# Patient Record
Sex: FEMALE | Race: WHITE | NOT HISPANIC OR LATINO | Employment: FULL TIME | URBAN - METROPOLITAN AREA
[De-identification: names, ages, dates, MRNs, and addresses within clinical notes are randomized per-mention and may not be internally consistent; named-entity substitution may affect disease eponyms.]

---

## 2018-10-30 ENCOUNTER — TRANSFERRED RECORDS (OUTPATIENT)
Dept: HEALTH INFORMATION MANAGEMENT | Facility: CLINIC | Age: 43
End: 2018-10-30

## 2019-02-27 ENCOUNTER — TRANSFERRED RECORDS (OUTPATIENT)
Dept: HEALTH INFORMATION MANAGEMENT | Facility: CLINIC | Age: 44
End: 2019-02-27

## 2019-03-07 ENCOUNTER — TELEPHONE (OUTPATIENT)
Dept: DERMATOLOGY | Facility: CLINIC | Age: 44
End: 2019-03-07

## 2019-06-13 NOTE — TELEPHONE ENCOUNTER
FUTURE VISIT INFORMATION      FUTURE VISIT INFORMATION:    Date: 6.27    Time: 1p    Location: Derm  REFERRAL INFORMATION:    Referring provider:       Referring providers clinic:  [facility]    Reason for visit/diagnosis  HS    RECORDS REQUESTED FROM:       Clinic name Comments Records Status Imaging Status   [facility] 2.27.19 Office note  10.30.18 Labs Scanned to [facility]

## 2019-06-27 ENCOUNTER — OFFICE VISIT (OUTPATIENT)
Dept: DERMATOLOGY | Facility: CLINIC | Age: 44
End: 2019-06-27
Payer: COMMERCIAL

## 2019-06-27 ENCOUNTER — PRE VISIT (OUTPATIENT)
Dept: DERMATOLOGY | Facility: CLINIC | Age: 44
End: 2019-06-27

## 2019-06-27 VITALS — SYSTOLIC BLOOD PRESSURE: 123 MMHG | HEART RATE: 75 BPM | DIASTOLIC BLOOD PRESSURE: 84 MMHG

## 2019-06-27 DIAGNOSIS — L73.2 HIDRADENITIS SUPPURATIVA: Primary | ICD-10-CM

## 2019-06-27 RX ORDER — CLOBETASOL PROPIONATE 0.5 MG/G
OINTMENT TOPICAL
Qty: 60 G | Refills: 1 | Status: SHIPPED | OUTPATIENT
Start: 2019-06-27 | End: 2020-03-20

## 2019-06-27 RX ORDER — OXYCODONE AND ACETAMINOPHEN 5; 325 MG/1; MG/1
1 TABLET ORAL EVERY 6 HOURS PRN
Qty: 10 TABLET | Refills: 0 | Status: SHIPPED | OUTPATIENT
Start: 2019-06-27 | End: 2019-09-05

## 2019-06-27 RX ORDER — SPIRONOLACTONE 100 MG/1
100 TABLET, FILM COATED ORAL DAILY
Qty: 90 TABLET | Refills: 3 | Status: SHIPPED | OUTPATIENT
Start: 2019-06-27 | End: 2020-03-20

## 2019-06-27 ASSESSMENT — PAIN SCALES - GENERAL: PAINLEVEL: MILD PAIN (2)

## 2019-06-27 NOTE — LETTER
6/27/2019       RE: Klarissa Curtis  [address] [facility] [address]     Dear Colleague,    Thank you for referring your patient, Klarissa Curtis, to the [facility] DERMATOLOGY at [facility]. Please see a copy of my visit note below.    [facility] Dermatology Note    Dermatology Clinic  [facility]  Clinics and Surgery Center  [address] [place] [address]    Dermatology Problem List:  1. Hidradenitis suppurativa: diagnosed age 11, initially on waistline (used to weigh 300 lbs) which cleared up -> intermittent outbreaks in groin -> for the past 10 years has had in bilateral axilla, groin, thighs, and buttocks   - Previous tx: PO antibiotics, Spironolactone, Finasteride, Rifampin, steroid injections  - Current tx: Humira (started 11/18/2018) 40mg weekly; Started 06/27/2019: clobetasol PRN, topical morphine PRN, spironolactone 100mg.    Encounter Date: Jun 27, 2019    CC:  Chief Complaint   Patient presents with     Derm Problem     Klarissa is here today for consult for HS, patient states it is located on both axilla and groin area. Previously on her waist but not recently.      History of Present Illness:  Ms. Klarissa Curtis is a 44 year old female who presents as a referral from [facility] in regards to her Hidradenitis suppurativa. This is her first visit with us at our dermatology clinic.     Since starting Humira, she doesn't think she is getting any new lesions but the current ones have not changed. She notes fatigue and a bit of weight gain since starting Humira. She states that her alcohol use has decreased since starting this medication as she used to self-medicate with alcohol. She feels steroid injections have helped in the past and allowed her to move. Pt notes previous use of many antibiotics without success. Her flares seem to be associated with her menstrual cycle, which have been irregular  recently. Reduction in smoking habits did not seem to help her symptoms. Pt notes recent swelling of her left middle finger, which hurts when she bends it.    Family Hx of breast cancer. No Hx of diabetes, pre-diabetes, or high blood pressure.    She is working [place] a  [place]    Past Medical History:   There is no problem list on file for this patient.    Past Medical History:   Diagnosis Date     NO ACTIVE PROBLEMS      Past Surgical History:   Procedure Laterality Date     BUNIONECTOMY       Social History:  Patient reports that she has been smoking.  She has never used smokeless tobacco.    Family History:  Family History   Problem Relation Age of Onset     Diabetes Sister      Hypertension Sister      Medications:  Current Outpatient Medications   Medication Sig Dispense Refill     adalimumab (HUMIRA *CF*) 40 MG/0.4ML pen kit        Allergies   Allergen Reactions     Penicillins Hives, Itching and Rash     Review of Systems:  -As per HPI  -Otherwise nml state of health. No other skin concerns.     Physical exam:  Vitals: /84 (BP Location: Left arm, Patient Position: Sitting, Cuff Size: Adult Large)   Pulse 75   GEN: This is a well developed, well-nourished female in no acute distress, in a pleasant mood.    SKIN: Full skin, which includes the head/face, both arms, chest, back, abdomen,both legs, genitalia and/or groin buttocks, digits and/or nails, was examined.  - 6 inflammatory nodules in R axilla, with 5mm vertical cord.  - 3 inflammatory nodules in the L axilla.  - Scarred plaque, non inflamed, on L abdomen  - 1 inflammatory nodule, R inguinal fold  - 2 inflammatory nodule in L upper thigh  - 1 inflammatory nodule on L lower buttock  - Cystic nodules and open comedones   - Cystic nodule on suprapubic region  - Acneiform papules on the jawline and posterior neck  - No other lesions of concern on areas examined.     Impression/Plan:  1. Hidradenitis suppurativa    Prescribed  topical morphine. Pt to apply 4 times day, PRN    Prescribed clobetasol ointment. Pt to apply PRN for itching.    Prescribed Spironolactone. Pt to take 100mg daily.    Prescribed oxycodone. Pt to take 1 tablet every 6 hours as needed. (10 tablets)    Prescribed Mepilex bandages.    Kenalog injection procedure note: After positioning and cleansing with isopropyl alcohol, 4 total cc of Kenalog 10 mg/cc was injected into multiple lesions Lesion(s) on the axilla and groin.  The patient tolerated the procedure well and left the Dermatology clinic in good condition.    Adalimumab Activity and Neutralizing Antibody to be taken, prior to next Humira injection.    Referral to plastic surgery provided for HS excision of R axilla.    Follow-up in 8 weeks, earlier for new or changing lesions.     Staff Involved: Flavio Raza MD (resident)    Scribe Disclosure  I, Ruben Crowe, am serving as a scribe to document services personally performed by Dr. Jean Kenny, based on data collection and the provider's statements to me.     Provider Disclosure:   The documentation recorded by the scribe accurately reflects the services I personally performed and the decisions made by me.    Jean Kenny MD, FAAD    Departments of Internal Medicine and Dermatology  [facility]  [phone]    More than half of the patient visit was spent counseling the patient on diagnosis/treatment.  Face-to-face time: 60 minutes.             Again, thank you for allowing me to participate in the care of your patient.      Sincerely,    Jean Kenny MD

## 2019-06-27 NOTE — NURSING NOTE
Drug Administration Record    Prior to injection, verified patient identity using patient's name and date of birth.  Due to injection administration, patient instructed to remain in clinic for 15 minutes  afterwards, and to report any adverse reaction to me immediately.    Drug Name: triamcinolone acetonide(kenalog)  Dose: 4mL of triamcinolone 10mg/mL, 40mg dose  Route administered: ID  NDC #: [id number]: Kenalog-10 ([id number])  Amount of waste(mL):1  Reason for waste: Multi dose vial    LOT #: [id number]  SITE: Body  : [employer]  EXPIRATION DATE: OCT2020

## 2019-06-27 NOTE — NURSING NOTE
Chief Complaint   Patient presents with     Derm Problem     Klarissa is here today for consult for HS, patient states it is located on both axilla and groin area. Previously on her waist but not recently.      Kasandra Billy LPN

## 2019-06-27 NOTE — PROGRESS NOTES
[facility] Dermatology Note    Dermatology Clinic  [facility]  Clinics and Surgery Center  [address] [place] [id number]    Dermatology Problem List:  1. Hidradenitis suppurativa: diagnosed age 11, initially on waistline (used to weigh 300 lbs) which cleared up -> intermittent outbreaks in groin -> for the past 10 years has had in bilateral axilla, groin, thighs, and buttocks   - Previous tx: PO antibiotics, Spironolactone, Finasteride, Rifampin, steroid injections  - Current tx: Humira (started 11/18/2018) 40mg weekly; Started 06/27/2019: clobetasol PRN, topical morphine PRN, spironolactone 100mg.    Encounter Date: Jun 27, 2019    CC:  Chief Complaint   Patient presents with     Derm Problem     Klarissa is here today for consult for HS, patient states it is located on both axilla and groin area. Previously on her waist but not recently.      History of Present Illness:  Ms. Klarissa Curtis is a 44 year old female who presents as a referral from [facility] in regards to her Hidradenitis suppurativa. This is her first visit with us at our dermatology clinic.     Since starting Humira, she doesn't think she is getting any new lesions but the current ones have not changed. She notes fatigue and a bit of weight gain since starting Humira. She states that her alcohol use has decreased since starting this medication as she used to self-medicate with alcohol. She feels steroid injections have helped in the past and allowed her to move. Pt notes previous use of many antibiotics without success. Her flares seem to be associated with her menstrual cycle, which have been irregular recently. Reduction in smoking habits did not seem to help her symptoms. Pt notes recent swelling of her left middle finger, which hurts when she bends it.    Family Hx of breast cancer. No Hx of diabetes, pre-diabetes, or high blood pressure.    She is working as a   [place]    Past Medical History:   There is no problem list on file for this patient.    Past Medical History:   Diagnosis Date     NO ACTIVE PROBLEMS      Past Surgical History:   Procedure Laterality Date     BUNIONECTOMY       Social History:  Patient reports that she has been smoking.  She has never used smokeless tobacco.    Family History:  Family History   Problem Relation Age of Onset     Diabetes Sister      Hypertension Sister      Medications:  Current Outpatient Medications   Medication Sig Dispense Refill     adalimumab (HUMIRA *CF*) 40 MG/0.4ML pen kit        Allergies   Allergen Reactions     Penicillins Hives, Itching and Rash     Review of Systems:  -As per HPI  -Otherwise nml state of health. No other skin concerns.     Physical exam:  Vitals: /84 (BP Location: Left arm, Patient Position: Sitting, Cuff Size: Adult Large)   Pulse 75   GEN: This is a well developed, well-nourished female in no acute distress, in a pleasant mood.    SKIN: Full skin, which includes the head/face, both arms, chest, back, abdomen,both legs, genitalia and/or groin buttocks, digits and/or nails, was examined.  - 6 inflammatory nodules in R axilla, with 5mm vertical cord.  - 3 inflammatory nodules in the L axilla.  - Scarred plaque, non inflamed, on L abdomen  - 1 inflammatory nodule, R inguinal fold  - 2 inflammatory nodule in L upper thigh  - 1 inflammatory nodule on L lower buttock  - Cystic nodules and open comedones   - Cystic nodule on suprapubic region  - Acneiform papules on the jawline and posterior neck  - No other lesions of concern on areas examined.     Impression/Plan:  1. Hidradenitis suppurativa    Prescribed topical morphine. Pt to apply 4 times day, PRN    Prescribed clobetasol ointment. Pt to apply PRN for itching.    Prescribed Spironolactone. Pt to take 100mg daily.    Prescribed oxycodone. Pt to take 1 tablet every 6 hours as needed. (10 tablets)    Prescribed Mepilex  bandages.    Kenalog injection procedure note: After positioning and cleansing with isopropyl alcohol, 4 total cc of Kenalog 10 mg/cc was injected into multiple lesions Lesion(s) on the axilla and groin.  The patient tolerated the procedure well and left the Dermatology clinic in good condition.    Adalimumab Activity and Neutralizing Antibody to be taken, prior to next Humira injection.    Referral to plastic surgery provided for HS excision of R axilla.    Follow-up in 8 weeks, earlier for new or changing lesions.     Staff Involved: Flavio Raza MD (resident)    Scribe Disclosure  I, Ruben Crowe, am serving as a scribe to document services personally performed by Dr. Jean Kenny, based on data collection and the provider's statements to me.     Provider Disclosure:   The documentation recorded by the scribe accurately reflects the services I personally performed and the decisions made by me.    Jean Kenny MD, FAAD    Departments of Internal Medicine and Dermatology  [facility]  [phone]    More than half of the patient visit was spent counseling the patient on diagnosis/treatment.  Face-to-face time: 60 minutes.       Provider Disclosure:   The documentation recorded by the scribe accurately reflects the services I personally performed and the decisions made by me.    Jean Kenny MD, FAAD    Departments of Internal Medicine and Dermatology  [facility]  [phone]    Staff Physician Comments:   I saw and evaluated the patient with the resident and I agree with the assessment and plan.  I was present for the entire procedure and examination.    Jean Kenny MD, FAABERTRAND    Departments of Internal Medicine and Dermatology  [facility]  [phone]

## 2019-06-27 NOTE — LETTER
Date:June 28, 2019      Patient was self referred, no letter generated. Do not send.        [facility] Physicians Health Information

## 2019-09-05 ENCOUNTER — OFFICE VISIT (OUTPATIENT)
Dept: DERMATOLOGY | Facility: CLINIC | Age: 44
End: 2019-09-05
Payer: COMMERCIAL

## 2019-09-05 ENCOUNTER — TELEPHONE (OUTPATIENT)
Dept: DERMATOLOGY | Facility: CLINIC | Age: 44
End: 2019-09-05

## 2019-09-05 DIAGNOSIS — L73.2 HIDRADENITIS SUPPURATIVA: Primary | ICD-10-CM

## 2019-09-05 DIAGNOSIS — L73.2 HIDRADENITIS SUPPURATIVA: ICD-10-CM

## 2019-09-05 LAB
ALBUMIN SERPL-MCNC: 3.7 G/DL (ref 3.4–5)
ALP SERPL-CCNC: 60 U/L (ref 40–150)
ALT SERPL W P-5'-P-CCNC: 20 U/L (ref 0–50)
ANION GAP SERPL CALCULATED.3IONS-SCNC: 4 MMOL/L (ref 3–14)
AST SERPL W P-5'-P-CCNC: 14 U/L (ref 0–45)
BASOPHILS # BLD AUTO: 0 10E9/L (ref 0–0.2)
BASOPHILS NFR BLD AUTO: 0.4 %
BILIRUB SERPL-MCNC: 0.3 MG/DL (ref 0.2–1.3)
BUN SERPL-MCNC: 14 MG/DL (ref 7–30)
CALCIUM SERPL-MCNC: 8.6 MG/DL (ref 8.5–10.1)
CHLORIDE SERPL-SCNC: 102 MMOL/L (ref 94–109)
CO2 SERPL-SCNC: 29 MMOL/L (ref 20–32)
CREAT SERPL-MCNC: 0.67 MG/DL (ref 0.52–1.04)
DIFFERENTIAL METHOD BLD: NORMAL
EOSINOPHIL # BLD AUTO: 0.2 10E9/L (ref 0–0.7)
EOSINOPHIL NFR BLD AUTO: 2.2 %
ERYTHROCYTE [DISTWIDTH] IN BLOOD BY AUTOMATED COUNT: 13.1 % (ref 10–15)
GFR SERPL CREATININE-BSD FRML MDRD: >90 ML/MIN/{1.73_M2}
GLUCOSE SERPL-MCNC: 79 MG/DL (ref 70–99)
HCT VFR BLD AUTO: 43.3 % (ref 35–47)
HGB BLD-MCNC: 14.2 G/DL (ref 11.7–15.7)
IMM GRANULOCYTES # BLD: 0 10E9/L (ref 0–0.4)
IMM GRANULOCYTES NFR BLD: 0.3 %
LYMPHOCYTES # BLD AUTO: 2.9 10E9/L (ref 0.8–5.3)
LYMPHOCYTES NFR BLD AUTO: 39.6 %
MCH RBC QN AUTO: 31.1 PG (ref 26.5–33)
MCHC RBC AUTO-ENTMCNC: 32.8 G/DL (ref 31.5–36.5)
MCV RBC AUTO: 95 FL (ref 78–100)
MONOCYTES # BLD AUTO: 0.5 10E9/L (ref 0–1.3)
MONOCYTES NFR BLD AUTO: 6.6 %
NEUTROPHILS # BLD AUTO: 3.7 10E9/L (ref 1.6–8.3)
NEUTROPHILS NFR BLD AUTO: 50.9 %
NRBC # BLD AUTO: 0 10*3/UL
NRBC BLD AUTO-RTO: 0 /100
PLATELET # BLD AUTO: 403 10E9/L (ref 150–450)
POTASSIUM SERPL-SCNC: 4 MMOL/L (ref 3.4–5.3)
PROT SERPL-MCNC: 7.9 G/DL (ref 6.8–8.8)
RBC # BLD AUTO: 4.56 10E12/L (ref 3.8–5.2)
SODIUM SERPL-SCNC: 134 MMOL/L (ref 133–144)
WBC # BLD AUTO: 7.2 10E9/L (ref 4–11)

## 2019-09-05 RX ORDER — METHYLPREDNISOLONE SODIUM SUCCINATE 125 MG/2ML
40 INJECTION, POWDER, LYOPHILIZED, FOR SOLUTION INTRAMUSCULAR; INTRAVENOUS ONCE
Status: CANCELLED | OUTPATIENT
Start: 2019-09-05

## 2019-09-05 RX ORDER — OXYCODONE AND ACETAMINOPHEN 5; 325 MG/1; MG/1
1 TABLET ORAL EVERY 6 HOURS PRN
Qty: 10 TABLET | Refills: 0 | Status: SHIPPED | OUTPATIENT
Start: 2019-09-05 | End: 2019-11-07

## 2019-09-05 ASSESSMENT — PAIN SCALES - GENERAL: PAINLEVEL: MILD PAIN (2)

## 2019-09-05 NOTE — TELEPHONE ENCOUNTER
Patient to start infusions. Sending to finance specialists to check insurance coverage.      Bev Cavazos LPN

## 2019-09-05 NOTE — LETTER
9/5/2019       RE: Klarissa Curtis  [address] [address]     Dear Colleague,    Thank you for referring your patient, Klarissa Curtis, to the [facility] DERMATOLOGY at [facility]. Please see a copy of my visit note below.    [facility] Dermatology Note    Dermatology Clinic  [facility]  Clinics and Surgery Center  [address] [place] [address]    Dermatology Problem List:  1. Hidradenitis suppurativa: diagnosed age 11, initially on waistline (used to weigh 300 lbs) which cleared up -> intermittent outbreaks-> for the past 10 years has had in bilateral axilla, groin, thighs, and buttocks  - Current tx:    - infliximab started 9/5/19   - clobetasol PRN, topical morphine PRN    - Kenalog injections intermittently, last 9/5/19  - Previous tx: PO antibiotics, Finasteride, Rifampin,  Spironolactone (6/2019- 9/5/19, stopped due to abnormal uterine bleeding); Humira 40 mg weekly (11/18/2018-9/2019, stopped due to ineffective)    Encounter Date: Sep 5, 2019    CC:  Chief Complaint   Patient presents with     Derm Problem     10 week follow up, no changes     History of Present Illness:  Ms. Klarissa Curtis is a 44 year old female without other PMH apart from HS, who presents for followup for Hidradenitis suppurativa. The patient last saw us in clinic on 06/27/19. At the time, she reported no new changes while on spironolactone and humira. She noted that flares seemed to increase with her menstrual cycle which she had noted was irregular. She was prescribed: topical morphine to apply qid PRN, clobetasol ointment PRN for pruritis, spirnolactone 100 mg qd, and percocet prn for pain. She received Kenalog injection and continues on Humira. She also had a referral to plastic surgery for excision of R axilla, which was not yet seen.    Today the patient reports that her HS has remained about the same. She was on Humira since  October of last year. In the interim, she has not had time to do labs as she doesn't have time on Sundays (prior taking her medications).  She had a flare-up on the Lt groin and Rt axilla with lesions active 90% of the time with constant draining and tenderness. Used percocet when she has pain and to get her to sleep. She last took 2 tablets yesterday. She notes that about 3-4 times per month she will have a bad flare.     She has some pain with sitting. It became irregular after the start of Spironolactone, but she is unsure if it may actually be due to menopause. Her menstruation normally lasts 4 days, but now it is 1.5 weeks long.    After starting Humira 10/2019, there are no new lesions but current lesions remain the same. She has not been using the clobetasol cream regularly due to not feeling itching. With regards to the morphine topical, she states that she was unable to locate a pharmacy that distributed it. She continues to take the Spironolactone and has been for the past 6 weeks; she is not sure if it is working as her HS hasn't changed.    Past Medical History:   Patient Active Problem List   Diagnosis     Hidradenitis suppurativa     Past Surgical History:   Procedure Laterality Date     BUNIONECTOMY       Social History:  Patient reports that she has been smoking.  She has never used smokeless tobacco. 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 Her mother is a nurse.    Family History:  Family History   Problem Relation Age of Onset     Diabetes Sister      Hypertension Sister      Medications:  Current Outpatient Medications   Medication Sig Dispense Refill     adalimumab (HUMIRA *CF*) 40 MG/0.4ML pen kit        clobetasol (TEMOVATE) 0.05 % external ointment Pain, itching irritation 60 g 1     morphine 0.1% in intrasite topical gel Place 1 Applicatorful onto the skin 4 times daily as needed for pain 25 g 0     oxyCODONE-acetaminophen (PERCOCET) 5-325 MG tablet Take 1 tablet  by mouth every 6 hours as needed for severe pain 10 tablet 0     spironolactone (ALDACTONE) 100 MG tablet Take 1 tablet (100 mg) by mouth daily 90 tablet 3     Allergies   Allergen Reactions     Penicillins Hives, Itching and Rash     Review of Systems:  -As per HPI  -Gastrointestinal: Normal BM.  -Otherwise nml state of health. No other skin concerns.     Physical exam:  Vitals: /80 (BP Location: Right arm)   Pulse 86   GEN: This is a well developed, well-nourished female in no acute distress, in a pleasant mood.    SKIN: Focused examination of the axillas bilaterally, proximal upper extremities and buttocks was performed.  -Julian stage II  - Rt and left axilla: multiple non-inflammatory nodule with scarring and multiple sinus tracts,  Two inflammatory nodules on right axilla  - Lt groin tunnel in L inguinal fold, with one open nodule with white sinus draining  -Rt groin 1 draining nodule, with one open nodule with white sinus draining   - no perianal HS involvement  -nevi homogenous and fairly symmetric on buttocks upon examination with dermoscopy on right arm  - No other lesions of concern on areas examined.     Impression/Plan:  1. Hidradenitis suppurativa, Julian stage II  Stable since last visit. Uncertain if Humira is on target dose as no level was done at this time. She failed multiple previous treatment. Would switch from Humira to infliximab.    Start Infliximab (switch from Humira given not effective, will continue current Humira until approval) .     LABS: CBC with platelets differential, Quantiferon TB Gold Plus, CMP today, and annually    Continue clobetasol PRN for itching, topical morphine PRN, percocet prn for severe pain    Unable to use Mepilex bandages due to insurance restrictions    Stop spironolactone given abnormal uterine bleeding    Kenalog injection procedure note: After positioning and cleansing with isopropyl alcohol,  4 total cc of Kenalog 10 mg/cc was injected into groin nodule  bilaterally and R axilla; 4 Lesion(s) total.  The patient tolerated the procedure well and left the Dermatology clinic in good condition.    Placed another referral to plastic surgery provided for HS excision of R axilla. (not went through on last visit)    2.  Abnormal uterine bleeding  Onset ~6/ 2019 reported to be irregular 2 weeks prior to start of spironolactone. And becomes more irregular with 1-2 weeks of continuous period, 2 tampon/day. Possible ddx include spironolactone-related bleeding from abnormal hormone, gyne bleeding (has h/o polyps), or perimenopausal.     - Placed a referral to OB/GYN due to irregular menstruation.    Follow-up in 12 weeks, or prn.    Tanja Monzon MD  Internal Medicine PGY-2  Pager: [phone]    Staff Involved:    Scribe Disclosure  I, June Augustus, am serving as a scribe to document services personally performed by Dr. Jean Kenny, based on data collection and the provider's statements to me.     Provider Disclosure:   The documentation recorded by the scribe accurately reflects the services I personally performed and the decisions made by me.    Jean Kenny MD, FAAD    Departments of Internal Medicine and Dermatology  [facility]  [phone]        Staff Physician Comments:   I saw and evaluated the patient with the resident and I agree with the assessment and plan.  I was present for the key portions of the above procedure and examination.    Jean Kenny MD, FAAD    Departments of Internal Medicine and Dermatology  [facility]  [phone]              Again, thank you for allowing me to participate in the care of your patient.      Sincerely,    Jean Kenny MD

## 2019-09-05 NOTE — LETTER
Date:September 6, 2019      Patient was self referred, no letter generated. Do not send.        [facility] Physicians Health Information

## 2019-09-05 NOTE — PROGRESS NOTES
[facility] Dermatology Note    Dermatology Clinic  [facility]  [facility]  [address] [place] [id number]    Dermatology Problem List:  1. Hidradenitis suppurativa: diagnosed age 11, initially on waistline (used to weigh 300 lbs) which cleared up -> intermittent outbreaks-> for the past 10 years has had in bilateral axilla, groin, thighs, and buttocks  - Current tx:    - infliximab started 9/5/19   - clobetasol PRN, topical morphine PRN    - Kenalog injections intermittently, last 9/5/19  - Previous tx: PO antibiotics, Finasteride, Rifampin,  Spironolactone (6/2019- 9/5/19, stopped due to abnormal uterine bleeding); Humira 40 mg weekly (11/18/2018-9/2019, stopped due to ineffective)    Encounter Date: Sep 5, 2019    CC:  Chief Complaint   Patient presents with     Derm Problem     10 week follow up, no changes     History of Present Illness:  Ms. Klarissa Curtis is a 44 year old female without other PMH apart from HS, who presents for followup for Hidradenitis suppurativa. The patient last saw us in clinic on 06/27/19. At the time, she reported no new changes while on spironolactone and humira. She noted that flares seemed to increase with her menstrual cycle which she had noted was irregular. She was prescribed: topical morphine to apply qid PRN, clobetasol ointment PRN for pruritis, spirnolactone 100 mg qd, and percocet prn for pain. She received Kenalog injection and continues on Humira. She also had a referral to plastic surgery for excision of R axilla, which was not yet seen.    Today the patient reports that her HS has remained about the same. She was on Humira since October of last year. In the interim, she has not had time to do labs as she doesn't have time on Sundays (prior taking her medications).  She had a flare-up on the Lt groin and Rt axilla with lesions active 90% of the time with constant draining and tenderness. Used percocet when  she has pain and to get her to sleep. She last took 2 tablets yesterday. She notes that about 3-4 times per month she will have a bad flare.     She has some pain with sitting. It became irregular after the start of Spironolactone, but she is unsure if it may actually be due to menopause. Her menstruation normally lasts 4 days, but now it is 1.5 weeks long.    After starting Humira 10/2019, there are no new lesions but current lesions remain the same. She has not been using the clobetasol cream regularly due to not feeling itching. With regards to the morphine topical, she states that she was unable to locate a pharmacy that distributed it. She continues to take the Spironolactone and has been for the past 6 weeks; she is not sure if it is working as her HS hasn't changed.    Past Medical History:   Patient Active Problem List   Diagnosis     Hidradenitis suppurativa     Past Surgical History:   Procedure Laterality Date     BUNIONECTOMY       Social History:  Patient reports that she has been smoking.  She has never used smokeless tobacco. The patient works for a company that runs [employer] in the [place]. Her mother is a nurse.    Family History:  Family History   Problem Relation Age of Onset     Diabetes Sister      Hypertension Sister      Medications:  Current Outpatient Medications   Medication Sig Dispense Refill     adalimumab (HUMIRA *CF*) 40 MG/0.4ML pen kit        clobetasol (TEMOVATE) 0.05 % external ointment Pain, itching irritation 60 g 1     morphine 0.1% in intrasite topical gel Place 1 Applicatorful onto the skin 4 times daily as needed for pain 25 g 0     oxyCODONE-acetaminophen (PERCOCET) 5-325 MG tablet Take 1 tablet by mouth every 6 hours as needed for severe pain 10 tablet 0     spironolactone (ALDACTONE) 100 MG tablet Take 1 tablet (100 mg) by mouth daily 90 tablet 3     Allergies   Allergen Reactions     Penicillins Hives, Itching and Rash     Review of Systems:  -As per  HPI  -Gastrointestinal: Normal BM.  -Otherwise nml state of health. No other skin concerns.     Physical exam:  Vitals: /80 (BP Location: Right arm)   Pulse 86   GEN: This is a well developed, well-nourished female in no acute distress, in a pleasant mood.    SKIN: Focused examination of the axillas bilaterally, proximal upper extremities and buttocks was performed.  -Julian stage II  - Rt and left axilla: multiple non-inflammatory nodule with scarring and multiple sinus tracts,  Two inflammatory nodules on right axilla  - Lt groin tunnel in L inguinal fold, with one open nodule with white sinus draining  -Rt groin 1 draining nodule, with one open nodule with white sinus draining   - no perianal HS involvement  -nevi homogenous and fairly symmetric on buttocks upon examination with dermoscopy on right arm  - No other lesions of concern on areas examined.     Impression/Plan:  1. Hidradenitis suppurativa, Julian stage II  Stable since last visit. Uncertain if Humira is on target dose as no level was done at this time. She failed multiple previous treatment. Would switch from Humira to infliximab.    Start Infliximab (switch from Humira given not effective, will continue current Humira until approval) .     LABS: CBC with platelets differential, Quantiferon TB Gold Plus, CMP today, and annually    Continue clobetasol PRN for itching, topical morphine PRN, percocet prn for severe pain    Unable to use Mepilex bandages due to insurance restrictions    Stop spironolactone given abnormal uterine bleeding    Kenalog injection procedure note: After positioning and cleansing with isopropyl alcohol,  4 total cc of Kenalog 10 mg/cc was injected into groin nodule bilaterally and R axilla; 4 Lesion(s) total.  The patient tolerated the procedure well and left the Dermatology clinic in good condition.    Placed another referral to plastic surgery provided for HS excision of R axilla. (not went through on last visit)    2.   Abnormal uterine bleeding  Onset ~6/ 2019 reported to be irregular 2 weeks prior to start of spironolactone. And becomes more irregular with 1-2 weeks of continuous period, 2 tampon/day. Possible ddx include spironolactone-related bleeding from abnormal hormone, gyne bleeding (has h/o polyps), or perimenopausal.     - Placed a referral to OB/GYN due to irregular menstruation.    Follow-up in 12 weeks, or prn.    Tanja Monzon MD  Internal Medicine PGY-2  Pager: [phone]    Staff Involved:    Scribe Disclosure  I, June Augustus, am serving as a scribe to document services personally performed by Dr. Jean Kenny, based on data collection and the provider's statements to me.     Provider Disclosure:   The documentation recorded by the scribe accurately reflects the services I personally performed and the decisions made by me.    Jean Kenny MD, FAAD    Departments of Internal Medicine and Dermatology  [facility]  [phone]        Staff Physician Comments:   I saw and evaluated the patient with the resident and I agree with the assessment and plan.  I was present for the key portions of the above procedure and examination.    Jean Kenny MD, FAAD    Departments of Internal Medicine and Dermatology  [facility]  [phone]

## 2019-09-05 NOTE — NURSING NOTE
Chief Complaint   Patient presents with     Derm Problem     10 week follow up, no changes     Magali Snider, EMT

## 2019-09-09 LAB
GAMMA INTERFERON BACKGROUND BLD IA-ACNC: 0.04 IU/ML
M TB IFN-G BLD-IMP: NEGATIVE
M TB IFN-G CD4+ BCKGRND COR BLD-ACNC: >10 IU/ML
MITOGEN IGNF BCKGRD COR BLD-ACNC: 0 IU/ML
MITOGEN IGNF BCKGRD COR BLD-ACNC: 0 IU/ML

## 2019-09-16 ENCOUNTER — TELEPHONE (OUTPATIENT)
Dept: PLASTIC SURGERY | Facility: CLINIC | Age: 44
End: 2019-09-16

## 2019-09-16 VITALS
SYSTOLIC BLOOD PRESSURE: 112 MMHG | HEART RATE: 86 BPM | BODY MASS INDEX: 37.67 KG/M2 | WEIGHT: 240 LBS | DIASTOLIC BLOOD PRESSURE: 80 MMHG | HEIGHT: 67 IN

## 2019-09-16 ASSESSMENT — MIFFLIN-ST. JEOR: SCORE: 1766.26

## 2019-09-16 NOTE — TELEPHONE ENCOUNTER
Prior Authorization required. This is off-label or against insurance policy. This may take 10-14 business days.    If patient were to dose as scheduled, it would likely not be covered by insurance.  recommendation is to wait for insurance approval, unless deemed medically necessary by provider.

## 2019-09-16 NOTE — TELEPHONE ENCOUNTER
Health Call Center    Phone Message    May a detailed message be left on voicemail: yes    Reason for Call: Question regarding specialist protocol  Please follow protocols- only utilize this documentation for questions or concerns that are not clear in the protocol.  Contact clinic directly to clarify question(s) via phone or [employer] message.   Was Clinic Available: yes  Question regarding protocol:  Pt has a referral to be seen for Hidradenitis suppurativa. Guideline says to transfer to Dermatology but Derm is referring pt to plastic.  Is there a referral for the requested specialist/specialty? yes  Name of referring provider: Jean Kenny MD  Location of referring provider: Dermatology       Action Taken: Message routed to:  [facility] ([facility]): Plastic Surgery

## 2019-09-24 NOTE — TELEPHONE ENCOUNTER
Prior authorization denied. Appeal in process, which can be a lengthy process.  will report back when response received.

## 2019-09-27 ENCOUNTER — OFFICE VISIT (OUTPATIENT)
Dept: OBGYN | Facility: CLINIC | Age: 44
End: 2019-09-27
Attending: DERMATOLOGY
Payer: COMMERCIAL

## 2019-09-27 VITALS
SYSTOLIC BLOOD PRESSURE: 129 MMHG | HEART RATE: 91 BPM | DIASTOLIC BLOOD PRESSURE: 84 MMHG | BODY MASS INDEX: 37.67 KG/M2 | WEIGHT: 240 LBS | HEIGHT: 67 IN

## 2019-09-27 DIAGNOSIS — N92.6 IRREGULAR MENSES: Primary | ICD-10-CM

## 2019-09-27 DIAGNOSIS — Z01.419 ENCOUNTER FOR GYNECOLOGICAL EXAMINATION WITHOUT ABNORMAL FINDING: ICD-10-CM

## 2019-09-27 DIAGNOSIS — Z12.4 SCREENING FOR MALIGNANT NEOPLASM OF CERVIX: ICD-10-CM

## 2019-09-27 DIAGNOSIS — L73.2 HIDRADENITIS SUPPURATIVA: ICD-10-CM

## 2019-09-27 PROCEDURE — G0145 SCR C/V CYTO,THINLAYER,RESCR: HCPCS | Performed by: OBSTETRICS & GYNECOLOGY

## 2019-09-27 PROCEDURE — 87624 HPV HI-RISK TYP POOLED RSLT: CPT | Performed by: OBSTETRICS & GYNECOLOGY

## 2019-09-27 PROCEDURE — G0463 HOSPITAL OUTPT CLINIC VISIT: HCPCS | Mod: ZF

## 2019-09-27 ASSESSMENT — PATIENT HEALTH QUESTIONNAIRE - PHQ9
5. POOR APPETITE OR OVEREATING: SEVERAL DAYS
SUM OF ALL RESPONSES TO PHQ QUESTIONS 1-9: 3

## 2019-09-27 ASSESSMENT — ANXIETY QUESTIONNAIRES
3. WORRYING TOO MUCH ABOUT DIFFERENT THINGS: NOT AT ALL
6. BECOMING EASILY ANNOYED OR IRRITABLE: NOT AT ALL
7. FEELING AFRAID AS IF SOMETHING AWFUL MIGHT HAPPEN: NOT AT ALL
2. NOT BEING ABLE TO STOP OR CONTROL WORRYING: NOT AT ALL
5. BEING SO RESTLESS THAT IT IS HARD TO SIT STILL: NOT AT ALL
GAD7 TOTAL SCORE: 1
1. FEELING NERVOUS, ANXIOUS, OR ON EDGE: NOT AT ALL

## 2019-09-27 ASSESSMENT — PAIN SCALES - GENERAL: PAINLEVEL: NO PAIN (0)

## 2019-09-27 ASSESSMENT — MIFFLIN-ST. JEOR: SCORE: 1766.26

## 2019-09-27 NOTE — PROGRESS NOTES
Progress Note    SUBJECTIVE:      S: Klarissa Curtis is a 44 year old  here today for annual exam and to discuss recent irregular menses. She has severe hidradenitis suppurativa effecting her axilla and groin.  She is under the care of Dr. Kenny.  She is currently on Humira but this is not been effective and they are hoping to start infliximab. She is considering surgery if she can figure out the financial aspect of this and the loss of work.    She has been gone from [place] for 20 years but is back to be with family and for a better medical team.  She does not think she has had a GYN exam in about 4 years.  She reports normal Pap smears.  She does state that she had a history of a cervical polyp but otherwise no abnormal GYN history.  Her last intercourse was in .  Her H.S. has been consuming her and management.  It is quite painful and uncomfortable.  It is made her very hesitant to start a new relationship.  She is currently .    She states that she was started on spironolactone in 2019.  Prior to this her menses were very regular but then began having her menses every 2 weeks then she went one whole month of continued spotting and bleeding.  When she forgot to of her spironolactone her bleeding stopped.  She has had significant weight gain on her Humira.  She does not think her thyroid has ever been checked.    Her mother had breast cancer in her late 40's.  Maura has not had a mammogram since prior to age 39.      Menstrual History:  Menstrual History 2019   LAST MENSTRUAL PERIOD 8/15/2019       Mammogram current: no (patient will schedule)  Last Mammogram:   No results found.     Last Colonoscopy:  No results found for this or any previous visit.      HISTORY:  adalimumab (HUMIRA *CF*) 40 MG/0.4ML pen kit,   clobetasol (TEMOVATE) 0.05 % external ointment, Pain, itching irritation  morphine 0.1% in intrasite topical gel, Place 1 Applicatorful onto the skin 4  times daily as needed for pain  oxyCODONE-acetaminophen (PERCOCET) 5-325 MG tablet, Take 1 tablet by mouth every 6 hours as needed for severe pain  spironolactone (ALDACTONE) 100 MG tablet, Take 1 tablet (100 mg) by mouth daily    No current facility-administered medications on file prior to visit.     Allergies   Allergen Reactions     Penicillins Hives, Itching and Rash       There is no immunization history on file for this patient.    OB History   No obstetric history on file.     Past Medical History:   Diagnosis Date     NO ACTIVE PROBLEMS      Past Surgical History:   Procedure Laterality Date     BUNIONECTOMY       Family History   Problem Relation Age of Onset     Diabetes Sister      Hypertension Sister      Social History     Socioeconomic History     Marital status: Single     Spouse name: None     Number of children: None     Years of education: None     Highest education level: None   Occupational History     None   Social Needs     Financial resource strain: None     Food insecurity:     Worry: None     Inability: None     Transportation needs:     Medical: None     Non-medical: None   Tobacco Use     Smoking status: Current Every Day Smoker     Smokeless tobacco: Never Used   Substance and Sexual Activity     Alcohol use: None     Drug use: None     Sexual activity: None   Lifestyle     Physical activity:     Days per week: None     Minutes per session: None     Stress: None   Relationships     Social connections:     Talks on phone: None     Gets together: None     Attends Mu-ism service: None     Active member of club or organization: None     Attends meetings of clubs or organizations: None     Relationship status: None     Intimate partner violence:     Fear of current or ex partner: None     Emotionally abused: None     Physically abused: None     Forced sexual activity: None   Other Topics Concern     None   Social History Narrative     None      ROS: 10 point ROS neg other than the symptoms  "noted above in the HPI.   PHQ-9 SCORE 9/27/2019   PHQ-9 Total Score 3     ELLIE-7 SCORE 9/27/2019   Total Score 1         EXAM:  Blood pressure 129/84, pulse 91, height 1.702 m (5' 7\"), weight 108.9 kg (240 lb), last menstrual period 08/15/2019, not currently breastfeeding. Body mass index is 37.59 kg/m .  General - pleasant female in no acute distress.  Skin - no suspicious lesions or rashes  EENT-  euthyroid with out palpable nodules  Neck - supple without lymphadenopathy.  Lungs - clear to auscultation bilaterally.  Heart - regular rate and rhythm without murmur.  Abdomen - soft, nontender, nondistended, no masses or organomegaly noted.  Musculoskeletal - no gross deformities.  Neurological - normal strength, sensation, and mental status.    Breast Exam:  Breast: Without visible skin changes. No dimpling or lesions seen.   Breasts supple, non-tender with palpation, no dominant mass, nodularity, or nipple discharge noted bilaterally. Axillary nodes negative.      Pelvic Exam:  EG/BUS: Normal genital architecture without lesions, erythema or abnormal secretions Bartholin's, Urethra, [place]'s normal; H.S in groin area not affecting vulva   Urethral meatus: normal   Urethra: no masses, tenderness, or scarring   Bladder: no masses or tenderness   Vagina:  with creamy, white and odorless  secretions  Cervix: no lesions  Uterus: anteverted,  and small, smooth, firm, mobile w/o pain  Adnexa: Within normal limits and No masses, nodularity, tenderness  Rectum:anus normal       ASSESSMENT:  Encounter Diagnoses   Name Primary?     Hidradenitis suppurativa      Screening for malignant neoplasm of cervix      Encounter for gynecological examination without abnormal finding      Irregular menses Yes    Irregular menses are most likely from her spironolactone as the time frame is the same.  However her recent weight gain could also be an explanation.  I will write a future order for a TSH test.  In addition since she is 44 she " could possibly be experiencing some perimenopausal menstrual irregularity.  If this happens again we can evaluate more thoroughly with ultrasound and endometrial biopsy.    PLAN:   Orders Placed This Encounter   Procedures     Pap Smear Exam [] Do Not Remove     Pap imaged thin layer screen with HPV - recommended age 30 - 65 years (select HPV order below)     HPV High Risk Types DNA Cervical     TSH with free T4 reflex   Pt to schedule mammogram.

## 2019-09-27 NOTE — LETTER
2019       RE: Klarissa Curtis   [facility] [id number]     Dear Colleague,    Thank you for referring your patient, Klarissa Curtis, to the [facility] at [facility]. Please see a copy of my visit note below.              Progress Note    SUBJECTIVE:    S: Klarissa Curtis is a 44 year old  here today for annual exam and to discuss recent irregular menses. She has severe hidradenitis suppurativa effecting her axilla and groin.  She is under the care of Dr. Kenny.  She is currently on Humira but this is not been effective and they are hoping to start infliximab. She is considering surgery if she can figure out the financial aspect of this and the loss of work.    She has been gone from [place] for 20 years but is back to be with family and for a better medical team.  She does not think she has had a GYN exam in about 4 years.  She reports normal Pap smears.  She does state that she had a history of a cervical polyp but otherwise no abnormal GYN history.  Her last intercourse was in .  Her H.S. has been consuming her and management.  It is quite painful and uncomfortable.  It is made her very hesitant to start a new relationship.  She is currently .    She states that she was started on spironolactone in 2019.  Prior to this her menses were very regular but then began having her menses every 2 weeks then she went one whole month of continued spotting and bleeding.  When she forgot to of her spironolactone her bleeding stopped.  She has had significant weight gain on her Humira.  She does not think her thyroid has ever been checked.    Her mother had breast cancer in her late 40's.  Maura has not had a mammogram since prior to age 39.      Menstrual History:  Menstrual History 2019   LAST MENSTRUAL PERIOD 8/15/2019     Mammogram current: no (patient will schedule)  Last Mammogram:   No results found.      Last Colonoscopy:  No results found for this or any previous visit.      HISTORY:  adalimumab (HUMIRA *CF*) 40 MG/0.4ML pen kit,   clobetasol (TEMOVATE) 0.05 % external ointment, Pain, itching irritation  morphine 0.1% in intrasite topical gel, Place 1 Applicatorful onto the skin 4 times daily as needed for pain  oxyCODONE-acetaminophen (PERCOCET) 5-325 MG tablet, Take 1 tablet by mouth every 6 hours as needed for severe pain  spironolactone (ALDACTONE) 100 MG tablet, Take 1 tablet (100 mg) by mouth daily    No current facility-administered medications on file prior to visit.     Allergies   Allergen Reactions     Penicillins Hives, Itching and Rash       There is no immunization history on file for this patient.    OB History   No obstetric history on file.     Past Medical History:   Diagnosis Date     NO ACTIVE PROBLEMS      Past Surgical History:   Procedure Laterality Date     BUNIONECTOMY       Family History   Problem Relation Age of Onset     Diabetes Sister      Hypertension Sister      Social History     Socioeconomic History     Marital status: Single     Spouse name: None     Number of children: None     Years of education: None     Highest education level: None   Occupational History     None   Social Needs     Financial resource strain: None     Food insecurity:     Worry: None     Inability: None     Transportation needs:     Medical: None     Non-medical: None   Tobacco Use     Smoking status: Current Every Day Smoker     Smokeless tobacco: Never Used   Substance and Sexual Activity     Alcohol use: None     Drug use: None     Sexual activity: None   Lifestyle     Physical activity:     Days per week: None     Minutes per session: None     Stress: None   Relationships     Social connections:     Talks on phone: None     Gets together: None     Attends Gnosticism service: None     Active member of club or organization: None     Attends meetings of clubs or organizations: None     Relationship  "status: None     Intimate partner violence:     Fear of current or ex partner: None     Emotionally abused: None     Physically abused: None     Forced sexual activity: None   Other Topics Concern     None   Social History Narrative     None      ROS: 10 point ROS neg other than the symptoms noted above in the HPI.   PHQ-9 SCORE 9/27/2019   PHQ-9 Total Score 3     ELLIE-7 SCORE 9/27/2019   Total Score 1         EXAM:  Blood pressure 129/84, pulse 91, height 1.702 m (5' 7\"), weight 108.9 kg (240 lb), last menstrual period 08/15/2019, not currently breastfeeding. Body mass index is 37.59 kg/m .  General - pleasant female in no acute distress.  Skin - no suspicious lesions or rashes  EENT-  euthyroid with out palpable nodules  Neck - supple without lymphadenopathy.  Lungs - clear to auscultation bilaterally.  Heart - regular rate and rhythm without murmur.  Abdomen - soft, nontender, nondistended, no masses or organomegaly noted.  Musculoskeletal - no gross deformities.  Neurological - normal strength, sensation, and mental status.    Breast Exam:  Breast: Without visible skin changes. No dimpling or lesions seen.   Breasts supple, non-tender with palpation, no dominant mass, nodularity, or nipple discharge noted bilaterally. Axillary nodes negative.      Pelvic Exam:  EG/BUS: Normal genital architecture without lesions, erythema or abnormal secretions Bartholin's, Urethra, [place]'s normal; H.S in groin area not affecting vulva   Urethral meatus: normal   Urethra: no masses, tenderness, or scarring   Bladder: no masses or tenderness   Vagina:  with creamy, white and odorless  secretions  Cervix: no lesions  Uterus: anteverted,  and small, smooth, firm, mobile w/o pain  Adnexa: Within normal limits and No masses, nodularity, tenderness  Rectum:anus normal     ASSESSMENT:  Encounter Diagnoses   Name Primary?     Hidradenitis suppurativa      Screening for malignant neoplasm of cervix      Encounter for gynecological " examination without abnormal finding      Irregular menses Yes    Irregular menses are most likely from her spironolactone as the time frame is the same.  However her recent weight gain could also be an explanation.  I will write a future order for a TSH test.  In addition since she is 44 she could possibly be experiencing some perimenopausal menstrual irregularity.  If this happens again we can evaluate more thoroughly with ultrasound and endometrial biopsy.    PLAN:   Orders Placed This Encounter   Procedures     Pap Smear Exam [] Do Not Remove     Pap imaged thin layer screen with HPV - recommended age 30 - 65 years (select HPV order below)     HPV High Risk Types DNA Cervical     TSH with free T4 reflex   Pt to schedule mammogram.      Again, thank you for allowing me to participate in the care of your patient.      Sincerely,    Isaac Lopez MD

## 2019-09-27 NOTE — NURSING NOTE
Chief Complaint   Patient presents with     Establish Care     C/O irregular periods   Viri Carreon LPN

## 2019-09-28 ASSESSMENT — ANXIETY QUESTIONNAIRES: GAD7 TOTAL SCORE: 1

## 2019-10-02 LAB
COPATH REPORT: ABNORMAL
PAP: ABNORMAL

## 2019-10-03 LAB
FINAL DIAGNOSIS: NORMAL
HPV HR 12 DNA CVX QL NAA+PROBE: NEGATIVE
HPV16 DNA SPEC QL NAA+PROBE: NEGATIVE
HPV18 DNA SPEC QL NAA+PROBE: NEGATIVE
SPECIMEN DESCRIPTION: NORMAL
SPECIMEN SOURCE CVX/VAG CYTO: NORMAL

## 2019-10-15 ENCOUNTER — ANCILLARY PROCEDURE (OUTPATIENT)
Dept: MAMMOGRAPHY | Facility: CLINIC | Age: 44
End: 2019-10-15
Payer: COMMERCIAL

## 2019-10-15 DIAGNOSIS — Z12.31 VISIT FOR SCREENING MAMMOGRAM: ICD-10-CM

## 2019-10-15 PROCEDURE — 77067 SCR MAMMO BI INCL CAD: CPT

## 2019-11-02 ENCOUNTER — OFFICE VISIT (OUTPATIENT)
Dept: URGENT CARE | Facility: URGENT CARE | Age: 44
End: 2019-11-02
Payer: COMMERCIAL

## 2019-11-02 VITALS
SYSTOLIC BLOOD PRESSURE: 112 MMHG | BODY MASS INDEX: 37.59 KG/M2 | TEMPERATURE: 97.2 F | OXYGEN SATURATION: 99 % | WEIGHT: 240 LBS | DIASTOLIC BLOOD PRESSURE: 72 MMHG | HEART RATE: 105 BPM

## 2019-11-02 DIAGNOSIS — L73.2 HIDRADENITIS SUPPURATIVA: Primary | ICD-10-CM

## 2019-11-02 PROCEDURE — 10060 I&D ABSCESS SIMPLE/SINGLE: CPT | Performed by: FAMILY MEDICINE

## 2019-11-02 PROCEDURE — 99203 OFFICE O/P NEW LOW 30 MIN: CPT | Mod: 25 | Performed by: FAMILY MEDICINE

## 2019-11-02 RX ORDER — DOXYCYCLINE HYCLATE 100 MG/1
TABLET, DELAYED RELEASE ORAL
COMMUNITY
End: 2020-03-20

## 2019-11-02 RX ORDER — OXYCODONE AND ACETAMINOPHEN 5; 325 MG/1; MG/1
1 TABLET ORAL EVERY 6 HOURS PRN
Qty: 10 TABLET | Refills: 0 | Status: SHIPPED | OUTPATIENT
Start: 2019-11-02 | End: 2020-02-13

## 2019-11-02 NOTE — PROGRESS NOTES
SUBJECTIVE:  Chief Complaint   Patient presents with     Urgent Care     Derm Problem     vaginal area boil, leroyt      Klarissa Curtis is a 44 year old female who presents with a chief complaint of hidradenitis suppurativa, has in armpit and vaginal area.    Symptom will flare, followed by Dermatology and has just received kenalog injection in right groin/buttock area on 10/29.  Has been on doxycycline, started on 10/29 for the past 3 days.  Developed more swelling and pain, usually will pop on its own but this has not yet, desires I&D.  Patient is on Humira for the past 1 year and did seem to prevent new lesions from forming but current lesions has continued to persist and drain intermittently.  Right axilla and right groin are more problematic.    Past Medical History:   Diagnosis Date     NO ACTIVE PROBLEMS      Current Outpatient Medications   Medication Sig Dispense Refill     adalimumab (HUMIRA *CF*) 40 MG/0.4ML pen kit        clobetasol (TEMOVATE) 0.05 % external ointment Pain, itching irritation 60 g 1     Doxycycline Hyclate 100 MG TBEC        morphine 0.1% in intrasite topical gel Place 1 Applicatorful onto the skin 4 times daily as needed for pain 25 g 0     oxyCODONE-acetaminophen (PERCOCET) 5-325 MG tablet Take 1 tablet by mouth every 6 hours as needed for severe pain 10 tablet 0     spironolactone (ALDACTONE) 100 MG tablet Take 1 tablet (100 mg) by mouth daily 90 tablet 3     Social History     Tobacco Use     Smoking status: Current Every Day Smoker     Smokeless tobacco: Never Used   Substance Use Topics     Alcohol use: Not on file       ROS:  Review of systems negative except as stated above.    EXAM:   /72 (BP Location: Right arm)   Pulse 105   Temp 97.2  F (36.2  C) (Tympanic)   Wt 108.9 kg (240 lb)   SpO2 99%   BMI 37.59 kg/m    M/S Exam:right upper leg/groin area - fluctuant, tender, yellowish pocket, surrounding indurated/scarred tissue  GENERAL APPEARANCE: healthy, alert and no  distress  EXTREMITIES: peripheral pulses normal  PSYCH: alert, affect bright    ASSESSMENT/PLAN:  (L73.2) Hidradenitis suppurativa  (primary encounter diagnosis)  Comment: right inner thigh/groin  Plan: oxyCODONE-acetaminophen (PERCOCET) 5-325 MG         tablet, Skin Abscess, Simple [23482]            Verbal consent given for I&D.  Lidocaine 1% with epi anesthestized lesion, 11 blade inserted with copious, malodorous greenish yellowed drainage.  Lesion massaged and pushed until drainage minimal.  Patient tolerated procedure.  Dressing applied.    Continue with Doxycycline.  Small quantity percocet 5/325 mg #10 given for pain.    Follow up with Dermatology for right axilla HS.    Luther Shelby MD, MD  November 2, 2019 10:12 AM

## 2019-11-05 ENCOUNTER — TELEPHONE (OUTPATIENT)
Dept: DERMATOLOGY | Facility: CLINIC | Age: 44
End: 2019-11-05

## 2019-11-05 NOTE — TELEPHONE ENCOUNTER
[facility] Call Center    Phone Message    May a detailed message be left on voicemail: yes    Reason for Call: Symptoms or Concerns     If patient has red-flag symptoms, warm transfer to triage line    Current symptom or concern: HS Flare up butt and now on arm    Symptoms have been present for:  few day(s)    Has patient previously been seen for this? Yes    By Dr. Kenny      Are there any new or worsening symptoms? Yes: PT states she had to go to urgent care on Sat.  PT had an HS flare up on her bottom and now has one on her arm and is pain.  PT is wondering if she can be seen this Thursday.  Please follow up with the PT.       Action Taken: Message routed to:  [facility] ([facility] derm

## 2019-11-05 NOTE — TELEPHONE ENCOUNTER
I spoke to Klarissa Curtis and have her scheduled to see Dr. Kenny 11/7/2019  At 12:30pm. Patient understood and had no further questions or concerns.    Bev Cavazos LPN

## 2019-11-07 ENCOUNTER — OFFICE VISIT (OUTPATIENT)
Dept: DERMATOLOGY | Facility: CLINIC | Age: 44
End: 2019-11-07
Payer: COMMERCIAL

## 2019-11-07 VITALS — SYSTOLIC BLOOD PRESSURE: 112 MMHG | HEART RATE: 84 BPM | DIASTOLIC BLOOD PRESSURE: 78 MMHG

## 2019-11-07 DIAGNOSIS — L73.2 HIDRADENITIS SUPPURATIVA: Primary | ICD-10-CM

## 2019-11-07 RX ORDER — PREDNISONE 10 MG/1
TABLET ORAL
Qty: 147 TABLET | Refills: 0 | Status: SHIPPED | OUTPATIENT
Start: 2019-11-07 | End: 2020-02-13

## 2019-11-07 RX ORDER — OXYCODONE AND ACETAMINOPHEN 5; 325 MG/1; MG/1
2 TABLET ORAL EVERY 6 HOURS PRN
Qty: 84 TABLET | Refills: 0 | Status: SHIPPED | OUTPATIENT
Start: 2019-11-07 | End: 2020-01-09

## 2019-11-07 ASSESSMENT — PAIN SCALES - GENERAL: PAINLEVEL: SEVERE PAIN (7)

## 2019-11-07 NOTE — NURSING NOTE
Dermatology Rooming Note    Klarissa Curtis's goals for this visit include:   Chief Complaint   Patient presents with     Derm Problem     Klarissa is here for a HS flare.        Bev Cavazos LPN

## 2019-11-07 NOTE — PROGRESS NOTES
[facility] Dermatology Note    Dermatology Clinic  [facility]  [facility]  [address] [place] [id number]    Dermatology Problem List:  1. Hidradenitis suppurativa: diagnosed age 11, initially on waistline (used to weigh 300 lbs) which cleared up -> intermittent outbreaks-> for the past 10 years has had in bilateral axilla, groin, thighs, and buttocks  - Current tx:    - infliximab pending insurance, currently on Humira for now, as well as prednisone 10 mg and oxycodone 5-325mg to be taken at 2 tablets q6hrs prn for severe pain   - clobetasol PRN, topical morphine PRN    - Kenalog injections intermittently  - Previous tx: PO antibiotics, Finasteride, Rifampin,  Spironolactone (6/2019- 9/5/19, stopped due to abnormal uterine bleeding); Humira 40 mg weekly (11/18/2018-9/2019, stopped due to ineffective)    Encounter Date: Nov 7, 2019    CC:  Chief Complaint   Patient presents with     Derm Problem     Klarissa is here for a HS flare.      History of Present Illness:  Ms. Klarissa Curtis is a 44 year old female without other PMH apart from HS, who presents for followup for Hidradenitis suppurativa. The patient was last seen in clinic on 9/5/19 for followup for HS. At the time she was reportedly stable on Humira and spironolactone. She had been on Humira since October of last year and was switched to infliximab at the last visit for improvement. Due to abnormal uterine bleeding, pt was instructed to stop the spironolactone. Labs were taken: CBC with platelets differential (normal), Quantiferon TB  Gold Plus (negative for TB), and CMP (normal). She was to continue clobetasol PRN for itching, topical morphine PRN, and percocet PRN for severe pain. Pt also received ILK injection to the R axilla due to flare-up at the area.    Today the patient reports that she had a flare-up of the R axilla that started draining last night. In the interim she has been  dealing with insurance restrictions and hasn't started her infliximab infusions.  Over the weekend she had R axilla I&D due to the flare-up at Urgent Care and thought to get ILK injection to the site today. Per records, she was seen at [facility] on 11/2/19 with Dr. Luther Shelby.  Today she reports that the flare-up has been rather severe these last two weeks and it leads to difficulty showering.     She is still taking the Humira. Notes that she also takes doxycycline 100 mg BID via Dr. Shelby who provided her 2x ILK injections to the R axilla about 2 weeks ago with no affect. Reports that she has negative side effects with doxycycline, notably feeling as though she will vomit, and she would like to come off it.    Today she would like a prescription for bandages.  Pt shares that she stopped the spironolactone about 1 month ago when she saw her gynecologist. At the last visit was noted she had abnormal uterine bleeding, and the pt believes she might be pre-menopausal. As of today she does not feel spironolactone helped with HS.  She does not feel that either topical creams from last time are working for her. Pt shares that she has not tried prednisone for the HS.    She moved to [place] in the interim and will be changing her regular pharmacy.     The patient is otherwise feeling well. There are no other skin concerns at this time.       Past Medical History:   Patient Active Problem List   Diagnosis     Hidradenitis suppurativa     Past Surgical History:   Procedure Laterality Date     BUNIONECTOMY       Social History:  Patient reports that she has been smoking. She has never used smokeless tobacco. 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 Her mother is a nurse.    Family History:  Family History   Problem Relation Age of Onset     Diabetes Sister      Hypertension Sister      Medications:  Current Outpatient Medications   Medication Sig Dispense Refill     adalimumab  (HUMIRA *CF*) 40 MG/0.4ML pen kit        clobetasol (TEMOVATE) 0.05 % external ointment Pain, itching irritation 60 g 1     Doxycycline Hyclate 100 MG TBEC        morphine 0.1% in intrasite topical gel Place 1 Applicatorful onto the skin 4 times daily as needed for pain 25 g 0     oxyCODONE-acetaminophen (PERCOCET) 5-325 MG tablet Take 1 tablet by mouth every 6 hours as needed for pain 10 tablet 0     oxyCODONE-acetaminophen (PERCOCET) 5-325 MG tablet Take 1 tablet by mouth every 6 hours as needed for severe pain 10 tablet 0     spironolactone (ALDACTONE) 100 MG tablet Take 1 tablet (100 mg) by mouth daily 90 tablet 3     Allergies   Allergen Reactions     Penicillins Hives, Itching and Rash     Review of Systems:  -As per HPI  -Gastrointestinal: Normal BM.  -Otherwise nml state of health. No other skin concerns.     Physical exam:  Vitals: /78 (BP Location: Right arm, Patient Position: Chair, Cuff Size: Adult Large)   Pulse 84   GEN: This is a well developed, well-nourished female in no acute distress, in a pleasant mood.    SKIN: Focused examination of the bilateral axillas and bilateral lower extremities, was performed.  -Julian stage II  -3.0 cm abscess with 3+ drainage in R axilla  -2 cords suspecting 2 tunnels and 1 draining nodule on the R axilla, 0.5% BSA, 2+ erythema, 2+ induration  -bursal swelling on the left  -healing abscess flattening out, minimal erythema on the R upper thigh  - No other lesions of concern on areas examined.       Impression/Plan:  1. Hidradenitis suppurativa, Julian stage II    Prescribed oxyCODONE-acetaminophen (PERCOCET) 5-325 MG tablet to be taken at 2 tablets po q6hrs prn for severe pain.    Prescried prednisone 60mg  Taper over next 6 weeks.    Pt was to start Infliximab (switch from Humira given not effective, but she will continue on Humira until insurance approval for infliximab), but had insurance restrictions so this is still pending insurance.    Stop doxycycline  due to negative side effects.    Continue clobetasol PRN for itching, topical morphine gel PRN, percocet PRN for severe pain    Prescribed mepilex bandages via [facility].    Placed another referral to plastic surgery provided for HS excision of R axilla. (not went through on last visit)    I am hoping luis can get her infliximab started in the next few weeks.    Follow-up in 2 weeks.       Staff Involved:    Scribe Disclosure  I, June Coffman, am serving as a scribe to document services personally performed by Dr. Jean Kenny, based on data collection and the provider's statements to me.     Provider Disclosure:   The documentation recorded by the scribe accurately reflects the services I personally performed and the decisions made by me.    Jean Kenny MD, FAAD    Departments of Internal Medicine and Dermatology  [facility]  [phone]

## 2019-11-07 NOTE — LETTER
11/7/2019       RE: Klarissa Curtis  [address]  [place] [id number]     Dear Colleague,    Thank you for referring your patient, Klarissa Curtis, to the [facility] DERMATOLOGY at [facility]. Please see a copy of my visit note below.    [facility] Dermatology Note    Dermatology Clinic  [facility]  [facility]  [address] [place] [id number]    Dermatology Problem List:  1. Hidradenitis suppurativa: diagnosed age 11, initially on waistline (used to weigh 300 lbs) which cleared up -> intermittent outbreaks-> for the past 10 years has had in bilateral axilla, groin, thighs, and buttocks  - Current tx:    - infliximab pending insurance, currently on Humira for now, as well as prednisone 10 mg and oxycodone 5-325mg to be taken at 2 tablets q6hrs prn for severe pain   - clobetasol PRN, topical morphine PRN    - Kenalog injections intermittently  - Previous tx: PO antibiotics, Finasteride, Rifampin,  Spironolactone (6/2019- 9/5/19, stopped due to abnormal uterine bleeding); Humira 40 mg weekly (11/18/2018-9/2019, stopped due to ineffective)    Encounter Date: Nov 7, 2019    CC:  Chief Complaint   Patient presents with     Derm Problem     Klarissa is here for a HS flare.      History of Present Illness:  Ms. Klarissa Curtis is a 44 year old female without other PMH apart from HS, who presents for followup for Hidradenitis suppurativa. The patient was last seen in clinic on 9/5/19 for followup for HS. At the time she was reportedly stable on Humira and spironolactone. She had been on Humira since October of last year and was switched to infliximab at the last visit for improvement. Due to abnormal uterine bleeding, pt was instructed to stop the spironolactone. Labs were taken: CBC with platelets differential (normal), Quantiferon TB  Gold Plus (negative for TB), and CMP (normal). She was to continue clobetasol  PRN for itching, topical morphine PRN, and percocet PRN for severe pain. Pt also received ILK injection to the R axilla due to flare-up at the area.    Today the patient reports that she had a flare-up of the R axilla that started draining last night. In the interim she has been dealing with insurance restrictions and hasn't started her infliximab infusions.  Over the weekend she had R axilla I&D due to the flare-up at Urgent Care and thought to get ILK injection to the site today. Per records, she was seen at [facility] on 11/2/19 with Dr. Luther Shelby.  Today she reports that the flare-up has been rather severe these last two weeks and it leads to difficulty showering.     She is still taking the Humira. Notes that she also takes doxycycline 100 mg BID via Dr. Shelby who provided her 2x ILK injections to the R axilla about 2 weeks ago with no affect. Reports that she has negative side effects with doxycycline, notably feeling as though she will vomit, and she would like to come off it.    Today she would like a prescription for bandages.  Pt shares that she stopped the spironolactone about 1 month ago when she saw her gynecologist. At the last visit was noted she had abnormal uterine bleeding, and the pt believes she might be pre-menopausal. As of today she does not feel spironolactone helped with HS.  She does not feel that either topical creams from last time are working for her. Pt shares that she has not tried prednisone for the HS.    She moved to [place] in the interim and will be changing her regular pharmacy.     The patient is otherwise feeling well. There are no other skin concerns at this time.       Past Medical History:   Patient Active Problem List   Diagnosis     Hidradenitis suppurativa     Past Surgical History:   Procedure Laterality Date     BUNIONECTOMY       Social History:  Patient reports that she has been smoking. She has never used smokeless tobacco. 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 Her mother is a nurse.    Family History:  Family History   Problem Relation Age of Onset     Diabetes Sister      Hypertension Sister      Medications:  Current Outpatient Medications   Medication Sig Dispense Refill     adalimumab (HUMIRA *CF*) 40 MG/0.4ML pen kit        clobetasol (TEMOVATE) 0.05 % external ointment Pain, itching irritation 60 g 1     Doxycycline Hyclate 100 MG TBEC        morphine 0.1% in intrasite topical gel Place 1 Applicatorful onto the skin 4 times daily as needed for pain 25 g 0     oxyCODONE-acetaminophen (PERCOCET) 5-325 MG tablet Take 1 tablet by mouth every 6 hours as needed for pain 10 tablet 0     oxyCODONE-acetaminophen (PERCOCET) 5-325 MG tablet Take 1 tablet by mouth every 6 hours as needed for severe pain 10 tablet 0     spironolactone (ALDACTONE) 100 MG tablet Take 1 tablet (100 mg) by mouth daily 90 tablet 3     Allergies   Allergen Reactions     Penicillins Hives, Itching and Rash     Review of Systems:  -As per HPI  -Gastrointestinal: Normal BM.  -Otherwise nml state of health. No other skin concerns.     Physical exam:  Vitals: /78 (BP Location: Right arm, Patient Position: Chair, Cuff Size: Adult Large)   Pulse 84   GEN: This is a well developed, well-nourished female in no acute distress, in a pleasant mood.    SKIN: Focused examination of the bilateral axillas and bilateral lower extremities, was performed.  -Julian stage II  -3.0 cm abscess with 3+ drainage in R axilla  -2 cords suspecting 2 tunnels and 1 draining nodule on the R axilla, 0.5% BSA, 2+ erythema, 2+ induration  -bursal swelling on the left  -healing abscess flattening out, minimal erythema on the R upper thigh  - No other lesions of concern on areas examined.       Impression/Plan:  1. Hidradenitis suppurativa, Julian stage II    Prescribed oxyCODONE-acetaminophen (PERCOCET) 5-325 MG tablet to be taken at 2 tablets po q6hrs prn for severe  pain.    Prescried prednisone 60mg  Taper over next 6 weeks.    Pt was to start Infliximab (switch from Humira given not effective, but she will continue on Humira until insurance approval for infliximab), but had insurance restrictions so this is still pending insurance.    Stop doxycycline due to negative side effects.    Continue clobetasol PRN for itching, topical morphine gel PRN, percocet PRN for severe pain    Prescribed mepilex bandages via [facility].    Placed another referral to plastic surgery provided for HS excision of R axilla. (not went through on last visit)    I am hoping wee can get her infliximab started in the next few weeks.    Follow-up in 2 weeks.       Staff Involved:    Scribe Disclosure  I, June Coffman, am serving as a scribe to document services personally performed by Dr. Jean Kenny, based on data collection and the provider's statements to me.     Provider Disclosure:   The documentation recorded by the scribe accurately reflects the services I personally performed and the decisions made by me.    Jean Kenny MD, FAAD    Departments of Internal Medicine and Dermatology  [facility]  [phone]                  Again, thank you for allowing me to participate in the care of your patient.      Sincerely,    Jean Kenny MD

## 2019-11-07 NOTE — LETTER
Date:November 19, 2019      Patient was self referred, no letter generated. Do not send.        [facility] Health Information

## 2019-11-19 NOTE — TELEPHONE ENCOUNTER
Dave Pablo,     Our appeal request was overturned on 11/15. It looks like Klarissa has been approved for 4 doses. I requested a copy of the approval letter be faxed so we can get the approval details.     Thanks,   Juwan Henao - [facility]     - Infusion Therapy    [facility]    [email or web address]  [email or web address]     Office: [phone]  Fax: [phone]

## 2019-11-20 NOTE — TELEPHONE ENCOUNTER
I spoke to Klarissa Curtis and told her about the approval for 4 doses.  Patient will wait to schedule tomorrow after she see's Dr. Kenny at her appointment.    Bev Cavazos LPN

## 2019-11-21 ENCOUNTER — APPOINTMENT (OUTPATIENT)
Dept: LAB | Facility: CLINIC | Age: 44
End: 2019-11-21
Payer: COMMERCIAL

## 2019-11-21 ENCOUNTER — OFFICE VISIT (OUTPATIENT)
Dept: DERMATOLOGY | Facility: CLINIC | Age: 44
End: 2019-11-21
Payer: COMMERCIAL

## 2019-11-21 DIAGNOSIS — L73.2 HIDRADENITIS SUPPURATIVA: Primary | ICD-10-CM

## 2019-11-21 DIAGNOSIS — Z79.899 ENCOUNTER FOR LONG-TERM (CURRENT) USE OF HIGH-RISK MEDICATION: ICD-10-CM

## 2019-11-21 LAB
CHOLEST SERPL-MCNC: 293 MG/DL
HBA1C MFR BLD: 5.3 % (ref 0–5.6)
HDLC SERPL-MCNC: 109 MG/DL
LDLC SERPL CALC-MCNC: 137 MG/DL
NONHDLC SERPL-MCNC: 184 MG/DL
TRIGL SERPL-MCNC: 235 MG/DL

## 2019-11-21 ASSESSMENT — PAIN SCALES - GENERAL: PAINLEVEL: NO PAIN (0)

## 2019-11-21 NOTE — LETTER
Date:November 29, 2019      Patient was self referred, no letter generated. Do not send.        [facility] Health Information

## 2019-11-21 NOTE — LETTER
11/21/2019       RE: Klarissa Curtis  [address]  [place] [id number]     Dear Colleague,    Thank you for referring your patient, Klarissa Curtis, to the [facility] at [facility]. Please see a copy of my visit note below.    [facility] Dermatology Note     Dermatology Clinic  [facility]  Clinics and Surgery Center  [address] [place] [id number]     Dermatology Problem List:  1. Hidradenitis suppurativa: diagnosed age 11, initially on waistline (used to weigh 300 lbs) which cleared up -> intermittent outbreaks-> for the past 10 years has had in bilateral axilla, groin, thighs, and buttocks  - Current tx:               - infliximab pending insurance            - clobetasol PRN, topical morphine PRN               - Kenalog injections intermittently  - Previous tx: PO antibiotics, Finasteride, Rifampin,  Spironolactone (6/2019- 9/5/19, stopped due to abnormal uterine bleeding); Humira 40 mg weekly (11/18/2018-9/2019, stopped due to ineffective)     Encounter Date:  11/21/19 Nov 7, 2019     CC:       Chief Complaint   Patient presents with     Derm Problem       Klarissa is here for follow-up from  HS flare.       History of Present Illness:    Today  Pt notes she is much better after the prednisone taper. Plan is to switch to infliximab. Pt with no pain and is happy.     Last visit 11/7/19  Ms. Klarissa Curtis is a 44 year old female without other PMH apart from HS, who presents for followup for Hidradenitis suppurativa. The patient was last seen in clinic on 9/5/19 for followup for HS. At the time she was reportedly stable on Humira and spironolactone. She had been on Humira since October of last year and was switched to infliximab at the last visit for improvement. Due to abnormal uterine bleeding, pt was instructed to stop the spironolactone. Labs were taken: CBC with platelets differential (normal),  Quantiferon TB  Gold Plus (negative for TB), and CMP (normal). She was to continue clobetasol PRN for itching, topical morphine PRN, and percocet PRN for severe pain. Pt also received ILK injection to the R axilla due to flare-up at the area.     Today the patient reports that she had a flare-up of the R axilla that started draining last night. In the interim she has been dealing with insurance restrictions and hasn't started her infliximab infusions.  Over the weekend she had R axilla I&D due to the flare-up at Urgent Care and thought to get ILK injection to the site today. Per records, she was seen at [facility] Urgent care on 11/2/19 with Dr. Luther Shelby.  Today she reports that the flare-up has been rather severe these last two weeks and it leads to difficulty showering.      She is still taking the Humira. Notes that she also takes doxycycline 100 mg BID via Dr. Shelby who provided her 2x ILK injections to the R axilla about 2 weeks ago with no affect. Reports that she has negative side effects with doxycycline, notably feeling as though she will vomit, and she would like to come off it.     Today she would like a prescription for bandages.  Pt shares that she stopped the spironolactone about 1 month ago when she saw her gynecologist. At the last visit was noted she had abnormal uterine bleeding, and the pt believes she might be pre-menopausal. As of today she does not feel spironolactone helped with HS.  She does not feel that either topical creams from last time are working for her. Pt shares that she has not tried prednisone for the HS.     She moved to [place] in the interim and will be changing her regular pharmacy.      The patient is otherwise feeling well. There are no other skin concerns at this time.         Past Medical History:       Patient Active Problem List   Diagnosis     Hidradenitis suppurativa      Past Surgical History         Past Surgical History:   Procedure Laterality Date      BUNIONECTOMY             Social History:  Patient reports that she has been smoking. She has never used smokeless tobacco. 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 Her mother is a nurse.     Family History:  Family History         Family History   Problem Relation Age of Onset     Diabetes Sister       Hypertension Sister           Medications:  Current Outpatient Medications   Medication     adalimumab (HUMIRA *CF*) 40 MG/0.4ML pen kit     oxyCODONE-acetaminophen (PERCOCET) 5-325 MG tablet     predniSONE (DELTASONE) 10 MG tablet     clobetasol (TEMOVATE) 0.05 % external ointment     Doxycycline Hyclate 100 MG TBEC     morphine 0.1% in intrasite topical gel     oxyCODONE-acetaminophen (PERCOCET) 5-325 MG tablet     spironolactone (ALDACTONE) 100 MG tablet     No current facility-administered medications for this visit.              Allergies   Allergen Reactions     Penicillins Hives, Itching and Rash      Review of Systems:  -As per HPI  -Gastrointestinal: Normal BM.  -Otherwise nml state of health. No other skin concerns.      Physical exam:  Vitals: /78 (BP Location: Right arm, Patient Position: Chair, Cuff Size: Adult Large)   Pulse 84   GEN: This is a well developed, well-nourished female in no acute distress, in a pleasant mood.    SKIN: Focused examination of the bilateral axillas and bilateral lower extremities, was performed.  -Julian stage II  7cm cord with tunnel that drainis. 1+ induration, 1+ erythema. No open surfgace. Small draining sinus in inferior axilla.   Scarred papule on left  BP have been good.        Impression/Plan:  1. Hidradenitis suppurativa, Julian stage II    Plan to switch to infliximab. Hold 1-2 weeks of humira prior to starting inlfiximav    Continue clobetasol PRN for itching, topical morphine gel PRN, percocet PRN for severe pain    Prescribed mepilex bandages via [facility].    Placed another referral to plastic surgery at her last visit  provided for HS excision of R axilla.    Will also check Hgba1c and lipids    RTC in 3 months     Jean Kenny MD, FAAD    Departments of Internal Medicine and Dermatology  [facility]  [phone]      Again, thank you for allowing me to participate in the care of your patient.      Sincerely,    Jean Kenny MD

## 2019-11-21 NOTE — NURSING NOTE
Dermatology Rooming Note    Klarissa Curtis's goals for this visit include:   Chief Complaint   Patient presents with     Derm Problem     Klarissa is here today for a 2 week HS follow up.      JASMIN Cortez

## 2019-11-21 NOTE — PROGRESS NOTES
[facility] Dermatology Note     Dermatology Clinic  [facility]  Clinics and Surgery Center  [address] [place] [id number]     Dermatology Problem List:  1. Hidradenitis suppurativa: diagnosed age 11, initially on waistline (used to weigh 300 lbs) which cleared up -> intermittent outbreaks-> for the past 10 years has had in bilateral axilla, groin, thighs, and buttocks  - Current tx:               - infliximab pending insurance            - clobetasol PRN, topical morphine PRN               - Kenalog injections intermittently  - Previous tx: PO antibiotics, Finasteride, Rifampin,  Spironolactone (6/2019- 9/5/19, stopped due to abnormal uterine bleeding); Humira 40 mg weekly (11/18/2018-9/2019, stopped due to ineffective)     Encounter Date: 11/21/19 Nov 7, 2019     CC:       Chief Complaint   Patient presents with     Derm Problem       Klarissa is here for follow-up from  HS flare.       History of Present Illness:    Today  Pt notes she is much better after the prednisone taper. Plan is to switch to infliximab. Pt with no pain and is happy.     Last visit 11/7/19  Ms. Klarissa Curtis is a 44 year old female without other PMH apart from HS, who presents for followup for Hidradenitis suppurativa. The patient was last seen in clinic on 9/5/19 for followup for HS. At the time she was reportedly stable on Humira and spironolactone. She had been on Humira since October of last year and was switched to infliximab at the last visit for improvement. Due to abnormal uterine bleeding, pt was instructed to stop the spironolactone. Labs were taken: CBC with platelets differential (normal), Quantiferon TB  Gold Plus (negative for TB), and CMP (normal). She was to continue clobetasol PRN for itching, topical morphine PRN, and percocet PRN for severe pain. Pt also received ILK injection to the R axilla due to flare-up at the area.     Today the patient reports that she had a  flare-up of the R axilla that started draining last night. In the interim she has been dealing with insurance restrictions and hasn't started her infliximab infusions.  Over the weekend she had R axilla I&D due to the flare-up at Urgent Care and thought to get ILK injection to the site today. Per records, she was seen at [facility] Urgent care on 11/2/19 with Dr. Luther Shelby.  Today she reports that the flare-up has been rather severe these last two weeks and it leads to difficulty showering.      She is still taking the Humira. Notes that she also takes doxycycline 100 mg BID via Dr. Shelby who provided her 2x ILK injections to the R axilla about 2 weeks ago with no affect. Reports that she has negative side effects with doxycycline, notably feeling as though she will vomit, and she would like to come off it.     Today she would like a prescription for bandages.  Pt shares that she stopped the spironolactone about 1 month ago when she saw her gynecologist. At the last visit was noted she had abnormal uterine bleeding, and the pt believes she might be pre-menopausal. As of today she does not feel spironolactone helped with HS.  She does not feel that either topical creams from last time are working for her. Pt shares that she has not tried prednisone for the HS.     She moved to [place] in the interim and will be changing her regular pharmacy.      The patient is otherwise feeling well. There are no other skin concerns at this time.         Past Medical History:   Patient Active Problem List   Diagnosis     Hidradenitis suppurativa      Past Surgical History         Past Surgical History:   Procedure Laterality Date     BUNIONECTOMY             Social History:  Patient reports that she has been smoking. She has never used smokeless tobacco. 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 Her mother is a nurse.     Family History:  Family History         Family History   Problem Relation Age of  Onset     Diabetes Sister       Hypertension Sister           Medications:  Current Outpatient Medications   Medication     adalimumab (HUMIRA *CF*) 40 MG/0.4ML pen kit     oxyCODONE-acetaminophen (PERCOCET) 5-325 MG tablet     predniSONE (DELTASONE) 10 MG tablet     clobetasol (TEMOVATE) 0.05 % external ointment     Doxycycline Hyclate 100 MG TBEC     morphine 0.1% in intrasite topical gel     oxyCODONE-acetaminophen (PERCOCET) 5-325 MG tablet     spironolactone (ALDACTONE) 100 MG tablet     No current facility-administered medications for this visit.              Allergies   Allergen Reactions     Penicillins Hives, Itching and Rash      Review of Systems:  -As per HPI  -Gastrointestinal: Normal BM.  -Otherwise nml state of health. No other skin concerns.      Physical exam:  Vitals: /78 (BP Location: Right arm, Patient Position: Chair, Cuff Size: Adult Large)   Pulse 84   GEN: This is a well developed, well-nourished female in no acute distress, in a pleasant mood.    SKIN: Focused examination of the bilateral axillas and bilateral lower extremities, was performed.  -Julian stage II  7cm cord with tunnel that drainis. 1+ induration, 1+ erythema. No open surfgace. Small draining sinus in inferior axilla.   Scarred papule on left  BP have been good.        Impression/Plan:  1. Hidradenitis suppurativa, Julian stage II    Plan to switch to infliximab. Hold 1-2 weeks of humira prior to starting inlfiximav    Continue clobetasol PRN for itching, topical morphine gel PRN, percocet PRN for severe pain    Prescribed mepilex bandages via [facility].    Placed another referral to plastic surgery at her last visit provided for HS excision of R axilla.    Will also check Hgba1c and lipids    RTC in 3 months     Jean Kenny MD, FAAD    Departments of Internal Medicine and Dermatology  [facility]  [phone]

## 2019-12-09 ENCOUNTER — INFUSION THERAPY VISIT (OUTPATIENT)
Dept: INFUSION THERAPY | Facility: CLINIC | Age: 44
End: 2019-12-09
Attending: DERMATOLOGY
Payer: COMMERCIAL

## 2019-12-09 VITALS
SYSTOLIC BLOOD PRESSURE: 133 MMHG | WEIGHT: 254.3 LBS | BODY MASS INDEX: 39.83 KG/M2 | DIASTOLIC BLOOD PRESSURE: 82 MMHG | TEMPERATURE: 98.2 F | OXYGEN SATURATION: 94 % | HEART RATE: 94 BPM

## 2019-12-09 DIAGNOSIS — L73.2 HIDRADENITIS SUPPURATIVA: Primary | ICD-10-CM

## 2019-12-09 PROCEDURE — 25000128 H RX IP 250 OP 636: Mod: ZF | Performed by: DERMATOLOGY

## 2019-12-09 PROCEDURE — 96415 CHEMO IV INFUSION ADDL HR: CPT

## 2019-12-09 PROCEDURE — 25800030 ZZH RX IP 258 OP 636: Mod: ZF | Performed by: DERMATOLOGY

## 2019-12-09 PROCEDURE — 96413 CHEMO IV INFUSION 1 HR: CPT

## 2019-12-09 RX ORDER — METHYLPREDNISOLONE SODIUM SUCCINATE 125 MG/2ML
40 INJECTION, POWDER, LYOPHILIZED, FOR SOLUTION INTRAMUSCULAR; INTRAVENOUS ONCE
Status: CANCELLED | OUTPATIENT
Start: 2019-12-10

## 2019-12-09 RX ADMIN — INFLIXIMAB 600 MG: 100 INJECTION, POWDER, LYOPHILIZED, FOR SOLUTION INTRAVENOUS at 13:42

## 2019-12-09 NOTE — PATIENT INSTRUCTIONS
Patient Education     Infliximab Solution for injection  What is this medicine?  INFLIXIMAB (in FLIX i mab) is used to treat Crohn's disease and ulcerative colitis. It is also used to treat ankylosing spondylitis, psoriasis, and some forms of arthritis.  This medicine may be used for other purposes; ask your health care provider or pharmacist if you have questions.  What should I tell my health care provider before I take this medicine?  They need to know if you have any of these conditions:    diabetes    exposure to tuberculosis    heart failure    hepatitis or liver disease    immune system problems    infection    lung or breathing disease, like COPD    multiple sclerosis    current or past resident of [place]    seizure disorder    an unusual or allergic reaction to infliximab, mouse proteins, other medicines, foods, dyes, or preservatives    pregnant or trying to get pregnant    breast-feeding  How should I use this medicine?  This medicine is for injection into a vein. It is usually given by a health care professional in a hospital or clinic setting.  A special MedGuide will be given to you by the pharmacist with each prescription and refill. Be sure to read this information carefully each time.  Talk to your pediatrician regarding the use of this medicine in children. Special care may be needed.  Overdosage: If you think you have taken too much of this medicine contact a poison control center or emergency room at once.  NOTE: This medicine is only for you. Do not share this medicine with others.  What if I miss a dose?  It is important not to miss your dose. Call your doctor or health care professional if you are unable to keep an appointment.  What may interact with this medicine?  Do not take this medicine with any of the following medications:    anakinra    rilonacept  This medicine may also interact with the following medications:    vaccines  This list may not describe all  possible interactions. Give your health care provider a list of all the medicines, herbs, non-prescription drugs, or dietary supplements you use. Also tell them if you smoke, drink alcohol, or use illegal drugs. Some items may interact with your medicine.  What should I watch for while using this medicine?  Visit your doctor or health care professional for regular checks on your progress.  If you get a cold or other infection while receiving this medicine, call your doctor or health care professional. Do not treat yourself. This medicine may decrease your body's ability to fight infections. Before beginning therapy, your doctor may do a test to see if you have been exposed to tuberculosis.  This medicine may make the symptoms of heart failure worse in some patients. If you notice symptoms such as increased shortness of breath or swelling of the ankles or legs, contact your health care provider right away.  If you are going to have surgery or dental work, tell your health care professional or dentist that you have received this medicine.  If you take this medicine for plaque psoriasis, stay out of the sun. If you cannot avoid being in the sun, wear protective clothing and use sunscreen. Do not use sun lamps or tanning beds/booths.  What side effects may I notice from receiving this medicine?  Side effects that you should report to your doctor or health care professional as soon as possible:    allergic reactions like skin rash, itching or hives, swelling of the face, lips, or tongue    chest pain    fever or chills, usually related to the infusion    muscle or joint pain    red, scaly patches or raised bumps on the skin    signs of infection - fever or chills, cough, sore throat, pain or difficulty passing urine    swollen lymph nodes in the neck, underarm, or groin areas    unexplained weight loss    unusual bleeding or bruising    unusually weak or tired    yellowing of the eyes or skin  Side effects that usually  do not require medical attention (report to your doctor or health care professional if they continue or are bothersome):    headache    heartburn or stomach pain    nausea, vomiting  This list may not describe all possible side effects. Call your doctor for medical advice about side effects. You may report side effects to FDA at 6-261-FDA-1901.  Where should I keep my medicine?  This drug is given in a hospital or clinic and will not be stored at home.  NOTE:This sheet is a summary. It may not cover all possible information. If you have questions about this medicine, talk to your doctor, pharmacist, or health care provider. Copyright  2016 Gold Standard

## 2019-12-09 NOTE — PROGRESS NOTES
Nursing Note  Klarissa Curtis [facility] for:   Chief Complaint   Patient presents with     Infusion     REMICADE     During today's [facility] appointment, orders from Dr. Kenny were completed.  Frequency: today is dose 1     Progress note:  Patient identification verified by name and date of birth.  Assessment completed.  Vitals recorded in Doc Flowsheets.  Patient was provided with education regarding medication/procedure and possible side effects.  Patient verbalized understanding.     present during visit today: Not Applicable.    Treatment Conditions: ~~~ NOTE: If the patient answers yes to any of the questions below, hold the infusion and contact ordering provider or on-call provider.    1. Have you recently had an elevated temperature, fever, chills, productive cough, coughing for 3 weeks or longer or hemoptysis, abnormal vital signs, night sweats,  chest pain or have you noticed a decrease in your appetite, unexplained weight loss or fatigue? No  2. Do you have any open wounds or new incisions? Yes  3. Do you have any recent or upcoming hospitalizations, surgeries or dental procedures? No  4. Do you currently have or recently have had any signs of illness or infection or are you on any antibiotics? No  5. Have you had any new, sudden or worsening abdominal pain? No   6. Have you or anyone in your household received a live vaccination in the past 4 weeks? Please note:  No live vaccines while on biologic/chemotherapy until 6 months after the last treatment.  Patient can receive the flu vaccine (shot only) and the pneumovax.  It is optimal for the patient to get these vaccines mid cycle, but they can be given at any time as long as it is not on the day of the infusion. No  7. Have you recently been diagnosed with any new nervous system diseases (ie. Multiple sclerosis, Guillain [place], seizures, neurological changes) or  cancer diagnosis? No  8. Are you on any form of radiation or chemotherapy? No  9. Are you pregnant or breast feeding or do you have plans of pregnancy in the future? No  10. Have you been having any signs of worsening depression or suicidal ideations?  (benlysta only) No  11. Have there been any other new onset medical symptoms? No      Premedications: were not ordered.    Drug Waste Record: No    Infusion length and rate:  infusion given over approximately 2 hours  168 ml/hr.    Labs: were not ordered for this appointment.    Vascular access: peripheral IV placed today.    Post Infusion Assessment:  Patient tolerated infusion without incident.     Discharge Plan:   Follow up plan of care with: ongoing infusions at [facility]. and primary care provider,  Discharge instructions were reviewed with patient.  Patient/representative verbalized understanding of discharge instructions and all questions answered.  Patient discharged from [facility] in stable condition.    María Elena Austin RN       Administrations This Visit     inFLIXimab (REMICADE) 600 mg in sodium chloride 0.9 % 335 mL infusion     Admin Date  12/09/2019 Action  New Bag Dose  600 mg Rate  167.5 mL/hr Route  Intravenous Administered By  María Elena Austin, RN                  /85   Pulse 94   Temp 98.2  F (36.8  C) (Oral)   Wt 115.3 kg (254 lb 4.8 oz)   SpO2 94%   BMI 39.83 kg/m

## 2019-12-23 ENCOUNTER — INFUSION THERAPY VISIT (OUTPATIENT)
Dept: INFUSION THERAPY | Facility: CLINIC | Age: 44
End: 2019-12-23
Attending: DERMATOLOGY
Payer: COMMERCIAL

## 2019-12-23 VITALS
DIASTOLIC BLOOD PRESSURE: 81 MMHG | OXYGEN SATURATION: 96 % | WEIGHT: 255.3 LBS | TEMPERATURE: 98.2 F | BODY MASS INDEX: 39.99 KG/M2 | RESPIRATION RATE: 16 BRPM | SYSTOLIC BLOOD PRESSURE: 111 MMHG | HEART RATE: 87 BPM

## 2019-12-23 DIAGNOSIS — L73.2 HIDRADENITIS SUPPURATIVA: Primary | ICD-10-CM

## 2019-12-23 PROCEDURE — 96415 CHEMO IV INFUSION ADDL HR: CPT

## 2019-12-23 PROCEDURE — 25000128 H RX IP 250 OP 636: Mod: ZF | Performed by: DERMATOLOGY

## 2019-12-23 PROCEDURE — 25800030 ZZH RX IP 258 OP 636: Mod: ZF | Performed by: DERMATOLOGY

## 2019-12-23 PROCEDURE — 96413 CHEMO IV INFUSION 1 HR: CPT

## 2019-12-23 RX ORDER — METHYLPREDNISOLONE SODIUM SUCCINATE 125 MG/2ML
40 INJECTION, POWDER, LYOPHILIZED, FOR SOLUTION INTRAMUSCULAR; INTRAVENOUS ONCE
Status: CANCELLED | OUTPATIENT
Start: 2019-12-24

## 2019-12-23 RX ADMIN — INFLIXIMAB 600 MG: 100 INJECTION, POWDER, LYOPHILIZED, FOR SOLUTION INTRAVENOUS at 13:44

## 2019-12-23 NOTE — PROGRESS NOTES
Nursing Note  Klarissa Curtis presents [facility] for:   Chief Complaint   Patient presents with     Infusion     Remicade     During [facility]'s [facility] appointment, orders from Dr. Kenny were completed.  Frequency: Week 0, 2, 4 then every 6 weeks. This is week #2.    Progress note:  Patient identification verified by name and date of birth.  Assessment completed.  Vitals recorded in Doc Flowsheets.  Patient was provided with education regarding medication/procedure and possible side effects.  Patient verbalized understanding.     present during visit today: Not Applicable.    Treatment Conditions: ~~~ NOTE: If the patient answers yes to any of the questions below, hold the infusion and contact ordering provider or on-call provider.    1. Have you recently had an elevated temperature, fever, chills, productive cough, coughing for 3 weeks or longer or hemoptysis, abnormal vital signs, night sweats,  chest pain or have you noticed a decrease in your appetite, unexplained weight loss or fatigue? No  2. Do you have any open wounds or new incisions? No  3. Do you have any recent or upcoming hospitalizations, surgeries or dental procedures? No  4. Do you currently have or recently have had any signs of illness or infection or are you on any antibiotics? No  5. Have you had any new, sudden or worsening abdominal pain? No  6. Have you or anyone in your household received a live vaccination in the past 4 weeks? Please note:  No live vaccines while on biologic/chemotherapy until 6 months after the last treatment.  Patient can receive the flu vaccine (shot only) and the pneumovax.  It is optimal for the patient to get these vaccines mid cycle, but they can be given at any time as long as it is not on the day of the infusion. No  7. Have you recently been diagnosed with any new nervous system diseases (ie. Multiple sclerosis, Guillain [place],  seizures, neurological changes) or cancer diagnosis? No  8. Are you on any form of radiation or chemotherapy? No  9. Are you pregnant or breast feeding or do you have plans of pregnancy in the future? No  10. Have you been having any signs of worsening depression or suicidal ideations?  (benlysta only) No  11. Have there been any other new onset medical symptoms? No      Premedications: historically patient has not taken pre-medications prior to infusion.    Drug Waste Record: No    Infusion length and rate:  infusion given over approximately 2 hours  168 ml/hr.    Labs: were not ordered for this appointment.    Vascular access: peripheral IV placed today.    Post Infusion Assessment:  Patient tolerated infusion without incident.     Discharge Plan:   Follow up plan of care with: ongoing infusions at [facility]. and primary care provider,  Discharge instructions were reviewed with patient.  Patient/representative verbalized understanding of discharge instructions and all questions answered.  Patient discharged [facility] in stable condition.    Kourtney Alvarez RN       Administrations This Visit     inFLIXimab (REMICADE) 600 mg in sodium chloride 0.9 % 335 mL infusion     Admin Date  12/23/2019 Action  New Bag Dose  600 mg Rate  167.5 mL/hr Route  Intravenous Administered By  Kourtney Alvarez, RN                  BP (P) 139/82   Pulse (P) 87   Resp (P) 16   SpO2 (P) 96%

## 2019-12-23 NOTE — PATIENT INSTRUCTIONS
EDUCATION POST BIOLOGICAL/CHEMOTHERAPY INFUSION  Call the triage nurse at your clinic or seek medical attention if you have chills and/or temperature greater than or equal to 100.5, uncontrolled nausea/vomiting, diarrhea, constipation, dizziness, shortness of breath, chest pain, heart palpitations, weakness or any other new or concerning symptoms, questions or concerns.  You can not have any live virus vaccines prior to or during treatment or up to 6 months post infusion.  If you have an upcoming surgery, medical procedure or dental procedure during treatment, this should be discussed with your ordering physician and your surgeon/dentist.  If you are having any concerning symptom, if you are unsure if you should get your next infusion or wish to speak to a provider before your next infusion, please call your care coordinator or triage nurse at your clinic to notify them so we can adequately serve you.  Patient Education     Infliximab injection  Brand Names: INFLECTRA, Remicade, RENFLEXIS  What is this medicine?  INFLIXIMAB (in FLIX i mab) is used to treat Crohn's disease and ulcerative colitis. It is also used to treat ankylosing spondylitis, plaque psoriasis, and some forms of arthritis.  How should I use this medicine?  This medicine is for injection into a vein. It is usually given by a health care professional in a hospital or clinic setting.  A special MedGuide will be given to you by the pharmacist with each prescription and refill. Be sure to read this information carefully each time.  Talk to your pediatrician regarding the use of this medicine in children. While this drug may be prescribed for children as young as 6 years of age for selected conditions, precautions do apply.  What side effects may I notice from receiving this medicine?  Side effects that you should report to your doctor or health care professional as soon as possible:    allergic reactions like skin rash, itching or hives, swelling of the  face, lips, or tongue    breathing problems    changes in vision    chest pain    fever or chills, usually related to the infusion    joint pain    pain, tingling, numbness in the hands or feet    redness, blistering, peeling or loosening of the skin, including inside the mouth    seizures    signs of infection - fever or chills, cough, sore throat, flu-like symptoms, pain or difficulty passing urine    signs and symptoms of liver injury like dark yellow or brown urine; general ill feeling; light-colored stools; loss of appetite; nausea; right upper belly pain; unusually weak or tired; yellowing of the eyes or skin    signs and symptoms of a stroke like changes in vision; confusion; trouble speaking or understanding; severe headaches; sudden numbness or weakness of the face, arm or leg; trouble walking; dizziness; loss of balance or coordination    swelling of the ankles, feet, or hands    swollen lymph nodes in the neck, underarm, or groin areas    unusual bleeding or bruising    unusually weak or tired  Side effects that usually do not require medical attention (report to your doctor or health care professional if they continue or are bothersome):    headache    nausea    stomach pain    upset stomach  What may interact with this medicine?  Do not take this medicine with any of the following medications:    biologic medicines such as abatacept, adalimumab, anakinra, certolizumab, etanercept, golimumab, rituximab, secukinumab, tocilizumab, tofactinib, ustekinumab    live vaccines  What if I miss a dose?  It is important not to miss your dose. Call your doctor or health care professional if you are unable to keep an appointment.  Where should I keep my medicine?  This drug is given in a hospital or clinic and will not be stored at home.  What should I tell my health care provider before I take this medicine?  They need to know if you have any of these conditions:    cancer    current or past resident of [place] or  [facility]    diabetes    exposure to tuberculosis    Guillain-Midland syndrome    heart failure    hepatitis or liver disease    immune system problems    infection    lung or breathing disease, like COPD    multiple sclerosis    receiving phototherapy for the skin    seizure disorder    an unusual or allergic reaction to infliximab, mouse proteins, other medicines, foods, dyes, or preservatives    pregnant or trying to get pregnant    breast-feeding  What should I watch for while using this medicine?  Your condition will be monitored carefully while you are receiving this medicine. Visit your doctor or health care professional for regular checks on your progress. You may need blood work done while you are taking this medicine. Before beginning therapy, your doctor may do a test to see if you have been exposed to tuberculosis.  Call your doctor or health care professional for advice if you get a fever, chills or sore throat, or other symptoms of a cold or flu. Do not treat yourself. This drug decreases your body's ability to fight infections. Try to avoid being around people who are sick.  This medicine may make the symptoms of heart failure worse in some patients. If you notice symptoms such as increased shortness of breath or swelling of the ankles or legs, contact your health care provider right away.  If you are going to have surgery or dental work, tell your health care professional or dentist that you have received this medicine.  If you take this medicine for plaque psoriasis, stay out of the sun. If you cannot avoid being in the sun, wear protective clothing and use sunscreen. Do not use sun lamps or tanning beds/booths.  Talk to your doctor about your risk of cancer. You may be more at risk for certain types of cancers if you take this medicine.  NOTE:This sheet is a summary. It may not cover all possible information. If you have questions about this medicine, talk to your doctor, pharmacist,  or health care provider. Copyright  2019 [employer]vier

## 2020-01-09 ENCOUNTER — OFFICE VISIT (OUTPATIENT)
Dept: DERMATOLOGY | Facility: CLINIC | Age: 45
End: 2020-01-09
Payer: COMMERCIAL

## 2020-01-09 VITALS — HEART RATE: 86 BPM | DIASTOLIC BLOOD PRESSURE: 81 MMHG | SYSTOLIC BLOOD PRESSURE: 121 MMHG

## 2020-01-09 DIAGNOSIS — Z79.899 HIGH RISK MEDICATION USE: ICD-10-CM

## 2020-01-09 DIAGNOSIS — L73.2 HIDRADENITIS SUPPURATIVA: Primary | ICD-10-CM

## 2020-01-09 DIAGNOSIS — M70.22 OLECRANON BURSITIS OF LEFT ELBOW: ICD-10-CM

## 2020-01-09 LAB — URATE SERPL-MCNC: 4.4 MG/DL (ref 2.6–6)

## 2020-01-09 RX ORDER — OXYCODONE AND ACETAMINOPHEN 5; 325 MG/1; MG/1
2 TABLET ORAL EVERY 6 HOURS PRN
Qty: 84 TABLET | Refills: 0 | Status: SHIPPED | OUTPATIENT
Start: 2020-01-09 | End: 2020-02-13

## 2020-01-09 RX ORDER — PREDNISONE 10 MG/1
TABLET ORAL
Qty: 147 TABLET | Refills: 0 | Status: SHIPPED | OUTPATIENT
Start: 2020-01-09 | End: 2020-03-20

## 2020-01-09 ASSESSMENT — PAIN SCALES - GENERAL: PAINLEVEL: MODERATE PAIN (4)

## 2020-01-09 NOTE — PROGRESS NOTES
[facility] Dermatology Note     Dermatology Clinic  [facility]  [facility] and [facility]  [address] [place] [id number]     Dermatology Problem List:  1. Hidradenitis suppurativa: diagnosed age 11, initially on waistline (used to weigh 300 lbs) which cleared up -> intermittent outbreaks -> for the past 10 years has had in bilateral axilla, groin, thighs, and buttocks  - Current tx: infliximab started 12/9/19, clobetasol PRN, topical morphine PRN, ILK  - Referral placed to plastic surgery for excision of the R axilla - still pending  - Previous tx: PO antibiotics, Finasteride, Rifampin,  Spironolactone (6/2019- 9/5/19, stopped due to abnormal uterine bleeding); Humira 40 mg weekly (11/18/2018-9/2019, stopped due to ineffective)     Encounter Date: January 9, 2020     CC:       Chief Complaint   Patient presents with     Derm Problem       Klarissa is here for follow-up from  HS flare.       History of Present Illness:  Ms. Klarissa Curtis is a 44 year old female without other PMH apart from HS, who presents for followup for Hidradenitis suppurativa.  The patient was last seen in clinic on 11/21/19 for HS followup. At the time she was to switch from Humira to infliximab. Her last infliximab infusion was 12/23/19, which was her second round. She was to continue with clobetasol PRN for itching, topical morphine PRN for severe pain, and prescribed mepilex bandages via [facility]. A referral was placed to plastic surgery for HS excision of the R axilla. Her Hgba1c (5.3) and lipids were checked (cholesterol a little high; pt to decrease caloric intake).     Today the patient reports that she has had two recent severe flares over the past ~2 weeks.  She notes that about 2 weeks ago her right arm started flaring with increased drainage, she saw her other dermatologist for this and was treated with IL K.  Then about 1 week later, her left underarm started flaring, she  saw her other dermatologist again and was also treated with ILK.  She notes that the pain in her left underarm is so severe that it has limited her ability to work and she has had to take off of work over the past couple of days.  The pain can at times be up to 11 out of 10 in severity.  She has been unable to carry out activities of daily living including bathing and dressing herself due to pain when she moves her arm.  She is unable to determine if there has been any improvement of her at bedtime with the infliximab given the recent flares.  She has not been using topical clobetasol or topical morphine as neither of these have been helpful.    Patient notes that her menses have been very irregular over the past few months ever since going off of spironolactone.  She recently had a period that lasted about 2 weeks.  Notes that her menses were regular prior to going on spironolactone, but she had many irregularities while on spironolactone which have now not resolved even though she is off of spironolactone.     She is using Hibiclens wash daily.  She has never tried benzoyl peroxide wash.  She is an active smoker but is cutting down and is currently following a plan for smoking cessation with the goal of stopping smoking over the next 1 to 2 weeks.    The patient is otherwise feeling well overall today, though she does have severe emotional distress due to her HS flare. There are no other skin concerns at this time, other than that she reports red bumps at the proximal L forearm    Plastic surgery still pending.     Of note, patient is requesting to speak with the financial counselor due to a very large bill associated with her infliximab.       Past Medical History:   Hidradenitis suppurativa     Past Surgical History:  Bunionectomy     Social History:  Patient reports that she has been smoking, though cutting down. She has never used smokeless tobacco. The patient works for a company that runs [employer] in the  [employer] [place] [employer]". Her mother is a nurse.     Family History:  Sister with diabetes, hypertension     Medications:  Current Outpatient Medications   Medication     oxyCODONE-acetaminophen (PERCOCET) 5-325 MG tablet     oxyCODONE-acetaminophen (PERCOCET) 5-325 MG tablet     adalimumab (HUMIRA *CF*) 40 MG/0.4ML pen kit     clobetasol (TEMOVATE) 0.05 % external ointment     Doxycycline Hyclate 100 MG TBEC     morphine 0.1% in intrasite topical gel     predniSONE (DELTASONE) 10 MG tablet     spironolactone (ALDACTONE) 100 MG tablet     No current facility-administered medications for this visit.      Allergies  Penicillin - hives, itching, and rash      Review of Systems:  -As per HPI  -Gastrointestinal: Normal BM.  -Otherwise nml state of health. No other skin concerns.      Physical exam:  Vitals: /81, Pulse 86  GEN: This is a well developed, well-nourished female in no acute distress, in a pleasant mood.    SKIN: Focused examination of the bilateral axillas and bilateral lower extremities, was performed.  -Julian stage II  - Draining abscess in R lower axilla, 1x draining tunnel, 1x 5.0 x 1.0 cm cord and 1x mildly inflammatory nodule  - Abscess on L axilla with inflammatory nodule  - L olecranon with inflamed bursitis   - Few scaly papules on the L proximal forearm  - Bilateral arms with small follicularly based 1-2 mm papules   - No other lesions of concern.        Impression/Plan:  1. Hidradenitis suppurativa, Julian stage II  Unfortunately, appears to have flared after 2 cycles of infliximab, though hard to tell if this medication was having any effect given concomitant menstrual irregularities that may have caused her flare (patient reports strong history of perimenstrual flares).  Long discussion with patient about possible treatment options, including enrollment in clinical studies, however, advised patient that enrollment would require a washout period of at least 3 months, which may be difficult for her to  achieve.  Discussed that unfortunately there is not strong evidence for many treatment options.  Given painful flare, will initiate prednisone taper as she is responded well to this in the past with plan to increase infliximab frequency to every 4 weeks. Patient was provided with financial counselor information today and will call to discuss this.  -Increase infliximab to [id number] as covered by insurance  -Start prednisone taper: [id number] mg-30 mg -20mg-10 mg x 1 week each   -Percocet refilled, 2 week supply  -Continue clobetasol PRN for itching, topical morphine gel PRN, and percocet PRN for severe pain  -Plastic surgery consult for HS excision still pending. Pt reports she will pursue when she is more stable.     2. L olecranon bursitis  Possibly due to gout therefore will check uric acid level.   -Labs ordered: Uric acid    3.  Scaly papules on the left proximal forearm   could possibly be early psoriasis in the setting of infliximab.  -Monitor    4. Keratosis pilaris  Benign, reassured.   -  recommend over-the-counter AmLactin    Follow up in about a month.        Staff Involved:    Scribe Disclosure  I, June Coffman, am serving as a scribe to document services personally performed by Dr. Jean Kenny, based on data collection and the provider's statements to me.     I, Siri Mckeon MD, reviewed this note and made any necessary modifications.       Provider Disclosure:   The documentation recorded by the scribe accurately reflects the services I personally performed and the decisions made by me.    Jean Kenny MD, FAAD    Departments of Internal Medicine and Dermatology  [facility]  [phone]    Staff Physician Comments:   I saw and evaluated the patient with the resident and I agree with the assessment and plan.  I was present for the examination.    Jean Kenny MD, FAAD    Departments of Internal Medicine and Dermatology  [facility]  [place]  [phone]

## 2020-01-09 NOTE — PATIENT INSTRUCTIONS
- take prednisone taper: 60 mg x 1 week, 50 mg x 1 week, 40 mg x 1 week, 30 mg x 1 week, 20 mg x 1 week, 10 mg x 1 week   - can try AmLactin lotion for arms  - get labs

## 2020-01-09 NOTE — LETTER
Date:February 6, 2020      Patient was self referred, no letter generated. Do not send.        [facility] Health Information

## 2020-01-09 NOTE — LETTER
1/9/2020       RE: Klarissa Curtis  [address]  [place] [id number]     Dear Colleague,    Thank you for referring your patient, Klarissa Curtis, to the [facility] DERMATOLOGY at [facility]. Please see a copy of my visit note below.    [facility] Dermatology Note     Dermatology Clinic  [facility]  Clinics and Surgery Center  [address] [place] [id number]     Dermatology Problem List:  1. Hidradenitis suppurativa: diagnosed age 11, initially on waistline (used to weigh 300 lbs) which cleared up -> intermittent outbreaks -> for the past 10 years has had in bilateral axilla, groin, thighs, and buttocks  - Current tx: infliximab started 12/9/19, clobetasol PRN, topical morphine PRN, ILK  - Referral placed to plastic surgery for excision of the R axilla - still pending  - Previous tx: PO antibiotics, Finasteride, Rifampin,  Spironolactone (6/2019- 9/5/19, stopped due to abnormal uterine bleeding); Humira 40 mg weekly (11/18/2018-9/2019, stopped due to ineffective)     Encounter Date:  January 9, 2020     CC:       Chief Complaint   Patient presents with     Derm Problem       Klarissa is here for follow-up from  HS flare.       History of Present Illness:  Ms. Klarissa Curtis is a 44 year old female without other PMH apart from HS, who presents for followup for Hidradenitis suppurativa.  The patient was last seen in clinic on 11/21/19 for HS followup. At the time she was to switch from Humira to infliximab. Her last infliximab infusion was 12/23/19, which was her second round. She was to continue with clobetasol PRN for itching, topical morphine PRN for severe pain, and prescribed mepilex bandages via [email or web address]. A referral was placed to plastic surgery for HS excision of the R axilla. Her Hgba1c (5.3) and lipids were checked (cholesterol a little high; pt to decrease caloric intake).     Today the patient reports that  she has had two recent severe flares over the past ~2 weeks.  She notes that about 2 weeks ago her right arm started flaring with increased drainage, she saw her other dermatologist for this and was treated with IL K.  Then about 1 week later, her left underarm started flaring, she saw her other dermatologist again and was also treated with ILK.  She notes that the pain in her left underarm is so severe that it has limited her ability to work and she has had to take off of work over the past couple of days.  The pain can at times be up to 11 out of 10 in severity.  She has been unable to carry out activities of daily living including bathing and dressing herself due to pain when she moves her arm.  She is unable to determine if there has been any improvement of her at bedtime with the infliximab given the recent flares.  She has not been using topical clobetasol or topical morphine as neither of these have been helpful.    Patient notes that her menses have been very irregular over the past few months ever since going off of spironolactone.  She recently had a period that lasted about 2 weeks.  Notes that her menses were regular prior to going on spironolactone, but she had many irregularities while on spironolactone which have now not resolved even though she is off of spironolactone.     She is using Hibiclens wash daily.  She has never tried benzoyl peroxide wash.  She is an active smoker but is cutting down and is currently following a plan for smoking cessation with the goal of stopping smoking over the next 1 to 2 weeks.    The patient is otherwise feeling well overall today, though she does have severe emotional distress due to her HS flare. There are no other skin concerns at this time, other than that she reports red bumps at the proximal L forearm    Plastic surgery still pending.     Of note, patient is requesting to speak with the financial counselor due to a very large bill associated with her  infliximab.       Past Medical History:   Hidradenitis suppurativa     Past Surgical History:  Bunionectomy     Social History:  Patient reports that she has been smoking, though cutting down. She has never used smokeless tobacco. 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 Her mother is a nurse.     Family History:  Sister with diabetes, hypertension     Medications:  Current Outpatient Medications   Medication     oxyCODONE-acetaminophen (PERCOCET) 5-325 MG tablet     oxyCODONE-acetaminophen (PERCOCET) 5-325 MG tablet     adalimumab (HUMIRA *CF*) 40 MG/0.4ML pen kit     clobetasol (TEMOVATE) 0.05 % external ointment     Doxycycline Hyclate 100 MG TBEC     morphine 0.1% in intrasite topical gel     predniSONE (DELTASONE) 10 MG tablet     spironolactone (ALDACTONE) 100 MG tablet     No current facility-administered medications for this visit.      Allergies  Penicillin - hives, itching, and rash      Review of Systems:  -As per HPI  -Gastrointestinal: Normal BM.  -Otherwise nml state of health. No other skin concerns.      Physical exam:  Vitals: BP  121/81, Pulse 86  GEN: This is a well developed, well-nourished female in no acute distress, in a pleasant mood.    SKIN: Focused examination of the bilateral axillas and bilateral lower extremities, was performed.  -Julian stage II  - Draining abscess in R lower axilla, 1x draining tunnel, 1x 5.0 x 1.0 cm cord and 1x mildly inflammatory nodule  - Abscess on L axilla with inflammatory nodule  - L olecranon with inflamed bursitis   - Few scaly papules on the L proximal forearm  - Bilateral arms with small follicularly based 1-2 mm papules   - No other lesions of concern.        Impression/Plan:  1. Hidradenitis suppurativa, Julian stage II  Unfortunately, appears to have flared after 2 cycles of infliximab, though hard to tell if this medication was having any effect given concomitant menstrual irregularities that may have caused her flare  (patient reports strong history of perimenstrual flares).  Long discussion with patient about possible treatment options, including enrollment in clinical studies, however, advised patient that enrollment would require a washout period of at least 3 months, which may be difficult for her to achieve.  Discussed that unfortunately there is not strong evidence for many treatment options.  Given painful flare, will initiate prednisone taper as she is responded well to this in the past with plan to increase infliximab frequency to every 4 weeks. Patient was provided with financial counselor information today and will call to discuss this.  -Increase infliximab to [id number] as covered by insurance  -Start prednisone taper: [id number] mg-30 mg -20mg-10 mg x 1 week each   -Percocet refilled, 2 week supply  -Continue clobetasol PRN for itching, topical morphine gel PRN, and percocet PRN for severe pain  -Plastic surgery consult for HS excision still pending. Pt reports she will pursue when she is more stable.     2. L olecranon bursitis  Possibly due to gout therefore will check uric acid level.   -Labs ordered: Uric acid    3.  Scaly papules on the left proximal forearm   could possibly be early psoriasis in the setting of infliximab.  -Monitor    4. Keratosis pilaris  Benign, reassured.   -  recommend over-the-counter AmLactin    Follow up in about a month.        Staff Involved:    Scribe Disclosure  I, June Coffman, am serving as a scribe to document services personally performed by Dr. Jean Kenny, based on data collection and the provider's statements to me.     ISiri MD, reviewed this note and made any necessary modifications.       Provider Disclosure:   The documentation recorded by the scribe accurately reflects the services I personally performed and the decisions made by me.    Jean Kenny MD, FAAD    Departments of Internal Medicine and Dermatology  [facility]  [place]  [phone]    Staff Physician Comments:   I saw and evaluated the patient with the resident and I agree with the assessment and plan.  I was present for the examination.    Jean Kenny MD, FAAD    Departments of Internal Medicine and Dermatology  [facility]  [phone]        Again, thank you for allowing me to participate in the care of your patient.      Sincerely,    Jean Kenny MD

## 2020-01-09 NOTE — NURSING NOTE
Chief Complaint   Patient presents with     Derm Problem     Patient is here today for an HS flare axilla and groin.      Tiarra Valenzuela CMA

## 2020-01-16 ENCOUNTER — TELEPHONE (OUTPATIENT)
Dept: DERMATOLOGY | Facility: CLINIC | Age: 45
End: 2020-01-16

## 2020-01-16 NOTE — TELEPHONE ENCOUNTER
[facility] Call Center    Phone Message    May a detailed message be left on voicemail: yes    Reason for Call: Other: Pt needs our help contaacting Ethan Phoenix for a document he needs signed.  He is not returning messages.  Pt is spproaching a deadline     Action Taken: Message routed to:  [facility] ([facility]): dermatology

## 2020-02-13 ENCOUNTER — OFFICE VISIT (OUTPATIENT)
Dept: DERMATOLOGY | Facility: CLINIC | Age: 45
End: 2020-02-13
Payer: COMMERCIAL

## 2020-02-13 DIAGNOSIS — L73.2 HIDRADENITIS SUPPURATIVA: ICD-10-CM

## 2020-02-13 RX ORDER — PREDNISONE 10 MG/1
TABLET ORAL
Qty: 147 TABLET | Refills: 0 | Status: SHIPPED | OUTPATIENT
Start: 2020-02-13 | End: 2021-08-16

## 2020-02-13 RX ORDER — OXYCODONE AND ACETAMINOPHEN 5; 325 MG/1; MG/1
2 TABLET ORAL EVERY 6 HOURS PRN
Qty: 84 TABLET | Refills: 0 | Status: SHIPPED | OUTPATIENT
Start: 2020-02-13 | End: 2020-03-31

## 2020-02-13 ASSESSMENT — PAIN SCALES - GENERAL: PAINLEVEL: NO PAIN (0)

## 2020-02-13 NOTE — LETTER
2/13/2020       RE: Klarissa Curtis  [address]  [place] [id number]     Dear Colleague,    Thank you for referring your patient, Klarissa Curtis, to the [facility] DERMATOLOGY at [facility]. Please see a copy of my visit note below.    [facility] Dermatology Note     Dermatology Clinic  [facility]  Clinics and Surgery Center  [address] [place] [id number]     Dermatology Problem List:  1. Hidradenitis suppurativa: diagnosed age 11, initially on waistline (used to weigh 300 lbs) which cleared up -> intermittent outbreaks -> for the past 10 years has had in bilateral axilla, groin, thighs, and buttocks  - Current tx: infliximab started 12/9/19, clobetasol PRN, topical morphine PRN, ILK  - Referral placed to plastic surgery for excision of the R axilla - still pending  - Previous tx: PO antibiotics, Finasteride, Rifampin,  Spironolactone (6/2019- 9/5/19, stopped due to abnormal uterine bleeding); Humira 40 mg weekly (11/18/2018-9/2019, stopped due to ineffective)  2. Scaly papules on the left proximal forearm  -Could possibly be early psoriasis in the setting of infliximab.  -Monitor  3. Keratosis pilaris  - Benign, reassured.  - Recommend over-the-counter AmLactin  4. L olecranon bursitis  -uric acid checked due to possibility of gout, on 1/9/20 wnl (4.4)    Encounter Date: January 9, 2020     CC:       Chief Complaint   Patient presents with     Derm Problem       Klarissa is here for follow-up from  HS flare.       History of Present Illness:  Ms. Klarissa Curtis is a 44 year old female without other PMH apart from HS, who presents for followup for hidradenitis suppurativa.  The patient was last seen in clinic on 1/9/20 for HS followup. At the time she had reported flare after 2 cycles of infliximab. Due to severe flare prednisone taper was initiated and infliximab infusion dosage was increased to every 4 weeks. A two  "week supply of Percocet was refilled. Pt was to continue clobetasol PRN for itching, topical morphine gel PRN, and percocet PRN for severe pain.   She continues to hold off on HS excision until she is more stable.  Of note, uric acid lab was ordered for L olecranon bursitis that was wnl (4.4).    Today she reports that she is feeling well overall today. She has one more week of prednisone. Still experienced a flare-up this weekend and was in bed \"all day\" on Sunday; reports that she had draining and flare-up. Last infliximab infusion was on the 23rd of December. Also has been dealing with a cold. Besides prednisone, reports that ILK injections have helped with the HS. The patient is otherwise feeling well overall today. No other skin concerns at this time.     Past Medical History:   Hidradenitis suppurativa     Past Surgical History:  Bunionectomy     Social History:  Patient reports that she has been smoking, though cutting down. She has never used smokeless tobacco. 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 Her mother is a nurse.     Family History:  Sister with diabetes, hypertension     Medications:  Current Outpatient Medications   Medication     adalimumab (HUMIRA *CF*) 40 MG/0.4ML pen kit     clobetasol (TEMOVATE) 0.05 % external ointment     Doxycycline Hyclate 100 MG TBEC     morphine 0.1% in intrasite topical gel     oxyCODONE-acetaminophen (PERCOCET) 5-325 MG tablet     oxyCODONE-acetaminophen (PERCOCET) 5-325 MG tablet     predniSONE (DELTASONE) 10 MG tablet     predniSONE (DELTASONE) 10 MG tablet     spironolactone (ALDACTONE) 100 MG tablet     No current facility-administered medications for this visit.      Allergies  Penicillin - hives, itching, and rash      Review of Systems:  -As per HPI  -Gastrointestinal: Normal BM.  -Otherwise nml state of health. No other skin concerns.      Physical exam:  Vitals: /81, Pulse 86  GEN: This is a well developed, well-nourished " female in no acute distress, in a pleasant mood.    SKIN: Focused examination of the bilateral axillas and bilateral lower extremities, was performed.  -Julian stage II  - 1x nodule, 2x draining tunnel, inflamed cord on R axilla with 1 erythema, 2x tunneling and 2+ induration 6.0 mm, zero open surface  - 1x inflammatory nodule of the L axilla, 1x papule, 1x scarred plaque, scattered tunneled comedones; tunnel is 7.5 x 3.5  - 1x non-inflammatory cystic nodule, L breast  - Scarred papule, L preauricular cheek  - Clear from the waist down  - No other lesions of concern.     Impression/Plan:  1. Hidradenitis suppurativa, Julian stage II  Discussion with patient about possible treatment options, including enrollment in clinical studies. Risankizumab discussed as a possibility.    -Continue to restart infliximab to [id number] as covered by insurance  -Continue current course of prednisone taper: [id number] mg-30 mg -20mg-10 mg x 1 week each; prolonged for another month starting at 40 mg (40mg for 4 week, 30mg for 1 week, 20mg for 1 week and 10mg for 1 week).  -Continue Percocet as needed. Refills provided today at 5-325 mg per tablet; take 2 tablets by mouth every 6 hours as needed for severe pain.  -Continue clobetasol PRN for itching, topical morphine gel PRN, and percocet PRN for severe pain  -Plastic surgery consult for HS excision still pending. Pt reports she will pursue when she is more stable.      Follow up 4-6 weeks.         Staff Involved:    Scribe Disclosure  I, June Coffman, am serving as a scribe to document services personally performed by Dr. Jean Kenny, based on data collection and the provider's statements to me.     Provider Disclosure:   The documentation recorded by the scribe accurately reflects the services I personally performed and the decisions made by me.    Jean Kenny MD, FAAD    Departments of Internal Medicine and Dermatology  [facility]  [place]  [phone]            Again, thank you for allowing me to participate in the care of your patient.      Sincerely,    Jean Kenny MD

## 2020-02-13 NOTE — PROGRESS NOTES
[facility] Dermatology Note     Dermatology Clinic  [facility]  Clinics and Surgery Center  [address] [place] [id number]     Dermatology Problem List:  1. Hidradenitis suppurativa: diagnosed age 11, initially on waistline (used to weigh 300 lbs) which cleared up -> intermittent outbreaks -> for the past 10 years has had in bilateral axilla, groin, thighs, and buttocks  - Current tx: infliximab started 12/9/19, clobetasol PRN, topical morphine PRN, ILK  - Referral placed to plastic surgery for excision of the R axilla - still pending  - Previous tx: PO antibiotics, Finasteride, Rifampin,  Spironolactone (6/2019- 9/5/19, stopped due to abnormal uterine bleeding); Humira 40 mg weekly (11/18/2018-9/2019, stopped due to ineffective)  2. Scaly papules on the left proximal forearm  -Could possibly be early psoriasis in the setting of infliximab.  -Monitor  3. Keratosis pilaris  - Benign, reassured.  - Recommend over-the-counter AmLactin  4. L olecranon bursitis  -uric acid checked due to possibility of gout, on 1/9/20 wnl (4.4)    Encounter Date: January 9, 2020     CC:       Chief Complaint   Patient presents with     Derm Problem       Klarissa is here for follow-up from  HS flare.       History of Present Illness:  Ms. Klarissa Curtis is a 44 year old female without other PMH apart from HS, who presents for followup for hidradenitis suppurativa.  The patient was last seen in clinic on 1/9/20 for HS followup. At the time she had reported flare after 2 cycles of infliximab. Due to severe flare prednisone taper was initiated and infliximab infusion dosage was increased to every 4 weeks. A two week supply of Percocet was refilled. Pt was to continue clobetasol PRN for itching, topical morphine gel PRN, and percocet PRN for severe pain.   She continues to hold off on HS excision until she is more stable.  Of note, uric acid lab was ordered for L olecranon bursitis that was  "wnl (4.4).    Today she reports that she is feeling well overall today. She has one more week of prednisone. Still experienced a flare-up this weekend and was in bed \"all day\" on Sunday; reports that she had draining and flare-up. Last infliximab infusion was on the 23rd of December. Also has been dealing with a cold. Besides prednisone, reports that ILK injections have helped with the HS. The patient is otherwise feeling well overall today. No other skin concerns at this time.     Past Medical History:   Hidradenitis suppurativa     Past Surgical History:  Bunionectomy     Social History:  Patient reports that she has been smoking, though cutting down. She has never used smokeless tobacco. 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 Her mother is a nurse.     Family History:  Sister with diabetes, hypertension     Medications:  Current Outpatient Medications   Medication     adalimumab (HUMIRA *CF*) 40 MG/0.4ML pen kit     clobetasol (TEMOVATE) 0.05 % external ointment     Doxycycline Hyclate 100 MG TBEC     morphine 0.1% in intrasite topical gel     oxyCODONE-acetaminophen (PERCOCET) 5-325 MG tablet     oxyCODONE-acetaminophen (PERCOCET) 5-325 MG tablet     predniSONE (DELTASONE) 10 MG tablet     predniSONE (DELTASONE) 10 MG tablet     spironolactone (ALDACTONE) 100 MG tablet     No current facility-administered medications for this visit.      Allergies  Penicillin - hives, itching, and rash      Review of Systems:  -As per HPI  -Gastrointestinal: Normal BM.  -Otherwise nml state of health. No other skin concerns.      Physical exam:  Vitals: /81, Pulse 86  GEN: This is a well developed, well-nourished female in no acute distress, in a pleasant mood.    SKIN: Focused examination of the bilateral axillas and bilateral lower extremities, was performed.  -Julian stage II  - 1x nodule, 2x draining tunnel, inflamed cord on R axilla with 1 erythema, 2x tunneling and 2+ induration 6.0 mm, " zero open surface  - 1x inflammatory nodule of the L axilla, 1x papule, 1x scarred plaque, scattered tunneled comedones; tunnel is 7.5 x 3.5  - 1x non-inflammatory cystic nodule, L breast  - Scarred papule, L preauricular cheek  - Clear from the waist down  - No other lesions of concern.     Impression/Plan:  1. Hidradenitis suppurativa, Julian stage II  Discussion with patient about possible treatment options, including enrollment in clinical studies. Risankizumab discussed as a possibility.    -Continue to restart infliximab to [id number] as covered by insurance  -Continue current course of prednisone taper: 16mb-[id number]-06 mg-30 mg -20mg-10 mg x 1 week each; prolonged for another month starting at 40 mg (40mg for 4 week, 30mg for 1 week, 20mg for 1 week and 10mg for 1 week).  -Continue Percocet as needed. Refills provided today at 5-325 mg per tablet; take 2 tablets by mouth every 6 hours as needed for severe pain.  -Continue clobetasol PRN for itching, topical morphine gel PRN, and percocet PRN for severe pain  -Plastic surgery consult for HS excision still pending. Pt reports she will pursue when she is more stable.      Follow up 4-6 weeks.         Staff Involved:    Scribe Disclosure  I, June Coffman, am serving as a scribe to document services personally performed by Dr. Jean Kenny, based on data collection and the provider's statements to me.     Provider Disclosure:   The documentation recorded by the scribe accurately reflects the services I personally performed and the decisions made by me.    Jean Kenny MD, FAAD    Departments of Internal Medicine and Dermatology  [facility]  [phone]

## 2020-02-13 NOTE — NURSING NOTE
Dermatology Rooming Note    Klarissa Curtis's goals for this visit include:   Chief Complaint   Patient presents with     Derm Problem     Maura is here for a HS follow up, states she gets flares when she has her menstural cycle.        Bev Cavazos LPN

## 2020-02-13 NOTE — LETTER
Date:February 28, 2020      Patient was self referred, no letter generated. Do not send.        [facility] Information

## 2020-03-03 ENCOUNTER — MYC MEDICAL ADVICE (OUTPATIENT)
Dept: DERMATOLOGY | Facility: CLINIC | Age: 45
End: 2020-03-03

## 2020-03-05 ENCOUNTER — OFFICE VISIT (OUTPATIENT)
Dept: DERMATOLOGY | Facility: CLINIC | Age: 45
End: 2020-03-05
Payer: COMMERCIAL

## 2020-03-05 DIAGNOSIS — L73.2 HIDRADENITIS SUPPURATIVA: Primary | ICD-10-CM

## 2020-03-05 RX ORDER — LORAZEPAM 2 MG/1
2 TABLET ORAL 2 TIMES DAILY PRN
Qty: 14 TABLET | Refills: 0 | Status: SHIPPED | OUTPATIENT
Start: 2020-03-05 | End: 2020-03-06

## 2020-03-05 ASSESSMENT — PAIN SCALES - GENERAL: PAINLEVEL: MODERATE PAIN (5)

## 2020-03-05 NOTE — LETTER
Date:March 23, 2020      Patient was self referred, no letter generated. Do not send.        [facility] Information

## 2020-03-05 NOTE — NURSING NOTE
Dermatology Rooming Note    Klarissa Curtis's goals for this visit include:   Chief Complaint   Patient presents with     Derm Problem     Maura is here for a HS flare, states she believes it's hormonal and stress related.          Bev Cavazos LPN

## 2020-03-05 NOTE — PROGRESS NOTES
[facility] Dermatology Note     Dermatology Clinic  [facility]  Clinics and Surgery Center  [address] [place] [id number]     Dermatology Problem List:  1. Hidradenitis suppurativa: diagnosed age 11, initially on waistline (used to weigh 300 lbs) which cleared up -> intermittent outbreaks -> for the past 10 years has had in bilateral axilla, groin, thighs, and buttocks  - Current tx: infliximab - previously started 12/9/19 and the patient is to re-start [id number] pending insurance, clobetasol PRN, topical morphine PRN, ILK,  re-start adalimumab (HUMIRA) 40 MG/0.8ML  -  topical morphine gel PRN, and percocet PRN for severe pain  - Referral placed to plastic surgery for excision of the R axilla - still pending  - Previous tx: PO antibiotics, Finasteride, Rifampin,  Spironolactone (6/2019- 9/5/19, stopped due to abnormal uterine bleeding); Humira 40 mg weekly (11/18/2018-9/2019, stopped due to ineffective)  - Provided the patient a sample of [employer] clothing for the R axilla and bandages. Prescribed Cutaned sorbion 5.0 x 5.0 inch bandages via [employer].  2. Scaly papules on the left proximal forearm  -Could possibly be early psoriasis in the setting of infliximab.  -Monitor  3. Keratosis pilaris  - Benign, reassured.  - Recommend over-the-counter AmLactin  4. L olecranon bursitis  -uric acid checked due to possibility of gout, on 1/9/20 wnl (4.4). Seems inflammatory, likely not traumatic. Seems chronic. No concern for infection..  SH: She works as an / [place]lots of walking/running around)    Encounter Date: January 9, 2020     CC:   Chief Complaint   Patient presents with     Derm Problem     Maura is here for a HS flare, states she believes it's hormonal and stress related.          History of Present Illness:  Ms. Klarissa Curtis is a 44 year old female without other PMH apart from HS, who presents for followup for  "hidradenitis suppurativa. The patient was last seen in clinic on 2/13/20 for HS followup. At the time she was to restart infliximab [id number] as covered by insurance. She was to continue on her course of prednisone taper and it was prolonged for another month to hold her over until infusion. She was to continue percocet 5-325 mg tablets to be taken at 2 tablets po q6hrs prn for severe pain. Also to continue clobetasol PRN for itching and topical morphine gel PRN. She is still waiting to further stabilize with the HS perfore pursuing excision.    Today the patient reports that things have not been going well. The HS is not progressively much worse, however, she continues having constant drainage at the R axilla. Also affected at the groin. Last Sunday she felt a flare-up coming. The following day she had difficulty ambulating and it was worse. Tuesday she reports severe pain and unable to move. Notes that the R axilla was quite inflamed with drainage and pus. She followed up with another Dr Quinteros and received ILK injection to the R axilla and there was a bit of improvement. She took pain medications the following Tuesday and noticed better movement on Wednesday. Alternated with heat and ice on the groin. Yesterday she reports that she was able to sleep a bit for the first time in awhile despite pain. However, she had tenderness this morning with burning and unusual drainage at the R axilla. Today she has noticed a strange odor at the R axilla which she describes as \"burnt hair\" and has difficulty moving around. L axilla is fine.    Notes that she has been quite stressed and is hitting menstruation. She has more anxiety and would like some Ativan to help with HS flares. Has used this in the past with benefit.     Has taken a lot of Percocet within the last week. Notes that she takes the Percocet as needed outside of work hours. Has not yet restarted infliximab infusion; it is still pending insurance.     She is " otherwise feeling well overall today. No other skin concerns at this time.       Past Medical History:   Hidradenitis suppurativa     Past Surgical History:  Bunionectomy     Social History:  Patient reports that she has been smoking, though cutting down. She has never used smokeless tobacco. 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 Her mother is a nurse.     Family History:  Sister with diabetes, hypertension     Medications:  Current Outpatient Medications   Medication     adalimumab (HUMIRA *CF*) 40 MG/0.4ML pen kit     clobetasol (TEMOVATE) 0.05 % external ointment     Doxycycline Hyclate 100 MG TBEC     morphine 0.1% in intrasite topical gel     oxyCODONE-acetaminophen (PERCOCET) 5-325 MG tablet     predniSONE (DELTASONE) 10 MG tablet     spironolactone (ALDACTONE) 100 MG tablet     No current facility-administered medications for this visit.      Allergies  Penicillin - hives, itching, and rash      Review of Systems:  -As per HPI  -Otherwise nml state of health. No other skin concerns.      Physical exam:  GEN: This is a well developed, well-nourished female in no acute distress, in a pleasant mood.    SKIN: Focused examination of the axillas bilaterally, L elbow and groin bilaterally were performed.  -Julian stage II  - Draining tunnel 8.0 cm in superior aspect of R axilla and 1x draining tunnel on the lower R axilla  - L axilla clear  - Draining inflammatory nodule on the L vulva  - No other lesions of concern.     Impression/Plan:  1. Hidradenitis suppurativa, Julian stage II. Currently flaring at the R axilla, with constant drainage, and L groin area. Last infliximab infusion was 12/23/19; she is to re-start but this is still pending insurance. She will need to restart with load ing this point. Exacerbated by current menstrual cycle and anxiety. Discussion today with patient the possibility of enrollment in clinical studies for HS. Risankizumab discussed in more detail  today.   -Plan to continue to restart infliximab [id number];  this is still pending insurance vs restarting humira  at 80mg weekly  -Finish current course of prednisone taper. She should start to taper in a week from now. This started at 26gn-[id number]-56 mg-30 mg -20mg-10 mg x 1 week each, and was prolonged for another month starting at 40 mg (40mg for 4 week, 30mg for 1 week, 20mg for 1 week and 10mg for 1 week) at last visit. She is still finishing off the starting 40mg dose. We discussed the risks and benefits of long-term prednisone usage with the goal of having the patient come completely off of this.  -Continue Percocet as needed. Refills provided as needed.  -Continue clobetasol PRN for itching, topical morphine gel PRN, and percocet PRN for severe pain  -Plastic surgery consult for HS excision still pending. Pt reports she will pursue when she is more stable.   -Provided the patient a sample of [employer] clothing for the R axilla and bandages. Prescribed Cutimed sorbion 5.0 x 5.0 inch bandages via [employer].    2.  Anxiety  - Placed a referral to psychiatry.  - Prescribed ativan 2 mg tablet. Take 1 tablet (2 mg) by mouth 2 times daily as needed for anxiety x1 week to hold the patient over until her psychiatry consult.     Follow up in 4 weeks.       Staff Involved:    Scribe Disclosure  I, June Coffman, am serving as a scribe to document services personally performed by Dr. Jean Kenny, based on data collection and the provider's statements to me.     I, Aicha Cummins MS4, saw and examined the patient in the presence of Dr. Kenny.    Staff Physician:  I was present with the medical student who participated in the service and in the documentation of the note. I have verified the history and personally performed the physical exam and medical decision making. I agree with the assessment and plan of care as documented in the note.     Jean Kenny MD    Department of Dermatology  [facility]: Phone: [phone], Fax: [phone]  3/28/2020        Provider Disclosure:   The documentation recorded by the scribe accurately reflects the services I personally performed and the decisions made by me.    Jean Kenny MD, FAAD    Departments of Internal Medicine and Dermatology  [facility]  [phone]

## 2020-03-05 NOTE — Clinical Note
3/5/2020       RE: Klarissa Curtis  [address]  [place] [address]     Dear Colleague,    Thank you for referring your patient, Klarissa Curtis, to the [facility] DERMATOLOGY at [facility]. Please see a copy of my visit note below.    [facility] Dermatology Note     Dermatology Clinic  [facility]  [address] [place] [id number]     Dermatology Problem List:  1. Hidradenitis suppurativa: diagnosed age 11, initially on waistline (used to weigh 300 lbs) which cleared up -> intermittent outbreaks -> for the past 10 years has had in bilateral axilla, groin, thighs, and buttocks  - Current tx: infliximab - previously started 12/9/19 and the patient is to re-start [id number] pending insurance, clobetasol PRN, topical morphine PRN, ILK,  re-start adalimumab (HUMIRA) 40 MG/0.8ML  -  topical morphine gel PRN, and percocet PRN for severe pain  - Referral placed to plastic surgery for excision of the R axilla - still pending  - Previous tx: PO antibiotics, Finasteride, Rifampin,  Spironolactone (6/2019- 9/5/19, stopped due to abnormal uterine bleeding); Humira 40 mg weekly (11/18/2018-9/2019, stopped due to ineffective)  - Provided the patient a sample of Handi Wear clothing for the R axilla and bandages. Prescribed Cutaned sorbion 5.0 x 5.0 inch bandages via [employer].  2. Scaly papules on the left proximal forearm  -Could possibly be early psoriasis in the setting of infliximab.  -Monitor  3. Keratosis pilaris  - Benign, reassured.  - Recommend over-the-counter AmLactin  4. L olecranon bursitis  -uric acid checked due to possibility of gout, on 1/9/20 wnl (4.4)  SH: [place] an [place]/running around)    Encounter Date: January 9, 2020     CC:       Chief Complaint   Patient presents with     Derm Problem       Klarissa is here for  "follow-up from  HS flare.       History of Present Illness:  Ms. Klarissa Curtis is a 44 year old female without other PMH apart from HS, who presents for followup for hidradenitis suppurativa.  The patient was last seen in clinic on 2/13/20 for HS followup. At the time she was to restart infliximab [id number] as covered by insurance. She was to continue on her course of prednisone taper and it was prolonged for another month to hold her over until infusion. She was to continue percocet 5-325 mg tablets to be taken at 2 tablets po q6hrs prn for severe pain. Also to continue clobetasol PRN for itching and topical morphine gel PRN. She is still waiting to further stabilize with the HS perfore pursuing excision.    Today the patient reports that things have not been going as well. The HS is not progressively worse. However, she continues having constant drainage at the R axilla. Also affected at the groin. Last Sunday she felt a flare-up coming. The following day she had difficulty ambulating and it was worse. Tuesday she reports severe pain and unable to move. Notes that the R axilla was quite inflamed with drainage and pus. She followed up with another provider and received ILK injection to the R axilla and there was a bit of improvement. She took pain medications the following Tuesday and noticed better movement on Wednesday. Alternated with heat and ice on the groin. Yesterday she reports that she was able to sleep a bit for the first time in awhile despite pain. However, she had tenderness this morning with burning and unusual drainage at the R axilla. Today she has noticed a strange odor at the R axilla which she describes as \"burnt hair\" and has difficulty moving around. L axilla is fine.    Notes that she has been quite stressed and is hitting menstruation. She has more anxiety and would like some Ativan to help with HS flares. Has used this in the past with benefit.     Has taken a lot of Percocet within the " last week. Notes that she takes the Percocet as needed outside of work hours. Has not yet restarted infliximab infusion; it is still pending insurance.     She is otherwise feeling well overall today. No other skin concerns at this time.       Past Medical History:   Hidradenitis suppurativa     Past Surgical History:  Bunionectomy     Social History:  Patient reports that she has been smoking, though cutting down. She has never used smokeless tobacco. 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 Her mother is a nurse.     Family History:  Sister with diabetes, hypertension     Medications:  Current Outpatient Medications   Medication     adalimumab (HUMIRA *CF*) 40 MG/0.4ML pen kit     clobetasol (TEMOVATE) 0.05 % external ointment     Doxycycline Hyclate 100 MG TBEC     morphine 0.1% in intrasite topical gel     oxyCODONE-acetaminophen (PERCOCET) 5-325 MG tablet     predniSONE (DELTASONE) 10 MG tablet     spironolactone (ALDACTONE) 100 MG tablet     No current facility-administered medications for this visit.      Allergies  Penicillin - hives, itching, and rash      Review of Systems:  -As per HPI  -Otherwise nml state of health. No other skin concerns.      Physical exam:  Vitals: There were no vitals taken for this visit.  GEN: This is a well developed, well-nourished female in no acute distress, in a pleasant mood.    SKIN: Focused examination of the  axillas bilaterally, L elbow and groin bilaterally were performed.  -Julian stage II  - Draining tunnel 8.0 cm in superior aspect of R axilla and 1x draining tunnel on the lower R axilla  - L axilla clear  - Draining inflammatory nodule on the L vulva  - No other lesions of concern.     Impression/Plan:  1. Hidradenitis suppurativa, Julian stage II. Currently flaring at the R axilla, with constant drainage, and L groin area. Last infliximab infusion was 12/23/19; she is to re-start but this is still pending insurance. Exacerbated by  current menstrual cycle and anxiety. Discussion today with patient the possibility of enrollment in clinical studies for HS. Risankizumab discussed in more detail today.  -Continue to restart infliximab [id number]; this is still pending insurance.  -For now, re-start adalimumab (HUMIRA) 40 MG/0.8ML prefilled syringe kit. 160 mg subQ (4 syringes) on day 1, followed by 80 mg subQ on day 15, and then 80 mg subQ every week starting on day 29.  -Finish current course of prednisone taper. She should start to taper in a week from now. This started at [id number] mg-30 mg -20mg-10 mg x 1 week each, and was prolonged for another month starting at 40 mg (40mg for 4 week, 30mg for 1 week, 20mg for 1 week and 10mg for 1 week) at last visit. She is still finishing off the starting 40mg dose. We discussed the risks and benefits of long-term prednisone usage with the goal of having the patient come completely off of this.  -Continue Percocet as needed. Refills provided as needed.  -Continue clobetasol PRN for itching, topical morphine gel PRN, and percocet PRN for severe pain  -Plastic surgery consult for HS excision still pending. Pt reports she will pursue when she is more stable.   -Provided the patient a sample of Handi Wear clothing for the R axilla and bandages. Prescribed Cutaned sorbion 5.0 x 5.0 inch bandages via [employer].    2.  Anxiety  - Placed a referral to psychiatry.  - Prescribed ativan 2 mg tablet. Take 1 tablet (2 mg) by mouth 2 times daily as needed for anxiety x1 week to hold the patient over until her psychiatry consult.     Follow up in 4 weeks.       Staff Involved:    Scribe Disclosure  I, June Coffman, am serving as a scribe to document services personally performed by Dr. Jean Kenny, based on data collection and the provider's statements to me.     I, Aicha Cummins MS4, saw and examined the patient in the presence of Dr. Kenny.    ***            Again, thank you for allowing me to participate in  the care of your patient.      Sincerely,    Jean Kenny MD

## 2020-03-06 ENCOUNTER — DOCUMENTATION ONLY (OUTPATIENT)
Dept: DERMATOLOGY | Facility: CLINIC | Age: 45
End: 2020-03-06

## 2020-03-06 ENCOUNTER — TELEPHONE (OUTPATIENT)
Dept: DERMATOLOGY | Facility: CLINIC | Age: 45
End: 2020-03-06

## 2020-03-06 DIAGNOSIS — L73.2 HIDRADENITIS SUPPURATIVA: ICD-10-CM

## 2020-03-06 RX ORDER — LORAZEPAM 0.5 MG/1
2 TABLET ORAL 2 TIMES DAILY PRN
Qty: 14 TABLET | Refills: 0
Start: 2020-03-06 | End: 2020-03-27

## 2020-03-06 NOTE — TELEPHONE ENCOUNTER
[facility] Call Center    Phone Message    May a detailed message be left on voicemail: no     Reason for Call: Christina from [facility] called to verify Ativan dose since there is no record of patient taking this medication. Please call Christina back at . Thank you     Action Taken: Message routed to:  [facility] ([facility] derm    Travel Screening: Not Applicable

## 2020-03-06 NOTE — PROGRESS NOTES
I was called about this patient regarding a recent prescription for Ativan sent by Dr. Kenny. This is a patient with HS who was noted to have severe anxiety during her appointment on 3/5/20. She was prescribed ativan 2 mg twice daily prn x 7 days to hold her over until her psychiatry consult, which was placed during the visit. Of note, the psychiatry appointment has not been scheduled yet.     The pharmacist was concerned as this is a very high starting dose for Ativan and the patient has no prior history of being on this medication per review of [place] . Due to this, I verbally OK'd a change to the prescription as this does seem to be a very high starting dose.    I verbally changed the prescription to Ativan 0.5 mg twice daily prn x 7 days for a total of 14 tablets. This change was also done in the Medications tab.    CC Dr. Kenny as SHAISTA Hong MD  PGY-3 Medicine-Dermatology  Pager [phone]

## 2020-03-09 DIAGNOSIS — L73.2 HIDRADENITIS SUPPURATIVA: ICD-10-CM

## 2020-03-09 NOTE — TELEPHONE ENCOUNTER
Received VM from  Specialty pharmacy about Humira dosage, returned call and no answer. Please try again.

## 2020-03-09 NOTE — TELEPHONE ENCOUNTER
Tayler Hong MD (Resident)     Student in [facility] [employer] [facility] health care education/training program      I was called about this patient regarding a recent prescription for Ativan sent by Dr. Kenny. This is a patient with HS who was noted to have severe anxiety during her appointment on 3/5/20. She was prescribed ativan 2 mg twice daily prn x 7 days to hold her over until her psychiatry consult, which was placed during the visit. Of note, the psychiatry appointment has not been scheduled yet.      The pharmacist was concerned as this is a very high starting dose for Ativan and the patient has no prior history of being on this medication per review of [place] . Due to this, I verbally OK'd a change to the prescription as this does seem to be a very high starting dose.     I verbally changed the prescription to Ativan 0.5 mg twice daily prn x 7 days for a total of 14 tablets. This change was also done in the Medications tab.     CC Dr. Kenny as SHAISTA Hong MD  PGY-3 Medicine-Dermatology  Pager [phone]

## 2020-03-10 ENCOUNTER — MYC MEDICAL ADVICE (OUTPATIENT)
Dept: DERMATOLOGY | Facility: CLINIC | Age: 45
End: 2020-03-10

## 2020-03-11 ENCOUNTER — HEALTH MAINTENANCE LETTER (OUTPATIENT)
Age: 45
End: 2020-03-11

## 2020-03-12 NOTE — TELEPHONE ENCOUNTER
PA Initiation    Medication: HUMIRA STARTER AND MAINTENANCE DOSE PA   Insurance Company: [employer] ([facility]) - Phone [phone] Fax [phone]  Pharmacy Filling the Rx: [facility] - [place], [place] - [address]  Filling Pharmacy Phone:    Filling Pharmacy Fax:    Start Date: 3/12/2020    CALLED AND SPOKE TO MARILOU OVER THE PHONE - PA CASE # [id number]

## 2020-03-13 ENCOUNTER — TELEPHONE (OUTPATIENT)
Dept: DERMATOLOGY | Facility: CLINIC | Age: 45
End: 2020-03-13

## 2020-03-13 NOTE — TELEPHONE ENCOUNTER
Abran from the pharmacy was questioning the sig to the patient's Humira dose.    adalimumab (HUMIRA) 40 MG/0.8ML prefilled syringe kit : 160 mg subQ (4 syringes) on day 1, followed by 80 mg subQ on day 15, and then 80 mg subQ every week starting on day 29.    She was questioning if it was suppose to be 40mg subQ every week starting on day 29.    Informed pharmacy I will be sending this question to the [facility].    Shannon Loaiza [facility]

## 2020-03-19 ENCOUNTER — TELEPHONE (OUTPATIENT)
Dept: PEDIATRICS | Facility: CLINIC | Age: 45
End: 2020-03-19

## 2020-03-19 ENCOUNTER — TRANSFERRED RECORDS (OUTPATIENT)
Dept: HEALTH INFORMATION MANAGEMENT | Facility: CLINIC | Age: 45
End: 2020-03-19

## 2020-03-19 PROBLEM — R53.83 FATIGUE: Status: ACTIVE | Noted: 2018-01-10

## 2020-03-19 NOTE — TELEPHONE ENCOUNTER
General Call:   Who is calling:  Pt  Reason for Call:  Pt asking for PreOp Physical appt.  Pt not established [facility] pt.  Surgery set for 3/24/20, [facility] (Dr Dexter), [place];  Pt states surgery is NOT elective.    What are your questions or concerns:  Provider approval needed to sechedule  Date of last appointment with provider: n/a  Okay to leave a detailed message:No at Home number on file [phone] (home)     Thank you.  brady

## 2020-03-19 NOTE — TELEPHONE ENCOUNTER
FUTURE VISIT INFORMATION      SURGERY INFORMATION:    Date: 3/24/2020    Location:  [facility] [place]    Surgeon:  Dr. Sven Muñoz    Anesthesia Type: Axillary Block or General Block     Procedure: removal of olcerna bursa left elbow    Consult: 3/19/2020    RECORDS REQUESTED FROM:       Primary Care Provider: Jean Kenny MD    Pertinent Medical History: N/A    Most recent EKG+ Tracing: N/A    Most recent ECHO: N/A    Most recent Cardiac Stress Test: N/A    Most recent Coronary Angiogram: N/A    Most recent PFT's: N/A    Most recent Sleep Study:  N/A

## 2020-03-19 NOTE — TELEPHONE ENCOUNTER
3/19/2020 10:49AM request sent to [facility] for records and surgery information - Amjose     [facility]  [address] [place] [address], [address]  [place] [address]  Phone: [phone]  Fax:  [phone]    3/19/2020 2:37PM notes from [facility] scanned in, called and left a message with Dr Muñoz's team re: Anesthesia type. Left my direct number for call back [phone] - Amay   *[place] called back, type will be either axillary block or general block -Amay

## 2020-03-19 NOTE — TELEPHONE ENCOUNTER
The pt is aware and scheduled for their upcoming pre op on 3/20/20.   Bouchra Boswell on 3/19/2020 at 11:11 AM

## 2020-03-20 ENCOUNTER — PRE VISIT (OUTPATIENT)
Dept: SURGERY | Facility: CLINIC | Age: 45
End: 2020-03-20

## 2020-03-20 ENCOUNTER — ANESTHESIA EVENT (OUTPATIENT)
Dept: SURGERY | Facility: CLINIC | Age: 45
End: 2020-03-20

## 2020-03-20 ENCOUNTER — OFFICE VISIT (OUTPATIENT)
Dept: SURGERY | Facility: CLINIC | Age: 45
End: 2020-03-20
Payer: COMMERCIAL

## 2020-03-20 VITALS
SYSTOLIC BLOOD PRESSURE: 126 MMHG | BODY MASS INDEX: 40.18 KG/M2 | OXYGEN SATURATION: 95 % | HEART RATE: 101 BPM | WEIGHT: 256 LBS | DIASTOLIC BLOOD PRESSURE: 86 MMHG | TEMPERATURE: 98.4 F | HEIGHT: 67 IN | RESPIRATION RATE: 16 BRPM

## 2020-03-20 DIAGNOSIS — M70.22 OLECRANON BURSITIS OF LEFT ELBOW: ICD-10-CM

## 2020-03-20 DIAGNOSIS — Z01.818 PREOP EXAMINATION: ICD-10-CM

## 2020-03-20 DIAGNOSIS — Z01.818 PREOP EXAMINATION: Primary | ICD-10-CM

## 2020-03-20 DIAGNOSIS — N92.6 IRREGULAR MENSES: ICD-10-CM

## 2020-03-20 LAB
CREAT SERPL-MCNC: 0.54 MG/DL (ref 0.52–1.04)
GFR SERPL CREATININE-BSD FRML MDRD: >90 ML/MIN/{1.73_M2}
HGB BLD-MCNC: 14.3 G/DL (ref 11.7–15.7)
POTASSIUM SERPL-SCNC: 4.1 MMOL/L (ref 3.4–5.3)
T4 FREE SERPL-MCNC: 1.09 NG/DL (ref 0.76–1.46)
TSH SERPL DL<=0.005 MIU/L-ACNC: 0.32 MU/L (ref 0.4–4)

## 2020-03-20 RX ORDER — CALCIUM CARBONATE 750 MG/1
750 TABLET, CHEWABLE ORAL PRN
COMMUNITY
End: 2023-05-25

## 2020-03-20 ASSESSMENT — PAIN SCALES - GENERAL: PAINLEVEL: EXTREME PAIN (9)

## 2020-03-20 ASSESSMENT — MIFFLIN-ST. JEOR: SCORE: 1833.84

## 2020-03-20 ASSESSMENT — LIFESTYLE VARIABLES: TOBACCO_USE: 1

## 2020-03-20 NOTE — H&P
Pre-Operative H & P     CC:  Preoperative exam to assess for increased cardiopulmonary risk while undergoing surgery and anesthesia.    Date of Encounter: 3/20/2020  Primary Care Physician:  Jean Kenny  Associated diagnosis:  Left olecranon bursitis    HILDA Curtis is a 45 year old female who presents for pre-operative H & P in preparation for an excision of a left elbow olecranon bursitis on 3/24/20 by Dr. Muñoz at [facility].    Maura Curtis is a 45 year old female with hidradenitis suppurativa, morbid obesity, migraines, tobacco use disorder and anxiety that has a left olecranon bursitis.  She reports that she started getting swelling in the left elbow about 2-3 weeks ago, but 1 week ago it started to become very painful.  She consulted with Dr. Muñoz yesterday at [facility] for evaluation.  She has since been wrapping for compression with an ace wrap.  She has otherwise had no specific treatment.  Discussed with Dr. Muñoz over the phone today and he does feel this is a non-elective surgery at this time due to the possibility of infection.  Per his note they did discuss all treatment options.  Discussed with Dr. Muñoz over the phone today and he does feel this is a non-elective surgery at this time due to the possibility of infection.  The above listed surgery has now been recommended.      History is obtained from the patient and the medical record.     Past Medical History  Past Medical History:   Diagnosis Date     NO ACTIVE PROBLEMS        Past Surgical History  Past Surgical History:   Procedure Laterality Date     BUNIONECTOMY      x2     OTHER SURGICAL HISTORY      right pointer finger foreign body removal       Hx of Blood transfusions/reactions: none     Hx of abnormal bleeding or anti-platelet use: none    Menstrual history: Patient's last menstrual period was 03/01/2020 (approximate).:     Steroid use in the last year: none    Personal or FH with difficulty  with Anesthesia:  none    Prior to Admission Medications  Current Outpatient Medications   Medication Sig Dispense Refill     calcium carbonate 750 MG CHEW Take 750 mg by mouth as needed       LORazepam (ATIVAN) 0.5 MG tablet Take 4 tablets (2 mg) by mouth 2 times daily as needed for anxiety (Patient taking differently: Take 1 mg by mouth as needed for anxiety ) 14 tablet 0     oxyCODONE-acetaminophen (PERCOCET) 5-325 MG tablet Take 2 tablets by mouth every 6 hours as needed for severe pain (Patient taking differently: Take 1 tablet by mouth At Bedtime ) 84 tablet 0     predniSONE (DELTASONE) 10 MG tablet 40mg for 4 week, 30mg for 1 week, 20mg for 1 week and 10mg for 1 week (Patient taking differently: Take 20 mg by mouth every morning 40mg for 4 week, 30mg for 1 week, 20mg for 1 week and 10mg for 1 week) 147 tablet 0     adalimumab (HUMIRA *CF*) 40 MG/0.4ML pen kit        adalimumab (HUMIRA) 40 MG/0.8ML prefilled syringe kit 160 mg subQ (4 syringes) on day 1, followed by 80 mg subQ on day 15, and then 80 mg subQ every week starting on day 29 8 Syringe 6       Allergies  Allergies   Allergen Reactions     Keflex [Cephalexin] Other (See Comments)     Not true allergy- intolerance     Penicillins Hives, Itching and Rash       Social History  Social History     Socioeconomic History     Marital status: Single     Spouse name: Not on file     Number of children: 0     Years of education: Not on file     Highest education level: Not on file   Occupational History     Occupation:    Social Needs     Financial resource strain: Not on file     Food insecurity     Worry: Not on file     Inability: Not on file     Transportation needs     Medical: Not on file     Non-medical: Not on file   Tobacco Use     Smoking status: Current Every Day Smoker     Packs/day: 0.50     Years: 20.00     Pack years: 10.00     Types: Cigarettes     Smokeless tobacco: Never Used   Substance and Sexual Activity     Alcohol use: Yes      Alcohol/week: 6.0 - 9.0 standard drinks     Types: 6 - 9 Standard drinks or equivalent per week     Drug use: Never     Sexual activity: Not on file   Lifestyle     Physical activity     Days per week: Not on file     Minutes per session: Not on file     Stress: Not on file   Relationships     Social connections     Talks on phone: Not on file     Gets together: Not on file     Attends Jehovah's witness service: Not on file     Active member of club or organization: Not on file     Attends meetings of clubs or organizations: Not on file     Relationship status: Not on file     Intimate partner violence     Fear of current or ex partner: Not on file     Emotionally abused: Not on file     Physically abused: Not on file     Forced sexual activity: Not on file   Other Topics Concern     Parent/sibling w/ CABG, MI or angioplasty before 65F 55M? Not Asked   Social History Narrative     Not on file       Family History  Family History   Problem Relation Age of Onset     Diabetes Sister      Hyperlipidemia Sister      Breast Cancer Mother      Arthritis Mother      Myocardial Infarction Father 47                 ROS/MED HX  The complete review of systems is negative other than noted in the HPI or here.   ENT/Pulmonary:     (+)FEDERICA risk factors snores loudly, tobacco use, Current use 0.5 packs/day  , . .    Neurologic:     (+)migraines,     Cardiovascular:  - neg cardiovascular ROS   (+) ----. : . . . :. . No previous cardiac testing       METS/Exercise Tolerance:  >4 METS   Hematologic:  - neg hematologic  ROS       Musculoskeletal:   (+)  other musculoskeletal- left olecranon bursitis      GI/Hepatic:     (+) GERD Asymptomatic on medication,       Renal/Genitourinary:  - ROS Renal section negative       Endo:     (+) Chronic steroid usage (for hidradenitis suppurativa) for Other Date most recently used: today,Obesity, .      Psychiatric:     (+) psychiatric history anxiety and other (comment) (OCD)      Infectious Disease:  -  "neg infectious disease ROS       Malignancy:      - no malignancy   Other:    (+) No chance of pregnancy H/O Chronic Pain,H/O chronic opiod use ,                        PHYSICAL EXAM:   Mental Status/Neuro: A/A/O; Age Appropriate   Airway: Facies: Feasible  Mallampati: I  Mouth/Opening: Full  TM distance: > 6 cm  Neck ROM: Full   Respiratory: Auscultation: CTAB     Resp. Rate: Normal     Resp. Effort: Normal      CV: Rhythm: Regular  Rate: Age appropriate  Heart: Normal Sounds  Edema: None   Comments: Multiple crowns     Dental: Details Habitus: Morbid                 Temp: 98.4  F (36.9  C) Temp src: Oral BP: 126/86 Pulse: 101   Resp: 16 SpO2: 95 %         256 lbs 0 oz  5' 7\"   Body mass index is 40.1 kg/m .       Physical Exam  Constitutional: Awake, alert, cooperative, no apparent distress, and appears stated age.  Morbidly obese  Eyes: Pupils equal, round and reactive to light, extra ocular muscles intact, sclera clear, conjunctiva normal.  HENT: Normocephalic, oral pharynx with moist mucus membranes, good dentition. No goiter appreciated.   Respiratory: Clear to auscultation bilaterally, no crackles or wheezing.  Cardiovascular: Regular rate and rhythm, normal S1 and S2, and no murmur noted.  Carotids +2, no bruits. No edema. Palpable pulses to radial  DP and PT arteries.   GI: Normal bowel sounds, soft, non-distended, non-tender, no masses palpated, no hepatosplenomegaly.    Lymph/Hematologic: No cervical lymphadenopathy and no supraclavicular lymphadenopathy.  Genitourinary:  deferred  Skin: Warm and dry.  No rashes at anticipated surgical site.   Musculoskeletal: Full ROM of neck. There is no redness, warmth, or swelling of the exposed joints other than the left elbow. Large left olecranon bursitis that is erythematous and tender to touch.  Gross motor strength is normal.    Neurologic: Awake, alert, oriented to name, place and time. Cranial nerves II-XII are grossly intact. Gait is normal. "   Neuropsychiatric: Calm, cooperative. Normal affect.     Labs: (personally reviewed)  Component      Latest Ref Rng & Units 3/20/2020   Creatinine      0.52 - 1.04 mg/dL 0.54   GFR Estimate      >60 mL/min/1.73:m2 >90   GFR Estimate If Black      >60 mL/min/1.73:m2 >90   Potassium      3.4 - 5.3 mmol/L 4.1   Hemoglobin      11.7 - 15.7 g/dL 14.3         Outside records reviewed from:  Dr. Muñoz    ASSESSMENT and PLAN  Maura Curtis is a 45 year old female scheduled for an excision of a left elbow olecranon bursitis on 3/24/20 by Dr. Muñoz at [facility].  PAC referral for risk assessment and optimization for anesthesia with comorbid conditions of: hidradenitis suppurativa, morbid obesity, migraines, tobacco use disorder and anxiety.    Pre-operative considerations:  1.  Cardiac:  Functional status- METS >4.  She doesn't exercise purposefully, but [place] between 15,0000 and 20,000 steps per day.  She has no known cardiac conditions.  Low risk surgery with 0.4%  risk of major adverse cardiac event.   2.  Pulm:  Airway feasible.  FEDERICA risk: low.  She is currently smoking 0.5ppd and reports that she has been working on cutting down.    3.  GI:  Risk of PONV score = 2.  If > 2, anti-emetic intervention recommended.  She has periodic GERD symptoms which she manages with prn Tums.  I have recommended to her that she avoid any GERD triggering food or drinks the day prior to surgery.  If intubation became necessary, anesthesia to consider RSI.  4. Derm:  She follows with dermatology for chronic/recurrent hidradenitis suppurativa.  She is currently on a 6-8 week prednisone taper currently and is prescribed Humira, but isn't on it yet due to insurance issues.    5. Endo:  She is morbidly obese with a BMI >40.    6. Anesthesia:  She has two current left axillary hidradenitis lesions.  Recommend avoiding axillary block.  Consider interscalene if appropriate.        VTE risk:  0.5%    Patient is optimized and is acceptable candidate for the proposed procedure.  No further diagnostic evaluation is needed.         Tiarra Looney DNP, RN, APRN  Preoperative Assessment Center  [facility]  [facility]  Phone: [phone]  Fax: [phone]

## 2020-03-20 NOTE — RESULT ENCOUNTER NOTE
Krish Cyr,    Your test results are attached.  All of your labs are normal.  Your visit note has been completed and faxed to [place].       Tiarra Looney DNP, RN, ANP-C

## 2020-03-20 NOTE — ANESTHESIA PREPROCEDURE EVALUATION
"Anesthesia Pre-Procedure Evaluation    Patient: Klarissa Curtis   MRN:     [id number] Gender:   female   Age:    45 year old :      1975        Preoperative Diagnosis: * No surgery found *        LABS:  CBC:   Lab Results   Component Value Date    WBC 7.2 2019    HGB 14.2 2019    HCT 43.3 2019     2019     BMP:   Lab Results   Component Value Date     2019    POTASSIUM 4.0 2019    CHLORIDE 102 2019    CO2 29 2019    BUN 14 2019    CR 0.67 2019    GLC 79 2019     COAGS: No results found for: PTT, INR, FIBR  POC: No results found for: BGM, HCG, HCGS  OTHER:   Lab Results   Component Value Date    A1C 5.3 2019    MERCY 8.6 2019    ALBUMIN 3.7 2019    PROTTOTAL 7.9 2019    ALT 20 2019    AST 14 2019    ALKPHOS 60 2019    BILITOTAL 0.3 2019        Preop Vitals    BP Readings from Last 3 Encounters:   20 126/86   20 121/81   19 111/81    Pulse Readings from Last 3 Encounters:   20 101   20 86   19 87      Resp Readings from Last 3 Encounters:   20 16   19 16    SpO2 Readings from Last 3 Encounters:   20 95%   19 96%   19 94%      Temp Readings from Last 1 Encounters:   20 98.4  F (36.9  C) (Oral)    Ht Readings from Last 1 Encounters:   20 1.702 m (5' 7\")      Wt Readings from Last 1 Encounters:   20 116.1 kg (256 lb)    Estimated body mass index is 40.1 kg/m  as calculated from the following:    Height as of this encounter: 1.702 m (5' 7\").    Weight as of this encounter: 116.1 kg (256 lb).     LDA:        Past Medical History:   Diagnosis Date     NO ACTIVE PROBLEMS       Past Surgical History:   Procedure Laterality Date     BUNIONECTOMY      x2     OTHER SURGICAL HISTORY      right pointer finger foreign body removal      Allergies   Allergen Reactions     Keflex [Cephalexin] Other (See Comments)     " Not true allergy- intolerance     Penicillins Hives, Itching and Rash        Anesthesia Evaluation     . Pt has had prior anesthetic. Type: Regional    No history of anesthetic complications          ROS/MED HX    ENT/Pulmonary:     (+)FEDERICA risk factors snores loudly, tobacco use, Current use 0.5 packs/day  , . .    Neurologic:     (+)migraines,     Cardiovascular:  - neg cardiovascular ROS   (+) ----. : . . . :. . No previous cardiac testing       METS/Exercise Tolerance:  >4 METS   Hematologic:  - neg hematologic  ROS       Musculoskeletal:   (+)  other musculoskeletal- left olecranon bursitis      GI/Hepatic:     (+) GERD Asymptomatic on medication,       Renal/Genitourinary:  - ROS Renal section negative       Endo:     (+) Chronic steroid usage (for hidradenitis suppurativa) for Other Date most recently used: today,Obesity, .      Psychiatric:     (+) psychiatric history anxiety and other (comment) (OCD)      Infectious Disease:  - neg infectious disease ROS       Malignancy:      - no malignancy   Other:    (+) No chance of pregnancy H/O Chronic Pain,H/O chronic opiod use ,                        PHYSICAL EXAM:   Mental Status/Neuro: A/A/O; Age Appropriate   Airway: Facies: Feasible  Mallampati: I  Mouth/Opening: Full  TM distance: > 6 cm  Neck ROM: Full   Respiratory: Auscultation: CTAB     Resp. Rate: Normal     Resp. Effort: Normal      CV: Rhythm: Regular  Rate: Age appropriate  Heart: Normal Sounds  Edema: None   Comments: Multiple crowns     Dental: Details Habitus: Morbid               JZG FV AN PLAN NO PONV RULE       PAC Discussion and Assessment    ASA Classification: 2  Case is suitable for: ASC OK for Surgery: [place].  Anesthetic techniques and relevant risks discussed: Regional  Invasive monitoring and risk discussed:   Types:   Possibility and Risk of blood transfusion discussed:   NPO instructions given:   Additional anesthetic preparation and risks discussed:   Needs early admission to pre-op  area:   Other:     PAC Resident/NP Anesthesia Assessment:  Maura Curtis is a 45 year old female scheduled for an excision of a left elbow olecranon bursitis on 3/24/20 by Dr. Muñoz at [facility].  PAC referral for risk assessment and optimization for anesthesia with comorbid conditions of: hidradenitis suppurativa, morbid obesity, migraines, tobacco use disorder and anxiety.    Pre-operative considerations:  1.  Cardiac:  Functional status- METS >4.  She doesn't exercise purposefully, but [place] between 15,0000 and 20,000 steps per day.  She has no known cardiac conditions.  Low risk surgery with 0.4%  risk of major adverse cardiac event.   2.  Pulm:  Airway feasible.  FEDERICA risk: low.  She is currently smoking 0.5ppd and reports that she has been working on cutting down.    3.  GI:  Risk of PONV score = 2.  If > 2, anti-emetic intervention recommended.  She has periodic GERD symptoms which she manages with prn Tums.  I have recommended to her that she avoid any GERD triggering food or drinks the day prior to surgery.  If intubation became necessary, anesthesia to consider RSI.  4. Derm:  She follows with dermatology for chronic/recurrent hidradenitis suppurativa.  She is currently on a 6-8 week prednisone taper currently and is prescribed Humira, but isn't on it yet due to insurance issues.    5. Endo:  She is morbidly obese with a BMI >40.    6. Anesthesia:  She has two current left axillary hidradenitis lesions.  Recommend avoiding axillary block.  Consider interscalene if appropriate.        VTE risk: 0.5%    Patient is optimized and is acceptable candidate for the proposed procedure.  No further diagnostic evaluation is needed.     **For further details of assessment, testing, and physical exam please see H and P completed on same date.          Tiarra Looney DNP, RN, APRN      Reviewed and Signed by PAC Mid-Level Provider/Resident  Mid-Level Provider/Resident:  Tiarra Looney DNP, RN,  Date:   Time:     Attending Anesthesiologist Anesthesia Assessment:        Anesthesiologist:   Date:   Time:   Pass/Fail:   Disposition:     PAC Pharmacist Assessment:        Pharmacist:   Date:   Time:    SERAFIN Blake CNP

## 2020-03-23 ENCOUNTER — TRANSFERRED RECORDS (OUTPATIENT)
Dept: HEALTH INFORMATION MANAGEMENT | Facility: CLINIC | Age: 45
End: 2020-03-23

## 2020-03-24 ENCOUNTER — TRANSFERRED RECORDS (OUTPATIENT)
Dept: HEALTH INFORMATION MANAGEMENT | Facility: CLINIC | Age: 45
End: 2020-03-24

## 2020-03-24 ENCOUNTER — TELEPHONE (OUTPATIENT)
Dept: DERMATOLOGY | Facility: CLINIC | Age: 45
End: 2020-03-24

## 2020-03-24 NOTE — TELEPHONE ENCOUNTER
PA Initiation    Medication: HUMIRA-DENIED  Insurance Company: [employer] ([facility]) - Phone [phone] Fax [phone]  Pharmacy Filling the Rx: [facility] - [place], [place] - [facility] [address]  Filling Pharmacy Phone: [phone]  Filling Pharmacy Fax: [phone]  Start Date: 3/18/2020

## 2020-03-24 NOTE — TELEPHONE ENCOUNTER
PRIOR AUTHORIZATION DENIED    Medication: HUMIRA-DENIED    Denial Date: 3/24/2020    Denial Rational: MEDICATION QUANTITIES ABOUT THE MAX BENEFIT LIMIT ARE EXCLUDED UNDER THE PLAN. THE PLAN WILL ALLOW #4 FOR MONTH    Appeal Information: ...

## 2020-03-25 ENCOUNTER — RECORDS - HEALTHEAST (OUTPATIENT)
Dept: LAB | Facility: CLINIC | Age: 45
End: 2020-03-25

## 2020-03-26 ENCOUNTER — TELEPHONE (OUTPATIENT)
Dept: DERMATOLOGY | Facility: CLINIC | Age: 45
End: 2020-03-26

## 2020-03-26 ENCOUNTER — TRANSFERRED RECORDS (OUTPATIENT)
Dept: HEALTH INFORMATION MANAGEMENT | Facility: CLINIC | Age: 45
End: 2020-03-26

## 2020-03-26 DIAGNOSIS — L73.2 HIDRADENITIS SUPPURATIVA: Primary | ICD-10-CM

## 2020-03-26 RX ORDER — PHENOL 1.4 %
2 AEROSOL, SPRAY (ML) MUCOUS MEMBRANE DAILY
Qty: 180 TABLET | Refills: 11 | Status: SHIPPED | OUTPATIENT
Start: 2020-03-26 | End: 2022-02-10

## 2020-03-26 RX ORDER — PREDNISONE 20 MG/1
20 TABLET ORAL DAILY
Qty: 90 TABLET | Refills: 0 | Status: SHIPPED | OUTPATIENT
Start: 2020-03-26 | End: 2021-08-16

## 2020-03-26 RX ORDER — CHOLECALCIFEROL (VITAMIN D3) 50 MCG
50 TABLET ORAL DAILY
Qty: 180 TABLET | Refills: 3 | Status: SHIPPED | OUTPATIENT
Start: 2020-03-26 | End: 2021-11-11

## 2020-03-26 RX ORDER — PREDNISONE 20 MG/1
TABLET ORAL
Qty: 67 TABLET | Refills: 11 | Status: SHIPPED | OUTPATIENT
Start: 2020-03-26 | End: 2021-08-16

## 2020-03-26 NOTE — TELEPHONE ENCOUNTER
Pt is not on anything currently except prednisone. Recommended starting taking calcium 1200mg daily and 2000 international international unit(s) vitamin D.  No other risk factors requiring PCP prophylaxis. Will continue to work with insurance to either start Remicaide or double dose humira. Will continue prednisone 20mg with 60mg x4 days for flares. Will order biosimiliar and consider ordering stelara.   She will think about triple abx regimen, but she has never responded to abx in the past including clinda/rifampin. Will touch base next week.

## 2020-03-27 ENCOUNTER — MYC MEDICAL ADVICE (OUTPATIENT)
Dept: DERMATOLOGY | Facility: CLINIC | Age: 45
End: 2020-03-27

## 2020-03-27 DIAGNOSIS — L73.2 HIDRADENITIS SUPPURATIVA: ICD-10-CM

## 2020-03-27 DIAGNOSIS — F41.9 ANXIETY: Primary | ICD-10-CM

## 2020-03-27 RX ORDER — LORAZEPAM 0.5 MG/1
1 TABLET ORAL 2 TIMES DAILY PRN
Qty: 10 TABLET | Refills: 0 | Status: SHIPPED | OUTPATIENT
Start: 2020-03-27 | End: 2020-04-01

## 2020-03-27 NOTE — TELEPHONE ENCOUNTER
Received message as resident on call for a refill of lorazepam for anxiety. Last prescribed 3/6/2020 by Dr. Kenny with the intent of having her see psychiatry to manage this. I do not see a referral in the system, so I placed one now. She reports taking 1 mg lorazepam once daily as needed for anxiety. I filled a prescription for 1 mg BID prn for 5 days (10 tablets). She can discuss this further at her upcoming appointment with Dr. Kenny on 4/2/2020.

## 2020-03-28 LAB
BACTERIA SPEC CULT: ABNORMAL
BACTERIA SPEC CULT: ABNORMAL
BACTERIA SPEC CULT: NORMAL
BACTERIA SPEC CULT: NORMAL
GRAM STAIN RESULT: ABNORMAL

## 2020-03-31 RX ORDER — OXYCODONE AND ACETAMINOPHEN 5; 325 MG/1; MG/1
2 TABLET ORAL EVERY 6 HOURS PRN
Qty: 42 TABLET | Refills: 0 | Status: SHIPPED | OUTPATIENT
Start: 2020-03-31 | End: 2020-05-06

## 2020-04-01 NOTE — TELEPHONE ENCOUNTER
Medication Appeal Initiation    We have initiated an appeal for the requested medication:  Medication: HUMIRA - Urgent appeal initiated   Appeal Start Date:  4/1/2020  Insurance Company:  [facility]  Comments:   Faxed letter written by Dr. Kenny

## 2020-04-02 ENCOUNTER — TRANSFERRED RECORDS (OUTPATIENT)
Dept: HEALTH INFORMATION MANAGEMENT | Facility: CLINIC | Age: 45
End: 2020-04-02

## 2020-04-02 RX ORDER — PREDNISONE 10 MG/1
TABLET ORAL
Qty: 147 TABLET | Refills: 0 | OUTPATIENT
Start: 2020-04-02

## 2020-04-06 ENCOUNTER — TELEPHONE (OUTPATIENT)
Dept: DERMATOLOGY | Facility: CLINIC | Age: 45
End: 2020-04-06

## 2020-04-06 NOTE — TELEPHONE ENCOUNTER
----- Message from Ahsan Rose sent at 4/3/2020 10:32 AM CDT -----  Regarding: Remicade dose increase approval  Good morning,    The request to increase Remicade has been APPROVED.    If you have any questions, please feel free to reach out    Thank you,  Ahsan Rose   - Infusion Therapy   [facility]   [email or web address]  [email or web address]

## 2020-04-09 ENCOUNTER — TRANSFERRED RECORDS (OUTPATIENT)
Dept: HEALTH INFORMATION MANAGEMENT | Facility: CLINIC | Age: 45
End: 2020-04-09

## 2020-04-13 NOTE — TELEPHONE ENCOUNTER
Called [employer] provider line at [phone] to check status of weekly appeal - talked to Adrienne, she gave me appeals line ([phone]) then spoke to Brisa whom transferred me to [facility] who transferred me to [phone] (option 4) then was disconnected - refaxed original appeal with a note to call me once received

## 2020-04-15 NOTE — TELEPHONE ENCOUNTER
Spoke with Dr. Kenny - he stated that the increase in Remicade has been approved and would like to hold off on Humira.

## 2020-04-15 NOTE — TELEPHONE ENCOUNTER
Called and spoke to [facility] - he stated that the first level appeal was received and was denied - asked him to fax me a copy so we can proceed with free drug     2nd level appeal is in process reference [id number] decision by 4/16/2020

## 2020-04-17 LAB
BACTERIA SPEC CULT: NORMAL
BACTERIA SPEC CULT: NORMAL

## 2020-04-30 ENCOUNTER — VIRTUAL VISIT (OUTPATIENT)
Dept: DERMATOLOGY | Facility: CLINIC | Age: 45
End: 2020-04-30
Payer: COMMERCIAL

## 2020-04-30 DIAGNOSIS — L73.2 HIDRADENITIS SUPPURATIVA: Primary | ICD-10-CM

## 2020-04-30 RX ORDER — FLUOXETINE 40 MG/1
80 CAPSULE ORAL DAILY
COMMUNITY
End: 2023-10-02

## 2020-04-30 RX ORDER — TRAZODONE HYDROCHLORIDE 50 MG/1
50 TABLET, FILM COATED ORAL DAILY
COMMUNITY
Start: 2020-04-27 | End: 2023-10-02

## 2020-04-30 ASSESSMENT — PAIN SCALES - GENERAL: PAINLEVEL: SEVERE PAIN (7)

## 2020-04-30 NOTE — PROGRESS NOTES
[facility]  Store and Forward and Video ( Invitation sent by:  Rae and text to cell phone: [phone] )    Impression and Recommendations (Patient Counseled on the Following):  1. Hidradenitis suppurativa, stable but not controlled. We discussed further treatment options including restarting infliximab, Humira vs. Triple antibiotic regimen. Patient was hesitant to restart infliximab given that she flared after a few infusions in the past and her insurance did not cover it and she is still fighting with them. Patient is not excited sbout the triple abx regimen given side effects and therefore prefers to avoid this.  - Will hold off on infliximab given COVID19 and uncertainty about insurance coverage (see below)  - Will continue 20 mg prednisone daily  - Will have Rose Robinson call patient regarding infusion coverage, message sent    Follow-up:   Follow-up with dermatology in approximately 6 weeks. Earlier for new or changing lesions or rash.      Staff and resident:    Jose Alfredo Oden MD  Dermatology Resident, PGY-2    Dr. Kenny was on the phone for the entire visit and agrees with the assessment and plan as documented in this note.  __________________________________________________________________________    Dermatology Problem List:  1. Hidradenitis suppurativa: diagnosed age 11, initially on waistline (used to weigh 300 lbs) which cleared up -> intermittent outbreaks -> for the past 10 years has had in bilateral axilla, groin, thighs, and buttocks  - Current tx: infliximab - previously started 12/9/19 and the patient is to re-start [id number] pending insurance, clobetasol PRN, topical morphine PRN, ILK,  re-start adalimumab (HUMIRA) 40 MG/0.8ML  -  topical morphine gel PRN, and percocet PRN for severe pain  - Referral placed to plastic surgery for excision of the R axilla - still pending  - Previous tx: PO antibiotics, Finasteride, Rifampin,  Spironolactone (6/2019- 9/5/19, stopped due to  "abnormal uterine bleeding); Humira 40 mg weekly (11/18/2018-9/2019, stopped due to ineffective)  - Provided the patient a sample of Handi Wear clothing for the R axilla and bandages. Prescribed Cutaned sorbion 5.0 x 5.0 inch bandages via [facility].  2. Scaly papules on the left proximal forearm  -Could possibly be early psoriasis in the setting of infliximab.  -Monitor  3. Keratosis pilaris  - Benign, reassured.  - Recommend over-the-counter AmLactin  4. L olecranon bursitis  -uric acid checked due to possibility of gout, on 1/9/20 wnl (4.4). Seems inflammatory, likely not traumatic. Seems chronic. No concern for infection..  SH: She works as an / at the [employer] (lots of walking/running around)    Encounter Date: Apr 30, 2020    CC:   Chief Complaint   Patient presents with     Derm Problem     flaring, no new areas. States under her nose is \"red and scaley\". Also present on back of neck       History of Present Illness:  I have reviewed the teledermatology information and the nursing intake corresponding to this issue. Klarissa Curtis is a 45 year old female who presents via teledermatology for HS follow-up. Patient was last seen in person on 3/5/2020 at which time we wondered about restarting infliximab vs. Humira. Remicade was approved, but she has not yet started. She has been seeing Dr. Naranjo and is really happy with that.    She is hesitant about infliximab because she had a huge flare after a few treatments. Additionally, her insurance didn't cover infliximab in the first round and she is nervous that it will not be covered. She is usually taking 20 mg prednisone, but jumped up to 60 mg yesterday because she was flaring.    She currently is not living at home. [place] She was so lonely because no one is at work and she was going home alone at night, so she is living with her best friend and her  in [place]. She denies any " suicidal thoughts. She does not want to die, but she does feel as if there is no point in living. A lot of her friends are avoiding her because she is still going into work, which makes her quite sad.       ROS: Patient is generally feeling well today .    Physical Examination:  N/A    Labs:  Reviewed    Past Medical History:   Patient Active Problem List   Diagnosis     Hidradenitis suppurativa     Fatigue     Past Medical History:   Diagnosis Date     NO ACTIVE PROBLEMS      Past Surgical History:   Procedure Laterality Date     BUNIONECTOMY      x2     OTHER SURGICAL HISTORY      right pointer finger foreign body removal       Social History:  Patient reports that she has been smoking cigarettes. She has a 10.00 pack-year smoking history. She has never used smokeless tobacco. She reports current alcohol use of about 6.0 - 9.0 standard drinks of alcohol per week. She reports that she does not use drugs.    Family History:  Family History   Problem Relation Age of Onset     Diabetes Sister      Hyperlipidemia Sister      Breast Cancer Mother      Arthritis Mother      Myocardial Infarction Father 47       Medications:  Current Outpatient Medications   Medication     adalimumab (HUMIRA *CF*) 40 MG/0.4ML pen kit     adalimumab (HUMIRA) 40 MG/0.8ML prefilled syringe kit     calcium carbonate (CALCIUM CARBONATE) 600 MG tablet     calcium carbonate 750 MG CHEW     FLUoxetine (PROZAC) 40 MG capsule     oxyCODONE-acetaminophen (PERCOCET) 5-325 MG tablet     predniSONE (DELTASONE) 10 MG tablet     predniSONE (DELTASONE) 20 MG tablet     predniSONE (DELTASONE) 20 MG tablet     traZODone (DESYREL) 50 MG tablet     Vitamin D3 (CHOLECALCIFEROL) 2000 units (50 mcg) tablet     No current facility-administered medications for this visit.           Allergies   Allergen Reactions     Keflex [Cephalexin] Other (See Comments)     Not true allergy- intolerance     Penicillins Hives, Itching and Rash          _____________________________________________________________________________    Teledermatology information:  - Location of patient: Home  - Patient presented as: return  - Location of teledermatologist:  ([facility] DERMATOLOGY )  - Reason teledermatology is appropriate:  of National Emergency Regarding Coronavirus disease (COVID 19) Outbreak  - Image quality and interpretability: N/A  - Physician has received verbal consent for a Video/Photos Visit from the patient? N/A  - In-person dermatology visit recommendation: yes - for physician visit  - Date of images: N/A  - Service start time: 5:12 PM  - Service end time: 5:50 PM  - Date of report: 4/30/2020      Staff Physician Comments:   I  evaluated the patient with the resident and I agree with the assessment and plan.  I was present for the      Jean Kenny MD, FAAD    Departments of Internal Medicine and Dermatology  [facility]  [phone]

## 2020-04-30 NOTE — NURSING NOTE
"Chief Complaint   Patient presents with     Derm Problem     flaring, no new areas. States under her nose is \"red and scaley\". Also present on back of neck     Magali Snider, EMT    "

## 2020-04-30 NOTE — PATIENT INSTRUCTIONS
[facility] Teledermatology Visit    Thank you for allowing us to participate in your care. Your findings, instructions and follow-up plan are as follows:    We will continue prednisone for 1-2 months and then start infliximab after verifying with Rose.  We will follow-up with phone visit in 6 weeks.    When should I call my doctor?    If you are worsening or not improving, please, contact us or seek urgent care as noted below.     Who should I call with questions (adults)?    [facility] (adult and pediatric): [phone]     [facility] (adult): [phone]    For urgent needs outside of business hours call the [facility] at [phone] and ask for the dermatology resident on call    If this is a medical emergency and you are unable to reach an ER, Call [phone]      Who should I call with questions (pediatric)?  [facility]- Pediatric Dermatology  Dr. Francisca Raza, Dr. Nadege Leong, Dr. Krista Bustillos, Flory Redd, VIC [place], Dr. Maribeth Galo & Dr. Dany Vallecillo  Non Urgent  Nurse Triage Line; [phone]- Bettina and Jeri JUAREZ Care Coordinators   Jeannine (/Complex ) [phone]    If you need a prescription refill, please contact your pharmacy. Refills are approved or denied by our Physicians during normal business hours, Monday through Fridays  Per office policy, refills will not be granted if you have not been seen within the past year (or sooner depending on your child's condition)    Scheduling Information:  Pediatric Appointment Scheduling and Call Center [phone]  Radiology Scheduling- [phone]  Sedation Unit Scheduling- [phone]  [place] Scheduling- General [phone]; Pediatric Dermatology [phone]  Main  Services: [phone]  Bulgarian: [phone]  American: [phone]  Hmong/Faroese/British Virgin Islander:  [phone]  Preadmission Nursing Department Fax Number: [phone] (Fax all pre-operative paperwork to this number)    For urgent matters arising during evenings, weekends, or holidays that cannot wait for normal business hours please call [phone] and ask for the Dermatology Resident On-Call to be paged.

## 2020-05-06 ENCOUNTER — MYC MEDICAL ADVICE (OUTPATIENT)
Dept: DERMATOLOGY | Facility: CLINIC | Age: 45
End: 2020-05-06

## 2020-05-06 DIAGNOSIS — L73.2 HIDRADENITIS SUPPURATIVA: ICD-10-CM

## 2020-05-06 RX ORDER — OXYCODONE AND ACETAMINOPHEN 5; 325 MG/1; MG/1
2 TABLET ORAL EVERY 6 HOURS PRN
Qty: 42 TABLET | Refills: 0 | Status: SHIPPED | OUTPATIENT
Start: 2020-05-06 | End: 2020-07-01

## 2020-05-07 ENCOUNTER — TRANSFERRED RECORDS (OUTPATIENT)
Dept: HEALTH INFORMATION MANAGEMENT | Facility: CLINIC | Age: 45
End: 2020-05-07

## 2020-06-10 ENCOUNTER — MYC MEDICAL ADVICE (OUTPATIENT)
Dept: DERMATOLOGY | Facility: CLINIC | Age: 45
End: 2020-06-10

## 2020-06-10 DIAGNOSIS — L29.9 SCALP PRURITUS: Primary | ICD-10-CM

## 2020-06-11 ENCOUNTER — VIRTUAL VISIT (OUTPATIENT)
Dept: DERMATOLOGY | Facility: CLINIC | Age: 45
End: 2020-06-11
Payer: COMMERCIAL

## 2020-06-11 DIAGNOSIS — L73.2 HIDRADENITIS SUPPURATIVA: Primary | ICD-10-CM

## 2020-06-11 RX ORDER — HEPARIN SODIUM (PORCINE) LOCK FLUSH IV SOLN 100 UNIT/ML 100 UNIT/ML
5 SOLUTION INTRAVENOUS
Status: CANCELLED | OUTPATIENT
Start: 2020-06-15

## 2020-06-11 RX ORDER — USTEKINUMAB 90 MG/ML
90 INJECTION, SOLUTION SUBCUTANEOUS ONCE
Qty: 1 ML | Refills: 0 | OUTPATIENT
Start: 2020-06-11 | End: 2020-07-05

## 2020-06-11 RX ORDER — METHYLPREDNISOLONE SODIUM SUCCINATE 125 MG/2ML
40 INJECTION, POWDER, LYOPHILIZED, FOR SOLUTION INTRAMUSCULAR; INTRAVENOUS ONCE
Status: CANCELLED | OUTPATIENT
Start: 2020-06-12

## 2020-06-11 RX ORDER — HEPARIN SODIUM,PORCINE 10 UNIT/ML
5 VIAL (ML) INTRAVENOUS
Status: CANCELLED | OUTPATIENT
Start: 2020-06-15

## 2020-06-11 RX ORDER — PREDNISONE 10 MG/1
10 TABLET ORAL DAILY
Qty: 60 TABLET | Refills: 1 | Status: SHIPPED | OUTPATIENT
Start: 2020-06-11 | End: 2021-08-16

## 2020-06-11 ASSESSMENT — PAIN SCALES - GENERAL: PAINLEVEL: MODERATE PAIN (5)

## 2020-06-11 NOTE — PATIENT INSTRUCTIONS
[facility] Teledermatology Visit    Thank you for allowing us to participate in your care. Your findings, instructions and follow-up plan are as follows:  - Prednisone 10 mg daily  - Take 40s for flares as needed  - Begin stelara - we will start getting this going  - Follow up in 8 weeks    When should I call my doctor?    If you are worsening or not improving, please, contact us or seek urgent care as noted below.     Who should I call with questions (adults)?    [facility] (adult and pediatric): [phone]     [facility] (adult): [phone]    For urgent needs outside of business hours call the [facility] at [phone] and ask for the dermatology resident on call    If this is a medical emergency and you are unable to reach an ER, Call [phone]      Who should I call with questions (pediatric)?  [facility]- Pediatric Dermatology  Dr. Francisca Raza, Dr. Nadege Leong, Dr. Krista Bustillos, Flory Redd, VIC [place], Dr. Maribeth Galo & Dr. Dany Vallecillo  Non Urgent  Nurse Triage Line; [phone]- Bettina and Jeri RN Care Coordinators   Jeannine (/Complex ) [phone]    If you need a prescription refill, please contact your pharmacy. Refills are approved or denied by our Physicians during normal business hours, Monday through Fridays  Per office policy, refills will not be granted if you have not been seen within the past year (or sooner depending on your child's condition)    Scheduling Information:  Pediatric Appointment Scheduling and Call Center [phone]  Radiology Scheduling- [phone]  Sedation Unit Scheduling- [phone]  [place] Scheduling- General [phone]; Pediatric Dermatology [phone]  Main  Services: [phone]  Micronesian: [phone]  Sudanese: [phone]  Hmong/Palauan/Belarusian: [phone]  Preadmission Nursing  Department Fax Number: [phone] (Fax all pre-operative paperwork to this number)    For urgent matters arising during evenings, weekends, or holidays that cannot wait for normal business hours please call [phone] and ask for the Dermatology Resident On-Call to be paged.

## 2020-06-11 NOTE — LETTER
6/11/2020       RE: Klarissa Curtis  [address]  [place] [address]     Dear Colleague,    Thank you for referring your patient, Klarissa Curtis, to the [facility] DERMATOLOGY at [facility]. Please see a copy of my visit note below.    **sending photo now    [facility]  Store and Forward and Telephone [phone]    Impression and Recommendations (Patient Counseled on the Following):  1. Hidradenitis suppurativa  - Pt hesitant to retry infliximab duie to cost and no improvement, and actually a flare, after previously starting infliximab  - Will try ustekinumab at crohn's dosing based of [employer] HS guidelines and current literature recommending higher doses with initial infusion.  - She has been ion prednisone 20mg and is currently flaring. Will have her do 5 days of 40mg daily and crease to 10mg daily. She can increase top 40mg for flares for 5 days.     Follow-up: 12 weeks          Jean Kenny MD, FAAD    Departments of Internal Medicine and Dermatology  [facility]  [phone]        Dermatology Problem List:  1. Hidradenitis suppurativa: diagnosed age 11, initially on waistline (used to weigh 300 lbs) which cleared up -> intermittent outbreaks -> for the past 10 years has had in bilateral axilla, groin, thighs, and buttocks  - Current tx: infliximab - previously started 12/9/19 and the patient is to re-start [id number] pending insurance, clobetasol PRN, topical morphine PRN, ILK,  re-start adalimumab (HUMIRA) 40 MG/0.8ML  -  topical morphine gel PRN, and percocet PRN for severe pain  - Referral placed to plastic surgery for excision of the R axilla - still pending  - Previous tx: PO antibiotics, Finasteride, Rifampin,  Spironolactone (6/2019- 9/5/19, stopped due to abnormal uterine bleeding); Humira 40 mg weekly (11/18/2018-9/2019, stopped due to ineffective)  - Provided the patient a sample of [employer]  clothing for the R axilla and bandages. Prescribed Cutaned sorbion 5.0 x 5.0 inch bandages via [employer].  2. Scaly papules on the left proximal forearm  -Could possibly be early psoriasis in the setting of infliximab.  -Monitor  3. Keratosis pilaris  - Benign, reassured.  - Recommend over-the-counter AmLactin  4. L olecranon bursitis  -uric acid checked due to possibility of gout, on 1/9/20 wnl (4.4). Seems inflammatory, likely not traumatic. Seems chronic. No concern for infection..  SH: She works as an / at the [employer] (lots of walking/running around)    Encounter Date: Jun 11, 2020    CC:   Chief Complaint   Patient presents with     Derm Problem     flaring in R axilla, R groin, L axilla     Interval Hx  6/11/20  Pt has been on prednisone 20mg daily and just inceased her dose to 40mg daily due to a deion-menstrual flare.Doing OK from HS perspective. No new issues.    History of Present Illness:  I have reviewed the teledermatology information and the nursing intake corresponding to this issue. Klarissa Curtis is a 45 year old female who presents via teledermatology for HS follow-up. Patient was last seen in person on 3/5/2020 at which time we wondered about restarting infliximab vs. Humira. Remicade was approved, but she has not yet started. She has been seeing Dr. Naranjo and is really happy with that.     She is hesitant about infliximab because she had a huge flare after a few treatments. Additionally, her insurance didn't cover infliximab in the first round and she is nervous that it will not be covered. She is usually taking 20 mg prednisone, but jumped up to 60 mg yesterday because she was flaring.     She currently is not living at home. [place]. She was so lonely because no one is at work and she was going home alone at night, so she is living with her best friend and her  in [place]. She denies any suicidal thoughts. She  does not want to die, but she does feel as if there is no point in living. A lot of her friends are avoiding her because she is still going into work, which makes her quite sad.        Past Medical History:   Patient Active Problem List   Diagnosis     Hidradenitis suppurativa     Fatigue     Past Medical History:   Diagnosis Date     NO ACTIVE PROBLEMS      Past Surgical History:   Procedure Laterality Date     BUNIONECTOMY      x2     OTHER SURGICAL HISTORY      right pointer finger foreign body removal       Social History:  Patient reports that she has been smoking cigarettes. She has a 10.00 pack-year smoking history. She has never used smokeless tobacco. She reports current alcohol use of about 6.0 - 9.0 standard drinks of alcohol per week. She reports that she does not use drugs.    Family History:  Family History   Problem Relation Age of Onset     Diabetes Sister      Hyperlipidemia Sister      Breast Cancer Mother      Arthritis Mother      Myocardial Infarction Father 47       Medications:  Current Outpatient Medications   Medication     adalimumab (HUMIRA *CF*) 40 MG/0.4ML pen kit     adalimumab (HUMIRA) 40 MG/0.8ML prefilled syringe kit     calcium carbonate (CALCIUM CARBONATE) 600 MG tablet     calcium carbonate 750 MG CHEW     FLUoxetine (PROZAC) 40 MG capsule     oxyCODONE-acetaminophen (PERCOCET) 5-325 MG tablet     predniSONE (DELTASONE) 10 MG tablet     predniSONE (DELTASONE) 20 MG tablet     predniSONE (DELTASONE) 20 MG tablet     traZODone (DESYREL) 50 MG tablet     Vitamin D3 (CHOLECALCIFEROL) 2000 units (50 mcg) tablet     No current facility-administered medications for this visit.           Allergies   Allergen Reactions     Keflex [Cephalexin] Other (See Comments)     Not true allergy- intolerance     Penicillins Hives, Itching and Rash     _____________________________________________________________________________    Teledermatology information:  - Location of patient in [place]:  Home  - Patient presented as: return  - Location of teledermatologist:  ([facility] DERMATOLOGY )  - Reason teledermatology is appropriate:  COVID-19  - Image quality and interpretability: None  - Physician has received verbal consent for a Video/Photos Visit from the patient? No  - In-person dermatology visit recommendation: no  - Service start time: 4:25  - Service end time: 4: 37  - Date of report: 6/11/2020     Again, thank you for allowing me to participate in the care of your patient.      Sincerely,    Jean Kenny MD

## 2020-06-11 NOTE — PROGRESS NOTES
**sending photo now    [facility] Record:  Store and Forward and Telephone [phone]    Impression and Recommendations (Patient Counseled on the Following):  1. Hidradenitis suppurativa  - Pt hesitant to retry infliximab duie to cost and no improvement, and actually a flare, after previously starting infliximab  - Will try ustekinumab at crohn's dosing based of [employer] HS guidelines and current literature recommending higher doses with initial infusion.  - She has been ion prednisone 20mg and is currently flaring. Will have her do 5 days of 40mg daily and crease to 10mg daily. She can increase top 40mg for flares for 5 days.     Follow-up: 12 weeks          Jean Kenny MD, FAAD    Departments of Internal Medicine and Dermatology  [facility]  [phone]        Dermatology Problem List:  1. Hidradenitis suppurativa: diagnosed age 11, initially on waistline (used to weigh 300 lbs) which cleared up -> intermittent outbreaks -> for the past 10 years has had in bilateral axilla, groin, thighs, and buttocks  - Current tx: infliximab - previously started 12/9/19 and the patient is to re-start [id number] pending insurance, clobetasol PRN, topical morphine PRN, ILK,  re-start adalimumab (HUMIRA) 40 MG/0.8ML  -  topical morphine gel PRN, and percocet PRN for severe pain  - Referral placed to plastic surgery for excision of the R axilla - still pending  - Previous tx: PO antibiotics, Finasteride, Rifampin,  Spironolactone (6/2019- 9/5/19, stopped due to abnormal uterine bleeding); Humira 40 mg weekly (11/18/2018-9/2019, stopped due to ineffective)  - Provided the patient a sample of [employer] clothing for the R axilla and bandages. Prescribed Cutaned sorbion 5.0 x 5.0 inch bandages via [employer].  2. Scaly papules on the left proximal forearm  -Could possibly be early psoriasis in the setting of infliximab.  -Monitor  3. Keratosis pilaris  - Benign, reassured.  -  Recommend over-the-counter AmLactin  4. L olecranon bursitis  -uric acid checked due to possibility of gout, on 1/9/20 wnl (4.4). Seems inflammatory, likely not traumatic. Seems chronic. No concern for infection..  SH: She works as an / at the [employer] (lots of walking/running around)    Encounter Date: Jun 11, 2020    CC:   Chief Complaint   Patient presents with     Derm Problem     flaring in R axilla, R groin, L axilla     Interval Hx  6/11/20  Pt has been on prednisone 20mg daily and just inceased her dose to 40mg daily due to a deion-menstrual flare.Doing OK from HS perspective. No new issues.    History of Present Illness:  I have reviewed the teledermatology information and the nursing intake corresponding to this issue. Klarissa Curtis is a 45 year old female who presents via teledermatology for HS follow-up. Patient was last seen in person on 3/5/2020 at which time we wondered about restarting infliximab vs. Humira. Remicade was approved, but she has not yet started. She has been seeing Dr. Naranjo and is really happy with that.     She is hesitant about infliximab because she had a huge flare after a few treatments. Additionally, her insurance didn't cover infliximab in the first round and she is nervous that it will not be covered. She is usually taking 20 mg prednisone, but jumped up to 60 mg yesterday because she was flaring.     She currently is not living at home. She is working [place] 5 days[place]. She was so lonely because no one is at work and she was going home alone at night, so she is living with her best friend and her  in [place]. She denies any suicidal thoughts. She does not want to die, but she does feel as if there is no point in living. A lot of her friends are avoiding her because she is still going into work, which makes her quite sad.        Past Medical History:   Patient Active Problem List   Diagnosis     Hidradenitis suppurativa      Fatigue     Past Medical History:   Diagnosis Date     NO ACTIVE PROBLEMS      Past Surgical History:   Procedure Laterality Date     BUNIONECTOMY      x2     OTHER SURGICAL HISTORY      right pointer finger foreign body removal       Social History:  Patient reports that she has been smoking cigarettes. She has a 10.00 pack-year smoking history. She has never used smokeless tobacco. She reports current alcohol use of about 6.0 - 9.0 standard drinks of alcohol per week. She reports that she does not use drugs.    Family History:  Family History   Problem Relation Age of Onset     Diabetes Sister      Hyperlipidemia Sister      Breast Cancer Mother      Arthritis Mother      Myocardial Infarction Father 47       Medications:  Current Outpatient Medications   Medication     adalimumab (HUMIRA *CF*) 40 MG/0.4ML pen kit     adalimumab (HUMIRA) 40 MG/0.8ML prefilled syringe kit     calcium carbonate (CALCIUM CARBONATE) 600 MG tablet     calcium carbonate 750 MG CHEW     FLUoxetine (PROZAC) 40 MG capsule     oxyCODONE-acetaminophen (PERCOCET) 5-325 MG tablet     predniSONE (DELTASONE) 10 MG tablet     predniSONE (DELTASONE) 20 MG tablet     predniSONE (DELTASONE) 20 MG tablet     traZODone (DESYREL) 50 MG tablet     Vitamin D3 (CHOLECALCIFEROL) 2000 units (50 mcg) tablet     No current facility-administered medications for this visit.           Allergies   Allergen Reactions     Keflex [Cephalexin] Other (See Comments)     Not true allergy- intolerance     Penicillins Hives, Itching and Rash     _____________________________________________________________________________    Teledermatology information:  - Location of patient in [place]: Home  - Patient presented as: return  - Location of teledermatologist:  ([facility] DERMATOLOGY )  - Reason teledermatology is appropriate:  COVID-19  - Image quality and interpretability: None  - Physician has received verbal consent for a Video/Photos Visit from the patient? No  -  In-person dermatology visit recommendation: no  - Service start time: 4:25  - Service end time: 4: 37  - Date of report: 6/11/2020

## 2020-06-11 NOTE — LETTER
Date:June 19, 2020      Patient was self referred, no letter generated. Do not send.        [facility] Physicians Health Information

## 2020-06-11 NOTE — NURSING NOTE
Chief Complaint   Patient presents with     Derm Problem     doing well, however flaring in R axilla, R groin, L axilla     Magali Snider, EMT

## 2020-06-22 ENCOUNTER — MYC MEDICAL ADVICE (OUTPATIENT)
Dept: DERMATOLOGY | Facility: CLINIC | Age: 45
End: 2020-06-22

## 2020-06-22 DIAGNOSIS — L73.2 HIDRADENITIS SUPPURATIVA: ICD-10-CM

## 2020-06-29 ENCOUNTER — TELEPHONE (OUTPATIENT)
Dept: DERMATOLOGY | Facility: CLINIC | Age: 45
End: 2020-06-29

## 2020-06-29 NOTE — TELEPHONE ENCOUNTER
Patient to start ustekinumab (STELARA), and first dose is to be done as an infusion in clinic. Can you please check insurance coverage?    Thank you,    Bev Cavazos LPN

## 2020-07-01 RX ORDER — OXYCODONE AND ACETAMINOPHEN 5; 325 MG/1; MG/1
2 TABLET ORAL EVERY 6 HOURS PRN
Qty: 42 TABLET | Refills: 0 | Status: SHIPPED | OUTPATIENT
Start: 2020-07-01 | End: 2020-08-31

## 2020-07-02 NOTE — TELEPHONE ENCOUNTER
Pt requesting pain med refill. We are still trying to get her on a new medication. Will rfefill pain meds for now while we are between therapies.

## 2020-07-05 DIAGNOSIS — L73.2 HIDRADENITIS SUPPURATIVA: ICD-10-CM

## 2020-07-05 RX ORDER — USTEKINUMAB 90 MG/ML
90 INJECTION, SOLUTION SUBCUTANEOUS ONCE
Qty: 1 ML | Refills: 0 | Status: SHIPPED | OUTPATIENT
Start: 2020-07-05 | End: 2020-07-05

## 2020-07-06 ENCOUNTER — TELEPHONE (OUTPATIENT)
Dept: DERMATOLOGY | Facility: CLINIC | Age: 45
End: 2020-07-06

## 2020-07-06 NOTE — TELEPHONE ENCOUNTER
PA Initiation    Medication: Jacob HO SUBMITTED  Insurance Company: "[employer] Part D - Phone [phone] Fax [phone]  Pharmacy Filling the Rx: [facility] - [place], [place] - [address]  Filling Pharmacy Phone:    Filling Pharmacy Fax:    Start Date: 7/6/2020

## 2020-07-28 NOTE — TELEPHONE ENCOUNTER
PA denied at the moment because patient does not meet the criteria of having tried and failed methotrexate for a 3 month trial.

## 2020-08-04 NOTE — TELEPHONE ENCOUNTER
Starting Application Process for Free Drug Patient Assistance Program.    2 levels of denials obtained.

## 2020-08-06 ENCOUNTER — VIRTUAL VISIT (OUTPATIENT)
Dept: DERMATOLOGY | Facility: CLINIC | Age: 45
End: 2020-08-06
Payer: COMMERCIAL

## 2020-08-06 DIAGNOSIS — L73.2 HIDRADENITIS SUPPURATIVA: Primary | ICD-10-CM

## 2020-08-06 ASSESSMENT — PAIN SCALES - GENERAL: PAINLEVEL: NO PAIN (0)

## 2020-08-06 NOTE — NURSING NOTE
Chief Complaint   Patient presents with     Derm Problem     HS follow up, flaring in groin, axilla x2; scalp HS now     Magali Snider, EMT

## 2020-08-06 NOTE — LETTER
Date:August 18, 2020      Patient was self referred, no letter generated. Do not send.        [facility] Physicians Health Information

## 2020-08-06 NOTE — LETTER
8/6/2020       RE: Klarissa Curtis  [address]  [place] [address]     Dear Colleague,    Thank you for referring your patient, Klarissa Curtis, to the [facility] DERMATOLOGY at [facility]. Please see a copy of my visit note below.    [facility] Dermatology Record:  Store and Forward and Telephone [phone]    Dermatology Problem List:  1. Hidradenitis suppurativa: diagnosed age 11, initially on waistline (used to weigh 300 lbs) which cleared up -> intermittent outbreaks -> for the past 10 years has had in bilateral axilla, groin, thighs, and buttocks.   - Prior: infliximab, clobetasol, topical morphine, ILK, humira, oral antibiotics, rifampin, finasteride, spironolactone (6/2019- 9/5/19, stopped due to abnormal uterine bleeding), topical morphine gel, and percocet for severe pain  - Referral placed to plastic surgery for excision of the R axilla - still pending  - Provided the patient a sample of [employer] clothing for the R axilla and bandages. Prescribed Cutaned sorbion 5.0 x 5.0 inch bandages via [employer].  2. Scaly papules on the left proximal forearm. Could possibly be early psoriasis in the setting of infliximab.  3. Keratosis pilaris. Recommended OTC AmLactin  4. L olecranon bursitis.   -uric acid checked due to possibility of gout, on 1/9/20 wnl (4.4). Seems inflammatory, likely not traumatic. Seems chronic. No concern for infection..  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  Last seen in derm clinic via virtual visit on 6/11/2020.  At that time she was given a steroid burst and taper. She was hesitant to retrial infliximab due to cost and inefficacy in the past. Currently she is dealing with flares in the armpits and right groin.  She was  able to upload pictures of these today. Describes them as the size of a marble, draining in axilla and groin folds. Currently not taking anything for HS. She quit prednisone. Discussed stelara at last appointment but patient believes it was denied. Has been getting     ROS: Patient is generally feeling well today     Physical Examination:  General: Well-sounding, appropriately-developed individual.  Skin: Focused examination including groin and axilla was performed.   - inflamed papules and nodules with drainage on bilateral axilla and within groin folds     Past Medical History:   Patient Active Problem List   Diagnosis     Hidradenitis suppurativa     Fatigue     Past Medical History:   Diagnosis Date     NO ACTIVE PROBLEMS      Past Surgical History:   Procedure Laterality Date     BUNIONECTOMY      x2     OTHER SURGICAL HISTORY      right pointer finger foreign body removal       Social History:  Patient reports that she has been smoking cigarettes. She has a 10.00 pack-year smoking history. She has never used smokeless tobacco. She reports current alcohol use of about 6.0 - 9.0 standard drinks of alcohol per week. She reports that she does not use drugs.    Family History:  Family History   Problem Relation Age of Onset     Diabetes Sister      Hyperlipidemia Sister      Breast Cancer Mother      Arthritis Mother      Myocardial Infarction Father 47       Medications:  Current Outpatient Medications   Medication     calcium carbonate (CALCIUM CARBONATE) 600 MG tablet     calcium carbonate 750 MG CHEW     FLUoxetine (PROZAC) 40 MG capsule     oxyCODONE-acetaminophen (PERCOCET) 5-325 MG tablet     predniSONE (DELTASONE) 10 MG tablet     predniSONE (DELTASONE) 10 MG tablet     predniSONE (DELTASONE) 20 MG tablet     predniSONE (DELTASONE) 20 MG tablet     traZODone (DESYREL) 50 MG tablet     Vitamin D3 (CHOLECALCIFEROL) 2000 units (50 mcg) tablet     No current facility-administered medications for this visit.         Allergies   Allergen Reactions     Keflex [Cephalexin] Other (See Comments)     Not true allergy- intolerance     Penicillins Hives, Itching and Rash     Impression and Recommendations (Patient Counseled on the Following):    # Hidradenitis suppurativa, Julian stage II. Prior treatments include infliximab, prednisone. Continued discussion for Stelara however patient was denied. Had minor improvement with humira previously.   - Start double-dose humira, start with 160mg on day 1, then 80mg weekly starting on day 15    - Prednisone course if needed   - Patient on medical marijuana for pain control, prescribed by psychiatrist     Follow-up: 1 month phone visit      Staff and resident    Tiffanie Aponte MD    Dermatology Resident .  [facility]     Staff Physician Comments:   I saw and evaluated the patient with the resident and I agree with the assessment and plan.  I was present for the examination.    Jean Kenny MD, FAAD    Departments of Internal Medicine and Dermatology  [facility]  [phone]      _____________________________________________________________________    Teledermatology information:  - Location of patient: [place]  - Patient presented as: return  - Location of teledermatologist:  ([facility] DERMATOLOGY )  - Reason teledermatology is appropriate:  of National Emergency Regarding Coronavirus disease (COVID 19) Outbreak  - Image quality and interpretability: acceptable  - Physician has received verbal consent for a Video/Photos Visit from the patient? Yes  - In-person dermatology visit recommendation: no  - Date of images: 8/6/20  - Service start time: 3:45 PM  - Service end time: 4:09 PM   - Date of report: 8/6/2020     Again, thank you for allowing me to participate in the care of your patient.      Sincerely,    Jean Kenny MD

## 2020-08-06 NOTE — PATIENT INSTRUCTIONS
[facility] Dermatology Visit    Start double-dose humira, start with 160mg on day 1, then 80mg weekly starting on day 15      When should I call my doctor?    If you are worsening or not improving, please, contact us or seek urgent care as noted below.     Who should I call with questions (adults)?    [facility] (adult and pediatric): [phone]     [facility] (adult): [phone]    For urgent needs outside of business hours call the [facility] at [phone] and ask for the dermatology resident on call    If this is a medical emergency and you are unable to reach an ER, Call 911      Who should I call with questions (pediatric)?  [facility]- Pediatric Dermatology  Dr. Francisca Raza, Dr. Nadege Leong, Dr. Krista Bustillos, Flory Redd, PA  Dr. Lizeth Reno, Dr. Maribeth Galo & Dr. Dany Vallecillo  Non Urgent  Nurse Triage Line; [phone]- Bettina and Jeri JUAREZ Care Coordinators   Jeannine (/Complex ) [phone]    If you need a prescription refill, please contact your pharmacy. Refills are approved or denied by our Physicians during normal business hours, Monday through Fridays  Per office policy, refills will not be granted if you have not been seen within the past year (or sooner depending on your child's condition)    Scheduling Information:  Pediatric Appointment Scheduling and Call Center [phone]  Radiology Scheduling- [phone]  Sedation Unit Scheduling- [phone]  [place] Scheduling- General [phone]; Pediatric Dermatology [phone]  Main  Services: [phone]  Irish: [phone]  Omani: [phone]  Hmong/Spanish/Stateless: [phone]  Preadmission Nursing Department Fax Number: [phone] (Fax all pre-operative paperwork to this number)    For urgent matters arising during evenings, weekends, or holidays that cannot  wait for normal business hours please call [phone] and ask for the Dermatology Resident On-Call to be paged.

## 2020-08-07 ENCOUNTER — TELEPHONE (OUTPATIENT)
Dept: DERMATOLOGY | Facility: CLINIC | Age: 45
End: 2020-08-07

## 2020-08-07 NOTE — TELEPHONE ENCOUNTER
PA Initiation    Medication: Humira 80mg weekly- Initiated  Insurance Company: [employer] ([facility]) - Phone [phone] Fax [phone]  Pharmacy Filling the Rx: [facility] - [place], [place] - [address]  Filling Pharmacy Phone:    Filling Pharmacy Fax:    Start Date: 8/7/2020

## 2020-08-07 NOTE — TELEPHONE ENCOUNTER
Spoke with pharmacist review at [facility] that stated this PA will require documentation of use of doxycycline or other antibiotic within the last three months or documentation of contraindication of doxycycline or other antibiotic within the last three months in order to review.  The PA was cancelled and will need to be resubmitted with this detail.

## 2020-08-07 NOTE — TELEPHONE ENCOUNTER
The Free Drug program through [employer] ([employer]) has received the application and will reach out to patient for household and income level information required to complete review.

## 2020-08-11 NOTE — TELEPHONE ENCOUNTER
Resubmitted PA with information about patient trial doxycycline    Klarissa Curtis Key: [id number] - PA Case ID: [id number]

## 2020-08-17 RX ORDER — FLUOCINONIDE TOPICAL SOLUTION USP, 0.05% 0.5 MG/ML
SOLUTION TOPICAL 2 TIMES DAILY
Qty: 60 ML | Refills: 3 | Status: SHIPPED | OUTPATIENT
Start: 2020-08-17 | End: 2023-05-25

## 2020-08-17 RX ORDER — KETOCONAZOLE 20 MG/ML
SHAMPOO TOPICAL DAILY PRN
Qty: 120 ML | Refills: 11 | Status: SHIPPED | OUTPATIENT
Start: 2020-08-17 | End: 2023-10-02

## 2020-08-17 NOTE — TELEPHONE ENCOUNTER
Medication Appeal Initiation    We have initiated an appeal for the requested medication:  Medication: Humira 80mg weekly - Appeal initiated   Appeal Start Date:  8/17/2020  Insurance Company:    Comments:

## 2020-08-17 NOTE — TELEPHONE ENCOUNTER
Pt with itching scalp helped previously with niozoral. Will send ketoconazoel shampoo every other day and lidex soluton.

## 2020-08-23 ENCOUNTER — MYC MEDICAL ADVICE (OUTPATIENT)
Dept: DERMATOLOGY | Facility: CLINIC | Age: 45
End: 2020-08-23

## 2020-08-31 DIAGNOSIS — L73.2 HIDRADENITIS SUPPURATIVA: ICD-10-CM

## 2020-08-31 RX ORDER — OXYCODONE AND ACETAMINOPHEN 5; 325 MG/1; MG/1
1 TABLET ORAL 3 TIMES DAILY PRN
Qty: 84 TABLET | Refills: 0 | Status: SHIPPED | OUTPATIENT
Start: 2020-08-31 | End: 2020-09-24

## 2020-08-31 NOTE — PROGRESS NOTES
Pt;s humira got denied. I am appealing it. Pt requesting pain meds while appealing humira.  I ordered her 28 days to get her through.

## 2020-09-01 NOTE — TELEPHONE ENCOUNTER
Called insurance ([phone]) spoke to Carmel - she stated that she will refax the resolution letter, verified fax number

## 2020-09-01 NOTE — TELEPHONE ENCOUNTER
MEDICATION APPEAL APPROVED    Medication: Humira 80mg weekly - Appeal Approved   Authorization Effective Date: 9/1/2020  Authorization Expiration Date: 02/28/2021  Approved Dose/Quantity:  4 for 28 days   Reference #: CMM Key [id number]   Insurance Company: [employer] ([facility]) - Phone [phone] Fax [phone]  Expected CoPay: $5     CoPay Card Available: Yes    Foundation Assistance Needed:    Which Pharmacy is filling the prescription (Not needed for infusion/clinic administered): [facility] - [place], [place] - [address]

## 2020-09-03 DIAGNOSIS — L73.2 HIDRADENITIS SUPPURATIVA: ICD-10-CM

## 2020-09-03 NOTE — TELEPHONE ENCOUNTER
Spoke with Brian at [facility] ([phone]) he notes that they are unable to dispense 80 mg weekly as this would be 8 pens monthly (not 4 pens as the appeal notes)    Pharmacy is unable to dispense Humira 80 mg pen unless it's in the starter dose.    Routing to pharmacy liaisons to help.

## 2020-09-04 NOTE — TELEPHONE ENCOUNTER
Phoned insurance - they cannot switch approval over to two of the 40mg pens injecting twice weekly - had to complete an urgent PA over the phone (dose limits exceeded) - asked this to be completed on an urgent basis.

## 2020-09-04 NOTE — TELEPHONE ENCOUNTER
Insurance denied quantity of 8 pens for 28 days - needing to appeal again - sent appeal letter as urgent

## 2020-09-15 NOTE — TELEPHONE ENCOUNTER
Called insurance spoke to Janis - she stated that appeal was denied - Maura is going to obtain the 80mg pens from [facility], #3 pens every 21 days (they come #3 in a box and the pharmacy is unable to break a box to give her a 4th pen)     Case [id number]

## 2020-09-24 ENCOUNTER — VIRTUAL VISIT (OUTPATIENT)
Dept: DERMATOLOGY | Facility: CLINIC | Age: 45
End: 2020-09-24
Payer: COMMERCIAL

## 2020-09-24 DIAGNOSIS — L73.2 HIDRADENITIS SUPPURATIVA: ICD-10-CM

## 2020-09-24 RX ORDER — OXYCODONE AND ACETAMINOPHEN 5; 325 MG/1; MG/1
1 TABLET ORAL 3 TIMES DAILY PRN
Qty: 84 TABLET | Refills: 0 | Status: SHIPPED | OUTPATIENT
Start: 2020-09-24 | End: 2020-11-13

## 2020-09-24 ASSESSMENT — PAIN SCALES - GENERAL: PAINLEVEL: MODERATE PAIN (5)

## 2020-09-24 NOTE — PATIENT INSTRUCTIONS
[facility] Dermatology Visit    Thank you for allowing us to participate in your care. Your findings, instructions and follow-up plan are as follows:    Your percocet has been refilled. Take one by mouth 3 times daily as needed for pain.    Take prednisone 60 mg daily for 4 days for acute flares of your HS.      When should I call my doctor?    If you are worsening or not improving, please, contact us or seek urgent care as noted below.     Who should I call with questions (adults)?    [facility] (adult and pediatric): [phone]     [facility] (adult): [phone]    For urgent needs outside of business hours call the [facility] at [phone] and ask for the dermatology resident on call    If this is a medical emergency and you are unable to reach an ER, Call [phone]      Who should I call with questions (pediatric)?  [facility]- Pediatric Dermatology  Dr. Francisca Raza, Dr. Nadege Leong, Dr. Krista Bustillos, Flory Redd, [facility] [place], Dr. Maribeth Galo & Dr. Dany Vallecillo  Non Urgent  Nurse Triage Line; [phone]- Bettina and Jeri JUAREZ Care Coordinators   Jeannine (/Complex ) [phone]    If you need a prescription refill, please contact your pharmacy. Refills are approved or denied by our Physicians during normal business hours, Monday through Fridays  Per office policy, refills will not be granted if you have not been seen within the past year (or sooner depending on your child's condition)    Scheduling Information:  Pediatric Appointment Scheduling and Call Center [phone]  Radiology Scheduling- [phone]  Sedation Unit Scheduling- [phone]  [place] Scheduling- General [phone]; Pediatric Dermatology [phone]  Main  Services: [phone]  Urdu: [phone]  South African: [phone]  Hmong/Tony/Christopher:  [phone]  Preadmission Nursing Department Fax Number: [phone] (Fax all pre-operative paperwork to this number)    For urgent matters arising during evenings, weekends, or holidays that cannot wait for normal business hours please call [phone] and ask for the Dermatology Resident On-Call to be paged.

## 2020-09-24 NOTE — PROGRESS NOTES
"[facility] Dermatology Record:  Store and Forward and Telephone [phone]      Dermatology Problem List:  1. Hidradenitis suppurativa: diagnosed age 11, initially on waistline (used to weigh 300 lbs) which cleared up -> intermittent outbreaks -> for the past 10 years has had in bilateral axilla, groin, thighs, and buttocks.   - Current: double-dose Humira (awaiting approval), prednisone 60 mg x 4 days   - Prior: infliximab, clobetasol, topical morphine, ILK, humira, oral antibiotics, rifampin, finasteride, spironolactone (6/2019- 9/5/19, stopped due to abnormal uterine bleeding), topical morphine gel, and percocet for severe pain  - Referral placed to plastic surgery for excision of the R axilla - still pending  - Provided the patient a sample of [employer] clothing for the R axilla and bandages. Prescribed Cutaned sorbion 5.0 x 5.0 inch bandages via [employer].  2. Scaly papules on the left proximal forearm. Could possibly be early psoriasis in the setting of infliximab.  3. Keratosis pilaris. Recommended OTC AmLactin  4. L olecranon bursitis.   -uric acid checked due to possibility of gout, on 1/9/20 wnl (4.4). Seems inflammatory, likely not traumatic. Seems chronic. No concern for infection..  SH: [place] an [place] of walking/running around)    Encounter Date: Sep 24, 2020    CC:   Chief Complaint   Patient presents with     Derm Problem     HS follow up, \"tons of issues\" and flaring in groin, needing pain medication refill       History of Present Illness:  Klarissa Curtis is a 45 year old female who presents for HS follow up. States she is not currently on the Humira as the insurance will not approve two 40 mg pens for a total dose of 80 mg weekly (double dosed Humira). She currently is having a flare of her HS in the right groin and right axilla and is not currently treating this with any topicals or prednisone. She is deferring plastic surgery " intervention of the right axilla until her HS is better controlled.    ROS: Patient is generally feeling well today.    Physical Examination:  General: Well-appearing female, appropriately-developed individual.  Skin: Focused examination including right axilla and right groin was performed via review of patient images.   - Inflamed nodules and papules of the right groin and right axilla    Labs:  None    Past Medical History:   Patient Active Problem List   Diagnosis     Hidradenitis suppurativa     Fatigue     Past Medical History:   Diagnosis Date     NO ACTIVE PROBLEMS      Past Surgical History:   Procedure Laterality Date     BUNIONECTOMY      x2     OTHER SURGICAL HISTORY      right pointer finger foreign body removal       Social History:  Patient reports that she has been smoking cigarettes. She has a 10.00 pack-year smoking history. She has never used smokeless tobacco. She reports current alcohol use of about 6.0 - 9.0 standard drinks of alcohol per week. She reports that she does not use drugs.    Family History:  Family History   Problem Relation Age of Onset     Diabetes Sister      Hyperlipidemia Sister      Breast Cancer Mother      Arthritis Mother      Myocardial Infarction Father 47       Medications:  Current Outpatient Medications   Medication     adalimumab (HUMIRA *CF*) 80 MG/0.8ML pen kit     calcium carbonate (CALCIUM CARBONATE) 600 MG tablet     calcium carbonate 750 MG CHEW     fluocinonide (LIDEX) 0.05 % external solution     FLUoxetine (PROZAC) 40 MG capsule     ketoconazole (NIZORAL) 2 % external shampoo     medical cannabis (Patient's own supply)     oxyCODONE-acetaminophen (PERCOCET) 5-325 MG tablet     predniSONE (DELTASONE) 10 MG tablet     predniSONE (DELTASONE) 10 MG tablet     predniSONE (DELTASONE) 20 MG tablet     predniSONE (DELTASONE) 20 MG tablet     traZODone (DESYREL) 50 MG tablet     Vitamin D3 (CHOLECALCIFEROL) 2000 units (50 mcg) tablet     No current  facility-administered medications for this visit.           Allergies   Allergen Reactions     Keflex [Cephalexin] Other (See Comments)     Not true allergy- intolerance     Penicillins Hives, Itching and Rash     Impression and Recommendations (Patient Counseled on the Following):  1. Hidradenitis Suppurativa, Julian Stage II  Insurance has denied double-dose Humira, however will appeal and attempt to get this approved. In the meantime, advised her to take prednisone for acute flares as needed. Will also refill pain medications for 1 month and have her follow up in person in 6 weeks.  - Take prednisone 60 mg daily for 4 days for acute flares  - Percocet 5-325 mg TID refilled for 1 month supply  - Will continue to seek approval for double-dose Humira      Follow-up:   Follow-up with dermatology in approximately 6 weeks. Earlier for new or changing lesions or rash.      Staff and resident    Montserrat Lemus DO (PGY-2)  Dermatology Resident  [facility]    Staff Physician Comments:   I saw and evaluated the patient with the resident and I agree with the assessment and plan.  I was present for the examination.    Jean Kenny MD, FAAD    Departments of Internal Medicine and Dermatology  [facility]  [phone]      _____________________________________________________________________________    Teledermatology information:  - Location of patient: [place]  - Patient presented as: return  - Location of teledermatologist:  ([facility] DERMATOLOGY )  - Reason teledermatology is appropriate:  of National Emergency Regarding Coronavirus disease (COVID 19) Outbreak  - Image quality and interpretability: acceptable  - Physician has received verbal consent for a Video/Photos Visit from the patient? Yes  - In-person dermatology visit recommendation: no  - Date of images: 9/23/20  - Service start time: 17:45  - Service end time: 18:00  - Date of report: 9/24/2020

## 2020-09-24 NOTE — NURSING NOTE
"Chief Complaint   Patient presents with     Derm Problem     HS follow up, \"tons of issues\" and flaring in groin     Magali Snider, EMT    "

## 2020-09-24 NOTE — LETTER
"9/24/2020       RE: Klarissa Curtis  [address]  [place] [address]     Dear Colleague,    Thank you for referring your patient, Klarissa Curtis, to the [facility] DERMATOLOGY at [facility]. Please see a copy of my visit note below.    [facility] Dermatology Record:  Store and Forward and Telephone [phone]      Dermatology Problem List:  1. Hidradenitis suppurativa: diagnosed age 11, initially on waistline (used to weigh 300 lbs) which cleared up -> intermittent outbreaks -> for the past 10 years has had in bilateral axilla, groin, thighs, and buttocks.   - Current: double-dose Humira (awaiting approval), prednisone 60 mg x 4 days   - Prior: infliximab, clobetasol, topical morphine, ILK, humira, oral antibiotics, rifampin, finasteride, spironolactone (6/2019- 9/5/19, stopped due to abnormal uterine bleeding), topical morphine gel, and percocet for severe pain  - Referral placed to plastic surgery for excision of the R axilla - still pending  - Provided the patient a sample of [employer] clothing for the R axilla and bandages. Prescribed Cutaned sorbion 5.0 x 5.0 inch bandages via [employer].  2. Scaly papules on the left proximal forearm. Could possibly be early psoriasis in the setting of infliximab.  3. Keratosis pilaris. Recommended OTC AmLactin  4. L olecranon bursitis.   -uric acid checked due to possibility of gout, on 1/9/20 wnl (4.4). Seems inflammatory, likely not traumatic. Seems chronic. No concern for infection..  SH: She works as an / [place] (lots of walking/running around)    Encounter Date: Sep 24, 2020    CC:   Chief Complaint   Patient presents with     Derm Problem     HS follow up, \"tons of issues\" and flaring in groin, needing pain medication refill       History of Present Illness:  Klarissa Curtis is a 45 year old female who presents for HS follow up. States she is not currently on the Humira as " the insurance will not approve two 40 mg pens for a total dose of 80 mg weekly (double dosed Humira). She currently is having a flare of her HS in the right groin and right axilla and is not currently treating this with any topicals or prednisone. She is deferring plastic surgery intervention of the right axilla until her HS is better controlled.    ROS: Patient is generally feeling well today.    Physical Examination:  General: Well-appearing female, appropriately-developed individual.  Skin: Focused examination including right axilla and right groin was performed via review of patient images.   - Inflamed nodules and papules of the right groin and right axilla    Labs:  None    Past Medical History:   Patient Active Problem List   Diagnosis     Hidradenitis suppurativa     Fatigue     Past Medical History:   Diagnosis Date     NO ACTIVE PROBLEMS      Past Surgical History:   Procedure Laterality Date     BUNIONECTOMY      x2     OTHER SURGICAL HISTORY      right pointer finger foreign body removal       Social History:  Patient reports that she has been smoking cigarettes. She has a 10.00 pack-year smoking history. She has never used smokeless tobacco. She reports current alcohol use of about 6.0 - 9.0 standard drinks of alcohol per week. She reports that she does not use drugs.    Family History:  Family History   Problem Relation Age of Onset     Diabetes Sister      Hyperlipidemia Sister      Breast Cancer Mother      Arthritis Mother      Myocardial Infarction Father 47       Medications:  Current Outpatient Medications   Medication     adalimumab (HUMIRA *CF*) 80 MG/0.8ML pen kit     calcium carbonate (CALCIUM CARBONATE) 600 MG tablet     calcium carbonate 750 MG CHEW     fluocinonide (LIDEX) 0.05 % external solution     FLUoxetine (PROZAC) 40 MG capsule     ketoconazole (NIZORAL) 2 % external shampoo     medical cannabis (Patient's own supply)     oxyCODONE-acetaminophen (PERCOCET) 5-325 MG tablet      predniSONE (DELTASONE) 10 MG tablet     predniSONE (DELTASONE) 10 MG tablet     predniSONE (DELTASONE) 20 MG tablet     predniSONE (DELTASONE) 20 MG tablet     traZODone (DESYREL) 50 MG tablet     Vitamin D3 (CHOLECALCIFEROL) 2000 units (50 mcg) tablet     No current facility-administered medications for this visit.           Allergies   Allergen Reactions     Keflex [Cephalexin] Other (See Comments)     Not true allergy- intolerance     Penicillins Hives, Itching and Rash     Impression and Recommendations (Patient Counseled on the Following):  1. Hidradenitis Suppurativa, Julian Stage II  Insurance has denied double-dose Humira, however will appeal and attempt to get this approved. In the meantime, advised her to take prednisone for acute flares as needed. Will also refill pain medications for 1 month and have her follow up in person in 6 weeks.  - Take prednisone 60 mg daily for 4 days for acute flares  - Percocet 5-325 mg TID refilled for 1 month supply  - Will continue to seek approval for double-dose Humira      Follow-up:   Follow-up with dermatology in approximately 6 weeks. Earlier for new or changing lesions or rash.      Staff and resident    Montserrat Lemus DO (PGY-2)  Dermatology Resident  [facility]    Staff Physician Comments:   I saw and evaluated the patient with the resident and I agree with the assessment and plan.  I was present for the examination.    Jean Kenny MD, FAAD    Departments of Internal Medicine and Dermatology  [facility]  [phone]      _____________________________________________________________________________    Teledermatology information:  - Location of patient: [place]  - Patient presented as: return  - Location of teledermatologist:  ([facility] DERMATOLOGY )  - Reason teledermatology is appropriate:  of National Emergency Regarding Coronavirus disease (COVID 19) Outbreak  - Image quality and interpretability:  acceptable  - Physician has received verbal consent for a Video/Photos Visit from the patient? Yes  - In-person dermatology visit recommendation: no  - Date of images: 9/23/20  - Service start time: 17:45  - Service end time: 18:00  - Date of report: 9/24/2020     Again, thank you for allowing me to participate in the care of your patient.      Sincerely,    Jean Kenny MD

## 2020-09-24 NOTE — LETTER
Date:September 30, 2020      Provider requested that no letter be sent. Do not send.       [facility] Health Information

## 2020-11-05 ENCOUNTER — OFFICE VISIT (OUTPATIENT)
Dept: DERMATOLOGY | Facility: CLINIC | Age: 45
End: 2020-11-05
Payer: COMMERCIAL

## 2020-11-05 VITALS — HEART RATE: 71 BPM | DIASTOLIC BLOOD PRESSURE: 74 MMHG | SYSTOLIC BLOOD PRESSURE: 111 MMHG

## 2020-11-05 DIAGNOSIS — L73.2 HIDRADENITIS SUPPURATIVA: Primary | ICD-10-CM

## 2020-11-05 PROCEDURE — 99214 OFFICE O/P EST MOD 30 MIN: CPT | Performed by: DERMATOLOGY

## 2020-11-05 ASSESSMENT — PAIN SCALES - GENERAL: PAINLEVEL: NO PAIN (0)

## 2020-11-05 NOTE — PROGRESS NOTES
[facility] Dermatology Note    Dermatology Problem List:  1. Hidradenitis suppurativa: diagnosed age 11, initially on waistline (used to weigh 300 lbs) which cleared up -> intermittent outbreaks -> for the past 10 years has had in bilateral axilla, groin, thighs, and buttocks.   - Current: double-dose Humira (started 10/4/20), prednisone 60 mg x 4 days (finished course Oct 2020)  - Prior: infliximab, clobetasol, topical morphine, ILK, humira, oral antibiotics, rifampin, finasteride, spironolactone (6/2019- 9/5/19, stopped due to abnormal uterine bleeding), topical morphine gel, and percocet for severe pain  - Referral placed to plastic surgery for excision of the R axilla - will try to make appointment (11/5/20)  - Provided the patient a sample of [employer] clothing for the R axilla and bandages. Prescribed Cutaned sorbion 5.0 x 5.0 inch bandages via [employer].  2. Scaly papules on the left proximal forearm. Could possibly be early psoriasis in the setting of infliximab.  3. Keratosis pilaris. Recommended OTC AmLactin  4. L olecranon bursitis.   -uric acid checked due to possibility of gout, on 1/9/20 wnl (4.4). Seems inflammatory, likely not traumatic. Seems chronic. No concern for infection..  SH: [place] an [place] of walking/running around)    Encounter Date: Nov 5, 2020    CC:  Chief Complaint   Patient presents with     Derm Problem     Maura is here for a HS follow up, states she's seeing improvement.          History of Present Illness:  Ms. Klarissa Curtis is a 45 year old female who presents as a follow-up for HS. The patient was last seen virtually 9/24/20 when she had a flare for which prednisone was prescribed 60mg daily PRN for flares, pain meds were refilled for 1 month, and approval was being sought for double-dose humira.     On double-dose Humira, has been taking since Oct 4th with significant improvement.   Still  "flaring but not as bad.   Significant right groin improvement.   Areas effected - bilateral armpits, bilateral groin, suprapubic area with scarring, left ear cystic nodule, \"bumps\" on left bra line and right back.   Improved mental health with resources received previously. Doing well.     Past Medical History:   Patient Active Problem List   Diagnosis     Hidradenitis suppurativa     Fatigue     Past Medical History:   Diagnosis Date     NO ACTIVE PROBLEMS      Past Surgical History:   Procedure Laterality Date     BUNIONECTOMY      x2     OTHER SURGICAL HISTORY      right pointer finger foreign body removal       Social History:  Patient reports that she has been smoking cigarettes. She has a 10.00 pack-year smoking history. She has never used smokeless tobacco. She reports current alcohol use of about 6.0 - 9.0 standard drinks of alcohol per week. She reports that she does not use drugs.    Family History:  Family History   Problem Relation Age of Onset     Diabetes Sister      Hyperlipidemia Sister      Breast Cancer Mother      Arthritis Mother      Myocardial Infarction Father 47       Medications:  Current Outpatient Medications   Medication Sig Dispense Refill     adalimumab (HUMIRA *CF*) 80 MG/0.8ML pen kit 80mg weekly 4 kit 4     calcium carbonate (CALCIUM CARBONATE) 600 MG tablet Take 2 tablets (1,200 mg) by mouth daily 180 tablet 11     calcium carbonate 750 MG CHEW Take 750 mg by mouth as needed       FLUoxetine (PROZAC) 40 MG capsule Take 80 mg by mouth daily Will bump up to 60mg by end of week 5/1/20        medical cannabis (Patient's own supply) See Admin Instructions (The purpose of this order is to document that the patient reports taking medical cannabis.  This is not a prescription, and is not used to certify that the patient has a qualifying medical condition.)       oxyCODONE-acetaminophen (PERCOCET) 5-325 MG tablet Take 1 tablet by mouth 3 times daily as needed for severe pain 84 tablet 0     " traZODone (DESYREL) 50 MG tablet Take 50 mg by mouth daily Take 2 tablets at night       Vitamin D3 (CHOLECALCIFEROL) 2000 units (50 mcg) tablet Take 1 tablet (50 mcg) by mouth daily 180 tablet 3     fluocinonide (LIDEX) 0.05 % external solution Apply topically 2 times daily (Patient not taking: Reported on 11/5/2020) 60 mL 3     ketoconazole (NIZORAL) 2 % external shampoo Apply topically daily as needed for itching or irritation (Patient not taking: Reported on 11/5/2020) 120 mL 11     predniSONE (DELTASONE) 10 MG tablet Take 1 tablet (10 mg) by mouth daily (Patient not taking: Reported on 11/5/2020) 60 tablet 1     predniSONE (DELTASONE) 10 MG tablet 40mg for 4 week, 30mg for 1 week, 20mg for 1 week and 10mg for 1 week (Patient not taking: Reported on 11/5/2020) 147 tablet 0     predniSONE (DELTASONE) 20 MG tablet Take 1 tablet (20 mg) by mouth daily (Patient not taking: Reported on 11/5/2020) 90 tablet 0     predniSONE (DELTASONE) 20 MG tablet 60mg for 4 days for flares. Dispense 6 flare packs (Patient not taking: Reported on 11/5/2020) 67 tablet 11     Allergies   Allergen Reactions     Keflex [Cephalexin] Other (See Comments)     Not true allergy- intolerance     Penicillins Hives, Itching and Rash         Review of Systems:  -Constitutional: Otherwise feeling well today, in usual state of health.  -Skin: As above in HPI. No additional skin concerns.  -As per HPI    Physical exam:  Vitals: /74 (BP Location: Right arm, Patient Position: Chair, Cuff Size: Adult Large)   Pulse 71   GEN: This is a well developed, well-nourished female in no acute distress, in a pleasant mood.    SKIN: Total skin excluding the undergarment areas was performed. The exam included the head/face, neck, both arms, chest, back, abdomen, both legs, digits and/or nails.   - 3mm cystic nodule on left superior anterior helix   - No other lesions of concern on areas examined.     HS Exam  No flowsheet data found.    Left Axilla  11/5/2020   # of inflamed nodules 3   # of abcesses 0   # of draining tunnels 0   Erythema (grade) 1   Thickness (grade) 1   Open skin surface (grade) 0   Tunnels (grade) 0       Right Axilla 11/5/2020   # of inflamed nodules 2   # of abcesses 0   # of draining tunnels 2   Erythema (grade) 2   Thickness (grade) 2   Open skin surface (grade) 1   Tunnels (grade) 2       No flowsheet data found.    No flowsheet data found.     No flowsheet data found.    Left Thigh 11/5/2020   # of draining tunnels 1   Erythema (grade) 2   Thickness (grade) 1   Open skin surface (grade) 0   Tunnels (grade) 1       Right Thigh 11/5/2020   # of inflamed nodules 3   # of abcesses 0   # of draining tunnels 0   Erythema (grade) 1   Thickness (grade) 2   Open skin surface (grade) 0   Tunnels (grade) 0       No flowsheet data found.    Buttocks 11/5/2020   # of inflamed nodules 4   # of abcesses 0   # of draining tunnels 0   Erythema (grade) 1   Thickness (grade) 1   Open skin surface (grade) 0   Tunnels (grade) 0       HS Data  HS Exam Data 2/13/2020 11/5/2020   LC Type - LC1   Clinical Subtypes - Regular type   Visual analogue score (0-100) - 40   Total Julian Stage II II   Total Inflammatory Nodules - 12   Total Abcesses - 0   Total Draining Tunnels - 3   Total Abscess and Nodule Count - 12   IHS4 Score  - 24   Total  HASI Score - 34   HS-PGA - 3     Labs:  N/A    Impression/Plan:  1. Hidradenitis Suppurativa, Julian Stage II  Improving on double-dose Humira. Flares following menstrual cycle - discussed birth control options and hormones. Patient is at high risk for DVT/PE with smoking history and age > 30, ruling out estrogen containing treatments. Tried spironolactone in the past with abnormal uterine bleeding. Will try Finasteride. She has tried this in the past. She is not on birth control and has issues with birth control historically. I am also concerened progesterone only methods will worsen her. We discussed my concerns regarding  pregnancy. She is not currently sexually active. We discussed the high risks of teratogenicity extensively,. She says she will remain abstinent and will stop the medication if she decides she wants to start being sexually active again and will use condoms.   - Take prednisone 60 mg daily for 4 days for acute flares PRN  - Refill Percocet 5-325 mg once daily for 1 month supply  - Continue double-dose Humira  - Plastics referral placed previously, patient will follow up on this   - Start Finasteride 5mg daily, pt will be abstinent. If she decided to be sexually active again, will stop medication and use condoms.  - Labs today (CBC, CMP)    Follow-up in 3 months, earlier for new or changing lesions.     Staff Involved:  I, Vicky Coles, MS4, saw and examined the patient in the presence of Dr. Kenny.    Staff Physician:  I was present with the medical student who participated in the service and in the documentation of the note. I have verified the history and personally performed the physical exam and medical decision making. I agree with the assessment and plan of care as documented in the note.     Jean Kenny MD    Department of Dermatology  [facility]: Phone: [phone], Fax: [phone]  11/8/2020

## 2020-11-05 NOTE — NURSING NOTE
Dermatology Rooming Note    Klarissa Curtis's goals for this visit include:   Chief Complaint   Patient presents with     Derm Problem     Maura is here for a HS follow up, states she's seeing improvement.        Bev Cavazos LPN

## 2020-11-05 NOTE — LETTER
11/5/2020       RE: Klarissa Curtis  [address]  [place] [address]     Dear Colleague,    Thank you for referring your patient, Klarissa Curtis, to the [facility] [place] at [facility]. Please see a copy of my visit note below.    [facility] Dermatology Note    Dermatology Problem List:  1. Hidradenitis suppurativa: diagnosed age 11, initially on waistline (used to weigh 300 lbs) which cleared up -> intermittent outbreaks -> for the past 10 years has had in bilateral axilla, groin, thighs, and buttocks.   - Current: double-dose Humira (started 10/4/20), prednisone 60 mg x 4 days (finished course Oct 2020)  - Prior: infliximab, clobetasol, topical morphine, ILK, humira, oral antibiotics, rifampin, finasteride, spironolactone (6/2019- 9/5/19, stopped due to abnormal uterine bleeding), topical morphine gel, and percocet for severe pain  - Referral placed to plastic surgery for excision of the R axilla - will try to make appointment (11/5/20)  - Provided the patient a sample of [employer] clothing for the R axilla and bandages. Prescribed Cutaned sorbion 5.0 x 5.0 inch bandages via [employer].  2. Scaly papules on the left proximal forearm. Could possibly be early psoriasis in the setting of infliximab.  3. Keratosis pilaris. Recommended OTC AmLactin  4. L olecranon bursitis.   -uric acid checked due to possibility of gout, on 1/9/20 wnl (4.4). Seems inflammatory, likely not traumatic. Seems chronic. No concern for infection..  SH: She works as an [place] (lots of walking/running around)    Encounter Date: Nov 5, 2020    CC:  Chief Complaint   Patient presents with     Derm Problem     Maura is here for a HS follow up, states she's seeing improvement.          History of Present Illness:  Ms. Klarissa Curtis is a 45 year old female who presents as a follow-up  "for HS. The patient was last seen virtually 9/24/20 when she had a flare for which prednisone was prescribed 60mg daily PRN for flares, pain meds were refilled for 1 month, and approval was being sought for double-dose humira.     On double-dose Humira, has been taking since Oct 4th with significant improvement.   Still flaring but not as bad.   Significant right groin improvement.   Areas effected - bilateral armpits, bilateral groin, suprapubic area with scarring, left ear cystic nodule, \"bumps\" on left bra line and right back.   Improved mental health with resources received previously. Doing well.     Past Medical History:   Patient Active Problem List   Diagnosis     Hidradenitis suppurativa     Fatigue     Past Medical History:   Diagnosis Date     NO ACTIVE PROBLEMS      Past Surgical History:   Procedure Laterality Date     BUNIONECTOMY      x2     OTHER SURGICAL HISTORY      right pointer finger foreign body removal       Social History:  Patient reports that she has been smoking cigarettes. She has a 10.00 pack-year smoking history. She has never used smokeless tobacco. She reports current alcohol use of about 6.0 - 9.0 standard drinks of alcohol per week. She reports that she does not use drugs.    Family History:  Family History   Problem Relation Age of Onset     Diabetes Sister      Hyperlipidemia Sister      Breast Cancer Mother      Arthritis Mother      Myocardial Infarction Father 47       Medications:  Current Outpatient Medications   Medication Sig Dispense Refill     adalimumab (HUMIRA *CF*) 80 MG/0.8ML pen kit 80mg weekly 4 kit 4     calcium carbonate (CALCIUM CARBONATE) 600 MG tablet Take 2 tablets (1,200 mg) by mouth daily 180 tablet 11     calcium carbonate 750 MG CHEW Take 750 mg by mouth as needed       FLUoxetine (PROZAC) 40 MG capsule Take 80 mg by mouth daily Will bump up to 60mg by end of week 5/1/20        medical cannabis (Patient's own supply) See Admin Instructions (The purpose of " this order is to document that the patient reports taking medical cannabis.  This is not a prescription, and is not used to certify that the patient has a qualifying medical condition.)       oxyCODONE-acetaminophen (PERCOCET) 5-325 MG tablet Take 1 tablet by mouth 3 times daily as needed for severe pain 84 tablet 0     traZODone (DESYREL) 50 MG tablet Take 50 mg by mouth daily Take 2 tablets at night       Vitamin D3 (CHOLECALCIFEROL) 2000 units (50 mcg) tablet Take 1 tablet (50 mcg) by mouth daily 180 tablet 3     fluocinonide (LIDEX) 0.05 % external solution Apply topically 2 times daily (Patient not taking: Reported on 11/5/2020) 60 mL 3     ketoconazole (NIZORAL) 2 % external shampoo Apply topically daily as needed for itching or irritation (Patient not taking: Reported on 11/5/2020) 120 mL 11     predniSONE (DELTASONE) 10 MG tablet Take 1 tablet (10 mg) by mouth daily (Patient not taking: Reported on 11/5/2020) 60 tablet 1     predniSONE (DELTASONE) 10 MG tablet 40mg for 4 week, 30mg for 1 week, 20mg for 1 week and 10mg for 1 week (Patient not taking: Reported on 11/5/2020) 147 tablet 0     predniSONE (DELTASONE) 20 MG tablet Take 1 tablet (20 mg) by mouth daily (Patient not taking: Reported on 11/5/2020) 90 tablet 0     predniSONE (DELTASONE) 20 MG tablet 60mg for 4 days for flares. Dispense 6 flare packs (Patient not taking: Reported on 11/5/2020) 67 tablet 11     Allergies   Allergen Reactions     Keflex [Cephalexin] Other (See Comments)     Not true allergy- intolerance     Penicillins Hives, Itching and Rash         Review of Systems:  -Constitutional: Otherwise feeling well today, in usual state of health.  -Skin: As above in HPI. No additional skin concerns.  -As per HPI    Physical exam:  Vitals: /74 (BP Location: Right arm, Patient Position: Chair, Cuff Size: Adult Large)   Pulse 71   GEN: This is a well developed, well-nourished female in no acute distress, in a pleasant mood.    SKIN: Total  skin excluding the undergarment areas was performed. The exam included the head/face, neck, both arms, chest, back, abdomen, both legs, digits and/or nails.   - 3mm cystic nodule on left superior anterior helix   - No other lesions of concern on areas examined.     HS Exam  No flowsheet data found.    Left Axilla 11/5/2020   # of inflamed nodules 3   # of abcesses 0   # of draining tunnels 0   Erythema (grade) 1   Thickness (grade) 1   Open skin surface (grade) 0   Tunnels (grade) 0       Right Axilla 11/5/2020   # of inflamed nodules 2   # of abcesses 0   # of draining tunnels 2   Erythema (grade) 2   Thickness (grade) 2   Open skin surface (grade) 1   Tunnels (grade) 2       No flowsheet data found.    No flowsheet data found.     No flowsheet data found.    Left Thigh 11/5/2020   # of draining tunnels 1   Erythema (grade) 2   Thickness (grade) 1   Open skin surface (grade) 0   Tunnels (grade) 1       Right Thigh 11/5/2020   # of inflamed nodules 3   # of abcesses 0   # of draining tunnels 0   Erythema (grade) 1   Thickness (grade) 2   Open skin surface (grade) 0   Tunnels (grade) 0       No flowsheet data found.    Buttocks 11/5/2020   # of inflamed nodules 4   # of abcesses 0   # of draining tunnels 0   Erythema (grade) 1   Thickness (grade) 1   Open skin surface (grade) 0   Tunnels (grade) 0       HS Data  HS Exam Data 2/13/2020 11/5/2020   LC Type - LC1   Clinical Subtypes - Regular type   Visual analogue score (0-100) - 40   Total Julian Stage II II   Total Inflammatory Nodules - 12   Total Abcesses - 0   Total Draining Tunnels - 3   Total Abscess and Nodule Count - 12   IHS4 Score  - 24   Total  HASI Score - 34   HS-PGA - 3     Labs:  N/A    Impression/Plan:  1. Hidradenitis Suppurativa, Julian Stage II  Improving on double-dose Humira. Flares following menstrual cycle - discussed birth control options and hormones. Patient is at high risk for DVT/PE with smoking history and age > 30, ruling out estrogen  containing treatments. Tried spironolactone in the past with abnormal uterine bleeding. Will try Finasteride. She has tried this in the past. She is not on birth control and has issues with birth control historically. I am also concerened progesterone only methods will worsen her. We discussed my concerns regarding pregnancy. She is not currently sexually active. We discussed the high risks of teratogenicity extensively,. She says she will remain abstinent and will stop the medication if she decides she wants to start being sexually active again and will use condoms.   - Take prednisone 60 mg daily for 4 days for acute flares PRN  - Refill Percocet 5-325 mg once daily for 1 month supply  - Continue double-dose Humira  - Plastics referral placed previously, patient will follow up on this   - Start Finasteride 5mg daily, pt will be abstinent. If she decided to be sexually active again, will stop medication and use condoms.  - Labs today (CBC, CMP)    Follow-up in 3 months, earlier for new or changing lesions.     Staff Involved:  I, Vikcy Coles, MS4, saw and examined the patient in the presence of Dr. Kenny.    Staff Physician:  I was present with the medical student who participated in the service and in the documentation of the note. I have verified the history and personally performed the physical exam and medical decision making. I agree with the assessment and plan of care as documented in the note.     Jean Kenny MD    Department of Dermatology  [facility]: Phone: [phone], Fax: [phone]  11/8/2020

## 2020-11-08 RX ORDER — FINASTERIDE 5 MG/1
5 TABLET, FILM COATED ORAL DAILY
Qty: 30 TABLET | Refills: 1 | Status: SHIPPED | OUTPATIENT
Start: 2020-11-08 | End: 2021-01-07

## 2021-01-03 DIAGNOSIS — L73.2 HIDRADENITIS SUPPURATIVA: ICD-10-CM

## 2021-01-05 ENCOUNTER — TELEPHONE (OUTPATIENT)
Dept: DERMATOLOGY | Facility: CLINIC | Age: 46
End: 2021-01-05

## 2021-01-07 RX ORDER — FINASTERIDE 5 MG/1
TABLET, FILM COATED ORAL
Qty: 30 TABLET | Refills: 2 | Status: SHIPPED | OUTPATIENT
Start: 2021-01-07 | End: 2021-04-08

## 2021-01-07 NOTE — TELEPHONE ENCOUNTER
PA Initiation    Medication: Humira - Pending  Insurance Company:    Pharmacy Filling the Rx:    Filling Pharmacy Phone:    Filling Pharmacy Fax:    Start Date:

## 2021-01-07 NOTE — TELEPHONE ENCOUNTER
LCV:11/5/2020  [facility] [place]  Derm process #2  NCV: 3-4-21

## 2021-01-11 NOTE — TELEPHONE ENCOUNTER
PRIOR AUTHORIZATION DENIED    Medication: Humira - Denied    Denial Date: 1/11/2021    Denial Rational:     Appeal Information:

## 2021-02-02 ENCOUNTER — DOCUMENTATION ONLY (OUTPATIENT)
Dept: CARE COORDINATION | Facility: CLINIC | Age: 46
End: 2021-02-02

## 2021-02-23 DIAGNOSIS — L73.2 HIDRADENITIS SUPPURATIVA: ICD-10-CM

## 2021-02-25 NOTE — TELEPHONE ENCOUNTER
HUMIRA PEN *CF*-CD/UC/HS STARTER 80MG KT      Last Written Prescription Date:  9-3-20  Last Fill Quantity: 4 kit,   # refills: 4  Last Office Visit : 11-5-20  Future Office visit:  3-4-21    Routing refill request to provider for review/approval because:  Med not on protocol

## 2021-02-26 RX ORDER — ADALIMUMAB 80MG/0.8ML
KIT SUBCUTANEOUS
Qty: 4 EACH | Refills: 0 | Status: SHIPPED | OUTPATIENT
Start: 2021-02-26 | End: 2021-03-04

## 2021-02-26 NOTE — TELEPHONE ENCOUNTER
Received refill request for Humira. Refill request approved for limited supply (1 month) to prevent flaring. Patient was due for her labs at last appt in Nov and did not have them drawn. Please make sure she has these labs drawn and reviewed by Dr. Kenny before the next refill will be provided.

## 2021-03-04 ENCOUNTER — OFFICE VISIT (OUTPATIENT)
Dept: DERMATOLOGY | Facility: CLINIC | Age: 46
End: 2021-03-04
Payer: COMMERCIAL

## 2021-03-04 VITALS — HEART RATE: 70 BPM | SYSTOLIC BLOOD PRESSURE: 105 MMHG | DIASTOLIC BLOOD PRESSURE: 66 MMHG

## 2021-03-04 DIAGNOSIS — Z51.81 MEDICATION MONITORING ENCOUNTER: ICD-10-CM

## 2021-03-04 DIAGNOSIS — L73.2 HIDRADENITIS SUPPURATIVA: Primary | ICD-10-CM

## 2021-03-04 PROCEDURE — 99214 OFFICE O/P EST MOD 30 MIN: CPT | Mod: GC | Performed by: DERMATOLOGY

## 2021-03-04 RX ORDER — PREDNISONE 10 MG/1
TABLET ORAL
Qty: 70 TABLET | Refills: 0 | Status: SHIPPED | OUTPATIENT
Start: 2021-03-04 | End: 2021-03-28

## 2021-03-04 RX ORDER — OXYCODONE AND ACETAMINOPHEN 5; 325 MG/1; MG/1
1 TABLET ORAL EVERY 6 HOURS PRN
Qty: 56 TABLET | Refills: 0 | Status: SHIPPED | OUTPATIENT
Start: 2021-03-04 | End: 2021-03-11

## 2021-03-04 RX ORDER — ADALIMUMAB 80MG/0.8ML
80 KIT SUBCUTANEOUS WEEKLY
Qty: 4 EACH | Refills: 3 | Status: SHIPPED | OUTPATIENT
Start: 2021-03-04 | End: 2021-03-18

## 2021-03-04 ASSESSMENT — PAIN SCALES - GENERAL: PAINLEVEL: EXTREME PAIN (8)

## 2021-03-04 NOTE — PROGRESS NOTES
Photography 3/4/2021:    Left axilla    Right axilla    Right groin    Upper right groin    Left groin

## 2021-03-04 NOTE — NURSING NOTE
Chief Complaint   Patient presents with     Derm Problem     3 month HS follow up, flaring and is stressed this week.      Magali Snider, EMT

## 2021-03-04 NOTE — PATIENT INSTRUCTIONS
Will start 4 week taper of prednisone:  40 mg daily for one week, 30 mg daily for 2nd week, 20 mg daily for 3rd week, and 10 mg daily for 4th week     Continue Humira 80 mg injection weekly     Continue finasteride 5 mg daily      Hope for HS ([email or web address]

## 2021-03-04 NOTE — LETTER
3/4/2021       RE: Klarissa Curtis  [address]  [place] [address]     Dear Colleague,    Thank you for referring your patient, Klarissa Curtis, to the [facility] DERMATOLOGY CLINIC [place] at [facility]. Please see a copy of my visit note below.    [facility] Dermatology Note  Encounter Date: Mar 4, 2021  Office Visit     Dermatology Problem List:  1. Hidradenitis suppurativa: diagnosed age 11, initially on waistline (used to weigh 300 lbs) which cleared up -> intermittent outbreaks -> for the past 10 years has had in bilateral axilla, groin, thighs, and buttocks.   - Current: Humira 80 mg weekly (started 10/4/20), prednisone taper (40 mg daily 1 week, 30 mg daily 2nd week, 20 mg daily for 3rd week, 10 mg daily for 4th week-initiated 3/4/21), finasteride 5 mg daily (initated 11/5/20)  - Prior: infliximab, clobetasol, topical morphine, ILK, humira, oral antibiotics, rifampin, finasteride, spironolactone (6/2019- 9/5/19, stopped due to abnormal uterine bleeding), topical morphine gel, and percocet for severe pain  - Referral placed to plastic surgery for excision of the R axilla - will try to make appointment (11/5/20)  - Provided the patient a sample of [employer] clothing for the R axilla and bandages. Prescribed Cutaned sorbion 5.0 x 5.0 inch bandages via [employer].  2. Scaly papules on the left proximal forearm. Could possibly be early psoriasis in the setting of infliximab.  3. Keratosis pilaris. Recommended OTC AmLactin  4. L olecranon bursitis.   -uric acid checked due to possibility of gout, on 1/9/20 wnl (4.4). Seems inflammatory, likely not traumatic. Seems chronic. No concern for infection..  SH: [place] an [place]/running around)    ____________________________________________    Assessment & Plan:   # Hidradenitis SuppIesha pinedo  Stage II. Currently flaring along R axilla and b/l inguinal folds since last visit. She has recently taken on new roles/stress at work, which she is concerned may be contributing to current flare. Two weeks ago, she received ILK injections with Dr. Quinteros into R axilla and inguinal folds due to current flare with minimal improvement. Given current flare, will plan to start a 1 month prednisone taper.  She does not want to escalate to infliximab and her insurance has denied many of the other biologics. Would consider adding sirolimus vs enrolling in trial.    - Start prednisone 40 mg daily for 1 week, then 30 mg daily for 2nd week, 20 mg daily for 3rd week and 10 mg daily for 4th week. Reviewed side effects of prednisone such as increased appetite, osteoporosis, impaired sleep, etc.    - Refill Percocet 5-325 mg  Q6H PRN for 7-day supple.  Last narcotic refill was 2/18/20.    - Continue Humira 80 mg weekly.    - Continue Finasteride 5mg daily, pt will be abstinent. If she decided to be sexually active again, will stop medication and use condoms.    Procedures Performed:   None    Follow-up: 6 week(s) virtually (telephone with photos), or earlier for new or changing lesions    Staff and Resident:     Dorita Almeida MD  PGY-3 Dermatology Resident     Staff Physician Comments:   I saw and evaluated the patient with the resident and I agree with the assessment and plan.  I was present for the examination.    Jean Kenny MD, FAAD    Departments of Internal Medicine and Dermatology  [facility]  [phone]      ____________________________________________    CC: Derm Problem (3 month HS follow up, flaring and is stressed this week. )    HPI:  Ms. Klarissa Curtis is a(n) 45 year old female who presents today as a return patient for HS. She was last seen in clinic on 11/5/20 at which time her HS was improving with Humira 80 mg weekly, and she was started on finasteride 5 mg daily. Since  her last visit, she states that her HS has remained well controlled until the past two weeks when she developed a flare in her R axilla and b/l inguinal folds. Her most active area of involvement at this time is the R axilla, which intermittently drains and is very tender. Two weeks ago, she saw Dr. Quinteros for ILK injections into the active areas of involvement on her R axilla and b/l inguinal folds with minimal improvement.  She is currently taking percocet every 6 hours as needed and medicinal marijuana for the pain.  Side from this, she has also started to develop a new active and tender area on her L axilla over the past few days.     No recently illnesses, fevers, chills, or injection site reactions with the Humira. Patient is otherwise feeling well, without additional skin concerns.      HS Nurse Assessment    Nurse Assessment Data 8/6/2020 9/24/2020 3/4/2021   Over the past month, about how many recurrent or new boils have you had? 0 new, 15 recurring nodules 2 14   Over the past week, how many dressing changes do you do each day? 0 3+ 2   Over the past week, has your wound drainage been: Severe Severe Moderate   Rate your HS overall from 0-10 (0 = no disease, 10 = worst) over the past week:  7 70 5   Rate your pain score from 0-10 (0 = no disease, 10 = worst) for the most painful/symptomatic lesion in the past week:  5 - Moderate Pain 9 10 - Worst Pain   Over the past week, how much has HS influenced your quality of life? - - moderately     Labs Reviewed:  N/A    Physical Exam:  Vitals: /66 (BP Location: Right arm)   Pulse 70   SKIN: Focused examination of face, axillae, groin and buttock was performed.    HS Exam  No flowsheet data found.    Left Axilla 3/4/2021   # of inflamed nodules 3   # of abcesses 0   # of draining tunnels 0   Erythema (grade) 1   Thickness (grade) 1   Open skin surface (grade) 0   Tunnels (grade) 0       Right Axilla 3/4/2021   # of inflamed nodules 3   # of abcesses 0   # of  draining tunnels 1   Erythema (grade) 2   Thickness (grade) 3   Open skin surface (grade) 1   Tunnels (grade) 2       No flowsheet data found.    No flowsheet data found.     No flowsheet data found.    Left Thigh 3/4/2021   # of inflamed nodules 2   # of abcesses 0   # of draining tunnels 0   Erythema (grade) 1   Thickness (grade) 1   Open skin surface (grade) 0   Tunnels (grade) 0       Right Thigh 3/4/2021   # of inflamed nodules 1   # of abcesses 0   # of draining tunnels 1   Erythema (grade) 1   Thickness (grade) 2   Open skin surface (grade) 0   Tunnels (grade) 1       No flowsheet data found.    Buttocks 3/4/2021   # of inflamed nodules -   # of abcesses -   # of draining tunnels -   Erythema (grade) -   Thickness (grade) -   Open skin surface (grade) -   Tunnels (grade) -   Pilonidal Disease? No   Pioderma Gangrenosum? No   Cutaneous Crohn's? No       HS Data  HS Exam Data 2/13/2020 11/5/2020 3/4/2021   LC Type - LC1 LC1   Clinical Subtypes - Regular type Regular type   Acne? - - No   Dissecting Cellulitis? - - No   Visual analogue score (0-100) - 40 65   Total Julian Stage II II -   Total Inflammatory Nodules - 12 9   Total Abcesses - 0 0   Total Draining Tunnels - 3 2   Total Abscess and Nodule Count - 12 9   IHS4 Score  - 24 17   Total  HASI Score - 34 34   HS-PGA - 3 -   - No other lesions of concern on areas examined.     Medications:  Current Outpatient Medications   Medication     calcium carbonate (CALCIUM CARBONATE) 600 MG tablet     calcium carbonate 750 MG CHEW     finasteride (PROSCAR) 5 MG tablet     fluocinonide (LIDEX) 0.05 % external solution     FLUoxetine (PROZAC) 40 MG capsule     HUMIRA *CF* PEN-CD/UC/HS STARTER 80 MG/0.8ML pen kit     ketoconazole (NIZORAL) 2 % external shampoo     medical cannabis (Patient's own supply)     oxyCODONE-acetaminophen (PERCOCET) 5-325 MG tablet     predniSONE (DELTASONE) 10 MG tablet     predniSONE (DELTASONE) 10 MG tablet     predniSONE (DELTASONE) 20 MG  tablet     predniSONE (DELTASONE) 20 MG tablet     traZODone (DESYREL) 50 MG tablet     Vitamin D3 (CHOLECALCIFEROL) 2000 units (50 mcg) tablet     No current facility-administered medications for this visit.       Past Medical History:   Patient Active Problem List   Diagnosis     Hidradenitis suppurativa     Fatigue     Past Medical History:   Diagnosis Date     NO ACTIVE PROBLEMS        CC Jean Kenny MD  [address] [place] [address] on close of this encounter.      Photography 3/4/2021:    Left axilla    Right axilla    Right groin    Upper right groin    Left groin        Photography 3/4/2021: (continued)

## 2021-03-04 NOTE — PROGRESS NOTES
[facility] Dermatology Note  Encounter Date: Mar 4, 2021  Office Visit     Dermatology Problem List:  1. Hidradenitis suppurativa: diagnosed age 11, initially on waistline (used to weigh 300 lbs) which cleared up -> intermittent outbreaks -> for the past 10 years has had in bilateral axilla, groin, thighs, and buttocks.   - Current: Humira 80 mg weekly (started 10/4/20), prednisone taper (40 mg daily 1 week, 30 mg daily 2nd week, 20 mg daily for 3rd week, 10 mg daily for 4th week-initiated 3/4/21), finasteride 5 mg daily (initated 11/5/20)  - Prior: infliximab, clobetasol, topical morphine, ILK, humira, oral antibiotics, rifampin, finasteride, spironolactone (6/2019- 9/5/19, stopped due to abnormal uterine bleeding), topical morphine gel, and percocet for severe pain  - Referral placed to plastic surgery for excision of the R axilla - will try to make appointment (11/5/20)  - Provided the patient a sample of [employer] clothing for the R axilla and bandages. Prescribed Cutaned sorbion 5.0 x 5.0 inch bandages via [employer].  2. Scaly papules on the left proximal forearm. Could possibly be early psoriasis in the setting of infliximab.  3. Keratosis pilaris. Recommended OTC AmLactin  4. L olecranon bursitis.   -uric acid checked due to possibility of gout, on 1/9/20 wnl (4.4). Seems inflammatory, likely not traumatic. Seems chronic. No concern for infection..  SH: [place] works as an [place] of walking/running around)    ____________________________________________    Assessment & Plan:   # Hidradenitis Suppurativa, Julian Stage II. Currently flaring along R axilla and b/l inguinal folds since last visit. She has recently taken on new roles/stress at work, which she is concerned may be contributing to current flare. Two weeks ago, she received ILK injections with Dr. Quinteros into R axilla and inguinal folds due to current flare with minimal  improvement. Given current flare, will plan to start a 1 month prednisone taper.  She does not want to escalate to infliximab and her insurance has denied many of the other biologics. Would consider adding sirolimus vs enrolling in trial.    - Start prednisone 40 mg daily for 1 week, then 30 mg daily for 2nd week, 20 mg daily for 3rd week and 10 mg daily for 4th week. Reviewed side effects of prednisone such as increased appetite, osteoporosis, impaired sleep, etc.    - Refill Percocet 5-325 mg  Q6H PRN for 7-day supple.  Last narcotic refill was 2/18/20.    - Continue Humira 80 mg weekly.    - Continue Finasteride 5mg daily, pt will be abstinent. If she decided to be sexually active again, will stop medication and use condoms.    Procedures Performed:   None    Follow-up: 6 week(s) virtually (telephone with photos), or earlier for new or changing lesions    Staff and Resident:     Dorita Almeida MD  PGY-3 Dermatology Resident     Staff Physician Comments:   I saw and evaluated the patient with the resident and I agree with the assessment and plan.  I was present for the examination.    Jean Kenny MD, FAAD    Departments of Internal Medicine and Dermatology  [facility]  [phone]      ____________________________________________    CC: Derm Problem (3 month HS follow up, flaring and is stressed this week. )    HPI:  Ms. Klarissa Curtis is a(n) 45 year old female who presents today as a return patient for HS. She was last seen in clinic on 11/5/20 at which time her HS was improving with Humira 80 mg weekly, and she was started on finasteride 5 mg daily. Since her last visit, she states that her HS has remained well controlled until the past two weeks when she developed a flare in her R axilla and b/l inguinal folds. Her most active area of involvement at this time is the R axilla, which intermittently drains and is very tender. Two weeks ago, she saw Dr. Quinteros for ILK  injections into the active areas of involvement on her R axilla and b/l inguinal folds with minimal improvement.  She is currently taking percocet every 6 hours as needed and medicinal marijuana for the pain.  Side from this, she has also started to develop a new active and tender area on her L axilla over the past few days.     No recently illnesses, fevers, chills, or injection site reactions with the Humira. Patient is otherwise feeling well, without additional skin concerns.      HS Nurse Assessment    Nurse Assessment Data 8/6/2020 9/24/2020 3/4/2021   Over the past month, about how many recurrent or new boils have you had? 0 new, 15 recurring nodules 2 14   Over the past week, how many dressing changes do you do each day? 0 3+ 2   Over the past week, has your wound drainage been: Severe Severe Moderate   Rate your HS overall from 0-10 (0 = no disease, 10 = worst) over the past week:  7 70 5   Rate your pain score from 0-10 (0 = no disease, 10 = worst) for the most painful/symptomatic lesion in the past week:  5 - Moderate Pain 9 10 - Worst Pain   Over the past week, how much has HS influenced your quality of life? - - moderately     Labs Reviewed:  N/A    Physical Exam:  Vitals: /66 (BP Location: Right arm)   Pulse 70   SKIN: Focused examination of face, axillae, groin and buttock was performed.    HS Exam  No flowsheet data found.    Left Axilla 3/4/2021   # of inflamed nodules 3   # of abcesses 0   # of draining tunnels 0   Erythema (grade) 1   Thickness (grade) 1   Open skin surface (grade) 0   Tunnels (grade) 0       Right Axilla 3/4/2021   # of inflamed nodules 3   # of abcesses 0   # of draining tunnels 1   Erythema (grade) 2   Thickness (grade) 3   Open skin surface (grade) 1   Tunnels (grade) 2       No flowsheet data found.    No flowsheet data found.     No flowsheet data found.    Left Thigh 3/4/2021   # of inflamed nodules 2   # of abcesses 0   # of draining tunnels 0   Erythema (grade) 1    Thickness (grade) 1   Open skin surface (grade) 0   Tunnels (grade) 0       Right Thigh 3/4/2021   # of inflamed nodules 1   # of abcesses 0   # of draining tunnels 1   Erythema (grade) 1   Thickness (grade) 2   Open skin surface (grade) 0   Tunnels (grade) 1       No flowsheet data found.    Buttocks 3/4/2021   # of inflamed nodules -   # of abcesses -   # of draining tunnels -   Erythema (grade) -   Thickness (grade) -   Open skin surface (grade) -   Tunnels (grade) -   Pilonidal Disease? No   Pioderma Gangrenosum? No   Cutaneous Crohn's? No       HS Data  HS Exam Data 2/13/2020 11/5/2020 3/4/2021   LC Type - LC1 LC1   Clinical Subtypes - Regular type Regular type   Acne? - - No   Dissecting Cellulitis? - - No   Visual analogue score (0-100) - 40 65   Total Julian Stage II II -   Total Inflammatory Nodules - 12 9   Total Abcesses - 0 0   Total Draining Tunnels - 3 2   Total Abscess and Nodule Count - 12 9   IHS4 Score  - 24 17   Total  HASI Score - 34 34   HS-PGA - 3 -   - No other lesions of concern on areas examined.     Medications:  Current Outpatient Medications   Medication     calcium carbonate (CALCIUM CARBONATE) 600 MG tablet     calcium carbonate 750 MG CHEW     finasteride (PROSCAR) 5 MG tablet     fluocinonide (LIDEX) 0.05 % external solution     FLUoxetine (PROZAC) 40 MG capsule     HUMIRA *CF* PEN-CD/UC/HS STARTER 80 MG/0.8ML pen kit     ketoconazole (NIZORAL) 2 % external shampoo     medical cannabis (Patient's own supply)     oxyCODONE-acetaminophen (PERCOCET) 5-325 MG tablet     predniSONE (DELTASONE) 10 MG tablet     predniSONE (DELTASONE) 10 MG tablet     predniSONE (DELTASONE) 20 MG tablet     predniSONE (DELTASONE) 20 MG tablet     traZODone (DESYREL) 50 MG tablet     Vitamin D3 (CHOLECALCIFEROL) 2000 units (50 mcg) tablet     No current facility-administered medications for this visit.       Past Medical History:   Patient Active Problem List   Diagnosis     Hidradenitis suppurativa      Fatigue     Past Medical History:   Diagnosis Date     NO ACTIVE PROBLEMS        CC Jean Kenny MD  [address] [place] [address] on close of this encounter.

## 2021-03-07 ENCOUNTER — HEALTH MAINTENANCE LETTER (OUTPATIENT)
Age: 46
End: 2021-03-07

## 2021-03-16 NOTE — TELEPHONE ENCOUNTER
Free Drug Application under review.  Waiting for patient to send in required documents to complete review. Patient is aware.    Update letter:  [phone]        forehead

## 2021-03-18 ENCOUNTER — MYC MEDICAL ADVICE (OUTPATIENT)
Dept: DERMATOLOGY | Facility: CLINIC | Age: 46
End: 2021-03-18

## 2021-03-18 DIAGNOSIS — L73.2 HIDRADENITIS SUPPURATIVA: ICD-10-CM

## 2021-03-18 RX ORDER — ADALIMUMAB 80MG/0.8ML
80 KIT SUBCUTANEOUS WEEKLY
Qty: 8 EACH | Refills: 6 | Status: SHIPPED | OUTPATIENT
Start: 2021-03-18 | End: 2021-11-11

## 2021-03-24 DIAGNOSIS — L73.2 HIDRADENITIS SUPPURATIVA: ICD-10-CM

## 2021-03-27 RX ORDER — PREDNISONE 10 MG/1
TABLET ORAL
Qty: 70 TABLET | Refills: 0 | OUTPATIENT
Start: 2021-03-27

## 2021-03-27 NOTE — TELEPHONE ENCOUNTER
Received refill request for prednisone as the resident on call. Reviewed patient's chart and attached communication. Patient last seen 3/4/21 for HS at which time she was prescribed a 1 month prednisone taper for a flare in her HS. She is scheduled to RTC on 4/22/21.  After reviewing the medication list and assessment and plan from last visit, the refill request was declined as she has completed her 1 month prednisone taper.    CC'ing Dr. Kenny as a SHAISTA Almeida  PGY-3 Dermatology Resident  Pager: [phone]

## 2021-03-27 NOTE — TELEPHONE ENCOUNTER
predniSONE (DELTASONE) 10 MG tablet  Last Written Prescription Date:  3/4/21  Last Fill Quantity: 70   # refills: 0  Last Office Visit :3/4/21  Future Office visit:  4/22/21    Routing refill request to provider for review/approval because. Pharm requests rf. written w/  end date 4/1/21. Rf?  Not on protocol

## 2021-03-28 RX ORDER — PREDNISONE 10 MG/1
TABLET ORAL
Qty: 70 TABLET | Refills: 0 | Status: SHIPPED | OUTPATIENT
Start: 2021-03-28 | End: 2021-04-25

## 2021-04-08 DIAGNOSIS — L73.2 HIDRADENITIS SUPPURATIVA: ICD-10-CM

## 2021-04-12 RX ORDER — FINASTERIDE 5 MG/1
1 TABLET, FILM COATED ORAL DAILY
Qty: 30 TABLET | Refills: 4 | Status: SHIPPED | OUTPATIENT
Start: 2021-04-12 | End: 2021-08-16

## 2021-04-22 ENCOUNTER — VIRTUAL VISIT (OUTPATIENT)
Dept: DERMATOLOGY | Facility: CLINIC | Age: 46
End: 2021-04-22
Payer: COMMERCIAL

## 2021-04-22 DIAGNOSIS — Z79.899 ENCOUNTER FOR LONG-TERM (CURRENT) USE OF HIGH-RISK MEDICATION: Primary | ICD-10-CM

## 2021-04-22 DIAGNOSIS — L73.2 HIDRADENITIS SUPPURATIVA: ICD-10-CM

## 2021-04-22 PROCEDURE — 99214 OFFICE O/P EST MOD 30 MIN: CPT | Mod: TEL | Performed by: DERMATOLOGY

## 2021-04-22 RX ORDER — OXYCODONE AND ACETAMINOPHEN 5; 325 MG/1; MG/1
1 TABLET ORAL DAILY PRN
Qty: 28 TABLET | Refills: 0 | Status: SHIPPED | OUTPATIENT
Start: 2021-04-22 | End: 2021-05-18

## 2021-04-22 RX ORDER — SIROLIMUS 1 MG
1 TABLET ORAL DAILY
Qty: 30 TABLET | Refills: 3 | Status: SHIPPED | OUTPATIENT
Start: 2021-04-22 | End: 2021-04-22

## 2021-04-22 NOTE — NURSING NOTE
Dermatology Rooming Note    Klarissa Cutris's goals for this visit include:   Chief Complaint   Patient presents with     Derm Problem     richardson is having this visit today for a follow up on HS, states that her HS has been normal for her     Crystal Garcia CMA on 4/22/2021 at 4:32 PM

## 2021-04-22 NOTE — LETTER
Date:May 3, 2021      Patient was self referred, no letter generated. Do not send.        [facility] Health Information

## 2021-04-22 NOTE — LETTER
4/22/2021       RE: Klarissa Curtis  [address]  [place] [address]     Dear Colleague,    Thank you for referring your patient, Klarissa Curtis, to the [facility] DERMATOLOGY CLINIC [place] at [facility]. Please see a copy of my visit note below.    [facility] Dermatology Note  Encounter Date: April 22, 2021  Office Visit    Time  = 25min      Dermatology Problem List:  1. Hidradenitis suppurativa: diagnosed age 11, initially on waistline (used to weigh 300 lbs) which cleared up -> intermittent outbreaks -> for the past 10 years has had in bilateral axilla, groin, thighs, and buttocks.   - Current: Humira 80 mg weekly (started 10/4/20), prednisone taper (40 mg daily 1 week, 30 mg daily 2nd week, 20 mg daily for 3rd week, 10 mg daily for 4th week-initiated 3/4/21), finasteride 5 mg daily (initated 11/5/20)  - Prior: infliximab, clobetasol, topical morphine, ILK, humira, oral antibiotics, rifampin, finasteride, spironolactone (6/2019- 9/5/19, stopped due to abnormal uterine bleeding), topical morphine gel, and percocet for severe pain  - Referral placed to plastic surgery for excision of the R axilla - will try to make appointment (11/5/20)  - Provided the patient a sample of [employer] clothing for the R axilla and bandages. Prescribed Cutaned sorbion 5.0 x 5.0 inch bandages via [employer].  2. Scaly papules on the left proximal forearm. Could possibly be early psoriasis in the setting of infliximab.  3. Keratosis pilaris. Recommended OTC AmLactin  4. L olecranon bursitis.   -uric acid checked due to possibility of gout, on 1/9/20 wnl (4.4). Seems inflammatory, likely not traumatic. Seems chronic. No concern for infection..  SH: [place] works as an [place] of walking/running around)     Assessment & Plan:   1) HS   - Continue humira 80mg weekly   - Plan to start  sirolimus. Has not had labs in over 1 year.  - CBC, CMP, lipids, vitamin D, zinc, UA. If wnl mustapha start sirolimus.   - No TB risk factors  - Refil pain meds for flares. PDMP checked    Jean Kenny MD, FAAD    Departments of Internal Medicine and Dermatology  [facility]  [phone]        ____________________________________________     CC: Derm Problem (3 month HS follow up, flaring and is stressed this week. )     Interval Hx April 22, 2021  At her last visit she was having a significant flare. We started prednisone and that helped but then her insurance refused tor refill her humira, so she was out for 3 weeks, but then restarted and has not had a significant flare since. She has short flares which she has dealt with Still getting numerous lesions and left axilla continues to refill.    She takes her percocet when she flares. She takes 2 at once every 4-6 hrs during the flare. She does this for 2-3 days every 2-3 weeks.     - No symptoms or history concerning for TB    No known TB exposure    No recent travel to TB endemic area   No chronic cough, hemoptysis, major weight changes or night sweats.   Not a healthcare worker   Not homeless and has not worked in a homeless shelter     HS Nurse Assessment    Nurse Assessment Data 9/24/2020 3/4/2021 4/22/2021   Over the past month, about how many recurrent or new boils have you had? 2 14 at least 10   Over the past week, how many dressing changes do you do each day? 3+ 2 2   Over the past week, has your wound drainage been: Severe Moderate Moderate   Rate your HS overall from 0-10 (0 = no disease, 10 = worst) over the past week:  70 5 7   Rate your pain score from 0-10 (0 = no disease, 10 = worst) for the most painful/symptomatic lesion in the past week:  9 10 - Worst Pain 7   Over the past week, how much has HS influenced your quality of life? - moderately slightly     Medications:  Current Outpatient Medications   Medication      adalimumab (HUMIRA *CF* PEN-CD/UC/HS STARTER) 80 MG/0.8ML pen kit     calcium carbonate (CALCIUM CARBONATE) 600 MG tablet     calcium carbonate 750 MG CHEW     fluocinonide (LIDEX) 0.05 % external solution     FLUoxetine (PROZAC) 40 MG capsule     ketoconazole (NIZORAL) 2 % external shampoo     medical cannabis (Patient's own supply)     oxyCODONE-acetaminophen (PERCOCET) 5-325 MG tablet     predniSONE (DELTASONE) 10 MG tablet     predniSONE (DELTASONE) 10 MG tablet     predniSONE (DELTASONE) 10 MG tablet     predniSONE (DELTASONE) 20 MG tablet     predniSONE (DELTASONE) 20 MG tablet     traZODone (DESYREL) 50 MG tablet     Vitamin D3 (CHOLECALCIFEROL) 2000 units (50 mcg) tablet     finasteride (PROSCAR) 5 MG tablet     No current facility-administered medications for this visit.      Past Medical History        Past Medical History:   Diagnosis Date     NO ACTIVE PROBLEMS              CC Jean Kenny MD  [address] [place] [address] on close of this encounter.           Again, thank you for allowing me to participate in the care of your patient.      Sincerely,    Jean Kenny MD

## 2021-04-23 NOTE — PROGRESS NOTES
[facility] Dermatology Note  Encounter Date: April 22, 2021  Office Visit    Time  = 25min      Dermatology Problem List:  1. Hidradenitis suppurativa: diagnosed age 11, initially on waistline (used to weigh 300 lbs) which cleared up -> intermittent outbreaks -> for the past 10 years has had in bilateral axilla, groin, thighs, and buttocks.   - Current: Humira 80 mg weekly (started 10/4/20), prednisone taper (40 mg daily 1 week, 30 mg daily 2nd week, 20 mg daily for 3rd week, 10 mg daily for 4th week-initiated 3/4/21), finasteride 5 mg daily (initated 11/5/20)  - Prior: infliximab, clobetasol, topical morphine, ILK, humira, oral antibiotics, rifampin, finasteride, spironolactone (6/2019- 9/5/19, stopped due to abnormal uterine bleeding), topical morphine gel, and percocet for severe pain  - Referral placed to plastic surgery for excision of the R axilla - will try to make appointment (11/5/20)  - Provided the patient a sample of [employer] clothing for the R axilla and bandages. Prescribed Cutaned sorbion 5.0 x 5.0 inch bandages via [employer].  2. Scaly papules on the left proximal forearm. Could possibly be early psoriasis in the setting of infliximab.  3. Keratosis pilaris. Recommended OTC AmLactin  4. L olecranon bursitis.   -uric acid checked due to possibility of gout, on 1/9/20 wnl (4.4). Seems inflammatory, likely not traumatic. Seems chronic. No concern for infection..  SH: [place] an [place]/running around)     Assessment & Plan:   1) HS   - Continue humira 80mg weekly   - Plan to start sirolimus. Has not had labs in over 1 year.  - CBC, CMP, lipids, vitamin D, zinc, UA. If wnl mustapha start sirolimus.   - No TB risk factors  - Refil pain meds for flares. PDMP checked    Jean Kenny MD, FAAD    Departments of Internal Medicine and Dermatology  [facility]  [place]  [phone]        ____________________________________________     CC: Derm Problem (3 month HS follow up, flaring and is stressed this week. )     Interval Hx April 22, 2021  At her last visit she was having a significant flare. We started prednisone and that helped but then her insurance refused tor refill her humira, so she was out for 3 weeks, but then restarted and has not had a significant flare since. She has short flares which she has dealt with Still getting numerous lesions and left axilla continues to refill.    She takes her percocet when she flares. She takes 2 at once every 4-6 hrs during the flare. She does this for 2-3 days every 2-3 weeks.     - No symptoms or history concerning for TB    No known TB exposure    No recent travel to TB endemic area   No chronic cough, hemoptysis, major weight changes or night sweats.   Not a healthcare worker   Not homeless and has not worked in a homeless shelter     HS Nurse Assessment    Nurse Assessment Data 9/24/2020 3/4/2021 4/22/2021   Over the past month, about how many recurrent or new boils have you had? 2 14 at least 10   Over the past week, how many dressing changes do you do each day? 3+ 2 2   Over the past week, has your wound drainage been: Severe Moderate Moderate   Rate your HS overall from 0-10 (0 = no disease, 10 = worst) over the past week:  70 5 7   Rate your pain score from 0-10 (0 = no disease, 10 = worst) for the most painful/symptomatic lesion in the past week:  9 10 - Worst Pain 7   Over the past week, how much has HS influenced your quality of life? - moderately slightly     Medications:  Current Outpatient Medications   Medication     adalimumab (HUMIRA *CF* PEN-CD/UC/HS STARTER) 80 MG/0.8ML pen kit     calcium carbonate (CALCIUM CARBONATE) 600 MG tablet     calcium carbonate 750 MG CHEW     fluocinonide (LIDEX) 0.05 % external solution     FLUoxetine (PROZAC) 40 MG capsule     ketoconazole (NIZORAL) 2 % external shampoo      medical cannabis (Patient's own supply)     oxyCODONE-acetaminophen (PERCOCET) 5-325 MG tablet     predniSONE (DELTASONE) 10 MG tablet     predniSONE (DELTASONE) 10 MG tablet     predniSONE (DELTASONE) 10 MG tablet     predniSONE (DELTASONE) 20 MG tablet     predniSONE (DELTASONE) 20 MG tablet     traZODone (DESYREL) 50 MG tablet     Vitamin D3 (CHOLECALCIFEROL) 2000 units (50 mcg) tablet     finasteride (PROSCAR) 5 MG tablet     No current facility-administered medications for this visit.      Past Medical History        Past Medical History:   Diagnosis Date     NO ACTIVE PROBLEMS              CC Jean Kenny MD  [address] [place] [address] on close of this encounter.

## 2021-04-23 NOTE — PATIENT INSTRUCTIONS
Continue humira weekly   Labs and then plan to start sirolimus  After start, will need labs:        Every 2 weeks for 4 weeks       Then every month for 3 months       Will follow-up virtually in 10 weeks

## 2021-04-29 ENCOUNTER — HOSPITAL ENCOUNTER (OUTPATIENT)
Dept: LAB | Facility: CLINIC | Age: 46
Discharge: HOME | End: 2021-04-29
Attending: DERMATOLOGY | Admitting: DERMATOLOGY
Payer: COMMERCIAL

## 2021-04-29 DIAGNOSIS — Z79.899 ENCOUNTER FOR LONG-TERM (CURRENT) USE OF HIGH-RISK MEDICATION: ICD-10-CM

## 2021-04-29 DIAGNOSIS — L73.2 HIDRADENITIS SUPPURATIVA: ICD-10-CM

## 2021-04-29 LAB
ALBUMIN SERPL-MCNC: 3.3 G/DL (ref 3.4–5)
ALBUMIN UR-MCNC: NEGATIVE MG/DL
ALP SERPL-CCNC: 63 U/L (ref 40–150)
ALT SERPL W P-5'-P-CCNC: 13 U/L (ref 0–50)
ANION GAP SERPL CALCULATED.3IONS-SCNC: 5 MMOL/L (ref 3–14)
APPEARANCE UR: CLEAR
AST SERPL W P-5'-P-CCNC: 9 U/L (ref 0–45)
BASOPHILS # BLD AUTO: 0 10E9/L (ref 0–0.2)
BASOPHILS NFR BLD AUTO: 0.3 %
BILIRUB SERPL-MCNC: 0.8 MG/DL (ref 0.2–1.3)
BILIRUB UR QL STRIP: NEGATIVE
BUN SERPL-MCNC: 9 MG/DL (ref 7–30)
CALCIUM SERPL-MCNC: 8.4 MG/DL (ref 8.5–10.1)
CHLORIDE SERPL-SCNC: 103 MMOL/L (ref 94–109)
CHOLEST SERPL-MCNC: 232 MG/DL
CO2 SERPL-SCNC: 27 MMOL/L (ref 20–32)
COLOR UR AUTO: ABNORMAL
CREAT SERPL-MCNC: 0.61 MG/DL (ref 0.52–1.04)
DEPRECATED CALCIDIOL+CALCIFEROL SERPL-MC: 19 UG/L (ref 20–75)
DIFFERENTIAL METHOD BLD: NORMAL
EOSINOPHIL # BLD AUTO: 0.1 10E9/L (ref 0–0.7)
EOSINOPHIL NFR BLD AUTO: 0.6 %
ERYTHROCYTE [DISTWIDTH] IN BLOOD BY AUTOMATED COUNT: 13.1 % (ref 10–15)
GFR SERPL CREATININE-BSD FRML MDRD: >90 ML/MIN/{1.73_M2}
GLUCOSE SERPL-MCNC: 106 MG/DL (ref 70–99)
GLUCOSE UR STRIP-MCNC: NEGATIVE MG/DL
HCT VFR BLD AUTO: 42.6 % (ref 35–47)
HDLC SERPL-MCNC: 90 MG/DL
HGB BLD-MCNC: 13.8 G/DL (ref 11.7–15.7)
HGB UR QL STRIP: NEGATIVE
IMM GRANULOCYTES # BLD: 0 10E9/L (ref 0–0.4)
IMM GRANULOCYTES NFR BLD: 0.3 %
KETONES UR STRIP-MCNC: NEGATIVE MG/DL
LDLC SERPL CALC-MCNC: 116 MG/DL
LEUKOCYTE ESTERASE UR QL STRIP: NEGATIVE
LYMPHOCYTES # BLD AUTO: 1.8 10E9/L (ref 0.8–5.3)
LYMPHOCYTES NFR BLD AUTO: 23.1 %
MCH RBC QN AUTO: 30.7 PG (ref 26.5–33)
MCHC RBC AUTO-ENTMCNC: 32.4 G/DL (ref 31.5–36.5)
MCV RBC AUTO: 95 FL (ref 78–100)
MONOCYTES # BLD AUTO: 0.5 10E9/L (ref 0–1.3)
MONOCYTES NFR BLD AUTO: 6.4 %
MUCOUS THREADS #/AREA URNS LPF: PRESENT /LPF
NEUTROPHILS # BLD AUTO: 5.4 10E9/L (ref 1.6–8.3)
NEUTROPHILS NFR BLD AUTO: 69.3 %
NITRATE UR QL: NEGATIVE
NONHDLC SERPL-MCNC: 142 MG/DL
NRBC # BLD AUTO: 0 10*3/UL
NRBC BLD AUTO-RTO: 0 /100
PH UR STRIP: 7 PH (ref 5–7)
PLATELET # BLD AUTO: 435 10E9/L (ref 150–450)
POTASSIUM SERPL-SCNC: 3.9 MMOL/L (ref 3.4–5.3)
PROT SERPL-MCNC: 7.6 G/DL (ref 6.8–8.8)
RBC # BLD AUTO: 4.5 10E12/L (ref 3.8–5.2)
RBC #/AREA URNS AUTO: <1 /HPF (ref 0–2)
SODIUM SERPL-SCNC: 135 MMOL/L (ref 133–144)
SOURCE: ABNORMAL
SP GR UR STRIP: 1.01 (ref 1–1.03)
SQUAMOUS #/AREA URNS AUTO: 3 /HPF (ref 0–1)
TRIGL SERPL-MCNC: 128 MG/DL
UROBILINOGEN UR STRIP-MCNC: 0 MG/DL (ref 0–2)
WBC # BLD AUTO: 7.8 10E9/L (ref 4–11)
WBC #/AREA URNS AUTO: 0 /HPF (ref 0–5)

## 2021-04-29 PROCEDURE — 80061 LIPID PANEL: CPT | Performed by: DERMATOLOGY

## 2021-04-29 PROCEDURE — 82306 VITAMIN D 25 HYDROXY: CPT | Performed by: DERMATOLOGY

## 2021-04-29 PROCEDURE — 36415 COLL VENOUS BLD VENIPUNCTURE: CPT | Performed by: DERMATOLOGY

## 2021-04-29 PROCEDURE — 85025 COMPLETE CBC W/AUTO DIFF WBC: CPT | Performed by: DERMATOLOGY

## 2021-04-29 PROCEDURE — 80053 COMPREHEN METABOLIC PANEL: CPT | Performed by: DERMATOLOGY

## 2021-04-29 PROCEDURE — 81001 URINALYSIS AUTO W/SCOPE: CPT | Performed by: DERMATOLOGY

## 2021-04-29 PROCEDURE — 84630 ASSAY OF ZINC: CPT | Performed by: DERMATOLOGY

## 2021-05-01 LAB — ZINC SERPL-MCNC: 69.3 UG/DL (ref 60–120)

## 2021-05-02 DIAGNOSIS — L73.2 HIDRADENITIS SUPPURATIVA: Primary | ICD-10-CM

## 2021-05-02 DIAGNOSIS — E55.9 VITAMIN D DEFICIENCY: Primary | ICD-10-CM

## 2021-05-02 RX ORDER — SIROLIMUS 1 MG
1 TABLET ORAL DAILY
Qty: 30 TABLET | Refills: 1 | Status: SHIPPED | OUTPATIENT
Start: 2021-05-02 | End: 2021-07-07

## 2021-05-05 ENCOUNTER — TELEPHONE (OUTPATIENT)
Dept: DERMATOLOGY | Facility: CLINIC | Age: 46
End: 2021-05-05

## 2021-05-05 DIAGNOSIS — L73.2 HIDRADENITIS SUPPURATIVA: Primary | ICD-10-CM

## 2021-05-10 NOTE — TELEPHONE ENCOUNTER
PA Initiation    Medication: RAPAMUNE (BRAND) 1 MG tablet -   Insurance Company: [employer] ([facility]) - Phone [phone] Fax [phone]  Pharmacy Filling the Rx: [id number] [place] [facility] - [address]  Filling Pharmacy Phone: [phone]  Filling Pharmacy Fax: [phone]  Start Date: 5/10/2021

## 2021-05-12 NOTE — TELEPHONE ENCOUNTER
Coverage Review Not Available    Medication: RAPAMUNE (BRAND) 1 MG tablet -     The medication requested is excluded from the patients plan. Patient may receive medication, but would have to pay out of pocket or use discount program/coupon.

## 2021-05-18 DIAGNOSIS — L73.2 HIDRADENITIS SUPPURATIVA: ICD-10-CM

## 2021-05-18 RX ORDER — OXYCODONE AND ACETAMINOPHEN 5; 325 MG/1; MG/1
1 TABLET ORAL DAILY PRN
Qty: 28 TABLET | Refills: 0 | Status: SHIPPED | OUTPATIENT
Start: 2021-05-18 | End: 2021-07-08

## 2021-06-21 RX ORDER — EVEROLIMUS 0.5 MG/1
0.5 TABLET ORAL 2 TIMES DAILY
Qty: 60 TABLET | Refills: 1 | Status: SHIPPED | OUTPATIENT
Start: 2021-06-21 | End: 2021-07-08

## 2021-07-04 DIAGNOSIS — L73.2 HIDRADENITIS SUPPURATIVA: ICD-10-CM

## 2021-07-07 NOTE — TELEPHONE ENCOUNTER
SIROLIMUS 1 MG TABLET  Last Written Prescription Date:  5/2/2021  Last Fill Quantity: 30,   # refills: 1  Last Office Visit : 4/22/2021  Future Office visit:  7/8/2021    Routing refill request to provider for review/approval because:  Not on refill protocol         Pt has appointment with Provider on 7/8/2021  Refer to Provider for review       Jie Berman RN  Central Triage Red Flags/Med Refills

## 2021-07-08 ENCOUNTER — VIRTUAL VISIT (OUTPATIENT)
Dept: DERMATOLOGY | Facility: CLINIC | Age: 46
End: 2021-07-08
Payer: COMMERCIAL

## 2021-07-08 DIAGNOSIS — Z79.899 ENCOUNTER FOR LONG-TERM (CURRENT) USE OF HIGH-RISK MEDICATION: Primary | ICD-10-CM

## 2021-07-08 DIAGNOSIS — L73.2 HIDRADENITIS SUPPURATIVA: ICD-10-CM

## 2021-07-08 PROCEDURE — 99214 OFFICE O/P EST MOD 30 MIN: CPT | Mod: TEL | Performed by: DERMATOLOGY

## 2021-07-08 RX ORDER — SIROLIMUS 1 MG/1
1 TABLET, FILM COATED ORAL DAILY
Qty: 30 TABLET | Refills: 1 | Status: SHIPPED | OUTPATIENT
Start: 2021-07-08 | End: 2021-07-08

## 2021-07-08 RX ORDER — OXYCODONE AND ACETAMINOPHEN 5; 325 MG/1; MG/1
1 TABLET ORAL DAILY PRN
Qty: 28 TABLET | Refills: 0 | Status: SHIPPED | OUTPATIENT
Start: 2021-07-08 | End: 2021-07-16

## 2021-07-08 RX ORDER — SIROLIMUS 1 MG/1
2 TABLET, FILM COATED ORAL DAILY
Qty: 60 TABLET | Refills: 1 | Status: SHIPPED | OUTPATIENT
Start: 2021-07-08 | End: 2022-01-25

## 2021-07-08 NOTE — PROGRESS NOTES
HS [id number]-[id number] Checklist      Follow up: Virtual    Labs today? YES     Fax labs to outside [place] location: [facility]     Imaging needed? NO    Referrals placed? NO    Infusion Start? NO    Infusion change/dose increase NO    Biologics change? NO    Wound care supplies? (print orders for faxing) NO    Reach out to outside providers? NO    Photos and photo consent done? NO

## 2021-07-08 NOTE — LETTER
7/8/2021       RE: Klarissa Curtis  [address]  [place] [address]     Dear Colleague,    Thank you for referring your patient, Klarissa Curtis, to the [facility]ERMATOLOG[facility] [place] [facility]. Please see a copy of my visit note below.    [facility] Dermatology Note  Encounter Date: Jul 8, 2021  Store-and-Forward and Telephone ([phone]). Location of teledermatologist: [facility] [place].  Start time: 7:44 End time: 7:54pm    Dermatology Problem List:  1. Hidradenitis suppurativa: diagnosed age 11, initially on waistline (used to weigh 300 lbs) which cleared up -> intermittent outbreaks -> for the past 10 years has had in bilateral axilla, groin, thighs, and buttocks.   - Current: Humira 80 mg weekly (started 10/4/20), prednisone taper (40 mg daily 1 week, 30 mg daily 2nd week, 20 mg daily for 3rd week, 10 mg daily for 4th week-initiated 3/4/21), finasteride 5 mg daily (initated 11/5/20)  - Prior: infliximab, clobetasol, topical morphine, ILK, humira, oral antibiotics, rifampin, finasteride, spironolactone (6/2019- 9/5/19, stopped due to abnormal uterine bleeding), topical morphine gel, and percocet for severe pain  - Referral placed to plastic surgery for excision of the R axilla - will try to make appointment (11/5/20)  - Provided the patient a sample of [employer] clothing for the R axilla and bandages. Prescribed Cutaned sorbion 5.0 x 5.0 inch bandages via [employer].  2. Scaly papules on the left proximal forearm. Could possibly be early psoriasis in the setting of infliximab.  3. Keratosis pilaris. Recommended OTC AmLactin  4. L olecranon bursitis.   -uric acid checked due to possibility of gout, on 1/9/20 wnl (4.4). Seems inflammatory, likely not traumatic. Seems chronic. No concern for infection..  SH: [place] works as an [place] of walking/running around)  ____________________________________________    Assessment & Plan:     # HS   - Continue humira 80mg weekly   - Will check CBC, CMP, lipids, UA and if OK increase to sirolimus 2mg daily  - Will also check quantfieron gold, although she has no risk factors.   - PDMP checked and refilled pain meds      Follow-up: RTC in 4 weeks  Staff:     Jean Kenny MD, FAAD, FACP    Departments of Internal Medicine and Dermatology  [facility]  [phone]    ____________________________________________    CC: Derm Problem (richardson is having this visit today for a follow up on the HS, states that she has her good days and her bad days)    HPI:  Ms. Klarissa Curtis is a(n) 46 year old female who presents today as a return patient for HS. She is on humira 80mg weekly and sirolimus 1mg daily.  She the flares are not as severe. They dont last as long. Has been on the sirolimus   HS Nurse Assessment    Nurse Assessment Data 3/4/2021 4/22/2021 7/8/2021   Over the past month, about how many recurrent or new boils have you had? 14 at least 10 4   Over the past week, how many dressing changes do you do each day? 2 2 0   Over the past week, has your wound drainage been: Moderate Moderate Moderate   Rate your HS overall from 0-10 (0 = no disease, 10 = worst) over the past week:  5 7 6   Rate your pain score from 0-10 (0 = no disease, 10 = worst) for the most painful/symptomatic lesion in the past week:  10 - Worst Pain 7 8   Over the past week, how much has HS influenced your quality of life? moderately slightly moderately       Patient is otherwise feeling well, without additional skin concerns.    Labs Review      Medications:  Current Outpatient Medications   Medication     adalimumab (HUMIRA *CF* PEN-CD/UC/HS STARTER) 80 MG/0.8ML pen kit     calcium carbonate (CALCIUM CARBONATE) 600 MG tablet     calcium carbonate 750 MG CHEW     everolimus (ZORTRESS) 0.5 MG  tablet     finasteride (PROSCAR) 5 MG tablet     fluocinonide (LIDEX) 0.05 % external solution     FLUoxetine (PROZAC) 40 MG capsule     ketoconazole (NIZORAL) 2 % external shampoo     medical cannabis (Patient's own supply)     oxyCODONE-acetaminophen (PERCOCET) 5-325 MG tablet     predniSONE (DELTASONE) 10 MG tablet     predniSONE (DELTASONE) 10 MG tablet     predniSONE (DELTASONE) 20 MG tablet     predniSONE (DELTASONE) 20 MG tablet     sirolimus (GENERIC EQUIVALENT) 1 MG tablet     traZODone (DESYREL) 50 MG tablet     Vitamin D3 (CHOLECALCIFEROL) 2000 units (50 mcg) tablet     No current facility-administered medications for this visit.       Past Medical/Surgical History:   Patient Active Problem List   Diagnosis     Hidradenitis suppurativa     Fatigue     Past Medical History:   Diagnosis Date     NO ACTIVE PROBLEMS        CC No referring provider defined for this encounter. on close of this encounter.    HS [id number]-[id number] Checklist      Follow up: Virtual    Labs today? YES     Fax labs to outside [place] location: [facility]     Imaging needed? NO    Referrals placed? NO    Infusion Start? NO    Infusion change/dose increase NO    Biologics change? NO    Wound care supplies? (print orders for faxing) NO    Reach out to outside providers? NO    Photos and photo consent done? NO      Again, thank you for allowing me to participate in the care of your patient.      Sincerely,    Jean Kenny MD

## 2021-07-08 NOTE — LETTER
Date:August 18, 2021      Patient was self referred, no letter generated. Do not send.        [facility] Health Information

## 2021-07-08 NOTE — PROGRESS NOTES
[facility] Dermatology Note  Encounter Date: Jul 8, 2021  Store-and-Forward and Telephone ([phone]). Location of teledermatologist: [facility] [place].  Start time: 7:44 End time: 7:54pm    Dermatology Problem List:  1. Hidradenitis suppurativa: diagnosed age 11, initially on waistline (used to weigh 300 lbs) which cleared up -> intermittent outbreaks -> for the past 10 years has had in bilateral axilla, groin, thighs, and buttocks.   - Current: Humira 80 mg weekly (started 10/4/20), prednisone taper (40 mg daily 1 week, 30 mg daily 2nd week, 20 mg daily for 3rd week, 10 mg daily for 4th week-initiated 3/4/21), finasteride 5 mg daily (initated 11/5/20)  - Prior: infliximab, clobetasol, topical morphine, ILK, humira, oral antibiotics, rifampin, finasteride, spironolactone (6/2019- 9/5/19, stopped due to abnormal uterine bleeding), topical morphine gel, and percocet for severe pain  - Referral placed to plastic surgery for excision of the R axilla - will try to make appointment (11/5/20)  - Provided the patient a sample of [employer] clothing for the R axilla and bandages. Prescribed Cutaned sorbion 5.0 x 5.0 inch bandages via [employer].  2. Scaly papules on the left proximal forearm. Could possibly be early psoriasis in the setting of infliximab.  3. Keratosis pilaris. Recommended OTC AmLactin  4. L olecranon bursitis.   -uric acid checked due to possibility of gout, on 1/9/20 wnl (4.4). Seems inflammatory, likely not traumatic. Seems chronic. No concern for infection..  SH: She works as an [place] (lots of walking/running around)  ____________________________________________    Assessment & Plan:     # HS   - Continue humira 80mg weekly   - Will check CBC, CMP, lipids, UA and if OK increase to sirolimus 2mg daily  - Will also check quantfieron gold, although she has no risk factors.   - PDMP checked and refilled  pain meds      Follow-up: RTC in 4 weeks  Staff:     Jean Kenny MD, FAAD, FACP    Departments of Internal Medicine and Dermatology  [facility]  [phone]    ____________________________________________    CC: Derm Problem (richardson is having this visit today for a follow up on the HS, states that she has her good days and her bad days)    HPI:  Ms. Klarissa Curtis is a(n) 46 year old female who presents today as a return patient for HS. She is on humira 80mg weekly and sirolimus 1mg daily.  She the flares are not as severe. They dont last as long. Has been on the sirolimus   HS Nurse Assessment    Nurse Assessment Data 3/4/2021 4/22/2021 7/8/2021   Over the past month, about how many recurrent or new boils have you had? 14 at least 10 4   Over the past week, how many dressing changes do you do each day? 2 2 0   Over the past week, has your wound drainage been: Moderate Moderate Moderate   Rate your HS overall from 0-10 (0 = no disease, 10 = worst) over the past week:  5 7 6   Rate your pain score from 0-10 (0 = no disease, 10 = worst) for the most painful/symptomatic lesion in the past week:  10 - Worst Pain 7 8   Over the past week, how much has HS influenced your quality of life? moderately slightly moderately       Patient is otherwise feeling well, without additional skin concerns.    Labs Review      Medications:  Current Outpatient Medications   Medication     adalimumab (HUMIRA *CF* PEN-CD/UC/HS STARTER) 80 MG/0.8ML pen kit     calcium carbonate (CALCIUM CARBONATE) 600 MG tablet     calcium carbonate 750 MG CHEW     everolimus (ZORTRESS) 0.5 MG tablet     finasteride (PROSCAR) 5 MG tablet     fluocinonide (LIDEX) 0.05 % external solution     FLUoxetine (PROZAC) 40 MG capsule     ketoconazole (NIZORAL) 2 % external shampoo     medical cannabis (Patient's own supply)     oxyCODONE-acetaminophen (PERCOCET) 5-325 MG tablet     predniSONE (DELTASONE) 10 MG tablet     predniSONE  (DELTASONE) 10 MG tablet     predniSONE (DELTASONE) 20 MG tablet     predniSONE (DELTASONE) 20 MG tablet     sirolimus (GENERIC EQUIVALENT) 1 MG tablet     traZODone (DESYREL) 50 MG tablet     Vitamin D3 (CHOLECALCIFEROL) 2000 units (50 mcg) tablet     No current facility-administered medications for this visit.       Past Medical/Surgical History:   Patient Active Problem List   Diagnosis     Hidradenitis suppurativa     Fatigue     Past Medical History:   Diagnosis Date     NO ACTIVE PROBLEMS        CC No referring provider defined for this encounter. on close of this encounter.

## 2021-07-08 NOTE — NURSING NOTE
Dermatology Rooming Note    Klarissa Curtis's goals for this visit include:   Chief Complaint   Patient presents with     Derm Problem     richardson is having this visit today for a follow up on the HS, states that she has her good days and her bad days     Crystal Garcia CMA on 7/8/2021 at 4:24 PM

## 2021-07-12 ENCOUNTER — MYC MEDICAL ADVICE (OUTPATIENT)
Dept: DERMATOLOGY | Facility: CLINIC | Age: 46
End: 2021-07-12

## 2021-07-12 DIAGNOSIS — L73.2 HIDRADENITIS SUPPURATIVA: ICD-10-CM

## 2021-07-16 RX ORDER — OXYCODONE AND ACETAMINOPHEN 5; 325 MG/1; MG/1
1 TABLET ORAL DAILY PRN
Qty: 28 TABLET | Refills: 0 | Status: SHIPPED | OUTPATIENT
Start: 2021-07-16 | End: 2021-08-16

## 2021-07-21 ENCOUNTER — LAB (OUTPATIENT)
Dept: LAB | Facility: CLINIC | Age: 46
End: 2021-07-21
Attending: DERMATOLOGY
Payer: COMMERCIAL

## 2021-07-21 DIAGNOSIS — L73.2 HIDRADENITIS SUPPURATIVA: ICD-10-CM

## 2021-07-21 DIAGNOSIS — Z79.899 ENCOUNTER FOR LONG-TERM (CURRENT) USE OF HIGH-RISK MEDICATION: ICD-10-CM

## 2021-07-21 LAB
ALBUMIN SERPL-MCNC: 3.5 G/DL (ref 3.4–5)
ALBUMIN UR-MCNC: NEGATIVE MG/DL
ALP SERPL-CCNC: 66 U/L (ref 40–150)
ALT SERPL W P-5'-P-CCNC: 18 U/L (ref 0–50)
ANION GAP SERPL CALCULATED.3IONS-SCNC: 6 MMOL/L (ref 3–14)
APPEARANCE UR: CLEAR
AST SERPL W P-5'-P-CCNC: 14 U/L (ref 0–45)
BACTERIA #/AREA URNS HPF: ABNORMAL /HPF
BASOPHILS # BLD AUTO: 0 10E3/UL (ref 0–0.2)
BASOPHILS NFR BLD AUTO: 0 %
BILIRUB SERPL-MCNC: 0.4 MG/DL (ref 0.2–1.3)
BILIRUB UR QL STRIP: NEGATIVE
BUN SERPL-MCNC: 10 MG/DL (ref 7–30)
CALCIUM SERPL-MCNC: 8.6 MG/DL (ref 8.5–10.1)
CHLORIDE BLD-SCNC: 105 MMOL/L (ref 94–109)
CHOLEST SERPL-MCNC: 208 MG/DL
CO2 SERPL-SCNC: 24 MMOL/L (ref 20–32)
COLOR UR AUTO: ABNORMAL
CREAT SERPL-MCNC: 0.57 MG/DL (ref 0.52–1.04)
EOSINOPHIL # BLD AUTO: 0.2 10E3/UL (ref 0–0.7)
EOSINOPHIL NFR BLD AUTO: 3 %
ERYTHROCYTE [DISTWIDTH] IN BLOOD BY AUTOMATED COUNT: 12.5 % (ref 10–15)
FASTING STATUS PATIENT QL REPORTED: YES
GFR SERPL CREATININE-BSD FRML MDRD: >90 ML/MIN/1.73M2
GLUCOSE BLD-MCNC: 106 MG/DL (ref 70–99)
GLUCOSE UR STRIP-MCNC: NEGATIVE MG/DL
HCT VFR BLD AUTO: 42.7 % (ref 35–47)
HDLC SERPL-MCNC: 82 MG/DL
HGB BLD-MCNC: 13.6 G/DL (ref 11.7–15.7)
HGB UR QL STRIP: NEGATIVE
IMM GRANULOCYTES # BLD: 0 10E3/UL
IMM GRANULOCYTES NFR BLD: 0 %
KETONES UR STRIP-MCNC: NEGATIVE MG/DL
LDLC SERPL CALC-MCNC: 107 MG/DL
LEUKOCYTE ESTERASE UR QL STRIP: NEGATIVE
LYMPHOCYTES # BLD AUTO: 2.1 10E3/UL (ref 0.8–5.3)
LYMPHOCYTES NFR BLD AUTO: 29 %
MCH RBC QN AUTO: 29.4 PG (ref 26.5–33)
MCHC RBC AUTO-ENTMCNC: 31.9 G/DL (ref 31.5–36.5)
MCV RBC AUTO: 92 FL (ref 78–100)
MONOCYTES # BLD AUTO: 0.4 10E3/UL (ref 0–1.3)
MONOCYTES NFR BLD AUTO: 6 %
MUCOUS THREADS #/AREA URNS LPF: PRESENT /LPF
NEUTROPHILS # BLD AUTO: 4.6 10E3/UL (ref 1.6–8.3)
NEUTROPHILS NFR BLD AUTO: 62 %
NITRATE UR QL: NEGATIVE
NONHDLC SERPL-MCNC: 126 MG/DL
NRBC # BLD AUTO: 0 10E3/UL
NRBC BLD AUTO-RTO: 0 /100
PH UR STRIP: 7 [PH] (ref 5–7)
PLATELET # BLD AUTO: 521 10E3/UL (ref 150–450)
POTASSIUM BLD-SCNC: 4 MMOL/L (ref 3.4–5.3)
PROT SERPL-MCNC: 7.6 G/DL (ref 6.8–8.8)
RBC # BLD AUTO: 4.63 10E6/UL (ref 3.8–5.2)
RBC URINE: 1 /HPF
SODIUM SERPL-SCNC: 135 MMOL/L (ref 133–144)
SP GR UR STRIP: 1.02 (ref 1–1.03)
SQUAMOUS EPITHELIAL: 9 /HPF
TRIGL SERPL-MCNC: 95 MG/DL
UROBILINOGEN UR STRIP-MCNC: NORMAL MG/DL
WBC # BLD AUTO: 7.3 10E3/UL (ref 4–11)
WBC URINE: 1 /HPF

## 2021-07-21 PROCEDURE — 85025 COMPLETE CBC W/AUTO DIFF WBC: CPT

## 2021-07-21 PROCEDURE — 80061 LIPID PANEL: CPT

## 2021-07-21 PROCEDURE — 36415 COLL VENOUS BLD VENIPUNCTURE: CPT

## 2021-07-21 PROCEDURE — 86481 TB AG RESPONSE T-CELL SUSP: CPT

## 2021-07-21 PROCEDURE — 81001 URINALYSIS AUTO W/SCOPE: CPT

## 2021-07-21 PROCEDURE — 80053 COMPREHEN METABOLIC PANEL: CPT

## 2021-07-23 LAB
QUANTIFERON MITOGEN: 10 IU/ML
QUANTIFERON NIL TUBE: 0.18 IU/ML
QUANTIFERON TB1 TUBE: 0.21 IU/ML
QUANTIFERON TB2 TUBE: 0.14

## 2021-07-24 LAB
GAMMA INTERFERON BACKGROUND BLD IA-ACNC: 0.18 IU/ML
M TB IFN-G BLD-IMP: NEGATIVE
M TB IFN-G CD4+ BCKGRND COR BLD-ACNC: 9.82 IU/ML
MITOGEN IGNF BCKGRD COR BLD-ACNC: -0.04 IU/ML
MITOGEN IGNF BCKGRD COR BLD-ACNC: 0.03 IU/ML

## 2021-08-04 NOTE — PROGRESS NOTES
[facility] Dermatology Note  Encounter Date: Aug 5, 2021  Office Visit     Dermatology Problem List:  #. Hidradenitis suppurativa: diagnosed age 11, initially on waistline (used to weigh 300 lbs) which cleared up -> intermittent outbreaks -> for the past 10 years has had in bilateral axilla, groin, thighs, and buttocks.   - Current: Humira 80 mg weekly (started 10/4/20), sirolimus started 4/22/21, increased to 2mg on 7/8/21   - Prior: infliximab, prednisone taper, clobetasol, topical morphine, ILK, humira, oral antibiotics, rifampin, finasteride, spironolactone (6/2019- 9/5/19, stopped due to abnormal uterine bleeding), topical morphine gel, and percocet for severe pain  - Referral placed to plastic surgery for excision of the R axilla - will try to make appointment (11/5/20)  - Provided the patient a sample of Handi Wear clothing for the R axilla and bandages. Prescribed Cutaned sorbion 5.0 x 5.0 inch bandages via [employer].  #. Scaly papules on the left proximal forearm. Could possibly be early psoriasis in the setting of infliximab.  #. Keratosis pilaris. Recommended OTC AmLactin  #. L olecranon bursitis.   -uric acid checked due to possibility of gout, on 1/9/20 wnl (4.4). Seems inflammatory, likely not traumatic. Seems chronic. No concern for infection..  SH: [place] an [place] walking/running around)  #. Dermatographism  - yrtec   #. Eruption ROS, punch biopsy 8/5/2021   - Right chest, ddx dermatitis vs lichen nitidus vs lichen simplex chronicus vs milia   ____________________________________________    Assessment & Plan:    # HS, overall stable on the higher dose of humira + sirolimus  - Continue humira 80mg weekly   - Continue sirolimus 2mg daily  - Will discuss plan in 2 weeks pending biopsy below.   - PDMP checked and refilled percocet (plan to get her off of this in next few months)  - Repeat labs CBC, CMP, lipids, UA due      # Eruption NOS, punch biopsy  - Right chest, ddx dermatitis vs lichen nitidus vs lichen simplex chronicus vs milia vs dermatitis     # Dermatographism   - Start Zyrtec bid, can increase to qid     Procedures Performed:   - Punch biopsy procedure note, location(s): right chest. After discussion of benefits and risks including but not limited to bleeding, infection, scar, incomplete removal, recurrence, and non-diagnostic biopsy, written consent and photographs were obtained. The area was cleaned with isopropyl alcohol. 0.5mL of 1% lidocaine with epinephrine was injected to obtain adequate anesthesia and a 4 mm punch biopsy was performed at site(s). 4-0 Prolene sutures were utilized to approximate the epidermal edges. White petrolatum ointment and a bandage was applied to the wound. Explicit verbal and written wound care instructions were provided. The patient left the dermatology clinic in good condition.     Follow-up: 2 week(s) to discuss biopsy and plan    Staff and Scribe:     This patient was seen and discussed with Dr. Kenny.  Bouchra Sahu, MS4      Scribe Disclosure:  I, June Matson, am serving as a scribe to document services personally performed by Jean Kenny MD based on data collection and the provider's statements to me.     Provider Disclosure:   The documentation recorded by the scribe accurately reflects the services I personally performed and the decisions made by me.    Jean Kenny MD, FAAD    Departments of Internal Medicine and Dermatology  [facility]  [phone]    Staff Physician:  I was present with the medical student who participated in the service and in the documentation of the note. I have verified the history and personally performed the physical exam and medical decision making. I agree with the assessment and plan of care as documented in the note.     Punch biopsy procedure note: After discussion of benefits and risks including  but not limited to bleeding, infection, scar, incomplete removal, recurrence, and non-diagnostic biopsy, written consent and photographs were obtained. The area was cleaned with isopropyl alcohol. 1mL of 1% lidocaine with epinephrine was injected to obtain adequate anesthesia of the lesion on the chest. A 4 mm punch biopsy was performed.  4-0 Nylon sutures were utilized to approximate the epidermal edges.  White petroleum jelly/VaselineTM and a bandage was applied to the wound.  Explicit verbal and written wound care instructions were provided.  The patient left the Dermatology Clinic in good condition.    Jean Kenny MD    Department of Dermatology  [facility]: Phone: [phone], Fax: [phone]  8/16/2021        ____________________________________________    CC: Derm Problem (richardson is coming in today for a follow up on the HS, states that the HS flares when she gets her menstral cycle, would also like to discuss hives/ rash that has been around for a few weeks, was given antihystamine but has not cleared fully)      HPI:  Ms. Klarissa Curtis is a(n) 46 year old female who presents today as a return patient for HS follow-up.    Last seen by me on 7/8/2021 via virtual visit, at which time the patient was continued on humira 80 mg weekly for HS. Additionally, sirolimus was increased to 2 mg pending labs.     Today, the patient is concerned about a rash on the chest that has been present for ~2 weeks. She reports associated itch and blistering. She also reports redness/hives on her back/arms that come and go. She has been treating with an antihistamine with some improvement. She denies a hx of herpes virus.     In regards to her HS, she states she overall feels stable to improved. She has flares during menses. She occasionally takes pain medications, estimates twice per week. She has some draining from the right  axillae and right upper thigh.     Patient is otherwise feeling well, without additional skin concerns.    HS Nurse Assessment    Nurse Assessment Data 4/22/2021 7/8/2021 8/5/2021   Over the past month, about how many recurrent or new boils have you had? at least 10 4 5   Over the past week, how many dressing changes do you do each day? 2 0 0   Over the past week, has your wound drainage been: Moderate Moderate Moderate   Rate your HS overall from 0-10 (0 = no disease, 10 = worst) over the past week:  7 6 5   Rate your pain score from 0-10 (0 = no disease, 10 = worst) for the most painful/symptomatic lesion in the past week:  7 8 7   Over the past week, how much has HS influenced your quality of life? slightly moderately slightly     Labs Reviewed:  N/A    Physical Exam:  Vitals: /71 (BP Location: Right arm, Patient Position: Sitting, Cuff Size: Adult Regular)   SKIN: Full skin, which includes the head/face, both arms, chest, back, abdomen,both legs, genitalia and/or groin buttocks, digits and/or nails, was examined.  - Few erroded papules in the scalp  - Few milia on the chest and one on the ventral forearm  - Linear macules and urticarial plaques scattered on the upper back, arms and chest  - Grouped hypopigmented 1-2 mm papules on the right upper chest and a little bit on the central chest, left chest as well as posterior neck. Does not seem vesicular.  - Right upper arm scattered hyperpigmented macules and papules all less than 6 mm in diameter. They are homogenous and symmetric  - Right upper arm cobble stone appearance 4.5x6 mm  - No other lesions of concern on areas examined.     HS Exam  No flowsheet data found.    Left Axilla 8/5/2021   # of inflamed nodules 1   # of abcesses 0   # of draining tunnels 0   Erythema (grade) 1   Thickness (grade) 1   Open skin surface (grade) 1   Tunnels (grade) 0       Right Axilla 8/5/2021   # of inflamed nodules 0   # of abcesses 0   # of draining tunnels 2   Erythema  (grade) 1   Thickness (grade) 3   Open skin surface (grade) 1   Tunnels (grade) 2       No flowsheet data found.    No flowsheet data found.     No flowsheet data found.    Left Thigh 3/4/2021   # of inflamed nodules 2   # of abcesses 0   # of draining tunnels 0   Erythema (grade) 1   Thickness (grade) 1   Open skin surface (grade) 0   Tunnels (grade) 0       Right Thigh 8/5/2021   # of inflamed nodules 3   # of abcesses 0   # of draining tunnels 0   Erythema (grade) 2   Thickness (grade) 1   Open skin surface (grade) 1   Tunnels (grade) 0       No flowsheet data found.    Buttocks 8/5/2021   # of inflamed nodules -   # of abcesses -   # of draining tunnels -   Erythema (grade) -   Thickness (grade) -   Open skin surface (grade) -   Tunnels (grade) -   Pilonidal Disease? No   Pioderma Gangrenosum? No   Cutaneous Crohn's? No       HS Data  HS Exam Data 11/5/2020 3/4/2021 8/5/2021   LC Type LC1 LC1 -   Clinical Subtypes Regular type Regular type -   Acne? - No -   Dissecting Cellulitis? - No -   Visual analogue score (0-100) 40 65 -   Total Julian Stage II - -   Total Inflammatory Nodules 12 9 4   Total Abcesses 0 0 0   Total Draining Tunnels 3 2 2   Total Abscess and Nodule Count 12 9 4   IHS4 Score  24 17 12   Total  HASI Score 34 34 31   HS-PGA 3 4 -       Medications:  Current Outpatient Medications   Medication     adalimumab (HUMIRA *CF* PEN-CD/UC/HS STARTER) 80 MG/0.8ML pen kit     calcium carbonate (CALCIUM CARBONATE) 600 MG tablet     calcium carbonate 750 MG CHEW     cetirizine (ZYRTEC) 10 MG tablet     finasteride (PROSCAR) 5 MG tablet     fluocinonide (LIDEX) 0.05 % external solution     FLUoxetine (PROZAC) 40 MG capsule     ketoconazole (NIZORAL) 2 % external shampoo     medical cannabis (Patient's own supply)     oxyCODONE-acetaminophen (PERCOCET) 5-325 MG tablet     predniSONE (DELTASONE) 10 MG tablet     predniSONE (DELTASONE) 10 MG tablet     predniSONE (DELTASONE) 20 MG tablet     predniSONE  (DELTASONE) 20 MG tablet     sirolimus (GENERIC EQUIVALENT) 1 MG tablet     traZODone (DESYREL) 50 MG tablet     Vitamin D3 (CHOLECALCIFEROL) 2000 units (50 mcg) tablet     No current facility-administered medications for this visit.      Past Medical History:   Patient Active Problem List   Diagnosis     Hidradenitis suppurativa     Fatigue     Past Medical History:   Diagnosis Date     NO ACTIVE PROBLEMS         CC Jean Kenny MD  [address] [place] [address] on close of this encounter.

## 2021-08-05 ENCOUNTER — OFFICE VISIT (OUTPATIENT)
Dept: DERMATOLOGY | Facility: CLINIC | Age: 46
End: 2021-08-05
Payer: COMMERCIAL

## 2021-08-05 VITALS — DIASTOLIC BLOOD PRESSURE: 71 MMHG | SYSTOLIC BLOOD PRESSURE: 115 MMHG

## 2021-08-05 DIAGNOSIS — R21 CUTANEOUS ERUPTION: Primary | ICD-10-CM

## 2021-08-05 DIAGNOSIS — L73.2 HIDRADENITIS SUPPURATIVA: ICD-10-CM

## 2021-08-05 PROCEDURE — 99214 OFFICE O/P EST MOD 30 MIN: CPT | Mod: 25 | Performed by: DERMATOLOGY

## 2021-08-05 PROCEDURE — 88313 SPECIAL STAINS GROUP 2: CPT | Performed by: DERMATOLOGY

## 2021-08-05 PROCEDURE — 11104 PUNCH BX SKIN SINGLE LESION: CPT | Performed by: DERMATOLOGY

## 2021-08-05 PROCEDURE — 88305 TISSUE EXAM BY PATHOLOGIST: CPT | Performed by: DERMATOLOGY

## 2021-08-05 RX ORDER — CETIRIZINE HYDROCHLORIDE 10 MG/1
TABLET ORAL
COMMUNITY
Start: 2021-07-30 | End: 2021-09-30

## 2021-08-05 NOTE — LETTER
8/5/2021       RE: Klarissa Curtis  [address]  [place] [address]     Dear Colleague,    Thank you for referring your patient, Klarissa Curtis, to the [facility] [place] at [facility]. Please see a copy of my visit note below.    [facility] Dermatology Note  Encounter Date: Aug 5, 2021  Office Visit     Dermatology Problem List:  #. Hidradenitis suppurativa: diagnosed age 11, initially on waistline (used to weigh 300 lbs) which cleared up -> intermittent outbreaks -> for the past 10 years has had in bilateral axilla, groin, thighs, and buttocks.   - Current: Humira 80 mg weekly (started 10/4/20), sirolimus started 4/22/21, increased to 2mg on 7/8/21   - Prior: infliximab, prednisone taper, clobetasol, topical morphine, ILK, humira, oral antibiotics, rifampin, finasteride, spironolactone (6/2019- 9/5/19, stopped due to abnormal uterine bleeding), topical morphine gel, and percocet for severe pain  - Referral placed to plastic surgery for excision of the R axilla - will try to make appointment (11/5/20)  - Provided the patient a sample of Handi Wear clothing for the R axilla and bandages. Prescribed Cutaned sorbion 5.0 x 5.0 inch bandages via [employer].  #. Scaly papules on the left proximal forearm. Could possibly be early psoriasis in the setting of infliximab.  #. Keratosis pilaris. Recommended OTC AmLactin  #. L olecranon bursitis.   -uric acid checked due to possibility of gout, on 1/9/20 wnl (4.4). Seems inflammatory, likely not traumatic. Seems chronic. No concern for infection..  SH: [place] works as an [place] (lots of walking/running around)  #. Dermatographism  - yrte   #. Eruption ROS, punch biopsy 8/5/2021   - Right chest, ddx dermatitis vs lichen nitidus vs lichen simplex chronicus vs milia    ____________________________________________    Assessment & Plan:    # HS, overall stable on the higher dose of humira + sirolimus  - Continue humira 80mg weekly   - Continue sirolimus 2mg daily  - Will discuss plan in 2 weeks pending biopsy below.   - PDMP checked and refilled percocet (plan to get her off of this in next few months)  - Repeat labs CBC, CMP, lipids, UA due     # Eruption NOS, punch biopsy  - Right chest, ddx dermatitis vs lichen nitidus vs lichen simplex chronicus vs milia vs dermatitis     # Dermatographism   - Start Zyrtec bid, can increase to qid     Procedures Performed:   - Punch biopsy procedure note, location(s): right chest. After discussion of benefits and risks including but not limited to bleeding, infection, scar, incomplete removal, recurrence, and non-diagnostic biopsy, written consent and photographs were obtained. The area was cleaned with isopropyl alcohol. 0.5mL of 1% lidocaine with epinephrine was injected to obtain adequate anesthesia and a 4 mm punch biopsy was performed at site(s). 4-0 Prolene sutures were utilized to approximate the epidermal edges. White petrolatum ointment and a bandage was applied to the wound. Explicit verbal and written wound care instructions were provided. The patient left the dermatology clinic in good condition.     Follow-up: 2 week(s) to discuss biopsy and plan    Staff and Scribe:     This patient was seen and discussed with Dr. Kenny.  Bouchra Sahu, MS4      Scribe Disclosure:  I, June Matson, am serving as a scribe to document services personally performed by Jean Kenny MD based on data collection and the provider's statements to me.     Provider Disclosure:   The documentation recorded by the scribe accurately reflects the services I personally performed and the decisions made by me.    Jean Kenny MD, FAAD    Departments of Internal Medicine and Dermatology  [facility]  [place]  [phone]    Staff Physician:  I was present with the medical student who participated in the service and in the documentation of the note. I have verified the history and personally performed the physical exam and medical decision making. I agree with the assessment and plan of care as documented in the note.     Punch biopsy procedure note: After discussion of benefits and risks including but not limited to bleeding, infection, scar, incomplete removal, recurrence, and non-diagnostic biopsy, written consent and photographs were obtained. The area was cleaned with isopropyl alcohol. 1mL of 1% lidocaine with epinephrine was injected to obtain adequate anesthesia of the lesion on the chest. A 4 mm punch biopsy was performed.  4-0 Nylon sutures were utilized to approximate the epidermal edges.  White petroleum jelly/VaselineTM and a bandage was applied to the wound.  Explicit verbal and written wound care instructions were provided.  The patient left the Dermatology Clinic in good condition.    Jean Kenny MD    Department of Dermatology  [facility]: Phone: [phone], Fax: [phone]  8/16/2021        ____________________________________________    CC: Derm Problem (richardson is coming in today for a follow up on the HS, states that the HS flares when she gets her menstral cycle, would also like to discuss hives/ rash that has been around for a few weeks, was given antihystamine but has not cleared fully)      HPI:  Ms. Klarissa Curtis is a(n) 46 year old female who presents today as a return patient for HS follow-up.    Last seen by me on 7/8/2021 via virtual visit, at which time the patient was continued on humira 80 mg weekly for HS. Additionally, sirolimus was increased to 2 mg pending labs.     Today, the patient is concerned about a rash on the chest that has been present for ~2 weeks. She reports  associated itch and blistering. She also reports redness/hives on her back/arms that come and go. She has been treating with an antihistamine with some improvement. She denies a hx of herpes virus.     In regards to her HS, she states she overall feels stable to improved. She has flares during menses. She occasionally takes pain medications, estimates twice per week. She has some draining from the right axillae and right upper thigh.     Patient is otherwise feeling well, without additional skin concerns.    HS Nurse Assessment    Nurse Assessment Data 4/22/2021 7/8/2021 8/5/2021   Over the past month, about how many recurrent or new boils have you had? at least 10 4 5   Over the past week, how many dressing changes do you do each day? 2 0 0   Over the past week, has your wound drainage been: Moderate Moderate Moderate   Rate your HS overall from 0-10 (0 = no disease, 10 = worst) over the past week:  7 6 5   Rate your pain score from 0-10 (0 = no disease, 10 = worst) for the most painful/symptomatic lesion in the past week:  7 8 7   Over the past week, how much has HS influenced your quality of life? slightly moderately slightly     Labs Reviewed:  N/A    Physical Exam:  Vitals: /71 (BP Location: Right arm, Patient Position: Sitting, Cuff Size: Adult Regular)   SKIN: Full skin, which includes the head/face, both arms, chest, back, abdomen,both legs, genitalia and/or groin buttocks, digits and/or nails, was examined.  - Few erroded papules in the scalp  - Few milia on the chest and one on the ventral forearm  - Linear macules and urticarial plaques scattered on the upper back, arms and chest  - Grouped hypopigmented 1-2 mm papules on the right upper chest and a little bit on the central chest, left chest as well as posterior neck. Does not seem vesicular.  - Right upper arm scattered hyperpigmented macules and papules all less than 6 mm in diameter. They are homogenous and symmetric  - Right upper arm cobble  stone appearance 4.5x6 mm  - No other lesions of concern on areas examined.     HS Exam  No flowsheet data found.    Left Axilla 8/5/2021   # of inflamed nodules 1   # of abcesses 0   # of draining tunnels 0   Erythema (grade) 1   Thickness (grade) 1   Open skin surface (grade) 1   Tunnels (grade) 0       Right Axilla 8/5/2021   # of inflamed nodules 0   # of abcesses 0   # of draining tunnels 2   Erythema (grade) 1   Thickness (grade) 3   Open skin surface (grade) 1   Tunnels (grade) 2       No flowsheet data found.    No flowsheet data found.     No flowsheet data found.    Left Thigh 3/4/2021   # of inflamed nodules 2   # of abcesses 0   # of draining tunnels 0   Erythema (grade) 1   Thickness (grade) 1   Open skin surface (grade) 0   Tunnels (grade) 0       Right Thigh 8/5/2021   # of inflamed nodules 3   # of abcesses 0   # of draining tunnels 0   Erythema (grade) 2   Thickness (grade) 1   Open skin surface (grade) 1   Tunnels (grade) 0       No flowsheet data found.    Buttocks 8/5/2021   # of inflamed nodules -   # of abcesses -   # of draining tunnels -   Erythema (grade) -   Thickness (grade) -   Open skin surface (grade) -   Tunnels (grade) -   Pilonidal Disease? No   Pioderma Gangrenosum? No   Cutaneous Crohn's? No       HS Data  HS Exam Data 11/5/2020 3/4/2021 8/5/2021   LC Type LC1 LC1 -   Clinical Subtypes Regular type Regular type -   Acne? - No -   Dissecting Cellulitis? - No -   Visual analogue score (0-100) 40 65 -   Total Julian Stage II - -   Total Inflammatory Nodules 12 9 4   Total Abcesses 0 0 0   Total Draining Tunnels 3 2 2   Total Abscess and Nodule Count 12 9 4   IHS4 Score  24 17 12   Total  HASI Score 34 34 31   HS-PGA 3 4 -       Medications:  Current Outpatient Medications   Medication     adalimumab (HUMIRA *CF* PEN-CD/UC/HS STARTER) 80 MG/0.8ML pen kit     calcium carbonate (CALCIUM CARBONATE) 600 MG tablet     calcium carbonate 750 MG CHEW     cetirizine (ZYRTEC) 10 MG tablet      finasteride (PROSCAR) 5 MG tablet     fluocinonide (LIDEX) 0.05 % external solution     FLUoxetine (PROZAC) 40 MG capsule     ketoconazole (NIZORAL) 2 % external shampoo     medical cannabis (Patient's own supply)     oxyCODONE-acetaminophen (PERCOCET) 5-325 MG tablet     predniSONE (DELTASONE) 10 MG tablet     predniSONE (DELTASONE) 10 MG tablet     predniSONE (DELTASONE) 20 MG tablet     predniSONE (DELTASONE) 20 MG tablet     sirolimus (GENERIC EQUIVALENT) 1 MG tablet     traZODone (DESYREL) 50 MG tablet     Vitamin D3 (CHOLECALCIFEROL) 2000 units (50 mcg) tablet     No current facility-administered medications for this visit.      Past Medical History:   Patient Active Problem List   Diagnosis     Hidradenitis suppurativa     Fatigue     Past Medical History:   Diagnosis Date     NO ACTIVE PROBLEMS         CC Jean Kenny MD  [address] [place] [address] on close of this encounter.      Again, thank you for allowing me to participate in the care of your patient.      Sincerely,    Jean Kenny MD

## 2021-08-05 NOTE — NURSING NOTE
Lidocaine-epinephrine 1-1:922718 % injection   1.5mL once for one use, starting 8/5/2021 ending 8/5/2021,  2mL disp, R-0, injection  Injected by Crystal Garcia CMA

## 2021-08-05 NOTE — NURSING NOTE
Dermatology Rooming Note    Klarissa Curtis's goals for this visit include:   Chief Complaint   Patient presents with     Derm Problem     richardson is coming in today for a follow up on the HS, states that the HS flares when she gets her menstral cycle, would also like to discuss hives/ rash that has been around for a few weeks, was given antihystamine but has not cleared fully     Crystal Garcia CMA on 8/5/2021 at 3:31 PM

## 2021-08-05 NOTE — PATIENT INSTRUCTIONS
Wound Care After a Biopsy    What is a skin biopsy?  A skin biopsy allows the doctor to examine a very small piece of tissue under the microscope to determine the diagnosis and the best treatment for the skin condition. A local anesthetic (numbing medicine)  is injected with a very small needle into the skin area to be tested. A small piece of skin is taken from the area. Sometimes a suture (stitch) is used.     What are the risks of a skin biopsy?  I will experience scar, bleeding, swelling, pain, crusting and redness. I may experience incomplete removal or recurrence. Risks of this procedure are excessive bleeding, bruising, infection, nerve damage, numbness, thick (hypertrophic or keloidal) scar and non-diagnostic biopsy.    How should I care for my wound for the first 24 hours?    Keep the wound dry and covered for 24 hours    If it bleeds, hold direct pressure on the area for 15 minutes. If bleeding does not stop then go to the emergency room    Avoid strenuous exercise the first 1-2 days or as your doctor instructs you    How should I care for the wound after 24 hours?    After 24 hours, remove the bandage    You may bathe or shower as normal    If you had a scalp biopsy, you can shampoo as usual and can use shower water to clean the biopsy site daily    Clean the wound twice a day with gentle soap and water    Do not scrub, be gentle    Apply white petroleum/Vaseline after cleaning the wound with a cotton swab or a clean finger, and keep the site covered with a Bandaid /bandage. Bandages are not necessary with a scalp biopsy    If you are unable to cover the site with a Bandaid /bandage, re-apply ointment 2-3 times a day to keep the site moist. Moisture will help with healing    Avoid strenuous activity for first 1-2 days    Avoid lakes, rivers, pools, and oceans until the stitches are removed or the site is healed    How do I clean my wound?    Wash hands thoroughly with soap or use hand  before all  wound care    Clean the wound with gentle soap and water    Apply white petroleum/Vaseline  to wound after it is clean    Replace the Bandaid /bandage to keep the wound covered for the first few days or as instructed by your doctor    If you had a scalp biopsy, warm shower water to the area on a daily basis should suffice    What should I use to clean my wound?     Cotton-tipped applicators (Qtips )    White petroleum jelly (Vaseline ). Use a clean new container and use Q-tips to apply.    Bandaids   as needed    Gentle soap     How should I care for my wound long term?    Do not get your wound dirty    Keep up with wound care for one week or until the area is healed.    A small scab will form and fall off by itself when the area is completely healed. The area will be red and will become pink in color as it heals. Sun protection is very important for how your scar will turn out. Sunscreen with an SPF 30 or greater is recommended once the area is healed.    If you have stitches, stitches need to be removed in 14 days. You may return to our clinic for this or you may have it done locally at your doctor s office.    You should have some soreness but it should be mild and slowly go away over several days. Talk to your doctor about using tylenol for pain,    When should I call my doctor?  If you have increased:     Pain or swelling    Pus or drainage (clear or slightly yellow drainage is ok)    Temperature over 100F    Spreading redness or warmth around wound    When will I hear about my results?  The biopsy results can take 2 weeks to come back.  Your results will automatically release to [employer] before your provider has even reviewed them.  The clinic will call you with the results, send you a [employer] message, or have you schedule a follow-up clinic or phone time to discuss the results.  Contact our clinics if you do not hear from us in 2 weeks.    Who should I call with questions?    [facility],  Briana Ritter: [phone]    [facility]: [phone]    For urgent needs outside of business hours call the [facility] at [phone] and ask for the dermatology resident on call

## 2021-08-05 NOTE — LETTER
Date:August 18, 2021      Patient was self referred, no letter generated. Do not send.        [facility] Health Information

## 2021-08-06 ENCOUNTER — MYC MEDICAL ADVICE (OUTPATIENT)
Dept: DERMATOLOGY | Facility: CLINIC | Age: 46
End: 2021-08-06

## 2021-08-13 ENCOUNTER — MYC MEDICAL ADVICE (OUTPATIENT)
Dept: DERMATOLOGY | Facility: CLINIC | Age: 46
End: 2021-08-13

## 2021-08-13 ENCOUNTER — TRANSFERRED RECORDS (OUTPATIENT)
Dept: HEALTH INFORMATION MANAGEMENT | Facility: CLINIC | Age: 46
End: 2021-08-13

## 2021-08-16 RX ORDER — OXYCODONE AND ACETAMINOPHEN 5; 325 MG/1; MG/1
1 TABLET ORAL DAILY PRN
Qty: 28 TABLET | Refills: 0 | Status: SHIPPED | OUTPATIENT
Start: 2021-08-16 | End: 2021-09-02

## 2021-08-18 ENCOUNTER — MYC MEDICAL ADVICE (OUTPATIENT)
Dept: DERMATOLOGY | Facility: CLINIC | Age: 46
End: 2021-08-18

## 2021-08-20 DIAGNOSIS — L30.9 DERMATITIS: Primary | ICD-10-CM

## 2021-08-20 RX ORDER — FEXOFENADINE HCL 180 MG/1
180 TABLET ORAL 2 TIMES DAILY
Qty: 60 TABLET | Refills: 0 | Status: SHIPPED | OUTPATIENT
Start: 2021-08-20 | End: 2021-09-22

## 2021-08-20 RX ORDER — TRIAMCINOLONE ACETONIDE 1 MG/G
OINTMENT TOPICAL 2 TIMES DAILY
Qty: 454 G | Refills: 0 | Status: SHIPPED | OUTPATIENT
Start: 2021-08-20 | End: 2023-03-02

## 2021-08-20 RX ORDER — VALACYCLOVIR HYDROCHLORIDE 500 MG/1
2000 TABLET, FILM COATED ORAL 2 TIMES DAILY
Qty: 8 TABLET | Refills: 0 | Status: SHIPPED | OUTPATIENT
Start: 2021-08-20 | End: 2023-05-25

## 2021-08-20 RX ORDER — HYDROXYZINE HYDROCHLORIDE 25 MG/1
25 TABLET, FILM COATED ORAL AT BEDTIME
Qty: 30 TABLET | Refills: 0 | Status: SHIPPED | OUTPATIENT
Start: 2021-08-20 | End: 2021-09-22

## 2021-08-21 NOTE — PROGRESS NOTES
Called and spoke with patient.  She gave me more detail about her rash.  She says it has been ongoing x6 weeks on the chest and now on the back.  It is itchy.  On the front looks like little blisters, but Dr. Kenny told her there was no fluid in them.  She says individual lesions do not last more than 24 hours.      Given morphology in chart, and previous discussion with Dr. Mejia, our plan before talking to patient was to cover for HSV with valtrex 2 g BID x1 day and cover for allergic phototoxic or phytophoto dermatitis with triamcinolone.  Continue this plan.  But given new possibility of urticaria, I am changing her antihistamine regimen.  Discontinue zyrtec.  Start allegra 180 mg qAM, qNoon and hydroxyzine at bedtime.      Discussed plan with patient that we are covering for all three possibilities.  Patient in agreement.  All questions answered.  Gave patient my pager for any issues this weekend.      Brianda Freire MD

## 2021-08-25 LAB
PATH REPORT.COMMENTS IMP SPEC: NORMAL
PATH REPORT.FINAL DX SPEC: NORMAL
PATH REPORT.GROSS SPEC: NORMAL
PATH REPORT.MICROSCOPIC SPEC OTHER STN: NORMAL
PATH REPORT.RELEVANT HX SPEC: NORMAL

## 2021-09-01 ENCOUNTER — MYC MEDICAL ADVICE (OUTPATIENT)
Dept: DERMATOLOGY | Facility: CLINIC | Age: 46
End: 2021-09-01

## 2021-09-02 ENCOUNTER — VIRTUAL VISIT (OUTPATIENT)
Dept: DERMATOLOGY | Facility: CLINIC | Age: 46
End: 2021-09-02
Payer: COMMERCIAL

## 2021-09-02 DIAGNOSIS — L73.2 HIDRADENITIS SUPPURATIVA: ICD-10-CM

## 2021-09-02 DIAGNOSIS — R21 CUTANEOUS ERUPTION: Primary | ICD-10-CM

## 2021-09-02 PROCEDURE — 99214 OFFICE O/P EST MOD 30 MIN: CPT | Mod: TEL | Performed by: DERMATOLOGY

## 2021-09-02 RX ORDER — CLOBETASOL PROPIONATE 0.5 MG/G
CREAM TOPICAL 2 TIMES DAILY
Qty: 60 G | Refills: 0 | Status: SHIPPED | OUTPATIENT
Start: 2021-09-02 | End: 2023-05-25

## 2021-09-02 RX ORDER — OXYCODONE AND ACETAMINOPHEN 5; 325 MG/1; MG/1
1 TABLET ORAL 2 TIMES DAILY
Qty: 60 TABLET | Refills: 0 | Status: SHIPPED | OUTPATIENT
Start: 2021-09-02 | End: 2021-09-09

## 2021-09-02 NOTE — LETTER
Date:September 10, 2021      Patient was self referred, no letter generated. Do not send.        [facility] Health Information

## 2021-09-02 NOTE — NURSING NOTE
Dermatology Rooming Note    Klarissa Curtis's goals for this visit include:   Chief Complaint   Patient presents with     Derm Problem     richardson is coming in today for a follow up on the HS and the bx, states that the back rash has gotten worse, states that her right arm has gotten worse, states that she is out of pain meds, states that she has been very tired as well     Crystal Garcia CMA on 9/2/2021 at 2:51 PM

## 2021-09-02 NOTE — PROGRESS NOTES
[facility] Dermatology Note  Encounter Date: Sep 2, 2021  Telephone ([phone] ). Location of teledermatologist: [facility] [place]. Start time: 6:00pm. End time: 6:40pm    Dermatology Problem List:  #. Hidradenitis suppurativa: diagnosed age 11, initially on waistline (used to weigh 300 lbs) which cleared up -> intermittent outbreaks -> for the past 10 years has had in bilateral axilla, groin, thighs, and buttocks.   - Current: Humira 80 mg weekly (started 10/4/20), sirolimus started 4/22/21, increased to 2mg on 7/8/21   - Prior: infliximab, prednisone taper, clobetasol, topical morphine, ILK, humira, oral antibiotics, rifampin, finasteride, spironolactone (6/2019- 9/5/19, stopped due to abnormal uterine bleeding), topical morphine gel, and percocet for severe pain  - Referral placed to plastic surgery for excision of the R axilla - will try to make appointment (11/5/20)  - Provided the patient a sample of Handi Wear clothing for the R axilla and bandages. Prescribed Cutaned sorbion 5.0 x 5.0 inch bandages via [employer].  #. Scaly papules on the left proximal forearm. Could possibly be early psoriasis in the setting of infliximab.  #. Keratosis pilaris. Recommended OTC AmLactin  #. L olecranon bursitis.   -uric acid checked due to possibility of gout, on 1/9/20 wnl (4.4). Seems inflammatory, likely not traumatic. Seems chronic. No concern for infection..  SH: [place] an [place]/running around)  #. Dermatographism  - zyrtec   #. Eruption NOS,   - punch biopsy 8/5/2021: superficial and deep perivascular and interstitial infiltrate of neutrophils, with focal leukocytoclasis and bluish, amorphous material suggestive of neutrophil degranulation, and lesser amounts of eosinophils and lymphocytes. Background dermal edema is noted. Focal neutrophilic epitheliotropism is  noted.    ____________________________________________    Assessment & Plan:   # Eruption NOS  - Biopsy was inconclusive.  Did have eosinophilis which does make me think about drug. HAs been on sirolimus since beginning to mid may. We increased her dose of sirolimus to 2mg mid July and rash started soon after that about 1-2 weeks.  Will have her stop sirolumus and increase to clobetasol ointment BID. Will stop triamcinolone and increase to clobetasol cream BID.     # HS  - Unfortunately flaring again on both humira + sirolimus 2mg daily and now stopping sirolimus  - Humira has stopped her 80mg weekly. Will have her decrease to 40mg weekly and start cosentyx 300mg weekly for 5 weeks and then monthly. Will overlap these to reduce the chance of severe flare. Discussed that this was off label and could increase risk of severe infection inlcuding death.  - PDMP checked and refilled percocet.  With flare and switching we decided to increase to BID (still planning to hopefully get her off soon.  - Should get booster vaccine    # Dermatographism  - Zyrtec initially helping, but seems to wear off.  - Will have her continue fexofenadine, stop zyrtec and continue hydroxyzine at night for itching     Follow-up: RTC in 4 weeks via phone    Jean Kenny MD, FAAD, FACP     Departments of Internal Medicine and Dermatology  [facility]  [phone]    ____________________________________________    CC: follow-up HS    HPI:  Ms. Klarissa Curtis is a(n) 46 year old female who presents today as a return patient for follow-up HS.    Last seen 8/5/21 by me, at which time the patient was continued on Humira and sirolimus for treatment of HS. Additionally, the patient underwent a punch biopsy at the right chest. Pathology revealed perivascular and interstitial dermatitis with neutrophils, eosinophils and lymphocytes. For treatment of dermatographism, patient was recommended Zyrtec bid. Biopsy was called  by by Dr Freire and Maura was started on triamcinolone ointment, and hydroxizine and alegra 180mG BID were started. Triamcinolone not helping. Zyrtec 20mg BID, she started taking fexofenadine 180mgh BID on top of the zyrtec and hydroxizine at night. Zyrtec was helping but not any more. Most chest have cleared now.     Patient is otherwise feeling well, without additional skin concerns.    HS Nurse Assessment    Nurse Assessment Data 7/8/2021 8/5/2021 9/2/2021   Over the past month, about how many recurrent or new boils have you had? 4 5 2 new, same ones as before   Over the past week, how many dressing changes do you do each day? 0 0 0   Over the past week, has your wound drainage been: Moderate Moderate Severe   Rate your HS overall from 0-10 (0 = no disease, 10 = worst) over the past week:  6 5 7   Rate your pain score from 0-10 (0 = no disease, 10 = worst) for the most painful/symptomatic lesion in the past week:  8 7 9   Over the past week, how much has HS influenced your quality of life? moderately slightly moderately     Physical Exam:  SKIN: Teledermatology photos were reviewed; image quality and interpretability:acceptable. Image date: September 2, 2021  - yellowish grouped papules with background erythema coalescing into plaque on b/l upper back  - Previously pink urticarial plaques  - No other lesions of concern on areas examined.     Medications:  Current Outpatient Medications   Medication     adalimumab (HUMIRA *CF* PEN-CD/UC/HS STARTER) 80 MG/0.8ML pen kit     calcium carbonate (CALCIUM CARBONATE) 600 MG tablet     calcium carbonate 750 MG CHEW     cetirizine (ZYRTEC) 10 MG tablet     fexofenadine (ALLEGRA) 180 MG tablet     fluocinonide (LIDEX) 0.05 % external solution     FLUoxetine (PROZAC) 40 MG capsule     hydrOXYzine (ATARAX) 25 MG tablet     ketoconazole (NIZORAL) 2 % external shampoo     medical cannabis (Patient's own supply)     oxyCODONE-acetaminophen (PERCOCET) 5-325 MG tablet      sirolimus (GENERIC EQUIVALENT) 1 MG tablet     traZODone (DESYREL) 50 MG tablet     triamcinolone (KENALOG) 0.1 % external ointment     valACYclovir (VALTREX) 500 MG tablet     Vitamin D3 (CHOLECALCIFEROL) 2000 units (50 mcg) tablet     No current facility-administered medications for this visit.      Past Medical/Surgical History:   Patient Active Problem List   Diagnosis     Hidradenitis suppurativa     Fatigue     Past Medical History:   Diagnosis Date     NO ACTIVE PROBLEMS        CC Jean Kenny MD  [address] [place] [address] on close of this encounter.

## 2021-09-02 NOTE — PATIENT INSTRUCTIONS
When starting starting cosentyx decrease humira to once a week for one month   Cosentyx is 300mg weekly for 5 weeks and the monthly

## 2021-09-02 NOTE — LETTER
9/2/2021       RE: Klarissa Curtis  [address]  [place] [address]     Dear Colleague,    Thank you for referring your patient, Klarissa Curtis, to the [facility] DERMATOLOGY [facility] [place] at [facility]. Please see a copy of my visit note below.    [facility] Dermatology Note  Encounter Date: Sep 2, 2021  Telephone ([phone] ). Location of teledermatologist: [facility] DERMATOLOG[facility] [place]. Start time: 6:00pm. End time: 6:40pm    Dermatology Problem List:  #. Hidradenitis suppurativa: diagnosed age 11, initially on waistline (used to weigh 300 lbs) which cleared up -> intermittent outbreaks -> for the past 10 years has had in bilateral axilla, groin, thighs, and buttocks.   - Current: Humira 80 mg weekly (started 10/4/20), sirolimus started 4/22/21, increased to 2mg on 7/8/21   - Prior: infliximab, prednisone taper, clobetasol, topical morphine, ILK, humira, oral antibiotics, rifampin, finasteride, spironolactone (6/2019- 9/5/19, stopped due to abnormal uterine bleeding), topical morphine gel, and percocet for severe pain  - Referral placed to plastic surgery for excision of the R axilla - will try to make appointment (11/5/20)  - Provided the patient a sample of [employer] clothing for the R axilla and bandages. Prescribed Cutaned sorbion 5.0 x 5.0 inch bandages via [employer].  #. Scaly papules on the left proximal forearm. Could possibly be early psoriasis in the setting of infliximab.  #. Keratosis pilaris. Recommended OTC AmLactin  #. L olecranon bursitis.   -uric acid checked due to possibility of gout, on 1/9/20 wnl (4.4). Seems inflammatory, likely not traumatic. Seems chronic. No concern for infection..  SH: She works as an [place] (lots of walking/running around)  #. Dermatographism  - zyrtec   #. Eruption NOS,   - punch biopsy 8/5/2021:  superficial and deep perivascular and interstitial infiltrate of neutrophils, with focal leukocytoclasis and bluish, amorphous material suggestive of neutrophil degranulation, and lesser amounts of eosinophils and lymphocytes. Background dermal edema is noted. Focal neutrophilic epitheliotropism is noted.    ____________________________________________    Assessment & Plan:   # Eruption NOS  - Biopsy was inconclusive.  Did have eosinophilis which does make me think about drug. HAs been on sirolimus since beginning to mid may. We increased her dose of sirolimus to 2mg mid July and rash started soon after that about 1-2 weeks.  Will have her stop sirolumus and increase to clobetasol ointment BID. Will stop triamcinolone and increase to clobetasol cream BID.     # HS  - Unfortunately flaring again on both humira + sirolimus 2mg daily and now stopping sirolimus  - Humira has stopped her 80mg weekly. Will have her decrease to 40mg weekly and start cosentyx 300mg weekly for 5 weeks and then monthly. Will overlap these to reduce the chance of severe flare. Discussed that this was off label and could increase risk of severe infection inlcuding death.  - PDMP checked and refilled percocet.  With flare and switching we decided to increase to BID (still planning to hopefully get her off soon.  - Should get booster vaccine    # Dermatographism  - Zyrtec initially helping, but seems to wear off.  - Will have her continue fexofenadine, stop zyrtec and continue hydroxyzine at night for itching     Follow-up: RTC in 4 weeks via phone    Jean Kenny MD, FAAD, FACP     Departments of Internal Medicine and Dermatology  [facility]  [phone]    ____________________________________________    CC: follow-up HS    HPI:  Ms. Klarissa Curtis is a(n) 46 year old female who presents today as a return patient for follow-up HS.    Last seen 8/5/21 by me, at which time the patient was continued on Humira  and sirolimus for treatment of HS. Additionally, the patient underwent a punch biopsy at the right chest. Pathology revealed perivascular and interstitial dermatitis with neutrophils, eosinophils and lymphocytes. For treatment of dermatographism, patient was recommended Zyrtec bid. Biopsy was called by by Dr Freire and Maura was started on triamcinolone ointment, and hydroxizine and alegra 180mG BID were started. Triamcinolone not helping. Zyrtec 20mg BID, she started taking fexofenadine 180mgh BID on top of the zyrtec and hydroxizine at night. Zyrtec was helping but not any more. Most chest have cleared now.     Patient is otherwise feeling well, without additional skin concerns.    HS Nurse Assessment    Nurse Assessment Data 7/8/2021 8/5/2021 9/2/2021   Over the past month, about how many recurrent or new boils have you had? 4 5 2 new, same ones as before   Over the past week, how many dressing changes do you do each day? 0 0 0   Over the past week, has your wound drainage been: Moderate Moderate Severe   Rate your HS overall from 0-10 (0 = no disease, 10 = worst) over the past week:  6 5 7   Rate your pain score from 0-10 (0 = no disease, 10 = worst) for the most painful/symptomatic lesion in the past week:  8 7 9   Over the past week, how much has HS influenced your quality of life? moderately slightly moderately     Physical Exam:  SKIN: Teledermatology photos were reviewed; image quality and interpretability:acceptable. Image date: September 2, 2021  - yellowish grouped papules with background erythema coalescing into plaque on b/l upper back  - Previously pink urticarial plaques  - No other lesions of concern on areas examined.     Medications:  Current Outpatient Medications   Medication     adalimumab (HUMIRA *CF* PEN-CD/UC/HS STARTER) 80 MG/0.8ML pen kit     calcium carbonate (CALCIUM CARBONATE) 600 MG tablet     calcium carbonate 750 MG CHEW     cetirizine (ZYRTEC) 10 MG tablet     fexofenadine  (ALLEGRA) 180 MG tablet     fluocinonide (LIDEX) 0.05 % external solution     FLUoxetine (PROZAC) 40 MG capsule     hydrOXYzine (ATARAX) 25 MG tablet     ketoconazole (NIZORAL) 2 % external shampoo     medical cannabis (Patient's own supply)     oxyCODONE-acetaminophen (PERCOCET) 5-325 MG tablet     sirolimus (GENERIC EQUIVALENT) 1 MG tablet     traZODone (DESYREL) 50 MG tablet     triamcinolone (KENALOG) 0.1 % external ointment     valACYclovir (VALTREX) 500 MG tablet     Vitamin D3 (CHOLECALCIFEROL) 2000 units (50 mcg) tablet     No current facility-administered medications for this visit.      Past Medical/Surgical History:   Patient Active Problem List   Diagnosis     Hidradenitis suppurativa     Fatigue     Past Medical History:   Diagnosis Date     NO ACTIVE PROBLEMS        CC Jean Kenny MD  [address] [place] [address] on close of this encounter.      Again, thank you for allowing me to participate in the care of your patient.      Sincerely,    Jean Kenny MD

## 2021-09-03 ENCOUNTER — TELEPHONE (OUTPATIENT)
Dept: DERMATOLOGY | Facility: CLINIC | Age: 46
End: 2021-09-03

## 2021-09-03 DIAGNOSIS — L73.2 HIDRADENITIS SUPPURATIVA: ICD-10-CM

## 2021-09-03 NOTE — TELEPHONE ENCOUNTER
Health Call Center    Phone Message    May a detailed message be left on voicemail: yes     Reason for Call: Medication Question or concern regarding medication   Prescription Clarification  Name of Medication: oxyCODONE-acetaminophen (PERCOCET) 5-325 MG tablet & clobetasol (TEMOVATE) 0.05 % external cream  Prescribing Provider: Dr. Kenny      Pharmacy: [facility] in [place] on [address]   What on the order needs clarification?Pt states medications were sent to the incorrect pharmacy. Please send medication to the [facility] in [place] on [address].     Pt needs medications as soon as possible because she must work until 10pm today, Friday, 9/3/21 and won't be able to get them later. Pt states she is in extreme pain and needs help as soon as possible.     Please call Pt to let her know they have been sent.   Thank you.        Action Taken: Message routed to:  [facility] Derm    Travel Screening: Not Applicable

## 2021-09-03 NOTE — TELEPHONE ENCOUNTER
PA Initiation    Medication: Cosentyx  Insurance Company: [employer] ([facility]) - Phone [phone] Fax [phone]  ID#: [id number]  Pharmacy Filling the Rx: [facility] - [place], [place] - [address]  Filling Pharmacy Phone:    Filling Pharmacy Fax:    Start Date: 9/3/2021    [facility] En[facility]quez [id number]

## 2021-09-07 NOTE — TELEPHONE ENCOUNTER
PRIOR AUTHORIZATION DENIED    Medication: Cosentyx    Denial Date: 9/6/2021    Denial Rational: Diagnosis is not covered    Appeal Information:

## 2021-09-09 RX ORDER — OXYCODONE AND ACETAMINOPHEN 5; 325 MG/1; MG/1
1 TABLET ORAL 2 TIMES DAILY
Qty: 60 TABLET | Refills: 0 | Status: SHIPPED | OUTPATIENT
Start: 2021-09-09 | End: 2021-11-11

## 2021-09-09 NOTE — TELEPHONE ENCOUNTER
Attempted to set up a peer to peer review was informed that this would take longer and would not be the route to go.  Recommended to start an appeal. Information should be faxed to [phone]. Cover sheet must include providers tax id number.

## 2021-09-10 DIAGNOSIS — L30.9 DERMATITIS: ICD-10-CM

## 2021-09-10 NOTE — TELEPHONE ENCOUNTER
Routed the appeal letter to you and Dr Kenny to be completed.  Please let me know when I can send the appeal letter to the insurance company.    Eladia

## 2021-09-14 NOTE — TELEPHONE ENCOUNTER
Following up on the appeal letter that is started.  Please let me know when the provider has completed it.    Eladia

## 2021-09-17 NOTE — TELEPHONE ENCOUNTER
Yes I put it there for the provider to add to it since this is an off label use.  I just need to provider to add what he wants to it for me.     Eladia

## 2021-09-20 DIAGNOSIS — L30.9 DERMATITIS: ICD-10-CM

## 2021-09-22 RX ORDER — FEXOFENADINE HCL 180 MG/1
180 TABLET ORAL 2 TIMES DAILY
Qty: 60 TABLET | Refills: 0 | Status: SHIPPED | OUTPATIENT
Start: 2021-09-22 | End: 2021-10-27

## 2021-09-22 RX ORDER — HYDROXYZINE HYDROCHLORIDE 25 MG/1
TABLET, FILM COATED ORAL
Qty: 30 TABLET | Refills: 0 | Status: SHIPPED | OUTPATIENT
Start: 2021-09-22 | End: 2021-10-22

## 2021-09-22 NOTE — TELEPHONE ENCOUNTER
Since this is an off label use for the medication will the provider please look at the appeal letter I have started?      Eladia

## 2021-09-30 ENCOUNTER — OFFICE VISIT (OUTPATIENT)
Dept: DERMATOLOGY | Facility: CLINIC | Age: 46
End: 2021-09-30
Payer: COMMERCIAL

## 2021-09-30 DIAGNOSIS — L30.9 DERMATITIS: Primary | ICD-10-CM

## 2021-09-30 PROCEDURE — 99214 OFFICE O/P EST MOD 30 MIN: CPT | Mod: GC | Performed by: DERMATOLOGY

## 2021-09-30 RX ORDER — PREDNISONE 10 MG/1
10 TABLET ORAL ONCE
Status: DISCONTINUED | OUTPATIENT
Start: 2021-09-30 | End: 2021-09-30 | Stop reason: CLARIF

## 2021-09-30 RX ORDER — PREDNISONE 10 MG/1
10 TABLET ORAL DAILY
Qty: 60 TABLET | Refills: 0 | Status: SHIPPED | OUTPATIENT
Start: 2021-09-30 | End: 2021-10-20

## 2021-09-30 NOTE — NURSING NOTE
Dermatology Rooming Note    Klarissa Curtis's goals for this visit include:   Chief Complaint   Patient presents with     Derm Problem     richardson is coming in today for a follow up on HS, states that her HS has not been too bad, her rash is still really bad     Crystal Garcia CMA on 9/30/2021 at 4:21 PM

## 2021-09-30 NOTE — LETTER
Date:November 6, 2021      Patient was self referred, no letter generated. Do not send.        [facility] Health Information

## 2021-09-30 NOTE — PATIENT INSTRUCTIONS
Take 4 tablets (40 mg) of prednisone daily for 6 days  Take 3 tablets (30 mg) of prednisone daily for 6 days  Take 2 tablets (20 mg) of prednisone daily for 6 days  Take 1 tablets (10 mg) of prednisone daily for 6 days    Once your lab results to make sure it is within normal limits, we will send you a medicine called dapsone to help with your rash

## 2021-09-30 NOTE — TELEPHONE ENCOUNTER
Medication Appeal Initiation    We have initiated an appeal for the requested medication:  Medication: Cosentyx  Appeal Start Date:  9/30/2021  Insurance Company: [employer] ([facility]) - Phone [phone] Fax [phone]  Comments:

## 2021-09-30 NOTE — PROGRESS NOTES
[facility] Dermatology Note  Encounter Date: Sep 30, 2021  Office Visit     Dermatology Problem List:  #. Hidradenitis suppurativa: diagnosed age 11, initially on waistline (used to weigh 300 lbs) which cleared up -> intermittent outbreaks -> for the past 10 years has had in bilateral axilla, groin, thighs, and buttocks.   - Current: Humira 80 mg weekly (started 10/4/20). Waiting for cosentyx approval  - Prior: infliximab, prednisone taper, clobetasol, topical morphine, ILK, humira, oral antibiotics, rifampin, sirolimus 2 mg (started 4/22/21) finasteride, spironolactone (6/2019- 9/5/19, stopped due to abnormal uterine bleeding), topical morphine gel, and percocet for severe pain  - Provided the patient a sample of Handi Wear clothing for the R axilla and bandages. Prescribed Cutaned sorbion 5.0 x 5.0 inch bandages via [employer].  #. Keratosis pilaris  #. L olecranon bursitis.   #. Dermatographism  SH: She works as an / at the [employer] (lots of walking/running around)      #. Eruption NOS,   - punch biopsy 8/5/2021: superficial and deep perivascular and interstitial infiltrate of neutrophils, with focal leukocytoclasis and bluish, amorphous material suggestive of neutrophil degranulation, and lesser amounts of eosinophils and lymphocytes. Background dermal edema is noted. Focal neutrophilic epitheliotropism is noted.  - At this time, no clear diagnosis for rash. Differential consisting of interstitial granulomatous dermatitis, neutrophilic urticarial dermatosis, drug  - Denies new arthralgias, fevers, unintentional weight loss, Raynaud phenomenon, dry eye, dry mouth. Up to date on cancer screening.  - Prednisone taper, dapsone  ____________________________________________    Assessment & Plan:    # Eruption NOS, dermatitis  At this time, no clear diagnosis for rash. Presence of small clusters of yellow papules with erythema on upper back and around neck.  Differential consisting of interstitial granulomatous dermatitis, neutrophilic urticarial dermatosis, drug (adalimumab). Not sure what the underlying etiology could be. ROS not concerning for autoimmune and is up to date on malignancy screening.  She denies new arthralgias, fevers, unintentional weight loss, Raynaud phenomenon, dry eye, dry mouth.Overall feels in usual state of health. Did have eosinophilis which led thought towards drug reaction. Had been on sirolimus from mid may until about one month ago. However, her rash has not resolved since stopping sirolimus. Neutrophils present in biopsy so feel that treatment with dapsone could help resolve rash. Will check G6PD prior to starting dapsone. Also will start her on a prednisone taper for the time being for symptom control while waiting for dapsone.   - Start prednisone taper 40 mg x 6 days, 30 mg x 6 days, 20 mg x6 days, 10 mg x6 days  - Check safety labs every two week: CBC with diff   - Check G6PD prior to starting dapsone  - Start dapsone pending G6PD lab result     # HS  Currently stable on Humira 80mg weekly. Still pending insurance coverage of cosentyx 300mg weekly for 5 weeks and then monthly.   - Continue Humira 80 mg weekly for now     # Dermatographism   - Continue allegra 180mg BID     Follow-up: 1 month(s) virtually (telephone with photos), or earlier for new or changing lesions    Staff and Resident:    Luis SIEGEL MD, discussed and evaluated the patient with Dr. Kenny.      Staff and Scribe:     Scribe Disclosure:  Brielle SIEGEL, am serving as a scribe to document services personally performed by Jean Kenny MD based on data collection and the provider's statements to me.     Provider Disclosure:   The documentation recorded by the scribe accurately reflects the services I personally performed and the decisions made by me.    Jean Kenny MD, FAAD    Departments of Internal Medicine and  Dermatology  [facility]  [phone]    Staff Physician Comments:   I saw and evaluated the patient with the resident and I agree with the assessment and plan.  I was present for the examination.    Jean Kenny MD, FAAD    Departments of Internal Medicine and Dermatology  [facility]  [phone]      ____________________________________________    CC: Derm Problem (richardson is coming in today for a follow up on HS, states that her HS has not been too bad, her rash is still really bad)    HPI:  Ms. Klarissa Curtis is a(n) 46 year old female who presents today as a return patient for follow-up. The patient was last seen in dermatology on 9/2/21 by myself at which point he was started on cosentyx and tapered off of Humira for treatment of HS. Additionally, she was given a refill on her Percocet for pain management. For treatment of a dermatographism rash starting in July 2021, she was continued on fexofenadine and hydroxyzine at night for itching, and advised to stop zyrtec.     Today, the patient states that the rash on her chest, back, neck, and legs is still present and is very itchy. They are occasionally tender/burning, particularly when not busy. She also occasionally develops welts. She states that she has been taking Allegra 2-3x daily, hydroxyzine before bed, and clobetasol which helps with symptom management. Although the rash has not resolved. She also reports that stopping the sirolimus has not helped either. There is no fluid released from the bumps. No recent infections. She denies any systemic symptoms like abnormal blood in stool, blood in urine, nausea, loss of appetite, or unintentional weight loss, dry eyes/mouth, difficulty swallowing, or finger discoloration. She reports night sweats, but states this has been an issue for years. She reports right knee pain, without swelling, in both the morning and evening. No new diets or eating lots of soy. She has  been drinking less alcohol. She is still smoking. She have never had a colonoscopy. Last mammogram and ob/gyn was 1.5 years ago.      Regarding her HS, she is on Humira 80 mg daily and has not been started on cosentyx yet. She has a few new lesions today. She did not feel the sirolimus was helpful.    Patient is otherwise feeling well, without additional skin concerns.    HS Nurse Assessment    Nurse Assessment Data 8/5/2021 9/2/2021 9/30/2021   Over the past month, about how many recurrent or new boils have you had? 5 2 new, same ones as before 2   Over the past week, how many dressing changes do you do each day? 0 0 0   Over the past week, has your wound drainage been: Moderate Severe Mild   Rate your HS overall from 0-10 (0 = no disease, 10 = worst) over the past week:  5 7 5   Rate your pain score from 0-10 (0 = no disease, 10 = worst) for the most painful/symptomatic lesion in the past week:  7 9 5 - Moderate Pain   Over the past week, how much has HS influenced your quality of life? slightly moderately slightly       Labs Reviewed:  N/A    Physical Exam:  Vitals: There were no vitals taken for this visit.  SKIN: Full skin, which includes the head/face, both arms, chest, back, abdomen,both legs, genitalia and/or groin buttocks, digits and/or nails, was examined.  - Clusters of pruritic, yellow papules on anterior neck, posterior neck, upper extremities and upper back on erythematous base  - Diffuse dermatographism on extremities on areas of itching, no rash present there  - Diffuse xerosis  - As below. No other lesions of concern on areas examined.     HS Exam    Left Axilla 9/30/2021   # of inflamed nodules 0   # of abcesses 0   # of draining tunnels 0   Erythema (grade) -   Thickness (grade) -   Open skin surface (grade) -   Tunnels (grade) -       Right Axilla 9/30/2021   # of inflamed nodules 1   # of abcesses 0   # of draining tunnels 2   Erythema (grade) 1   Thickness (grade) 3   Open skin surface (grade)  1   Tunnels (grade) 1        Pubis/Genital 9/30/2021   # of inflamed nodules 1   # of abcesses 0   # of draining tunnels 0   Erythema (grade) 0   Thickness (grade) 1   Open skin surface (grade) 0   Tunnels (grade) 0       Left Thigh 9/30/2021   # of inflamed nodules 0   # of abcesses 0   # of draining tunnels 1   Erythema (grade) 1   Thickness (grade) 1   Open skin surface (grade) 1   Tunnels (grade) 1       Right Thigh 9/30/2021   # of inflamed nodules 1   # of abcesses 0   # of draining tunnels 1   Erythema (grade) 1   Thickness (grade) 1   Open skin surface (grade) 1   Tunnels (grade) 1       Buttocks 9/30/2021   # of inflamed nodules 0   # of abcesses 0   # of draining tunnels 0   Erythema (grade) -   Thickness (grade) -   Open skin surface (grade) -   Tunnels (grade) -   Pilonidal Disease? -   Pioderma Gangrenosum? -   Cutaneous Crohn's? -       HS Data  HS Exam Data 3/4/2021 8/5/2021 9/30/2021   LC Type LC1 - LC1   Clinical Subtypes Regular type - Regular type   Acne? No - -   Dissecting Cellulitis? No - -   Visual analogue score (0-100) 65 - -   Total Julian Stage - - -   Total Inflammatory Nodules 9 4 3   Total Abcesses 0 0 0   Total Draining Tunnels 2 2 4   Total Abscess and Nodule Count 9 4 3   IHS4 Score  17 12 19   Total  HASI Score 34 31 27   HS-PGA 4 - -       Medications:  Current Outpatient Medications   Medication     adalimumab (HUMIRA *CF* PEN-CD/UC/HS STARTER) 80 MG/0.8ML pen kit     calcium carbonate (CALCIUM CARBONATE) 600 MG tablet     calcium carbonate 750 MG CHEW     cetirizine (ZYRTEC) 10 MG tablet     clobetasol (TEMOVATE) 0.05 % external cream     fexofenadine (ALLEGRA) 180 MG tablet     fluocinonide (LIDEX) 0.05 % external solution     FLUoxetine (PROZAC) 40 MG capsule     hydrOXYzine (ATARAX) 25 MG tablet     ketoconazole (NIZORAL) 2 % external shampoo     medical cannabis (Patient's own supply)     oxyCODONE-acetaminophen (PERCOCET) 5-325 MG tablet     predniSONE (DELTASONE) 10 MG  tablet     Secukinumab, 300 MG Dose, 150 MG/ML SOAJ     Secukinumab, 300 MG Dose, 150 MG/ML SOAJ     traZODone (DESYREL) 50 MG tablet     triamcinolone (KENALOG) 0.1 % external ointment     Vitamin D3 (CHOLECALCIFEROL) 2000 units (50 mcg) tablet     sirolimus (GENERIC EQUIVALENT) 1 MG tablet     valACYclovir (VALTREX) 500 MG tablet     No current facility-administered medications for this visit.      Past Medical History:   Patient Active Problem List   Diagnosis     Hidradenitis suppurativa     Fatigue     Past Medical History:   Diagnosis Date     NO ACTIVE PROBLEMS         CC Jean Kenny MD  [address] [place] [address] on close of this encounter.

## 2021-09-30 NOTE — LETTER
9/30/2021       RE: Klraissa Curtis  [address]  [place] [address]     Dear Colleague,    Thank you for referring your patient, Klarissa Curtis, to the [facility] DERMATOLOGY [facility]IC [place] at [facility]. Please see a copy of my visit note below.    [facility] Dermatology Note  Encounter Date: Sep 30, 2021  Office Visit     Dermatology Problem List:  #. Hidradenitis suppurativa: diagnosed age 11, initially on waistline (used to weigh 300 lbs) which cleared up -> intermittent outbreaks -> for the past 10 years has had in bilateral axilla, groin, thighs, and buttocks.   - Current: Humira 80 mg weekly (started 10/4/20). Waiting for cosentyx approval  - Prior: infliximab, prednisone taper, clobetasol, topical morphine, ILK, humira, oral antibiotics, rifampin, sirolimus 2 mg (started 4/22/21) finasteride, spironolactone (6/2019- 9/5/19, stopped due to abnormal uterine bleeding), topical morphine gel, and percocet for severe pain  - Provided the patient a sample of [employer]i [employer]ear clothing for the R axilla and bandages. Prescribed Cutaned sorbion 5.0 x 5.0 inch bandages via [employer].  #. Keratosis pilaris  #. L olecranon bursitis.   #. Dermatographism  SH: She works as an / [place] (lots of walking/running around)      #. Eruption NOS,   - punch biopsy 8/5/2021: superficial and deep perivascular and interstitial infiltrate of neutrophils, with focal leukocytoclasis and bluish, amorphous material suggestive of neutrophil degranulation, and lesser amounts of eosinophils and lymphocytes. Background dermal edema is noted. Focal neutrophilic epitheliotropism is noted.  - At this time, no clear diagnosis for rash. Differential consisting of interstitial granulomatous dermatitis, neutrophilic urticarial dermatosis, drug  - Denies new arthralgias, fevers, unintentional weight loss,  Raynaud phenomenon, dry eye, dry mouth. Up to date on cancer screening.  - Prednisone taper, dapsone  ____________________________________________    Assessment & Plan:    # Eruption NOS, dermatitis  At this time, no clear diagnosis for rash. Presence of small clusters of yellow papules with erythema on upper back and around neck. Differential consisting of interstitial granulomatous dermatitis, neutrophilic urticarial dermatosis, drug (adalimumab). Not sure what the underlying etiology could be. ROS not concerning for autoimmune and is up to date on malignancy screening.  She denies new arthralgias, fevers, unintentional weight loss, Raynaud phenomenon, dry eye, dry mouth.Overall feels in usual state of health. Did have eosinophilis which led thought towards drug reaction. Had been on sirolimus from mid may until about one month ago. However, her rash has not resolved since stopping sirolimus. Neutrophils present in biopsy so feel that treatment with dapsone could help resolve rash. Will check G6PD prior to starting dapsone. Also will start her on a prednisone taper for the time being for symptom control while waiting for dapsone.   - Start prednisone taper 40 mg x 6 days, 30 mg x 6 days, 20 mg x6 days, 10 mg x6 days  - Check safety labs every two week: CBC with diff   - Check G6PD prior to starting dapsone  - Start dapsone pending G6PD lab result     # HS  Currently stable on Humira 80mg weekly. Still pending insurance coverage of cosentyx 300mg weekly for 5 weeks and then monthly.   - Continue Humira 80 mg weekly for now     # Dermatographism   - Continue allegra 180mg BID     Follow-up: 1 month(s) virtually (telephone with photos), or earlier for new or changing lesions    Staff and Resident:    Luis SIEGEL MD, discussed and evaluated the patient with Dr. Kenny.      Staff and Scribe:     Scribe Disclosure:  Brielle SIEGEL, am serving as a scribe to document services personally performed by Jean  Marco Kenny MD based on data collection and the provider's statements to me.     Provider Disclosure:   The documentation recorded by the scribe accurately reflects the services I personally performed and the decisions made by me.    Jean Kenny MD, FAAD    Departments of Internal Medicine and Dermatology  [facility]  [phone]    Staff Physician Comments:   I saw and evaluated the patient with the resident and I agree with the assessment and plan.  I was present for the examination.    Jean Kenny MD, FAAD    Departments of Internal Medicine and Dermatology  [facility]  [phone]      ____________________________________________    CC: Derm Problem (richardson is coming in today for a follow up on HS, states that her HS has not been too bad, her rash is still really bad)    HPI:  Ms. Klarissa Curtis is a(n) 46 year old female who presents today as a return patient for follow-up. The patient was last seen in dermatology on 9/2/21 by myself at which point he was started on cosentyx and tapered off of Humira for treatment of HS. Additionally, she was given a refill on her Percocet for pain management. For treatment of a dermatographism rash starting in July 2021, she was continued on fexofenadine and hydroxyzine at night for itching, and advised to stop zyrtec.     Today, the patient states that the rash on her chest, back, neck, and legs is still present and is very itchy. They are occasionally tender/burning, particularly when not busy. She also occasionally develops welts. She states that she has been taking Allegra 2-3x daily, hydroxyzine before bed, and clobetasol which helps with symptom management. Although the rash has not resolved. She also reports that stopping the sirolimus has not helped either. There is no fluid released from the bumps. No recent infections. She denies any systemic symptoms like abnormal blood in stool, blood in  urine, nausea, loss of appetite, or unintentional weight loss, dry eyes/mouth, difficulty swallowing, or finger discoloration. She reports night sweats, but states this has been an issue for years. She reports right knee pain, without swelling, in both the morning and evening. No new diets or eating lots of soy. She has been drinking less alcohol. She is still smoking. She have never had a colonoscopy. Last mammogram and ob/gyn was 1.5 years ago.      Regarding her HS, she is on Humira 80 mg daily and has not been started on cosentyx yet. She has a few new lesions today. She did not feel the sirolimus was helpful.    Patient is otherwise feeling well, without additional skin concerns.    HS Nurse Assessment    Nurse Assessment Data 8/5/2021 9/2/2021 9/30/2021   Over the past month, about how many recurrent or new boils have you had? 5 2 new, same ones as before 2   Over the past week, how many dressing changes do you do each day? 0 0 0   Over the past week, has your wound drainage been: Moderate Severe Mild   Rate your HS overall from 0-10 (0 = no disease, 10 = worst) over the past week:  5 7 5   Rate your pain score from 0-10 (0 = no disease, 10 = worst) for the most painful/symptomatic lesion in the past week:  7 9 5 - Moderate Pain   Over the past week, how much has HS influenced your quality of life? slightly moderately slightly       Labs Reviewed:  N/A    Physical Exam:  Vitals: There were no vitals taken for this visit.  SKIN: Full skin, which includes the head/face, both arms, chest, back, abdomen,both legs, genitalia and/or groin buttocks, digits and/or nails, was examined.  - Clusters of pruritic, yellow papules on anterior neck, posterior neck, upper extremities and upper back on erythematous base  - Diffuse dermatographism on extremities on areas of itching, no rash present there  - Diffuse xerosis  - As below. No other lesions of concern on areas examined.     HS Exam    Left Axilla 9/30/2021   # of  inflamed nodules 0   # of abcesses 0   # of draining tunnels 0   Erythema (grade) -   Thickness (grade) -   Open skin surface (grade) -   Tunnels (grade) -       Right Axilla 9/30/2021   # of inflamed nodules 1   # of abcesses 0   # of draining tunnels 2   Erythema (grade) 1   Thickness (grade) 3   Open skin surface (grade) 1   Tunnels (grade) 1        Pubis/Genital 9/30/2021   # of inflamed nodules 1   # of abcesses 0   # of draining tunnels 0   Erythema (grade) 0   Thickness (grade) 1   Open skin surface (grade) 0   Tunnels (grade) 0       Left Thigh 9/30/2021   # of inflamed nodules 0   # of abcesses 0   # of draining tunnels 1   Erythema (grade) 1   Thickness (grade) 1   Open skin surface (grade) 1   Tunnels (grade) 1       Right Thigh 9/30/2021   # of inflamed nodules 1   # of abcesses 0   # of draining tunnels 1   Erythema (grade) 1   Thickness (grade) 1   Open skin surface (grade) 1   Tunnels (grade) 1       Buttocks 9/30/2021   # of inflamed nodules 0   # of abcesses 0   # of draining tunnels 0   Erythema (grade) -   Thickness (grade) -   Open skin surface (grade) -   Tunnels (grade) -   Pilonidal Disease? -   Pioderma Gangrenosum? -   Cutaneous Crohn's? -       HS Data  HS Exam Data 3/4/2021 8/5/2021 9/30/2021   LC Type LC1 - LC1   Clinical Subtypes Regular type - Regular type   Acne? No - -   Dissecting Cellulitis? No - -   Visual analogue score (0-100) 65 - -   Total Julian Stage - - -   Total Inflammatory Nodules 9 4 3   Total Abcesses 0 0 0   Total Draining Tunnels 2 2 4   Total Abscess and Nodule Count 9 4 3   IHS4 Score  17 12 19   Total  HASI Score 34 31 27   HS-PGA 4 - -       Medications:  Current Outpatient Medications   Medication     adalimumab (HUMIRA *CF* PEN-CD/UC/HS STARTER) 80 MG/0.8ML pen kit     calcium carbonate (CALCIUM CARBONATE) 600 MG tablet     calcium carbonate 750 MG CHEW     cetirizine (ZYRTEC) 10 MG tablet     clobetasol (TEMOVATE) 0.05 % external cream     fexofenadine  (ALLEGRA) 180 MG tablet     fluocinonide (LIDEX) 0.05 % external solution     FLUoxetine (PROZAC) 40 MG capsule     hydrOXYzine (ATARAX) 25 MG tablet     ketoconazole (NIZORAL) 2 % external shampoo     medical cannabis (Patient's own supply)     oxyCODONE-acetaminophen (PERCOCET) 5-325 MG tablet     predniSONE (DELTASONE) 10 MG tablet     Secukinumab, 300 MG Dose, 150 MG/ML SOAJ     Secukinumab, 300 MG Dose, 150 MG/ML SOAJ     traZODone (DESYREL) 50 MG tablet     triamcinolone (KENALOG) 0.1 % external ointment     Vitamin D3 (CHOLECALCIFEROL) 2000 units (50 mcg) tablet     sirolimus (GENERIC EQUIVALENT) 1 MG tablet     valACYclovir (VALTREX) 500 MG tablet     No current facility-administered medications for this visit.      Past Medical History:   Patient Active Problem List   Diagnosis     Hidradenitis suppurativa     Fatigue     Past Medical History:   Diagnosis Date     NO ACTIVE PROBLEMS         CC Jean Kenny MD  [address] [place] [id number] on close of this encounter.        Again, thank you for allowing me to participate in the care of your patient.      Sincerely,    Jean Kenny MD

## 2021-10-04 ENCOUNTER — MYC MEDICAL ADVICE (OUTPATIENT)
Dept: DERMATOLOGY | Facility: CLINIC | Age: 46
End: 2021-10-04

## 2021-10-04 NOTE — TELEPHONE ENCOUNTER
Sent a reply to pt letting her know she needs a lab drawn prior to starting the medication.  See note from last ofv below:    - Check G6PD prior to starting dapsone  - Start dapsone pending G6PD lab result        June Nick LPN on 10/4/2021 at 3:20 PM

## 2021-10-05 ENCOUNTER — LAB (OUTPATIENT)
Dept: LAB | Facility: CLINIC | Age: 46
End: 2021-10-05

## 2021-10-05 DIAGNOSIS — L73.2 HIDRADENITIS SUPPURATIVA: ICD-10-CM

## 2021-10-05 DIAGNOSIS — Z79.899 ENCOUNTER FOR LONG-TERM (CURRENT) USE OF HIGH-RISK MEDICATION: ICD-10-CM

## 2021-10-05 DIAGNOSIS — L30.9 DERMATITIS: ICD-10-CM

## 2021-10-05 LAB
ALBUMIN UR-MCNC: NEGATIVE MG/DL
APPEARANCE UR: CLEAR
BACTERIA #/AREA URNS HPF: ABNORMAL /HPF
BASOPHILS # BLD AUTO: 0 10E3/UL (ref 0–0.2)
BASOPHILS NFR BLD AUTO: 0 %
BILIRUB UR QL STRIP: NEGATIVE
COLOR UR AUTO: YELLOW
EOSINOPHIL # BLD AUTO: 0 10E3/UL (ref 0–0.7)
EOSINOPHIL NFR BLD AUTO: 0 %
ERYTHROCYTE [DISTWIDTH] IN BLOOD BY AUTOMATED COUNT: 13.6 % (ref 10–15)
GLUCOSE UR STRIP-MCNC: NEGATIVE MG/DL
HCT VFR BLD AUTO: 41.8 % (ref 35–47)
HGB BLD-MCNC: 13.5 G/DL (ref 11.7–15.7)
HGB UR QL STRIP: ABNORMAL
IMM GRANULOCYTES # BLD: 0 10E3/UL
IMM GRANULOCYTES NFR BLD: 0 %
KETONES UR STRIP-MCNC: NEGATIVE MG/DL
LEUKOCYTE ESTERASE UR QL STRIP: NEGATIVE
LYMPHOCYTES # BLD AUTO: 0.8 10E3/UL (ref 0.8–5.3)
LYMPHOCYTES NFR BLD AUTO: 8 %
MCH RBC QN AUTO: 30.5 PG (ref 26.5–33)
MCHC RBC AUTO-ENTMCNC: 32.3 G/DL (ref 31.5–36.5)
MCV RBC AUTO: 95 FL (ref 78–100)
MONOCYTES # BLD AUTO: 0.1 10E3/UL (ref 0–1.3)
MONOCYTES NFR BLD AUTO: 1 %
NEUTROPHILS # BLD AUTO: 9.1 10E3/UL (ref 1.6–8.3)
NEUTROPHILS NFR BLD AUTO: 91 %
NITRATE UR QL: NEGATIVE
NRBC # BLD AUTO: 0 10E3/UL
NRBC BLD AUTO-RTO: 0 /100
PH UR STRIP: 7 [PH] (ref 5–7)
PLATELET # BLD AUTO: 356 10E3/UL (ref 150–450)
RBC # BLD AUTO: 4.42 10E6/UL (ref 3.8–5.2)
RBC #/AREA URNS AUTO: ABNORMAL /HPF
SP GR UR STRIP: 1.02 (ref 1–1.03)
SQUAMOUS #/AREA URNS AUTO: ABNORMAL /LPF
UROBILINOGEN UR STRIP-ACNC: 0.2 E.U./DL
WBC # BLD AUTO: 10.1 10E3/UL (ref 4–11)
WBC #/AREA URNS AUTO: ABNORMAL /HPF

## 2021-10-05 PROCEDURE — 80053 COMPREHEN METABOLIC PANEL: CPT

## 2021-10-05 PROCEDURE — 80061 LIPID PANEL: CPT

## 2021-10-05 PROCEDURE — 99000 SPECIMEN HANDLING OFFICE-LAB: CPT

## 2021-10-05 PROCEDURE — 82955 ASSAY OF G6PD ENZYME: CPT | Mod: 90

## 2021-10-05 PROCEDURE — 85025 COMPLETE CBC W/AUTO DIFF WBC: CPT

## 2021-10-05 PROCEDURE — 81001 URINALYSIS AUTO W/SCOPE: CPT

## 2021-10-05 NOTE — TELEPHONE ENCOUNTER
I called [employer] for a status check on this appeal [phone], case# [id number].  Appeal is under review.    Eladia

## 2021-10-06 LAB
ALBUMIN SERPL-MCNC: 3.4 G/DL (ref 3.4–5)
ALP SERPL-CCNC: 58 U/L (ref 40–150)
ALT SERPL W P-5'-P-CCNC: 19 U/L (ref 0–50)
ANION GAP SERPL CALCULATED.3IONS-SCNC: 7 MMOL/L (ref 3–14)
AST SERPL W P-5'-P-CCNC: 15 U/L (ref 0–45)
BILIRUB SERPL-MCNC: 0.5 MG/DL (ref 0.2–1.3)
BUN SERPL-MCNC: 18 MG/DL (ref 7–30)
CALCIUM SERPL-MCNC: 8.5 MG/DL (ref 8.5–10.1)
CHLORIDE BLD-SCNC: 103 MMOL/L (ref 94–109)
CHOLEST SERPL-MCNC: 252 MG/DL
CO2 SERPL-SCNC: 25 MMOL/L (ref 20–32)
CREAT SERPL-MCNC: 0.7 MG/DL (ref 0.52–1.04)
FASTING STATUS PATIENT QL REPORTED: YES
G6PD RBC-CCNT: 15.7 U/G HB
GFR SERPL CREATININE-BSD FRML MDRD: >90 ML/MIN/1.73M2
GLUCOSE BLD-MCNC: 102 MG/DL (ref 70–99)
HDLC SERPL-MCNC: 100 MG/DL
LDLC SERPL CALC-MCNC: 128 MG/DL
NONHDLC SERPL-MCNC: 152 MG/DL
POTASSIUM BLD-SCNC: 4.2 MMOL/L (ref 3.4–5.3)
PROT SERPL-MCNC: 7.7 G/DL (ref 6.8–8.8)
SODIUM SERPL-SCNC: 135 MMOL/L (ref 133–144)
TRIGL SERPL-MCNC: 119 MG/DL

## 2021-10-08 ENCOUNTER — MYC MEDICAL ADVICE (OUTPATIENT)
Dept: DERMATOLOGY | Facility: CLINIC | Age: 46
End: 2021-10-08

## 2021-10-08 DIAGNOSIS — R21 CUTANEOUS ERUPTION: Primary | ICD-10-CM

## 2021-10-10 ENCOUNTER — HEALTH MAINTENANCE LETTER (OUTPATIENT)
Age: 46
End: 2021-10-10

## 2021-10-13 RX ORDER — DAPSONE 100 MG/1
100 TABLET ORAL DAILY
Qty: 30 TABLET | Refills: 0 | Status: SHIPPED | OUTPATIENT
Start: 2021-10-13 | End: 2021-11-11

## 2021-10-16 DIAGNOSIS — L30.9 DERMATITIS: ICD-10-CM

## 2021-10-20 RX ORDER — PREDNISONE 10 MG/1
TABLET ORAL
Qty: 30 TABLET | Refills: 1 | Status: SHIPPED | OUTPATIENT
Start: 2021-10-20 | End: 2021-11-11

## 2021-10-20 NOTE — TELEPHONE ENCOUNTER
Received refill request for prednisone. Patient chart reviewed. Refill accepted. Patient to take 10 mg daily to extend taper for additional anti-inflammatory effect for 1-2 weeks until follow-up 11/4/21. Rx routed to Dr. Kenny.    Trini Stern MD  Dermatology Resident  [facility]

## 2021-10-20 NOTE — TELEPHONE ENCOUNTER
PREDNISONE 10 MG TABLET   TAKE 4 TABS DAILY X6 DAYS THEN 3 TABS DAILY X6 DAYS THEN 2 TABS/DAY X 6 DAYS,THEN 1 TAB/DAY X 6 DAYS  Last Written Prescription Date:  9-30-21  Last Fill Quantity: 60,   # refills: 0  Last Office Visit : 9-30-21  Future Office visit:  11-4-21    Routing refill request to provider for review/approval because:  Med not on protocol   ? Med tx completed

## 2021-10-21 NOTE — TELEPHONE ENCOUNTER
MEDICATION APPEAL APPROVED    Medication: Cosentyx  Authorization Effective Date: 10/5/2021  Authorization Expiration Date: 4/5/2022  Approved Dose/Quantity:   Reference #: CMM Key [id number]   Insurance Company: [employer] ([facility]) - Phone [phone] Fax [phone]  Expected CoPay:       CoPay Card Available:      Foundation Assistance Needed:    Which Pharmacy is filling the prescription (Not needed for infusion/clinic administered): [facility] - [place], [place] - [address]      I now need to start a quantity limit prior authorization.

## 2021-10-21 NOTE — TELEPHONE ENCOUNTER
PA Initiation    Medication: Cosentyx  Insurance Company: [employer] ([facility]) - Phone [phone] Fax [phone]  Pharmacy Filling the Rx: [facility] - [place], [place] - [address]  Filling Pharmacy Phone:    Filling Pharmacy Fax:    Start Date: 9/3/2021    [facility] [id number]

## 2021-10-22 DIAGNOSIS — L30.9 DERMATITIS: ICD-10-CM

## 2021-10-22 RX ORDER — HYDROXYZINE HYDROCHLORIDE 25 MG/1
TABLET, FILM COATED ORAL
Qty: 30 TABLET | Refills: 4 | Status: SHIPPED | OUTPATIENT
Start: 2021-10-22 | End: 2023-05-25

## 2021-10-27 ENCOUNTER — MYC REFILL (OUTPATIENT)
Dept: DERMATOLOGY | Facility: CLINIC | Age: 46
End: 2021-10-27

## 2021-10-27 DIAGNOSIS — L30.9 DERMATITIS: ICD-10-CM

## 2021-10-27 RX ORDER — FEXOFENADINE HCL 180 MG/1
180 TABLET ORAL 2 TIMES DAILY
Qty: 60 TABLET | Refills: 0 | Status: SHIPPED | OUTPATIENT
Start: 2021-10-27 | End: 2022-02-10

## 2021-10-28 NOTE — TELEPHONE ENCOUNTER
Qty limit prior authorization has been approved.  I sent the patient a [email or web address] message letting her know the medication has been approved and sent to the [facility].    Eladia

## 2021-11-01 ENCOUNTER — LAB (OUTPATIENT)
Dept: LAB | Facility: CLINIC | Age: 46
End: 2021-11-01
Payer: COMMERCIAL

## 2021-11-01 DIAGNOSIS — R21 CUTANEOUS ERUPTION: ICD-10-CM

## 2021-11-01 DIAGNOSIS — L73.2 HIDRADENITIS SUPPURATIVA: ICD-10-CM

## 2021-11-01 DIAGNOSIS — Z79.899 ENCOUNTER FOR LONG-TERM (CURRENT) USE OF HIGH-RISK MEDICATION: ICD-10-CM

## 2021-11-01 LAB
ALBUMIN UR-MCNC: NEGATIVE MG/DL
APPEARANCE UR: CLEAR
BACTERIA #/AREA URNS HPF: ABNORMAL /HPF
BASOPHILS # BLD AUTO: 0 10E3/UL (ref 0–0.2)
BASOPHILS NFR BLD AUTO: 0 %
BILIRUB UR QL STRIP: NEGATIVE
COLOR UR AUTO: YELLOW
EOSINOPHIL # BLD AUTO: 0.2 10E3/UL (ref 0–0.7)
EOSINOPHIL NFR BLD AUTO: 2 %
ERYTHROCYTE [DISTWIDTH] IN BLOOD BY AUTOMATED COUNT: 13.9 % (ref 10–15)
GLUCOSE UR STRIP-MCNC: NEGATIVE MG/DL
HCT VFR BLD AUTO: 39.4 % (ref 35–47)
HGB BLD-MCNC: 12.9 G/DL (ref 11.7–15.7)
HGB UR QL STRIP: NEGATIVE
KETONES UR STRIP-MCNC: NEGATIVE MG/DL
LEUKOCYTE ESTERASE UR QL STRIP: NEGATIVE
LYMPHOCYTES # BLD AUTO: 2.2 10E3/UL (ref 0.8–5.3)
LYMPHOCYTES NFR BLD AUTO: 27 %
MCH RBC QN AUTO: 31.2 PG (ref 26.5–33)
MCHC RBC AUTO-ENTMCNC: 32.7 G/DL (ref 31.5–36.5)
MCV RBC AUTO: 95 FL (ref 78–100)
MONOCYTES # BLD AUTO: 0.5 10E3/UL (ref 0–1.3)
MONOCYTES NFR BLD AUTO: 6 %
NEUTROPHILS # BLD AUTO: 5.2 10E3/UL (ref 1.6–8.3)
NEUTROPHILS NFR BLD AUTO: 65 %
NITRATE UR QL: NEGATIVE
PH UR STRIP: 5.5 [PH] (ref 5–7)
PLATELET # BLD AUTO: 350 10E3/UL (ref 150–450)
RBC # BLD AUTO: 4.13 10E6/UL (ref 3.8–5.2)
RBC #/AREA URNS AUTO: ABNORMAL /HPF
SP GR UR STRIP: 1.02 (ref 1–1.03)
SQUAMOUS #/AREA URNS AUTO: ABNORMAL /LPF
UROBILINOGEN UR STRIP-ACNC: 0.2 E.U./DL
WBC # BLD AUTO: 8 10E3/UL (ref 4–11)
WBC #/AREA URNS AUTO: ABNORMAL /HPF

## 2021-11-01 PROCEDURE — 81001 URINALYSIS AUTO W/SCOPE: CPT

## 2021-11-01 PROCEDURE — 85025 COMPLETE CBC W/AUTO DIFF WBC: CPT

## 2021-11-01 PROCEDURE — 36415 COLL VENOUS BLD VENIPUNCTURE: CPT

## 2021-11-01 PROCEDURE — 80053 COMPREHEN METABOLIC PANEL: CPT

## 2021-11-02 LAB
ALBUMIN SERPL-MCNC: 3.1 G/DL (ref 3.4–5)
ALP SERPL-CCNC: 57 U/L (ref 40–150)
ALT SERPL W P-5'-P-CCNC: 16 U/L (ref 0–50)
ANION GAP SERPL CALCULATED.3IONS-SCNC: 4 MMOL/L (ref 3–14)
AST SERPL W P-5'-P-CCNC: 12 U/L (ref 0–45)
BILIRUB SERPL-MCNC: 0.5 MG/DL (ref 0.2–1.3)
BUN SERPL-MCNC: 16 MG/DL (ref 7–30)
CALCIUM SERPL-MCNC: 8.5 MG/DL (ref 8.5–10.1)
CHLORIDE BLD-SCNC: 103 MMOL/L (ref 94–109)
CO2 SERPL-SCNC: 26 MMOL/L (ref 20–32)
CREAT SERPL-MCNC: 0.71 MG/DL (ref 0.52–1.04)
GFR SERPL CREATININE-BSD FRML MDRD: >90 ML/MIN/1.73M2
GLUCOSE BLD-MCNC: 68 MG/DL (ref 70–99)
POTASSIUM BLD-SCNC: 4.1 MMOL/L (ref 3.4–5.3)
PROT SERPL-MCNC: 7 G/DL (ref 6.8–8.8)
SODIUM SERPL-SCNC: 133 MMOL/L (ref 133–144)

## 2021-11-04 ENCOUNTER — VIRTUAL VISIT (OUTPATIENT)
Dept: DERMATOLOGY | Facility: CLINIC | Age: 46
End: 2021-11-04
Payer: COMMERCIAL

## 2021-11-04 DIAGNOSIS — L73.2 HIDRADENITIS SUPPURATIVA: Primary | ICD-10-CM

## 2021-11-04 PROCEDURE — 99207 PR NO CHARGE LOS: CPT | Performed by: DERMATOLOGY

## 2021-11-04 NOTE — PROGRESS NOTES
[facility] Dermatology Note  Encounter Date: Nov 4, 2021  Telephone ([phone]). Location of teledermatologist: [facility]ERMATOLOGY [facility] [place]. Start time: ***. End time: ***.    LV: 9/30/21    Dermatology Problem List:  #. Hidradenitis suppurativa: diagnosed age 11, initially on waistline (used to weigh 300 lbs) which cleared up -> intermittent outbreaks -> for the past 10 years has had in bilateral axilla, groin, thighs, and buttocks.   - Current: Humira 80 mg weekly (started 10/4/20). Waiting for cosentyx approval  - Prior: infliximab, prednisone taper, clobetasol, topical morphine, ILK, humira, oral antibiotics, rifampin, sirolimus 2 mg (started 4/22/21) finasteride, spironolactone (6/2019- 9/5/19, stopped due to abnormal uterine bleeding), topical morphine gel, and percocet for severe pain  - Provided the patient a sample of [employer]i [employer]ear clothing for the R axilla and bandages. Prescribed Cutaned sorbion 5.0 x 5.0 inch bandages via [employer].  #. Keratosis pilaris  #. L olecranon bursitis.   #. Dermatographism  SH: [place] an [place] walking/running around)    #. Eruption NOS,   - punch biopsy 8/5/2021: superficial and deep perivascular and interstitial infiltrate of neutrophils, with focal leukocytoclasis and bluish, amorphous material suggestive of neutrophil degranulation, and lesser amounts of eosinophils and lymphocytes. Background dermal edema is noted. Focal neutrophilic epitheliotropism is noted.  - At this time, no clear diagnosis for rash. Differential consisting of interstitial granulomatous dermatitis, neutrophilic urticarial dermatosis, drug  - Denies new arthralgias, fevers, unintentional weight loss, Raynaud phenomenon, dry eye, dry mouth. Up to date on cancer screening.  - Prednisone taper, dapsone    ____________________________________________    Assessment & Plan:     # Eruption NOS,  dermatitis  At this time, no clear diagnosis for rash. Presence of small clusters of yellow papules with erythema on upper back and around neck. Differential consisting of interstitial granulomatous dermatitis, neutrophilic urticarial dermatosis, drug (adalimumab). Not sure what the underlying etiology could be. ROS not concerning for autoimmune and is up to date on malignancy screening.  She denies new arthralgias, fevers, unintentional weight loss, Raynaud phenomenon, dry eye, dry mouth.Overall feels in usual state of health. Did have eosinophilis which led thought towards drug reaction. Had been on sirolimus from mid may until about one month ago. However, her rash has not resolved since stopping sirolimus. Neutrophils present in biopsy so feel that treatment with dapsone could help resolve rash. Will check G6PD prior to starting dapsone. Also will start her on a prednisone taper for the time being for symptom control while waiting for dapsone.   - Start prednisone taper 40 mg x 6 days, 30 mg x 6 days, 20 mg x6 days, 10 mg x6 days  - Check safety labs every two week: CBC with diff   - Check G6PD prior to starting dapsone  - Start dapsone pending G6PD lab result     # HS  Currently stable on Humira 80mg weekly. Still pending insurance coverage of cosentyx 300mg weekly for 5 weeks and then monthly.   - Continue Humira 80 mg weekly for now      # Dermatographism   - Continue allegra 180mg BID     Procedures Performed:    None    Follow-up: {[id number]}    Staff and Scribe:     Scribe Disclosure:  IJayy, am serving as a scribe to document services personally performed by Jean Kenny MD based on data collection and the provider's statements to me.     ***  ____________________________________________    CC: No chief complaint on file.    HPI:  Ms. Klarissa Curtis is a(n) 46 year old female who presents today as a return patient for follow-up. Last seen by me on 9/30/2021, at which  time patient was started on prednisone taper 40 mg x 6 days, 20 mg x 6 days, 10 mg x 6 days and started on daposone pending G6PD lab result for treatment of eruption NOS, dermatitis.    Today,***    Patient is otherwise feeling well, without additional skin concerns.    Labs Reviewed:  ***N/A    Physical Exam:  Vitals: There were no vitals taken for this visit.  SKIN: Teledermatology photos were reviewed; image quality and interpretability: ***acceptable. Image date: ***.  - ***  - No other lesions of concern on areas examined.     Medications:  Current Outpatient Medications   Medication     adalimumab (HUMIRA *CF* PEN-CD/UC/HS STARTER) 80 MG/0.8ML pen kit     calcium carbonate (CALCIUM CARBONATE) 600 MG tablet     calcium carbonate 750 MG CHEW     clobetasol (TEMOVATE) 0.05 % external cream     dapsone (ACZONE) 100 MG tablet     fexofenadine (ALLEGRA) 180 MG tablet     fluocinonide (LIDEX) 0.05 % external solution     FLUoxetine (PROZAC) 40 MG capsule     hydrOXYzine (ATARAX) 25 MG tablet     ketoconazole (NIZORAL) 2 % external shampoo     medical cannabis (Patient's own supply)     oxyCODONE-acetaminophen (PERCOCET) 5-325 MG tablet     predniSONE (DELTASONE) 10 MG tablet     Secukinumab, 300 MG Dose, 150 MG/ML SOAJ     sirolimus (GENERIC EQUIVALENT) 1 MG tablet     traZODone (DESYREL) 50 MG tablet     triamcinolone (KENALOG) 0.1 % external ointment     valACYclovir (VALTREX) 500 MG tablet     Vitamin D3 (CHOLECALCIFEROL) 2000 units (50 mcg) tablet     No current facility-administered medications for this visit.      Past Medical/Surgical History:   Patient Active Problem List   Diagnosis     Hidradenitis suppurativa     Fatigue     Past Medical History:   Diagnosis Date     NO ACTIVE PROBLEMS        CC Jean Kenny MD  [address] [place] [address] on close of this encounter.

## 2021-11-04 NOTE — NURSING NOTE
Dermatology Rooming Note    Klarissa Curtis's goals for this visit include:   Chief Complaint   Patient presents with     Derm Problem     HS follow up, states that things are the same     Crystalnegra Garcia CMA on 11/4/2021 at 4:04 PM

## 2021-11-04 NOTE — LETTER
Date:November 19, 2021      Patient was self referred, no letter generated. Do not send.        [facility] Health Information

## 2021-11-04 NOTE — LETTER
11/4/2021       RE: Klarissa Curtis  [address]  [place] [place] [address]     Dear Colleague,    Thank you for referring your patient, Klarissa Curtis, to the [facility] DERMATOLOGY CLINIC [place] at [facility]. Please see a copy of my visit note below.    Pt did not . Cancelled appt        Again, thank you for allowing me to participate in the care of your patient.      Sincerely,    Jean Kenny MD

## 2021-11-05 ENCOUNTER — MYC MEDICAL ADVICE (OUTPATIENT)
Dept: DERMATOLOGY | Facility: CLINIC | Age: 46
End: 2021-11-05
Payer: COMMERCIAL

## 2021-11-11 ENCOUNTER — VIRTUAL VISIT (OUTPATIENT)
Dept: DERMATOLOGY | Facility: CLINIC | Age: 46
End: 2021-11-11
Payer: COMMERCIAL

## 2021-11-11 DIAGNOSIS — R21 CUTANEOUS ERUPTION: ICD-10-CM

## 2021-11-11 DIAGNOSIS — L73.2 HIDRADENITIS SUPPURATIVA: ICD-10-CM

## 2021-11-11 PROCEDURE — 99214 OFFICE O/P EST MOD 30 MIN: CPT | Mod: TEL | Performed by: DERMATOLOGY

## 2021-11-11 RX ORDER — DAPSONE 100 MG/1
200 TABLET ORAL DAILY
Qty: 30 TABLET | Refills: 1 | Status: SHIPPED | OUTPATIENT
Start: 2021-11-11 | End: 2022-09-01

## 2021-11-11 RX ORDER — PHENOL 1.4 %
2 AEROSOL, SPRAY (ML) MUCOUS MEMBRANE DAILY
Qty: 180 TABLET | Refills: 11 | Status: CANCELLED | OUTPATIENT
Start: 2021-11-11

## 2021-11-11 RX ORDER — ADALIMUMAB 80MG/0.8ML
80 KIT SUBCUTANEOUS WEEKLY
Qty: 8 EACH | Refills: 6 | Status: SHIPPED | OUTPATIENT
Start: 2021-11-11 | End: 2022-05-19

## 2021-11-11 RX ORDER — CHOLECALCIFEROL (VITAMIN D3) 50 MCG
50 TABLET ORAL DAILY
Qty: 180 TABLET | Refills: 3 | Status: SHIPPED | OUTPATIENT
Start: 2021-11-11 | End: 2022-02-10

## 2021-11-11 RX ORDER — OXYCODONE AND ACETAMINOPHEN 5; 325 MG/1; MG/1
1 TABLET ORAL 2 TIMES DAILY
Qty: 60 TABLET | Refills: 0 | Status: SHIPPED | OUTPATIENT
Start: 2021-11-11 | End: 2021-12-29

## 2021-11-11 NOTE — LETTER
11/11/2021       RE: Klarissa Curtis  [address]  [place] [address]     Dear Colleague,    Thank you for referring your patient, Klarissa Curtis, to the [facility] DERMATOLOGY CLINIC [place] at [facility]. Please see a copy of my visit note below.    [facility] Dermatology Note  Encounter Date: Nov 11, 2021  Start 9:55  End 10:10pm    Dermatology Problem List:  #. Hidradenitis suppurativa: diagnosed age 11, initially on waistline (used to weigh 300 lbs) which cleared up -> intermittent outbreaks -> for the past 10 years has had in bilateral axilla, groin, thighs, and buttocks.   - Current: Humira 80 mg weekly (started 10/4/20). Waiting for cosentyx approval  - Prior: infliximab, prednisone taper, clobetasol, topical morphine, ILK, humira, oral antibiotics, rifampin, sirolimus 2 mg (started 4/22/21) finasteride, spironolactone (6/2019- 9/5/19, stopped due to abnormal uterine bleeding), topical morphine gel, and percocet for severe pain  - Provided the patient a sample of [employer]i [employer]ear clothing for the R axilla and bandages. Prescribed Cutaned sorbion 5.0 x 5.0 inch bandages via [employer].  #. Keratosis pilaris  #. L olecranon bursitis.   #. Dermatographism  SH: She works [place] an [place] ([place] of walking/running around)     #. Eruption NOS,   - punch biopsy 8/5/2021: superficial and deep perivascular and interstitial infiltrate of neutrophils, with focal leukocytoclasis and bluish, amorphous material suggestive of neutrophil degranulation, and lesser amounts of eosinophils and lymphocytes. Background dermal edema is noted. Focal neutrophilic epitheliotropism is noted.  - At this time, no clear diagnosis for rash. Differential consisting of interstitial granulomatous dermatitis, neutrophilic urticarial dermatosis, drug  - Denies new arthralgias, fevers, unintentional weight  loss, Raynaud phenomenon, dry eye, dry mouth. Up to date on cancer screening.  - Prednisone taper, dapsone    ____________________________________________    Assessment & Plan:   # Eruption NOS, dermatitis  At this time, no clear diagnosis for rash. Presence of small clusters of yellow papules with erythema on upper back and around neck. Differential consisting of interstitial granulomatous dermatitis, neutrophilic urticarial dermatosis, drug (adalimumab). Not sure what the underlying etiology could be. ROS not concerning for autoimmune and is up to date on malignancy screening.  She denies new arthralgias, fevers, unintentional weight loss, Raynaud phenomenon, dry eye, dry mouth.Overall feels in usual state of health. Did have eosinophilis which led thought towards drug reaction. Had been on sirolimus from mid may until about one month ago. However, her rash has not resolved since stopping sirolimus. Neutrophils present in biopsy so feel that treatment with dapsone could help resolve rash. Started dapsone and the itch is 75% better, The actually bumops themselves are still there. No new ones. A littl faded. Also still red.     # HS  Currently stable on Humira 80mg weekly. Still pending insurance coverage of cosentyx 300mg weekly for 5 weeks and then monthly.   - Continue Humira 80 mg weekly for now. Has been better overall. Did flare in rt axilla recently.   - Also increasing daposne to 200mg daily  - CBC q 2week   - Last TB checkl 7/2021  - Refilled percocrt. PDMP checked.      # Dermatographism   - Continue allegra 180mg BID     # Vitamin D deficiency   - Vuitamin D 2000 international unit(s) daily  - Recheck levels    Follow-up: 4 weeks phone visit      Jean Kenny MD, FAAD, FACP     Departments of Internal Medicine and Dermatology  [facility]  [phone]  ____________________________________________    CC: Follow-up HS    HPI:  Ms. Klarissa Curtis is a(n) 46 year old  female who presents for folow-up of HS. Still on humira 80mgh weekly,. Did not get cosentyx yet. Started dapsone 100mg daily for the rash on the back which is no better. Itch is better. Still taking allegra 180mg occasionally.  Prednisone did not help.     Patient is otherwise feeling well, without additional skin concerns.    HS Nurse Assessment    Nurse Assessment Data 9/30/2021 11/4/2021 11/11/2021   Over the past month, about how many recurrent or new boils have you had? 2 0 new, same recurrent 10 or more   Over the past week, how many dressing changes do you do each day? 0 3+ 2   Over the past week, has your wound drainage been: Mild Moderate Moderate   Rate your HS overall from 0-10 (0 = no disease, 10 = worst) over the past week:  5 7 6   Rate your pain score from 0-10 (0 = no disease, 10 = worst) for the most painful/symptomatic lesion in the past week:  5 - Moderate Pain 8 8   Over the past week, how much has HS influenced your quality of life? slightly slightly very much       Medications:  Current Outpatient Medications   Medication     adalimumab (HUMIRA *CF* PEN-CD/UC/HS STARTER) 80 MG/0.8ML pen kit     calcium carbonate (CALCIUM CARBONATE) 600 MG tablet     calcium carbonate 750 MG CHEW     clobetasol (TEMOVATE) 0.05 % external cream     dapsone (ACZONE) 100 MG tablet     fexofenadine (ALLEGRA) 180 MG tablet     fluocinonide (LIDEX) 0.05 % external solution     FLUoxetine (PROZAC) 40 MG capsule     hydrOXYzine (ATARAX) 25 MG tablet     ketoconazole (NIZORAL) 2 % external shampoo     medical cannabis (Patient's own supply)     oxyCODONE-acetaminophen (PERCOCET) 5-325 MG tablet     predniSONE (DELTASONE) 10 MG tablet     Secukinumab, 300 MG Dose, 150 MG/ML SOAJ     sirolimus (GENERIC EQUIVALENT) 1 MG tablet     traZODone (DESYREL) 50 MG tablet     triamcinolone (KENALOG) 0.1 % external ointment     valACYclovir (VALTREX) 500 MG tablet     Vitamin D3 (CHOLECALCIFEROL) 2000 units (50 mcg) tablet     No  current facility-administered medications for this visit.      Past Medical/Surgical History:   Patient Active Problem List   Diagnosis     Hidradenitis suppurativa     Fatigue     Past Medical History:   Diagnosis Date     NO ACTIVE PROBLEMS        CC No referring provider defined for this encounter. on close of this encounter.          Again, thank you for allowing me to participate in the care of your patient.      Sincerely,    Jean Kenny MD

## 2021-11-11 NOTE — PROGRESS NOTES
[facility] Dermatology Note  Encounter Date: Nov 11, 2021  Start 9:55  End 10:10pm    Dermatology Problem List:  #. Hidradenitis suppurativa: diagnosed age 11, initially on waistline (used to weigh 300 lbs) which cleared up -> intermittent outbreaks -> for the past 10 years has had in bilateral axilla, groin, thighs, and buttocks.   - Current: Humira 80 mg weekly (started 10/4/20). Waiting for cosentyx approval  - Prior: infliximab, prednisone taper, clobetasol, topical morphine, ILK, humira, oral antibiotics, rifampin, sirolimus 2 mg (started 4/22/21) finasteride, spironolactone (6/2019- 9/5/19, stopped due to abnormal uterine bleeding), topical morphine gel, and percocet for severe pain  - Provided the patient a sample of [employer] clothing for the R axilla and bandages. Prescribed Cutaned sorbion 5.0 x 5.0 inch bandages via [employer].  #. Keratosis pilaris  #. L olecranon bursitis.   #. Dermatographism  SH: [place] works [place] an [place] of walking/running around)     #. Eruption NOS,   - punch biopsy 8/5/2021: superficial and deep perivascular and interstitial infiltrate of neutrophils, with focal leukocytoclasis and bluish, amorphous material suggestive of neutrophil degranulation, and lesser amounts of eosinophils and lymphocytes. Background dermal edema is noted. Focal neutrophilic epitheliotropism is noted.  - At this time, no clear diagnosis for rash. Differential consisting of interstitial granulomatous dermatitis, neutrophilic urticarial dermatosis, drug  - Denies new arthralgias, fevers, unintentional weight loss, Raynaud phenomenon, dry eye, dry mouth. Up to date on cancer screening.  - Prednisone taper, dapsone    ____________________________________________    Assessment & Plan:   # Eruption NOS, dermatitis  At this time, no clear diagnosis for rash. Presence of small clusters of yellow papules with erythema on upper back and around  neck. Differential consisting of interstitial granulomatous dermatitis, neutrophilic urticarial dermatosis, drug (adalimumab). Not sure what the underlying etiology could be. ROS not concerning for autoimmune and is up to date on malignancy screening.  She denies new arthralgias, fevers, unintentional weight loss, Raynaud phenomenon, dry eye, dry mouth.Overall feels in usual state of health. Did have eosinophilis which led thought towards drug reaction. Had been on sirolimus from mid may until about one month ago. However, her rash has not resolved since stopping sirolimus. Neutrophils present in biopsy so feel that treatment with dapsone could help resolve rash. Started dapsone and the itch is 75% better, The actually bumops themselves are still there. No new ones. A littl faded. Also still red.     # HS  Currently stable on Humira 80mg weekly. Still pending insurance coverage of cosentyx 300mg weekly for 5 weeks and then monthly.   - Continue Humira 80 mg weekly for now. Has been better overall. Did flare in rt axilla recently.   - Also increasing daposne to 200mg daily  - CBC q 2week   - Last TB checkl 7/2021  - Refilled percocrt. PDMP checked.      # Dermatographism   - Continue allegra 180mg BID     # Vitamin D deficiency   - Vuitamin D 2000 international unit(s) daily  - Recheck levels    Follow-up: 4 weeks phone visit      Jean Kenny MD, FAAD, FACP     Departments of Internal Medicine and Dermatology  [facility]  [phone]  ____________________________________________    CC: Follow-up HS    HPI:  Ms. Klarissa Curtis is a(n) 46 year old female who presents for folow-up of HS. Still on humira 80mgh weekly,. Did not get cosentyx yet. Started dapsone 100mg daily for the rash on the back which is no better. Itch is better. Still taking allegra 180mg occasionally.  Prednisone did not help.     Patient is otherwise feeling well, without additional skin concerns.    HS Nurse  Assessment    Nurse Assessment Data 9/30/2021 11/4/2021 11/11/2021   Over the past month, about how many recurrent or new boils have you had? 2 0 new, same recurrent 10 or more   Over the past week, how many dressing changes do you do each day? 0 3+ 2   Over the past week, has your wound drainage been: Mild Moderate Moderate   Rate your HS overall from 0-10 (0 = no disease, 10 = worst) over the past week:  5 7 6   Rate your pain score from 0-10 (0 = no disease, 10 = worst) for the most painful/symptomatic lesion in the past week:  5 - Moderate Pain 8 8   Over the past week, how much has HS influenced your quality of life? slightly slightly very much       Medications:  Current Outpatient Medications   Medication     adalimumab (HUMIRA *CF* PEN-CD/UC/HS STARTER) 80 MG/0.8ML pen kit     calcium carbonate (CALCIUM CARBONATE) 600 MG tablet     calcium carbonate 750 MG CHEW     clobetasol (TEMOVATE) 0.05 % external cream     dapsone (ACZONE) 100 MG tablet     fexofenadine (ALLEGRA) 180 MG tablet     fluocinonide (LIDEX) 0.05 % external solution     FLUoxetine (PROZAC) 40 MG capsule     hydrOXYzine (ATARAX) 25 MG tablet     ketoconazole (NIZORAL) 2 % external shampoo     medical cannabis (Patient's own supply)     oxyCODONE-acetaminophen (PERCOCET) 5-325 MG tablet     predniSONE (DELTASONE) 10 MG tablet     Secukinumab, 300 MG Dose, 150 MG/ML SOAJ     sirolimus (GENERIC EQUIVALENT) 1 MG tablet     traZODone (DESYREL) 50 MG tablet     triamcinolone (KENALOG) 0.1 % external ointment     valACYclovir (VALTREX) 500 MG tablet     Vitamin D3 (CHOLECALCIFEROL) 2000 units (50 mcg) tablet     No current facility-administered medications for this visit.      Past Medical/Surgical History:   Patient Active Problem List   Diagnosis     Hidradenitis suppurativa     Fatigue     Past Medical History:   Diagnosis Date     NO ACTIVE PROBLEMS        CC No referring provider defined for this encounter. on close of this encounter.

## 2021-11-11 NOTE — LETTER
Date:December 6, 2021      Provider requested that no letter be sent. Do not send.       [facility]

## 2021-11-12 ENCOUNTER — TELEPHONE (OUTPATIENT)
Dept: DERMATOLOGY | Facility: CLINIC | Age: 46
End: 2021-11-12
Payer: COMMERCIAL

## 2021-11-12 NOTE — PATIENT INSTRUCTIONS
Continue humira 80mg weekly  Increase daspone to 200mg daily  Continue allegra 180 BID as needed  Start Vitamin D 2000IU daily   Labs every 2 weeks for next month  Follow-up in 4 weeks via phone

## 2021-11-15 NOTE — TELEPHONE ENCOUNTER
LOV note copied below.    Assessment & Plan:   # Eruption NOS, dermatitis  At this time, no clear diagnosis for rash. Presence of small clusters of yellow papules with erythema on upper back and around neck. Differential consisting of interstitial granulomatous dermatitis, neutrophilic urticarial dermatosis, drug (adalimumab). Not sure what the underlying etiology could be. ROS not concerning for autoimmune and is up to date on malignancy screening.  She denies new arthralgias, fevers, unintentional weight loss, Raynaud phenomenon, dry eye, dry mouth.Overall feels in usual state of health. Did have eosinophilis which led thought towards drug reaction. Had been on sirolimus from mid may until about one month ago. However, her rash has not resolved since stopping sirolimus. Neutrophils present in biopsy so feel that treatment with dapsone could help resolve rash. Started dapsone and the itch is 75% better, The actually bumops themselves are still there. No new ones. A littl faded. Also still red.     # HS  Currently stable on Humira 80mg weekly. Still pending insurance coverage of cosentyx 300mg weekly for 5 weeks and then monthly.   - Continue Humira 80 mg weekly for now. Has been better overall. Did flare in rt axilla recently.   - Also increasing daposne to 200mg daily  - CBC q 2week   - Last TB checkl 7/2021  - Refilled percocrt. PDMP checked.

## 2021-11-22 ENCOUNTER — LAB (OUTPATIENT)
Dept: LAB | Facility: CLINIC | Age: 46
End: 2021-11-22
Payer: COMMERCIAL

## 2021-11-22 DIAGNOSIS — L73.2 HIDRADENITIS SUPPURATIVA: ICD-10-CM

## 2021-11-22 LAB
BASOPHILS # BLD AUTO: 0 10E3/UL (ref 0–0.2)
BASOPHILS NFR BLD AUTO: 0 %
EOSINOPHIL # BLD AUTO: 0.1 10E3/UL (ref 0–0.7)
EOSINOPHIL NFR BLD AUTO: 1 %
ERYTHROCYTE [DISTWIDTH] IN BLOOD BY AUTOMATED COUNT: 13.2 % (ref 10–15)
HCT VFR BLD AUTO: 40.6 % (ref 35–47)
HGB BLD-MCNC: 13 G/DL (ref 11.7–15.7)
LYMPHOCYTES # BLD AUTO: 2.2 10E3/UL (ref 0.8–5.3)
LYMPHOCYTES NFR BLD AUTO: 28 %
MCH RBC QN AUTO: 30.8 PG (ref 26.5–33)
MCHC RBC AUTO-ENTMCNC: 32 G/DL (ref 31.5–36.5)
MCV RBC AUTO: 96 FL (ref 78–100)
MONOCYTES # BLD AUTO: 0.4 10E3/UL (ref 0–1.3)
MONOCYTES NFR BLD AUTO: 5 %
NEUTROPHILS # BLD AUTO: 5.2 10E3/UL (ref 1.6–8.3)
NEUTROPHILS NFR BLD AUTO: 65 %
PLATELET # BLD AUTO: 426 10E3/UL (ref 150–450)
RBC # BLD AUTO: 4.22 10E6/UL (ref 3.8–5.2)
WBC # BLD AUTO: 7.9 10E3/UL (ref 4–11)

## 2021-11-22 PROCEDURE — 82306 VITAMIN D 25 HYDROXY: CPT

## 2021-11-22 PROCEDURE — 85025 COMPLETE CBC W/AUTO DIFF WBC: CPT

## 2021-11-22 PROCEDURE — 36415 COLL VENOUS BLD VENIPUNCTURE: CPT

## 2021-11-23 LAB — DEPRECATED CALCIDIOL+CALCIFEROL SERPL-MC: 21 UG/L (ref 20–75)

## 2021-12-16 ENCOUNTER — TELEPHONE (OUTPATIENT)
Dept: DERMATOLOGY | Facility: CLINIC | Age: 46
End: 2021-12-16
Payer: COMMERCIAL

## 2021-12-16 NOTE — TELEPHONE ENCOUNTER
PA Initiation    Medication: Cosentyx  Insurance Company:  [employer]  Pharmacy Filling the Rx:  [facility]  Filling Pharmacy Phone:  [phone]  Filling Pharmacy Fax:  [phone]  Start Date:  12/16/21

## 2021-12-20 NOTE — TELEPHONE ENCOUNTER
Prior Authorization Approval    Authorization Effective Date: 12/16/2021  Authorization Expiration Date: 1/16/2022  Medication: Cosentyx  Approved Dose/Quantity: 8/28 days  Reference #: [id number]   Insurance Company: [employer] ([facility]) - Phone [phone] Fax [phone]  Expected CoPay: $80 ($0 if pt signs up for copay card)     CoPay Card Available: Yes    Foundation Assistance Needed:    Which Pharmacy is filling the prescription (Not needed for infusion/clinic administered):    Pharmacy Notified: Yes  Patient Notified: Yes

## 2021-12-29 ENCOUNTER — TELEPHONE (OUTPATIENT)
Dept: DERMATOLOGY | Facility: CLINIC | Age: 46
End: 2021-12-29
Payer: COMMERCIAL

## 2021-12-29 DIAGNOSIS — L73.2 HIDRADENITIS SUPPURATIVA: ICD-10-CM

## 2021-12-29 RX ORDER — OXYCODONE AND ACETAMINOPHEN 5; 325 MG/1; MG/1
1 TABLET ORAL 2 TIMES DAILY
Qty: 14 TABLET | Refills: 0 | Status: SHIPPED | OUTPATIENT
Start: 2021-12-29 | End: 2022-01-25

## 2021-12-29 NOTE — TELEPHONE ENCOUNTER
I called and spoke with Maura and she notes that on her right axilla she is still experiencing drainage and her left arm from shoulder down she is having a hard time moving it without pain. Maura is Unable to bend left hand and fingers   Right knee and hips are also in a lot of pain.     Please advise, pt would like to know how to proceed.

## 2021-12-29 NOTE — TELEPHONE ENCOUNTER
Called patient to address concern for pain. Patient states that for <1week she has had pain in the joints of her left hand, wrist and shoulder as well as the hip. She has trouble moving that arm/closing that hand due to pain and denies any redness, swelling or overlying skin change. She says she maybe had similar symptoms to this prior to starting humira. She was last seen by Dr. Kenny in 11/2021 for HS and plan at that time was to continue Humira 80 mg weekly, continue dapsone and start Cosentyx. Patient states that even though Dr. Kenny recommended continuign Humira and dapsone, she stopped them 6-8 weeks ago because she did not think they were helpful. She has not started the Cosentyx yet. Discussed that Humira was possibly treating undiagnosed rheumatoid/other inflammatory arthritis. Recommend starting the Cosentyx as planned. Offered rheumatology referral, patient declined and prefers to discuss with Dr. Kenny. Patient requesting additional week of Percocet to help with acute pain, will send script.    Plan discussed with Dr. Henson    CC Dr. Kenny to address further management (Humira, dapsone, rheum referral) and preferred follow up

## 2021-12-29 NOTE — TELEPHONE ENCOUNTER
[facility] Call Center    Phone Message    May a detailed message be left on voicemail: yes     Reason for Call: Other: Pt having unexplainable symptoms, feels awful, please call to discuss     Action Taken: Message routed to:  [facility] Derm    Travel Screening: Not Applicable

## 2022-01-13 ENCOUNTER — VIRTUAL VISIT (OUTPATIENT)
Dept: DERMATOLOGY | Facility: CLINIC | Age: 47
End: 2022-01-13
Payer: COMMERCIAL

## 2022-01-13 DIAGNOSIS — G89.29 OTHER CHRONIC PAIN: Primary | ICD-10-CM

## 2022-01-13 PROCEDURE — 99214 OFFICE O/P EST MOD 30 MIN: CPT | Mod: TEL | Performed by: DERMATOLOGY

## 2022-01-13 NOTE — LETTER
Date:January 30, 2022      Patient was self referred, no letter generated. Do not send.        [facility] Health Information       PDMP reviewed

## 2022-01-13 NOTE — NURSING NOTE
Dermatology Rooming Note    Klarissa Curtis's goals for this visit include:   Chief Complaint   Patient presents with     Derm Problem     Maura is following up today for HS flare

## 2022-01-13 NOTE — LETTER
1/13/2022       RE: Klarissa Curtis  [address]  [place] [address]     Dear Colleague,    Thank you for referring your patient, Klarissa Curtis, to the [facility] [place] at [facility]. Please see a copy of my visit note below.    of [facility] Dermatology Note  Encounter Date:January 25, 2022 (was unable to get a hold of her for the past week. Phone was broken. Was able to get a hold of her today at her job0    Start 4:15pm  End: 4:30pm     Dermatology Problem List:  #. Hidradenitis suppurativa: diagnosed age 11, initially on waistline (used to weigh 300 lbs) which cleared up -> intermittent outbreaks -> for the past 10 years has had in bilateral axilla, groin, thighs, and buttocks.   - Current: Humira 80 mg weekly (started 10/4/20). Waiting for cosentyx approval  - Prior: infliximab, prednisone taper, clobetasol, topical morphine, ILK, humira, oral antibiotics, rifampin, sirolimus 2 mg (started 4/22/21) finasteride, spironolactone (6/2019- 9/5/19, stopped due to abnormal uterine bleeding), topical morphine gel, and percocet for severe pain  - Provided the patient a sample of Handi Wear clothing for the R axilla and bandages. Prescribed Cutaned sorbion 5.0 x 5.0 inch bandages via [employer].  #. Keratosis pilaris  #. L olecranon bursitis.   #. Dermatographis  #. Eruption NOS,   - punch biopsy 8/5/2021: superficial and deep perivascular and interstitial infiltrate of neutrophils, with focal leukocytoclasis and bluish, amorphous material suggestive of neutrophil degranulation, and lesser amounts of eosinophils and lymphocytes. Background dermal edema is noted. Focal neutrophilic epitheliotropism is noted.  - At this time, no clear diagnosis for rash. Differential consisting of interstitial granulomatous dermatitis, neutrophilic urticarial dermatosis, drug  - Denies new arthralgias, fevers, unintentional  weight loss, Raynaud phenomenon, dry eye, dry mouth. Up to date on cancer screening.  - Prednisone taper, dapsone  - REsolved   SH: [place] an [place]/running around)  ____________________________________________     Assessment & Plan:   # Eruption NOS, dermatitis  Resolved    # HS  - Severe flare on top of chronic pain. PDMP reviewed. She is currently 7/10 pain on average.   - With frequent wes for opiods, will try to trasnition to belbuca to see if this provides better pain relief with less issues relating to overdose or tolerancce.Will touch base with her next week to make sure pain level is coming down   - Will give the cosentyx a little longer to kick in. She did stop cold turey humira for a few months which I probably would not have recommended   - Last TB checkl 7/2021  - Given she is out of percocet and asking for refill , will try switching to belbucu 450mg buccal BID.  PDMP checked.     # Dermatographism  - Continue allegra 180mg BID prn     # Vitamin D deficiency   - Vuitamin D 2000 international unit(s) daily     Follow-up: 4 weeks phone visit        Jean Kenny MD, FAAD, FACP     Departments of Internal Medicine and Dermatology  [facility]  [phone]  ____________________________________________     CC: Follow-up HS     Interval Hx  Quit humira Oct 1. Started cosentyx 1 month ago. Was off everything at that time.   Started getting bad in Dec, with new lesions at the end of Dec. Beginning of Jan got bad.   The other rash resolved. Itched sometimes but not too bad. Takes fexofenadine and that helps the itching.,Unclear what helped. She has been off daspone also for 2-2.5 months and never returned.  She is not sure if the cosentyx is helping. She is on cosenyx 300mg weekly and taking 5th dose tomorrow. She started having joint pains in knees and wrists. She notes a lot of soreness in the last few  months. The pain is intermittent last a few days. It doesn't get red, warm or swollen. Just hurts to bend it. DUriny the day more than at night. LEft knee is worse in morning. It also comes and goes. She will have weeks with no pain and then develops. When it comes on it is stiff. Loosens up after 15 min. She is on medical marijuana.       HS Nurse Assessment    Nurse Assessment Data 11/4/2021 11/11/2021 1/13/2022   Over the past 30 days how many old lesions flared back up? - - 7   Over the past 30 days how many new lesions did you get? 0 new, same recurrent 10 or more 1   Over the past week, how many dressing changes do you do each day? 3+ 2 2   Over the past week, has your wound drainage been: Moderate Moderate Severe   Rate your HS overall from 0 - 10 (0 = no disease, 10 = worst) over the past week:  7 6 5   Rate your pain score from 0 - 10 (0 = no disease, 10 = worst) for the most painful/symptomatic lesion in the past week:  8 8 7   Over the past week, how much has HS influenced your quality of life? slightly very much very much       Medications:  Current Outpatient Medications   Medication     adalimumab (HUMIRA *CF* PEN-CD/UC/HS STARTER) 80 MG/0.8ML pen kit     calcium carbonate (CALCIUM CARBONATE) 600 MG tablet     calcium carbonate 750 MG CHEW     clobetasol (TEMOVATE) 0.05 % external cream     dapsone (ACZONE) 100 MG tablet     fexofenadine (ALLEGRA) 180 MG tablet     FLUoxetine (PROZAC) 40 MG capsule     hydrOXYzine (ATARAX) 25 MG tablet     medical cannabis (Patient's own supply)     oxyCODONE-acetaminophen (PERCOCET) 5-325 MG tablet     Secukinumab, 300 MG Dose, 150 MG/ML SOAJ     traZODone (DESYREL) 50 MG tablet     vitamin D3 (CHOLECALCIFEROL) 50 mcg (2000 units) tablet     fluocinonide (LIDEX) 0.05 % external solution     ketoconazole (NIZORAL) 2 % external shampoo     sirolimus (GENERIC EQUIVALENT) 1 MG tablet     triamcinolone (KENALOG) 0.1 % external ointment     valACYclovir (VALTREX) 500 MG  tablet     No current facility-administered medications for this visit.       Past Medical/Surgical History:       Patient Active Problem List   Diagnosis     Hidradenitis suppurativa     Fatigue      Past Medical History     Exam: very indurated and red rt axillary tunnel s, acute abscess proximal rt upper rm. ALso tunnel vs few inflammatory nodules on lower axilla. Severe drainage        Again, thank you for allowing me to participate in the care of your patient.      Sincerely,    Jean Kenny MD

## 2022-01-25 NOTE — PROGRESS NOTES
[facility] Dermatology Note  Encounter Date:January 25, 2022 (was unable to get a hold of her for the past week. Phone was broken. Was able to get a hold of her today at her job0    Start 4:15pm  End: 4:30pm     Dermatology Problem List:  #. Hidradenitis suppurativa: diagnosed age 11, initially on waistline (used to weigh 300 lbs) which cleared up -> intermittent outbreaks -> for the past 10 years has had in bilateral axilla, groin, thighs, and buttocks.   - Current: Humira 80 mg weekly (started 10/4/20). Waiting for cosentyx approval  - Prior: infliximab, prednisone taper, clobetasol, topical morphine, ILK, humira, oral antibiotics, rifampin, sirolimus 2 mg (started 4/22/21) finasteride, spironolactone (6/2019- 9/5/19, stopped due to abnormal uterine bleeding), topical morphine gel, and percocet for severe pain  - Provided the patient a sample of [employer] clothing for the R axilla and bandages. Prescribed Cutaned sorbion 5.0 x 5.0 inch bandages via [employer].  #. Keratosis pilaris  #. L olecranon bursitis.   #. Dermatographis  #. Eruption NOS,   - punch biopsy 8/5/2021: superficial and deep perivascular and interstitial infiltrate of neutrophils, with focal leukocytoclasis and bluish, amorphous material suggestive of neutrophil degranulation, and lesser amounts of eosinophils and lymphocytes. Background dermal edema is noted. Focal neutrophilic epitheliotropism is noted.  - At this time, no clear diagnosis for rash. Differential consisting of interstitial granulomatous dermatitis, neutrophilic urticarial dermatosis, drug  - Denies new arthralgias, fevers, unintentional weight loss, Raynaud phenomenon, dry eye, dry mouth. Up to date on cancer screening.  - Prednisone taper, dapsone  - REsolved   SH: She works as an [place] (lots of walking/running around)  ____________________________________________     Assessment & Plan:   # Eruption NOS,  dermatitis  Resolved    # HS  - Severe flare on top of chronic pain. PDMP reviewed. She is currently 7/10 pain on average.   - With frequent wes for opiods, will try to trasnition to belbuca to see if this provides better pain relief with less issues relating to overdose or tolerancce.Will touch base with her next week to make sure pain level is coming down   - Will give the cosentyx a little longer to kick in. She did stop cold turey humira for a few months which I probably would not have recommended   - Last TB checkl 7/2021  - Given she is out of percocet and asking for refill , will try switching to belbucu 450mg buccal BID.  PDMP checked.     # Dermatographism  - Continue allegra 180mg BID prn     # Vitamin D deficiency   - Vuitamin D 2000 international unit(s) daily     Follow-up: 4 weeks phone visit        Jean Kenny MD, FAAD, FACP     Departments of Internal Medicine and Dermatology  [facility]  [phone]  ____________________________________________     CC: Follow-up HS     Interval Hx  Quit humira Oct 1. Started cosentyx 1 month ago. Was off everything at that time.   Started getting bad in Dec, with new lesions at the end of Dec. Beginning of Jan got bad.   The other rash resolved. Itched sometimes but not too bad. Takes fexofenadine and that helps the itching.,Unclear what helped. She has been off daspone also for 2-2.5 months and never returned.  She is not sure if the cosentyx is helping. She is on cosenyx 300mg weekly and taking 5th dose tomorrow. She started having joint pains in knees and wrists. She notes a lot of soreness in the last few months. The pain is intermittent last a few days. It doesn't get red, warm or swollen. Just hurts to bend it. DUriny the day more than at night. LEft knee is worse in morning. It also comes and goes. She will have weeks with no pain and then develops. When it comes on it is stiff. Loosens up after 15 min. She is on medical  marijuana.       HS Nurse Assessment    Nurse Assessment Data 11/4/2021 11/11/2021 1/13/2022   Over the past 30 days how many old lesions flared back up? - - 7   Over the past 30 days how many new lesions did you get? 0 new, same recurrent 10 or more 1   Over the past week, how many dressing changes do you do each day? 3+ 2 2   Over the past week, has your wound drainage been: Moderate Moderate Severe   Rate your HS overall from 0 - 10 (0 = no disease, 10 = worst) over the past week:  7 6 5   Rate your pain score from 0 - 10 (0 = no disease, 10 = worst) for the most painful/symptomatic lesion in the past week:  8 8 7   Over the past week, how much has HS influenced your quality of life? slightly very much very much       Medications:  Current Outpatient Medications   Medication     adalimumab (HUMIRA *CF* PEN-CD/UC/HS STARTER) 80 MG/0.8ML pen kit     calcium carbonate (CALCIUM CARBONATE) 600 MG tablet     calcium carbonate 750 MG CHEW     clobetasol (TEMOVATE) 0.05 % external cream     dapsone (ACZONE) 100 MG tablet     fexofenadine (ALLEGRA) 180 MG tablet     FLUoxetine (PROZAC) 40 MG capsule     hydrOXYzine (ATARAX) 25 MG tablet     medical cannabis (Patient's own supply)     oxyCODONE-acetaminophen (PERCOCET) 5-325 MG tablet     Secukinumab, 300 MG Dose, 150 MG/ML SOAJ     traZODone (DESYREL) 50 MG tablet     vitamin D3 (CHOLECALCIFEROL) 50 mcg (2000 units) tablet     fluocinonide (LIDEX) 0.05 % external solution     ketoconazole (NIZORAL) 2 % external shampoo     sirolimus (GENERIC EQUIVALENT) 1 MG tablet     triamcinolone (KENALOG) 0.1 % external ointment     valACYclovir (VALTREX) 500 MG tablet     No current facility-administered medications for this visit.       Past Medical/Surgical History:   Patient Active Problem List   Diagnosis     Hidradenitis suppurativa     Fatigue      Past Medical History     Exam: very indurated and red rt axillary tunnel s, acute abscess proximal rt upper rm. ALso tunnel vs few  inflammatory nodules on lower axilla. Severe drainage

## 2022-01-26 ENCOUNTER — TELEPHONE (OUTPATIENT)
Dept: DERMATOLOGY | Facility: CLINIC | Age: 47
End: 2022-01-26
Payer: COMMERCIAL

## 2022-01-26 NOTE — TELEPHONE ENCOUNTER
PA Initiation    Medication: Buprenorphine HCl (BELBUCA) 450 MCG FILM buccal film  Insurance Company: [employer] ([facility]) - Phone [phone] Fax [phone]  Pharmacy Filling the [id number] [place] [facility] - [address]  Filling Pharmacy Phone: [phone]  Filling Pharmacy Fax: [phone]  Start Date: 1/26/2022

## 2022-01-26 NOTE — TELEPHONE ENCOUNTER
Prior Authorization Retail Medication Request    Medication/Dose: Buprenorphine HCl (BELBUCA) 450 MCG FILM buccal film   ICD code (if different than what is on RX):  Other chronic pain [G89.29]   Previously Tried and Failed:    Rationale:      Insurance Name:  [facility]  Insurance ID:  [id number]

## 2022-01-27 ENCOUNTER — MYC MEDICAL ADVICE (OUTPATIENT)
Dept: DERMATOLOGY | Facility: CLINIC | Age: 47
End: 2022-01-27
Payer: COMMERCIAL

## 2022-01-28 NOTE — TELEPHONE ENCOUNTER
Prior Authorization Approval    Authorization Effective Date: 1/26/2022  Authorization Expiration Date: 7/26/2022  Medication: Buprenorphine HCl (BELBUCA) 450 MCG FILM buccal film--APPROVED  Approved Dose/Quantity:   Reference #:     Insurance Company: [employer] ([facility]) - Phone [phone] Fax [phone]  Expected CoPay:       CoPay Card Available:      Foundation Assistance Needed:    Which Pharmacy is filling the prescription (Not needed for infusion/clinic administered): [id number] [place] [address]  Pharmacy Notified: Yes  Patient Notified: Yes **Instructed pharmacy to notify patient when script is ready to /ship.**

## 2022-02-07 ENCOUNTER — MYC MEDICAL ADVICE (OUTPATIENT)
Dept: DERMATOLOGY | Facility: CLINIC | Age: 47
End: 2022-02-07
Payer: COMMERCIAL

## 2022-02-10 ENCOUNTER — OFFICE VISIT (OUTPATIENT)
Dept: DERMATOLOGY | Facility: CLINIC | Age: 47
End: 2022-02-10
Payer: COMMERCIAL

## 2022-02-10 DIAGNOSIS — L30.9 DERMATITIS: ICD-10-CM

## 2022-02-10 DIAGNOSIS — G89.29 OTHER CHRONIC PAIN: ICD-10-CM

## 2022-02-10 DIAGNOSIS — L73.2 HIDRADENITIS SUPPURATIVA: Primary | ICD-10-CM

## 2022-02-10 PROCEDURE — 99214 OFFICE O/P EST MOD 30 MIN: CPT | Mod: 25 | Performed by: DERMATOLOGY

## 2022-02-10 PROCEDURE — 17110 DESTRUCTION B9 LES UP TO 14: CPT | Performed by: DERMATOLOGY

## 2022-02-10 PROCEDURE — 11900 INJECT SKIN LESIONS </W 7: CPT | Mod: XS | Performed by: DERMATOLOGY

## 2022-02-10 RX ORDER — FEXOFENADINE HCL 180 MG/1
180 TABLET ORAL 2 TIMES DAILY
Qty: 60 TABLET | Refills: 0 | Status: SHIPPED | OUTPATIENT
Start: 2022-02-10 | End: 2023-10-02

## 2022-02-10 RX ORDER — PHENOL 1.4 %
2 AEROSOL, SPRAY (ML) MUCOUS MEMBRANE DAILY
Qty: 180 TABLET | Refills: 11 | Status: SHIPPED | OUTPATIENT
Start: 2022-02-10 | End: 2023-10-02

## 2022-02-10 RX ORDER — CHOLECALCIFEROL (VITAMIN D3) 50 MCG
50 TABLET ORAL DAILY
Qty: 180 TABLET | Refills: 3 | Status: SHIPPED | OUTPATIENT
Start: 2022-02-10 | End: 2023-02-17

## 2022-02-10 RX ORDER — PREDNISONE 5 MG/1
TABLET ORAL
Qty: 168 TABLET | Refills: 0 | Status: SHIPPED | OUTPATIENT
Start: 2022-02-10 | End: 2022-03-14

## 2022-02-10 NOTE — LETTER
2/10/2022       RE: Klarissa Curtis  [address]  [place] [address]     Dear Colleague,    Thank you for referring your patient, Klarissa Curtis, to the [facility] [place] at [facility]. Please see a copy of my visit note below.    [facility] Dermatology Note  Encounter Date: Feb 10, 2022  Office Visit     Dermatology Problem List:  # Hidradenitis suppurativa: diagnosed age 11, initially on waistline (used to weigh 300 lbs) which cleared up -> intermittent outbreaks -> for the past 10 years has had in bilateral axilla, groin, thighs, and buttocks.   - Cosentyx, belbuca; consider baricitinib   - Prior: humira, infliximab, prednisone taper, clobetasol, topical morphine, ILK, humira, oral antibiotics, rifampin, sirolimus 2 mg (started 4/22/21) finasteride, spironolactone (6/2019- 9/5/19, stopped due to abnormal uterine bleeding), topical morphine gel, and percocet for severe pain  - Cutaned sorbion 5.0 x 5.0 inch bandages via [employer]  # Keratosis pilaris  # L olecranon bursitis.   # Dermatographis  # Eruption NOS,   - punch biopsy 8/5/2021: superficial and deep perivascular and interstitial infiltrate of neutrophils, with focal leukocytoclasis and bluish, amorphous material suggestive of neutrophil degranulation, and lesser amounts of eosinophils and lymphocytes. Background dermal edema is noted. Focal neutrophilic epitheliotropism is noted.  - Resolved     SH: [place] works as an [place]lots of walking/running around)    ____________________________________________    Assessment & Plan:    # HS.   - No noticeable improvement since starting cosentyx as she is still experiencing flares. Also having continued pain. Discussed risks and benefits of various treatment options given potential rheumatoid arthritis component including IL1A, IL-17 inhibitors,  and joselo inhibitors.  For now, recommended giving cosentyx another month to kick in and starting prednisone taper. Plan will be to try the jak inhibitor baricitinib at follow up. For pain, continue belbuca.     - Start prednisone taper: 60mg daily x4 days, 50mg daily x4 days, 40 mg x4 days, 30mg x4 days, 20mg x4 days  - start xeljanz 5mg BID  - Continue cosentyx until starting xeljanz  - Continue belbuca 450 mg buccal BID  - ILK today, see procedure note below     # Dermatographism  - Continue allegra 180mg BID prn (refilled today)    # Verruca Vulgaris, left 5th MCP joint  - After paring, cryo was performed today - see procedure note below    # Shoulder pain  - Suspect rotator injury  - Recommended PT     Procedures Performed:   - Intra-lesional triamcinolone procedure note. After positioning and cleansing with isopropyl alcohol, 6 total mL of triamcinolone 10 mg/mL was injected into sites in axilla in groin. The patient tolerated the procedure well and left the dermatology clinic in good condition.    - Cryotherapy procedure note, location(s): See above. After verbal consent and discussion of risks and benefits including, but not limited to, dyspigmentation/scar, blister, and pain, 1 wart(s) was(were) treated with 1-2 mm freeze border for 1-2 cycles with liquid nitrogen. Post cryotherapy instructions were provided.    Follow-up: 3 week(s) virtually (telephone with photos), or earlier for new or changing lesions    Staff , medical student and Scribe:     Scribe Disclosure:  I, June Alexander, am serving as a scribe to document services personally performed by Jean Kenny MD based on data collection and the provider's statements to me.     Emmanuelle Orona, MS4  .  Provider Disclosure:   The documentation recorded by the scribe accurately reflects the services I personally performed and the decisions made by me.    Jean Kenny MD, FAAD    Departments of Internal Medicine and  Dermatology  [facility]  [phone]    Staff Physician:  I was present with the medical student who participated in the service and in the documentation of the note. I have verified the history and personally performed the physical exam and medical decision making. I agree with the assessment and plan of care as documented in the note.     Jean Kenny MD    Department of Dermatology  [facility]: Phone: [phone], Fax: [phone]  2/18/2022      ____________________________________________    CC: Derm Problem (Maura is here today for an HS flare)    HPI:  Ms. Klarissa Curtis is a(n) 46 year old female who presents today as a return patient for follow up of her hidradenitis suppurativa. The patient was last seen in dermatology on 1/13/22 by myself at which time she was continued on cosentyx for treatment of HS.    Pt currently on cosentyx (next scheduled for 2/23/22) and buccal buprenorphine (started after her last visit in 1/22). She has not noticed an improvement her hidradenitis, currently in a flare worst in her axillae and inguinal area. This started about 1 week ago with increased drainage. Uses mepilex dressing when necessary but sometimes feels like this makes it worse. Rates her pain at 7/10 (states she lives at a 3-4/10 at baseline). Has been using belbuca at increased frequency compared to the prescribed dose. Has been using 2-3 per day. Only does this on work days when she has to be moving around more. Offsets her pain with medical marijuana on non workdays. She states kenalog injections have helped in the past. She states she has also had continued pruritis, particularly on her face, and would like a refill of allegra.    She has joint pain in both shoulders which decreases her mobility. Also has inflammatory type joint pain that moves from joint to joint. Most recently has been bothersome  in her wrists and hands. Pain is worst in the morning when she wakes up, typically lasts for 5 minutes and improves with movement. No pain in the fingers. She denies warmth, redness, or swelling in any of her joints.    Patient is otherwise feeling well, without additional skin concerns.    HS Nurse Assessment    Nurse Assessment Data 11/11/2021 1/13/2022 2/10/2022   Over the past 30 days how many old lesions flared back up? - 7 9   Over the past 30 days how many new lesions did you get? 10 or more 1 5-7   Over the past week, how many dressing changes do you do each day? 2 2 3+   Over the past week, has your wound drainage been: Moderate Severe Severe   Rate your HS overall from 0 - 10 (0 = no disease, 10 = worst) over the past week:  6 5 6-7   Rate your pain score from 0 - 10 (0 = no disease, 10 = worst) for the most painful/symptomatic lesion in the past week:  8 7 8   Over the past week, how much has HS influenced your quality of life? very much very much very much             Labs Reviewed:  N/A    Physical Exam:  Vitals: There were no vitals taken for this visit.  SKIN: Total skin excluding the undergarment areas was performed. The exam included the head/face, neck, both arms, chest, back, abdomen, both legs, digits and/or nails.   - verrucate papule over left 5th MCP joint  - right axilla: multiple indurated nodules with surrounding erythema, rope like scars and tunnels with active drainage of purulent material   - left axilla: two pink/purple nodules with one tunnel   - bilateral inguinal folds with nodules and multiple draining tunnels    - two medium brown macules over right shoulder   - No other lesions of concern on areas examined.     HS Data  HS Exam Data 8/5/2021 9/30/2021 2/10/2022   LC Type - LC1 LC1   Clinical Subtypes - Regular type Regular type   Acne? - No No   Dissecting Cellulitis? - No No   Visual analogue score (0-100) - - 55   Total Julian Stage - II II   Total Inflammatory Nodules 4 3 10    Total Abcesses 0 0 0   Total Draining Tunnels 2 4 5   Total Abscess and Nodule Count 4 3 10   IHS4 Score  12 19 30   Total  HASI Score 31 27 69   HS-PGA - 3 3     Medications:  Current Outpatient Medications   Medication     calcium carbonate (CALCIUM CARBONATE) 600 MG tablet     fexofenadine (ALLEGRA) 180 MG tablet     hydrOXYzine (ATARAX) 25 MG tablet     medical cannabis (Patient's own supply)     Secukinumab, 300 MG Dose, 150 MG/ML SOAJ     traZODone (DESYREL) 50 MG tablet     vitamin D3 (CHOLECALCIFEROL) 50 mcg (2000 units) tablet     adalimumab (HUMIRA *CF* PEN-CD/UC/HS STARTER) 80 MG/0.8ML pen kit     calcium carbonate 750 MG CHEW     clobetasol (TEMOVATE) 0.05 % external cream     dapsone (ACZONE) 100 MG tablet     fluocinonide (LIDEX) 0.05 % external solution     FLUoxetine (PROZAC) 40 MG capsule     ketoconazole (NIZORAL) 2 % external shampoo     triamcinolone (KENALOG) 0.1 % external ointment     valACYclovir (VALTREX) 500 MG tablet     No current facility-administered medications for this visit.      Past Medical History:   Patient Active Problem List   Diagnosis     Hidradenitis suppurativa     Fatigue     Past Medical History:   Diagnosis Date     NO ACTIVE PROBLEMS         CC No referring provider defined for this encounter. on close of this encounter.          Again, thank you for allowing me to participate in the care of your patient.      Sincerely,    Jean Kenny MD

## 2022-02-10 NOTE — NURSING NOTE
Dermatology Rooming Note    Klarissa Curtis's goals for this visit include:   Chief Complaint   Patient presents with     Derm Problem     Maura is here today for an HS flare

## 2022-02-10 NOTE — LETTER
Date:February 18, 2022      Provider requested that no letter be sent. Do not send.       [facility]

## 2022-02-10 NOTE — PROGRESS NOTES
[facility] Dermatology Note  Encounter Date: Feb 10, 2022  Office Visit     Dermatology Problem List:  # Hidradenitis suppurativa: diagnosed age 11, initially on waistline (used to weigh 300 lbs) which cleared up -> intermittent outbreaks -> for the past 10 years has had in bilateral axilla, groin, thighs, and buttocks.   - Cosentyx, belbuca; consider baricitinib   - Prior: humira, infliximab, prednisone taper, clobetasol, topical morphine, ILK, humira, oral antibiotics, rifampin, sirolimus 2 mg (started 4/22/21) finasteride, spironolactone (6/2019- 9/5/19, stopped due to abnormal uterine bleeding), topical morphine gel, and percocet for severe pain  - Cutaned sorbion 5.0 x 5.0 inch bandages via [employer]  # Keratosis pilaris  # L olecranon bursitis.   # Dermatographis  # Eruption NOS,   - punch biopsy 8/5/2021: superficial and deep perivascular and interstitial infiltrate of neutrophils, with focal leukocytoclasis and bluish, amorphous material suggestive of neutrophil degranulation, and lesser amounts of eosinophils and lymphocytes. Background dermal edema is noted. Focal neutrophilic epitheliotropism is noted.  - Resolved     SH: [place] works [place] an [place] of walking/running around)    ____________________________________________    Assessment & Plan:    # HS.   - No noticeable improvement since starting cosentyx as she is still experiencing flares. Also having continued pain. Discussed risks and benefits of various treatment options given potential rheumatoid arthritis component including IL1A, IL-17 inhibitors, and joselo inhibitors.  For now, recommended giving cosentyx another month to kick in and starting prednisone taper. Plan will be to try the jak inhibitor baricitinib at follow up. For pain, continue belbuca.     - Start prednisone taper: 60mg daily x4 days, 50mg daily x4 days, 40 mg x4 days, 30mg x4 days, 20mg x4 days  - start xeljanz  5mg BID  - Continue cosentyx until starting xeljanz  - Continue belbuca 450 mg buccal BID  - ILK today, see procedure note below     # Dermatographism  - Continue allegra 180mg BID prn (refilled today)    # Verruca Vulgaris, left 5th MCP joint  - After paring, cryo was performed today - see procedure note below    # Shoulder pain  - Suspect rotator injury  - Recommended PT     Procedures Performed:   - Intra-lesional triamcinolone procedure note. After positioning and cleansing with isopropyl alcohol, 6 total mL of triamcinolone 10 mg/mL was injected into sites in axilla in groin. The patient tolerated the procedure well and left the dermatology clinic in good condition.    - Cryotherapy procedure note, location(s): See above. After verbal consent and discussion of risks and benefits including, but not limited to, dyspigmentation/scar, blister, and pain, 1 wart(s) was(were) treated with 1-2 mm freeze border for 1-2 cycles with liquid nitrogen. Post cryotherapy instructions were provided.    Follow-up: 3 week(s) virtually (telephone with photos), or earlier for new or changing lesions    Staff , medical student and Scribe:     Scribe Disclosure:  I, June Alexander, am serving as a scribe to document services personally performed by Jean Kenny MD based on data collection and the provider's statements to me.     Emmanuelle Orona, MS4  .  Provider Disclosure:   The documentation recorded by the scribe accurately reflects the services I personally performed and the decisions made by me.    Jean Kenny MD, FAAD    Departments of Internal Medicine and Dermatology  [facility]  [phone]    Staff Physician:  I was present with the medical student who participated in the service and in the documentation of the note. I have verified the history and personally performed the physical exam and medical decision making. I agree with the assessment and plan of care as documented in the note.      Jean Kenny MD    Department of Dermatology  [facility] [place]: Phone: [phone], Fax: [phone]  2/18/2022      ____________________________________________    CC: Derm Problem (Maura is here today for an HS flare)    HPI:  Ms. Klarissa Curtis is a(n) 46 year old female who presents today as a return patient for follow up of her hidradenitis suppurativa. The patient was last seen in dermatology on 1/13/22 by myself at which time she was continued on cosentyx for treatment of HS.    Pt currently on cosentyx (next scheduled for 2/23/22) and buccal buprenorphine (started after her last visit in 1/22). She has not noticed an improvement her hidradenitis, currently in a flare worst in her axillae and inguinal area. This started about 1 week ago with increased drainage. Uses mepilex dressing when necessary but sometimes feels like this makes it worse. Rates her pain at 7/10 (states she lives at a 3-4/10 at baseline). Has been using belbuca at increased frequency compared to the prescribed dose. Has been using 2-3 per day. Only does this on work days when she has to be moving around more. Offsets her pain with medical marijuana on non workdays. She states kenalog injections have helped in the past. She states she has also had continued pruritis, particularly on her face, and would like a refill of allegra.    She has joint pain in both shoulders which decreases her mobility. Also has inflammatory type joint pain that moves from joint to joint. Most recently has been bothersome in her wrists and hands. Pain is worst in the morning when she wakes up, typically lasts for 5 minutes and improves with movement. No pain in the fingers. She denies warmth, redness, or swelling in any of her joints.    Patient is otherwise feeling well, without additional skin concerns.    HS Nurse Assessment    Nurse Assessment Data 11/11/2021  1/13/2022 2/10/2022   Over the past 30 days how many old lesions flared back up? - 7 9   Over the past 30 days how many new lesions did you get? 10 or more 1 5-7   Over the past week, how many dressing changes do you do each day? 2 2 3+   Over the past week, has your wound drainage been: Moderate Severe Severe   Rate your HS overall from 0 - 10 (0 = no disease, 10 = worst) over the past week:  6 5 6-7   Rate your pain score from 0 - 10 (0 = no disease, 10 = worst) for the most painful/symptomatic lesion in the past week:  8 7 8   Over the past week, how much has HS influenced your quality of life? very much very much very much             Labs Reviewed:  N/A    Physical Exam:  Vitals: There were no vitals taken for this visit.  SKIN: Total skin excluding the undergarment areas was performed. The exam included the head/face, neck, both arms, chest, back, abdomen, both legs, digits and/or nails.   - verrucate papule over left 5th MCP joint  - right axilla: multiple indurated nodules with surrounding erythema, rope like scars and tunnels with active drainage of purulent material   - left axilla: two pink/purple nodules with one tunnel   - bilateral inguinal folds with nodules and multiple draining tunnels    - two medium brown macules over right shoulder   - No other lesions of concern on areas examined.     HS Data  HS Exam Data 8/5/2021 9/30/2021 2/10/2022   LC Type - LC1 LC1   Clinical Subtypes - Regular type Regular type   Acne? - No No   Dissecting Cellulitis? - No No   Visual analogue score (0-100) - - 55   Total Julian Stage - II II   Total Inflammatory Nodules 4 3 10   Total Abcesses 0 0 0   Total Draining Tunnels 2 4 5   Total Abscess and Nodule Count 4 3 10   IHS4 Score  12 19 30   Total  HASI Score 31 27 69   HS-PGA - 3 3     Medications:  Current Outpatient Medications   Medication     calcium carbonate (CALCIUM CARBONATE) 600 MG tablet     fexofenadine (ALLEGRA) 180 MG tablet     hydrOXYzine (ATARAX) 25 MG  tablet     medical cannabis (Patient's own supply)     Secukinumab, 300 MG Dose, 150 MG/ML SOAJ     traZODone (DESYREL) 50 MG tablet     vitamin D3 (CHOLECALCIFEROL) 50 mcg (2000 units) tablet     adalimumab (HUMIRA *CF* PEN-CD/UC/HS STARTER) 80 MG/0.8ML pen kit     calcium carbonate 750 MG CHEW     clobetasol (TEMOVATE) 0.05 % external cream     dapsone (ACZONE) 100 MG tablet     fluocinonide (LIDEX) 0.05 % external solution     FLUoxetine (PROZAC) 40 MG capsule     ketoconazole (NIZORAL) 2 % external shampoo     triamcinolone (KENALOG) 0.1 % external ointment     valACYclovir (VALTREX) 500 MG tablet     No current facility-administered medications for this visit.      Past Medical History:   Patient Active Problem List   Diagnosis     Hidradenitis suppurativa     Fatigue     Past Medical History:   Diagnosis Date     NO ACTIVE PROBLEMS         CC No referring provider defined for this encounter. on close of this encounter.

## 2022-02-21 ENCOUNTER — TELEPHONE (OUTPATIENT)
Dept: DERMATOLOGY | Facility: CLINIC | Age: 47
End: 2022-02-21
Payer: COMMERCIAL

## 2022-02-21 NOTE — TELEPHONE ENCOUNTER
PRIOR AUTHORIZATION DENIED    Medication: Xeljanz 5MG Tablet (PA DENIED)    Denial Date: 2/21/2022    Denial Rational:       Appeal Information:

## 2022-02-21 NOTE — TELEPHONE ENCOUNTER
PA Initiation    Medication: Xeljanz-PA Pending  Insurance Company: [employer] ([facility]) - Phone [phone] Fax [phone]  Pharmacy Filling the Rx: [facility] - [place], [place] - [address]  Filling Pharmacy Phone:    Filling Pharmacy Fax:    Start Date: 2/21/2022

## 2022-03-01 ENCOUNTER — TELEPHONE (OUTPATIENT)
Dept: DERMATOLOGY | Facility: CLINIC | Age: 47
End: 2022-03-01
Payer: COMMERCIAL

## 2022-03-01 NOTE — TELEPHONE ENCOUNTER
[facility] is requesting a new prescription specifying 13.5 MME/Day for the Buprenorphine HCl (BELBUCA) 450 MCG FILM buccal film. Current prescription unclear.    Frances Godoy, Procedure

## 2022-03-02 NOTE — TELEPHONE ENCOUNTER
Appeal Initiated  Medication: Xeljanz  Payer or group: [facility] appeals  Phone # -# on pts health plan ID card  Fax # [phone]  Additional Information : Faxed in appeal letter and chart notes, and study (https://www.ncbi.nlm.nih.gov/pmc/articles/WTR0597518/ )    [facility]  - Appeals/ Clinical Review Dept.  Patient:  Klarissa Curtis  ID#: [id number]  From the office of Jean Kenny MD      To whom it may concern,    I am writing this letter of medical necessity in regards to the recent denial of tofacitinib (Xeljanz) tablets. Klarissa Curtis is a pleasant female who unfortunately suffers from hidradenitis suppurativa. See chart notes we've attached as well as studies showing Xeljanz helping improve symptoms in HS patients.       In your denial letter, you state cannot authorize our request for coverage of Xeljanz to treat her HS due to it not being FDA APPROVED. This patient has tried an extensive # of treatments for HS, including FDA Approved treatments without resolution. She is currently on Cosentyx, but since that isn't quickly working to resolve her HS, we were hoping to get Xeljanz in place as a back up-- see chart notes we've attached.     Dermatology Problem List:  # Hidradenitis suppurativa: diagnosed age 11, initially on waistline (used to weigh 300 lbs) which cleared up -> intermittent outbreaks -> for the past 10 years has had in bilateral axilla, groin, thighs, and buttocks.   - Cosentyx, belbuca; consider baricitinib   - Prior: humira, infliximab, prednisone taper, clobetasol, topical morphine, ILK, humira, oral antibiotics, rifampin, sirolimus 2 mg (started 4/22/21) finasteride, spironolactone (6/2019- 9/5/19, stopped due to abnormal uterine bleeding), topical morphine gel, and percocet for severe pain  - Cutaned sorbion 5.0 x 5.0 inch bandages via [employer]  # Keratosis pilaris  # L olecranon bursitis.   # Dermatographis  # Eruption NOS,   - punch  biopsy 8/5/2021: superficial and deep perivascular and interstitial infiltrate of neutrophils, with focal leukocytoclasis and bluish, amorphous material suggestive of neutrophil degranulation, and lesser amounts of eosinophils and lymphocytes. Background dermal edema is noted. Focal neutrophilic epitheliotropism is noted.      We feel that tofacitinib (Xeljanz) tablets are the best choice of therapy for Klarissa Curtis. We would like to pursue this course of therapy and are requesting that you approve our decision to provide Xeljanz to our patient, allowing us to provide the best treatment option available. We thank you for your immediate attention to this issue and we would appreciate haste in your determination.                                                                                         On behalf of Jean Kenny MD    Sincerely,    Archana Narvaez

## 2022-03-03 ENCOUNTER — VIRTUAL VISIT (OUTPATIENT)
Dept: DERMATOLOGY | Facility: CLINIC | Age: 47
End: 2022-03-03
Payer: COMMERCIAL

## 2022-03-03 ENCOUNTER — TELEPHONE (OUTPATIENT)
Dept: DERMATOLOGY | Facility: CLINIC | Age: 47
End: 2022-03-03

## 2022-03-03 DIAGNOSIS — L73.2 HIDRADENITIS SUPPURATIVA: ICD-10-CM

## 2022-03-03 DIAGNOSIS — L73.2 HIDRADENITIS SUPPURATIVA: Primary | ICD-10-CM

## 2022-03-03 DIAGNOSIS — R52 SEVERE PAIN: ICD-10-CM

## 2022-03-03 DIAGNOSIS — G89.29 OTHER CHRONIC PAIN: Primary | ICD-10-CM

## 2022-03-03 PROCEDURE — 99214 OFFICE O/P EST MOD 30 MIN: CPT | Mod: TEL | Performed by: DERMATOLOGY

## 2022-03-03 NOTE — LETTER
3/3/2022       RE: Klarissa Curtis  [address]  [place] [place] [address]     Dear Colleague,    Thank you for referring your patient, Klarissa Curtis, to the [facility] [place] at [facility]. Please see a copy of my visit note below.    [facility] Dermatology Note  Encounter Date: Mar 3, 2022  Telephone ([phone]). Location of teledermatologist: [facility] [place]. Start time: 7:51pm End time: 8:05pm    Dermatology Problem List:  # Hidradenitis suppurativa: diagnosed age 11, initially on waistline (used to weigh 300 lbs) which cleared up -> intermittent outbreaks -> for the past 10 years has had in bilateral axilla, groin, thighs, and buttocks.   - Cosentyx, belbuca; consider baricitinib   - Prior: humira, infliximab, prednisone taper, clobetasol, topical morphine, ILK, humira, oral antibiotics, rifampin, sirolimus 2 mg (started 4/22/21) finasteride, spironolactone (6/2019- 9/5/19, stopped due to abnormal uterine bleeding), topical morphine gel, and percocet for severe pain  # Keratosis pilaris  # L olecranon bursitis.   # Dermatographis  # Eruption NOS,   - punch biopsy 8/5/2021: superficial and deep perivascular and interstitial infiltrate of neutrophils, with focal leukocytoclasis and bluish, amorphous material suggestive of neutrophil degranulation, and lesser amounts of eosinophils and lymphocytes. Background dermal edema is noted. Focal neutrophilic epitheliotropism is noted.  - Resolved      SH: [place] works [place] an [place]lots of walking/running around)    ____________________________________________    Assessment & Plan:     # HS  - Calmed down after last visit.but srtarting to flare now around cycle.   - Start prednisone taper: 60mg daily x4 days, 50mg daily x4 days, 40 mg x4 days, 30mg x4 days, 20mg x4 days  - start  xeljanz 5mg BID  - Continue cosentyx until starting xeljanz  - Increase  belbuca 450 mg buccal TID if flaring (PDMP checked)     # Dermatographism  - Continue allegra 180mg BID prn (refilled today)     # Verruca Vulgaris, left 5th MCP joint  - Resolved     # Shoulder pain  - Resolved    Follow-up: 4 weeks phone\     Staff:     Jean Kenny MD, FAAD, FACP     Departments of Internal Medicine and Dermatology  [facility]  [phone]  ____________________________________________    CC: follow-up HS  HPI:  Ms. Klarissa Curtis is a(n) 46 year old female who presents today as a return patient for HS.  She is doing OK. Nora was helpful.  She has not received zeljanz or any updates regaridng xeljanz.  She did not do the prednisone taper. She continue the consentyx for now. There was an issue with how I ordered the belbuca last time.   She is currently not taking the belbuca because she was not in significant pain. When she was in pain, she needed it three times a day rather than twice a day.     HS Nurse Assessment    Nurse Assessment Data 1/13/2022 2/10/2022 3/3/2022   Over the past 30 days how many old lesions flared back up? 7 9 5   Over the past 30 days how many new lesions did you get? 1 5-7 5   Over the past week, how many dressing changes do you do each day? 2 3+ 2   Over the past week, has your wound drainage been: Severe Severe Moderate   Rate your HS overall from 0 - 10 (0 = no disease, 10 = worst) over the past week:  5 6-7 5   Rate your pain score from 0 - 10 (0 = no disease, 10 = worst) for the most painful/symptomatic lesion in the past week:  7 8 4   Over the past week, how much has HS influenced your quality of life? very much very much moderately     Patient is otherwise feeling well, without additional skin concerns.    Medications:  Current Outpatient Medications   Medication     adalimumab (HUMIRA *CF* PEN-CD/UC/HS STARTER) 80 MG/0.8ML pen kit     Buprenorphine  HCl (BELBUCA) 450 MCG FILM buccal film     calcium carbonate (CALCIUM CARBONATE) 600 MG tablet     calcium carbonate 750 MG CHEW     clobetasol (TEMOVATE) 0.05 % external cream     dapsone (ACZONE) 100 MG tablet     fexofenadine (ALLEGRA) 180 MG tablet     fluocinonide (LIDEX) 0.05 % external solution     FLUoxetine (PROZAC) 40 MG capsule     hydrOXYzine (ATARAX) 25 MG tablet     ketoconazole (NIZORAL) 2 % external shampoo     medical cannabis (Patient's own supply)     predniSONE (DELTASONE) 5 MG tablet     Secukinumab, 300 MG Dose, 150 MG/ML SOAJ     tofacitinib (XELJANZ) 5 MG tablet     traZODone (DESYREL) 50 MG tablet     triamcinolone (KENALOG) 0.1 % external ointment     valACYclovir (VALTREX) 500 MG tablet     vitamin D3 (CHOLECALCIFEROL) 50 mcg (2000 units) tablet     No current facility-administered medications for this visit.      Past Medical/Surgical History:   Patient Active Problem List   Diagnosis     Hidradenitis suppurativa     Fatigue     Past Medical History:   Diagnosis Date     NO ACTIVE PROBLEMS        CC No referring provider defined for this encounter. on close of this encounter.      Again, thank you for allowing me to participate in the care of your patient.      Sincerely,    Jean Kenny MD

## 2022-03-03 NOTE — NURSING NOTE
Dermatology Rooming Note    Klarissa Curtis's goals for this visit include:   Chief Complaint   Patient presents with     Derm Problem     HS follow up, states that things have been going well     Crystal Garcia CMA on 3/3/2022 at 4:59 PM

## 2022-03-03 NOTE — PROGRESS NOTES
[facility] Dermatology Note  Encounter Date: Mar 3, 2022  Telephone ([phone]). Location of teledermatologist: [facility] [place]. Start time: 7:51pm End time: 8:05pm    Dermatology Problem List:  # Hidradenitis suppurativa: diagnosed age 11, initially on waistline (used to weigh 300 lbs) which cleared up -> intermittent outbreaks -> for the past 10 years has had in bilateral axilla, groin, thighs, and buttocks.   - Cosentyx, belbuca; consider baricitinib   - Prior: humira, infliximab, prednisone taper, clobetasol, topical morphine, ILK, humira, oral antibiotics, rifampin, sirolimus 2 mg (started 4/22/21) finasteride, spironolactone (6/2019- 9/5/19, stopped due to abnormal uterine bleeding), topical morphine gel, and percocet for severe pain  # Keratosis pilaris  # L olecranon bursitis.   # Dermatographis  # Eruption NOS,   - punch biopsy 8/5/2021: superficial and deep perivascular and interstitial infiltrate of neutrophils, with focal leukocytoclasis and bluish, amorphous material suggestive of neutrophil degranulation, and lesser amounts of eosinophils and lymphocytes. Background dermal edema is noted. Focal neutrophilic epitheliotropism is noted.  - Resolved      SH: She works as an / [place] (lots of walking/running around)    ____________________________________________    Assessment & Plan:     # HS  - Calmed down after last visit.but srtarting to flare now around cycle.   - Start prednisone taper: 60mg daily x4 days, 50mg daily x4 days, 40 mg x4 days, 30mg x4 days, 20mg x4 days  - start xeljanz 5mg BID  - Continue cosentyx until starting xeljanz  - Increase  belbuca 450 mg buccal TID if flaring (PDMP checked)     # Dermatographism  - Continue allegra 180mg BID prn (refilled today)     # Verruca Vulgaris, left 5th MCP joint  - Resolved     # Shoulder pain  - Resolved    Follow-up: 4 weeks phone\     Staff:     Jean  MD Zoya, FAAD, FACP     Departments of Internal Medicine and Dermatology  [facility]  [phone]  ____________________________________________    CC: follow-up HS  HPI:  Ms. Klarissa Curtis is a(n) 46 year old female who presents today as a return patient for HS.  She is doing OK. Nora was helpful.  She has not received zeljanz or any updates regaridn xeljanz.  She did not do the prednisone taper. She continue the consentyx for now. There was an issue with how I ordered the belbuca last time.   She is currently not taking the belbuca because she was not in significant pain. When she was in pain, she needed it three times a day rather than twice a day.     HS Nurse Assessment    Nurse Assessment Data 1/13/2022 2/10/2022 3/3/2022   Over the past 30 days how many old lesions flared back up? 7 9 5   Over the past 30 days how many new lesions did you get? 1 5-7 5   Over the past week, how many dressing changes do you do each day? 2 3+ 2   Over the past week, has your wound drainage been: Severe Severe Moderate   Rate your HS overall from 0 - 10 (0 = no disease, 10 = worst) over the past week:  5 6-7 5   Rate your pain score from 0 - 10 (0 = no disease, 10 = worst) for the most painful/symptomatic lesion in the past week:  7 8 4   Over the past week, how much has HS influenced your quality of life? very much very much moderately     Patient is otherwise feeling well, without additional skin concerns.    Medications:  Current Outpatient Medications   Medication     adalimumab (HUMIRA *CF* PEN-CD/UC/HS STARTER) 80 MG/0.8ML pen kit     Buprenorphine HCl (BELBUCA) 450 MCG FILM buccal film     calcium carbonate (CALCIUM CARBONATE) 600 MG tablet     calcium carbonate 750 MG CHEW     clobetasol (TEMOVATE) 0.05 % external cream     dapsone (ACZONE) 100 MG tablet     fexofenadine (ALLEGRA) 180 MG tablet     fluocinonide (LIDEX) 0.05 % external solution     FLUoxetine (PROZAC) 40 MG  capsule     hydrOXYzine (ATARAX) 25 MG tablet     ketoconazole (NIZORAL) 2 % external shampoo     medical cannabis (Patient's own supply)     predniSONE (DELTASONE) 5 MG tablet     Secukinumab, 300 MG Dose, 150 MG/ML SOAJ     tofacitinib (XELJANZ) 5 MG tablet     traZODone (DESYREL) 50 MG tablet     triamcinolone (KENALOG) 0.1 % external ointment     valACYclovir (VALTREX) 500 MG tablet     vitamin D3 (CHOLECALCIFEROL) 50 mcg (2000 units) tablet     No current facility-administered medications for this visit.      Past Medical/Surgical History:   Patient Active Problem List   Diagnosis     Hidradenitis suppurativa     Fatigue     Past Medical History:   Diagnosis Date     NO ACTIVE PROBLEMS        CC No referring provider defined for this encounter. on close of this encounter.

## 2022-03-04 NOTE — PATIENT INSTRUCTIONS
Prednisone taper: 60mg daily x4 days, 50mg daily x4 days, 40 mg x4 days, 30mg x4 days, 20mg x4 days  Xeljanz 5mg BID  Continue cosentyx until starting xeljanz  Increase  belbuca 600 mg buccal BID if flaring (PDMP checked)

## 2022-03-04 NOTE — TELEPHONE ENCOUNTER
Central Prior Authorization Team   Phone: [phone]      PA Initiation    Medication: Buprenorphine HCl (BELBUCA) 450 MCG FILM buccal film - T.I.D. Dosing  Insurance Company: [employer] ([facility]) - Phone [phone] Fax [phone]  Pharmacy Filling the Rx: [id number] [place] [facility] - [address]  Filling Pharmacy Phone: [phone]  Filling Pharmacy Fax:    Start Date: 3/4/2022    CHRISTINE CAMARA (Key: [id number])

## 2022-03-04 NOTE — TELEPHONE ENCOUNTER
PRIOR AUTHORIZATION DENIED    Medication: Buprenorphine HCl (BELBUCA) 450 MCG FILM buccal film - T.I.D. Dosing - Denied     Denial Date: 3/4/2022    Denial Rational:       Appeal Information: If the provider would like to appeal this denial, please provide a letter of medical necessity. Please also include any therapies that the patient has tried and their outcomes. The patient's insurance company will also require the provider to address why the insurance preferred options are not appropriate in the patient's therapy.  The reason could be that the preferred options will harm the patient; either physically or mentally. They are contraindicated to the patient; or the patient has already been taking the requested medication and changing the therapy would change the outcome of their therapy.    Once it has been completed and placed in the patient's chart, notify the [facility] ([facility]) and the appeal can be initiated on behalf of the patient and provider.

## 2022-03-08 DIAGNOSIS — L73.2 HIDRADENITIS SUPPURATIVA: ICD-10-CM

## 2022-03-10 NOTE — TELEPHONE ENCOUNTER
PREDNISONE 5 MG TABLET  Take 12 tablets (60 mg) by mouth daily for 4 days, THEN 10 tablets (50 mg) daily for 4 days, THEN 8 tablets (40 mg) daily for 4 days, THEN 6 tablets (30 mg) daily for 4 days, THEN 4 tablets (20 mg) daily for 4 days, THEN 2 tablets (10 mg) daily for 4 days.  Last Written Prescription Date:  2/10/22  Last Fill Quantity: 168,   # refills: 0  Last Office Visit : 3/3/22 Zoya  Future Office visit:  3/31/22    Routing refill request to provider for review/approval because:  Drug not active on patient's medication list(  3/6/22), prednisone taper is not on DERM protocol. Prednisone taper is noted as plan in 3/3/22 note.

## 2022-03-11 NOTE — TELEPHONE ENCOUNTER
Pharmacy stated medication is not covered. Tried running it under different doses and still not covered. OOP cost for pt would be very expensive. Will route to Dr Trevizo for alternative or appeal.

## 2022-03-14 RX ORDER — PREDNISONE 5 MG/1
TABLET ORAL
Qty: 168 TABLET | Refills: 0 | Status: SHIPPED | OUTPATIENT
Start: 2022-03-14 | End: 2022-09-01

## 2022-03-16 RX ORDER — PREDNISONE 10 MG/1
10 TABLET ORAL DAILY
OUTPATIENT
Start: 2022-03-16

## 2022-03-26 ENCOUNTER — HEALTH MAINTENANCE LETTER (OUTPATIENT)
Age: 47
End: 2022-03-26

## 2022-03-31 ENCOUNTER — VIRTUAL VISIT (OUTPATIENT)
Dept: DERMATOLOGY | Facility: CLINIC | Age: 47
End: 2022-03-31
Payer: COMMERCIAL

## 2022-03-31 DIAGNOSIS — L73.2 HIDRADENITIS SUPPURATIVA: Primary | ICD-10-CM

## 2022-03-31 DIAGNOSIS — Z79.52 ON PREDNISONE THERAPY: ICD-10-CM

## 2022-03-31 PROCEDURE — 99214 OFFICE O/P EST MOD 30 MIN: CPT | Mod: TEL | Performed by: DERMATOLOGY

## 2022-03-31 NOTE — NURSING NOTE
Dermatology Rooming Note    Klarissa Curtis's goals for this visit include:   Chief Complaint   Patient presents with     Derm Problem     HS follow up     Crystal Garcia CMA on 3/31/2022 at 4:03 PM

## 2022-03-31 NOTE — PROGRESS NOTES
[facility] Dermatology Note  Encounter Date: Mar 31, 2022  Telephone ([phone]). Location of teledermatologist: [facility]MATOLOG[facility] [place]. Start time: 5:45 End time: 6:00 (4/1/22 as she was not able to talk last night    Dermatology Problem List:  # Hidradenitis suppurativa: diagnosed age 11, initially on waistline (used to weigh 300 lbs) which cleared up -> intermittent outbreaks -> for the past 10 years has had in bilateral axilla, groin, thighs, and buttocks.   - Cosentyx, belbuca; consider baricitinib   - Prior: humira, infliximab, prednisone taper, clobetasol, topical morphine, ILK, humira, oral antibiotics, rifampin, sirolimus 2 mg (started 4/22/21) finasteride, spironolactone (6/2019- 9/5/19, stopped due to abnormal uterine bleeding), topical morphine gel, and percocet for severe pain  # Keratosis pilaris  # L olecranon bursitis.   # Dermatographis  # Eruption NOS,   - punch biopsy 8/5/2021: superficial and deep perivascular and interstitial infiltrate of neutrophils, with focal leukocytoclasis and bluish, amorphous material suggestive of neutrophil degranulation, and lesser amounts of eosinophils and lymphocytes. Background dermal edema is noted. Focal neutrophilic epitheliotropism is noted.  - Resolved      SH: [place] an [place]/running around)    ____________________________________________    Assessment & Plan:     # HS  - Calmed down after last visit.but srtarting to flare now around cycle.   - Start prednisone taper: 60mg daily x4 days, 50mg daily x4 days, 40 mg x4 days, 30mg x4 days, 20mg x4 days  - start xeljanz 5mg BID  - Increased belbuca to 600mg  BID if flaring (previously iordered)  - takes calcium 1200mg and vitamin d 2000 international unit(s)  - Immunizations   Due for COVID booster   Needs pneumonia and shingrix     # Dermatographism  - Continue allegra 180mg BID prn (refilled  today)      Follow-up: 4 week phone    Staff:     Jean Kenny MD, FAAD, FACP     Departments of Internal Medicine and Dermatology  [facility]  [phone]    ____________________________________________    CC: Follow-up HS    HPI:  Ms. Klarissa Curtis is a(n) 47 year old female who presents today as a return patient for. The patient was last seen in dermatology on 3/3/22 by myself at which time she was started on xeljanz 5 mg BID + prednisone taper for treatment of HS. She was also continued on allegra 180 mg BID prn for treatment of dermatographism. She has not gotten the xeljanz yet. She stopped the costenyx and is on the predniosne taper. She started at 39 and is currently on 20mg and will hold here for a bit.      Patient is otherwise feeling well, without additional skin concerns.    Medications:  Current Outpatient Medications   Medication     adalimumab (HUMIRA *CF* PEN-CD/UC/HS STARTER) 80 MG/0.8ML pen kit     Buprenorphine HCl (BELBUCA) 600 MCG FILM buccal film     calcium carbonate (CALCIUM CARBONATE) 600 MG tablet     calcium carbonate 750 MG CHEW     clobetasol (TEMOVATE) 0.05 % external cream     dapsone (ACZONE) 100 MG tablet     fexofenadine (ALLEGRA) 180 MG tablet     fluocinonide (LIDEX) 0.05 % external solution     FLUoxetine (PROZAC) 40 MG capsule     hydrOXYzine (ATARAX) 25 MG tablet     ketoconazole (NIZORAL) 2 % external shampoo     medical cannabis (Patient's own supply)     predniSONE (DELTASONE) 5 MG tablet     tofacitinib (XELJANZ) 5 MG tablet     traZODone (DESYREL) 50 MG tablet     triamcinolone (KENALOG) 0.1 % external ointment     valACYclovir (VALTREX) 500 MG tablet     vitamin D3 (CHOLECALCIFEROL) 50 mcg (2000 units) tablet     No current facility-administered medications for this visit.      Past Medical/Surgical History:   Patient Active Problem List   Diagnosis     Hidradenitis suppurativa     Fatigue       CC No referring provider defined  for this encounter. on close of this encounter.

## 2022-03-31 NOTE — LETTER
3/31/2022       RE: Klarissa Curtis  [address]  [place] [address]     Dear Colleague,    Thank you for referring your patient, Klarissa Curtis, to the [facility] DERMATOLOGY [facility] [place] at [facility]. Please see a copy of my visit note below.    [facility] Dermatology Note  Encounter Date: Mar 31, 2022  Telephone ([phone]). Location of teledermatologist: [facility]ERMATOLOGY [facility] [place]. Start time: 5:45 End time: 6:00 (4/1/22 as she was not able to talk last night    Dermatology Problem List:  # Hidradenitis suppurativa: diagnosed age 11, initially on waistline (used to weigh 300 lbs) which cleared up -> intermittent outbreaks -> for the past 10 years has had in bilateral axilla, groin, thighs, and buttocks.   - Cosentyx, belbuca; consider baricitinib   - Prior: humira, infliximab, prednisone taper, clobetasol, topical morphine, ILK, humira, oral antibiotics, rifampin, sirolimus 2 mg (started 4/22/21) finasteride, spironolactone (6/2019- 9/5/19, stopped due to abnormal uterine bleeding), topical morphine gel, and percocet for severe pain  # Keratosis pilaris  # L olecranon bursitis.   # Dermatographis  # Eruption NOS,   - punch biopsy 8/5/2021: superficial and deep perivascular and interstitial infiltrate of neutrophils, with focal leukocytoclasis and bluish, amorphous material suggestive of neutrophil degranulation, and lesser amounts of eosinophils and lymphocytes. Background dermal edema is noted. Focal neutrophilic epitheliotropism is noted.  - Resolved      SH: [place] works [place] an [place] of walking/running around)    ____________________________________________    Assessment & Plan:     # HS  - Calmed down after last visit.but srtarting to flare now around cycle.   - Start prednisone taper: 60mg daily x4 days, 50mg daily x4 days, 40 mg  x4 days, 30mg x4 days, 20mg x4 days  - start xeljanz 5mg BID  - Increased belbuca to 600mg  BID if flaring (previously iordered)  - takes calcium 1200mg and vitamin d 2000 international unit(s)  - Immunizations   Due for COVID booster   Needs pneumonia and shingrix     # Dermatographism  - Continue allegra 180mg BID prn (refilled today)      Follow-up: 4 week phone    Staff:     Jean Kenny MD, FAAD, FACP     Departments of Internal Medicine and Dermatology  [facility]  [phone]    ____________________________________________    CC: Follow-up HS    HPI:  Ms. Klarissa Curtis is a(n) 47 year old female who presents today as a return patient for. The patient was last seen in dermatology on 3/3/22 by myself at which time she was started on xeljanz 5 mg BID + prednisone taper for treatment of HS. She was also continued on allegra 180 mg BID prn for treatment of dermatographism. She has not gotten the xeljanz yet. She stopped the costenyx and is on the predniosne taper. She started at 39 and is currently on 20mg and will hold here for a bit.      Patient is otherwise feeling well, without additional skin concerns.    Medications:  Current Outpatient Medications   Medication     adalimumab (HUMIRA *CF* PEN-CD/UC/HS STARTER) 80 MG/0.8ML pen kit     Buprenorphine HCl (BELBUCA) 600 MCG FILM buccal film     calcium carbonate (CALCIUM CARBONATE) 600 MG tablet     calcium carbonate 750 MG CHEW     clobetasol (TEMOVATE) 0.05 % external cream     dapsone (ACZONE) 100 MG tablet     fexofenadine (ALLEGRA) 180 MG tablet     fluocinonide (LIDEX) 0.05 % external solution     FLUoxetine (PROZAC) 40 MG capsule     hydrOXYzine (ATARAX) 25 MG tablet     ketoconazole (NIZORAL) 2 % external shampoo     medical cannabis (Patient's own supply)     predniSONE (DELTASONE) 5 MG tablet     tofacitinib (XELJANZ) 5 MG tablet     traZODone (DESYREL) 50 MG tablet     triamcinolone (KENALOG) 0.1 % external  ointment     valACYclovir (VALTREX) 500 MG tablet     vitamin D3 (CHOLECALCIFEROL) 50 mcg (2000 units) tablet     No current facility-administered medications for this visit.      Past Medical/Surgical History:   Patient Active Problem List   Diagnosis     Hidradenitis suppurativa     Fatigue       CC No referring provider defined for this encounter. on close of this encounter.      Again, thank you for allowing me to participate in the care of your patient.      Sincerely,    Jean Kenny MD

## 2022-03-31 NOTE — LETTER
Date:April 2, 2022      Provider requested that no letter be sent. Do not send.       [facility]

## 2022-03-31 NOTE — TELEPHONE ENCOUNTER
MEDICATION APPEAL APPROVED    Medication: Xeljanz Appeal approved  Authorization Effective Date:  3/17/22  Authorization Expiration Date:  No end date  Approved Dose/Quantity: 60 for 30 days  Reference #: Key: [id number]   Insurance Company: [employer] ([facility]) - Phone [phone] Fax [phone]  Expected CoPay:       CoPay Card Available:      Foundation Assistance Needed:    Which Pharmacy is filling the prescription (Not needed for infusion/clinic administered): [facility] - [place], [place] - [address]        Spoke with plan they will be faxing approval letter. Ref #[id number]

## 2022-04-01 NOTE — PATIENT INSTRUCTIONS
Stay on 20mg daily of prednisone for now while I work on getting the xeljanz approved  Continue calcium 1200mg daily and vitamin d 2000 international units daily  Immunizations needed  - COVID booster  - Shingrix vaccine x2  - Pneumococcal vaccine

## 2022-04-12 ENCOUNTER — MYC MEDICAL ADVICE (OUTPATIENT)
Dept: DERMATOLOGY | Facility: CLINIC | Age: 47
End: 2022-04-12
Payer: COMMERCIAL

## 2022-04-15 NOTE — TELEPHONE ENCOUNTER
Called patient to follow up on leg edema concern. Per the patient, 3-4 days ago she developed sudden swelling in her bilateral lower extremities. She says her ankles became large and she could barely fit her hands around her calf. The L side was worse than the right side. She was unsure if this could have been related to the prednisone she recently completed, stress, or following a recent trip she took to visit her Mother. Her skin felt tight like she was going to burst out of her skin due to the swelling. She denies any associated pain at rest or with ambulation, no redness or tenderness to touch. No shortness of breath associated. Since that time, she's been wearing compression and socks and keeping her legs elevated at night. The swelling has improved significantly, though some is still present.    It is reassuring that the patient's edema improved with conservative management, which leads me to believe this was stasis related. I advised her to watch for the following signs:    - fever  - worsening edema, especially if one leg is much more swollen than the other  - redness  - tenderness to touch or calf pain with walking  - new shortness of breath    If she develops any of these symptoms, I advised her to seek ED attention to rule out a DVT. She expressed agreement and understanding of this plan. FYI to Dr. Zoya Hong MD  PGY-5 Medicine-Dermatology  Pager [phone]

## 2022-05-19 ENCOUNTER — VIRTUAL VISIT (OUTPATIENT)
Dept: DERMATOLOGY | Facility: CLINIC | Age: 47
End: 2022-05-19
Payer: COMMERCIAL

## 2022-05-19 DIAGNOSIS — G89.29 OTHER CHRONIC PAIN: Primary | ICD-10-CM

## 2022-05-19 DIAGNOSIS — Z79.899 ENCOUNTER FOR LONG-TERM (CURRENT) USE OF HIGH-RISK MEDICATION: ICD-10-CM

## 2022-05-19 DIAGNOSIS — L73.2 HIDRADENITIS SUPPURATIVA: ICD-10-CM

## 2022-05-19 PROCEDURE — 99214 OFFICE O/P EST MOD 30 MIN: CPT | Mod: TEL | Performed by: DERMATOLOGY

## 2022-05-19 RX ORDER — BUPRENORPHINE AND NALOXONE 2; .5 MG/1; MG/1
0.5 FILM, SOLUBLE BUCCAL; SUBLINGUAL 2 TIMES DAILY
Qty: 30 FILM | Refills: 0 | Status: SHIPPED | OUTPATIENT
Start: 2022-05-19 | End: 2022-06-02

## 2022-05-19 RX ORDER — PREDNISONE 10 MG/1
TABLET ORAL
Qty: 70 TABLET | Refills: 0 | Status: SHIPPED | OUTPATIENT
Start: 2022-05-19 | End: 2022-06-16

## 2022-05-19 NOTE — NURSING NOTE
Dermatology Rooming Note    Klarissa Curtis's goals for this visit include:   Chief Complaint   Patient presents with     Derm Problem     Maura is following up for recent HS flare

## 2022-05-19 NOTE — LETTER
5/19/2022       RE: Klarissa Curtis  [address]  [place] [address]     Dear Colleague,    Thank you for referring your patient, Klarissa Curtis, to the [facility] DERMATOLOGY CLINIC [place] at [facility]. Please see a copy of my visit note below.    [facility] Dermatology Note  Encounter Date: Mar 31, 2022  Telephone ([phone]). Location of teledermatologist: [facility] DERMATOLOGY [facility]IC [place]. Start time: 5:30  End time: 6:00      Dermatology Problem List:  # Hidradenitis suppurativa: diagnosed age 11, initially on waistline (used to weigh 300 lbs) which cleared up -> intermittent outbreaks -> for the past 10 years has had in bilateral axilla, groin, thighs, and buttocks.   - Cosentyx, belbuca; consider baricitinib   - Prior: humira, infliximab, prednisone taper, clobetasol, topical morphine, ILK, humira, oral antibiotics, rifampin, sirolimus 2 mg (started 4/22/21) finasteride, spironolactone (6/2019- 9/5/19, stopped due to abnormal uterine bleeding), topical morphine gel, and percocet for severe pain  # Keratosis pilaris  # L olecranon bursitis.   # Dermatographis  # Eruption NOS,   - punch biopsy 8/5/2021: superficial and deep perivascular and interstitial infiltrate of neutrophils, with focal leukocytoclasis and bluish, amorphous material suggestive of neutrophil degranulation, and lesser amounts of eosinophils and lymphocytes. Background dermal edema is noted. Focal neutrophilic epitheliotropism is noted.  - Resolved      SH: [place] works [place] an [place] of walking/running around)     ____________________________________________     Assessment & Plan:   # HS  -  Significantly flaring again. No help with xeljanz. Will try double dose 10mg BID.   - If this fails other options would be clinical trials vs golimumab vs infliximab vs going back to  double dose humira  - Refilled predniosne in case she needs it.  - Belbuca works when she takes 900mcg in morning and 450mcg at night.  Pain goes from 7 to 5. Insurance having issues with vbelbuca. Will switch to suboxone 0.5mg BID films. PDMP checked.  - takes calcium 1200mg and vitamin d 2000 international unit(s)  - Immunizations              Due for 4th COVID booster               Needs pneumonia and shingrix.  Will need PCP appointment.   - Prednisone taper again  -Reached out Hola Quinteros to see if he can see her early next week for I&D on abdomen      # Dermatographism  - Continue allegra 180mg BID prn (refilled today)        Follow-up: 4 week phone     Staff:      Jean Kenny MD, FAAD, FACP     Departments of Internal Medicine and Dermatology  [facility]  [phone]     ____________________________________________     CC: Follow-up HS     HPI:  Ms. Klarissa Curtis is a(n) 47 year old female who presents today as a return patient for. She has been flaring over the last 3 weeks ago. Just started xeljanz 4 weeks ago. She has not seen any significant improvement.  Went in for ILK with Dr Quinteros.     Patient is otherwise feeling well, without additional skin concerns.     Medications:  Current Outpatient Medications   Medication     adalimumab (HUMIRA *CF* PEN-CD/UC/HS STARTER) 80 MG/0.8ML pen kit     calcium carbonate (CALCIUM CARBONATE) 600 MG tablet     calcium carbonate 750 MG CHEW     clobetasol (TEMOVATE) 0.05 % external cream     dapsone (ACZONE) 100 MG tablet     fexofenadine (ALLEGRA) 180 MG tablet     fluocinonide (LIDEX) 0.05 % external solution     FLUoxetine (PROZAC) 40 MG capsule     hydrOXYzine (ATARAX) 25 MG tablet     ketoconazole (NIZORAL) 2 % external shampoo     medical cannabis (Patient's own supply)     tofacitinib (XELJANZ) 5 MG tablet     traZODone (DESYREL) 50 MG tablet     triamcinolone (KENALOG) 0.1 % external ointment     valACYclovir (VALTREX)  500 MG tablet     vitamin D3 (CHOLECALCIFEROL) 50 mcg (2000 units) tablet     No current facility-administered medications for this visit.         Past Medical/Surgical History:       Patient Active Problem List   Diagnosis     Hidradenitis suppurativa     Fatigue      Exam:  Abscess  On lower abdomen       CC No referring provider defined for this encounter. on close of this encounter        Again, thank you for allowing me to participate in the care of your patient.      Sincerely,    Jean Kenny MD

## 2022-05-19 NOTE — LETTER
Date:May 20, 2022      Provider requested that no letter be sent. Do not send.       [facility]

## 2022-05-19 NOTE — PROGRESS NOTES
[facility] Dermatology Note  Encounter Date: Mar 31, 2022  Telephone ([phone]). Location of teledermatologist: [facility] DERMATOLOGY CLINIC [place]. Start time: 5:30  End time: 6:00      Dermatology Problem List:  # Hidradenitis suppurativa: diagnosed age 11, initially on waistline (used to weigh 300 lbs) which cleared up -> intermittent outbreaks -> for the past 10 years has had in bilateral axilla, groin, thighs, and buttocks.   - Cosentyx, belbuca; consider baricitinib   - Prior: humira, infliximab, prednisone taper, clobetasol, topical morphine, ILK, humira, oral antibiotics, rifampin, sirolimus 2 mg (started 4/22/21) finasteride, spironolactone (6/2019- 9/5/19, stopped due to abnormal uterine bleeding), topical morphine gel, and percocet for severe pain  # Keratosis pilaris  # L olecranon bursitis.   # Dermatographis  # Eruption NOS,   - punch biopsy 8/5/2021: superficial and deep perivascular and interstitial infiltrate of neutrophils, with focal leukocytoclasis and bluish, amorphous material suggestive of neutrophil degranulation, and lesser amounts of eosinophils and lymphocytes. Background dermal edema is noted. Focal neutrophilic epitheliotropism is noted.  - Resolved      SH: She works as an [place] of walking/running around)     ____________________________________________     Assessment & Plan:   # HS  -  Significantly flaring again. No help with xeljanz. Will try double dose 10mg BID.   - If this fails other options would be clinical trials vs golimumab vs infliximab vs going back to double dose humira  - Refilled predniosne in case she needs it.  - Mirza works when she takes 900mcg in morning and 450mcg at night.  Pain goes from 7 to 5. Insurance having issues with vbelbuca. Will switch to suboxone 0.5mg BID films. PDMP checked.  - takes calcium 1200mg and vitamin d 2000 international unit(s)  - Immunizations               Due for 4th COVID booster               Needs pneumonia and shingrix.  Will need PCP appointment.   - Prednisone taper again  -Reached out Hola Quinteros to see if he can see her early next week for I&D on abdomen      # Dermatographism  - Continue allegra 180mg BID prn (refilled today)        Follow-up: 4 week phone     Staff:      Jean Kenny MD, FAAD, FACP     Departments of Internal Medicine and Dermatology  [facility]  [phone]     ____________________________________________     CC: Follow-up HS     HPI:  Ms. Klarissa Curtis is a(n) 47 year old female who presents today as a return patient for. She has been flaring over the last 3 weeks ago. Just started xeljanz 4 weeks ago. She has not seen any significant improvement.  Went in for ILK with Dr Quinteros.     Patient is otherwise feeling well, without additional skin concerns.     Medications:  Current Outpatient Medications   Medication     adalimumab (HUMIRA *CF* PEN-CD/UC/HS STARTER) 80 MG/0.8ML pen kit     calcium carbonate (CALCIUM CARBONATE) 600 MG tablet     calcium carbonate 750 MG CHEW     clobetasol (TEMOVATE) 0.05 % external cream     dapsone (ACZONE) 100 MG tablet     fexofenadine (ALLEGRA) 180 MG tablet     fluocinonide (LIDEX) 0.05 % external solution     FLUoxetine (PROZAC) 40 MG capsule     hydrOXYzine (ATARAX) 25 MG tablet     ketoconazole (NIZORAL) 2 % external shampoo     medical cannabis (Patient's own supply)     tofacitinib (XELJANZ) 5 MG tablet     traZODone (DESYREL) 50 MG tablet     triamcinolone (KENALOG) 0.1 % external ointment     valACYclovir (VALTREX) 500 MG tablet     vitamin D3 (CHOLECALCIFEROL) 50 mcg (2000 units) tablet     No current facility-administered medications for this visit.         Past Medical/Surgical History:       Patient Active Problem List   Diagnosis     Hidradenitis suppurativa     Fatigue      Exam:  Abscess  On lower abdomen       CC No referring provider  defined for this encounter. on close of this encounter

## 2022-05-19 NOTE — PATIENT INSTRUCTIONS
Increase xeljanz to 10mg BID   Need to get shingrix and pneumonia vaccine and COVID booster.   Belbuca 0.5mg film

## 2022-06-01 ENCOUNTER — LAB (OUTPATIENT)
Dept: LAB | Facility: CLINIC | Age: 47
End: 2022-06-01
Payer: COMMERCIAL

## 2022-06-01 DIAGNOSIS — Z79.899 ENCOUNTER FOR LONG-TERM (CURRENT) USE OF HIGH-RISK MEDICATION: ICD-10-CM

## 2022-06-01 LAB
BASOPHILS # BLD AUTO: 0 10E3/UL (ref 0–0.2)
BASOPHILS NFR BLD AUTO: 0 %
EOSINOPHIL # BLD AUTO: 0.2 10E3/UL (ref 0–0.7)
EOSINOPHIL NFR BLD AUTO: 2 %
ERYTHROCYTE [DISTWIDTH] IN BLOOD BY AUTOMATED COUNT: 13.8 % (ref 10–15)
HCT VFR BLD AUTO: 38.4 % (ref 35–47)
HGB BLD-MCNC: 12.5 G/DL (ref 11.7–15.7)
LYMPHOCYTES # BLD AUTO: 3.7 10E3/UL (ref 0.8–5.3)
LYMPHOCYTES NFR BLD AUTO: 45 %
MCH RBC QN AUTO: 32.2 PG (ref 26.5–33)
MCHC RBC AUTO-ENTMCNC: 32.6 G/DL (ref 31.5–36.5)
MCV RBC AUTO: 99 FL (ref 78–100)
MONOCYTES # BLD AUTO: 0.7 10E3/UL (ref 0–1.3)
MONOCYTES NFR BLD AUTO: 9 %
NEUTROPHILS # BLD AUTO: 3.6 10E3/UL (ref 1.6–8.3)
NEUTROPHILS NFR BLD AUTO: 44 %
PLATELET # BLD AUTO: 348 10E3/UL (ref 150–450)
RBC # BLD AUTO: 3.88 10E6/UL (ref 3.8–5.2)
WBC # BLD AUTO: 8.2 10E3/UL (ref 4–11)

## 2022-06-01 PROCEDURE — 80053 COMPREHEN METABOLIC PANEL: CPT

## 2022-06-01 PROCEDURE — 85025 COMPLETE CBC W/AUTO DIFF WBC: CPT

## 2022-06-01 PROCEDURE — 36415 COLL VENOUS BLD VENIPUNCTURE: CPT

## 2022-06-01 PROCEDURE — 80061 LIPID PANEL: CPT

## 2022-06-02 ENCOUNTER — VIRTUAL VISIT (OUTPATIENT)
Dept: DERMATOLOGY | Facility: CLINIC | Age: 47
End: 2022-06-02
Payer: COMMERCIAL

## 2022-06-02 DIAGNOSIS — E78.5 HYPERLIPIDEMIA LDL GOAL <100: Primary | ICD-10-CM

## 2022-06-02 DIAGNOSIS — L73.2 HIDRADENITIS SUPPURATIVA: Primary | ICD-10-CM

## 2022-06-02 DIAGNOSIS — G89.29 OTHER CHRONIC PAIN: ICD-10-CM

## 2022-06-02 LAB
ALBUMIN SERPL-MCNC: 3.2 G/DL (ref 3.4–5)
ALP SERPL-CCNC: 55 U/L (ref 40–150)
ALT SERPL W P-5'-P-CCNC: 16 U/L (ref 0–50)
ANION GAP SERPL CALCULATED.3IONS-SCNC: 3 MMOL/L (ref 3–14)
AST SERPL W P-5'-P-CCNC: 13 U/L (ref 0–45)
BILIRUB SERPL-MCNC: 0.6 MG/DL (ref 0.2–1.3)
BUN SERPL-MCNC: 14 MG/DL (ref 7–30)
CALCIUM SERPL-MCNC: 8.9 MG/DL (ref 8.5–10.1)
CHLORIDE BLD-SCNC: 103 MMOL/L (ref 94–109)
CHOLEST SERPL-MCNC: 261 MG/DL
CO2 SERPL-SCNC: 30 MMOL/L (ref 20–32)
CREAT SERPL-MCNC: 0.67 MG/DL (ref 0.52–1.04)
FASTING STATUS PATIENT QL REPORTED: YES
GFR SERPL CREATININE-BSD FRML MDRD: >90 ML/MIN/1.73M2
GLUCOSE BLD-MCNC: 77 MG/DL (ref 70–99)
HDLC SERPL-MCNC: 110 MG/DL
LDLC SERPL CALC-MCNC: 132 MG/DL
NONHDLC SERPL-MCNC: 151 MG/DL
POTASSIUM BLD-SCNC: 4 MMOL/L (ref 3.4–5.3)
PROT SERPL-MCNC: 7 G/DL (ref 6.8–8.8)
SODIUM SERPL-SCNC: 136 MMOL/L (ref 133–144)
TRIGL SERPL-MCNC: 95 MG/DL

## 2022-06-02 PROCEDURE — 99214 OFFICE O/P EST MOD 30 MIN: CPT | Mod: TEL | Performed by: DERMATOLOGY

## 2022-06-02 RX ORDER — BUPRENORPHINE AND NALOXONE 2; .5 MG/1; MG/1
0.5 FILM, SOLUBLE BUCCAL; SUBLINGUAL 2 TIMES DAILY
Qty: 30 FILM | Refills: 0 | Status: SHIPPED | OUTPATIENT
Start: 2022-06-02 | End: 2022-06-16

## 2022-06-02 RX ORDER — TOFACITINIB 10 MG/1
10 TABLET, FILM COATED ORAL 2 TIMES DAILY
Qty: 60 TABLET | Refills: 3 | Status: SHIPPED | OUTPATIENT
Start: 2022-06-02 | End: 2022-09-01

## 2022-06-02 NOTE — LETTER
6/2/2022       RE: Klarissa Curtis  [address]  [place] [facility] [place] [place] [address]     Dear Colleague,    Thank you for referring your patient, Klarissa Curtis, to the [facility] [place] at [facility]. Please see a copy of my visit note below.    [facility] Dermatology Note  Encounter Date: Jun 2, 2022  Store-and-Forward and Telephone ([phone]). Location of teledermatologist: [facility] [place].  Start time: 7:10 End time:7:30    Dermatology Problem List:  # Hidradenitis suppurativa: diagnosed age 11, initially on waistline (used to weigh 300 lbs) which cleared up -> intermittent outbreaks -> for the past 10 years has had in bilateral axilla, groin, thighs, and buttocks.   - Cosentyx, belbuca; consider baricitinib   - Prior: humira, infliximab, prednisone taper, clobetasol, topical morphine, ILK, humira, oral antibiotics, rifampin, sirolimus 2 mg (started 4/22/21) finasteride, spironolactone (6/2019- 9/5/19, stopped due to abnormal uterine bleeding), topical morphine gel, and percocet for severe pain  # Keratosis pilaris  # L olecranon bursitis.   # Dermatographis  # Eruption NOS,   - punch biopsy 8/5/2021: superficial and deep perivascular and interstitial infiltrate of neutrophils, with focal leukocytoclasis and bluish, amorphous material suggestive of neutrophil degranulation, and lesser amounts of eosinophils and lymphocytes. Background dermal edema is noted. Focal neutrophilic epitheliotropism is noted.  - Resolved      SH: [place] an [place]/running around)    ____________________________________________    Assessment & Plan:   # HS  - Labs looking OK. Will increase xeljanz to 10mg BID  - If this fails other options would be clinical trials vs golimumab vs infliximab vs going back to double dose humira  -  Refilled predniosne in case she needs it.  - She has been taking 0.5mg 2-0.5mg twice a day. Has helped a lot.  She thinks it helps more than oxy but not feeling as itchy and not as tired.PDMP checked and refilled  - takes calcium 1200mg and vitamin d 2000 international unit(s)  - Immunizations              Due for 4th COVID booster               Needs pneumonia and shingrix.  Will need PCP appointment.     # Dermatographism  - Continue allegra 180mg BID prn (refilled today)    # Hyperlipidemia   - Elevated LDL fasting. Nutrition.    Follow-up: 6 weeks     Staff:     Jean Kenny MD, FAAD, FACP     Departments of Internal Medicine and Dermatology  [facility]  [phone]    ____________________________________________    CC: Follow-up HS    HPI:  Ms. Klarissa Curtis is a(n) 47 year old female who presents today for f/uy HS.  She did do a 1.5week taper of predniosne and came off. She takes Belbuca 900 mcg in the AM and 450 mcg in the PM.      Flaring on lateral thighs b/l and suprapubic region.     Patient is otherwise feeling well, without additional skin concerns.    Medications:  Current Outpatient Medications   Medication     buprenorphine HCl-naloxone HCl (SUBOXONE) 2-0.5 MG per film     calcium carbonate (CALCIUM CARBONATE) 600 MG tablet     calcium carbonate 750 MG CHEW     clobetasol (TEMOVATE) 0.05 % external cream     dapsone (ACZONE) 100 MG tablet     fexofenadine (ALLEGRA) 180 MG tablet     fluocinonide (LIDEX) 0.05 % external solution     FLUoxetine (PROZAC) 40 MG capsule     hydrOXYzine (ATARAX) 25 MG tablet     ketoconazole (NIZORAL) 2 % external shampoo     medical cannabis (Patient's own supply)     predniSONE (DELTASONE) 10 MG tablet     tofacitinib (XELJANZ) 10 MG tablet     tofacitinib (XELJANZ) 5 MG tablet     traZODone (DESYREL) 50 MG tablet     triamcinolone (KENALOG) 0.1 % external ointment     valACYclovir (VALTREX) 500 MG tablet     vitamin D3  (CHOLECALCIFEROL) 50 mcg (2000 units) tablet     No current facility-administered medications for this visit.      Past Medical/Surgical History:   Patient Active Problem List   Diagnosis     Hidradenitis suppurativa     Fatigue     Past Medical History:   Diagnosis Date     NO ACTIVE PROBLEMS        CC No referring provider defined for this encounter. on close of this encounter.        Again, thank you for allowing me to participate in the care of your patient.      Sincerely,    Jean Kenny MD

## 2022-06-02 NOTE — LETTER
Date:Kandy 3, 2022      Provider requested that no letter be sent. Do not send.       [facility]

## 2022-06-02 NOTE — PROGRESS NOTES
[facility] Dermatology Note  Encounter Date: Jun 2, 2022  Store-and-Forward and Telephone ([phone]). Location of teledermatologist: [facility] [place].  Start time: 7:10 End time:7:30    Dermatology Problem List:  # Hidradenitis suppurativa: diagnosed age 11, initially on waistline (used to weigh 300 lbs) which cleared up -> intermittent outbreaks -> for the past 10 years has had in bilateral axilla, groin, thighs, and buttocks.   - Cosentyx, belbuca; consider baricitinib   - Prior: humira, infliximab, prednisone taper, clobetasol, topical morphine, ILK, humira, oral antibiotics, rifampin, sirolimus 2 mg (started 4/22/21) finasteride, spironolactone (6/2019- 9/5/19, stopped due to abnormal uterine bleeding), topical morphine gel, and percocet for severe pain  # Keratosis pilaris  # L olecranon bursitis.   # Dermatographis  # Eruption NOS,   - punch biopsy 8/5/2021: superficial and deep perivascular and interstitial infiltrate of neutrophils, with focal leukocytoclasis and bluish, amorphous material suggestive of neutrophil degranulation, and lesser amounts of eosinophils and lymphocytes. Background dermal edema is noted. Focal neutrophilic epitheliotropism is noted.  - Resolved      SH: She works as an / [place] (lots of walking/running around)    ____________________________________________    Assessment & Plan:   # HS  - Labs looking OK. Will increase xeljanz to 10mg BID  - If this fails other options would be clinical trials vs golimumab vs infliximab vs going back to double dose humira  - Refilled predniosne in case she needs it.  - She has been taking 0.5mg 2-0.5mg twice a day. Has helped a lot.  She thinks it helps more than oxy but not feeling as itchy and not as tired.PDMP checked and refilled  - takes calcium 1200mg and vitamin d 2000 international unit(s)  - Immunizations              Due for 4th COVID booster                Needs pneumonia and shingrix.  Will need PCP appointment.     # Dermatographism  - Continue allegra 180mg BID prn (refilled today)    # Hyperlipidemia   - Elevated LDL fasting. Nutrition.    Follow-up: 6 weeks     Staff:     Jean Kenny MD, FAAD, FACP     Departments of Internal Medicine and Dermatology  [facility]  [phone]    ____________________________________________    CC: Follow-up HS    HPI:  Ms. Klarissa Curtis is a(n) 47 year old female who presents today for f/uy HS.  She did do a 1.5week taper of predniosne and came off. She takes Belbuca 900 mcg in the AM and 450 mcg in the PM.      Flaring on lateral thighs b/l and suprapubic region.     Patient is otherwise feeling well, without additional skin concerns.    Medications:  Current Outpatient Medications   Medication     buprenorphine HCl-naloxone HCl (SUBOXONE) 2-0.5 MG per film     calcium carbonate (CALCIUM CARBONATE) 600 MG tablet     calcium carbonate 750 MG CHEW     clobetasol (TEMOVATE) 0.05 % external cream     dapsone (ACZONE) 100 MG tablet     fexofenadine (ALLEGRA) 180 MG tablet     fluocinonide (LIDEX) 0.05 % external solution     FLUoxetine (PROZAC) 40 MG capsule     hydrOXYzine (ATARAX) 25 MG tablet     ketoconazole (NIZORAL) 2 % external shampoo     medical cannabis (Patient's own supply)     predniSONE (DELTASONE) 10 MG tablet     tofacitinib (XELJANZ) 10 MG tablet     tofacitinib (XELJANZ) 5 MG tablet     traZODone (DESYREL) 50 MG tablet     triamcinolone (KENALOG) 0.1 % external ointment     valACYclovir (VALTREX) 500 MG tablet     vitamin D3 (CHOLECALCIFEROL) 50 mcg (2000 units) tablet     No current facility-administered medications for this visit.      Past Medical/Surgical History:   Patient Active Problem List   Diagnosis     Hidradenitis suppurativa     Fatigue     Past Medical History:   Diagnosis Date     NO ACTIVE PROBLEMS        CC No referring provider defined for this  encounter. on close of this encounter.

## 2022-06-02 NOTE — NURSING NOTE
Dermatology Rooming Note    Klarissa Curtis's goals for this visit include:   Chief Complaint   Patient presents with     Derm Problem     HS follow up     Crystal Garcia CMA on 6/2/2022 at 4:19 PM

## 2022-06-16 ENCOUNTER — MYC REFILL (OUTPATIENT)
Dept: DERMATOLOGY | Facility: CLINIC | Age: 47
End: 2022-06-16

## 2022-06-16 DIAGNOSIS — G89.29 OTHER CHRONIC PAIN: ICD-10-CM

## 2022-06-22 NOTE — TELEPHONE ENCOUNTER
buprenorphine HCl-naloxone HCl (SUBOXONE) 2-0.5 MG per film     Sig - Route: Place 0.5 Film under the tongue 2 times daily - Sublingual  Last Written Prescription Date:  6/2/22  Last Fill Quantity: 30 film ,   # refills: 0  [place] [id number] [place] [address]  Last Office Visit : 6/2/2022  Future Office visit:  6 weeks, not scheduled    Routing refill request to provider for review/approval because:  Controlled substance, early request.    Your refill request has been entered and sent to your physician for authorization.  The last refill noted in chart was :   buprenorphine HCl-naloxone HCl (SUBOXONE) 2-0.5 MG per film      Place 0.5 Film under the tongue 2 times daily - Sublingual  Last Written Prescription Date:  6/2/22  Last Fill Quantity: 30 film ,   # refills: 0  Sent to [place] [id number] [place] [address]

## 2022-06-23 RX ORDER — BUPRENORPHINE AND NALOXONE 2; .5 MG/1; MG/1
0.5 FILM, SOLUBLE BUCCAL; SUBLINGUAL 2 TIMES DAILY
Qty: 30 FILM | Refills: 0 | Status: SHIPPED | OUTPATIENT
Start: 2022-06-23 | End: 2022-09-01

## 2022-08-04 ENCOUNTER — OFFICE VISIT (OUTPATIENT)
Dept: DERMATOLOGY | Facility: CLINIC | Age: 47
End: 2022-08-04
Payer: COMMERCIAL

## 2022-08-04 DIAGNOSIS — L73.2 HIDRADENITIS SUPPURATIVA: Primary | ICD-10-CM

## 2022-08-04 PROCEDURE — 10061 I&D ABSCESS COMP/MULTIPLE: CPT | Performed by: PHYSICIAN ASSISTANT

## 2022-08-04 PROCEDURE — 99214 OFFICE O/P EST MOD 30 MIN: CPT | Mod: 25 | Performed by: PHYSICIAN ASSISTANT

## 2022-08-04 PROCEDURE — 11900 INJECT SKIN LESIONS </W 7: CPT | Performed by: PHYSICIAN ASSISTANT

## 2022-08-04 ASSESSMENT — PAIN SCALES - GENERAL: PAINLEVEL: SEVERE PAIN (7)

## 2022-08-04 NOTE — LETTER
8/4/2022       RE: Klarissa Curtis  [address]  [place] [place] [address]     Dear Colleague,    Thank you for referring your patient, Klarissa Curtis, to the [facility]MAT[facility] [place] at [facility]. Please see a copy of my visit note below.    [facility] Dermatology Note  Encounter Date: Aug 4, 2022  Office Visit     Dermatology Problem List:  1. Hidradenitis suppurativa: diagnosed age 11, initially on waistline (used to weigh 300 lbs) which cleared up -> intermittent outbreaks -> for the past 10 years has had in bilateral axilla, groin, thighs, and buttocks.   - Xeljanz, suboxone, prednisone   - I&D x2 to lesions on the R axilla - 8/4/22  - Prior: humira, infliximab, prednisone taper, clobetasol, topical morphine, ILK, oral antibiotics, rifampin, sirolimus 2 mg (started 4/22/21) finasteride, spironolactone (6/2019- 9/5/19, stopped due to abnormal uterine bleeding), topical morphine gel, and percocet for severe pain, Cosentyx, belbuca  2. Keratosis pilaris  3. L olecranon bursitis.   4. Dermatographis  -  allegra 180mg  5. Eruption NOS,   - punch biopsy 8/5/2021: superficial and deep perivascular and interstitial infiltrate of neutrophils, with focal leukocytoclasis and bluish, amorphous material suggestive of neutrophil degranulation, and lesser amounts of eosinophils and lymphocytes. Background dermal edema is noted. Focal neutrophilic epitheliotropism is noted.  - Resolved      Soc Hx: She works as an [place] (lots of walking/running around)    ____________________________________________    Assessment & Plan:    # HS, currently flaring. Left axilla is starting to improve slightly after ILK with outside derm, but right axilla not improving s/p ILK injections 8/2/22. Discussed treatment options, including increasing/repeating her prednisone and targeted  drainage/deroofing. Explained that a second dose of ILK injections in the armpits within a few days was not a viable option.  - ILK today, right perineum, see procedure note below - she did not have this site injected previously  - I&D today to two sites on the R axilla - see procedure below  - Continue calcium 1200mg and vitamin d 2000 international unit(s)  - Continue buprenorphine HCl- naloxone 0.5 mg/film BID sublingually (last refill by Dr. Kenny 6/23/22)  - Immunizations              Due for 4th COVID booster               Needs pneumonia and shingrix.  Will need PCP appointment.    Procedures Performed:   - Intra-lesional triamcinolone procedure note. After verbal consent and review of risk of pain and skin thinning/atrophy, positioning and cleansing with isopropyl alcohol, 1 total mL of triamcinolone 10 mg/mL was injected into 2 lesion(s) on the right perineum. The patient tolerated the procedure well and left the dermatology clinic in good condition.     - Incision and Drainage. After discussion of the risks including bleeding, infection, scar, non diagnosis and skin discoloration, verbal and/or written consent was obtained. The area was prepped with alcohol and 0.5 mL of lidocaine with epi (1:100,000) was injected into the lesion on the R axilla x2 sites.  An 4mm punch was used to incise the lesion and the lesion was drained. A dressing was placed.  The patient tolerated the procedure well and left the dermatology clinic in good condition.     Follow-up: PRN  Staff and Scribe:     Scribe Disclosure:  ROBBIN, ARTIE BROWN, am serving as a scribe to document services personally performed by Brittanie Booth PA-C based on data collection and the provider's statements to me.   Provider Disclosure:   The documentation recorded by the scribe accurately reflects the services I personally performed and the decisions made by me.    All risks, benefits and alternatives were discussed with patient.  Patient is in  agreement and understands the assessment and plan.  All questions were answered.    Brittanie Booth PA-C, MPAS  [facility]: Phone: [phone], Fax: [phone]  [facility]: Phone: [phone],  Fax: [phone]  [facility]: Phone: [phone], Fax: [phone]  ____________________________________________    CC: Derm Problem (Maura is here because she has a HS flare up.  Shot helped a little but not like it typically does.  )    HPI:  Ms. Klarissa Curtis is a(n) 47 year old female who presents today as a return patient for HS follow up. Last seen virtually by Dr. Kenny on 6/2/22, at which time patient was increased on xeljanz to 10 mg BID for treatment of HS.     Today, patient reports that a flare began in her armpits on Friday (7/29) that left her unable to walk or breathe. She describes the pain as stabbing. She has been trying to alternate narcotics and medical marijuana to treat the pain. The flares have been interfering with her ability to work.      Two days ago, she received two injections into both armpits and her leg from an outside dermatologist. Usually, injections manage her pain very well, but they did not provide the same relief this time. The left armpit began draining but the right armpit did not respond.     Patient is otherwise feeling well, without additional skin concerns.    Labs Reviewed:  N/A    Physical Exam:  Vitals: There were no vitals taken for this visit.  SKIN: Focused examination of the armpits, thighs, and perineum was performed.  - Inflammatory papule on the left medial thigh.  - Scarring on the billateral thighs.  - Inflamed nodule on the right medial perineum.  - Left axilla, scarring, tract formation, inflammatory nodule, tender to palpations, active draining.  - Right axilla, scarring, tract formation, inflammatory nodule, significant pain on palpation and  induration that has not resolved with ILK.   - No other lesions of concern on areas examined.     Medications:  Current Outpatient Medications   Medication     buprenorphine HCl-naloxone HCl (SUBOXONE) 2-0.5 MG per film     calcium carbonate (CALCIUM CARBONATE) 600 MG tablet     fexofenadine (ALLEGRA) 180 MG tablet     FLUoxetine (PROZAC) 40 MG capsule     medical cannabis (Patient's own supply)     tofacitinib (XELJANZ) 10 MG tablet     tofacitinib (XELJANZ) 5 MG tablet     traZODone (DESYREL) 50 MG tablet     vitamin D3 (CHOLECALCIFEROL) 50 mcg (2000 units) tablet     calcium carbonate 750 MG CHEW     clobetasol (TEMOVATE) 0.05 % external cream     dapsone (ACZONE) 100 MG tablet     fluocinonide (LIDEX) 0.05 % external solution     hydrOXYzine (ATARAX) 25 MG tablet     ketoconazole (NIZORAL) 2 % external shampoo     triamcinolone (KENALOG) 0.1 % external ointment     valACYclovir (VALTREX) 500 MG tablet     No current facility-administered medications for this visit.      Past Medical History:   Patient Active Problem List   Diagnosis     Hidradenitis suppurativa     Fatigue     Past Medical History:   Diagnosis Date     NO ACTIVE PROBLEMS

## 2022-08-04 NOTE — NURSING NOTE
Dermatology Rooming Note    Klarissa Curtis's goals for this visit include:   Chief Complaint   Patient presents with     Derm Problem     Maura is here because she has a HS flare up.  Shot helped a little but not like it typically does.       Jen Min, CMA

## 2022-08-04 NOTE — NURSING NOTE
Drug Administration Record    Prior to injection, verified patient identity using patient's name and date of birth.  Due to injection administration, patient instructed to remain in clinic for 15 minutes  afterwards, and to report any adverse reaction to me immediately.    Drug Name: triamcinolone acetonide(kenalog)  Dose: 1mL of triamcinolone 10mg/mL, 10mg dose  Route administered: ID  NDC #: Kenalog-10 (5290-3745-69)  Amount of waste(mL):4mL  Reason for waste: Multi dose vial    LOT #: [id number]  SITE: see provider note  : CodeBaby  EXPIRATION DATE: 02/2024

## 2022-08-04 NOTE — PROGRESS NOTES
[facility] Dermatology Note  Encounter Date: Aug 4, 2022  Office Visit     Dermatology Problem List:  1. Hidradenitis suppurativa: diagnosed age 11, initially on waistline (used to weigh 300 lbs) which cleared up -> intermittent outbreaks -> for the past 10 years has had in bilateral axilla, groin, thighs, and buttocks.   - Xeljanz, suboxone, prednisone   - I&D x2 to lesions on the R axilla - 8/4/22  - Prior: humira, infliximab, prednisone taper, clobetasol, topical morphine, ILK, oral antibiotics, rifampin, sirolimus 2 mg (started 4/22/21) finasteride, spironolactone (6/2019- 9/5/19, stopped due to abnormal uterine bleeding), topical morphine gel, and percocet for severe pain, Cosentyx, belbuca  2. Keratosis pilaris  3. L olecranon bursitis.   4. Dermatographis  -  allegra 180mg  5. Eruption NOS,   - punch biopsy 8/5/2021: superficial and deep perivascular and interstitial infiltrate of neutrophils, with focal leukocytoclasis and bluish, amorphous material suggestive of neutrophil degranulation, and lesser amounts of eosinophils and lymphocytes. Background dermal edema is noted. Focal neutrophilic epitheliotropism is noted.  - Resolved      Soc Hx: [place] an [place]/running around)    ____________________________________________    Assessment & Plan:    # HS, currently flaring. Left axilla is starting to improve slightly after ILK with outside derm, but right axilla not improving s/p ILK injections 8/2/22. Discussed treatment options, including increasing/repeating her prednisone and targeted drainage/deroofing. Explained that a second dose of ILK injections in the armpits within a few days was not a viable option.  - ILK today, right perineum, see procedure note below - she did not have this site injected previously  - I&D today to two sites on the R axilla - see procedure below  - Continue calcium 1200mg and vitamin d 2000  international unit(s)  - Continue buprenorphine HCl- naloxone 0.5 mg/film BID sublingually (last refill by Dr. Kenny 6/23/22)  - Immunizations              Due for 4th COVID booster               Needs pneumonia and shingrix.  Will need PCP appointment.    Procedures Performed:   - Intra-lesional triamcinolone procedure note. After verbal consent and review of risk of pain and skin thinning/atrophy, positioning and cleansing with isopropyl alcohol, 1 total mL of triamcinolone 10 mg/mL was injected into 2 lesion(s) on the right perineum. The patient tolerated the procedure well and left the dermatology clinic in good condition.     - Incision and Drainage. After discussion of the risks including bleeding, infection, scar, non diagnosis and skin discoloration, verbal and/or written consent was obtained. The area was prepped with alcohol and 0.5 mL of lidocaine with epi (1:100,000) was injected into the lesion on the R axilla x2 sites.  An 4mm punch was used to incise the lesion and the lesion was drained. A dressing was placed.  The patient tolerated the procedure well and left the dermatology clinic in good condition.     Follow-up: PRN  Staff and Scribe:     Scribe Disclosure:  ARTIE SIEGEL, am serving as a scribe to document services personally performed by Brittanie Booth PA-C based on data collection and the provider's statements to me.   Provider Disclosure:   The documentation recorded by the scribe accurately reflects the services I personally performed and the decisions made by me.    All risks, benefits and alternatives were discussed with patient.  Patient is in agreement and understands the assessment and plan.  All questions were answered.    Brittanie Booth PA-C, MPAS  [facility]: Phone: [phone], Fax: [phone]  [facility]: Phone: [phone],  Fax: [phone]  [facility]:  Phone: [phone], Fax: [phone]  ____________________________________________    CC: Derm Problem (Maura is here because she has a HS flare up.  Shot helped a little but not like it typically does.  )    HPI:  Ms. Klarissa Curtis is a(n) 47 year old female who presents today as a return patient for HS follow up. Last seen virtually by Dr. Kenny on 6/2/22, at which time patient was increased on xeljanz to 10 mg BID for treatment of HS.     Today, patient reports that a flare began in her armpits on Friday (7/29) that left her unable to walk or breathe. She describes the pain as stabbing. She has been trying to alternate narcotics and medical marijuana to treat the pain. The flares have been interfering with her ability to work.      Two days ago, she received two injections into both armpits and her leg from an outside dermatologist. Usually, injections manage her pain very well, but they did not provide the same relief this time. The left armpit began draining but the right armpit did not respond.     Patient is otherwise feeling well, without additional skin concerns.    Labs Reviewed:  N/A    Physical Exam:  Vitals: There were no vitals taken for this visit.  SKIN: Focused examination of the armpits, thighs, and perineum was performed.  - Inflammatory papule on the left medial thigh.  - Scarring on the billateral thighs.  - Inflamed nodule on the right medial perineum.  - Left axilla, scarring, tract formation, inflammatory nodule, tender to palpations, active draining.  - Right axilla, scarring, tract formation, inflammatory nodule, significant pain on palpation and induration that has not resolved with ILK.   - No other lesions of concern on areas examined.     Medications:  Current Outpatient Medications   Medication     buprenorphine HCl-naloxone HCl (SUBOXONE) 2-0.5 MG per film     calcium carbonate (CALCIUM CARBONATE) 600 MG tablet     fexofenadine (ALLEGRA) 180 MG tablet     FLUoxetine (PROZAC) 40  MG capsule     medical cannabis (Patient's own supply)     tofacitinib (XELJANZ) 10 MG tablet     tofacitinib (XELJANZ) 5 MG tablet     traZODone (DESYREL) 50 MG tablet     vitamin D3 (CHOLECALCIFEROL) 50 mcg (2000 units) tablet     calcium carbonate 750 MG CHEW     clobetasol (TEMOVATE) 0.05 % external cream     dapsone (ACZONE) 100 MG tablet     fluocinonide (LIDEX) 0.05 % external solution     hydrOXYzine (ATARAX) 25 MG tablet     ketoconazole (NIZORAL) 2 % external shampoo     triamcinolone (KENALOG) 0.1 % external ointment     valACYclovir (VALTREX) 500 MG tablet     No current facility-administered medications for this visit.      Past Medical History:   Patient Active Problem List   Diagnosis     Hidradenitis suppurativa     Fatigue     Past Medical History:   Diagnosis Date     NO ACTIVE PROBLEMS

## 2022-09-01 ENCOUNTER — VIRTUAL VISIT (OUTPATIENT)
Dept: DERMATOLOGY | Facility: CLINIC | Age: 47
End: 2022-09-01
Payer: COMMERCIAL

## 2022-09-01 DIAGNOSIS — G89.29 OTHER CHRONIC PAIN: ICD-10-CM

## 2022-09-01 DIAGNOSIS — L73.2 HIDRADENITIS SUPPURATIVA: ICD-10-CM

## 2022-09-01 PROCEDURE — 99214 OFFICE O/P EST MOD 30 MIN: CPT | Mod: TEL | Performed by: DERMATOLOGY

## 2022-09-01 RX ORDER — BUPRENORPHINE AND NALOXONE 2; .5 MG/1; MG/1
0.5 FILM, SOLUBLE BUCCAL; SUBLINGUAL 2 TIMES DAILY
Qty: 30 FILM | Refills: 0 | Status: SHIPPED | OUTPATIENT
Start: 2022-09-01 | End: 2022-10-06

## 2022-09-01 RX ORDER — PREDNISONE 5 MG/1
TABLET ORAL
Qty: 168 TABLET | Refills: 0 | Status: SHIPPED | OUTPATIENT
Start: 2022-09-01 | End: 2022-09-25

## 2022-09-01 RX ORDER — ADALIMUMAB 80MG/0.8ML
80 KIT SUBCUTANEOUS WEEKLY
Qty: 4 EACH | Refills: 6 | Status: SHIPPED | OUTPATIENT
Start: 2022-09-01 | End: 2023-06-14

## 2022-09-01 ASSESSMENT — PAIN SCALES - GENERAL: PAINLEVEL: SEVERE PAIN (6)

## 2022-09-01 NOTE — PROGRESS NOTES
[facility] Dermatology Note  Encounter Date: Sep 1, 2022  Telephone ([phone] ). Location of teledermatologist: [facility] [place]. Start time:8:05 End time: 8:30    Dermatology Problem List:  1. Hidradenitis suppurativa: diagnosed age 11, initially on waistline (used to weigh 300 lbs) which cleared up -> intermittent outbreaks -> for the past 10 years has had in bilateral axilla, groin, thighs, and buttocks.   - Xeljanz, suboxone, prednisone   - I&D x2 to lesions on the R axilla - 8/4/22  - Prior: humira, infliximab, prednisone taper, clobetasol, topical morphine, ILK, oral antibiotics, rifampin, sirolimus 2 mg (started 4/22/21) finasteride, spironolactone (6/2019- 9/5/19, stopped due to abnormal uterine bleeding), topical morphine gel, and percocet for severe pain, Cosentyx, belbuca  2. Keratosis pilaris  3. L olecranon bursitis.   4. Dermatographis  -  allegra 180mg  5. Eruption NOS,   - punch biopsy 8/5/2021: superficial and deep perivascular and interstitial infiltrate of neutrophils, with focal leukocytoclasis and bluish, amorphous material suggestive of neutrophil degranulation, and lesser amounts of eosinophils and lymphocytes. Background dermal edema is noted. Focal neutrophilic epitheliotropism is noted.  - Resolved      Soc Hx: She works as an [place] (lots of walking/running around)    ____________________________________________    Assessment & Plan:   # HS, currently flaring.  -Prednisone burst  - Restart humira 80mg weekly. Will consider re-trying infliximab in future  - Refilled suboxone BID --> opk to uincrease to TID for acute flares> PDMP checked. Refileld  - Continue calcium 1200mg and vitamin d 2000 international unit(s)  - Immunizations              Due for 4th COVID booster               Needs pneumonia and shingrix.  Will need PCP appointment    Follow-up: 8 weeks    Staff and Scribe:      Jean  "MD Zoya, FAAD, FACP     Departments of Internal Medicine and Dermatology  [facility]  [phone]  ____________________________________________    CC:  Follow-up HS    HPI:  Ms. Klarissa Curtis is a(n) 47 year old female who presents today as a return patient for HS. Last seen by Francine Booth PA-C on 8/4/22, at which time patient underwent ILK injections to the right perineum and underwent an incision and drainage to the right axilla for treatment of HS.    Currently flaring.Lerft axilla chronically flaring > had injection in early month with Dr Quinteros and it did not help.  Had I&D of that site 1 weeek later with Brittanie and had more injections. Still hasnt stopped.    She is currently doing:   - Tofacicitnib  10mg twice a day which is not helping     Pain med are helpful.   Currently she is on medical marijuana and 0.5 mg/film BID sublingually (TID for pain)      HS Nurse Assessment    Nurse Assessment Data 5/19/2022 6/2/2022 9/1/2022   Over the past 30 days how many old lesions flared back up? \"155\" 6-7 5-10   Over the past 30 days how many new lesions did you get? 2 1 \"a couple\"   Over the past week, how many dressing changes do you do each day? 3+ 1 3+   Over the past week, has your wound drainage been: Severe Mild Severe   Rate your HS overall from 0 - 10 (0 = no disease, 10 = worst) over the past week:  5 5 6   Rate your pain score from 0 - 10 (0 = no disease, 10 = worst) for the most painful/symptomatic lesion in the past week:  6 5 - Moderate Pain 8   Over the past week, how much has HS influenced your quality of life? very much moderately moderately       Patient is otherwise feeling well, without additional skin concerns.=    - No other lesions of concern on areas examined.     Medications:  Current Outpatient Medications   Medication     buprenorphine HCl-naloxone HCl (SUBOXONE) 2-0.5 MG per film     calcium carbonate (CALCIUM CARBONATE) 600 MG tablet     calcium " carbonate 750 MG CHEW     clobetasol (TEMOVATE) 0.05 % external cream     dapsone (ACZONE) 100 MG tablet     fexofenadine (ALLEGRA) 180 MG tablet     fluocinonide (LIDEX) 0.05 % external solution     FLUoxetine (PROZAC) 40 MG capsule     hydrOXYzine (ATARAX) 25 MG tablet     ketoconazole (NIZORAL) 2 % external shampoo     medical cannabis (Patient's own supply)     tofacitinib (XELJANZ) 10 MG tablet     tofacitinib (XELJANZ) 5 MG tablet     traZODone (DESYREL) 50 MG tablet     triamcinolone (KENALOG) 0.1 % external ointment     valACYclovir (VALTREX) 500 MG tablet     vitamin D3 (CHOLECALCIFEROL) 50 mcg (2000 units) tablet     No current facility-administered medications for this visit.      Past Medical/Surgical History:   Patient Active Problem List   Diagnosis     Hidradenitis suppurativa     Fatigue     Past Medical History:   Diagnosis Date     NO ACTIVE PROBLEMS        CC Referred Self, MD  No address on file on close of this encounter.

## 2022-09-01 NOTE — LETTER
9/1/2022       RE: Klarissa Curtis  [address]  [place] [address]     Dear Colleague,    Thank you for referring your patient, Klarissa Curtis, to the [facility] [place] at [facility]. Please see a copy of my visit note below.    [facility] Dermatology Note  Encounter Date: Sep 1, 2022  Telephone ([phone] ). Location of teledermatologist: [facility] [place]. Start time:8:05 End time: 8:30    Dermatology Problem List:  1. Hidradenitis suppurativa: diagnosed age 11, initially on waistline (used to weigh 300 lbs) which cleared up -> intermittent outbreaks -> for the past 10 years has had in bilateral axilla, groin, thighs, and buttocks.   - Xeljanz, suboxone, prednisone   - I&D x2 to lesions on the R axilla - 8/4/22  - Prior: humira, infliximab, prednisone taper, clobetasol, topical morphine, ILK, oral antibiotics, rifampin, sirolimus 2 mg (started 4/22/21) finasteride, spironolactone (6/2019- 9/5/19, stopped due to abnormal uterine bleeding), topical morphine gel, and percocet for severe pain, Cosentyx, belbuca  2. Keratosis pilaris  3. L olecranon bursitis.   4. Dermatographis  -  allegra 180mg  5. Eruption NOS,   - punch biopsy 8/5/2021: superficial and deep perivascular and interstitial infiltrate of neutrophils, with focal leukocytoclasis and bluish, amorphous material suggestive of neutrophil degranulation, and lesser amounts of eosinophils and lymphocytes. Background dermal edema is noted. Focal neutrophilic epitheliotropism is noted.  - Resolved      Soc Hx: [place] works as an [place] of walking/running around)    ____________________________________________    Assessment & Plan:   # HS, currently flaring.  -Prednisone burst  - Restart humira 80mg weekly. Will consider re-trying infliximab in future  -  "Refilled suboxone BID --> opk to uincrease to TID for acute flares> PDMP checked. Refileld  - Continue calcium 1200mg and vitamin d 2000 international unit(s)  - Immunizations              Due for 4th COVID booster               Needs pneumonia and shingrix.  Will need PCP appointment    Follow-up: 8 weeks    Staff and Scribe:      Jean Kenny MD, FAAD, FACP     Departments of Internal Medicine and Dermatology  [facility]  [phone]  ____________________________________________    CC:  Follow-up HS    HPI:  Ms. Klarissa Curtis is a(n) 47 year old female who presents today as a return patient for HS. Last seen by Francine Booth PA-C on 8/4/22, at which time patient underwent ILK injections to the right perineum and underwent an incision and drainage to the right axilla for treatment of HS.    Currently flaring.Lerft axilla chronically flaring > had injection in early month with Dr Quinteros and it did not help.  Had I&D of that site 1 weeek later with Brittanie and had more injections. Still hasnt stopped.    She is currently doing:   - Tofacicitnib  10mg twice a day which is not helping     Pain med are helpful.   Currently she is on medical marijuana and 0.5 mg/film BID sublingually (TID for pain)      HS Nurse Assessment    Nurse Assessment Data 5/19/2022 6/2/2022 9/1/2022   Over the past 30 days how many old lesions flared back up? \"155\" 6-7 5-10   Over the past 30 days how many new lesions did you get? 2 1 \"a couple\"   Over the past week, how many dressing changes do you do each day? 3+ 1 3+   Over the past week, has your wound drainage been: Severe Mild Severe   Rate your HS overall from 0 - 10 (0 = no disease, 10 = worst) over the past week:  5 5 6   Rate your pain score from 0 - 10 (0 = no disease, 10 = worst) for the most painful/symptomatic lesion in the past week:  6 5 - Moderate Pain 8   Over the past week, how much has HS influenced your quality of life? very much " moderately moderately       Patient is otherwise feeling well, without additional skin concerns.=    - No other lesions of concern on areas examined.     Medications:  Current Outpatient Medications   Medication     buprenorphine HCl-naloxone HCl (SUBOXONE) 2-0.5 MG per film     calcium carbonate (CALCIUM CARBONATE) 600 MG tablet     calcium carbonate 750 MG CHEW     clobetasol (TEMOVATE) 0.05 % external cream     dapsone (ACZONE) 100 MG tablet     fexofenadine (ALLEGRA) 180 MG tablet     fluocinonide (LIDEX) 0.05 % external solution     FLUoxetine (PROZAC) 40 MG capsule     hydrOXYzine (ATARAX) 25 MG tablet     ketoconazole (NIZORAL) 2 % external shampoo     medical cannabis (Patient's own supply)     tofacitinib (XELJANZ) 10 MG tablet     tofacitinib (XELJANZ) 5 MG tablet     traZODone (DESYREL) 50 MG tablet     triamcinolone (KENALOG) 0.1 % external ointment     valACYclovir (VALTREX) 500 MG tablet     vitamin D3 (CHOLECALCIFEROL) 50 mcg (2000 units) tablet     No current facility-administered medications for this visit.      Past Medical/Surgical History:   Patient Active Problem List   Diagnosis     Hidradenitis suppurativa     Fatigue     Past Medical History:   Diagnosis Date     NO ACTIVE PROBLEMS        CC Referred Self, MD  No address on file on close of this encounter.      Again, thank you for allowing me to participate in the care of your patient.      Sincerely,    Jean Kenny MD

## 2022-09-01 NOTE — NURSING NOTE
Dermatology Rooming Note    Klarissa Curtis's goals for this visit include:   Chief Complaint   Patient presents with     Derm Problem     Maura is being called for a HS follow-up. States condition has slightly improved since last seen.     Sonido Ramos, EMT

## 2022-09-01 NOTE — LETTER
Date:September 2, 2022      Patient was self referred, no letter generated. Do not send.        [facility] Health Information

## 2022-09-02 ENCOUNTER — TELEPHONE (OUTPATIENT)
Dept: DERMATOLOGY | Facility: CLINIC | Age: 47
End: 2022-09-02

## 2022-09-02 NOTE — PATIENT INSTRUCTIONS
-Prednisone burst  - Restart humira 80mg weekly. Will consider re-trying infliximab in future  - Refilled suboxone 2x (OK to increeae to TID for flares) + medical canabis  - Continue calcium 1200mg and vitamin d 2000 international unit(s)  - Immunizations              Due for 4th COVID booster               Needs pneumonia and shingrix.  Will need PCP appointment    Make PCP appointment: Dr Cassie Bhatti

## 2022-09-02 NOTE — TELEPHONE ENCOUNTER
PA Initiation    Medication: PA Pending Humira  Insurance Company: [employer] ([facility]) - Phone [phone] Fax [phone]  Pharmacy Filling the Rx:    Filling Pharmacy Phone:    Filling Pharmacy Fax:    Start Date: 9/2/2022    Key: [id number]

## 2022-09-17 ENCOUNTER — PATIENT OUTREACH (OUTPATIENT)
Dept: DERMATOLOGY | Facility: CLINIC | Age: 47
End: 2022-09-17

## 2022-09-17 NOTE — TELEPHONE ENCOUNTER
Attempted to reach patient to schedule follow up in the Dermatology Clinic.  No answer,  LM on VM to call office and [employer] message sent..    Schedule with Dr. Jean Kenny.

## 2022-09-18 ENCOUNTER — HEALTH MAINTENANCE LETTER (OUTPATIENT)
Age: 47
End: 2022-09-18

## 2022-09-19 ENCOUNTER — TELEPHONE (OUTPATIENT)
Dept: DERMATOLOGY | Facility: CLINIC | Age: 47
End: 2022-09-19

## 2022-09-19 NOTE — TELEPHONE ENCOUNTER
Call patient to schedule her follow up appointment. Last visit was 09/01/2022 with Dr Kenny and the provider advise to 8 weeks telephone visit. Left message with clinic number.    Brie Hoffmann, Clinic , 09/19/22

## 2022-09-20 ENCOUNTER — TELEPHONE (OUTPATIENT)
Dept: DERMATOLOGY | Facility: CLINIC | Age: 47
End: 2022-09-20

## 2022-09-20 NOTE — TELEPHONE ENCOUNTER
[facility] Call Center    Phone Message    May a detailed message be left on voicemail: yes     Reason for Call: Medication Question or concern regarding medication   Prescription Clarification  Name of Medication: adalimumab (HUMIRA *CF* PEN-CD/UC/HS STARTER) 80 MG/0.8ML pen kit    AND     adalimumab (HUMIRA *CF*) 80 MG/0.8ML pen kit  Prescribing Provider: Zoya   Pharmacy: [facility] - [place], [place] - [address]   What on the order needs clarification?   Pharmacist states dosage for adalimumab (HUMIRA *CF* PEN-CD/UC/HS STARTER) 80 MG/0.8ML pen kit and adalimumab (HUMIRA *CF*) 80 MG/0.8ML pen kit is much higher than usual - wants to confirm dosages are correct on both        Action Taken: Message routed to:  [facility] Derm    Travel Screening: Not Applicable

## 2022-09-20 NOTE — TELEPHONE ENCOUNTER
Prior Authorization Approval    Authorization Effective Date: 9/13/2022  Authorization Expiration Date: 9/13/2023  Medication: Humira  Approved Dose/Quantity: 4 for 28 days  Reference #: Key: [id number]   Insurance Company: [employer] ([facility]) - Phone [phone] Fax [phone]  Expected CoPay: $45.00     CoPay Card Available: Yes    Foundation Assistance Needed:    Which Pharmacy is filling the prescription (Not needed for infusion/clinic administered): [facility] - [place], [place] - [address]   Pharmacy Notified: Yes  Patient Notified: Yes    PA approved for starter and maintenance dose

## 2022-10-06 DIAGNOSIS — G89.29 OTHER CHRONIC PAIN: ICD-10-CM

## 2022-10-06 RX ORDER — BUPRENORPHINE AND NALOXONE 2; .5 MG/1; MG/1
0.5 FILM, SOLUBLE BUCCAL; SUBLINGUAL 2 TIMES DAILY
Qty: 30 FILM | Refills: 0 | Status: SHIPPED | OUTPATIENT
Start: 2022-10-06 | End: 2023-07-06

## 2022-10-12 NOTE — PROGRESS NOTES
[facility] Dermatology Note  Encounter Date: Oct 13, 2022  Office Visit     Dermatology Problem List:  1. Hidradenitis suppurativa: diagnosed age 11, initially on waistline (used to weigh 300 lbs) which cleared up -> intermittent outbreaks -> for the past 10 years has had in bilateral axilla, groin, thighs, and buttocks.   - Humira, suboxone, prednisone   - Flair plan: ILK 60 injections, Prednisone 60 mg x 1 week, 50 mg x 1 week, 40 mg x 1 week (she does not like to taper below 40 mg as she flares)  - I&D x2 to lesions on the R axilla - 8/4/22  - Prior: Xeljanz, infliximab, prednisone taper, clobetasol, topical morphine, ILK, oral antibiotics, rifampin, sirolimus 2 mg (started 4/22/21) finasteride, spironolactone (6/2019- 9/5/19, stopped due to abnormal uterine bleeding), topical morphine gel, and percocet for severe pain, Cosentyx, belbuca  2. Keratosis pilaris  3. L olecranon bursitis.   4. Dermatographis  -  allegra 180mg  5. Eruption NOS,   - punch biopsy 8/5/2021: superficial and deep perivascular and interstitial infiltrate of neutrophils, with focal leukocytoclasis and bluish, amorphous material suggestive of neutrophil degranulation, and lesser amounts of eosinophils and lymphocytes. Background dermal edema is noted. Focal neutrophilic epitheliotropism is noted.  - Resolved  6. Pink linear patches, ddx: follicular dermatitis vs lichen spinulosus vs KP   - amlactin, lidex       Soc Hx: [place] an [place]/running around)  ____________________________________________    Assessment & Plan:    # HS, Stage II, currently flaring.  - ILK injections today, see procedure note below  - Prednisone burst x3 weeks  - Restart humira 80mg weekly  - Continue suboxone BID.   - Continue calcium 1200mg and vitamin D 2000 international unit(s)  - Immunizations: Due for 4th COVID booster. Needs pneumonia and shingrix. Will need PCP  "appointment  - Future consideration: Re-trying infliximab     # Constipation  - Start MiraLax BID     # Dermatographism  - Continue allegra 180mg BID prn. Refilled today.    # Pink linear patches, ddx: follicular dermatitis vs lichen spinulosus vs KP   - Start lidex 0.05% ointment BID  - Start amlactin 12% cream BID     Procedures Performed:   - Intra-lesional triamcinolone procedure note. After verbal consent and review of risk of pain and skin thinning/atrophy, positioning and cleansing with isopropyl alcohol, 6 total mL of triamcinolone 10 mg/mL was injected into <7 lesions. The patient tolerated the procedure well and left the dermatology clinic in good condition.    Follow-up: 3 month(s) in-person, or earlier for new or changing lesions    Staff and Scribe:     Scribe Disclosure:  I, ARTIE BROWN, am serving as a scribe to document services personally performed by eJan Kenny MD based on data collection and the provider's statements to me.      Provider Disclosure:   The documentation recorded by the scribe accurately reflects the services I personally performed and the decisions made by me.    Jean Kenny MD, FAAD    Departments of Internal Medicine and Dermatology  [facility]  [phone]      ____________________________________________    CC: Derm Problem (Maura is here to follow up for HS. Flaring R side. States she is \"tired of this\" )    HPI:  Ms. Klarissa Curtis is a(n) 47 year old female who presents today as a return patient for HS. Last seen by myself virtually on 9/1/22, at which time patient was restarted on Humira for treatment of HS.     Today, Maura reports she is having a severe flare. She is currently taking the soboxone, which helps to numb the pain. She just had the double dose of Humira approved yesterday and has not started it yet. The prednisone taper was helpful, but has now worn off.      Additionally, the patient reports scratchy hives on " "her arms and legs. She notes bumps that look like freckles on her legs.     HS Nurse Assessment    Nurse Assessment Data 6/2/2022 9/1/2022 10/13/2022   Over the past 30 days how many old lesions flared back up? 6-7 5-10 10   Over the past 30 days how many new lesions did you get? 1 \"a couple\" unknown   Over the past week, how many dressing changes do you do each day? 1 3+ 3+   Over the past week, has your wound drainage been: Mild Severe Moderate   Rate your HS overall from 0 - 10 (0 = no disease, 10 = worst) over the past week:  5 6 7-8   Rate your pain score from 0 - 10 (0 = no disease, 10 = worst) for the most painful/symptomatic lesion in the past week:  5 - Moderate Pain 8 7   Over the past week, how much has HS influenced your quality of life? moderately moderately very much       Patient is otherwise feeling well, without additional skin concerns.    Labs Reviewed:  N/A    Physical Exam:  Vitals: There were no vitals taken for this visit.  SKIN: Full skin, which includes the head/face, both arms, chest, back, abdomen,both legs, genitalia and/or groin buttocks, digits and/or nails, was examined.  - Pink linear patches on bilateral lower legs as well as keratotic follicular based papules scattered around lower leg with some areas almost coalescing into a plaque.  - Ulcerated papule with hemorraghic crust on the posterior neck.     HS Data  HS Exam Data 8/5/2021 9/30/2021 2/10/2022   LC Type - LC1 LC1   Clinical Subtypes - Regular type Regular type   Acne? - No No   Dissecting Cellulitis? - No No   Visual analogue score (0-100) - - 55   Total Julian Stage - II II   Total Inflammatory Nodules 4 3 10   Total Abcesses 0 0 0   Total Draining Tunnels 2 4 5   Total Abscess and Nodule Count 4 3 10   IHS4 Score  12 19 30   Total  HASI Score 31 27 69   HS-PGA - 3 3     - No other lesions of concern on areas examined.     Medications:  Current Outpatient Medications   Medication     adalimumab (HUMIRA *CF* PEN-CD/UC/HS " STARTER) 80 MG/0.8ML pen kit     adalimumab (HUMIRA *CF*) 80 MG/0.8ML pen kit     buprenorphine HCl-naloxone HCl (SUBOXONE) 2-0.5 MG per film     calcium carbonate (CALCIUM CARBONATE) 600 MG tablet     calcium carbonate 750 MG CHEW     clobetasol (TEMOVATE) 0.05 % external cream     fexofenadine (ALLEGRA) 180 MG tablet     fluocinonide (LIDEX) 0.05 % external solution     FLUoxetine (PROZAC) 40 MG capsule     hydrOXYzine (ATARAX) 25 MG tablet     ketoconazole (NIZORAL) 2 % external shampoo     medical cannabis (Patient's own supply)     traZODone (DESYREL) 50 MG tablet     triamcinolone (KENALOG) 0.1 % external ointment     valACYclovir (VALTREX) 500 MG tablet     vitamin D3 (CHOLECALCIFEROL) 50 mcg (2000 units) tablet     No current facility-administered medications for this visit.      Past Medical History:   Patient Active Problem List   Diagnosis     Hidradenitis suppurativa     Fatigue     Past Medical History:   Diagnosis Date     NO ACTIVE PROBLEMS         CC No referring provider defined for this encounter. on close of this encounter.

## 2022-10-13 ENCOUNTER — OFFICE VISIT (OUTPATIENT)
Dept: DERMATOLOGY | Facility: CLINIC | Age: 47
End: 2022-10-13
Payer: COMMERCIAL

## 2022-10-13 DIAGNOSIS — L50.3 DERMATOGRAPHISM: ICD-10-CM

## 2022-10-13 DIAGNOSIS — K59.09 OTHER CONSTIPATION: ICD-10-CM

## 2022-10-13 DIAGNOSIS — L73.2 HIDRADENITIS SUPPURATIVA: Primary | ICD-10-CM

## 2022-10-13 DIAGNOSIS — L30.9 DERMATITIS: ICD-10-CM

## 2022-10-13 PROCEDURE — 99214 OFFICE O/P EST MOD 30 MIN: CPT | Mod: 25 | Performed by: DERMATOLOGY

## 2022-10-13 PROCEDURE — 11900 INJECT SKIN LESIONS </W 7: CPT | Performed by: DERMATOLOGY

## 2022-10-13 RX ORDER — AMMONIUM LACTATE 12 G/100G
CREAM TOPICAL 2 TIMES DAILY
Qty: 385 G | Refills: 11 | Status: SHIPPED | OUTPATIENT
Start: 2022-10-13 | End: 2023-10-02

## 2022-10-13 RX ORDER — FLUOCINONIDE 0.5 MG/G
OINTMENT TOPICAL 2 TIMES DAILY
Qty: 60 G | Refills: 4 | Status: SHIPPED | OUTPATIENT
Start: 2022-10-13 | End: 2023-10-02

## 2022-10-13 RX ORDER — TRIAMCINOLONE ACETONIDE 40 MG/ML
90 INJECTION, SUSPENSION INTRA-ARTICULAR; INTRAMUSCULAR ONCE
Status: CANCELLED | OUTPATIENT
Start: 2022-10-13 | End: 2022-10-13

## 2022-10-13 RX ORDER — PREDNISONE 20 MG/1
TABLET ORAL
Qty: 53 TABLET | Refills: 0 | Status: SHIPPED | OUTPATIENT
Start: 2022-10-13 | End: 2022-11-03

## 2022-10-13 RX ORDER — FEXOFENADINE HCL 180 MG/1
180 TABLET ORAL 2 TIMES DAILY
Qty: 90 TABLET | Refills: 11 | Status: SHIPPED | OUTPATIENT
Start: 2022-10-13 | End: 2023-03-02

## 2022-10-13 RX ORDER — POLYETHYLENE GLYCOL 3350 17 G/17G
1 POWDER, FOR SOLUTION ORAL DAILY
Qty: 507 G | Refills: 4 | Status: SHIPPED | OUTPATIENT
Start: 2022-10-13 | End: 2023-10-02

## 2022-10-13 NOTE — NURSING NOTE
"Dermatology Rooming Note    Klarissa Curtis's goals for this visit include:   Chief Complaint   Patient presents with     Derm Problem     Maura is here to follow up for HS. Flaring R side. States she is \"tired of this\"              "

## 2022-10-13 NOTE — LETTER
10/13/2022    To Whom It May Concern:    Klarissa Curtis has a severe painful disease that makes it difficult or impossible for her to do her normal activities, including work. On days when she is in severe pain, depending on her level of pain, please allow her to work from home or not work, as it is medically necessary.    Sincerely,    Jean Kenny MD

## 2022-10-13 NOTE — LETTER
Date:October 31, 2022      Provider requested that no letter be sent. Do not send.       [facility]

## 2022-10-13 NOTE — LETTER
10/13/2022       RE: Klarissa Curtis  [address]  [place] [id number]     To Whom It May Concern:    Klarissa Curtis has a severe painful medical diagnosis that may limit her ability to work. For 2-4 days each month, please allow her to work from home, or if severe, to not work at all, as it is medically necessary.      Sincerely,  Jean Kenny MD

## 2023-01-29 ENCOUNTER — HEALTH MAINTENANCE LETTER (OUTPATIENT)
Age: 48
End: 2023-01-29

## 2023-02-15 DIAGNOSIS — L73.2 HIDRADENITIS SUPPURATIVA: ICD-10-CM

## 2023-02-17 RX ORDER — CHOLECALCIFEROL (VITAMIN D3) 50 MCG
TABLET ORAL
Qty: 90 TABLET | Refills: 1 | Status: SHIPPED | OUTPATIENT
Start: 2023-02-17 | End: 2024-03-05

## 2023-02-28 NOTE — PROGRESS NOTES
[facility] Dermatology Note  Encounter Date: Mar 2, 2023  Office Visit     Dermatology Problem List:  1. Hidradenitis suppurativa: diagnosed age 11, initially on waistline (used to weigh 300 lbs) which cleared up -> intermittent outbreaks -> for the past 10 years has had in bilateral axilla, groin, thighs, and buttocks.   - Humira, suboxone, prednisone, resorcinol cream  - Flair plan: ILK 60 injections, Prednisone 60 mg x 1 week, 50 mg x 1 week, 40 mg x 1 week (she does not like to taper below 40 mg as she flares)  - I&D x2 to lesions on the R axilla - 8/4/22  - Prior: Xeljanz, infliximab, prednisone taper, clobetasol, topical morphine, ILK, oral antibiotics, rifampin, sirolimus 2 mg (started 4/22/21) finasteride, spironolactone (6/2019- 9/5/19, stopped due to abnormal uterine bleeding), topical morphine gel, and percocet for severe pain, Cosentyx, belbuca  2. Keratosis pilaris  3. L olecranon bursitis.   4. Dermatographis  -  allegra 180mg  5. Eruption NOS,   - punch biopsy 8/5/2021: superficial and deep perivascular and interstitial infiltrate of neutrophils, with focal leukocytoclasis and bluish, amorphous material suggestive of neutrophil degranulation, and lesser amounts of eosinophils and lymphocytes. Background dermal edema is noted. Focal neutrophilic epitheliotropism is noted.  - Resolved  6. Pink linear patches, ddx: follicular dermatitis vs lichen spinulosus vs KP, posterior shoulders   - amlactin, lidex   7. Seborrheic dermatitis  - desonide 0.05% ointment, ketoconazole 2% shampoo      Soc Hx: [place] works [place] an [place]/running around)  ____________________________________________    Assessment & Plan:  # HS, Stage II, improved from last visit.   - ILK injections today, see procedure note below  - Start resorcinol cream BID as needed for flares. Information provided for [place].   - Continue Humira 80 mg weekly  - Continue  suboxone BID.   - Continue calcium 1200mg and vitamin D 2000 international unit(s)  - Recommended Immunizations: Due for 4th COVID booster. Needs pneumonia and shingrix. Will need PCP appointment  - Referral provided for plastic surgery today   - Future consideration: Re-trying infliximab     # Seborrheic dermatitis, ears and posterior neck  - Start desonide 0.05% ointment BID until resolves   - Start ketoconazole 2% shampoo 2-3 x weekly      # Dermatographism  - Continue allegra 180mg BID prn. Refilled today.     # Pink linear patches, ddx: follicular dermatitis vs lichen spinulosus vs KP, resolved today with lidex and amlactin   - Resolved    Procedures Performed:   - Intra-lesional triamcinolone procedure note. After verbal consent and review of risk of pain and skin thinning/atrophy, positioning and cleansing with isopropyl alcohol, 2 total mL of triamcinolone 20 mg/mL was injected into <7 lesion(s) on the bilateral axillae. The patient tolerated the procedure well and left the dermatology clinic in good condition.    Follow-up: 3 month(s) in-person, or earlier for new or changing lesions    Staff and Scribe:   Provider Disclosure:   The documentation recorded by the scribe accurately reflects the services I personally performed and the decisions made by me.    Jean Kenny MD, FAAD    Departments of Internal Medicine and Dermatology  [facility]  [phone]      _______________________________    CC: Derm Problem (Maura is here today to for HS follow up. Has itching and flaking on/in both ears. )    HPI:  Ms. Klarissa Curtis is a(n) 47 year old female who presents today as a return patient for HS. Last seen by myself on 10/13/22, at which time the patient underwent ILK and was restarted on Humira for treatment of HS. Additionally, the patient was started on lidex ointment and amlactin for treatment of pink patches.     She states, as always, her flare resolved with the ILK and  "prednisone last October. She then had another flare and was able to get into another provider earlier so went there for ILK (February 7th) which helped her most recent flare.     Today, she reports that she is at her baseline with a few draining areas in her right axilla, left axilla and groin which is typical for her. She re-started Humira last in October and is using 80 mg weekly. She feels like this helps her to maintain her baseline.     She also expresses concerns about a new rash in and around her ears L>R. She describes it as itchy and scaly. This started about 1 month ago. She also notes she has struggled with an itchy scalp in the past which was alleviated with ketoconazole.    The pink linear patches that were present at last visit resolved with lidex ointment and amlactin.      Her period recently returned after losing it for several months. Will notes flares occur right before she starts menstruating. Neither finasteride nor spironolactone helped significantly.     The wart on her hand has finally resolved after being frozen about two years ago.     HS Nurse Assessment    Nurse Assessment Data 9/1/2022 10/13/2022 3/2/2023   Over the past 30 days how many old lesions flared back up? 5-10 10 8-10   Over the past 30 days how many new lesions did you get? \"a couple\" unknown 2   Over the past week, how many dressing changes do you do each day? 3+ 3+ 3+   Over the past week, has your wound drainage been: Severe Moderate Moderate   Rate your HS overall from 0 - 10 (0 = no disease, 10 = worst) over the past week:  6 7-8 5   Rate your pain score from 0 - 10 (0 = no disease, 10 = worst) for the most painful/symptomatic lesion in the past week:  8 7 7   Over the past week, how much has HS influenced your quality of life? moderately very much moderately       Patient is otherwise feeling well, without additional skin concerns.    Labs Reviewed:  N/A    Physical Exam:  Vitals: BP (!) 128/93   Wt 108.1 kg (238 lb 6.4 " oz)   BMI 37.34 kg/m    SKIN: Full skin, which includes the head/face, both arms, chest, back, abdomen,both legs, genitalia and/or groin buttocks, digits and/or nails, was examined.  - Flaking and erythema noted to the scalp, posterior neck, and conchal bowls.   HS Data  HS Exam Data 9/30/2021 2/10/2022 10/13/2022   LC Type LC1 LC1 LC1   Clinical Subtypes Regular type Regular type Regular type   Acne? No No No   Dissecting Cellulitis? No No No   Visual analogue score (0-100) - 55 70   Total Julian Stage II II II   Total Inflammatory Nodules 3 10 4   Total Abcesses 0 0 0   Total Draining Tunnels 4 5 6   Total Abscess and Nodule Count 3 10 4   IHS4 Score  19 30 28   Total  HASI Score 27 69 29   HS-PGA 3 3 5     - No other lesions of concern on areas examined.     Medications:  Current Outpatient Medications   Medication     adalimumab (HUMIRA *CF* PEN-CD/UC/HS STARTER) 80 MG/0.8ML pen kit     adalimumab (HUMIRA *CF*) 80 MG/0.8ML pen kit     buprenorphine HCl-naloxone HCl (SUBOXONE) 2-0.5 MG per film     calcium carbonate (CALCIUM CARBONATE) 600 MG tablet     DULoxetine (CYMBALTA) 60 MG capsule     fexofenadine (ALLEGRA) 180 MG tablet     medical cannabis (Patient's own supply)     polyethylene glycol (MIRALAX) 17 GM/Dose powder     predniSONE (DELTASONE) 5 MG tablet     traZODone (DESYREL) 150 MG tablet     VITAMIN D3 50 MCG (2000 UT) tablet     ammonium lactate (AMLACTIN) 12 % external cream     calcium carbonate (OS-MERCY) 1500 (600 Ca) MG tablet     calcium carbonate 750 MG CHEW     clobetasol (TEMOVATE) 0.05 % external cream     fexofenadine (ALLEGRA) 180 MG tablet     fluocinonide (LIDEX) 0.05 % external ointment     fluocinonide (LIDEX) 0.05 % external solution     FLUoxetine (PROZAC) 40 MG capsule     hydrOXYzine (ATARAX) 25 MG tablet     ketoconazole (NIZORAL) 2 % external shampoo     traZODone (DESYREL) 50 MG tablet     valACYclovir (VALTREX) 500 MG tablet     Current Facility-Administered Medications    Medication     triamcinolone acetonide (KENALOG-10) injection 60 mg      Past Medical History:   Patient Active Problem List   Diagnosis     Hidradenitis suppurativa     Fatigue     Past Medical History:   Diagnosis Date     NO ACTIVE PROBLEMS         CC No referring provider defined for this encounter. on close of this encounter.

## 2023-03-02 ENCOUNTER — OFFICE VISIT (OUTPATIENT)
Dept: DERMATOLOGY | Facility: CLINIC | Age: 48
End: 2023-03-02
Payer: COMMERCIAL

## 2023-03-02 VITALS — SYSTOLIC BLOOD PRESSURE: 128 MMHG | DIASTOLIC BLOOD PRESSURE: 93 MMHG | BODY MASS INDEX: 37.34 KG/M2 | WEIGHT: 238.4 LBS

## 2023-03-02 DIAGNOSIS — L21.9 DERMATITIS, SEBORRHEIC: ICD-10-CM

## 2023-03-02 DIAGNOSIS — L73.2 HIDRADENITIS SUPPURATIVA: ICD-10-CM

## 2023-03-02 DIAGNOSIS — L50.3 DERMATOGRAPHISM: ICD-10-CM

## 2023-03-02 DIAGNOSIS — D84.9 IMMUNOSUPPRESSION (H): Primary | ICD-10-CM

## 2023-03-02 PROCEDURE — 11900 INJECT SKIN LESIONS </W 7: CPT | Performed by: DERMATOLOGY

## 2023-03-02 PROCEDURE — 99214 OFFICE O/P EST MOD 30 MIN: CPT | Mod: 25 | Performed by: DERMATOLOGY

## 2023-03-02 RX ORDER — RESORCINOL
POWDER (GRAM) MISCELLANEOUS
Qty: 30 G | Refills: 3 | Status: SHIPPED | OUTPATIENT
Start: 2023-03-02 | End: 2023-10-02

## 2023-03-02 RX ORDER — TRIAMCINOLONE ACETONIDE 40 MG/ML
40 INJECTION, SUSPENSION INTRA-ARTICULAR; INTRAMUSCULAR ONCE
Status: COMPLETED | OUTPATIENT
Start: 2023-03-02 | End: 2023-03-02

## 2023-03-02 RX ORDER — FEXOFENADINE HCL 180 MG/1
180 TABLET ORAL 2 TIMES DAILY
Qty: 90 TABLET | Refills: 11 | Status: SHIPPED | OUTPATIENT
Start: 2023-03-02 | End: 2024-03-05

## 2023-03-02 RX ORDER — DULOXETIN HYDROCHLORIDE 60 MG/1
CAPSULE, DELAYED RELEASE ORAL
COMMUNITY
Start: 2023-02-19 | End: 2023-10-02

## 2023-03-02 RX ORDER — KETOCONAZOLE 20 MG/ML
SHAMPOO TOPICAL
Qty: 120 ML | Refills: 11 | Status: SHIPPED | OUTPATIENT
Start: 2023-03-02 | End: 2024-02-12

## 2023-03-02 RX ORDER — DESONIDE 0.5 MG/G
OINTMENT TOPICAL 2 TIMES DAILY
Qty: 60 G | Refills: 3 | Status: SHIPPED | OUTPATIENT
Start: 2023-03-02 | End: 2023-10-02

## 2023-03-02 RX ORDER — TRAZODONE HYDROCHLORIDE 150 MG/1
150 TABLET ORAL
Status: ON HOLD | COMMUNITY
Start: 2023-02-16 | End: 2024-02-29

## 2023-03-02 RX ORDER — PREDNISONE 5 MG/1
TABLET ORAL
COMMUNITY
Start: 2022-09-27 | End: 2023-06-08

## 2023-03-02 RX ADMIN — TRIAMCINOLONE ACETONIDE 40 MG: 40 INJECTION, SUSPENSION INTRA-ARTICULAR; INTRAMUSCULAR at 17:45

## 2023-03-02 NOTE — NURSING NOTE
Clinic Administered Medication Documentation    Administrations This Visit     triamcinolone (KENALOG-40) injection 40 mg     Admin Date  03/02/2023 Action  $Given Dose  40 mg Route  Intra-Lesional Site   Administered By  Radha Estrada RN    Ordering Provider: Jean Kenny MD    NDC: [address]-9    Lot#: [id number]    : [employer] [place]    Patient Supplied?: No

## 2023-03-02 NOTE — LETTER
Date:March 8, 2023      Provider requested that no letter be sent. Do not send.       [facility]

## 2023-03-02 NOTE — LETTER
3/2/2023       RE: Klarissa Curtis  [address]  [place] [address]     Dear Colleague,    Thank you for referring your patient, Klarissa Curtis, to the [facility] DERMATOLOGY CLINIC [place] at [facility]. Please see a copy of my visit note below.    [facility] Dermatology Note  Encounter Date: Mar 2, 2023  Office Visit     Dermatology Problem List:  1. Hidradenitis suppurativa: diagnosed age 11, initially on waistline (used to weigh 300 lbs) which cleared up -> intermittent outbreaks -> for the past 10 years has had in bilateral axilla, groin, thighs, and buttocks.   - Humira, suboxone, prednisone, resorcinol cream  - Flair plan: ILK 60 injections, Prednisone 60 mg x 1 week, 50 mg x 1 week, 40 mg x 1 week (she does not like to taper below 40 mg as she flares)  - I&D x2 to lesions on the R axilla - 8/4/22  - Prior: Xeljanz, infliximab, prednisone taper, clobetasol, topical morphine, ILK, oral antibiotics, rifampin, sirolimus 2 mg (started 4/22/21) finasteride, spironolactone (6/2019- 9/5/19, stopped due to abnormal uterine bleeding), topical morphine gel, and percocet for severe pain, Cosentyx, belbuca  2. Keratosis pilaris  3. L olecranon bursitis.   4. Dermatographis  -  allegra 180mg  5. Eruption NOS,   - punch biopsy 8/5/2021: superficial and deep perivascular and interstitial infiltrate of neutrophils, with focal leukocytoclasis and bluish, amorphous material suggestive of neutrophil degranulation, and lesser amounts of eosinophils and lymphocytes. Background dermal edema is noted. Focal neutrophilic epitheliotropism is noted.  - Resolved  6. Pink linear patches, ddx: follicular dermatitis vs lichen spinulosus vs KP, posterior shoulders   - amlactin, lidex   7. Seborrheic dermatitis  - desonide 0.05% ointment, ketoconazole 2% shampoo      Soc Hx: She works as an / at the  air[employer] (lots of walking/running around)  ____________________________________________    Assessment & Plan:  # HS, Stage II, improved from last visit.   - ILK injections today, see procedure note below  - Start resorcinol cream BID as needed for flares. Information provided for [place].   - Continue Humira 80 mg weekly  - Continue suboxone BID.   - Continue calcium 1200mg and vitamin D 2000 international unit(s)  - Recommended Immunizations: Due for 4th COVID booster. Needs pneumonia and shingrix. Will need PCP appointment  - Referral provided for plastic surgery today   - Future consideration: Re-trying infliximab     # Seborrheic dermatitis, ears and posterior neck  - Start desonide 0.05% ointment BID until resolves   - Start ketoconazole 2% shampoo 2-3 x weekly      # Dermatographism  - Continue allegra 180mg BID prn. Refilled today.     # Pink linear patches, ddx: follicular dermatitis vs lichen spinulosus vs KP, resolved today with lidex and amlactin   - Resolved    Procedures Performed:   - Intra-lesional triamcinolone procedure note. After verbal consent and review of risk of pain and skin thinning/atrophy, positioning and cleansing with isopropyl alcohol, 2 total mL of triamcinolone 20 mg/mL was injected into <7 lesion(s) on the bilateral axillae. The patient tolerated the procedure well and left the dermatology clinic in good condition.    Follow-up: 3 month(s) in-person, or earlier for new or changing lesions    Staff and Scribe:   Provider Disclosure:   The documentation recorded by the scribe accurately reflects the services I personally performed and the decisions made by me.    Jean Kenny MD, FAAD    Departments of Internal Medicine and Dermatology  [facility]  [phone]      _______________________________    CC: Derm Problem (Maura is here today to for HS follow up. Has itching and flaking on/in both ears. )    HPI:  Ms. Klarissa Curtis is a(n) 47 year old  "female who presents today as a return patient for HS. Last seen by myself on 10/13/22, at which time the patient underwent ILK and was restarted on Humira for treatment of HS. Additionally, the patient was started on lidex ointment and amlactin for treatment of pink patches.     She states, as always, her flare resolved with the ILK and prednisone last October. She then had another flare and was able to get into another provider earlier so went there for ILK (February 7th) which helped her most recent flare.     Today, she reports that she is at her baseline with a few draining areas in her right axilla, left axilla and groin which is typical for her. She re-started Humira last in October and is using 80 mg weekly. She feels like this helps her to maintain her baseline.     She also expresses concerns about a new rash in and around her ears L>R. She describes it as itchy and scaly. This started about 1 month ago. She also notes she has struggled with an itchy scalp in the past which was alleviated with ketoconazole.    The pink linear patches that were present at last visit resolved with lidex ointment and amlactin.      Her period recently returned after losing it for several months. Will notes flares occur right before she starts menstruating. Neither finasteride nor spironolactone helped significantly.     The wart on her hand has finally resolved after being frozen about two years ago.     HS Nurse Assessment    Nurse Assessment Data 9/1/2022 10/13/2022 3/2/2023   Over the past 30 days how many old lesions flared back up? 5-10 10 8-10   Over the past 30 days how many new lesions did you get? \"a couple\" unknown 2   Over the past week, how many dressing changes do you do each day? 3+ 3+ 3+   Over the past week, has your wound drainage been: Severe Moderate Moderate   Rate your HS overall from 0 - 10 (0 = no disease, 10 = worst) over the past week:  6 7-8 5   Rate your pain score from 0 - 10 (0 = no disease, 10 = " worst) for the most painful/symptomatic lesion in the past week:  8 7 7   Over the past week, how much has HS influenced your quality of life? moderately very much moderately       Patient is otherwise feeling well, without additional skin concerns.    Labs Reviewed:  N/A    Physical Exam:  Vitals: BP (!) 128/93   Wt 108.1 kg (238 lb 6.4 oz)   BMI 37.34 kg/m    SKIN: Full skin, which includes the head/face, both arms, chest, back, abdomen,both legs, genitalia and/or groin buttocks, digits and/or nails, was examined.  - Flaking and erythema noted to the scalp, posterior neck, and conchal bowls.   HS Data  HS Exam Data 9/30/2021 2/10/2022 10/13/2022   LC Type LC1 LC1 LC1   Clinical Subtypes Regular type Regular type Regular type   Acne? No No No   Dissecting Cellulitis? No No No   Visual analogue score (0-100) - 55 70   Total Julian Stage II II II   Total Inflammatory Nodules 3 10 4   Total Abcesses 0 0 0   Total Draining Tunnels 4 5 6   Total Abscess and Nodule Count 3 10 4   IHS4 Score  19 30 28   Total  HASI Score 27 69 29   HS-PGA 3 3 5     - No other lesions of concern on areas examined.     Medications:  Current Outpatient Medications   Medication     adalimumab (HUMIRA *CF* PEN-CD/UC/HS STARTER) 80 MG/0.8ML pen kit     adalimumab (HUMIRA *CF*) 80 MG/0.8ML pen kit     buprenorphine HCl-naloxone HCl (SUBOXONE) 2-0.5 MG per film     calcium carbonate (CALCIUM CARBONATE) 600 MG tablet     DULoxetine (CYMBALTA) 60 MG capsule     fexofenadine (ALLEGRA) 180 MG tablet     medical cannabis (Patient's own supply)     polyethylene glycol (MIRALAX) 17 GM/Dose powder     predniSONE (DELTASONE) 5 MG tablet     traZODone (DESYREL) 150 MG tablet     VITAMIN D3 50 MCG (2000 UT) tablet     ammonium lactate (AMLACTIN) 12 % external cream     calcium carbonate (OS-MERCY) 1500 (600 Ca) MG tablet     calcium carbonate 750 MG CHEW     clobetasol (TEMOVATE) 0.05 % external cream     fexofenadine (ALLEGRA) 180 MG tablet      fluocinonide (LIDEX) 0.05 % external ointment     fluocinonide (LIDEX) 0.05 % external solution     FLUoxetine (PROZAC) 40 MG capsule     hydrOXYzine (ATARAX) 25 MG tablet     ketoconazole (NIZORAL) 2 % external shampoo     traZODone (DESYREL) 50 MG tablet     valACYclovir (VALTREX) 500 MG tablet     Current Facility-Administered Medications   Medication     triamcinolone acetonide (KENALOG-10) injection 60 mg      Past Medical History:   Patient Active Problem List   Diagnosis     Hidradenitis suppurativa     Fatigue     Past Medical History:   Diagnosis Date     NO ACTIVE PROBLEMS         CC No referring provider defined for this encounter. on close of this encounter.      Again, thank you for allowing me to participate in the care of your patient.      Sincerely,    Jean Kenny MD

## 2023-03-02 NOTE — PATIENT INSTRUCTIONS
HS   - Steroid injections today  - Continue Humira 80mg weekly  - Resorcinol cream  twice a day for flares:  - Pneumonia and shingles vaccines with PCP    [facility]  Address: [address] [place] [address], [place] [address]  Hours: Open ? Closes 6PM  Phone: [phone]    Seborrheic dermatitis  - Ketoconazole shampoo every other day. Leave in for 3 min, then wash out  - Desonide ointment twice a day to itchy area in areas

## 2023-05-02 ENCOUNTER — MYC MEDICAL ADVICE (OUTPATIENT)
Dept: DERMATOLOGY | Facility: CLINIC | Age: 48
End: 2023-05-02
Payer: COMMERCIAL

## 2023-05-07 ENCOUNTER — HEALTH MAINTENANCE LETTER (OUTPATIENT)
Age: 48
End: 2023-05-07

## 2023-05-11 ENCOUNTER — MYC MEDICAL ADVICE (OUTPATIENT)
Dept: DERMATOLOGY | Facility: CLINIC | Age: 48
End: 2023-05-11
Payer: COMMERCIAL

## 2023-05-11 DIAGNOSIS — L73.2 HIDRADENITIS SUPPURATIVA: ICD-10-CM

## 2023-05-18 RX ORDER — OXYCODONE AND ACETAMINOPHEN 5; 325 MG/1; MG/1
1 TABLET ORAL DAILY PRN
Qty: 28 TABLET | Refills: 0 | Status: SHIPPED | OUTPATIENT
Start: 2023-05-18 | End: 2023-05-26

## 2023-05-25 ENCOUNTER — OFFICE VISIT (OUTPATIENT)
Dept: DERMATOLOGY | Facility: CLINIC | Age: 48
End: 2023-05-25
Payer: COMMERCIAL

## 2023-05-25 VITALS — SYSTOLIC BLOOD PRESSURE: 125 MMHG | WEIGHT: 243.5 LBS | BODY MASS INDEX: 38.14 KG/M2 | DIASTOLIC BLOOD PRESSURE: 79 MMHG

## 2023-05-25 DIAGNOSIS — L73.2 HIDRADENITIS SUPPURATIVA: ICD-10-CM

## 2023-05-25 DIAGNOSIS — L73.9 FOLLICULITIS: ICD-10-CM

## 2023-05-25 DIAGNOSIS — L21.9 DERMATITIS, SEBORRHEIC: ICD-10-CM

## 2023-05-25 DIAGNOSIS — R21 RASH: ICD-10-CM

## 2023-05-25 DIAGNOSIS — Z79.899 ENCOUNTER FOR LONG-TERM (CURRENT) USE OF HIGH-RISK MEDICATION: Primary | ICD-10-CM

## 2023-05-25 PROCEDURE — 87077 CULTURE AEROBIC IDENTIFY: CPT | Performed by: DERMATOLOGY

## 2023-05-25 PROCEDURE — 99214 OFFICE O/P EST MOD 30 MIN: CPT | Mod: 25 | Performed by: DERMATOLOGY

## 2023-05-25 PROCEDURE — 99000 SPECIMEN HANDLING OFFICE-LAB: CPT | Performed by: PATHOLOGY

## 2023-05-25 PROCEDURE — 11900 INJECT SKIN LESIONS </W 7: CPT | Performed by: DERMATOLOGY

## 2023-05-25 RX ORDER — UPADACITINIB 15 MG/1
15 TABLET, EXTENDED RELEASE ORAL DAILY
Qty: 30 TABLET | Refills: 4 | Status: SHIPPED | OUTPATIENT
Start: 2023-05-25 | End: 2023-10-21

## 2023-05-25 RX ORDER — CLOBETASOL PROPIONATE 0.5 MG/ML
SOLUTION TOPICAL 2 TIMES DAILY
Qty: 50 ML | Refills: 3 | Status: CANCELLED | OUTPATIENT
Start: 2023-05-25

## 2023-05-25 RX ORDER — FLUOCINONIDE 0.5 MG/G
OINTMENT TOPICAL 2 TIMES DAILY
Qty: 60 G | Refills: 1 | Status: SHIPPED | OUTPATIENT
Start: 2023-05-25 | End: 2023-05-25

## 2023-05-25 RX ORDER — FLUOCINOLONE ACETONIDE 0.11 MG/ML
OIL TOPICAL 2 TIMES DAILY
Qty: 118.28 ML | Refills: 11 | Status: SHIPPED | OUTPATIENT
Start: 2023-05-25 | End: 2023-10-02

## 2023-05-25 RX ORDER — FLUOCINONIDE 0.5 MG/G
OINTMENT TOPICAL 2 TIMES DAILY
Qty: 60 G | Refills: 1 | Status: ON HOLD | OUTPATIENT
Start: 2023-05-25 | End: 2024-03-01

## 2023-05-25 NOTE — LETTER
5/25/2023       RE: Klarissa Curtis  [address]  [place] [address]     Dear Colleague,    Thank you for referring your patient, Klarissa Curtis, to the [facility] DERMATOLOGY CLINIC [place] at [facility]. Please see a copy of my visit note below.    [facility] Dermatology Note  Encounter Date: May 25, 2023  Office Visit     Dermatology Problem List:  1. Hidradenitis suppurativa: diagnosed age 11, initially on waistline (used to weigh 300 lbs) which cleared up -> intermittent outbreaks -> for the past 10 years has had in bilateral axilla, groin, thighs, and buttocks.   - Humira, suboxone, prednisone, resorcinol cream  - Flair plan: ILK 60 injections, Prednisone 60 mg x 1 week, 50 mg x 1 week, 40 mg x 1 week (she does not like to taper below 40 mg as she flares)  - I&D x2 to lesions on the R axilla - 8/4/22  - Prior: Xeljanz, infliximab, prednisone taper, clobetasol, topical morphine, ILK, oral antibiotics, rifampin, sirolimus 2 mg (started 4/22/21) finasteride, spironolactone (6/2019- 9/5/19, stopped due to abnormal uterine bleeding), topical morphine gel, and percocet for severe pain, Cosentyx, belbuca  2. Keratosis pilaris  3. L olecranon bursitis.   4. Dermatographis  -  allegra 180mg  5. Eruption NOS,   - punch biopsy 8/5/2021: superficial and deep perivascular and interstitial infiltrate of neutrophils, with focal leukocytoclasis and bluish, amorphous material suggestive of neutrophil degranulation, and lesser amounts of eosinophils and lymphocytes. Background dermal edema is noted. Focal neutrophilic epitheliotropism is noted.  - Resolved  6. Pink linear patches, ddx: follicular dermatitis vs lichen spinulosus vs KP, posterior shoulders   - amlactin, lidex   7. 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   - ILK injections today, see procedure note below  - Start resorcinol cream BID as needed for flares. Information provided for [place].   - Continue Humira 80 mg weekly  - Continue suboxone BID.   - Continue calcium 1200mg and vitamin D 2000 international unit(s)  - Recommended Immunizations: Due for 4th COVID booster. Needs pneumonia and shingrix. Will need PCP appointment  - Referral provided for plastic surgery today   - Future consideration: Re-trying infliximab   -     Left axilla, head and buttox still active. Right axilla is constant.   Mepergan, percocet (2-3 pills), suboxone (3-4 strips)   Shingles and pneumonia   Still wanting a referral to plastic surgery   financial aspect of remicade     # Seborrheic dermatitis, ears and posterior neck  - Start desonide 0.05% ointment BID until resolves   - Start ketoconazole 2% shampoo 2-3 x weekly     - Flucinolone     # Dermatographism  - Continue allegra 180mg BID prn. Refilled today.     # Pink linear patches, ddx: follicular dermatitis vs lichen spinulosus vs KP, resolved today with lidex and amlactin   - Resolved    #Folliculitis   Culture today     Procedures Performed:   - Intra-lesional triamcinolone procedure note. After verbal consent and review of risk of pain and skin thinning/atrophy, positioning and cleansing with isopropyl alcohol, 4 total mL of triamcinolone 0.1 mg/mL was injected into *** lesion(s) on the left, right axilla. The patient tolerated the procedure well and left the dermatology clinic in good condition.    {kkprocedurenotes:269863}    Follow-up: {kkfollowup:150404}    Staff and Scribe:     Scribe Disclosure:  ARTIE SIEGEL, am serving as a scribe to document services personally performed by Jean Kenny MD based on data collection and the provider's statements to me.      ***  ____________________________________________    CC: No chief complaint on file.    HPI:  Ms. Klarissa Curtis is a(n) 48 year old female who presents today as a return patient for HS. Last seen by myself on 3/2/23, at which time patient underwent ILK and was started on resorcinol for HS. Also started on desonide and ketoconazole for seb derm.    Patient reports increased disease activity, noting new activity in   Patient is otherwise feeling well, without additional skin concerns.    Labs Reviewed:  ***N/A    Physical Exam:  Vitals: There were no vitals taken for this visit.  SKIN: Full skin, which includes the head/face, both arms, chest, back, abdomen,both legs, genitalia and/or groin buttocks, digits and/or nails, was examined.  - Scalp without erythema  - Mild flake on the occipital scalp   - 1 draining tunnel, 1 inflammatory papule in left axilla   - 3 tunnels .4 BSA in right axilla  - Left draining abscess on left abdomen   - Red draining abscess on left medial thigh  - 1 draining tunnel right buttocks  - 2 draining tunnels on the right buttocks  - xerotic skin in the ears   - No other lesions of concern on areas examined.     Medications:  Current Outpatient Medications   Medication     adalimumab (HUMIRA *CF* PEN-CD/UC/HS STARTER) 80 MG/0.8ML pen kit     adalimumab (HUMIRA *CF*) 80 MG/0.8ML pen kit     ammonium lactate (AMLACTIN) 12 % external cream     buprenorphine HCl-naloxone HCl (SUBOXONE) 2-0.5 MG per film     calcium carbonate (CALCIUM CARBONATE) 600 MG tablet     calcium carbonate (OS-MERCY) 1500 (600 Ca) MG tablet     calcium carbonate 750 MG CHEW     clobetasol (TEMOVATE) 0.05 % external cream     desonide (DESOWEN) 0.05 % external ointment     DULoxetine (CYMBALTA) 60 MG capsule     fexofenadine (ALLEGRA) 180 MG tablet     fexofenadine (ALLEGRA) 180 MG tablet     fluocinonide (LIDEX) 0.05 % external ointment     fluocinonide (LIDEX) 0.05 % external solution     FLUoxetine (PROZAC) 40 MG  capsule     hydrOXYzine (ATARAX) 25 MG tablet     ketoconazole (NIZORAL) 2 % external shampoo     ketoconazole (NIZORAL) 2 % external shampoo     medical cannabis (Patient's own supply)     oxyCODONE-acetaminophen (PERCOCET) 5-325 MG tablet     polyethylene glycol (MIRALAX) 17 GM/Dose powder     predniSONE (DELTASONE) 5 MG tablet     Resorcinol POWD     traZODone (DESYREL) 150 MG tablet     traZODone (DESYREL) 50 MG tablet     valACYclovir (VALTREX) 500 MG tablet     VITAMIN D3 50 MCG (2000 UT) tablet     Current Facility-Administered Medications   Medication     triamcinolone acetonide (KENALOG-10) injection 60 mg      Past Medical History:   Patient Active Problem List   Diagnosis     Hidradenitis suppurativa     Fatigue     Past Medical History:   Diagnosis Date     NO ACTIVE PROBLEMS         CC No referring provider defined for this encounter. on close of this encounter.      Again, thank you for allowing me to participate in the care of your patient.      Sincerely,    Jean Kenny MD

## 2023-05-25 NOTE — PROGRESS NOTES
[facility] Dermatology Note  Encounter Date: May 25, 2023  Office Visit     Dermatology Problem List:  1. Hidradenitis suppurativa: diagnosed age 11, initially on waistline (used to weigh 300 lbs) which cleared up -> intermittent outbreaks -> for the past 10 years has had in bilateral axilla, groin, thighs, and buttocks.   - Humira, suboxone, prednisone, resorcinol cream  - Flair plan: ILK 60 injections, Prednisone 60 mg x 1 week, 50 mg x 1 week, 40 mg x 1 week (she does not like to taper below 40 mg as she flares)  - I&D x2 to lesions on the R axilla - 8/4/22  - Prior: Xeljanz, infliximab, prednisone taper, clobetasol, topical morphine, ILK, oral antibiotics, rifampin, sirolimus 2 mg (started 4/22/21) finasteride, spironolactone (6/2019- 9/5/19, stopped due to abnormal uterine bleeding), topical morphine gel, and percocet for severe pain, Cosentyx, belbuca  2. Keratosis pilaris  3. L olecranon bursitis.   4. Dermatographis  -  allegra 180mg  5. Eruption NOS,   - punch biopsy 8/5/2021: superficial and deep perivascular and interstitial infiltrate of neutrophils, with focal leukocytoclasis and bluish, amorphous material suggestive of neutrophil degranulation, and lesser amounts of eosinophils and lymphocytes. Background dermal edema is noted. Focal neutrophilic epitheliotropism is noted.  - Resolved  6. Pink linear patches, ddx: follicular dermatitis vs lichen spinulosus vs KP, posterior shoulders   - amlactin, lidex   7. Seborrheic dermatitis  - desonide 0.05% ointment, ketoconazole 2% shampoo      Soc Hx: She works as an [place] (lots of walking/running around)  ____________________________________________    Assessment & Plan:  # HS, Stage II, improved from last visit.   - ILK injections today, see procedure note below  - Start resorcinol cream BID as needed for flares. Information provided for [place].   - Continue Humira 80 mg  weekly  - Percocet PRN pain. Suboxone is expensive and she doesn't like how it makes her feel   - Continue calcium 1200mg and vitamin D 2000 international unit(s)  - Recommended Immunizations: Due for 4th COVID booster. Needs pneumonia and shingrix. Will need PCP appointment  - Referral provided for plastic surgery today   - Will try to add on upacitinib 15mg daily. Discussed limited data on safety of combing small molecule inhibitors with biologics.  - WIll also start semaglutide for obesity and HS  - Future consideration: Re-trying infliximab     # Seborrheic dermatitis, ears and posterior neck  - Start desonide 0.05% ointment BID until resolves   - Start ketoconazole 2% shampoo 2-3 x weekly      # Dermatographism  - Continue allegra 180mg BID prn. Refilled today.     # Pink linear patches, ddx: follicular dermatitis vs lichen spinulosus vs KP, resolved today with lidex and amlactin   - Seems to have recurred   - Lidex ointment BID. Will consider re-biopsy if does not resolve at follow-up     #Scalp Folliculitis   - Culture today   - Will treat as pityrosporum for now  - Addendum 6/8/23: Culture grew Staph aureus. Ordered clindamycin solution BID     Procedures Performed:   - Intra-lesional triamcinolone procedure note. After verbal consent and review of risk of pain and skin thinning/atrophy, positioning and cleansing with isopropyl alcohol, 4 total mL of triamcinolone 10 mg/mL was injected into lesion(s) on the left, right axilla. The patient tolerated the procedure well and left the dermatology clinic in good condition.    Follow-up: 3 months    Staff and Scribe:     Jean Kenny MD, FAAD, FACP     Departments of Internal Medicine and Dermatology  [facility]  [phone]    ______________________________________    CC: Follow-up HS    HPI:  Ms. Klarissa Curtis is a(n) 48 year old female who presents today as a return patient for HS. Last seen by myself on 3/2/23, at which  time patient underwent ILK and was started on resorcinol for HS.     HS Nurse Assessment        10/13/2022     1:28 PM 3/2/2023     3:54 PM 5/25/2023     4:01 PM   Nurse Assessment Data   Over the past 30 days how many old lesions flared back up? 10 8-10 10   Over the past 30 days how many new lesions did you get? unknown 2 0   Over the past week, how many dressing changes do you do each day? 3+ 3+ 0   Over the past week, has your wound drainage been: Moderate Moderate Moderate   Rate your HS overall from 0 - 10 (0 = no disease, 10 = worst) over the past week:  7-8 5 7   Rate your pain score from 0 - 10 (0 = no disease, 10 = worst) for the most painful/symptomatic lesion in the past week:  7 7 7   Over the past week, how much has HS influenced your quality of life? very much moderately very much       Patient reports increased disease activity, noting new activity in   Patient is otherwise feeling well, without additional skin concerns.    Physical Exam:  Vitals: /79   Wt 110.5 kg (243 lb 8 oz)   BMI 38.14 kg/m    SKIN: Full skin, which includes the head/face, both arms, chest, back, abdomen,both legs, genitalia and/or groin buttocks, digits and/or nails, was examined.  - Scalp without erythema  - Mild flake on the occipital scalp   - 1 draining tunnel, 1 inflammatory papule in left axilla   - 3 tunnels .4 BSA in right axilla  - Left draining abscess on left abdomen   - Red draining abscess on left medial thigh  - xerotic skin in the ears   - No other lesions of concern on areas examined.   HS Data      10/13/2022     3:02 PM 3/2/2023     4:38 PM 5/25/2023     4:01 PM   HS Exam Data   LC Type LC1 LC1 LC1   Clinical Subtypes Regular type Regular type Regular type   Acne? No No No   Dissecting Cellulitis? No No Yes   Visual analogue score (0-100) 70 50 60   Total Julian Stage II II II   Total Inflammatory Nodules 4 3 3   Total Abcesses 0 0 1   Total Draining Tunnels 6 5 4   Total Abscess and Nodule Count 4 3 4    IHS4 Score  28 23 21   Total  HASI Score 53 40 48   HS-PGA 5 3 4           Medications:  Current Outpatient Medications   Medication     adalimumab (HUMIRA *CF* PEN-CD/UC/HS STARTER) 80 MG/0.8ML pen kit     adalimumab (HUMIRA *CF*) 80 MG/0.8ML pen kit     ammonium lactate (AMLACTIN) 12 % external cream     buprenorphine HCl-naloxone HCl (SUBOXONE) 2-0.5 MG per film     calcium carbonate (CALCIUM CARBONATE) 600 MG tablet     calcium carbonate (OS-MERCY) 1500 (600 Ca) MG tablet     calcium carbonate 750 MG CHEW     clobetasol (TEMOVATE) 0.05 % external cream     desonide (DESOWEN) 0.05 % external ointment     DULoxetine (CYMBALTA) 60 MG capsule     fexofenadine (ALLEGRA) 180 MG tablet     fexofenadine (ALLEGRA) 180 MG tablet     fluocinonide (LIDEX) 0.05 % external ointment     fluocinonide (LIDEX) 0.05 % external solution     FLUoxetine (PROZAC) 40 MG capsule     hydrOXYzine (ATARAX) 25 MG tablet     ketoconazole (NIZORAL) 2 % external shampoo     ketoconazole (NIZORAL) 2 % external shampoo     medical cannabis (Patient's own supply)     oxyCODONE-acetaminophen (PERCOCET) 5-325 MG tablet     polyethylene glycol (MIRALAX) 17 GM/Dose powder     predniSONE (DELTASONE) 5 MG tablet     Resorcinol POWD     traZODone (DESYREL) 150 MG tablet     traZODone (DESYREL) 50 MG tablet     valACYclovir (VALTREX) 500 MG tablet     VITAMIN D3 50 MCG (2000 UT) tablet     Current Facility-Administered Medications   Medication     triamcinolone acetonide (KENALOG-10) injection 60 mg      Past Medical History:   Patient Active Problem List   Diagnosis     Hidradenitis suppurativa     Fatigue     Past Medical History:   Diagnosis Date     NO ACTIVE PROBLEMS

## 2023-05-25 NOTE — PATIENT INSTRUCTIONS
1) HS   - Continue adalimumab 80mg weekly  - Start upacitinib 15mg daily (will see if this is covered). Check Labs 1 month after starting upacitinib. Dont start ozempic nd upacitinib at same time.   - Steroid injections done today  - Get vaccines   Shingles    Pneumonia vaccine   COVID booster  - Ozempic:  Initial: 0.25 mg once weekly for 4 weeks, then increase to 0.5 mg once weekly for 4 weeks, then increase to 1 mg once weekly for weeks and then increase to 2mg weekly      2) Rash on chest   - Lidex ointment twice a day   - Lac-hydrin 1-2x daily (lotion) - see if you have this at home    3) Seborrheic dermatitis/Scalp folliculitis  - Ketoconazole shampoo every 3 days  - Culture taken   - Derma-smoothe oil for scalp and ears daily as needed    4) Hives  - Allegra twice a day as needed for hives

## 2023-05-26 ENCOUNTER — TELEPHONE (OUTPATIENT)
Dept: DERMATOLOGY | Facility: CLINIC | Age: 48
End: 2023-05-26
Payer: COMMERCIAL

## 2023-05-26 RX ORDER — OXYCODONE AND ACETAMINOPHEN 5; 325 MG/1; MG/1
1 TABLET ORAL DAILY PRN
Qty: 28 TABLET | Refills: 0 | Status: SHIPPED | OUTPATIENT
Start: 2023-05-26 | End: 2023-07-06

## 2023-05-26 NOTE — LETTER
5/26/2023       RE: Klarissa Curtis  [address]  [place] [address]     To Whom It May Concern,             Sincerely,    Jean Kenny MD

## 2023-05-26 NOTE — TELEPHONE ENCOUNTER
PA Initiation    Medication: RINVOQ 15 MG PO TB24  Insurance Company: [employer] ([facility]) - Phone [phone] Fax [phone]  Pharmacy Filling the Rx: [facility] - [place], [place] - [address]  Filling Pharmacy Phone:    Filling Pharmacy Fax:    Start Date: 5/26/2023     Key: [id number]

## 2023-05-26 NOTE — LETTER
October 3, 2023    ATTN: Appeals & Edwige                                      Re: Prior Authorization Request   Plan: [employer]    Patient: Klarissa Curtis  Member ID: [id number]   YOB: 1975        To whom it may concern:     I am contacting you as a dermatologist caring for Klarissa Curtis with regard to the medical necessity of Rinvoq (upadacitinib) to treat the patient's diagnosis of Hidradenitis Suppurativa (L73.2).    Treatment requested: Rinvoq (upadacitinib)   Requested Dose: 15 mg tablet once daily   Prior treatments tried (with limited long-term response):   - infliximab infusion every 4 weeks  - Cosentyx (secukinumab) 300 mg every 28 days  - Xeljanz (tofacitinib) 10 mg twice daily  - Rapamune (sirolimus) 2 mg daily   - Intralesional kenalog injections intermittently and oral prednisone tapers   - Hormonal agents: spironolactone (stopped due to abnormal uterine bleeding), metformin, finasteride  - Oral antibiotics: doxycyline, dapsone, clindamycin plus rifampin  - Topical therapies: clindamycin 1% solution, clobetasol 0.05% cream, desonide 0.05%     Evidence of disease severity: Julian stage II    I recently prescribed this patient Rinvoq (upadacitinib), however the medication was denied. I have reviewed the patient's diagnosis, care plan and clinical guidelines for treatment and request a formal appeal of your denial for Rinvoq (upadacitinib).     I am writing to highlight the severe nature of this patient's condition. They have suffered from hidradenitis suppurativa (HS) for many years and have extensive disease intermittently affecting bilateral axilla, groin, thighs, and buttocks. The severity of their skin condition has affected their life in many ways including but not limited to chronic pain, ability to perform activities of daily living, missed work/disability. They have tried numerous treatments listed above, as well as current treatment with Humira (adalimumab), the only  FDA-approved treatment for moderate-to-severe HS. However, they have had inadequate clinical response with their refractory condition. Due to the severity of their condition and the significant impact this disease has had on their quality of life, I strongly believe this patient needs access to Rinvoq (upadacitinib).     HS is a chronic inflammatory skin condition that primarily affects intertriginous areas (axilla, groin, inframammary skin, gluteal cleft) and is characterized by recurrent painful, malodorous nodules and abscesses that can lead to scarring, sinus tracts, and fistulas overtime. HS has a devastating impact on quality of life (1), contributing to chronic pain (2,3), poor mental health (4), substance use disorder (5), impaired intimate relationships (6,7), and suicide rates 2.4 times that of the general population (8). Finding the most effective treatment for each patient is extremely important for restoring quality of life and preventing long-term complications that arise from long-standing, poorly controlled disease.    My recommendation for using upatacitinib to treat this patient's HS is based on:  The patient's inadequate clinical response to inhibition of TNF-alpha alone  Scientific rationale and emerging clinical evidence. HS has known disease signatures associated with elevated FREDIS/STAT signaling, similar to other conditions such as inflammatory bowel disease and psoriatic arthritis for which upadacitinib is currently approved. Upatacitinib is a Janus kinase (FREDIS) inhibitor, which interferes with the FREDIS-STAT signaling pathway, thus suppressing inflammatory cytokines that are implicated in HS pathogenesis (9). Recently, a phase 2 placebo-controlled study examined the efficacy and safety of upadacitinib in the treatment of moderate-to-severe Hidradenitis suppurativa (10,11). The study demonstrated a durable clinical improvement in HS with a statistically greater proportion of patients in the  upadacitinib group achieving a ?50% reduction in abscess and inflammatory nodule count without an increase in abscess or draining fistula count (Hidradenitis Suppurativa Clinical Response [HiSCR]), as well as a reduction from baseline in pain (10,11). In addition, a phase 3 trial to further evaluate upadacitinib in moderate-to-severe Hidradenitis suppurativa is currently underway (12). Furthermore, FREDIS inhibitors are being described as an emerging medical treatment for HS (13) and there are clinical trials currently evaluating the safety and efficacy of FREDIS inhibitors for the management of moderate to severe HS.   In my professional experience as an HS expert I have found FREDIS inhibition helpful for some of my patients with severe HS.     In my clinical opinion as a dermatologist, supported by the medical literature described above, that the combination of prior failed treatments, medical comorbidities, and contraindications listed above, the Rinvoq (upadacitinib) is the most appropriate next step in treating this patient's HS and restoring their quality of life.      On behalf of Klarissa Curtis, I would be grateful for your prompt approval of this medication. Please let me know if I can provide any further clarification or assistance that will help result in coverage of this medication. I am happy to discuss this further and would appreciate the chance to do so if this medication is not approved. Thank you for your time and consideration.        Sincerely,      Jean Kenny MD, FAAD, FACP     Departments of Internal Medicine and Dermatology  [facility]  [phone]      References:  (1) Julio P, Jazlyn A, Grover K, Gus P, Joan J. Quality of life impairment in hidradenitis suppurativa: a study of 61 cases. J Am Acad Dermatol. 2007;56(4):621-3. PMID: 47345157  (2) Cam OLIVA, Refugio M, Yesy KRAMER, et al. Uncovering burden disparity: A comparative analysis of the  impact of moderate-to-severe psoriasis and hidradenitis suppurativa. J Am Acad Dermatol. 2017;77(6):1038-46. PMID: 01980572  (3) Hans ZS, Tera LK, Jon DC, et al. Pain, Psychological Comorbidities, Disability, and Impaired Quality of Life in Hidradenitis Suppurativa [corrected]. Curr Pain Headache Rep. 2017;21(12):49. PMID: 01699185  (4) Jeff AJ, van sarah Ciara HH, Ines S, et al. Depression in patients with hidradenitis suppurativa. J Eur Acad Dermatol Venereol. 2013;27(4):473-8. PMID: 03308528  (5)  Neeta A, Linda V, Virgie M, Valentin A, Oscar J. Opioid, alcohol, and cannabis misuse among patients with hidradenitis suppurativa: A population-based analysis in the [place]. J Am Acad Dermatol. 2018;79(3):495-500.e1. PMID: 68609596  (6) Forrest IC, Ese IE, van sarah Yuryn AD, et al. Sexual health and quality of life are impaired in hidradenitis suppurativa: a multicentre cross-sectional study. Br J Dermatol. 2017;176(4):1042-7. PMID: 18738286  (7) John-Do C, Anand-Clemente R, Daniel A. Sexual Distress in Patients with Hidradenitis Suppurativa: A Cross-Sectional Study. J Clin Med. 2019;8(4):E532. PMID: 11807683  (8) Gilberto L, Carmelo AD, Fadi GH, Jesal GBE, Egkristine A. Increased Suicide Risk in Patients with Hidradenitis Suppurativa. J Invest Dermatol. 2018;138(1):52-7. PMID: 89717273   (9) Herminio KT, Baldev MR, Jose Elias KS, Rojas A, Connor ML, Terell AB. Tofacitinib shows benefit in conjunction with other therapies in recalcitrant hidradenitis suppurativa patients. JAAD Case Rep. 2020;6(2):. PMID: 81566577  (10) LIAN Figueredo, IVONE Rush, et al. 28311 Efficacy and Safety of Upadacitinib in Moderate-to-Severe Hidradenitis Suppurativa: A Phase 2, Randomized, Placebo-Controlled Study. J Am Acad Dermatol. 2023;89(3):42. https://doi.org/10.1016/j.jaad.2023.07.172.  (11) [facility]. A Study of Oral Upadacitinib Tablet Compared to Placebo in Adult  Participants With Moderate to Severe Hidradenitis Suppurativa to Assess Change in Disease Symptoms. [id number]  (12) [facility]. A Study to Assess Change in Disease Activity and Adverse Events of Oral Upadacitinib in Adult and Adolescent Participants With Moderate to Severe Hidradenitis Suppurativa Who Have Failed Anti-TNF Therapy (Step-Up HS). [id number]  (13) Violeta AB, Ewa N, Yesy CL, Tez HAWK, Esteban A, Cam OLIVA. Emerging medical treatments for hidradenitis suppurativa. J Am Acad Dermatol. 2020;83(2):554-62. PMID: 50379010

## 2023-05-28 LAB
BACTERIA SKIN AEROBE CULT: ABNORMAL
BACTERIA SKIN AEROBE CULT: ABNORMAL
GRAM STAIN RESULT: ABNORMAL
GRAM STAIN RESULT: ABNORMAL

## 2023-05-31 NOTE — TELEPHONE ENCOUNTER
PRIOR AUTHORIZATION DENIED    Medication: RINVOQ 15 MG PO TB24  Insurance Company: [employer] ([facility]) - Phone [phone] Fax [phone]  Denial Date: 5/29/2023  Denial Rational: not FDA approved diagnosis  Appeal Information: [phone]  Patient Notified:

## 2023-06-01 NOTE — PROGRESS NOTES
[facility] Dermatology Note  Encounter Date: Jun 22, 2023  Office Visit     Dermatology Problem List:  1. Hidradenitis suppurativa: diagnosed age 11, initially on waistline (used to weigh 300 lbs) which cleared up -> intermittent outbreaks -> for the past 10 years has had in bilateral axilla, groin, thighs, and buttocks.   - Humira, suboxone, prednisone, resorcinol cream  - Flare plan: ILK 60 injections, Prednisone 60 mg x 1 week, 50 mg x 1 week, 40 mg x 1 week (she does not like to taper below 40 mg as she flares)  - I&D x2 to lesions on the R axilla - 8/4/22  - Prior: Xeljanz, infliximab, prednisone taper, clobetasol, topical morphine, ILK, oral antibiotics, rifampin, sirolimus 2 mg (started 4/22/21) finasteride, spironolactone (6/2019- 9/5/19, stopped due to abnormal uterine bleeding), topical morphine gel, and percocet for severe pain, Cosentyx, belbuca  2. Keratosis pilaris  3. L olecranon bursitis.   4. Dermatographis  -  allegra 180mg  5. Eruption NOS,   - punch biopsy 8/5/2021: superficial and deep perivascular and interstitial infiltrate of neutrophils, with focal leukocytoclasis and bluish, amorphous material suggestive of neutrophil degranulation, and lesser amounts of eosinophils and lymphocytes. Background dermal edema is noted. Focal neutrophilic epitheliotropism is noted.  - Resolved  6. Pink linear patches, ddx: follicular dermatitis vs lichen spinulosus vs KP, posterior shoulders   - amlactin, lidex   7. Seborrheic dermatitis  - desonide 0.05% ointment, ketoconazole 2% shampoo      Soc Hx: [place]ots of walking/running around during COVID, now more sedentary at work)    ____________________________________________    Assessment & Plan:  # HS, Stage II, improved from last visit.   - ILK injections today, see procedure note below  - Continue Humira 80 mg weekly  - Continue Percocet PRN pain, previously on suboxone but  stopped as too expensive and didn't like how it made her feel   - Continue calcium 1200mg and vitamin D 2000 international unit(s)  - Additional referral provided for plastic surgery today   - Referral to OBGYN provided today  - Recommended Immunizations: Due for 4th COVID booster. Needs pneumonia and shingrix. Will need PCP appointment  - Will try to add on upacitinib 15mg daily. Discussed limited data on safety of combing small molecule inhibitors with biologics. Need to confirm with insurance covered -- discussed switching to combined pharmacy   - Didn't receive resorcinol cream BID previously prescribed for flares, can discuss at future visits  - Future consideration: re-trying infliximab       # Seborrheic dermatitis, ears and posterior neck  - Continue desonide 0.05% ointment BID until resolves   - Continue ketoconazole 2% shampoo 2-3 x weekly      # Dermatographism  - Increased allegra to 360 mg BID prn       # Eruption of unclear specification on chest and face under ears: ddx follicular dermatitis vs lichen spinulosus vs KP vs lichen simplex chronicus over underlying dermatographism  - Previous linear patches (ddx follicular dermatitis vs lichen spinulosus vs KP) resolved previously with lidex and amlactin. Unclear if same as previous eruption or new- - Ordered lidex ointment but insurance didn't cover, used clobetasol instead -- refill provided    Procedures Performed:   - Intra-lesional triamcinolone procedure note. After verbal consent and review of risk of pain and skin thinning/atrophy, positioning and cleansing with isopropyl alcohol, 8 total mL of triamcinolone 8 mg/mL was injected into 8 lesion(s) on the bilateral axilla and groin. The patient tolerated the procedure well and left the dermatology clinic in good condition.    Follow-up: 8 week(s) in-person, or earlier for new or changing lesions    Staff and Scribe:     Camille Pal, MS3   [employer]    Staff  Physician:  I was present with the medical student who participated in the service and in the documentation of the note. I have verified the history and personally performed the physical exam and medical decision making. I agree with the assessment and plan of care as documented in the note.     Jean Kenny MD    Department of Dermatology  [facility]: Phone: [phone], Fax: [phone]  6/25/2023        ____________________________________________    CC: Derm Problem (Maura is here today for HS flare.)    HPI:  Ms. Klarissa Curtis is a(n) 48 year old female who presents today as a return patient for HS. She was last seen in clinic 5/25/2023 where she was started on resorcinol cream and given ILK injections for HS and started on desonide 0.05% ointment and ketoconazole 2% shampoo for seborrheic dermatitis.     Today, she said she is experiencing a flare that she attributes to her upcoming menses. She notes a few draining areas in right axilla, left axilla, and groin. She states that she has continued to take Humira 80 mg/week, percoset prn for flares, but wasn't sure about the resorcinol cream. She said she experienced improvement in symptoms following the injections last visit, and would like to receive more today as she anticipates a flare with upcoming travel and menses. She also expressed interest in potentially receiving a hysterectomy in the future as she feels her flares are significantly triggered by her hormones/menses. She has not been able to take Ozempic and an additional medication she wasn't sure the name of as neither were covered by insurance.    She also said she has been increasingly itchy recently and is currently taking 180 mg Allegra BID.    HS Nurse Assessment        3/2/2023     3:54 PM 5/25/2023     4:01 PM 6/22/2023     1:00 PM   Nurse Assessment Data   Over the past 30 days how many old  lesions flared back up? 8-10 10 10   Over the past 30 days how many new lesions did you get? 2 0 2   Over the past week, how many dressing changes do you do each day? 3+ 0 0   Over the past week, has your wound drainage been: Moderate Moderate Moderate   Rate your HS overall from 0 - 10 (0 = no disease, 10 = worst) over the past week:  5 7 5   Rate your pain score from 0 - 10 (0 = no disease, 10 = worst) for the most painful/symptomatic lesion in the past week:  7 7 8   Over the past week, how much has HS influenced your quality of life? moderately very much moderately         Labs Reviewed: N/A    Physical Exam:  Vitals: Wt 111.7 kg (246 lb 2.9 oz)   BMI 38.56 kg/m    SKIN: Full skin, which includes the head/face, both arms, chest, back, abdomen,both legs, genitalia and/or groin buttocks, digits and/or nails, was examined.  - several draining tunnels in left and right axilla  - previously draining abscess on left abdomen and left medial thigh  - draining tunnel in left medial groin and central vulva  - erythematous papules coalescing into plaques with central erosion on chest and under both ears bilaterally  - No other lesions of concern on areas examined.     Medications:  Current Outpatient Medications   Medication     adalimumab (HUMIRA *CF* PEN-CD/UC/HS STARTER) 80 MG/0.8ML pen kit     ammonium lactate (AMLACTIN) 12 % external cream     buprenorphine HCl-naloxone HCl (SUBOXONE) 2-0.5 MG per film     calcium carbonate (CALCIUM CARBONATE) 600 MG tablet     calcium carbonate (OS-MERCY) 1500 (600 Ca) MG tablet     clindamycin (CLEOCIN T) 1 % external solution     desonide (DESOWEN) 0.05 % external ointment     DULoxetine (CYMBALTA) 60 MG capsule     fexofenadine (ALLEGRA) 180 MG tablet     fexofenadine (ALLEGRA) 180 MG tablet     fluocinolone acetonide (DERMA SMOOTHE/FS BODY) 0.01 % external oil     fluocinonide (LIDEX) 0.05 % external ointment     fluocinonide (LIDEX) 0.05 % external ointment     FLUoxetine  (PROZAC) 40 MG capsule     ketoconazole (NIZORAL) 2 % external shampoo     ketoconazole (NIZORAL) 2 % external shampoo     medical cannabis (Patient's own supply)     oxyCODONE-acetaminophen (PERCOCET) 5-325 MG tablet     polyethylene glycol (MIRALAX) 17 GM/Dose powder     Resorcinol POWD     semaglutide (OZEMPIC) 2 MG/1.5ML SOPN pen     Semaglutide, 1 MG/DOSE, (OZEMPIC) 4 MG/3ML pen     Semaglutide, 2 MG/DOSE, (OZEMPIC) 8 MG/3ML pen     traZODone (DESYREL) 150 MG tablet     traZODone (DESYREL) 50 MG tablet     Upadacitinib ER (RINVOQ) 15 MG TB24     VITAMIN D3 50 MCG (2000 UT) tablet     Current Facility-Administered Medications   Medication     triamcinolone acetonide (KENALOG-10) injection 60 mg      Past Medical History:   Patient Active Problem List   Diagnosis     Hidradenitis suppurativa     Fatigue     Past Medical History:   Diagnosis Date     NO ACTIVE PROBLEMS

## 2023-06-05 ENCOUNTER — TELEPHONE (OUTPATIENT)
Dept: DERMATOLOGY | Facility: CLINIC | Age: 48
End: 2023-06-05

## 2023-06-06 NOTE — TELEPHONE ENCOUNTER
Medication Appeal Initiation    We have initiated an appeal for the requested medication:  Medication: RINVOQ 15 MG PO TB24  Appeal Start Date:  6/6/2023  Insurance Company: [employer] Phone: [phone]  Insurance Fax: [phone]  Comments:  Faxed [phone]

## 2023-06-07 ENCOUNTER — TELEPHONE (OUTPATIENT)
Dept: DERMATOLOGY | Facility: CLINIC | Age: 48
End: 2023-06-07
Payer: COMMERCIAL

## 2023-06-07 DIAGNOSIS — L73.9 FOLLICULITIS: Primary | ICD-10-CM

## 2023-06-07 RX ORDER — CLINDAMYCIN PHOSPHATE 11.9 MG/ML
SOLUTION TOPICAL
Qty: 60 ML | Refills: 1 | Status: SHIPPED | OUTPATIENT
Start: 2023-06-07 | End: 2023-10-02

## 2023-06-09 DIAGNOSIS — L73.2 HIDRADENITIS SUPPURATIVA: ICD-10-CM

## 2023-06-12 NOTE — TELEPHONE ENCOUNTER
"5/25/2023 office visit AVS:  \") HS   - Continue adalimumab 80mg weekly  - Start upacitinib 15mg daily (will see if this is covered). Check Labs 1 month after starting upacitinib. Dont start ozempic nd upacitinib at same time.   - Steroid injections done today  - Get vaccines              Shingles               Pneumonia vaccine              COVID booster  - Ozempic:  Initial: 0.25 mg once weekly for 4 weeks, then increase to 0.5 mg once weekly for 4 weeks, then increase to 1 mg once weekly for weeks and then increase to 2mg weekly       2) Rash on chest   - Lidex ointment twice a day   - Lac-hydrin 1-2x daily (lotion) - see if you have this at home     3) Seborrheic dermatitis/Scalp folliculitis  - Ketoconazole shampoo every 3 days  - Culture taken   - Derma-smoothe oil for scalp and ears daily as needed     4) Hives  - Allegra twice a day as needed for hives\"  "

## 2023-06-14 DIAGNOSIS — L73.2 HIDRADENITIS SUPPURATIVA: ICD-10-CM

## 2023-06-14 RX ORDER — ADALIMUMAB 80MG/0.8ML
80 KIT SUBCUTANEOUS WEEKLY
Qty: 8 EACH | Refills: 11 | Status: SHIPPED | OUTPATIENT
Start: 2023-06-14 | End: 2023-10-02

## 2023-06-21 ENCOUNTER — MYC MEDICAL ADVICE (OUTPATIENT)
Dept: DERMATOLOGY | Facility: CLINIC | Age: 48
End: 2023-06-21
Payer: COMMERCIAL

## 2023-06-22 ENCOUNTER — OFFICE VISIT (OUTPATIENT)
Dept: DERMATOLOGY | Facility: CLINIC | Age: 48
End: 2023-06-22
Payer: COMMERCIAL

## 2023-06-22 VITALS — BODY MASS INDEX: 38.56 KG/M2 | WEIGHT: 246.18 LBS

## 2023-06-22 DIAGNOSIS — E66.9 OBESITY (BMI 30-39.9): ICD-10-CM

## 2023-06-22 DIAGNOSIS — R21 CUTANEOUS ERUPTION: Primary | ICD-10-CM

## 2023-06-22 DIAGNOSIS — L73.2 HIDRADENITIS SUPPURATIVA: ICD-10-CM

## 2023-06-22 PROCEDURE — 11900 INJECT SKIN LESIONS </W 7: CPT | Performed by: DERMATOLOGY

## 2023-06-22 PROCEDURE — 99214 OFFICE O/P EST MOD 30 MIN: CPT | Mod: 25 | Performed by: DERMATOLOGY

## 2023-06-22 NOTE — LETTER
6/22/2023       RE: Klarissa Curtis  [address]  [place] [address]     Dear Colleague,    Thank you for referring your patient, Klarissa Curtis, to the [facility] DERMAT[facility] [place] at [facility]. Please see a copy of my visit note below.    [facility] Dermatology Note  Encounter Date: Jun 22, 2023  Office Visit     Dermatology Problem List:  1. Hidradenitis suppurativa: diagnosed age 11, initially on waistline (used to weigh 300 lbs) which cleared up -> intermittent outbreaks -> for the past 10 years has had in bilateral axilla, groin, thighs, and buttocks.   - Humira, suboxone, prednisone, resorcinol cream  - Flare plan: ILK 60 injections, Prednisone 60 mg x 1 week, 50 mg x 1 week, 40 mg x 1 week (she does not like to taper below 40 mg as she flares)  - I&D x2 to lesions on the R axilla - 8/4/22  - Prior: Xeljanz, infliximab, prednisone taper, clobetasol, topical morphine, ILK, oral antibiotics, rifampin, sirolimus 2 mg (started 4/22/21) finasteride, spironolactone (6/2019- 9/5/19, stopped due to abnormal uterine bleeding), topical morphine gel, and percocet for severe pain, Cosentyx, belbuca  2. Keratosis pilaris  3. L olecranon bursitis.   4. Dermatographis  -  allegra 180mg  5. Eruption NOS,   - punch biopsy 8/5/2021: superficial and deep perivascular and interstitial infiltrate of neutrophils, with focal leukocytoclasis and bluish, amorphous material suggestive of neutrophil degranulation, and lesser amounts of eosinophils and lymphocytes. Background dermal edema is noted. Focal neutrophilic epitheliotropism is noted.  - Resolved  6. Pink linear patches, ddx: follicular dermatitis vs lichen spinulosus vs KP, posterior shoulders   - amlactin, lidex   7. Seborrheic dermatitis  - desonide 0.05% ointment, ketoconazole 2% shampoo      Soc Hx: She works as an / at the  [employer] (lots of walking/running around during COVID, now more sedentary at work)    ____________________________________________    Assessment & Plan:  Taiwo CAMP, Stage II, improved from last visit.   - ILK injections today, see procedure note below  - Continue Humira 80 mg weekly  - Continue Percocet PRN pain, previously on suboxone but stopped as too expensive and didn't like how it made her feel   - Continue calcium 1200mg and vitamin D 2000 international unit(s)  - Additional referral provided for plastic surgery today   - Referral to OBGYN provided today  - Recommended Immunizations: Due for 4th COVID booster. Needs pneumonia and shingrix. Will need PCP appointment  - Will try to add on upacitinib 15mg daily. Discussed limited data on safety of combing small molecule inhibitors with biologics. Need to confirm with insurance covered -- discussed switching to combined pharmacy   - Didn't receive resorcinol cream BID previously prescribed for flares, can discuss at future visits  - Future consideration: re-trying infliximab       # Seborrheic dermatitis, ears and posterior neck  - Continue desonide 0.05% ointment BID until resolves   - Continue ketoconazole 2% shampoo 2-3 x weekly      # Dermatographism  - Increased allegra to 360 mg BID prn       # Eruption of unclear specification on chest and face under ears: ddx follicular dermatitis vs lichen spinulosus vs KP vs lichen simplex chronicus over underlying dermatographism  - Previous linear patches (ddx follicular dermatitis vs lichen spinulosus vs KP) resolved previously with lidex and amlactin. Unclear if same as previous eruption or new- - Ordered lidex ointment but insurance didn't cover, used clobetasol instead -- refill provided    Procedures Performed:   - Intra-lesional triamcinolone procedure note. After verbal consent and review of risk of pain and skin thinning/atrophy, positioning and cleansing with isopropyl alcohol, 8 total mL of triamcinolone 8 mg/mL  was injected into 8 lesion(s) on the bilateral axilla and groin. The patient tolerated the procedure well and left the dermatology clinic in good condition.    Follow-up: 8 week(s) in-person, or earlier for new or changing lesions    Staff and Scribe:     Camille Pal, MS3   [facility] [employer]    Staff Physician:  I was present with the medical student who participated in the service and in the documentation of the note. I have verified the history and personally performed the physical exam and medical decision making. I agree with the assessment and plan of care as documented in the note.     Jean Kenny MD    Department of Dermatology  [facility]: Phone: [phone], Fax: [phone]  6/25/2023        ____________________________________________    CC: Derm Problem (Maura is here today for HS flare.)    HPI:  Ms. Klarissa Curtis is a(n) 48 year old female who presents today as a return patient for HS. She was last seen in clinic 5/25/2023 where she was started on resorcinol cream and given ILK injections for HS and started on desonide 0.05% ointment and ketoconazole 2% shampoo for seborrheic dermatitis.     Today, she said she is experiencing a flare that she attributes to her upcoming menses. She notes a few draining areas in right axilla, left axilla, and groin. She states that she has continued to take Humira 80 mg/week, percoset prn for flares, but wasn't sure about the resorcinol cream. She said she experienced improvement in symptoms following the injections last visit, and would like to receive more today as she anticipates a flare with upcoming travel and menses. She also expressed interest in potentially receiving a hysterectomy in the future as she feels her flares are significantly triggered by her hormones/menses. She has not been able to take Ozempic and an additional medication  she wasn't sure the name of as neither were covered by insurance.    She also said she has been increasingly itchy recently and is currently taking 180 mg Allegra BID.    HS Nurse Assessment        3/2/2023     3:54 PM 5/25/2023     4:01 PM 6/22/2023     1:00 PM   Nurse Assessment Data   Over the past 30 days how many old lesions flared back up? 8-10 10 10   Over the past 30 days how many new lesions did you get? 2 0 2   Over the past week, how many dressing changes do you do each day? 3+ 0 0   Over the past week, has your wound drainage been: Moderate Moderate Moderate   Rate your HS overall from 0 - 10 (0 = no disease, 10 = worst) over the past week:  5 7 5   Rate your pain score from 0 - 10 (0 = no disease, 10 = worst) for the most painful/symptomatic lesion in the past week:  7 7 8   Over the past week, how much has HS influenced your quality of life? moderately very much moderately         Labs Reviewed: N/A    Physical Exam:  Vitals: There were no vitals taken for this visit.  SKIN: Full skin, which includes the head/face, both arms, chest, back, abdomen,both legs, genitalia and/or groin buttocks, digits and/or nails, was examined.  - several draining tunnels in left and right axilla  - previously draining abscess on left abdomen and left medial thigh  - draining tunnel in left medial groin and central vulva  - erythematous papules coalescing into plaques with central erosion on chest and under both ears bilaterally  - No other lesions of concern on areas examined.     Medications:  Current Outpatient Medications   Medication    adalimumab (HUMIRA *CF* PEN-CD/UC/HS STARTER) 80 MG/0.8ML pen kit    ammonium lactate (AMLACTIN) 12 % external cream    buprenorphine HCl-naloxone HCl (SUBOXONE) 2-0.5 MG per film    calcium carbonate (CALCIUM CARBONATE) 600 MG tablet    calcium carbonate (OS-MERCY) 1500 (600 Ca) MG tablet    clindamycin (CLEOCIN T) 1 % external solution    desonide (DESOWEN) 0.05 % external ointment     DULoxetine (CYMBALTA) 60 MG capsule    fexofenadine (ALLEGRA) 180 MG tablet    fexofenadine (ALLEGRA) 180 MG tablet    fluocinolone acetonide (DERMA SMOOTHE/FS BODY) 0.01 % external oil    fluocinonide (LIDEX) 0.05 % external ointment    fluocinonide (LIDEX) 0.05 % external ointment    FLUoxetine (PROZAC) 40 MG capsule    ketoconazole (NIZORAL) 2 % external shampoo    ketoconazole (NIZORAL) 2 % external shampoo    medical cannabis (Patient's own supply)    oxyCODONE-acetaminophen (PERCOCET) 5-325 MG tablet    polyethylene glycol (MIRALAX) 17 GM/Dose powder    Resorcinol POWD    semaglutide (OZEMPIC) 2 MG/1.5ML SOPN pen    Semaglutide, 1 MG/DOSE, (OZEMPIC) 4 MG/3ML pen    Semaglutide, 2 MG/DOSE, (OZEMPIC) 8 MG/3ML pen    traZODone (DESYREL) 150 MG tablet    traZODone (DESYREL) 50 MG tablet    Upadacitinib ER (RINVOQ) 15 MG TB24    VITAMIN D3 50 MCG (2000 UT) tablet     Current Facility-Administered Medications   Medication    triamcinolone acetonide (KENALOG-10) injection 60 mg      Past Medical History:   Patient Active Problem List   Diagnosis    Hidradenitis suppurativa    Fatigue     Past Medical History:   Diagnosis Date    NO ACTIVE PROBLEMS

## 2023-06-25 RX ORDER — CLOBETASOL PROPIONATE 0.5 MG/G
CREAM TOPICAL 2 TIMES DAILY
Qty: 60 G | Refills: 1 | Status: SHIPPED | OUTPATIENT
Start: 2023-06-25 | End: 2023-06-25

## 2023-06-25 RX ORDER — CLOBETASOL PROPIONATE 0.5 MG/G
CREAM TOPICAL 2 TIMES DAILY
Qty: 60 G | Refills: 1 | Status: SHIPPED | OUTPATIENT
Start: 2023-06-25 | End: 2023-10-02

## 2023-06-26 NOTE — PATIENT INSTRUCTIONS
Steroid injections today   I re-ordered semaglutide to the [place] [facility] pharmacy  Refilled clobetasol cream twice a day  Increase allegra 360mg (2 pills) twice a day

## 2023-06-27 ENCOUNTER — TELEPHONE (OUTPATIENT)
Dept: OBGYN | Facility: CLINIC | Age: 48
End: 2023-06-27
Payer: COMMERCIAL

## 2023-06-27 NOTE — TELEPHONE ENCOUNTER
[facility] Call Center    Phone Message    May a detailed message be left on voicemail: yes     Reason for Call: Appointment Intake    Referring Provider Name: Jean Kenny MD  Diagnosis and/or Symptoms:     Patient being referred for Dx Hidradenitis suppurativa by referring provider. Writer sending encounter message as this Dx is not listed in guidelines for scheduling. Please review and follow-up with patient for scheduling. Thank you!    Action Taken: Other: [facility]    Travel Screening: Not Applicable

## 2023-06-27 NOTE — TELEPHONE ENCOUNTER
"Attempted to call patient to inquire about need for OB/GYN referral as diagnostic code is \"L73.2 (ICD-10-CM) - Hidradenitis suppurativa\". She appears to be following closely with Dermatology, so clarification is needed if OB/GYN referral is needed for other reasons that are within the scope of an OB/GYN.   "

## 2023-06-28 ENCOUNTER — MYC REFILL (OUTPATIENT)
Dept: DERMATOLOGY | Facility: CLINIC | Age: 48
End: 2023-06-28
Payer: COMMERCIAL

## 2023-06-28 DIAGNOSIS — L73.2 HIDRADENITIS SUPPURATIVA: ICD-10-CM

## 2023-06-28 NOTE — TELEPHONE ENCOUNTER
6/22/2023 Assessment & Plan:  # HS, Stage II, improved from last visit.   - ILK injections today, see procedure note below  - Continue Humira 80 mg weekly  - Continue Percocet PRN pain, previously on suboxone but stopped as too expensive and didn't like how it made her feel   - Continue calcium 1200mg and vitamin D 2000 international unit(s)  - Additional referral provided for plastic surgery today   - Referral to OBGYN provided today  - Recommended Immunizations: Due for 4th COVID booster. Needs pneumonia and shingrix. Will need PCP appointment  - Will try to add on upacitinib 15mg daily. Discussed limited data on safety of combing small molecule inhibitors with biologics. Need to confirm with insurance covered -- discussed switching to combined pharmacy   - Didn't receive resorcinol cream BID previously prescribed for flares, can discuss at future visits  - Future consideration: re-trying infliximab

## 2023-06-29 NOTE — NURSING NOTE
Drug Administration Record    Prior to injection, verified patient identity using patient's name and date of birth.  Due to injection administration, patient instructed to remain in clinic for 15 minutes  afterwards, and to report any adverse reaction to me immediately.    Drug Name: triamcinolone acetonide(kenalog)  Dose: 8mL of triamcinolone 10mg/mL, 80mg dose  Route administered: ID  NDC #: Kenalog-10 (0782-7533-63)  Amount of waste(mL):2 ml  Reason for waste: Single use vial  LOT #: [id number]  : [employer]  EXPIRATION DATE: 09/2025    NDC #: Kenalog-10 (8669-3789-34)  LOT #: [id number]  : [employer]  EXPIRATION DATE: 09/2025    2 vials used

## 2023-06-29 NOTE — TELEPHONE ENCOUNTER
MEDICATION APPEAL DENIED    Medication: RINVOQ 15 MG PO TB24  Insurance Company: [employer]  Denial Date: 6/19/2023  Denial Rational: not fda approved diagnosis  Second Level Appeal Information: all appeals exhausted  Patient Notified: yese         Declined

## 2023-07-06 RX ORDER — OXYCODONE AND ACETAMINOPHEN 5; 325 MG/1; MG/1
1 TABLET ORAL DAILY PRN
Qty: 28 TABLET | Refills: 0 | Status: CANCELLED | OUTPATIENT
Start: 2023-07-06

## 2023-07-06 RX ORDER — OXYCODONE AND ACETAMINOPHEN 5; 325 MG/1; MG/1
1 TABLET ORAL DAILY PRN
Qty: 28 TABLET | Refills: 0 | Status: SHIPPED | OUTPATIENT
Start: 2023-07-06 | End: 2023-07-27

## 2023-07-14 NOTE — TELEPHONE ENCOUNTER
"Copied below Dr Kenny's response regarding dose.    \"Wr can try under diagnosis of HS or obesity or both.   YEs, 3 different doses. There is a ramp up period.   Jean\"  "
Appeals exhausted and free drug program only available to patient with no insurance.  
Insurance states that will not review as patient doesn't have type 2 diabetes and we have appealed for Obesity/HS diagnosis. No free drug programs available   
MEDICATION APPEAL DENIED    Medication: OZEMPIC (0.25 OR 0.5 MG/DOSE) 2 MG/1.5ML SC SOPN  Insurance Company: [employer]  Denial Date: 6/19/2023  Denial Rational: denial upheld  Second Level Appeal Information: appeals exhausted  Patient Notified:          
Medication Appeal Initiation    We have initiated an appeal for the requested medication:  Medication: OZEMPIC (0.25 OR 0.5 MG/DOSE) 2 MG/1.5ML SC SOPN  Appeal Start Date:  6/14/2023  Insurance Company: [employer] Phone: [phone]  Insurance Fax: [phone]  Comments:  Faxed [phone]      
PA Initiation    Medication: OZEMPIC (0.25 OR 0.5 MG/DOSE) 2 MG/1.5ML SC SOPN  Insurance Company: [employer] ([facility]) - Phone [phone] Fax [phone]  Pharmacy Filling the Rx: [facility] - [place], [place] - [address]  Filling Pharmacy Phone:    Filling Pharmacy Fax:    Start Date: 6/7/2023    Key: [id number]  
PA denied, only approved for type 2 diabetes and A1C greater than 6.5 also tried and failed metformin. Please assist with appeal   
PRIOR AUTHORIZATION DENIED    Medication: OZEMPIC (0.25 OR 0.5 MG/DOSE) 2 MG/1.5ML SC SOPN  Insurance Company: [employer] ([facility]) - Phone [phone] Fax [phone]  Denial Date: 6/7/2023  Denial Rational:    Appeal Information: [phone]  Patient Notified:      
Prior Authorization Specialty Medication Request    Medication/Dose: Ozempic  ICD code (if different than what is on RX):    Previously Tried and Failed:      Important Lab Values:   Rationale:     Insurance Name: [employer]  Insurance ID: [id number]   Insurance Phone Number: [phone]    Pharmacy Information (if different than what is on RX)  Name:    Phone:              
Spoke with insurance at [phone].  Appeal still pending with determination due date of 06/19/2023.  
Spoke with insurance, appeal has been denied. They are faxing over denial letter, will review for next steps    
Submitted pa under Obesity diagnosis. Key: [id number]  
msg sent to pt.  
Female

## 2023-07-18 NOTE — TELEPHONE ENCOUNTER
Spoke with patient and she wont qualify for free drug program due to income. Patient is also aware of denial on ozempic. Please follow up with patient how alternative options.

## 2023-07-27 ENCOUNTER — VIRTUAL VISIT (OUTPATIENT)
Dept: DERMATOLOGY | Facility: CLINIC | Age: 48
End: 2023-07-27
Payer: COMMERCIAL

## 2023-07-27 DIAGNOSIS — L73.2 HIDRADENITIS SUPPURATIVA: Primary | ICD-10-CM

## 2023-07-27 PROCEDURE — 99443 PR PHYSICIAN TELEPHONE EVALUATION 21-30 MIN: CPT | Performed by: DERMATOLOGY

## 2023-07-27 RX ORDER — OXYCODONE AND ACETAMINOPHEN 5; 325 MG/1; MG/1
1 TABLET ORAL DAILY PRN
Qty: 28 TABLET | Refills: 0 | Status: SHIPPED | OUTPATIENT
Start: 2023-07-27 | End: 2023-08-17

## 2023-07-27 RX ORDER — GABAPENTIN 300 MG/1
900 CAPSULE ORAL 3 TIMES DAILY
Qty: 270 CAPSULE | Refills: 3 | Status: SHIPPED | OUTPATIENT
Start: 2023-07-27 | End: 2023-10-02

## 2023-07-27 RX ORDER — RESORCINOL
POWDER (GRAM) MISCELLANEOUS
Qty: 30 G | Refills: 11 | Status: SHIPPED | OUTPATIENT
Start: 2023-07-27 | End: 2023-11-20

## 2023-07-27 RX ORDER — OXYCODONE AND ACETAMINOPHEN 5; 325 MG/1; MG/1
1 TABLET ORAL DAILY PRN
Qty: 28 TABLET | Refills: 0 | Status: SHIPPED | OUTPATIENT
Start: 2023-07-27 | End: 2023-07-27

## 2023-07-27 RX ORDER — GABAPENTIN 300 MG/1
900 CAPSULE ORAL 3 TIMES DAILY
Qty: 270 CAPSULE | Refills: 3 | Status: SHIPPED | OUTPATIENT
Start: 2023-07-27 | End: 2023-07-27

## 2023-07-27 ASSESSMENT — PAIN SCALES - GENERAL: PAINLEVEL: SEVERE PAIN (7)

## 2023-07-27 NOTE — NURSING NOTE
Chief Complaint   Patient presents with    Follow Up     Medication refill visit for percocet.      Tiarra FAN CMA

## 2023-07-28 NOTE — PATIENT INSTRUCTIONS
Pain   - Percocet daily as needed  - Gabapentin 300mg at night for 3 days, if tolerating, increase to 300mg twice a day for 3 days. If tolerating, increase to 300mg 3x a day for 3 days. Continue until you are at 900mg (3 pills) three times a day. If developing side effects, like drowsiness, go back to last dose tolerated.  - Resorcinol cream twice a day as needed for flares  Chemistry Rx  Resorcinol 15% in [facility]   30 g = $68  60g = $95   Phone #: [phone]    OB/GYN and Plastic Surgery Referral    Compression stockings: 20-25mmHg

## 2023-07-28 NOTE — PROGRESS NOTES
[facility] Dermatology Note  Encounter Date: Jun 22, 2023  Phone visit    Start :5:00pm-5:22pm     Dermatology Problem List:  1. Hidradenitis suppurativa: diagnosed age 11, initially on waistline (used to weigh 300 lbs) which cleared up -> intermittent outbreaks -> for the past 10 years has had in bilateral axilla, groin, thighs, and buttocks.   - Humira, suboxone, prednisone, resorcinol cream  - Flare plan: ILK 60 injections, Prednisone 60 mg x 1 week, 50 mg x 1 week, 40 mg x 1 week (she does not like to taper below 40 mg as she flares)  - I&D x2 to lesions on the R axilla - 8/4/22  - Prior: Xeljanz, infliximab, prednisone taper, clobetasol, topical morphine, ILK, oral antibiotics, rifampin, sirolimus 2 mg (started 4/22/21) finasteride, spironolactone (6/2019- 9/5/19, stopped due to abnormal uterine bleeding), topical morphine gel, and percocet for severe pain, Cosentyx, belbuca  2. Keratosis pilaris  3. L olecranon bursitis.   4. Dermatographis  -  allegra 180mg  5. Eruption NOS,   - punch biopsy 8/5/2021: superficial and deep perivascular and interstitial infiltrate of neutrophils, with focal leukocytoclasis and bluish, amorphous material suggestive of neutrophil degranulation, and lesser amounts of eosinophils and lymphocytes. Background dermal edema is noted. Focal neutrophilic epitheliotropism is noted.  - Resolved  6. Pink linear patches, ddx: follicular dermatitis vs lichen spinulosus vs KP, posterior shoulders   - amlactin, lidex   7. Seborrheic dermatitis  - desonide 0.05% ointment, ketoconazole 2% shampoo      Soc Hx: She works as an [place] (lots of walking/running around during COVID, now more sedentary at work)     ____________________________________________     Assessment & Plan:  # HS, Stage II, improved from last visit.   - Continue Humira 80 mg weekly  - Continue Percocet PRN pain, previously on suboxone but stopped as too expensive  and didn't like how it made her feel   - Last prescribed percocet 28 pills on July 6. She has been taking it 2 pills 2-3 x a day but then it calmed down and then it started flaring again  - Continue calcium 1200mg and vitamin D 2000 international unit(s)  - Additional referral provided for plastic surgery today   - Referral to OBGYN provided last visit about hysterectomy  - Recommended Immunizations: Due for 4th COVID booster. Needs pneumonia and shingrix. Will need PCP appointment  - Did not meet criteria for free drug for upatacitinib. Ozempic also declined.  - gabapentin  - Plastic surgery pending  - Didn't receive resorcinol cream BID previously prescribed for flares, can discuss at future visits  - Future consideration: re-trying infliximab   - Pain:  - Percocet daily as needed  - Gabapentin 300mg at night for 3 days, if tolerating, increase to 300mg twice a day for 3 days. If tolerating, increase to 300mg 3x a day for 3 days. Continue until you are at 900mg (3 pills) three times a day. If developing side effects, like drowsiness, go back to last dose tolerated.  - Resorcinol cream twice a day as needed for flares  Chemistry Rx  Resorcinol 15% in [facility]   30 g = $68  60g = $95   Phone #: [phone]      # Seborrheic dermatitis, ears and posterior neck  - Continue desonide 0.05% ointment BID until resolves   - Continue ketoconazole 2% shampoo 2-3 x weekly      # Dermatographism  - continue allegra to 360 mg BID        # Eruption of unclear specification on chest and face under ears: ddx follicular dermatitis vs lichen spinulosus vs KP vs lichen simplex chronicus over underlying dermatographism  - Previous linear patches (ddx follicular dermatitis vs lichen spinulosus vs KP) resolved previously with lidex and amlactin. Unclear if same as previous eruption or new- - Ordered lidex ointment but insurance didn't cover, used clobetasol instead    # Leg swelling  - Compression stockings 20mmHg  - No difficulty  breathing or sob laying down. Unlikely fluid retention.  - IN future, will check BNP      Follow-up: 8 week(s) in-person, or earlier for new or changing lesions     Staff and Scribe:      Camille Pal, MS3   [facility] [employer]     Staff Physician:  I was present with the medical student who participated in the service and in the documentation of the note. I have verified the history and personally performed the physical exam and medical decision making. I agree with the assessment and plan of care as documented in the note.      Jean Kenny MD    Department of Dermatology  [facility]: Phone: [phone], Fax: [phone]  6/25/2023           ____________________________________________     CC: Derm Problem (Maura is here today for HS flare.)     HPI:  Flared on the left side in [place] with cellulitis like plaque in left axilla. It is still present from 6/28/23.   [place] flared on the rt  Schedule for plastic surgery in Oct  Less flares of urticaria on higher dose allegra  Eruption NOS spread to ear and hair and clear on back. Clobetasol ointment is helping.      Medications:  Current Outpatient Medications   Medication    adalimumab (HUMIRA *CF* PEN-CD/UC/HS STARTER) 80 MG/0.8ML pen kit    ammonium lactate (AMLACTIN) 12 % external cream    calcium carbonate (CALCIUM CARBONATE) 600 MG tablet    calcium carbonate (OS-MERCY) 1500 (600 Ca) MG tablet    clindamycin (CLEOCIN T) 1 % external solution    clobetasol (TEMOVATE) 0.05 % external cream    desonide (DESOWEN) 0.05 % external ointment    DULoxetine (CYMBALTA) 60 MG capsule    fexofenadine (ALLEGRA) 180 MG tablet    fexofenadine (ALLEGRA) 180 MG tablet    fluocinolone acetonide (DERMA SMOOTHE/FS BODY) 0.01 % external oil    fluocinonide (LIDEX) 0.05 % external ointment    fluocinonide (LIDEX) 0.05 % external ointment    FLUoxetine (PROZAC) 40 MG  capsule    ketoconazole (NIZORAL) 2 % external shampoo    ketoconazole (NIZORAL) 2 % external shampoo    medical cannabis (Patient's own supply)    oxyCODONE-acetaminophen (PERCOCET) 5-325 MG tablet    polyethylene glycol (MIRALAX) 17 GM/Dose powder    Resorcinol POWD    semaglutide (OZEMPIC) 2 MG/1.5ML SOPN pen    Semaglutide, 1 MG/DOSE, (OZEMPIC) 4 MG/3ML pen    Semaglutide, 2 MG/DOSE, (OZEMPIC) 8 MG/3ML pen    traZODone (DESYREL) 150 MG tablet    traZODone (DESYREL) 50 MG tablet    upadacitinib ER (RINVOQ) 15 MG tablet    VITAMIN D3 50 MCG (2000 UT) tablet     Current Facility-Administered Medications   Medication    triamcinolone acetonide (KENALOG-10) injection 40 mg    triamcinolone acetonide (KENALOG-10) injection 60 mg       Past Medical History:       Patient Active Problem List   Diagnosis    Hidradenitis suppurativa    Fatigue      Past Medical History

## 2023-08-15 ENCOUNTER — MYC MEDICAL ADVICE (OUTPATIENT)
Dept: DERMATOLOGY | Facility: CLINIC | Age: 48
End: 2023-08-15
Payer: COMMERCIAL

## 2023-08-15 DIAGNOSIS — L73.2 HIDRADENITIS SUPPURATIVA: ICD-10-CM

## 2023-08-17 RX ORDER — OXYCODONE AND ACETAMINOPHEN 5; 325 MG/1; MG/1
1 TABLET ORAL DAILY PRN
Qty: 28 TABLET | Refills: 0 | Status: SHIPPED | OUTPATIENT
Start: 2023-08-17 | End: 2023-10-02

## 2023-08-18 NOTE — TELEPHONE ENCOUNTER
Pt with large flare in axilla. Refilled pain meds at 3 weeks.   Will need to consider going back to suboxone if needing this freuqnet refill.  On gabapentin.  Jean

## 2023-08-25 ENCOUNTER — TELEPHONE (OUTPATIENT)
Dept: PLASTIC SURGERY | Facility: CLINIC | Age: 48
End: 2023-08-25
Payer: COMMERCIAL

## 2023-08-25 NOTE — TELEPHONE ENCOUNTER
Upon chart prep, it was discovered this patient was scheduled for a consult for hydradenitis suppurativa. Dr. Villalobos does not treat this concern.     Call was placed to patient letting her know we were happy to still see her but that there would not be anything we could offer her as far treatment. We would recommend the patient see Dr. Mendoza. The patient was very appreciative of call and informed me that she was initially scheduled with Dr. Mendoza but she had to miss her initial consult due to work travel.     I will route a message to Dr. Mendoza's RN's to schedule this patient for a consult.     The patient was appreciative of call back. Appointment cancelled.     Xin Cobb RN on 8/25/2023 at 3:15 PM

## 2023-08-29 ENCOUNTER — TELEPHONE (OUTPATIENT)
Dept: DERMATOLOGY | Facility: CLINIC | Age: 48
End: 2023-08-29
Payer: COMMERCIAL

## 2023-08-29 NOTE — TELEPHONE ENCOUNTER
PA Initiation    Medication: HUMIRA PEN 80 MG/0.8ML SC PNKT  Insurance Company: [employer] ([facility]) - Phone [phone] Fax [phone]  Pharmacy Filling the Rx: [facility] - [place], [place] - [address]  Filling Pharmacy Phone:    Filling Pharmacy Fax:    Start Date: 8/29/2023    Key: [id number]

## 2023-08-30 ENCOUNTER — MYC MEDICAL ADVICE (OUTPATIENT)
Dept: DERMATOLOGY | Facility: CLINIC | Age: 48
End: 2023-08-30
Payer: COMMERCIAL

## 2023-08-31 ENCOUNTER — OFFICE VISIT (OUTPATIENT)
Dept: DERMATOLOGY | Facility: CLINIC | Age: 48
End: 2023-08-31
Payer: COMMERCIAL

## 2023-08-31 VITALS
HEART RATE: 75 BPM | SYSTOLIC BLOOD PRESSURE: 118 MMHG | DIASTOLIC BLOOD PRESSURE: 69 MMHG | BODY MASS INDEX: 39.36 KG/M2 | WEIGHT: 251.3 LBS

## 2023-08-31 DIAGNOSIS — E66.811 CLASS 1 OBESITY WITH BODY MASS INDEX (BMI) OF 30.0 TO 30.9 IN ADULT, UNSPECIFIED OBESITY TYPE, UNSPECIFIED WHETHER SERIOUS COMORBIDITY PRESENT: ICD-10-CM

## 2023-08-31 DIAGNOSIS — L73.2 HIDRADENITIS SUPPURATIVA: Primary | ICD-10-CM

## 2023-08-31 PROCEDURE — 99214 OFFICE O/P EST MOD 30 MIN: CPT | Performed by: DERMATOLOGY

## 2023-08-31 NOTE — PROGRESS NOTES
[facility] Dermatology Note  Encounter Date: Aug 31, 2023    Dermatology Problem List:  1. Hidradenitis suppurativa: diagnosed age 11, initially on waistline (used to weigh 300 lbs) which cleared up -> intermittent outbreaks -> for the past 10 years has had in bilateral axilla, groin, thighs, and buttocks.   - Humira, suboxone, prednisone, resorcinol cream  - Flare plan: ILK 60 injections, Prednisone 60 mg x 1 week, 50 mg x 1 week, 40 mg x 1 week (she does not like to taper below 40 mg as she flares)  - I&D x2 to lesions on the R axilla - 8/4/22  - Prior: Xeljanz, infliximab, prednisone taper, clobetasol, topical morphine, ILK, oral antibiotics, rifampin, sirolimus 2 mg (started 4/22/21) finasteride, spironolactone (6/2019- 9/5/19, stopped due to abnormal uterine bleeding), topical morphine gel, and percocet for severe pain, Cosentyx, belbuca  2. Keratosis pilaris  3. L olecranon bursitis.   4. Dermatographis  -  allegra 180mg  5. Eruption NOS,   - punch biopsy 8/5/2021: superficial and deep perivascular and interstitial infiltrate of neutrophils, with focal leukocytoclasis and bluish, amorphous material suggestive of neutrophil degranulation, and lesser amounts of eosinophils and lymphocytes. Background dermal edema is noted. Focal neutrophilic epitheliotropism is noted.  - Resolved  6. Pink linear patches, ddx: follicular dermatitis vs lichen spinulosus vs KP, posterior shoulders   - amlactin, lidex   7. Seborrheic dermatitis  - desonide 0.05% ointment, ketoconazole 2% shampoo      Soc Hx: [place]lots of walking/running around during COVID, now more sedentary at work)     ____________________________________________     Assessment & Plan:  # HS, Stage II, improved from last visit.   - Continue Humira 80 mg weekly, will check humira level  - Continue calcium 1200mg and vitamin D 2000 international unit(s)  - Pt was unable to get  semaglutide. WIll see if insurance will let her try liraglutide.   - Working on getting rinvoq covered by insurance  - Plastic surgery pending (appointment in mid October)  Pain:  - Percocet refills provided  - Is working up to gabapentin 900mg TID  - Marijuana for pain management as needed  - Resorcinol cream twice a day as needed for flares  - Kenalog injections administered today  - Obtain Humira antibody levels      # Seborrheic dermatitis, ears and posterior neck  - Continue desonide 0.05% ointment BID until resolves   - Continue ketoconazole 2% shampoo 2-3 x weekly      # Dermatographism  - continue allegra to 360 mg BID        # Eruption of unclear specification on chest and face under ears: ddx follicular dermatitis vs lichen spinulosus vs KP vs lichen simplex chronicus over underlying dermatographism  - Previous linear patches (ddx follicular dermatitis vs lichen spinulosus vs KP) resolved previously with lidex and amlactin. Unclear if same as previous eruption or new  - Today eruption appear rrsolved.    # Leg swelling  - resolved      Follow-up: 9/14/23 as previoisly scheduled     Staff and Scribe:      Madelyn Cruz, MS3   [facility] [employer]     Staff Physician:  I was present with the medical student who participated in the service and in the documentation of the note. I have verified the history and personally performed the physical exam and medical decision making. I agree with the assessment and plan of care as documented in the note.      Jean Kenny MD    Department of Dermatology  [facility]: Phone: [phone], Fax: [phone]  6/25/2023           ____________________________________________     CC: Derm Problem (Muara is here today for HS flare.)     HPI:  Ms. Klarissa Curtis is a 49 yo female presenting as a follow up for HS. She reports having several new flared nodules,  particularly under her axilla, bilaterally, and her groin. She would like the kenalog injections on active nodules and would like to discuss insurance coverage of treatment options.     Medications:  Current Outpatient Medications   Medication    adalimumab (HUMIRA *CF* PEN-CD/UC/HS STARTER) 80 MG/0.8ML pen kit    ammonium lactate (AMLACTIN) 12 % external cream    calcium carbonate (CALCIUM CARBONATE) 600 MG tablet    calcium carbonate (OS-MERCY) 1500 (600 Ca) MG tablet    clindamycin (CLEOCIN T) 1 % external solution    clobetasol (TEMOVATE) 0.05 % external cream    desonide (DESOWEN) 0.05 % external ointment    DULoxetine (CYMBALTA) 60 MG capsule    fexofenadine (ALLEGRA) 180 MG tablet    fexofenadine (ALLEGRA) 180 MG tablet    fluocinolone acetonide (DERMA SMOOTHE/FS BODY) 0.01 % external oil    fluocinonide (LIDEX) 0.05 % external ointment    fluocinonide (LIDEX) 0.05 % external ointment    FLUoxetine (PROZAC) 40 MG capsule    gabapentin (NEURONTIN) 300 MG capsule    ketoconazole (NIZORAL) 2 % external shampoo    ketoconazole (NIZORAL) 2 % external shampoo    medical cannabis (Patient's own supply)    oxyCODONE-acetaminophen (PERCOCET) 5-325 MG tablet    polyethylene glycol (MIRALAX) 17 GM/Dose powder    Resorcinol POWD    Resorcinol POWD    semaglutide (OZEMPIC) 2 MG/1.5ML SOPN pen    Semaglutide, 1 MG/DOSE, (OZEMPIC) 4 MG/3ML pen    Semaglutide, 2 MG/DOSE, (OZEMPIC) 8 MG/3ML pen    traZODone (DESYREL) 150 MG tablet    traZODone (DESYREL) 50 MG tablet    upadacitinib ER (RINVOQ) 15 MG tablet    VITAMIN D3 50 MCG (2000 UT) tablet     Current Facility-Administered Medications   Medication    triamcinolone acetonide (KENALOG-10) injection 40 mg    triamcinolone acetonide (KENALOG-10) injection 60 mg     Physical Exam:  - L axilla:    - 1 abscess, 1 nodule    - bleeding, pus   - R axilla:     - 1 inflammatory abscess    - several firm nodules of varying sizes scattered over the whole axilla   - L upper shoulder:      - Raised hypopigmented firm nodule (1 cm), not bleeding, not tender   - Upper chest    - irregularly shaped erythematous patch with 3 vesicles    - same kind of rash present on L ear and behind neck  - Upper lumbar back:    - Raised hypopigmented nodule (1 cm), not bleeding, not tender   - L side of abdomen    - old nodule that is flaring again    - crusted over     - painful, minimal pus   - R buttocks: 1 nodule   - R le abscess on inguinal fold, smaller pustules on inner thigh   - L le abscess on labia, 1 inflamed nodule on L inner thigh    Past Medical History:       Patient Active Problem List   Diagnosis    Hidradenitis suppurativa    Fatigue      Past Medical History

## 2023-08-31 NOTE — LETTER
8/31/2023       RE: Klarissa Curtis  [address]  [place] [address]     Dear Colleague,    Thank you for referring your patient, Klarissa Curtis, to the [facility] [place] [facility]. Please see a copy of my visit note below.    [facility] Dermatology Note  Encounter Date: Aug 31, 2023    Dermatology Problem List:  1. Hidradenitis suppurativa: diagnosed age 11, initially on waistline (used to weigh 300 lbs) which cleared up -> intermittent outbreaks -> for the past 10 years has had in bilateral axilla, groin, thighs, and buttocks.   - Humira, suboxone, prednisone, resorcinol cream  - Flare plan: ILK 60 injections, Prednisone 60 mg x 1 week, 50 mg x 1 week, 40 mg x 1 week (she does not like to taper below 40 mg as she flares)  - I&D x2 to lesions on the R axilla - 8/4/22  - Prior: Xeljanz, infliximab, prednisone taper, clobetasol, topical morphine, ILK, oral antibiotics, rifampin, sirolimus 2 mg (started 4/22/21) finasteride, spironolactone (6/2019- 9/5/19, stopped due to abnormal uterine bleeding), topical morphine gel, and percocet for severe pain, Cosentyx, belbuca  2. Keratosis pilaris  3. L olecranon bursitis.   4. Dermatographis  -  allegra 180mg  5. Eruption NOS,   - punch biopsy 8/5/2021: superficial and deep perivascular and interstitial infiltrate of neutrophils, with focal leukocytoclasis and bluish, amorphous material suggestive of neutrophil degranulation, and lesser amounts of eosinophils and lymphocytes. Background dermal edema is noted. Focal neutrophilic epitheliotropism is noted.  - Resolved  6. Pink linear patches, ddx: follicular dermatitis vs lichen spinulosus vs KP, posterior shoulders   - amlactin, lidex   7. Seborrheic dermatitis  - desonide 0.05% ointment, ketoconazole 2% shampoo      Soc Hx: [place] works as an [place] (lots of  walking/running around during COVID, now more sedentary at work)     ____________________________________________     Assessment & Plan:  # HS, Stage II, improved from last visit.   - Continue Humira 80 mg weekly, will check humira level  - Continue calcium 1200mg and vitamin D 2000 international unit(s)  - Pt was unable to get semaglutide. WIll see if insurance will let her try liraglutide.   - Working on getting rinvoq covered by insurance  - Plastic surgery pending (appointment in mid October)  Pain:  - Percocet refills provided  - Is working up to gabapentin 900mg TID  - Marijuana for pain management as needed  - Resorcinol cream twice a day as needed for flares  - Kenalog injections administered today  - Obtain Humira antibody levels      # Seborrheic dermatitis, ears and posterior neck  - Continue desonide 0.05% ointment BID until resolves   - Continue ketoconazole 2% shampoo 2-3 x weekly      # Dermatographism  - continue allegra to 360 mg BID        # Eruption of unclear specification on chest and face under ears: ddx follicular dermatitis vs lichen spinulosus vs KP vs lichen simplex chronicus over underlying dermatographism  - Previous linear patches (ddx follicular dermatitis vs lichen spinulosus vs KP) resolved previously with lidex and amlactin. Unclear if same as previous eruption or new  - Today eruption appear rrsolved.    # Leg swelling  - resolved      Follow-up: 9/14/23 as previoisly scheduled     Staff and Scribe:      Madelyn Cruz, MS3   [facility] [employer]     Staff Physician:  I was present with the medical student who participated in the service and in the documentation of the note. I have verified the history and personally performed the physical exam and medical decision making. I agree with the assessment and plan of care as documented in the note.      Jean Kenny MD    Department of Dermatology  [facility]  [facility]: Phone: [phone], Fax: [phone]  6/25/2023           ____________________________________________     CC: Derm Problem (Maura is here today for HS flare.)     HPI:  Ms. Klarissa Curtis is a 49 yo female presenting as a follow up for HS. She reports having several new flared nodules, particularly under her axilla, bilaterally, and her groin. She would like the kenalog injections on active nodules and would like to discuss insurance coverage of treatment options.     Medications:  Current Outpatient Medications   Medication    adalimumab (HUMIRA *CF* PEN-CD/UC/HS STARTER) 80 MG/0.8ML pen kit    ammonium lactate (AMLACTIN) 12 % external cream    calcium carbonate (CALCIUM CARBONATE) 600 MG tablet    calcium carbonate (OS-MERCY) 1500 (600 Ca) MG tablet    clindamycin (CLEOCIN T) 1 % external solution    clobetasol (TEMOVATE) 0.05 % external cream    desonide (DESOWEN) 0.05 % external ointment    DULoxetine (CYMBALTA) 60 MG capsule    fexofenadine (ALLEGRA) 180 MG tablet    fexofenadine (ALLEGRA) 180 MG tablet    fluocinolone acetonide (DERMA SMOOTHE/FS BODY) 0.01 % external oil    fluocinonide (LIDEX) 0.05 % external ointment    fluocinonide (LIDEX) 0.05 % external ointment    FLUoxetine (PROZAC) 40 MG capsule    gabapentin (NEURONTIN) 300 MG capsule    ketoconazole (NIZORAL) 2 % external shampoo    ketoconazole (NIZORAL) 2 % external shampoo    medical cannabis (Patient's own supply)    oxyCODONE-acetaminophen (PERCOCET) 5-325 MG tablet    polyethylene glycol (MIRALAX) 17 GM/Dose powder    Resorcinol POWD    Resorcinol POWD    semaglutide (OZEMPIC) 2 MG/1.5ML SOPN pen    Semaglutide, 1 MG/DOSE, (OZEMPIC) 4 MG/3ML pen    Semaglutide, 2 MG/DOSE, (OZEMPIC) 8 MG/3ML pen    traZODone (DESYREL) 150 MG tablet    traZODone (DESYREL) 50 MG tablet    upadacitinib ER (RINVOQ) 15 MG tablet    VITAMIN D3 50 MCG (2000 UT) tablet     Current Facility-Administered Medications   Medication     triamcinolone acetonide (KENALOG-10) injection 40 mg    triamcinolone acetonide (KENALOG-10) injection 60 mg     Physical Exam:  - L axilla:    - 1 abscess, 1 nodule    - bleeding, pus   - R axilla:     - 1 inflammatory abscess    - several firm nodules of varying sizes scattered over the whole axilla   - L upper shoulder:     - Raised hypopigmented firm nodule (1 cm), not bleeding, not tender   - Upper chest    - irregularly shaped erythematous patch with 3 vesicles    - same kind of rash present on L ear and behind neck  - Upper lumbar back:    - Raised hypopigmented nodule (1 cm), not bleeding, not tender   - L side of abdomen    - old nodule that is flaring again    - crusted over     - painful, minimal pus   - R buttocks: 1 nodule   - R le abscess on inguinal fold, smaller pustules on inner thigh   - L le abscess on labia, 1 inflamed nodule on L inner thigh    Past Medical History:       Patient Active Problem List   Diagnosis    Hidradenitis suppurativa    Fatigue      Past Medical History

## 2023-08-31 NOTE — NURSING NOTE
Chief Complaint   Patient presents with    Follow Up     HS flare other axilla, left groin, and both thighs.      Tiarra FAN [facility]      Drug Administration Record    Prior to injection, verified patient identity using patient's name and date of birth.  Due to injection administration, patient instructed to remain in clinic for 15 minutes  afterwards, and to report any adverse reaction to me immediately.    Drug Name: triamcinolone acetonide(kenalog)  Dose: 6mL of triamcinolone 10mg/mL, 60mg dose  Route administered: ID  NDC #: Kenalog-10 (3340-8173-89)  Amount of waste(mL):4  Reason for waste: Single use vial    LOT #: [id number]  SITE: see chart note  : [employer]  EXPIRATION DATE: 09/2025

## 2023-09-01 NOTE — TELEPHONE ENCOUNTER
PRIOR AUTHORIZATION DENIED    Medication: HUMIRA PEN 80 MG/0.8ML SC PNKT  Insurance Company: [employer] ([facility]) - Phone [phone] Fax [phone]  Denial Date: 8/31/2023  Denial Rational:  Appeal Information: [phone]  Patient Notified:

## 2023-09-01 NOTE — TELEPHONE ENCOUNTER
Medication Appeal Initiation    We have initiated an appeal for the requested medication:  Medication: HUMIRA PEN 80 MG/0.8ML SC PNKT  Appeal Start Date:  9/1/2023  Insurance Company: [employer] Phone: [phone]  Insurance Fax: [phone]  Comments:  Faxed [phone]

## 2023-09-04 RX ORDER — PEN NEEDLE, DIABETIC 30 GX3/16"
1 NEEDLE, DISPOSABLE MISCELLANEOUS DAILY
Qty: 100 EACH | Refills: 3 | Status: SHIPPED | OUTPATIENT
Start: 2023-09-04 | End: 2023-10-02 | Stop reason: ALTCHOICE

## 2023-09-04 RX ORDER — LIRAGLUTIDE 6 MG/ML
3 INJECTION SUBCUTANEOUS DAILY
Qty: 15 ML | Refills: 6 | Status: SHIPPED | OUTPATIENT
Start: 2023-09-04 | End: 2023-10-02

## 2023-09-04 RX ORDER — LIRAGLUTIDE 6 MG/ML
INJECTION SUBCUTANEOUS
Qty: 13.3 ML | Refills: 0 | Status: SHIPPED | OUTPATIENT
Start: 2023-09-04 | End: 2023-10-02

## 2023-09-05 ENCOUNTER — TELEPHONE (OUTPATIENT)
Dept: DERMATOLOGY | Facility: CLINIC | Age: 48
End: 2023-09-05

## 2023-09-05 NOTE — PATIENT INSTRUCTIONS
Start liraglutide: 0.6mg daily. Increase by 0.6 mg weekly to 3mg daily. Continue 3mg daily until follow-up

## 2023-09-13 NOTE — TELEPHONE ENCOUNTER
PRIOR AUTHORIZATION DENIED    Medication: LIRAGLUTIDE 18 MG/3ML SC SOPN  Insurance Company: [employer] ([facility]) - Phone [phone] Fax [phone]  Denial Date: 9/5/2023  Denial Rational:     Appeal Information:     Patient Notified: No

## 2023-09-15 NOTE — TELEPHONE ENCOUNTER
MEDICATION APPEAL APPROVED    Medication: HUMIRA PEN 80 MG/0.8ML SC PNKT  Authorization Effective Date: 9/13/2023  Authorization Expiration Date: 3/13/2024  Approved Dose/Quantity: 4 per 28 days  Reference #: Key: [id number]   Appeal Insurance Company: [employer]  Expected CoPay: $45.00     CoPay Card Available:    Financial Assistance Needed: no  Which Pharmacy is filling the prescription: [facility] - [place] [address]  Patient Notified: no      Call ref #[id number]

## 2023-09-26 ENCOUNTER — OFFICE VISIT (OUTPATIENT)
Dept: PLASTIC SURGERY | Facility: CLINIC | Age: 48
End: 2023-09-26
Payer: COMMERCIAL

## 2023-09-26 VITALS
RESPIRATION RATE: 16 BRPM | DIASTOLIC BLOOD PRESSURE: 71 MMHG | HEART RATE: 83 BPM | OXYGEN SATURATION: 97 % | SYSTOLIC BLOOD PRESSURE: 121 MMHG | WEIGHT: 248 LBS | HEIGHT: 67 IN | BODY MASS INDEX: 38.92 KG/M2

## 2023-09-26 DIAGNOSIS — L73.2 HIDRADENITIS SUPPURATIVA: Primary | ICD-10-CM

## 2023-09-26 PROCEDURE — G0463 HOSPITAL OUTPT CLINIC VISIT: HCPCS | Performed by: PLASTIC SURGERY

## 2023-09-26 PROCEDURE — 99205 OFFICE O/P NEW HI 60 MIN: CPT | Performed by: PLASTIC SURGERY

## 2023-09-26 RX ORDER — CLINDAMYCIN PHOSPHATE 900 MG/50ML
900 INJECTION, SOLUTION INTRAVENOUS SEE ADMIN INSTRUCTIONS
Status: CANCELLED | OUTPATIENT
Start: 2023-09-26

## 2023-09-26 RX ORDER — CLINDAMYCIN PHOSPHATE 900 MG/50ML
900 INJECTION, SOLUTION INTRAVENOUS
Status: CANCELLED | OUTPATIENT
Start: 2023-09-26

## 2023-09-26 ASSESSMENT — PAIN SCALES - GENERAL: PAINLEVEL: EXTREME PAIN (8)

## 2023-09-26 NOTE — NURSING NOTE
"Oncology Rooming Note    September 26, 2023 8:45 AM   Klarissa Curtis is a 48 year old female who presents for:    Chief Complaint   Patient presents with    Oncology Clinic Visit     Hidradenitis suppurativa     Initial Vitals: /71   Pulse 83   Resp 16   Ht 1.703 m (5' 7.05\")   Wt 112.5 kg (248 lb)   SpO2 97%   BMI 38.79 kg/m   Estimated body mass index is 38.79 kg/m  as calculated from the following:    Height as of this encounter: 1.703 m (5' 7.05\").    Weight as of this encounter: 112.5 kg (248 lb). Body surface area is 2.31 meters squared.  Extreme Pain (8) Comment: Data Unavailable   No LMP recorded.  Allergies reviewed: Yes  Medications reviewed: Yes    Medications: Medication refills not needed today.  Pharmacy name entered into EPIC:    [facility] - [place], [place] - [address] [facility] [address] [place] [facility] [place] [address]  [facility] [place] [address] [place] [address] RD  [id number] - [place], [place] - [address] AT [facility]  [facility] - [place], [place] - [address] [place] [address]    Clinical concerns:  none       Valencia Campoverde CMA            "

## 2023-09-26 NOTE — PROGRESS NOTES
Referring Provider:  Jean Kenny MD  [address] [place] [address]     Primary Care Provider:  Jean Kenny      RE: Klarissa Curtis.  : 1975.  CHRIS: 2023.    Reason for visit: Hidradenitis suppurativa of right axilla    HPI: The patient has been diagnosed with hidradenitis suppurativa since she was age 11.  The years has been treated conservatively and in the last year or so been on Humira and breakthrough ILK and steroids.  She has been treated by Dr. Kenny in dermatology.  The medications though have helped have not resolved her right axilla.  She is not at the point where she would like surgical treatment.    Medical history:  Past Medical History:   Diagnosis Date    NO ACTIVE PROBLEMS        Surgical history:  Past Surgical History:   Procedure Laterality Date    BUNIONECTOMY      x2    OTHER SURGICAL HISTORY      right pointer finger foreign body removal       Family history:  Family History   Problem Relation Age of Onset    Diabetes Sister     Hyperlipidemia Sister     Breast Cancer Mother     Arthritis Mother     Myocardial Infarction Father 47       Medications:  Current Outpatient Medications   Medication Sig Dispense Refill    adalimumab (HUMIRA *CF* PEN-CD/UC/HS STARTER) 80 MG/0.8ML pen kit Inject 0.8 mLs (80 mg) Subcutaneous once a week 8 each 11    ammonium lactate (AMLACTIN) 12 % external cream Apply topically 2 times daily 385 g 11    calcium carbonate (CALCIUM CARBONATE) 600 MG tablet Take 2 tablets (1,200 mg) by mouth daily 180 tablet 11    calcium carbonate (OS-MERCY) 1500 (600 Ca) MG tablet TAKE 2 TABLETS (1,200 MG) BY MOUTH DAILY      clindamycin (CLEOCIN T) 1 % external solution To the scalp 60 mL 1    clobetasol (TEMOVATE) 0.05 % external cream Apply topically 2 times daily 60 g 1    desonide (DESOWEN) 0.05 % external ointment Apply topically 2 times daily 60 g 3    DULoxetine (CYMBALTA) 60 MG capsule       fexofenadine (ALLEGRA) 180 MG tablet  Take 1 tablet (180 mg) by mouth 2 times daily 90 tablet 11    fexofenadine (ALLEGRA) 180 MG tablet Take 1 tablet (180 mg) by mouth 2 times daily 60 tablet 0    fluocinolone acetonide (DERMA SMOOTHE/FS BODY) 0.01 % external oil Apply topically 2 times daily 118.28 mL 11    fluocinonide (LIDEX) 0.05 % external ointment Apply topically 2 times daily 60 g 1    fluocinonide (LIDEX) 0.05 % external ointment Apply topically 2 times daily Apply to itchy bumps on legs twice daily. 60 g 4    FLUoxetine (PROZAC) 40 MG capsule Take 80 mg by mouth daily Will bump up to 60mg by end of week 5/1/20       gabapentin (NEURONTIN) 300 MG capsule Take 3 capsules (900 mg) by mouth 3 times daily 270 capsule 3    Insulin Pen Needle (PEN NEEDLES) 32G X 4 MM MISC 1 each daily 100 each 3    ketoconazole (NIZORAL) 2 % external shampoo Leave in scalp for 3 min, then wash off 120 mL 11    ketoconazole (NIZORAL) 2 % external shampoo Apply topically daily as needed for itching or irritation 120 mL 11    liraglutide (VICTOZA) 18 MG/3ML solution Inject 0.6 mg Subcutaneous daily for 7 days, THEN 1.2 mg daily for 7 days, THEN 1.8 mg daily for 7 days, THEN 2.4 mg daily for 7 days, THEN 2.4 mg daily for 7 days, THEN 3 mg daily for 7 days. 13.3 mL 0    liraglutide (VICTOZA) 18 MG/3ML solution Inject 3 mg Subcutaneous daily for 210 days 15 mL 6    medical cannabis (Patient's own supply) See Admin Instructions (The purpose of this order is to document that the patient reports taking medical cannabis.  This is not a prescription, and is not used to certify that the patient has a qualifying medical condition.)      oxyCODONE-acetaminophen (PERCOCET) 5-325 MG tablet Take 1 tablet by mouth daily as needed for severe pain 28 tablet 0    polyethylene glycol (MIRALAX) 17 GM/Dose powder Take 17 g (1 capful) by mouth daily 507 g 4    Resorcinol POWD Resorcinol 15% in vanicream twice a day 30 g 11    Resorcinol POWD Resorcinol 15% in vanicream twice a day for flares  "30 g 3    Semaglutide, 1 MG/DOSE, (OZEMPIC) 4 MG/3ML pen Inject 1 mg Subcutaneous every 7 days 3 mL 0    Semaglutide, 2 MG/DOSE, (OZEMPIC) 8 MG/3ML pen Inject 2 mg Subcutaneous every 7 days 9 mL 4    traZODone (DESYREL) 150 MG tablet       traZODone (DESYREL) 50 MG tablet Take 50 mg by mouth daily Take 2 tablets at night      upadacitinib ER (RINVOQ) 15 MG tablet Take 15 mg by mouth daily 30 tablet 4    VITAMIN D3 50 MCG (2000 UT) tablet TAKE 1 TABLET BY MOUTH EVERY DAY 90 tablet 1       Allergies:  Allergies   Allergen Reactions    Keflex [Cephalexin] Other (See Comments)     Not true allergy- intolerance    Penicillins Hives, Itching and Rash       Social history:   Social History     Tobacco Use    Smoking status: Every Day     Packs/day: 0.50     Years: 20.00     Pack years: 10.00     Types: Cigarettes    Smokeless tobacco: Never   Substance Use Topics    Alcohol use: Yes     Alcohol/week: 6.0 - 9.0 standard drinks of alcohol     Types: 6 - 9 Standard drinks or equivalent per week     Currently smokes half a pack a day.  Trying to quit.      Physical Examination:  /71   Pulse 83   Resp 16   Ht 1.703 m (5' 7.05\")   Wt 112.5 kg (248 lb)   SpO2 97%   BMI 38.79 kg/m    Body mass index is 38.79 kg/m .    General: No acute distress.    Right axillary hidradenitis suppurativa that is active.  Julian grade 3.  80% of the axilla involved.  No scars on her back.  Skin pinch thickness 3 cm.        ASSESMENT and PLAN:     Based upon the above findings, a diagnosis of Julian grade 3 right axillary hidradenitis suppurativa was made.  I had a rena, detailed discussion with the patient, in the presence of my nurse, who was present from beginning to end.  I discussed with the patient the pathophysiology behind the problem, the concept behind the procedure/treatment proposed, expectations of the planned procedure(s), and all deion-operative steps.     Options of surgery versus continued medical therapy were discussed. "  Surgical therapy of wide excision and either immediate versus staged right latissimus dorsi muscle-sparing musculocutaneous flap was offered.  She wants to proceed with surgery.    All risks, benefits and alternatives, including but not limited to (what applies), pain, infection, bleeding, scarring, asymmetry, seromas, hematomas, wound breakdown, wound dehiscence, requirement of staged flap revisions, wound healing complications of 100%, recurrence, DVT, PE, MI, CVA, pneumonia, renal failure and death, were explained. They were understood and agreed upon by the patient, they were acknowledged by the patient, all the patient's questions were answered in detail to the patient's fullest understanding that they acknowledged, the team approach for treatment in the operating room was agreed upon by the patient, and proceeding with surgery was agreed upon by the patient.    After our discussion, the patient is inclined towards surgical treatment as discussed above.    Her active smoking is a contraindication to proceed currently.  She understood.  She needs to be off of all nicotine products 6 weeks before and 6 weeks after this proposed surgery.  She understood.    All questions were answered. The patient was happy with the visit. I look forward to helping the patient out in the near future as indicated.       Total time spent in the encounter today including chart review, visit itself, and post-visit paperwork was 60 minutes.       Shaniqua Mendoza MD    Chief, Division of Plastic Surgery  Department of Surgery  [facility]      CC: Jean Kenny MD  [address] [place] [address]  CC: Jean Kenny

## 2023-09-28 ENCOUNTER — OFFICE VISIT (OUTPATIENT)
Dept: DERMATOLOGY | Facility: CLINIC | Age: 48
End: 2023-09-28
Payer: COMMERCIAL

## 2023-09-28 VITALS — DIASTOLIC BLOOD PRESSURE: 82 MMHG | WEIGHT: 250.5 LBS | BODY MASS INDEX: 39.18 KG/M2 | SYSTOLIC BLOOD PRESSURE: 125 MMHG

## 2023-09-28 DIAGNOSIS — L73.2 HIDRADENITIS SUPPURATIVA: ICD-10-CM

## 2023-09-28 DIAGNOSIS — F17.200 TOBACCO DEPENDENCE: Primary | ICD-10-CM

## 2023-09-28 PROCEDURE — 11900 INJECT SKIN LESIONS </W 7: CPT | Performed by: DERMATOLOGY

## 2023-09-28 PROCEDURE — 99214 OFFICE O/P EST MOD 30 MIN: CPT | Mod: 25 | Performed by: DERMATOLOGY

## 2023-09-28 RX ORDER — OXYCODONE AND ACETAMINOPHEN 5; 325 MG/1; MG/1
1 TABLET ORAL DAILY
Qty: 28 TABLET | Refills: 0 | Status: SHIPPED | OUTPATIENT
Start: 2023-09-28 | End: 2023-09-28

## 2023-09-28 RX ORDER — ROFLUMILAST 500 UG/1
500 TABLET ORAL DAILY
Qty: 90 TABLET | Refills: 3 | Status: SHIPPED | OUTPATIENT
Start: 2023-09-28 | End: 2023-10-02

## 2023-09-28 RX ORDER — ROFLUMILAST 250 UG/1
TABLET ORAL
Qty: 28 TABLET | Refills: 0 | Status: SHIPPED | OUTPATIENT
Start: 2023-09-28 | End: 2023-10-02

## 2023-09-28 RX ORDER — OXYCODONE AND ACETAMINOPHEN 5; 325 MG/1; MG/1
1 TABLET ORAL DAILY
Qty: 56 TABLET | Refills: 0 | Status: SHIPPED | OUTPATIENT
Start: 2023-09-28 | End: 2023-10-30

## 2023-09-28 NOTE — PROGRESS NOTES
[facility] Dermatology Note  Encounter Date: Sep 28, 2023  Office Visit     Dermatology Problem List:  1. Hidradenitis suppurativa: diagnosed age 11, initially on waistline (used to weigh 300 lbs) which cleared up -> intermittent outbreaks -> for the past 10 years has had in bilateral axilla, groin, thighs, and buttocks.   - Humira, Percocet, resorcinol cream  - Chantix started 9/28/23 for smoking quit date of 11/15/23 before right arm plastic surgery in mid-January 2024  - Flare plan: ILK 60 injections, Prednisone 60 mg x 1 week, 50 mg x 1 week, 40 mg x 1 week (she does not like to taper below 40 mg as she flares)  - I&D x2 to lesions on the R axilla - 8/4/22  - Prior: gabapentin, Xeljanz, infliximab, prednisone taper, clobetasol, topical morphine, ILK, oral antibiotics, rifampin, sirolimus 2 mg (started 4/22/21) finasteride, spironolactone (6/2019- 9/5/19, stopped due to abnormal uterine bleeding), topical morphine gel, and percocet for severe pain, Cosentyx, belbuca  2. Keratosis pilaris  3. L olecranon bursitis.   4. Dermatographis  -  allegra 180mg  5. Eruption NOS,   - punch biopsy 8/5/2021: superficial and deep perivascular and interstitial infiltrate of neutrophils, with focal leukocytoclasis and bluish, amorphous material suggestive of neutrophil degranulation, and lesser amounts of eosinophils and lymphocytes. Background dermal edema is noted. Focal neutrophilic epitheliotropism is noted.  - Resolved  6. Pink linear patches, ddx: follicular dermatitis vs lichen spinulosus vs KP, posterior shoulders   - amlactin, lidex   7. Seborrheic dermatitis  - desonide 0.05% ointment, ketoconazole 2% shampoo     Soc Hx: [place]  of her company has offered to help support with insurance however he can    ____________________________________________    Assessment & Plan:    # HS, Stage II  - Continue Humira 80 mg weekly  - Pt will make lab  appointment for 10/7/23 to get Humira levels checked  - Continue Percocet PRN for pain  - Continue calcium 1200 mg and vitamin D 2000 IUs  - Prior authorizations for semaglutide and liraglutide were denied by insurance. Will continue to work on this.   - Working with medical management team trying to get Rinvoq approved by insurance  - Met with plastic surgery earlier this month and plans to have surgery done mid-January 2024  - Chantix started today with smoking quit date of 11/15/23  Pain:   - Percocet refilled today  - Marijuana for pain PRN  - Has not received resorcinol yet. When she gets it, she can use resorcinol cream BID as needed for flares  - Kenalog injections administered today, procedure note below    # Seborrheic dermatitis, ears and posterior neck  - Moisturize: Recommend good OTC moisturizer to full body, such as Cera-Ve, Heather-cream, or Cetaphil. Advised best to apply after bathing to lock in moisture.  - Monitor: Patient to continue monitoring at home and will contact the clinic for any changes.   - Continue using ketoconazole 2% shampoo 2-3 x weekly    # Dermatographism.    - Continue fexofenadine 360 mg BID     # Previous eruption of unclear specification on chest, back, and ears: ddx follicular dermatitis vs lichen spinulosus vs KP vs lichen simplex chronicus over underlying dermatographism  - Monitor: Patient to continue monitoring at home and will contact the clinic for any changes.   - Today eruption appears resolved, pt applies clobetasol 0.05% prn to chest    Procedures Performed:   - Intra-lesional triamcinolone procedure note. After verbal consent and review of risk of pain and skin thinning/atrophy, positioning and cleansing with isopropyl alcohol, 6 total mL of triamcinolone 10 mg/mL was injected into 4 lesion(s) on the left axilla, 1 lesion on the left vulva, and 1 lesion on the left inner thigh. The patient tolerated the procedure well and left the dermatology clinic in good  condition.  - Procedure(s) performed by faculty with medical student participation.     Follow-up: 8 week(s) virtually (telephone with photos), or earlier for new or changing lesions    Staff and Medical Student:   Pt seen and staffed with Dr. Zoya Obregon, MS3    Staff Physician:  I was present with the medical student who participated in the service and in the documentation of the note. I have verified the history and personally performed the physical exam and medical decision making. I agree with the assessment and plan of care as documented in the note.     Jean Kenny MD    Department of Dermatology  [facility]: Phone: [phone], Fax: [phone]  10/12/2023     ____________________________________________    CC: Derm Problem (Maura is here today for a flare.)    HPI:  Ms. Klarissa Curtis is a(n) 48 year old female who presents today as a return patient for HS    Today she reports several flared nodules in her right axilla that have been bothering her for the 6 days. Her axilla was swollen and hard and she was unable to sleep or move her arm. It feels like hot irons stabbing her in the axilla. She is able to smell the pus and normally her nodules and tunnels drain regularly so this doesn't happen. She has been treating herself for pain with marijuana and ice packs. She used the last 4 Percocet pills that she had, and taking 600-800mg of naproxen did not help.    Currently taking Humira. She hasn't started resorcinol cream that goes through a different pharmacy, she still needs to get it. She takes fexofenadine every day for hives that come from sweating. Not currently taking prednisone, she doesn't like the side effects. Not currently taking suboxone. She took gabapentin for one month and didn't feel anything so she stopped taking it because it was difficult to take 3 pills 3x daily. She applies  clobatesol 0.05% (ordered 6/5/23) for a cutaneous eruption on her chest, only when it flares and is annoying her. She used to have this eruption on her back and earlobes but these areas are now clear.    Patient is otherwise feeling well, without additional skin concerns.    HS Nurse Assessment        7/27/2023     3:42 PM 8/31/2023     2:11 PM 9/28/2023    12:58 PM   Nurse Assessment Data   Over the past 30 days how many old lesions flared back up? 10 10 20   Over the past 30 days how many new lesions did you get? 0 4 0   Over the past week, how many dressing changes do you do each day? 0 3+ 3+   Over the past week, has your wound drainage been: Moderate Severe Severe   Rate your HS overall from 0 - 10 (0 = no disease, 10 = worst) over the past week:  7 7 9   Rate your pain score from 0 - 10 (0 = no disease, 10 = worst) for the most painful/symptomatic lesion in the past week:  7 8 9   Over the past week, how much has HS influenced your quality of life? moderately very much extremely         Physical Exam:  Vitals: /82   Wt 113.6 kg (250 lb 8 oz)   BMI 39.18 kg/m    SKIN: Full skin, which includes the head/face, both arms, chest, back, abdomen,both legs, genitalia and/or groin buttocks, digits and/or nails, was examined.  HS Data      3/2/2023     4:38 PM 5/25/2023     4:01 PM 9/28/2023    12:58 PM   HS Exam Data   LC Type LC1 LC1 LC1   Clinical Subtypes Regular type Regular type Regular type   Acne? No No No   Dissecting Cellulitis? No Yes No   Visual analogue score (0-100) 50 60    Total Julian Stage II II II   Total Inflammatory Nodules 3 3 6   Total Abcesses 0 1 0   Total Draining Tunnels 5 4 3   Total Abscess and Nodule Count 3 4 6   IHS4 Score  23 21 18   Total  HASI Score 40 48    HS-PGA 3 4 4     - No other lesions of concern on areas examined.     Medications:  Current Outpatient Medications   Medication    adalimumab (HUMIRA *CF* PEN-CD/UC/HS STARTER) 80 MG/0.8ML pen kit    ammonium lactate  (AMLACTIN) 12 % external cream    calcium carbonate (CALCIUM CARBONATE) 600 MG tablet    calcium carbonate (OS-MERCY) 1500 (600 Ca) MG tablet    clindamycin (CLEOCIN T) 1 % external solution    clobetasol (TEMOVATE) 0.05 % external cream    desonide (DESOWEN) 0.05 % external ointment    DULoxetine (CYMBALTA) 60 MG capsule    fexofenadine (ALLEGRA) 180 MG tablet    fexofenadine (ALLEGRA) 180 MG tablet    fluocinolone acetonide (DERMA SMOOTHE/FS BODY) 0.01 % external oil    fluocinonide (LIDEX) 0.05 % external ointment    fluocinonide (LIDEX) 0.05 % external ointment    FLUoxetine (PROZAC) 40 MG capsule    gabapentin (NEURONTIN) 300 MG capsule    Insulin Pen Needle (PEN NEEDLES) 32G X 4 MM MISC    ketoconazole (NIZORAL) 2 % external shampoo    ketoconazole (NIZORAL) 2 % external shampoo    liraglutide (VICTOZA) 18 MG/3ML solution    liraglutide (VICTOZA) 18 MG/3ML solution    medical cannabis (Patient's own supply)    oxyCODONE-acetaminophen (PERCOCET) 5-325 MG tablet    oxyCODONE-acetaminophen (PERCOCET) 5-325 MG tablet    polyethylene glycol (MIRALAX) 17 GM/Dose powder    Resorcinol POWD    Resorcinol POWD    roflumilast (DALIRESP) 250 MCG TABS tablet    roflumilast (DALIRESP) 500 MCG TABS tablet    Semaglutide, 1 MG/DOSE, (OZEMPIC) 4 MG/3ML pen    Semaglutide, 2 MG/DOSE, (OZEMPIC) 8 MG/3ML pen    traZODone (DESYREL) 150 MG tablet    traZODone (DESYREL) 50 MG tablet    upadacitinib ER (RINVOQ) 15 MG tablet    VITAMIN D3 50 MCG (2000 UT) tablet     Current Facility-Administered Medications   Medication    triamcinolone acetonide (KENALOG-10) injection 40 mg    triamcinolone acetonide (KENALOG-10) injection 60 mg      Past Medical History:   Patient Active Problem List   Diagnosis    Hidradenitis suppurativa    Fatigue     Past Medical History:   Diagnosis Date    NO ACTIVE PROBLEMS         CC No referring provider defined for this encounter. on close of this encounter.

## 2023-09-28 NOTE — LETTER
9/28/2023       RE: Klarissa Curtis  [address]  [place] [address]     Dear Colleague,    Thank you for referring your patient, Klarissa Curtis, to the [facility]ERMAT[facility] [place] [facility]. Please see a copy of my visit note below.    [id number][facility] Dermatology Note  Encounter Date: Sep 28, 2023  Office Visit     Dermatology Problem List:  1. Hidradenitis suppurativa: diagnosed age 11, initially on waistline (used to weigh 300 lbs) which cleared up -> intermittent outbreaks -> for the past 10 years has had in bilateral axilla, groin, thighs, and buttocks.   - Humira, suboxone, prednisone, resorcinol cream  - Flare plan: ILK 60 injections, Prednisone 60 mg x 1 week, 50 mg x 1 week, 40 mg x 1 week (she does not like to taper below 40 mg as she flares)  - I&D x2 to lesions on the R axilla - 8/4/22  - Prior: Xeljanz, infliximab, prednisone taper, clobetasol, topical morphine, ILK, oral antibiotics, rifampin, sirolimus 2 mg (started 4/22/21) finasteride, spironolactone (6/2019- 9/5/19, stopped due to abnormal uterine bleeding), topical morphine gel, and percocet for severe pain, Cosentyx, belbuca  2. Keratosis pilaris  3. L olecranon bursitis.   4. Dermatographis  -  allegra 180mg  5. Eruption NOS,   - punch biopsy 8/5/2021: superficial and deep perivascular and interstitial infiltrate of neutrophils, with focal leukocytoclasis and bluish, amorphous material suggestive of neutrophil degranulation, and lesser amounts of eosinophils and lymphocytes. Background dermal edema is noted. Focal neutrophilic epitheliotropism is noted.  - Resolved  6. Pink linear patches, ddx: follicular dermatitis vs lichen spinulosus vs KP, posterior shoulders   - amlactin, lidex   7. 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   - Monitor: Patient to continue monitoring at home and will contact the clinic for any changes.  - Continue Humira 80 mg weekly?  - Continue calcium 1200 mg and vitamin D 2000 IUs  - Prior authorizations for semaglutide and liraglutide were denied by insurance  - Plastic surgery consult pending?  Pain:   - Percocet refills?  - Gabapentin?  - Marijuana?  - Resorcinol cream BID as needed for flare  - Kenalog injections administered today    # {Diagnosesderm:848481}.   {kkplans:873817}   - ***     # {Diagnosesderm:424351}.   {kkplans:819024}   - ***     # {Diagnosesderm:863009}.   {kkplans:029943}   - ***     Procedures Performed:   {kkprocedurenotes:263950}  {kkproceduremedstudent:385319}    Follow-up: {kkfollowup:979139}    Staff and Medical Student:     Dani Obregon, MS3    ***  ____________________________________________    CC: Derm Problem    HPI:  Ms. Klarissa Curtis is a(n) 48 year old female who presents today as a return patient for HS    Today she reports several flared nodules in her right axilla. Her axilla was swollen and hard and she was unable to sleep or move her arm. It feels like hot irons stabbing her in the axilla. She is able to smell the pus and she normally drains regularly so this doesn't happen.  Ice, used last 4 Percocet pills, 600-800mg of naproxen did not help    Currently taking Humira, prednisone, applying resorcinol cream prn? Suboxone for pain management.  Hasn't started resorcinol cream that goes through a different pharmacy  Clobatesol on chest only when it is annoying her  Takes fexophenadine every day for hives that come from sweating.  Not currently taking prednisone, she doesn't like the side effects.   Not currently taking suboxone, took gabapentin for one  month and didn't feel anything so she stopped taking it.    Patient is otherwise feeling well, without additional skin concerns.    Labs:  {kkreviewedlabs:977699} reviewed.    Physical Exam:  Vitals: There were no vitals taken for this visit.  SKIN: {kkSkinExam:699533}  - cyst on right inguinal fold  - right axilla  - ***  - No other lesions of concern on areas examined.     Medications:  Current Outpatient Medications   Medication    adalimumab (HUMIRA *CF* PEN-CD/UC/HS STARTER) 80 MG/0.8ML pen kit    ammonium lactate (AMLACTIN) 12 % external cream    calcium carbonate (CALCIUM CARBONATE) 600 MG tablet    calcium carbonate (OS-MERCY) 1500 (600 Ca) MG tablet    clindamycin (CLEOCIN T) 1 % external solution    clobetasol (TEMOVATE) 0.05 % external cream    desonide (DESOWEN) 0.05 % external ointment    DULoxetine (CYMBALTA) 60 MG capsule    fexofenadine (ALLEGRA) 180 MG tablet    fexofenadine (ALLEGRA) 180 MG tablet    fluocinolone acetonide (DERMA SMOOTHE/FS BODY) 0.01 % external oil    fluocinonide (LIDEX) 0.05 % external ointment    fluocinonide (LIDEX) 0.05 % external ointment    FLUoxetine (PROZAC) 40 MG capsule    gabapentin (NEURONTIN) 300 MG capsule    Insulin Pen Needle (PEN NEEDLES) 32G X 4 MM MISC    ketoconazole (NIZORAL) 2 % external shampoo    ketoconazole (NIZORAL) 2 % external shampoo    liraglutide (VICTOZA) 18 MG/3ML solution    liraglutide (VICTOZA) 18 MG/3ML solution    medical cannabis (Patient's own supply)    oxyCODONE-acetaminophen (PERCOCET) 5-325 MG tablet    polyethylene glycol (MIRALAX) 17 GM/Dose powder    Resorcinol POWD    Resorcinol POWD    Semaglutide, 1 MG/DOSE, (OZEMPIC) 4 MG/3ML pen    Semaglutide, 2 MG/DOSE, (OZEMPIC) 8 MG/3ML pen    traZODone (DESYREL) 150 MG tablet    traZODone (DESYREL) 50 MG tablet    upadacitinib ER (RINVOQ) 15 MG tablet    VITAMIN D3 50 MCG (2000 UT) tablet     Current Facility-Administered Medications   Medication    triamcinolone acetonide (KENALOG-10)  injection 40 mg    triamcinolone acetonide (KENALOG-10) injection 60 mg      Past Medical History:   Patient Active Problem List   Diagnosis    Hidradenitis suppurativa    Fatigue     Past Medical History:   Diagnosis Date    NO ACTIVE PROBLEMS         CC No referring provider defined for this encounter. on close of this encounter.      [facility] Dermatology Note  Encounter Date: Sep 28, 2023  Office Visit     Dermatology Problem List:  1. Hidradenitis suppurativa: diagnosed age 11, initially on waistline (used to weigh 300 lbs) which cleared up -> intermittent outbreaks -> for the past 10 years has had in bilateral axilla, groin, thighs, and buttocks.   - Humira, suboxone, prednisone, resorcinol cream  - Flare plan: ILK 60 injections, Prednisone 60 mg x 1 week, 50 mg x 1 week, 40 mg x 1 week (she does not like to taper below 40 mg as she flares)  - I&D x2 to lesions on the R axilla - 8/4/22  - Prior: Xeljanz, infliximab, prednisone taper, clobetasol, topical morphine, ILK, oral antibiotics, rifampin, sirolimus 2 mg (started 4/22/21) finasteride, spironolactone (6/2019- 9/5/19, stopped due to abnormal uterine bleeding), topical morphine gel, and percocet for severe pain, Cosentyx, belbuca  2. Keratosis pilaris  3. L olecranon bursitis.   4. Dermatographis  -  allegra 180mg  5. Eruption NOS,   - punch biopsy 8/5/2021: superficial and deep perivascular and interstitial infiltrate of neutrophils, with focal leukocytoclasis and bluish, amorphous material suggestive of neutrophil degranulation, and lesser amounts of eosinophils and lymphocytes. Background dermal edema is noted. Focal neutrophilic epitheliotropism is noted.  - Resolved  6. Pink linear patches, ddx: follicular dermatitis vs lichen spinulosus vs KP, posterior shoulders   - amlactin, lidex   7. Seborrheic dermatitis  - desonide 0.05% ointment, ketoconazole 2% shampoo     Soc Hx: [place] works as an [place]lots of walking/running around during COVID, now more sedentary at work)    ____________________________________________    Assessment & Plan:    # HS, Stage II, improved?.   - Monitor: Patient to continue monitoring at home and will contact the clinic for any changes.  - Continue Humira 80 mg weekly  - Continue Percocet PRN for pain  - Continue calcium 1200 mg and vitamin D 2000 IUs  - Prior authorizations for semaglutide and liraglutide were denied by insurance  - Plastic surgery pending  Pain:   - Percocet refilled today  - Gabapentin??  - Marijuana for pain PRN  - Has not received resorcinol yet. When she gets it, she can use resorcinol cream BID as needed for flares  - Kenalog injections administered today    # Seborrheic dermatitis.   - Moisturize: Recommend good OTC moisturizer to full body, such as Cera-Ve, Heather-cream, or Cetaphil. Advised best to apply after bathing to lock in moisture.  - Monitor: Patient to continue monitoring at home and will contact the clinic for any changes.   - ***     # {Diagnosesderm:769129}.   {kkplans:049351}   - ***     # {Diagnosesderm:882631}.   {kkplans:510971}   - ***     Procedures Performed:   {kkprocedurenotes:571205}  {kkproceduremedstudent:712172}    Follow-up: {kkfollowup:362909}    Staff and Medical Student:     Dani Obregon, MS3    ***  ____________________________________________    CC: Derm Problem    HPI:  Ms. Klarissa Curtis is a(n) 48 year old female who presents today as a return patient for HS    Today she reports several flared nodules in her right axilla that have been bothering her for the 6 days. Her axilla was swollen and hard and she was unable to sleep or move her arm. It feels like hot irons stabbing her in the axilla. She is able to smell the pus and normally her nodules and tunnels drain regularly so this doesn't happen. She has been treating herself for pain with marijuana and ice packs. She used the last 4 Percocet pills that she  had, and taking 600-800mg of naproxen did not help.    Currently taking Humira. She hasn't started resorcinol cream that goes through a different pharmacy, she still needs to get it. She takes fexofenadine every day for hives that come from sweating. Not currently taking prednisone, she doesn't like the side effects. Not currently taking suboxone. She took gabapentin for one month and didn't feel anything so she stopped taking it. She applies clobatesol 0.05% (ordered 6/5/23) for a cutaneous eruption on her chest, only when it flares and is annoying her. She used to have this eruption on her back and earlobes but these areas are now clear.    Patient is otherwise feeling well, without additional skin concerns.    Labs:  {kkreviewedlabs:103903} reviewed.    Physical Exam:  Vitals: There were no vitals taken for this visit.  SKIN: {kkSkinExam:785291}  - cyst on right inguinal fold  - right axilla  - ***  - No other lesions of concern on areas examined.     Medications:  Current Outpatient Medications   Medication     adalimumab (HUMIRA *CF* PEN-CD/UC/HS STARTER) 80 MG/0.8ML pen kit     ammonium lactate (AMLACTIN) 12 % external cream     calcium carbonate (CALCIUM CARBONATE) 600 MG tablet     calcium carbonate (OS-MERCY) 1500 (600 Ca) MG tablet     clindamycin (CLEOCIN T) 1 % external solution     clobetasol (TEMOVATE) 0.05 % external cream     desonide (DESOWEN) 0.05 % external ointment     DULoxetine (CYMBALTA) 60 MG capsule     fexofenadine (ALLEGRA) 180 MG tablet     fexofenadine (ALLEGRA) 180 MG tablet     fluocinolone acetonide (DERMA SMOOTHE/FS BODY) 0.01 % external oil     fluocinonide (LIDEX) 0.05 % external ointment     fluocinonide (LIDEX) 0.05 % external ointment     FLUoxetine (PROZAC) 40 MG capsule     gabapentin (NEURONTIN) 300 MG capsule     Insulin Pen Needle (PEN NEEDLES) 32G X 4 MM MISC     ketoconazole (NIZORAL) 2 % external shampoo     ketoconazole (NIZORAL) 2 % external shampoo     liraglutide  (VICTOZA) 18 MG/3ML solution     liraglutide (VICTOZA) 18 MG/3ML solution     medical cannabis (Patient's own supply)     oxyCODONE-acetaminophen (PERCOCET) 5-325 MG tablet     polyethylene glycol (MIRALAX) 17 GM/Dose powder     Resorcinol POWD     Resorcinol POWD     Semaglutide, 1 MG/DOSE, (OZEMPIC) 4 MG/3ML pen     Semaglutide, 2 MG/DOSE, (OZEMPIC) 8 MG/3ML pen     traZODone (DESYREL) 150 MG tablet     traZODone (DESYREL) 50 MG tablet     upadacitinib ER (RINVOQ) 15 MG tablet     VITAMIN D3 50 MCG (2000 UT) tablet     Current Facility-Administered Medications   Medication     triamcinolone acetonide (KENALOG-10) injection 40 mg     triamcinolone acetonide (KENALOG-10) injection 60 mg      Past Medical History:   Patient Active Problem List   Diagnosis     Hidradenitis suppurativa     Fatigue     Past Medical History:   Diagnosis Date     NO ACTIVE PROBLEMS         CC No referring provider defined for this encounter. on close of this encounter.    [id number]    Again, thank you for allowing me to participate in the care of your patient.      Sincerely,    Jean Kenny MD

## 2023-09-28 NOTE — NURSING NOTE
Drug Administration Record    Prior to injection, verified patient identity using patient's name and date of birth.  Due to injection administration, patient instructed to remain in clinic for 15 minutes  afterwards, and to report any adverse reaction to me immediately.    A total of 60mg used from 2 vials    Drug Name: triamcinolone acetonide(kenalog)  Dose: 5mL of triamcinolone 10mg/mL, 50mg dose  Route administered: ID  NDC #: Kenalog-40 ([address]-0)  LOT #: [id number]  : [place]  EXPIRATION DATE: 11/2025      Drug Name: triamcinolone acetonide(kenalog)  Dose: 1mL of triamcinolone 10mg/mL, 10mg dose  Route administered: ID  NDC #: Kenalog-40 ([address]-3)  Amount of waste(mL):4 ml  Reason for waste: Single use vial

## 2023-09-28 NOTE — PATIENT INSTRUCTIONS
Will try to add roflumilast 250mg daily for 4 weeks and then 500mg daily after that  Get humira level on Saturday 10/7/23  Chantix prescription put in- quit date 11/15/23  Reach out to [facility]   [facility]  Address: [address] [place] [address], [place] [address]  Phone: [phone]

## 2023-09-29 ENCOUNTER — TELEPHONE (OUTPATIENT)
Dept: DERMATOLOGY | Facility: CLINIC | Age: 48
End: 2023-09-29
Payer: COMMERCIAL

## 2023-09-29 DIAGNOSIS — L73.2 HIDRADENITIS SUPPURATIVA: ICD-10-CM

## 2023-09-29 NOTE — TELEPHONE ENCOUNTER
PRIOR AUTHORIZATION DENIED    Medication: ROFLUMILAST 250 MCG PO TABS  Insurance Company: [employer] ([facility]) - Phone [phone] Fax [phone]  Denial Date: 9/29/2023  Denial Rational:     Appeal Information:       Patient Notified: NO

## 2023-09-29 NOTE — PROGRESS NOTES
Medication Therapy Management (MTM) Encounter    ASSESSMENT:                            Medication Adherence/Access: See below for considerations    Hidradenitis suppurativa/Seborrheic dermatitis:   Needs improvement, per Dr. Kenny would benefit from initiation of Rinvoq pending coverage. MTM to complete appeal letter. Provided education on Rinvoq today including dosing, side effects (both common/serious), precautions, monitoring and time to efficacy. Continue Humira, resent orders today. MTM to discuss plan for roflumilast orders with Zoya. Encouraged indicated non-live vaccines (Covid-19 2023-24 vaccine, annual influenza, Shingrix, Prevnar 20, tetanus booster) and avoidance of live vaccines. Scheduled for pharmacy vaccine appointment - will prioritze receiving Covid-19 & influenza vaccines first, then others in future. Due for annual Quant TB (ordered today) and adalimumab level, CBC, CMP, and lipids per Dr. Kenny. Scheduled for lab only appointment today. Future consideration: recommend repeat CBC and LFT within one month after initiation of Rinvoq.     Obesity:   Needs improvement, patient experiencing progressive weight gain/plateau despite lifestyle interventions. Prescribed Victoza and Ozempic by Dr. Kenny which were denied due to indication for type 2 diabetes. Discussed option to try for coverage of Wegovy (semaglutide) due to its indication for weight loss. Current backorders may also delay initiation. Will prescribe Wegovy 0.25 mg once weekly, with plan to titrate as tolerated if insurance approves coverage.     Pain/Sleep:   Unchanged, continues to have persistent pain with Hidradenitis suppurativa. Limits use of as needed Percocet for pain (prescribed by Zoya).     Anxiety:   Unchanged, patient would like reestablish with therapist. Referral placed today.     Urticaria:   Stable.     Supplement:   Discussed calcium supplementation in excess of 500 mg per dose is associated with a  plateau in absorption. Patient would benefit from taking calcium carbonate 600 mg twice daily vs once daily.     Tobacco Cessation:   Patient continues to use tobacco. Patient is ready to quit using tobacco (planned quit date 11/15/23).  Based on patient & provider (Dr. Kenny) preferences, patient would benefit initiation of Chantix. Order pending, needs sign off from Dr. Kenny.     PLAN:                            Addendum (10/5/23): Spoke with Derm liaisons - Rinvoq appeals have been exhausted and patient does not qualify for  assistance program. Per Dr. Kenny, recommend starting Roflumilast 1 tablet (500 mg) once daily at this time. GoodRx coupon available. All weight management medications are excluded from patients insurance plan - unable to start Wegovy. Sent Mychart to update patient.     Completed PA appeal letter (10/3/23) for Rinvoq & routed to Derm liaisons. Appeal attempts have been exhausted   Start roflumilast 1 tablet (500 mg) once daily. Use GoodRx coupon.   Continue Humira 80 mg once weekly. Resent orders today.   Sent script for Wegovy 0.25 mg once weekly. See telephone encounter (10/2/23) - routed to central PA team for assistance with PA. Plan does not cover weight loss medications.   Scheduled for lab only appointment at [facility] clinic on 10/9/23 at 8:15am.   Due for the following labs: adalimumab level, Quant TB, CBC, lipids, CMP   Scheduled for vaccine appointment at [facility] on 10/9/23 at 9:00am  Receive Covid-19 2023-24 vaccine and annual influenza (avoid FluMist)   Vaccine recommendations for future: Shingrix, Prevnar 20, tetanus booster   Plan to start: Chantix 0.5 mg once daily for three days (days 1-3), then 0.5 mg twice daily for four days (days 4-7), then 1 mg twice daily thereafter.   After Chantix initiation: plan for quit date on Day 8 or flexible quit date between days 8 to 35  Order pending sign off/authorization by   Zoya  Switch to taking calcium carbonate 600 mg tablet twice daily   Referral placed for  Health behavioral therapy. Watch for call to set up an appointment to establish with therapist/counselor.     Follow-up: with me (Nicky Hartman, PharmD) in around 2 weeks (10/16/23) by [email or web address] message to check-in on status of Rinvoq, Chantix and Wegovy. Then schedule 2 month MTM follow-up as needed.     SUBJECTIVE/OBJECTIVE:                          Maura Curtis is a 48 year old female called for an initial visit. She was referred to me from Dr. Kenny.      Reason for visit: initiate Rinvoq with Humira (pending coverage)    Medication Adherence/Access:   [facility] Choice Plus - [facility]   Rinvoq coverage:   - PA & appeals denied    Hidradenitis suppurativa/Seborrheic dermatitis:   - Humira (adalimumab) 80 mg injection: once weekly  - Resorcinol 15% cream: has not started, awaiting to hear from pharmacy ([facility])  - ketoconazole 2% shampoo: 2-3 times per week as needed, for dermatitis   - fluocinonide 0.05% ointment: as needed   - Rinvoq (upadacitinib) 15 mg tablet daily: pending coverage   Side effects: None.    Symptoms: Patient reports right arm is constantly flaring, plans to have surgery on this- in 2024. Also reports HS along left arm which has worsened.   [place] Stage II     Specialist: Dr. Jean Kenny MD, Dermatology. Last visit on 9/28/23. The following was recommended:   - Continue Humira. Labs for Humira levels.  - Rinvoq 15 mg daily (pending coverage)   - Will try to add roflumilast 250mg daily for 4 weeks and then 500mg daily after that   - Flare plan (not currently on): ILK 60 injections, Prednisone 60 mg x 1 week, 50 mg x 1 week, 40 mg x 1 week    Previous treatment:   - Xeljanz 10 mg twice daily: not effective   - infliximab (2019): not effective  - Cosentyx  - ILK injections   - Topical treatments: clobetasol   - Oral treatments: metformin, oral antibiotics, rifampin, sirolimus 2 mg (2021),   "finasteride, spironolactone (6/2019- 9/5/19, stopped due to abnormal uterine bleeding), prednisone taper     Pre-Biologic Screening:   Hep B Surface Antigen Negative (10/30/2018)    Hep B Surface Antibody  Hep B Core Antibody  Reactive for Hep B immunity (10/30/18)    Hep C Antibody  Non-reactive (10/30/18)    HIV Antigen Antibody Negative (10/30/18)    Quantiferon TB Gold Negative (7/21/21)      CBC - checked 6/1/22, ordered for repeat by Zoya  CMP - checked 6/1/22, eGFR >90 mL/min, ordered for repeat by Zoya    Moved from [place] in 2012/2013. [facility] vaccines in [facility] are off due to this.     Immunization History   Administered Status   Covid-19 Booster Due for 2023-24 vaccine   Influenza (annual, 8511-7463) Due for annual , avoid live FluMist   Pneumococcal Due for Prevnar 20     Tetanus/Tdap  Due for booster   Shingrix Due for 2 dose series     Weight Management:   - No current weight loss medications.   Prescribed Ozempic and Victoza, which were denied as patient does not have a Type 2 diabetes diagnosis.   Patient would like to start a medication for weight loss. Reports despite lifestyle interventions including dietary changes & physical activity - patient has experienced progressive weight gain, thinks this is related to prednisone tapers.     Current weight (patient reported): 250 lbs   Wt Readings from Last 5 Encounters:   09/28/23 113.6 kg (250 lb 8 oz)   09/26/23 112.5 kg (248 lb)   08/31/23 114 kg (251 lb 4.8 oz)   06/22/23 111.7 kg (246 lb 2.9 oz)   05/25/23 110.5 kg (243 lb 8 oz)     Estimated body mass index is 39.18 kg/m  as calculated from the following:    Height as of 9/26/23: 1.703 m (5' 7.05\").    Weight as of 9/28/23: 113.6 kg (250 lb 8 oz).    Pain/Sleep:   - oxycodone-acetaminophen daily as needed: limits use to severe pain   - trazodone 150 mg nightly as needed for sleep: limits use   - Medicinal mariajuana as needed   Side effects: None.    Symptoms: Persistent pain related to " Hidradenitis suppurativa. Limits use of Percocet, however patient feels this is the only medication that has been effective for pain.     Previous treatment:   - gabapentin 900 mg three times daily: not effective    - duloxetine: not efective   - topical morphine gel  - Belbuca     Anxiety:   No current medications.   No major concerns with mood at this time, however patient would like to reestablish with therapist. Requests referral today.   Previously was following with therapist. Patient     Urticaria:   - Allegra (fexofenadine) 180 mg twice daily   Side effects: None.  Effectively manages symptoms.     Supplement:   - calcium carb 600 mg - two tablets once daily   - vitamin D3 2000 units daily   No concerns with side effects.     Lab Results   Component Value Date    VITDT 21 11/22/2021    VITDT 19 (L) 04/29/2021     Tobacco Cessation:   No current pharmacotherapies.   Per Dr. Kenny, plan to initiate Chantix for smoking cessation (0.5 mg tab daily for 3 days, then 0.5 mg tab twice daily for 4 days, then 1 mg twice daily).   Patient has not received Chantix. No orders placed.   Quit date: 11/15/23  Patient is motivated to quit smoking as she would like to have plastic surgery on her right arm in Jan/Feb 2024.     Allergies/ADRs: Reviewed in chart  Past Medical History: Reviewed in chart  Tobacco: She reports that she has been smoking cigarettes. She has a 10.00 pack-year smoking history. She has never used smokeless tobacco. (See notes above for quit plan)     ----------------    I spent 50 minutes with this patient today. All changes were made via collaborative practice agreement with Jean Kenny. A copy of the visit note was provided to the patient's provider(s).    A summary of these recommendations was sent via [employer].    Nicky Hartman, PharmD  Medication Therapy Management Pharmacist   [facility] Dermatology    Telemedicine Visit Details  Type of service:  Telephone visit  Start Time:   8:04 AM  End Time: 8:54 AM       Medication Therapy Recommendations  Class 2 severe obesity with serious comorbidity and body mass index (BMI) of 39.0 to 39.9 in adult, unspecified obesity type (H)    Rationale: Untreated condition - Needs additional medication therapy - Indication   Recommendation: Start Medication - Wegovy 0.25 MG/0.5ML Soaj - Start Wegovy 0.25 mg once weekly.   Status: Accepted per CPA         Hidradenitis suppurativa    Rationale: Preventive therapy - Needs additional medication therapy - Indication   Recommendation: Order Vaccine - Moderna COVID-19 Vaccine 100 MCG/0.5ML Susp - Receive Covid-19 2023-24 vaccine, annual influenza, Shingrix, Prevnar 20, tetanus boosters   Status: Patient Agreed - Adherence/Education         Takes dietary supplements    Current Medication: calcium carbonate (OS-MERCY) 1500 (600 Ca) MG tablet   Rationale: Frequency inappropriate - Dosage too low - Effectiveness   Recommendation: Increase Frequency - Switch to taking calcium carbonate 600 mg tablet twice daily   Status: Accepted - no CPA Needed

## 2023-10-01 NOTE — TELEPHONE ENCOUNTER
roflumilast (DALIRESP) 500 MCG TABS tablet     90 tablet 3 9/28/2023 9/28/2023  [facility] Jean Montemayor MD  Dermatology    Routed because: not on protocol

## 2023-10-02 ENCOUNTER — VIRTUAL VISIT (OUTPATIENT)
Dept: RHEUMATOLOGY | Facility: CLINIC | Age: 48
End: 2023-10-02
Attending: DERMATOLOGY
Payer: COMMERCIAL

## 2023-10-02 ENCOUNTER — TELEPHONE (OUTPATIENT)
Dept: DERMATOLOGY | Facility: CLINIC | Age: 48
End: 2023-10-02
Payer: COMMERCIAL

## 2023-10-02 DIAGNOSIS — L50.9 URTICARIA: ICD-10-CM

## 2023-10-02 DIAGNOSIS — E66.01 CLASS 2 SEVERE OBESITY WITH SERIOUS COMORBIDITY AND BODY MASS INDEX (BMI) OF 39.0 TO 39.9 IN ADULT, UNSPECIFIED OBESITY TYPE (H): ICD-10-CM

## 2023-10-02 DIAGNOSIS — L73.2 HIDRADENITIS SUPPURATIVA: Primary | ICD-10-CM

## 2023-10-02 DIAGNOSIS — F17.200 TOBACCO USE DISORDER: ICD-10-CM

## 2023-10-02 DIAGNOSIS — Z78.9 TAKES DIETARY SUPPLEMENTS: ICD-10-CM

## 2023-10-02 DIAGNOSIS — E66.01 CLASS 2 SEVERE OBESITY WITH SERIOUS COMORBIDITY AND BODY MASS INDEX (BMI) OF 39.0 TO 39.9 IN ADULT, UNSPECIFIED OBESITY TYPE (H): Primary | ICD-10-CM

## 2023-10-02 DIAGNOSIS — G47.00 INSOMNIA, UNSPECIFIED TYPE: ICD-10-CM

## 2023-10-02 DIAGNOSIS — F41.9 ANXIETY: ICD-10-CM

## 2023-10-02 DIAGNOSIS — E66.812 CLASS 2 SEVERE OBESITY WITH SERIOUS COMORBIDITY AND BODY MASS INDEX (BMI) OF 39.0 TO 39.9 IN ADULT, UNSPECIFIED OBESITY TYPE (H): Primary | ICD-10-CM

## 2023-10-02 DIAGNOSIS — G89.29 OTHER CHRONIC PAIN: ICD-10-CM

## 2023-10-02 DIAGNOSIS — L21.9 SEBORRHEIC DERMATITIS: ICD-10-CM

## 2023-10-02 DIAGNOSIS — F17.200 NICOTINE DEPENDENCE, UNCOMPLICATED, UNSPECIFIED NICOTINE PRODUCT TYPE: ICD-10-CM

## 2023-10-02 DIAGNOSIS — E66.812 CLASS 2 SEVERE OBESITY WITH SERIOUS COMORBIDITY AND BODY MASS INDEX (BMI) OF 39.0 TO 39.9 IN ADULT, UNSPECIFIED OBESITY TYPE (H): ICD-10-CM

## 2023-10-02 DIAGNOSIS — L73.2 HIDRADENITIS SUPPURATIVA: ICD-10-CM

## 2023-10-02 RX ORDER — ADALIMUMAB 80MG/0.8ML
80 KIT SUBCUTANEOUS WEEKLY
Qty: 3.2 ML | Refills: 5 | Status: SHIPPED | OUTPATIENT
Start: 2023-10-02 | End: 2024-04-25

## 2023-10-02 NOTE — LETTER
10/2/2023       RE: Klarissa Curtis  [address]  [place] [address]     Dear Colleague,    Thank you for referring your patient, Klarissa Curtis, to the [facility] [place] [facility]. Please see a copy of my visit note below.    Medication Therapy Management (MTM) Encounter    ASSESSMENT:                            Medication Adherence/Access: {adherencechoices:642331}    ***:   ***    PLAN:                            ***  Initiate appeal for Rinvoq (upadacitinib) 15 mg daily for Hidradenitis suppurativa (denied 6/19/23)   Wegovy vs Ozempic     Due for Humira level + Quant TB     Chantix -     Rescorsinol cream         Follow-up: {followuptest2:218328}    SUBJECTIVE/OBJECTIVE:                          Maura Curtis is a 48 year old female contacted via secure video for an initial visit. She was referred to me from Dr. Kenny. {mtmvisitdetails:962510}     Reason for visit: initiate Rinvoq with Humira. Rinvoq pending insurance    Medication Adherence/Access: {fumedadherence:063437}  [facility] Choice Plus - [facility]   Rinvoq coverage:   - PA     {MTM SUBJECTIVE (Optional):069248}    Hidradenitis suppurativa:   - Humira (adalimumab) 80 mg injection: once weekly  - Rinvoq (upadacitinib) *** mg tablet ***: ***  - resorcinol ***% cream: ***    Flare plan: ILK 60 injections, Prednisone 60 mg x 1 week, 50 mg x 1 week, 40 mg x 1 week (she does not like to taper below 40 mg as she flares)     Side effects: ***.    Symptoms: ***.  [place] Stage II     Specialist: {Dermatology Providers:642523}, Dermatology. Last visit on ***. The following was recommended:   - Continue Humira. Lab appointment for 10/7/23 to get Humira levels checked   - Continue percocet as needed for pain   - continue calcium 1200 mg & Vitamin D 2000 units   - PA for semaglutide & liraglutide denied   -     Previous treatment:   gabapentin, Xeljanz, infliximab,  prednisone taper, clobetasol, topical morphine, ILK, oral antibiotics, rifampin, sirolimus 2 mg (started 4/22/21) finasteride, spironolactone (6/2019- 9/5/19, stopped due to abnormal uterine bleeding), topical morphine gel, and percocet for severe pain, Cosentyx, belbuca     Pre-Biologic Screening: 10/30/2018 - from outside lab system, see faxed in orders   Hep B Surface Antigen Negative    Hep B Surface Antibody  Hep B Core Antibody  Reactive, Immunity (***)    Hep C Antibody  {DERM RESULTS:296142} (***)    HIV Antigen Antibody {DERM RESULTS:315916} (***)    Quantiferon TB Gold Negative (7/21/21)      CBC - checked ***  CMP - checked ***, eGFR *** mL/min, liver panel ***    All patients on biologics should avoid live vaccines (varicella/VZV, intranasal influenza, MMR, or yellow fever vaccine (if traveling))      Immunization History   Administered Status   Covid-19 Booster {Status:629478}   Hepatitis B {Status:868190}   Influenza (annual, 8544-0586) {Status:483019}, avoid live FluMist   Pneumococcal  Prevnar-13: ***  Pneumovax-23: ***   Prevnar-20: *** {Status:858895}     Tetanus/Tdap  {Status:469501}   Shingrix {Status:031897}   RSV (only for = 60 years old)  {Status:929832}     Seborrheic dermatitis:   - desonide 0.05% ointment  - ketoconazole 2% shampoo   Side effects: ***.    Symptoms: ***.    Pain:   - oxycodone-acetaminophen ***  -   Side effects: ***.    Symptoms: ***.  Pain related to Hidradenitis suppurativa.     Previous treatment: gabapentin     Allergies:   - Allegra (fexofenadine) 180 mg twice daily   Side effects: ***.  Symptoms: ***.       Allergies/ADRs: {1/2/3/4/5:512131}  Past Medical History: {1/2/3/4/5:087580}  Tobacco: She reports that she has been smoking cigarettes. She has a 10.00 pack-year smoking history. She has never used smokeless tobacco.  Nicotine/Tobacco Cessation Plan:   {Nicotine/Tobacco Cessation Plan:639367}  Chantix started 9/28/23 for smoking quit date of 11/15/23 before right  "arm plastic surgery in mid-January 2024       Goal date would be 11/15/23, hoping for nathanegery in Jan or Feb     Alcohol: {ALCOHOL CONSUMPTION HX:401481}  {Social and Goals:272026}    ----------------  {JAVIER?:223543}    I spent {mt total time 3:120898} with this patient today{MTMpartdbillingquestion:977576}. { :550942}. A copy of the visit note was provided to the patient's provider(s).    A summary of these recommendations {GIVEN/NOT GIVEN:659358}.    ***    Telemedicine Visit Details  Type of service:  {[facility]:764436::\"Telephone visit\"}  Start Time: {video/phone visit start time:152948}  End Time: {video/phone visit end time:152948}       Medication Therapy Recommendations  No medication therapy recommendations to display       Again, thank you for allowing me to participate in the care of your patient.      Sincerely,    Nicky Hartman [facility]    "

## 2023-10-02 NOTE — TELEPHONE ENCOUNTER
Routing orders for Wegovy to central PA team. Please initiate PA if required by insurance & route any communications/updates back to Nicky Hartman Rph.     Nicky Hartman, PharmD  Medication Therapy Management Pharmacist   [facility]

## 2023-10-02 NOTE — TELEPHONE ENCOUNTER
MTM to complete appeal for Rinvoq for Hidradenitis suppurativa.    Nicky Hartman, PharmD  Medication Therapy Management Pharmacist   [facility]

## 2023-10-02 NOTE — Clinical Note
Met with this patient for an MTM visit. The referral mentioned starting Rinvoq (upadacitinib) with Humira - I will work on this appeal letter. However, I'm wondering what the plan is for roflumilast? The patient said that you weren't pursuing this (denied by insurance), so I removed from the med list. However, your 9/28/23 note mentions possibly starting this - do you need me to reorder this?   Also, I sent an order for Wegovy vs Ozempic (both semaglutide just different indications), we'll see if her insurance covers weight loss meds they may not.    Lastly, it looks like the med student you were working with last week pended an order for the Chantix. It is still awaiting approval/sign off from you. However, if you'd prefer I just send the Chantix script let me know & I can do that.   Thanks!  Nicky Hartman, LorenD

## 2023-10-02 NOTE — PATIENT INSTRUCTIONS
Recommendations from today's [facility] visit:                                                       I will complete a prior authorization apeal letter for Rinvoq & work on this with our Dermatology liaisions  Pending coverage: start Rinvoq (upadacitinib) 15 mg tablet once daily   We will also repeat some labs around 1 month after starting this (CBC & liver function)     Continue Humira 80 mg once weekly.     I sent a prescription for Wegovy (semaglutide) 0.25 mg subcutaneous injection once weekly.  I will work with our prior authorization team to see if we can get this started. Please let me know if you hear that this was approved from your end.     Scheduled for lab only appointment at [facility] clinic on 10/9/23 at 8:15am   Due for the following labs: adalimumab level, Quant TB, CBC, lipids, CMP     Scheduled for vaccine appointment at [facility] on 10/9/23 at 9:00am  Received Covid-19 2023-24 vaccine and annual influenza (avoid FluMist)   Vaccine recommendations for future: Shingrix, Prevnar 20, tetanus booster     Plan to start: Chantix (varenicline) 0.5 mg once daily for three days (days 1-3), then 0.5 mg twice daily for four days (days 4-7), then 1 mg twice daily thereafter.   Plan for quit date on Day 8 after Chantix initiation or flexible quit date between days 8 to 35 on Chantix  This order will be sent soon. Dr. Kenny just needs to sign off on it. Please reach out to me if you don't receive a prescription for this by the end of the week.     Switch to taking calcium carbonate 600 mg tablet twice daily     Referral placed for [facility] behavioral therapy. Watch for call to set up an appointment to establish with therapist/counselor.     Follow-up: with me (Nicky Hartman, PharmD) in around 2 weeks (10/16/23) by [employer] message to check-in on status of Rinvoq, Chantix and Wegovy. Then we can discuss scheduling a 2 month [facility] follow-up appointment moving forward.     It was great  "speaking with you today.  I value your experience and would be very thankful for your time in providing feedback in our clinic survey. In the next few days, you may receive an email or text message from [facility] [employer] with a link to a survey related to your  clinical pharmacist.\"     To schedule another MTM appointment, please call the clinic directly or you may call the MTM scheduling line at [phone] or toll-free at [phone].     My Clinical Pharmacist's contact information:                                                      Please feel free to contact me with any questions or concerns you have.      Nicky Hartman, PharmD  Medication Therapy Management Pharmacist   [facility] Dermatology     "

## 2023-10-03 NOTE — TELEPHONE ENCOUNTER
See attached prior authorization appeal letter for Rinvoq (upadacitinib). Routing to Derm liaison for submission to insurance. Thank you!    Nicky Hartman, PharmD  Medication Therapy Management Pharmacist   [facility]

## 2023-10-04 RX ORDER — ROFLUMILAST 500 UG/1
500 TABLET ORAL DAILY
Qty: 90 TABLET | Refills: 3 | Status: SHIPPED | OUTPATIENT
Start: 2023-10-04 | End: 2023-10-10

## 2023-10-04 NOTE — TELEPHONE ENCOUNTER
PRIOR AUTHORIZATION DENIED    Medication: Wegovy - PA initiation    Denial Date: 10/4/2023    Denial Rational: Benefit exclusion, medication is not covered under plan            Appeal Information: Benefit exclusion. Patient may get medication but will be responsible for cost

## 2023-10-04 NOTE — TELEPHONE ENCOUNTER
Central Prior Authorization Team   Phone: [phone]    PA Initiation    Medication: Wegovy - PA initiation  Insurance Company: [email or web address] ([facility]) - Phone [phone] Fax [phone]  Pharmacy Filling the Rx: [facility] - [place], [place] - [address]  Filling Pharmacy Phone: [phone]  Filling Pharmacy Fax:    Start Date: 10/4/2023

## 2023-10-05 ENCOUNTER — MYC MEDICAL ADVICE (OUTPATIENT)
Dept: RHEUMATOLOGY | Facility: CLINIC | Age: 48
End: 2023-10-05
Payer: COMMERCIAL

## 2023-10-05 DIAGNOSIS — L73.2 HIDRADENITIS SUPPURATIVA: ICD-10-CM

## 2023-10-05 NOTE — TELEPHONE ENCOUNTER
Sent Mychart to patient to update her that insurance plan excludes coverage of weight loss medications. She also does not qualify for coverage of Ozempic or Victoza since she does not have a Type 2 diabetes diagnosis.     Nicky Hartman, LorenD

## 2023-10-06 ENCOUNTER — TELEPHONE (OUTPATIENT)
Dept: PLASTIC SURGERY | Facility: CLINIC | Age: 48
End: 2023-10-06
Payer: COMMERCIAL

## 2023-10-06 NOTE — TELEPHONE ENCOUNTER
Left voicemail for patient regarding scheduling surgery with Dr. Mendoza.    Provided direct contact number to discuss.    P: [phone]    __    Archana Ortez, Senior Perioperative Coordinator, on 10/6/2023 at 4:41 PM

## 2023-10-09 ENCOUNTER — LAB (OUTPATIENT)
Dept: LAB | Facility: CLINIC | Age: 48
End: 2023-10-09
Payer: COMMERCIAL

## 2023-10-09 DIAGNOSIS — L73.2 HIDRADENITIS SUPPURATIVA: ICD-10-CM

## 2023-10-09 DIAGNOSIS — Z79.899 ENCOUNTER FOR LONG-TERM (CURRENT) USE OF HIGH-RISK MEDICATION: ICD-10-CM

## 2023-10-09 LAB
ALBUMIN SERPL BCG-MCNC: 3.7 G/DL (ref 3.5–5.2)
ALBUMIN UR-MCNC: NEGATIVE MG/DL
ALP SERPL-CCNC: 61 U/L (ref 35–104)
ALT SERPL W P-5'-P-CCNC: 10 U/L (ref 0–50)
ANION GAP SERPL CALCULATED.3IONS-SCNC: 7 MMOL/L (ref 7–15)
APPEARANCE UR: CLEAR
AST SERPL W P-5'-P-CCNC: 12 U/L (ref 0–45)
BACTERIA #/AREA URNS HPF: ABNORMAL /HPF
BASO+EOS+MONOS # BLD AUTO: NORMAL 10*3/UL
BASO+EOS+MONOS NFR BLD AUTO: NORMAL %
BASOPHILS # BLD AUTO: 0 10E3/UL (ref 0–0.2)
BASOPHILS NFR BLD AUTO: 0 %
BILIRUB SERPL-MCNC: 0.4 MG/DL
BILIRUB UR QL STRIP: NEGATIVE
BUN SERPL-MCNC: 9.5 MG/DL (ref 6–20)
CALCIUM SERPL-MCNC: 8.7 MG/DL (ref 8.6–10)
CHLORIDE SERPL-SCNC: 103 MMOL/L (ref 98–107)
CHOLEST SERPL-MCNC: 207 MG/DL
COLOR UR AUTO: YELLOW
CREAT SERPL-MCNC: 0.64 MG/DL (ref 0.51–0.95)
DEPRECATED HCO3 PLAS-SCNC: 29 MMOL/L (ref 22–29)
EGFRCR SERPLBLD CKD-EPI 2021: >90 ML/MIN/1.73M2
EOSINOPHIL # BLD AUTO: 0.2 10E3/UL (ref 0–0.7)
EOSINOPHIL NFR BLD AUTO: 2 %
ERYTHROCYTE [DISTWIDTH] IN BLOOD BY AUTOMATED COUNT: 12.7 % (ref 10–15)
GLUCOSE SERPL-MCNC: 87 MG/DL (ref 70–99)
GLUCOSE UR STRIP-MCNC: NEGATIVE MG/DL
HCT VFR BLD AUTO: 41.9 % (ref 35–47)
HDLC SERPL-MCNC: 75 MG/DL
HGB BLD-MCNC: 13.4 G/DL (ref 11.7–15.7)
HGB UR QL STRIP: ABNORMAL
IMM GRANULOCYTES # BLD: 0 10E3/UL
IMM GRANULOCYTES NFR BLD: 0 %
KETONES UR STRIP-MCNC: NEGATIVE MG/DL
LDLC SERPL CALC-MCNC: 105 MG/DL
LEUKOCYTE ESTERASE UR QL STRIP: NEGATIVE
LYMPHOCYTES # BLD AUTO: 3.5 10E3/UL (ref 0.8–5.3)
LYMPHOCYTES NFR BLD AUTO: 40 %
MCH RBC QN AUTO: 30.7 PG (ref 26.5–33)
MCHC RBC AUTO-ENTMCNC: 32 G/DL (ref 31.5–36.5)
MCV RBC AUTO: 96 FL (ref 78–100)
MONOCYTES # BLD AUTO: 0.6 10E3/UL (ref 0–1.3)
MONOCYTES NFR BLD AUTO: 6 %
NEUTROPHILS # BLD AUTO: 4.4 10E3/UL (ref 1.6–8.3)
NEUTROPHILS NFR BLD AUTO: 51 %
NITRATE UR QL: NEGATIVE
NONHDLC SERPL-MCNC: 132 MG/DL
PH UR STRIP: 5.5 [PH] (ref 5–7)
PLATELET # BLD AUTO: 438 10E3/UL (ref 150–450)
POTASSIUM SERPL-SCNC: 4.3 MMOL/L (ref 3.4–5.3)
PROT SERPL-MCNC: 7 G/DL (ref 6.4–8.3)
RBC # BLD AUTO: 4.37 10E6/UL (ref 3.8–5.2)
RBC #/AREA URNS AUTO: ABNORMAL /HPF
SODIUM SERPL-SCNC: 139 MMOL/L (ref 135–145)
SP GR UR STRIP: 1.01 (ref 1–1.03)
SQUAMOUS #/AREA URNS AUTO: ABNORMAL /LPF
TRIGL SERPL-MCNC: 136 MG/DL
UROBILINOGEN UR STRIP-ACNC: 0.2 E.U./DL
WBC # BLD AUTO: 8.7 10E3/UL (ref 4–11)
WBC #/AREA URNS AUTO: ABNORMAL /HPF

## 2023-10-09 PROCEDURE — 80145 DRUG ASSAY ADALIMUMAB: CPT | Mod: 90

## 2023-10-09 PROCEDURE — 36415 COLL VENOUS BLD VENIPUNCTURE: CPT

## 2023-10-09 PROCEDURE — 86481 TB AG RESPONSE T-CELL SUSP: CPT

## 2023-10-09 PROCEDURE — 80061 LIPID PANEL: CPT

## 2023-10-09 PROCEDURE — 81001 URINALYSIS AUTO W/SCOPE: CPT

## 2023-10-09 PROCEDURE — 82397 CHEMILUMINESCENT ASSAY: CPT | Mod: 90

## 2023-10-09 PROCEDURE — 80053 COMPREHEN METABOLIC PANEL: CPT

## 2023-10-09 PROCEDURE — 99000 SPECIMEN HANDLING OFFICE-LAB: CPT

## 2023-10-09 PROCEDURE — 85025 COMPLETE CBC W/AUTO DIFF WBC: CPT

## 2023-10-10 RX ORDER — ROFLUMILAST 500 UG/1
500 TABLET ORAL DAILY
Qty: 90 TABLET | Refills: 3 | Status: SHIPPED | OUTPATIENT
Start: 2023-10-10 | End: 2024-05-06

## 2023-10-10 NOTE — TELEPHONE ENCOUNTER
Resents orders from Dr. Kenny for roflumilast to [facility] in [place] per patient request. Provided with [employer] card.     Nicky Hartman, PharmD  Medication Therapy Management Pharmacist   [facility]

## 2023-10-10 NOTE — TELEPHONE ENCOUNTER
RN Care Coordinator: Jaqueline Looney    Surgery is scheduled with Dr. Mendoza  Date: 2/8   Location: [facility]      H&P to be completed by: PAC clinic on 1/17     Surgical consult: 1/17 with Ana Paula Cuenca [facility]    Post-op: 2/14 with Ana Paula Cuenca [facility]      Patient will receive surgery arrival and start time from PAC.       Spoke with the patient and was able to confirm all scheduled information.       Patient questions/concerns: N/A       Surgery packet: to be sent via US mail and via [employer]    __    Archana Ortez, Senior Perioperative Coordinator, on 10/10/2023 at 2:18 PM  P: [phone]

## 2023-10-11 LAB
GAMMA INTERFERON BACKGROUND BLD IA-ACNC: 0.02 IU/ML
M TB IFN-G BLD-IMP: NEGATIVE
M TB IFN-G CD4+ BCKGRND COR BLD-ACNC: 5.29 IU/ML
MITOGEN IGNF BCKGRD COR BLD-ACNC: 0.02 IU/ML
MITOGEN IGNF BCKGRD COR BLD-ACNC: 0.02 IU/ML
QUANTIFERON MITOGEN: 5.31 IU/ML
QUANTIFERON NIL TUBE: 0.02 IU/ML
QUANTIFERON TB1 TUBE: 0.04 IU/ML
QUANTIFERON TB2 TUBE: 0.04

## 2023-10-11 NOTE — TELEPHONE ENCOUNTER
FUTURE VISIT INFORMATION      SURGERY INFORMATION:  Date: 2/8/24  Location: [facility]  Surgeon:  BORIS Mendoza MD   Anesthesia Type:  general  Procedure: Right axillary wound excision and regional flap reconstruction, SPY   RECORDS REQUESTED FROM:       Primary Care Provider: Jean Kenny MD

## 2023-10-11 NOTE — TELEPHONE ENCOUNTER
Closing encounter. Not able to get Victoza as patient does not have Type 2 diabetes.     Nicky Hartman, PharmD

## 2023-10-12 ENCOUNTER — TELEPHONE (OUTPATIENT)
Dept: DERMATOLOGY | Facility: CLINIC | Age: 48
End: 2023-10-12

## 2023-10-12 LAB
ADALIMUMAB NAB TITR SER BIOASSAY: NOT DETECTED {TITER}
ADALIMUMAB SERPL-MCNC: 10.71 UG/ML
PATHOLOGY STUDY: NORMAL

## 2023-10-12 RX ORDER — VARENICLINE TARTRATE 1 MG/1
1 TABLET, FILM COATED ORAL 2 TIMES DAILY
Qty: 60 TABLET | Refills: 4 | Status: SHIPPED | OUTPATIENT
Start: 2023-10-12 | End: 2023-11-20

## 2023-10-12 RX ORDER — ROFLUMILAST 250 UG/1
TABLET ORAL
Qty: 28 TABLET | Refills: 0 | Status: SHIPPED | OUTPATIENT
Start: 2023-10-12 | End: 2023-12-28

## 2023-10-12 RX ORDER — VARENICLINE TARTRATE 0.5 (11)-1
KIT ORAL
Qty: 53 TABLET | Refills: 0 | Status: SHIPPED | OUTPATIENT
Start: 2023-10-12 | End: 2023-11-20

## 2023-10-12 RX ORDER — ROFLUMILAST 250 UG/1
TABLET ORAL
Qty: 28 TABLET | Refills: 0 | Status: SHIPPED | OUTPATIENT
Start: 2023-10-12 | End: 2023-10-12

## 2023-10-12 RX ORDER — ROFLUMILAST 500 UG/1
500 TABLET ORAL DAILY
Qty: 90 TABLET | Refills: 3 | Status: SHIPPED | OUTPATIENT
Start: 2023-10-12 | End: 2023-11-20

## 2023-10-12 RX ORDER — ROFLUMILAST 500 UG/1
500 TABLET ORAL DAILY
Qty: 90 TABLET | Refills: 3 | Status: SHIPPED | OUTPATIENT
Start: 2023-10-12 | End: 2023-10-12

## 2023-10-12 NOTE — TELEPHONE ENCOUNTER
PRIOR AUTHORIZATION DENIED    Medication: VARENICLINE TARTRATE (STARTER) 0.5 MG X 11 & 1 MG X 42 PO TBPK  Insurance Company: [employer] ([facility]) - Phone [phone] Fax [phone]  Denial Date: 10/12/2023  Denial Rational:     Appeal Information:       Patient Notified: NO

## 2023-10-16 ENCOUNTER — TELEPHONE (OUTPATIENT)
Dept: DERMATOLOGY | Facility: CLINIC | Age: 48
End: 2023-10-16
Payer: COMMERCIAL

## 2023-10-16 NOTE — TELEPHONE ENCOUNTER
Prior Authorization Retail Medication Request    Medication/Dose: roflumilast (DALIRESP) 500 MCG TABS tablet  ICD code (if different than what is on RX):  see chart  Previously Tried and Failed:  see chart  Rationale:  see chart    Insurance Name: [employer]    Insurance ID:  [id number]

## 2023-10-17 NOTE — TELEPHONE ENCOUNTER
Central Prior Authorization Team   Phone: [phone]    PA Initiation    Medication: ROFLUMILAST 500 MCG PO TABS  Insurance Company: [employer] ([facility]) - Phone [phone] Fax [phone]  Pharmacy Filling the Rx: [facility] [address] [place], [place] - [address]  Filling Pharmacy Phone: [phone]  Filling Pharmacy Fax:    Start Date: 10/17/2023

## 2023-10-21 ENCOUNTER — TELEPHONE (OUTPATIENT)
Dept: DERMATOLOGY | Facility: CLINIC | Age: 48
End: 2023-10-21
Payer: COMMERCIAL

## 2023-10-21 DIAGNOSIS — L73.2 HIDRADENITIS SUPPURATIVA: ICD-10-CM

## 2023-10-21 RX ORDER — UPADACITINIB 15 MG/1
15 TABLET, EXTENDED RELEASE ORAL DAILY
Qty: 30 TABLET | Refills: 4 | OUTPATIENT
Start: 2023-10-21 | End: 2023-11-20

## 2023-10-21 NOTE — LETTER
October 21, 2023    ATTN: Appeals & Edwige                                      Re: Prior Authorization Request   Plan: [employer]                                               Patient: Klarissa Curtis  Member ID: [id number]                                  YOB: 1975        To whom it may concern:     I am contacting you as a dermatologist caring for Klarissa Curtis with regard to the medical necessity of Rinvoq (upadacitinib) to treat the patient's diagnosis of Hidradenitis Suppurativa (L73.2).     Treatment requested: Rinvoq (upadacitinib)   Requested Dose: 15 mg tablet once daily   Prior treatments tried (with limited long-term response):   - infliximab infusion every 4 weeks  - Cosentyx (secukinumab) 300 mg every 28 days  - Xeljanz (tofacitinib) 10 mg twice daily  - Rapamune (sirolimus) 2 mg daily   - Intralesional kenalog injections intermittently and oral prednisone tapers   - Hormonal agents: spironolactone (stopped due to abnormal uterine bleeding), metformin, finasteride  - Oral antibiotics: doxycyline, dapsone, clindamycin plus rifampin  - Topical therapies: clindamycin 1% solution, clobetasol 0.05% cream, desonide 0.05%      Evidence of disease severity: Julian stage II     I recently prescribed this patient Rinvoq (upadacitinib), however the medication was denied. I have reviewed the patient's diagnosis, care plan and clinical guidelines for treatment and request a formal appeal of your denial for Rinvoq (upadacitinib).      I am writing to highlight the severe nature of this patient's condition. They have suffered from hidradenitis suppurativa (HS) for many years and have extensive disease affecting the bilateral axilla, groin, thighs, and buttocks. The severity of their skin condition has affected their life in many ways including but not limited to chronic pain, ability to perform activities of daily living, missed work/disability. They have tried numerous treatments listed above,  as well as current treatment with Humira (adalimumab), the only FDA-approved treatment for moderate-to-severe HS. However, they have had inadequate clinical response with their refractory condition. Due to the severity of their condition and the significant impact this disease has had on their quality of life, I strongly believe this patient needs access to Rinvoq (upadacitinib).      HS is a chronic inflammatory skin condition that primarily affects intertriginous areas (axilla, groin, inframammary skin, gluteal cleft) and is characterized by recurrent painful, malodorous nodules and abscesses that can lead to scarring, sinus tracts, and fistulas overtime. HS has a devastating impact on quality of life (1), contributing to chronic pain (2,3), poor mental health (4), substance use disorder (5), impaired intimate relationships (6,7), and suicide rates 2.4 times that of the general population (8). Finding the most effective treatment for each patient is extremely important for restoring quality of life and preventing long-term complications that arise from long-standing, poorly controlled disease.     My recommendation for using upatacitinib to treat this patient's HS is based on:  The patient's inadequate clinical response to inhibition of TNF-alpha alone  Scientific rationale and emerging clinical evidence. HS has known disease signatures associated with elevated FREDIS/STAT signaling, similar to other conditions such as inflammatory bowel disease and psoriatic arthritis for which upadacitinib is currently approved. Upatacitinib is a Janus kinase (FREDIS) inhibitor, which interferes with the FREDIS-STAT signaling pathway, thus suppressing inflammatory cytokines that are implicated in HS pathogenesis (9). Recently, a phase 2 placebo-controlled study examined the efficacy and safety of upadacitinib in the treatment of moderate-to-severe Hidradenitis suppurativa (10,11). The study demonstrated a durable clinical improvement in  HS with a statistically greater proportion of patients in the upadacitinib group achieving a ?50% reduction in abscess and inflammatory nodule count without an increase in abscess or draining fistula count (Hidradenitis Suppurativa Clinical Response [HiSCR]), as well as a reduction from baseline in pain (10,11). In addition, a phase 3 trial to further evaluate upadacitinib in moderate-to-severe Hidradenitis suppurativa is currently underway (12). Furthermore, FREDIS inhibitors are being described as an emerging medical treatment for HS (13) and there are clinical trials currently evaluating the safety and efficacy of FREDIS inhibitors for the management of moderate to severe HS.   In my professional experience as an HS expert I have found FREDIS inhibition helpful for some of my patients with severe HS.      In my clinical opinion as a dermatologist, supported by the medical literature described above, that the combination of prior failed treatments, medical comorbidities, and contraindications listed above, the Rinvoq (upadacitinib) is the most appropriate next step in treating this patient's HS and restoring their quality of life.       On behalf of Klarissa Curtis, I would be grateful for your prompt approval of this medication. Please let me know if I can provide any further clarification or assistance that will help result in coverage of this medication. I am happy to discuss this further and would appreciate the chance to do so if this medication is not approved. Thank you for your time and consideration.       Sincerely,     Jean Kenny MD, FAAD, FACP     Departments of Internal Medicine and Dermatology  [facility]  [phone]    Office Contact:   Archana Agosto  Pharmacy Liaison Dermatology  P: [phone]  F: [phone]  [email or web address]          References:  (1) Julio P, Jazlyn A, Grover K, Gus P, Joan J. Quality of life impairment in hidradenitis  suppurativa: a study of 61 cases. J Am Acad Dermatol. 2007;56(4):621-3. PMID: 67695413  (2) Cam IH, Refugio M, Yesy KRAMER, et al. Uncovering burden disparity: A comparative analysis of the impact of moderate-to-severe psoriasis and hidradenitis suppurativa. J Am Acad Dermatol. 2017;77(6):1038-46. PMID: 48147741  (3) Hans ZS, Tera LK, Jon DC, et al. Pain, Psychological Comorbidities, Disability, and Impaired Quality of Life in Hidradenitis Suppurativa [corrected]. Curr Pain Headache Rep. 2017;21(12):49. PMID: 51726466  (4) Jeff AJ, van sarah Ciara HH, Ines S, et al. Depression in patients with hidradenitis suppurativa. J Eur Acad Dermatol Venereol. 2013;27(4):473-8. PMID: 09747365  (5)  Neeta A, Linda V, Virgie M, Valentin A, Oscar J. Opioid, alcohol, and cannabis misuse among patients with hidradenitis suppurativa: A population-based analysis in the [place]. J Am Acad Dermatol. 2018;79(3):495-500.e1. PMID: 60084585  (6) Forrest SEVILLA, Ese IE, van sarah Yuryn AD, et al. Sexual health and quality of life are impaired in hidradenitis suppurativa: a multicentre cross-sectional study. Br J Dermatol. 2017;176(4):1042-7. PMID: 70567991  (7) John-Do C, Anand-Clemente R, Vladimir-Penny A. Sexual Distress in Patients with Hidradenitis Suppurativa: A Cross-Sectional Study. J Clin Med. 2019;8(4):E532. PMID: 46687665  (8) Gilberto L, Carmelo AD, Fadi GH, Abhijeet GBE, Raman A. Increased Suicide Risk in Patients with Hidradenitis Suppurativa. J Invest Dermatol. 2018;138(1):52-7. PMID: 33180051   (9) Herminio KT, Baldev MR, Jose Elias KS, Rojas A, Connor ML, Terell BRAVO. Tofacitinib shows benefit in conjunction with other therapies in recalcitrant hidradenitis suppurativa patients. JAAD Case Rep. 2020;6(2):. PMID: 21305428  (10) LIAN Figueredo, IVONE Rush, et al. 80140 Efficacy and Safety of Upadacitinib in Moderate-to-Severe Hidradenitis Suppurativa: A Phase 2, Randomized, Placebo-Controlled  Study. J Am Acad Dermatol. 2023;89(3):42. https://doi.org/10.1016/j.jaad.2023.07.172.  (11) [facility]. A Study of Oral Upadacitinib Tablet Compared to Placebo in Adult Participants With Moderate to Severe Hidradenitis Suppurativa to Assess Change in Disease Symptoms. [id number]  (12) [facility]. A Study to Assess Change in Disease Activity and Adverse Events of Oral Upadacitinib in Adult and Adolescent Participants With Moderate to Severe Hidradenitis Suppurativa Who Have Failed Anti-TNF Therapy (Step-Up HS). [id number]  (13) Violeta AB, Ewa N, Yesy CL, Tez HAWK, Esteban A, Cam IH. Emerging medical treatments for hidradenitis suppurativa. J Am Acad Dermatol. 2020;83(2):554-62. PMID: 04134331

## 2023-10-23 NOTE — TELEPHONE ENCOUNTER
PA Initiation    Medication: RINVOQ 15 MG PO TB24  Insurance Company: [employer] ([facility]) - Phone [phone] Fax [phone]  Pharmacy Filling the Rx: [facility] - [place], [place] - [address]  Filling Pharmacy Phone:    Filling Pharmacy Fax:    Start Date: 5/26/2023      Key: [id number]

## 2023-10-23 NOTE — TELEPHONE ENCOUNTER
PRIOR AUTHORIZATION DENIED    Medication: ROFLUMILAST 500 MCG PO TABS  Insurance Company: [employer] ([facility]) - Phone [phone] Fax [phone]  Denial Date: 10/18/2023  Denial Rational:               Appeal Information:

## 2023-10-25 NOTE — TELEPHONE ENCOUNTER
PRIOR AUTHORIZATION DENIED    Medication: RINVOQ 15 MG PO TB24  Insurance Company: [id number] ([facility]) - Phone [phone] Fax [phone]  Denial Date: 10/23/2023  Denial Rational:     Appeal Information: [phone]  Patient Notified:

## 2023-10-26 NOTE — TELEPHONE ENCOUNTER
Medication Appeal Initiation    Medication: RINVOQ 15 MG PO TB24  Appeal Start Date:  10/26/2023  Insurance Company: [employer] Phone: [phone]  Insurance Fax: [phone]  Comments:  Faxed [phone]

## 2023-10-27 ENCOUNTER — MYC MEDICAL ADVICE (OUTPATIENT)
Dept: DERMATOLOGY | Facility: CLINIC | Age: 48
End: 2023-10-27
Payer: COMMERCIAL

## 2023-10-27 DIAGNOSIS — L73.2 HIDRADENITIS SUPPURATIVA: ICD-10-CM

## 2023-10-30 RX ORDER — OXYCODONE AND ACETAMINOPHEN 5; 325 MG/1; MG/1
1 TABLET ORAL DAILY
Qty: 56 TABLET | Refills: 0 | Status: SHIPPED | OUTPATIENT
Start: 2023-10-30 | End: 2023-11-16

## 2023-11-03 NOTE — TELEPHONE ENCOUNTER
Spoke with insurance again today they still don't see appeal on file . Verified that we do have the correct fax. Resent again today

## 2023-11-06 NOTE — TELEPHONE ENCOUNTER
Medication Appeal Initiation    Medication: ROFLUMILAST 500 MCG PO TABS  Appeal Start Date:  11/6/2023  Insurance Company:   Insurance Phone:   Insurance Fax:   Comments:

## 2023-11-09 NOTE — TELEPHONE ENCOUNTER
Finally confirmed that appeal has been received. Review SATYA Saavedra would like a call back about appeal/ peer to peer at [phone]

## 2023-11-09 NOTE — TELEPHONE ENCOUNTER
Any updates on whether they received the appeal? Let me know if you want me to try and fax this from clinic since I'm here today.     Nicky Hartman, PharmD

## 2023-11-09 NOTE — TELEPHONE ENCOUNTER
Called [facility] appeals department @ [phone] and spoke to rep Mark. Appeal is still under review with due date of 11/21/23. Case# [id number].

## 2023-11-13 NOTE — TELEPHONE ENCOUNTER
Left voicemail for patient regarding rescheduling surgery with Dr. Mendoza .    Provided direct contact number to discuss.    P: [phone]    __    Archana Ortez, Senior Perioperative Coordinator, on 11/13/2023 at 3:43 PM

## 2023-11-15 ENCOUNTER — MYC MEDICAL ADVICE (OUTPATIENT)
Dept: DERMATOLOGY | Facility: CLINIC | Age: 48
End: 2023-11-15
Payer: COMMERCIAL

## 2023-11-15 NOTE — TELEPHONE ENCOUNTER
Spoke with insurance and appeal has been denied, they are faxing over letter. Will review for further options

## 2023-11-15 NOTE — TELEPHONE ENCOUNTER
Called & spoke with SATYA Saaverda. Reports the peer to peer was closed from his end 5 days ago and he was unable to provide any other information on the status of the appeal with the insurance. Archana - any updates on the appeal status with the insurance?     Nicky Hartman, PharmD  [facility] Pharmacist

## 2023-11-16 ENCOUNTER — OFFICE VISIT (OUTPATIENT)
Dept: DERMATOLOGY | Facility: CLINIC | Age: 48
End: 2023-11-16
Payer: COMMERCIAL

## 2023-11-16 VITALS
HEART RATE: 82 BPM | SYSTOLIC BLOOD PRESSURE: 131 MMHG | WEIGHT: 253 LBS | DIASTOLIC BLOOD PRESSURE: 83 MMHG | BODY MASS INDEX: 39.57 KG/M2

## 2023-11-16 DIAGNOSIS — L73.2 HIDRADENITIS SUPPURATIVA: ICD-10-CM

## 2023-11-16 PROCEDURE — 11900 INJECT SKIN LESIONS </W 7: CPT | Performed by: DERMATOLOGY

## 2023-11-16 PROCEDURE — 99214 OFFICE O/P EST MOD 30 MIN: CPT | Mod: 25 | Performed by: DERMATOLOGY

## 2023-11-16 RX ORDER — OXYCODONE AND ACETAMINOPHEN 5; 325 MG/1; MG/1
TABLET ORAL
Qty: 56 TABLET | Refills: 0 | Status: SHIPPED | OUTPATIENT
Start: 2023-11-16 | End: 2023-12-28

## 2023-11-16 ASSESSMENT — PAIN SCALES - GENERAL: PAINLEVEL: SEVERE PAIN (6)

## 2023-11-16 NOTE — LETTER
11/16/2023       RE: Klarissa Curtis  [address]  [place] [address]     Dear Colleague,    Thank you for referring your patient, Klarissa Curtis, to the [facility] [place] [facility]. Please see a copy of my visit note below.    [facility] Dermatology Note  Encounter Date: Nov 16, 2023  Office Visit     Dermatology Problem List:  1. Hidradenitis suppurativa: diagnosed age 11, initially on waistline (used to weigh 300 lbs) which cleared up -> intermittent outbreaks -> for the past 10 years has had in bilateral axilla, groin, thighs, and buttocks.   - Humira, Percocet, resorcinol cream  - Chantix started 9/28/23 for smoking quit date of 11/15/23 before right arm plastic surgery in mid-January 2024  - Flare plan: ILK 60 injections, Prednisone 60 mg x 1 week, 50 mg x 1 week, 40 mg x 1 week (she does not like to taper below 40 mg as she flares)  - I&D x2 to lesions on the R axilla - 8/4/22  - Prior: gabapentin, Xeljanz, infliximab, prednisone taper, clobetasol, topical morphine, ILK, oral antibiotics, rifampin, sirolimus 2 mg (started 4/22/21) finasteride, spironolactone (6/2019- 9/5/19, stopped due to abnormal uterine bleeding), topical morphine gel, and percocet for severe pain, Cosentyx, belbuca  2. Keratosis pilaris  3. L olecranon bursitis.   4. Dermatographis  -  allegra 180mg  5. Eruption NOS,   - punch biopsy 8/5/2021: superficial and deep perivascular and interstitial infiltrate of neutrophils, with focal leukocytoclasis and bluish, amorphous material suggestive of neutrophil degranulation, and lesser amounts of eosinophils and lymphocytes. Background dermal edema is noted. Focal neutrophilic epitheliotropism is noted.  - Resolved  6. Pink linear patches, ddx: follicular dermatitis vs lichen spinulosus vs KP, posterior shoulders   - amlactin, lidex   7. Seborrheic dermatitis  - desonide  0.05% ointment, ketoconazole 2% shampoo     Soc Hx: [place]  of her company has offered to help support with insurance however he can    ____________________________________________    Assessment & Plan:  # HS, Stage II  - On 80mg weekly currently. Level only at 10. Will try to get 160mng approved and recheck level in 1-2 months,  Goal level 16-20  - Continue Percocet PRN for pain. Refilled. If continues to require more freuqnet doing will switch back to bupronerphine, but NOT buprenoprhine/naloxone  - Continue calcium 1200 mg and vitamin D 2000 IUs  - Working with medical management team trying to get Rinvoq approved by insurance  - Met with plastic surgery earlier this month and plans to have surgery done mid-January 2024  - Chantix started today with smoking quit date of 11/15/23 ( not mentioned today)   Pain:   - Percocet refilled today  - Marijuana for pain PRN  - Has not received resorcinol yet. When she gets it, she can use resorcinol cream BID as needed for flares  - Kenalog injections administered 9/28/23, 11/16/23     2) Eruption NOS  -Upper chest and back  - international unit(s) did biopsy a previous eruption in the same area. Histology was not specific for any specific dermatosios. DDx: dermatitis vs PMLE vs REM vs dermal hypersensitivity reaction  - The prior eruption was not steroid responsive, but recently th rsh hs been more steroid responsive.   - She has lidex at home, will ahve her use that on the rash.,    Procedures Performed:   - Intra-lesional triamcinolone procedure note. After verbal consent and review of risk of pain and skin thinning/atrophy, positioning and cleansing with isopropyl alcohol, 4 total mL of triamcinolone 10 mg/mL was injected into 4 lesion(s) on the right axilla. The patient tolerated the procedure well and left the dermatology clinic in good condition.      Follow-up: 1 month(s) in-person, or earlier for new or  changing lesions    Staff and Scribe:   I, VIVIAN PRAKASH, am serving as a scribe; to document services personally performed by Jean Kenny MD -based on data collection and the provider's statements to me.    Provider Disclosure:   The documentation recorded by the scribe accurately reflects the services I personally performed and the decisions made by me.    Jean Kenny MD, FAAD    Departments of Internal Medicine and Dermatology  [facility]  [phone]      ____________________________________________    CC: Follow Up (HS follow up.  Feels like it's getting worse, never stop.  Areas of concern: armpit, groin and buttocks)    HPI:  Ms. Klarissa Curtis is a(n) 48 year old female who presents today as a return patient for HS that is recently flares which she describes as scabby lesions. She mentioned that she still hasn't been able to start roflumilast. She mentioned that she is currently taking Humira but she feels like it doesn't really provide sufficient improvement.     Reports not getting a lot of light exposure. She adds that she had good response with   steroids. Also mentioned that she takes oxycodone 1-2 times a day as needed for pain. Patient is otherwise feeling well, without additional skin concerns.    HS Nurse Assessment        8/31/2023     2:11 PM 9/28/2023    12:58 PM 11/16/2023     1:01 PM   Nurse Assessment Data   Over the past 30 days how many old lesions flared back up? 10 20 10   Over the past 30 days how many new lesions did you get? 4 0 2   Over the past week, how many dressing changes do you do each day? 3+ 3+ 3+   Over the past week, has your wound drainage been: Severe Severe Severe   Rate your HS overall from 0 - 10 (0 = no disease, 10 = worst) over the past week:  7 9 7   Rate your pain score from 0 - 10 (0 = no disease, 10 = worst) for the most painful/symptomatic lesion in the past week:  8 9 9   Over the past week, how much has HS  influenced your quality of life? very much extremely extremely             Physical Exam:  Vitals: /83 (BP Location: Left arm, Patient Position: Sitting, Cuff Size: Adult Large)   Pulse 82   Wt 253 lb (114.8 kg)   BMI 39.57 kg/m      - Juicy pink papules coalescing into a pink plaque on the upper chest and bck  - Right axilla- 2 draining tunnels, 1 inflammatory nodules   - Left axilla- 1 inflammatory nodule    Medications:  Current Outpatient Medications   Medication    adalimumab (HUMIRA *CF* PEN-CD/UC/HS STARTER) 80 MG/0.8ML pen kit    calcium carbonate (OS-MERCY) 1500 (600 Ca) MG tablet    fexofenadine (ALLEGRA) 180 MG tablet    fluocinonide (LIDEX) 0.05 % external ointment    ketoconazole (NIZORAL) 2 % external shampoo    medical cannabis (Patient's own supply)    oxyCODONE-acetaminophen (PERCOCET) 5-325 MG tablet    Resorcinol POWD    roflumilast (DALIRESP) 250 MCG TABS tablet    roflumilast (DALIRESP) 500 MCG TABS tablet    roflumilast (DALIRESP) 500 MCG TABS tablet    Semaglutide-Weight Management (WEGOVY) 0.25 MG/0.5ML pen    traZODone (DESYREL) 150 MG tablet    upadacitinib ER (RINVOQ) 15 MG tablet    varenicline (CHANTIX LOPEZ) 0.5 MG X 11 & 1 MG X 42 tablet    varenicline (CHANTIX) 1 MG tablet    VITAMIN D3 50 MCG (2000 UT) tablet     Current Facility-Administered Medications   Medication    triamcinolone acetonide (KENALOG-10) injection 40 mg    triamcinolone acetonide (KENALOG-10) injection 60 mg      Past Medical History:   Patient Active Problem List   Diagnosis    Hidradenitis suppurativa    Fatigue

## 2023-11-16 NOTE — NURSING NOTE
Dermatology Rooming Note    Klarissa Curtis's goals for this visit include:   Chief Complaint   Patient presents with    Follow Up     HS follow up.  Feels like it's getting worse, never stop.  Areas of concern: armpit, groin and buttocks     Jen Min CMA

## 2023-11-16 NOTE — PROGRESS NOTES
[facility] Dermatology Note  Encounter Date: Nov 16, 2023  Office Visit     Dermatology Problem List:  1. Hidradenitis suppurativa: diagnosed age 11, initially on waistline (used to weigh 300 lbs) which cleared up -> intermittent outbreaks -> for the past 10 years has had in bilateral axilla, groin, thighs, and buttocks.   - Humira, Percocet, resorcinol cream  - Chantix started 9/28/23 for smoking quit date of 11/15/23 before right arm plastic surgery in mid-January 2024  - Flare plan: ILK 60 injections, Prednisone 60 mg x 1 week, 50 mg x 1 week, 40 mg x 1 week (she does not like to taper below 40 mg as she flares)  - I&D x2 to lesions on the R axilla - 8/4/22  - Prior: gabapentin, Xeljanz, infliximab, prednisone taper, clobetasol, topical morphine, ILK, oral antibiotics, rifampin, sirolimus 2 mg (started 4/22/21) finasteride, spironolactone (6/2019- 9/5/19, stopped due to abnormal uterine bleeding), topical morphine gel, and percocet for severe pain, Cosentyx, belbuca  2. Keratosis pilaris  3. L olecranon bursitis.   4. Dermatographis  -  allegra 180mg  5. Eruption NOS,   - punch biopsy 8/5/2021: superficial and deep perivascular and interstitial infiltrate of neutrophils, with focal leukocytoclasis and bluish, amorphous material suggestive of neutrophil degranulation, and lesser amounts of eosinophils and lymphocytes. Background dermal edema is noted. Focal neutrophilic epitheliotropism is noted.  - Resolved  6. Pink linear patches, ddx: follicular dermatitis vs lichen spinulosus vs KP, posterior shoulders   - amlactin, lidex   7. Seborrheic dermatitis  - desonide 0.05% ointment, ketoconazole 2% shampoo     Soc Hx: [place]  of her company has offered to help support with insurance however he can    ____________________________________________    Assessment & Plan:  # HS, Stage II  - On 80mg weekly currently. Level only at 10. Will try to get  160mng approved and recheck level in 1-2 months,  Goal level 16-20  - Continue Percocet PRN for pain. Refilled. If continues to require more freuqnet doing will switch back to bupronerphine, but NOT buprenoprhine/naloxone  - Continue calcium 1200 mg and vitamin D 2000 IUs  - Working with medical management team trying to get Rinvoq approved by insurance  - Met with plastic surgery earlier this month and plans to have surgery done mid-January 2024  - Chantix started today with smoking quit date of 11/15/23 ( not mentioned today)   Pain:   - Percocet refilled today  - Marijuana for pain PRN  - Has not received resorcinol yet. When she gets it, she can use resorcinol cream BID as needed for flares  - Kenalog injections administered 9/28/23, 11/16/23     2) Eruption NOS  -Upper chest and back  - international unit(s) did biopsy a previous eruption in the same area. Histology was not specific for any specific dermatosios. DDx: dermatitis vs PMLE vs REM vs dermal hypersensitivity reaction  - The prior eruption was not steroid responsive, but recently thsi rsh hs been more steroid responsive.   - She has lidex at home, will ahve her use that on the rash.,    Procedures Performed:   - Intra-lesional triamcinolone procedure note. After verbal consent and review of risk of pain and skin thinning/atrophy, positioning and cleansing with isopropyl alcohol, 4 total mL of triamcinolone 10 mg/mL was injected into 4 lesion(s) on the right axilla. The patient tolerated the procedure well and left the dermatology clinic in good condition.      Follow-up: 1 month(s) in-person, or earlier for new or changing lesions    Staff and Scribe:   I, VIVIAN PRAKASH, am serving as a scribe; to document services personally performed by Jean Kenny MD -based on data collection and the provider's statements to me.    Provider Disclosure:   The documentation recorded by the scribe accurately reflects the services I personally performed and  the decisions made by me.    Jean Kenny MD, FAAD    Departments of Internal Medicine and Dermatology  [facility]  [phone]      ____________________________________________    CC: Follow Up (HS follow up.  Feels like it's getting worse, never stop.  Areas of concern: armpit, groin and buttocks)    HPI:  Ms. Klarissa Curtis is a(n) 48 year old female who presents today as a return patient for HS that is recently flares which she describes as scabby lesions. She mentioned that she still hasn't been able to start roflumilast. She mentioned that she is currently taking Humira but she feels like it doesn't really provide sufficient improvement.     Reports not getting a lot of light exposure. She adds that she had good response with   steroids. Also mentioned that she takes oxycodone 1-2 times a day as needed for pain. Patient is otherwise feeling well, without additional skin concerns.    HS Nurse Assessment        8/31/2023     2:11 PM 9/28/2023    12:58 PM 11/16/2023     1:01 PM   Nurse Assessment Data   Over the past 30 days how many old lesions flared back up? 10 20 10   Over the past 30 days how many new lesions did you get? 4 0 2   Over the past week, how many dressing changes do you do each day? 3+ 3+ 3+   Over the past week, has your wound drainage been: Severe Severe Severe   Rate your HS overall from 0 - 10 (0 = no disease, 10 = worst) over the past week:  7 9 7   Rate your pain score from 0 - 10 (0 = no disease, 10 = worst) for the most painful/symptomatic lesion in the past week:  8 9 9   Over the past week, how much has HS influenced your quality of life? very much extremely extremely             Physical Exam:  Vitals: /83 (BP Location: Left arm, Patient Position: Sitting, Cuff Size: Adult Large)   Pulse 82   Wt 253 lb (114.8 kg)   BMI 39.57 kg/m      - Juicy pink papules coalescing into a pink plaque on the upper chest and bck  - Right axilla- 2 draining  tunnels, 1 inflammatory nodules   - Left axilla- 1 inflammatory nodule    Medications:  Current Outpatient Medications   Medication    adalimumab (HUMIRA *CF* PEN-CD/UC/HS STARTER) 80 MG/0.8ML pen kit    calcium carbonate (OS-MERCY) 1500 (600 Ca) MG tablet    fexofenadine (ALLEGRA) 180 MG tablet    fluocinonide (LIDEX) 0.05 % external ointment    ketoconazole (NIZORAL) 2 % external shampoo    medical cannabis (Patient's own supply)    oxyCODONE-acetaminophen (PERCOCET) 5-325 MG tablet    Resorcinol POWD    roflumilast (DALIRESP) 250 MCG TABS tablet    roflumilast (DALIRESP) 500 MCG TABS tablet    roflumilast (DALIRESP) 500 MCG TABS tablet    Semaglutide-Weight Management (WEGOVY) 0.25 MG/0.5ML pen    traZODone (DESYREL) 150 MG tablet    upadacitinib ER (RINVOQ) 15 MG tablet    varenicline (CHANTIX LOPEZ) 0.5 MG X 11 & 1 MG X 42 tablet    varenicline (CHANTIX) 1 MG tablet    VITAMIN D3 50 MCG (2000 UT) tablet     Current Facility-Administered Medications   Medication    triamcinolone acetonide (KENALOG-10) injection 40 mg    triamcinolone acetonide (KENALOG-10) injection 60 mg      Past Medical History:   Patient Active Problem List   Diagnosis    Hidradenitis suppurativa    Fatigue

## 2023-11-16 NOTE — PROGRESS NOTES
Drug Administration Record    Prior to injection, verified patient identity using patient's name and date of birth.  Due to injection administration, patient instructed to remain in clinic for 15 minutes  afterwards, and to report any adverse reaction to me immediately.    Drug Name: triamcinolone acetonide(kenalog)  Dose: 4mL of triamcinolone 10mg/mL, 40mg dose  Route administered: ID  NDC #: Kenalog-10 (0257-6992-23)  Amount of waste(mL):1ml  Reason for waste: Single use vial    LOT #: [id number]  SITE:   : [place]  EXPIRATION DATE: 12/2025

## 2023-11-17 NOTE — TELEPHONE ENCOUNTER
MEDICATION APPEAL DENIED    Medication: RINVOQ 15 MG PO TB24  Insurance Company: [facility]  Denial Date: 11/10/2023  Denial Rational:     Second Level Appeal Information: all appeals have been exhausted  Patient Notified:

## 2023-11-17 NOTE — TELEPHONE ENCOUNTER
Appeals have been exhausted, previous notes state patient will not qualify for free drug due to income

## 2023-11-20 ENCOUNTER — VIRTUAL VISIT (OUTPATIENT)
Dept: RHEUMATOLOGY | Facility: CLINIC | Age: 48
End: 2023-11-20
Attending: DERMATOLOGY
Payer: COMMERCIAL

## 2023-11-20 ENCOUNTER — TELEPHONE (OUTPATIENT)
Dept: PHARMACY | Facility: CLINIC | Age: 48
End: 2023-11-20
Payer: COMMERCIAL

## 2023-11-20 DIAGNOSIS — E66.812 CLASS 2 SEVERE OBESITY WITH SERIOUS COMORBIDITY AND BODY MASS INDEX (BMI) OF 39.0 TO 39.9 IN ADULT, UNSPECIFIED OBESITY TYPE (H): ICD-10-CM

## 2023-11-20 DIAGNOSIS — L73.2 HIDRADENITIS SUPPURATIVA: Primary | ICD-10-CM

## 2023-11-20 DIAGNOSIS — F17.200 NICOTINE DEPENDENCE, UNCOMPLICATED, UNSPECIFIED NICOTINE PRODUCT TYPE: ICD-10-CM

## 2023-11-20 DIAGNOSIS — E66.01 CLASS 2 SEVERE OBESITY WITH SERIOUS COMORBIDITY AND BODY MASS INDEX (BMI) OF 39.0 TO 39.9 IN ADULT, UNSPECIFIED OBESITY TYPE (H): ICD-10-CM

## 2023-11-20 RX ORDER — RESORCINOL
POWDER (GRAM) MISCELLANEOUS
Qty: 30 G | Refills: 11 | Status: SHIPPED | OUTPATIENT
Start: 2023-11-20

## 2023-11-20 RX ORDER — ADALIMUMAB 80MG/0.8ML
160 KIT SUBCUTANEOUS WEEKLY
Qty: 6.4 ML | Refills: 3 | Status: SHIPPED | OUTPATIENT
Start: 2023-11-20 | End: 2024-05-08

## 2023-11-20 RX ORDER — BUPROPION HYDROCHLORIDE 150 MG/1
TABLET, EXTENDED RELEASE ORAL
Qty: 90 TABLET | Refills: 1 | Status: SHIPPED | OUTPATIENT
Start: 2023-11-20 | End: 2024-02-12

## 2023-11-20 RX ORDER — ROFLUMILAST 500 UG/1
TABLET ORAL
Qty: 90 TABLET | Refills: 3 | Status: SHIPPED | OUTPATIENT
Start: 2023-11-20 | End: 2024-05-06

## 2023-11-20 NOTE — TELEPHONE ENCOUNTER
Called patient to discuss increasing Humira per last note with Dr. Kenny. See MTM progress note from 11/20/23.     Nicky Hartman, PharmD  [facility] Pharmacist

## 2023-11-20 NOTE — Clinical Note
11/20/2023       RE: Klarissa Curtis  [address]  [place] [address]     Dear Colleague,    Thank you for referring your patient, Klarissa Curtsi, to the [facility] [place] at [facility]. Please see a copy of my visit note below.    Medication Therapy Management (MTM) Encounter    ASSESSMENT:                            Medication Adherence/Access: See below for considerations    Hidradenitis suppurativa:   Needs improvement. Scheduled for plastic surgery of right axilla in Feb 2024. Due to current adalimumab level of 10 ug/mL and no detection of adalimumab neutralizing antibody - per Dr. Kenny's recommendation will attempt to increase Humira to 160 mg weekly with goal adalimumab level of 16-20 ug/mL. Future considerations include genetic screening such as [facility] - [employer]. Will resend orders for resorcinol 15% cream to [facility]. Also discussed starting roflumilast, will contact patients pharmacy to help provide savings card information to lower cost/copay.    Smoking cessation:   Improving, continues to reduce quantity of cigarettes over the last few weeks and has set a quit date for 12/1/23. Chantix was recently denied by patients insurance. Based on patient preferences and history, patient would benefit from bupropion, one to two weeks prior to quit date. Provided education on dosing, administration, potential side effects, and time to efficacy.     Weight Management:   Working on lifestyle interventions at this time. Discussed we can [facility] when [facility] in 2024 to see if new pharmacy benefits are willing to cover.           PLAN:                            ***  Discuss increasing Humira 160 mg weekly with goal of     Send Resorcinol 15% cream to [facility]     Call [facility] - GoodRx           Initiation and titration - Bupropion  "sustained-release is started one week before the target quit date, since it takes five to seven days to reach steady-state blood levels. The recommended dose of bupropion is 150 mg/day for three days, then 150 mg twice daily thereafter [62].    ?When to stop smoking - Individuals take bupropion for at least one week prior to quitting smoking.    ?Duration of treatment - We recommend treating for at least 12 weeks. Longer duration of treatment can be considered in individual cases, based on the patient s previous quit attempts and patient preference. Longer-duration therapy may prevent relapse in successful quitters.    A randomized trial of 461 individuals who quit smoking after seven weeks of bupropion compared ongoing treatment for one year with either bupropion 300 mg/day or placebo [108]. Patients taking bupropion for one year had a higher abstinence rate at one year (51 versus 42 percent) that persisted 16 weeks after discontinuation of therapy (47 versus 37 percent), a longer median time to relapse after cessation of therapy (156 days versus 65 days), and less weight gain at two years (4.1 versus 5.4 kg). Abstinence rates at two years were the same in both groups (41 versus 40 percent).    However, if bupropion is also being used for treatment of a mood disorder, clinicians should monitor changes in depressive symptoms and adjust doses accordingly. (See \"Unipolar major depression in adults: Choosing initial treatment\", section on 'Dose'.)    ?Addressing side effects - Lower-dose bupropion (150 mg/day) is an option for those who do not tolerate the full dose due to side effects. Although this dose is less studied, one randomized trial found that 150 mg/day was as effective as 300 mg/day and caused fewer side effects [106].    Follow-up: {followuptest2:956505}    Addendum (10/5/23): Spoke with Derm liaisons - Rinvoq appeals have been exhausted and patient does not qualify for  assistance program. Per " Dr. Kenny, recommend starting Roflumilast 1 tablet (500 mg) once daily at this time. GoodRx coupon available. All weight management medications are excluded from patients insurance plan - unable to start Wegovy. Sent Mychart to update patient.     Completed PA appeal letter (10/3/23) for Rinvoq & routed to Derm liaisons. Appeal attempts have been exhausted   Start roflumilast 1 tablet (500 mg) once daily. Use GoodRx coupon.   Continue Humira 80 mg once weekly. Resent orders today.   Sent script for Wegovy 0.25 mg once weekly. See telephone encounter (10/2/23) - routed to central PA team for assistance with PA. Plan does not cover weight loss medications.   Scheduled for lab only appointment at [facility] clinic on 10/9/23 at 8:15am.   Due for the following labs: adalimumab level, Quant TB, CBC, lipids, CMP   Scheduled for vaccine appointment at [facility] on 10/9/23 at 9:00am  Receive Covid-19 2023-24 vaccine and annual influenza (avoid FluMist)   Vaccine recommendations for future: Shingrix, Prevnar 20, tetanus booster   Plan to start: Chantix 0.5 mg once daily for three days (days 1-3), then 0.5 mg twice daily for four days (days 4-7), then 1 mg twice daily thereafter.   After Chantix initiation: plan for quit date on Day 8 or flexible quit date between days 8 to 35  Order pending sign off/authorization by Dr. Kenny  Switch to taking calcium carbonate 600 mg tablet twice daily   Referral placed for [facility] behavioral therapy. Watch for call to set up an appointment to establish with therapist/counselor.     SUBJECTIVE/OBJECTIVE:                          Maura Curtis is a 48 year old female called for a follow-up visit from 10/2/23.       Reason for visit: Humira dose increase.    Medication Adherence/Access:   [facility] Choice Plus - [employer]. Enrolled in a higher tier plan/coverage for 2024.   Rinvoq coverage:   - PA & appeal reattempted in Nov. Denied 11/10/23.   - PAP: annual income  must be <= $87,480, does not qualify   Humira coverage:   - 80 mg weekly covered thru 3/13/24    Hidradenitis suppurativa/Seborrheic dermatitis:   - Humira (adalimumab) 80 mg injection once weekly   - Resorcinol 15% cream twice daily as needed for flares (has not started, has not heard from [facility])   - roflumilast 250 mcg daily x 4 weeks, then 500 mcg daily (has not started, admits she has not called pharmacy to give them the information for savings card)   - ketoconazole 2% shampoo: 2-3 times per week as needed, for dermatitis   - fluocinonide 0.05% ointment: as needed   - Rinvoq (upadacitinib) 15 mg tablet daily: pending coverage   - ILK injection intermittently (last dose 11/16/23)   Side effects: None.    Symptoms: chronic flaring of right arm - scheduled for surgery for this 2/8/24. Also reports HS flares along left arm. Intermittent outbreaks over the past 10 years affecting bilateral axilla, groin, thighs, buttocks.      Iesha Stage II     Specialist: Dr. Jean Kenny MD, Dermatology. Last visit on 11/16/23. Pink plaque on upper chest & back ; 2 draining tunnels + 1 inflammatory nodule of right axilla ; 1 inflammatory nodule of left axilla. The following was recommended:   - Humira 80mg weekly currently. Level only at 10. Will try to get 160mg weekly approved and recheck level in 1-2 months,  Goal level 16-20  - Working with medical management team trying to get Rinvoq approved by insurance  - Met with plastic surgery earlier this month and plans to have surgery done mid-January 2024  - Chantix started today with smoking quit date of 11/15/23 ( not mentioned today)     Previous treatment:   - infliximab infusion every 4 weeks (2019, not effective)   - Cosentyx (secukinumab) 300 mg every 28 days (Mar 2022, not effective)   - Xeljanz (tofacitinib) 10 mg twice daily (in addition to Cosentyx, not effective)   - Rapamune (sirolimus) 2 mg daily (2021)   - Intralesional kenalog injections intermittently and  oral prednisone tapers   - Hormonal agents: spironolactone (Jun 2019 to Sept 2019; stopped due to abnormal uterine bleeding), metformin, finasteride  - Oral antibiotics: doxycyline, dapsone, clindamycin plus rifampin  - Topical therapies: clindamycin 1% solution, clobetasol 0.05% cream, desonide 0.05%     Component      Latest Ref Rng 10/9/2023  8:13 AM   Adalimumab Activity      >=0.65 ug/mL 10.71    Adalimumab Neutralizing Antibody      Not Detected  Not Detected      Smoking Cessation:   - No current medications.     Patient was prescribed Chantix (varenicline) for smoking cessation, however insurance won't cover this since she has not tried nicotine replacement therapies or bupropion in the past. Not interested in pursuing coverage for Chantix at this time. She would be interested in starting bupropion if this is covered because she feels it could help her success. She has not taken this in the past, is aware with any antidepressant the importance of reporting changes/worsening in mood or thoughts of self harm.     Patient reports she has significantly cut back the number of cigarettes she smokes each week, no longer will go outside at night to smoke. Has set a quit date for 12/1/23 before her right arm plastic surgery in mid-January 2024     Weight Management:   - No current weight loss medications.     Tried for coverage of Ozempic Victoza, which were denied as patient does not have a Type 2 diabetes diagnosis. Then tried for Wegovy, which was denied as weight loss medications are excluded from pharmacy benefits. Reports she enrolled in a higher tier/coverage plan for 2024, she is not sure if Wegovy will be covered but would be interested in retrying for coverage of this.     Reports despite lifestyle interventions including dietary changes & physical activity - patient has experienced progressive weight gain. Previously noted that she attributes some of this to frequent prednisone tapers.   She has been  "working on increasing physical activity however admits the frequency/intensity of this is limited by concerns that sweating will lead to worsened HS flare.      Patient reported weight at last MTM visit (10/2/23): 250 lbs   Wt Readings from Last 5 Encounters:   11/16/23 114.8 kg (253 lb)   09/28/23 113.6 kg (250 lb 8 oz)   09/26/23 112.5 kg (248 lb)   08/31/23 114 kg (251 lb 4.8 oz)   06/22/23 111.7 kg (246 lb 2.9 oz)     Estimated body mass index is 39.57 kg/m  as calculated from the following:    Height as of 9/26/23: 1.703 m (5' 7.05\").    Weight as of 11/16/23: 114.8 kg (253 lb).    Allergies/ADRs: Reviewed in chart  Past Medical History: Reviewed in chart  Tobacco: She reports that she has been smoking cigarettes. She has a 10.00 pack-year smoking history. She has never used smokeless tobacco.  Nicotine/Tobacco Cessation Plan:   See notes above, patient has set a quit date for 12/1/23.  ----------------      I spent 20 minutes with this patient today. All changes were made via collaborative practice agreement with Dr. Kenny. A copy of the visit note was provided to the patient's provider(s).    A summary of these recommendations was sent via [employer].    Nicky Hartman, PharmD  Medication Therapy Management Pharmacist   [facility]    Telemedicine Visit Details  Type of service:  Telephone visit  Start Time:  1:01pm  End Time:  1:21pm       Medication Therapy Recommendations  No medication therapy recommendations to display     Medication Therapy Management (MTM) Encounter    ASSESSMENT:                            Medication Adherence/Access: See below for considerations    Hidradenitis suppurativa:   Needs improvement. Scheduled for plastic surgery of right axilla in Feb 2024. Due to current adalimumab level of 10 ug/mL and no detection of adalimumab neutralizing antibody - per Dr. Kenny's recommendation will attempt to increase Humira to 160 mg weekly with goal adalimumab level of " 16-20 ug/mL. Future considerations include genetic screening such as [facility] - [employer]. Will resend orders for resorcinol 15% cream to [facility]. Also discussed starting roflumilast, will contact patients pharmacy to help provide savings card information to lower cost/copay.    Smoking cessation:   Improving, continues to reduce quantity of cigarettes over the last few weeks and has set a quit date for 12/1/23. Chantix was recently denied by patients insurance. Based on patient preferences and history, patient would benefit from bupropion, one to two weeks prior to quit date. Provided education on dosing, administration, potential side effects, and time to efficacy.     Weight Management:   Working on lifestyle interventions at this time. Discussed we can re-attempt a prior authorization for Wegovy when plan turns over in 2024 to see if new pharmacy benefits are willing to cover.     PLAN:                            Sent orders for Humira 160 mg once weekly. Will submit appeal to insurance.   Pending coverage: Increase Humira to 160 mg once weekly, then recheck level in around 2-3 months (Goal level between 16 to 20 micrograms/mL)     Resent orders for Resorcinol 15% cream to [facility] (P#: [phone])     Called [facility] pharmacy to discuss applying savings card to roflumilast. Due to lower cost of roflumilast 500 mcg, they requested a new e-script be sent for roflumilast 500 mcg tablet.   Instructions: Take roflumilast 0.5 tablets daily x 4 weeks, then increase to 1 tablet daily thereafter.    Start Bupropion  mg tablet once daily for 3 days, then increase to 1 tablet twice daily thereafter. Take 2nd dose no later than 4:00pm.   Recommend taking bupropion for at least one week prior to quit date.   Typically recommend taking for at least 12 weeks. May consider taking for a longer-duration to prevent relapse in successful quit attempt.    Please reach out right away if you notice any  changes in your mood after starting this.     Follow-up: with me (Nicky Hartman [facility]) by [employer] message with updates on Humira coverage, then follow up for an MTM visit in around 3 months (2/20/24)     SUBJECTIVE/OBJECTIVE:                          Maura Curtis is a 48 year old female called for a follow-up visit from 10/2/23.       Reason for visit: Humira dose increase.    Medication Adherence/Access:   [facility] Choice Plus - [employer]. Enrolled in a higher tier plan/coverage for 2024.   Rinvoq coverage:   - PA & appeal reattempted in Nov. Denied 11/10/23.   - PAP: annual income must be <= $87,480, does not qualify   Humira coverage:   - 80 mg weekly covered thru 3/13/24  - 160 mg weekly - pending     Hidradenitis suppurativa/Seborrheic dermatitis:   - Humira (adalimumab) 80 mg injection once weekly   - Resorcinol 15% cream twice daily as needed for flares (has not started, has not heard from [facility])   - roflumilast 250 mcg daily x 4 weeks, then 500 mcg daily (has not started, admits she has not called pharmacy to give them the information for savings card)   - ketoconazole 2% shampoo: 2-3 times per week as needed, for dermatitis   - fluocinonide 0.05% ointment: as needed   - Rinvoq (upadacitinib) 15 mg tablet daily: pending coverage   - ILK injection intermittently (last dose 11/16/23)   Side effects: None.    Symptoms: chronic flaring of right arm - scheduled for surgery for this 2/8/24. Also reports HS flares along left arm. Intermittent outbreaks over the past 10 years affecting bilateral axilla, groin, thighs, buttocks.      Iesha Stage II     Specialist: Dr. Jean Kenny MD, Dermatology. Last visit on 11/16/23. Pink plaque on upper chest & back ; 2 draining tunnels + 1 inflammatory nodule of right axilla ; 1 inflammatory nodule of left axilla. The following was recommended:   - Humira 80mg weekly currently. Level only at 10. Will try to get 160mg weekly approved and recheck level in 1-2 months,  Goal  level 16-20  - Working with medical management team trying to get Rinvoq approved by insurance  - Met with plastic surgery earlier this month and plans to have surgery done mid-January 2024  - Chantix started today with smoking quit date of 11/15/23 ( not mentioned today)     Previous treatment:   - infliximab infusion every 4 weeks (2019, not effective)   - Cosentyx (secukinumab) 300 mg every 28 days (Mar 2022, not effective)   - Xeljanz (tofacitinib) 10 mg twice daily (in addition to Cosentyx, not effective)   - Rapamune (sirolimus) 2 mg daily (2021)   - Intralesional kenalog injections intermittently and oral prednisone tapers   - Hormonal agents: spironolactone (Jun 2019 to Sept 2019; stopped due to abnormal uterine bleeding), metformin, finasteride  - Oral antibiotics: doxycyline, dapsone, clindamycin plus rifampin  - Topical therapies: clindamycin 1% solution, clobetasol 0.05% cream, desonide 0.05%     Component      Latest Ref Rng 10/9/2023  8:13 AM   Adalimumab Activity      >=0.65 ug/mL 10.71    Adalimumab Neutralizing Antibody      Not Detected  Not Detected      Smoking Cessation:   - No current medications.     Patient was prescribed Chantix (varenicline) for smoking cessation, however insurance won't cover this since she has not tried nicotine replacement therapies or bupropion in the past. Not interested in pursuing coverage for Chantix at this time. She would be interested in starting bupropion if this is covered because she feels it could help her success. She has not taken this in the past, is aware with any antidepressant the importance of reporting changes/worsening in mood or thoughts of self harm.     Patient reports she has significantly cut back the number of cigarettes she smokes each week, no longer will go outside at night to smoke. Has set a quit date for 12/1/23 before her right arm plastic surgery in mid-January 2024     Weight Management:   - No current weight loss medications.  "    Tried for coverage of Ozempic Victoza, which were denied as patient does not have a Type 2 diabetes diagnosis. Then tried for Wegovy, which was denied as weight loss medications are excluded from pharmacy benefits. Reports she enrolled in a higher tier/coverage plan for 2024, she is not sure if Wegovy will be covered but would be interested in retrying for coverage of this.     Reports despite lifestyle interventions including dietary changes & physical activity - patient has experienced progressive weight gain. Previously noted that she attributes some of this to frequent prednisone tapers.   She has been working on increasing physical activity however admits the frequency/intensity of this is limited by concerns that sweating will lead to worsened HS flare.      Patient reported weight at last MTM visit (10/2/23): 250 lbs   Wt Readings from Last 5 Encounters:   11/16/23 114.8 kg (253 lb)   09/28/23 113.6 kg (250 lb 8 oz)   09/26/23 112.5 kg (248 lb)   08/31/23 114 kg (251 lb 4.8 oz)   06/22/23 111.7 kg (246 lb 2.9 oz)     Estimated body mass index is 39.57 kg/m  as calculated from the following:    Height as of 9/26/23: 1.703 m (5' 7.05\").    Weight as of 11/16/23: 114.8 kg (253 lb).    Allergies/ADRs: Reviewed in chart  Past Medical History: Reviewed in chart  Tobacco: She reports that she has been smoking cigarettes. She has a 10.00 pack-year smoking history. She has never used smokeless tobacco.  Nicotine/Tobacco Cessation Plan:   See notes above, patient has set a quit date for 12/1/23.  ----------------    I spent 20 minutes with this patient today. All changes were made via collaborative practice agreement with Dr. Kenny. A copy of the visit note was provided to the patient's provider(s).    A summary of these recommendations was sent via [employer].    Nicky Hartman, PharmD  Medication Therapy Management Pharmacist   [facility]    Telemedicine Visit Details  Type of service:  " Telephone visit  Start Time:  1:01pm  End Time:  1:21pm       Medication Therapy Recommendations  No medication therapy recommendations to display       Again, thank you for allowing me to participate in the care of your patient.      Sincerely,    Nicky Hartman RPH

## 2023-11-20 NOTE — Clinical Note
DAVIDI - sent Rx for bupropion SR instead of Chantix for smoking cessation since this wasn't covered. Planned quit date is 12/1/23. Also sent a script for Humira 160 mg weekly as you recommended - I'm also not sure if insurance will cover this but I'm happy to try and appeal this. I'll keep you updated on this!  Nikcy

## 2023-11-20 NOTE — PROGRESS NOTES
Medication Therapy Management (MTM) Encounter    ASSESSMENT:                            Medication Adherence/Access: See below for considerations    Hidradenitis suppurativa:   Needs improvement. Scheduled for plastic surgery of right axilla in Feb 2024. Due to current adalimumab level of 10 ug/mL and no detection of adalimumab neutralizing antibody - per Dr. Kenny's recommendation will attempt to increase Humira to 160 mg weekly with goal adalimumab level of 16-20 ug/mL. Was unable to find any literature or case reviews to support this target, however did find that levels > 20 ug/mL are consider supratherapeutic. Alternatively, could consider trial of one time reload of adalimumab 160 mg then continue with 80 mg weekly and recheck trough levels. Will discuss this dose escalation further with the provider. Future considerations include genetic screening such as More[employer] - [employer]. Will resend orders for resorcinol 15% cream to [facility]. Also discussed starting roflumilast, will contact patients pharmacy to help provide savings card information to lower cost/copay.    Smoking cessation:   Improving, continues to reduce quantity of cigarettes over the last few weeks and has set a quit date for 12/1/23. Chantix was recently denied by patients insurance. Based on patient preferences and history, patient would benefit from bupropion, one to two weeks prior to quit date. Provided education on dosing, administration, potential side effects, and time to efficacy.     Weight Management:   Working on lifestyle interventions at this time. Discussed we can re-attempt a prior authorization for Wev when plan turns over in 2024 to see if new pharmacy benefits are willing to cover.     PLAN:                            Resent orders for Humira 160 mg once weekly. Will work on appeal to insurance.   Discuss the following with Dr. Kenny:   Guidance/recommendation for goal adalimumab level between 16 to 20  ug/mL   Option to instead try for reload of adalimumab 160 mg x 1 dose, then continue with 80 mg weekly thereafter     Resent orders for Resorcinol 15% cream to [facility] (P#: [phone])     Looked into Rinvoq income criteria and you are not eligible, however we could consider retrying for insurance coverage when plan turns over in 2024    Called [facility] pharmacy to discuss applying savings card to roflumilast. Due to lower cost of roflumilast 500 mcg - sent a new prescription over for roflumilast 500 mcg tablet.   Instructions: Take roflumilast 0.5 tablets daily x 4 weeks, then increase to 1 tablet daily thereafter.    Start Bupropion  mg tablet once daily for 3 days, then increase to 1 tablet twice daily thereafter. Take 2nd dose no later than 4:00pm.   Recommend taking bupropion for at least one week prior to quit date.   Typically recommend taking for at least 12 weeks. May consider taking for a longer-duration to prevent relapse in successful quit attempt.    Please reach out right away if you notice any changes in your mood after starting this.     Follow-up: with me (Nicky Hartman, [facility]) by [employer] message with updates on Humira coverage, then follow up for an MTM visit in around 3 months (2/20/24)     SUBJECTIVE/OBJECTIVE:                          Maura Curtis is a 48 year old female called for a follow-up visit from 10/2/23.       Reason for visit: Humira dose increase.    Medication Adherence/Access:   [place] [employer] [facility] - [employer] [facility]. Enrolled in a higher tier plan/coverage for 2024.   Rinvoq coverage:   - PA & appeal reattempted in Nov. Denied 11/10/23.   - PAP: annual income must be <= $87,480, does not qualify   Humira coverage:   - 80 mg weekly covered thru 3/13/24  - 160 mg weekly - pending     Hidradenitis suppurativa/Seborrheic dermatitis:   - Humira (adalimumab) 80 mg injection once weekly   - Resorcinol 15% cream twice daily as needed for flares (has not started, has not heard from  [facility])   - roflumilast 250 mcg daily x 4 weeks, then 500 mcg daily (has not started, admits she has not called pharmacy to give them the information for savings card)   - ketoconazole 2% shampoo: 2-3 times per week as needed, for dermatitis   - fluocinonide 0.05% ointment: as needed   - Rinvoq (upadacitinib) 15 mg tablet daily: pending coverage   - ILK injection intermittently (last dose 11/16/23)   Side effects: None.    Symptoms: chronic flaring of right arm - scheduled for surgery for this 2/8/24. Also reports HS flares along left arm. Intermittent outbreaks over the past 10 years affecting bilateral axilla, groin, thighs, buttocks.      Iesha Stage II     Specialist: Dr. Jean Kenny MD, Dermatology. Last visit on 11/16/23. Pink plaque on upper chest & back ; 2 draining tunnels + 1 inflammatory nodule of right axilla ; 1 inflammatory nodule of left axilla. The following was recommended:   - Humira 80mg weekly currently. Level only at 10. Will try to get 160mg weekly approved and recheck level in 1-2 months,  Goal level 16-20  - Working with medical management team trying to get Rinvoq approved by insurance  - Met with plastic surgery earlier this month and plans to have surgery done mid-January 2024  - Chantix started today with smoking quit date of 11/15/23 ( not mentioned today)     Previous treatment:   - infliximab infusion every 4 weeks (2019, not effective)   - Cosentyx (secukinumab) 300 mg every 28 days (Mar 2022, not effective)   - Xeljanz (tofacitinib) 10 mg twice daily (in addition to Cosentyx, not effective)   - Rapamune (sirolimus) 2 mg daily (2021)   - Intralesional kenalog injections intermittently and oral prednisone tapers   - Hormonal agents: spironolactone (Jun 2019 to Sept 2019; stopped due to abnormal uterine bleeding), metformin, finasteride  - Oral antibiotics: doxycyline, dapsone, clindamycin plus rifampin  - Topical therapies: clindamycin 1% solution, clobetasol 0.05% cream,  desonide 0.05%     Component      Latest Ref Rng 10/9/2023  8:13 AM   Adalimumab Activity      >=0.65 ug/mL 10.71    Adalimumab Neutralizing Antibody      Not Detected  Not Detected         Proportion of Days Covered for Adalimumab (from 5/23/2023 to 11/18/2023):     87%  of total days covered  (157/180 days) Recent Dispenses  11/02/2023 80 MG/0.8ML PNKT (disp 4, 28d supply)   10/02/2023 80 MG/0.8ML PNKT (disp 4, 28d supply)   08/17/2023 80 MG/0.8ML PNKT (disp 4, 28d supply)   07/11/2023 80 MG/0.8ML PNKT (disp 4, 28d supply)   06/16/2023 80 MG/0.8ML PNKT (disp 4, 28d supply)        Smoking Cessation:   - No current medications.     Patient was prescribed Chantix (varenicline) for smoking cessation, however insurance won't cover this since she has not tried nicotine replacement therapies or bupropion in the past. Not interested in pursuing coverage for Chantix at this time. She would be interested in starting bupropion if this is covered because she feels it could help her success. She has not taken this in the past, is aware with any antidepressant the importance of reporting changes/worsening in mood or thoughts of self harm.     Patient reports she has significantly cut back the number of cigarettes she smokes each week, no longer will go outside at night to smoke. Has set a quit date for 12/1/23 before her right arm plastic surgery in mid-January 2024     Weight Management:   - No current weight loss medications.     Tried for coverage of Ozempic Victoza, which were denied as patient does not have a Type 2 diabetes diagnosis. Then tried for Wegovy, which was denied as weight loss medications are excluded from pharmacy benefits. Reports she enrolled in a higher tier/coverage plan for 2024, she is not sure if Wegovy will be covered but would be interested in retrying for coverage of this.     Reports despite lifestyle interventions including dietary changes & physical activity - patient has experienced progressive  "weight gain. Previously noted that she attributes some of this to frequent prednisone tapers.   She has been working on increasing physical activity however admits the frequency/intensity of this is limited by concerns that sweating will lead to worsened HS flare.      Patient reported weight at last MTM visit (10/2/23): 250 lbs   Wt Readings from Last 5 Encounters:   11/16/23 114.8 kg (253 lb)   09/28/23 113.6 kg (250 lb 8 oz)   09/26/23 112.5 kg (248 lb)   08/31/23 114 kg (251 lb 4.8 oz)   06/22/23 111.7 kg (246 lb 2.9 oz)     Estimated body mass index is 39.57 kg/m  as calculated from the following:    Height as of 9/26/23: 1.703 m (5' 7.05\").    Weight as of 11/16/23: 114.8 kg (253 lb).    Allergies/ADRs: Reviewed in chart  Past Medical History: Reviewed in chart  Tobacco: She reports that she has been smoking cigarettes. She has a 10.00 pack-year smoking history. She has never used smokeless tobacco.  Nicotine/Tobacco Cessation Plan:   See notes above, patient has set a quit date for 12/1/23.  ----------------    I spent 20 minutes with this patient today. All changes were made via collaborative practice agreement with Dr. Kenny. A copy of the visit note was provided to the patient's provider(s).    A summary of these recommendations was sent via [employer].    Nicky Hartman, PharmD  Medication Therapy Management Pharmacist   [facility] Dermatology    Telemedicine Visit Details  Type of service:  Telephone visit  Start Time:  1:01pm  End Time:  1:21pm       Medication Therapy Recommendations  Nicotine dependence, uncomplicated, unspecified nicotine product type    Rationale: Untreated condition - Needs additional medication therapy - Indication   Recommendation: Start Medication - buPROPion 150 MG 12 hr tablet   Status: Accepted per CPA            "

## 2023-11-20 NOTE — TELEPHONE ENCOUNTER
Received voicemail from patient.    Left voicemail for patient. Provided direct number to discuss.    Inquiring if patient is willing to move surgery from 2/8 to 2/5 with Dr. Mendoza.  __    Archana Ortez, Senior Perioperative Coordinator, on 11/20/2023  P: [phone]

## 2023-11-21 ENCOUNTER — TELEPHONE (OUTPATIENT)
Dept: DERMATOLOGY | Facility: CLINIC | Age: 48
End: 2023-11-21
Payer: COMMERCIAL

## 2023-11-21 NOTE — LETTER
November 27, 2023      [URGENT]: Appeals & Grievances                                      Re: Prior Authorization Request   Plan: [employer] [facility]    Patient: Klarissa Curtis   Member ID: [id number] YOB: 1975       To Whom It May Concern:    I am writing this letter of medical necessity as a dermatologist caring for Klarissa Curtis with regard to the medical necessity of adalimumab to treat the patient's diagnosis of Hidradenitis Suppurativa (L73.2).    Treatment requested: Adalimumab  Requested Dose: 160 mg subcutaneous injection once weekly maintenance   Prior treatments tried:  - Humira (adalimumab) 80 mg once weekly (10/2020 to 10/2021, restarted 10/2022 to Present): moderately effective, continues to experience chronic flares, low adalimumab concentration/level  - Humira (adalimumab) 40 mg weekly (11/2018 to 09/2019): not effective   - Infliximab 5 mg/kg infusion every 4 weeks (12/2019 to 04/2020): not effective, limited response   - Cosentyx (secukinumab) 300 mg every 28 days (10/2021 to 03/2022): not effective, limited response  - Xeljanz (tofacitinib) 10 mg twice daily (03/2022 to 09/2022): not effective   - Rapamune (sirolimus) 2 mg daily (04/2021 to 09/2021): not effective, developed psoriasis-form rash  - Intralesional kenalog injections intermittently: short-term/temporary response  - Oral prednisone tapers as needed: short-term/temporary response  - Hormonal agents: spironolactone (stopped due to abnormal uterine bleeding), metformin, finasteride  - Oral antibiotics: doxycyline 100 mg twice daily, dapsone 100 mg daily, clindamycin 300 mg twice daily plus rifampin 600 mg daily   - Topical therapies: clindamycin 1% solution, clobetasol 0.05% cream, desonide 0.05% ointment, resorcinol 15% cream   - Upcoming surgery for excision of the right axilla (Feb 2024)     Evidence of disease severity: Julian stage II    I recently prescribed this patient a dose escalation in their adalimumab, however  the medication was denied due to the higher requested maintenance dose of 160 mg once weekly. I have reviewed the patient's diagnosis, care plan, clinical literature and guidelines for treatment and request a formal appeal of your denial for adalimumab.    I am writing to highlight the severe nature of this patient's condition. They have suffered from hidradenitis suppurativa (HS) for many years and have extensive disease affecting the bilateral axilla, groin, thighs, and buttocks. The severity of their skin condition has affected their life in many ways including but not limited to chronic pain, ability to perform activities of daily living, missed work/disability. They have tried numerous treatments listed above, as well as current treatment with Humira (adalimumab), one of two FDA-approved treatments for moderate-to-severe HS. However, they have had inadequate clinical response with their refractory condition. Due to the severity of their condition and the significant impact this disease has had on their quality of life, I strongly believe this patient needs access to an intensified maintenance dose regimen of Humira (adalimumab) 160 mg weekly.     Hidradenitis suppurativa (HS) is a chronic skin condition that primarily affects intertriginous areas (armpits, groin, inframammary skin, gluteal cleft) and is characterized by recurrent painful, malodorous nodules and abscesses that can lead to scarring, sinus tracts, and fistulas overtime. HS has a devastating impact on quality of life - contributing to chronic pain, poor mental health, substance use disorder, impaired intimate relationships, and suicide rates 2.4 times that of the general population. Finding the most effective treatment for each patient is extremely important for restoring quality of life and preventing long-term complications that arise from long-standing, poorly controlled disease.    My recommendation for adalimumab dose intensification to 160 mg  weekly to treat this patient's HS is based on and supported by the following:   Adalimumab is one of two FDA approved medications for the treatment of moderate-severe Hidradenitis suppurativa including Cosentyx (secukinumab) which the patient tried for over 3 months without clinical improvement in HS symptoms. In addition, the patient has an inadequate response to numerous other systemic and topical therapies discussed above.   The patient has experienced the most substantial clinical response with Humira (adalimumab) vs numerous alternatives treatment. However, despite her previous clinical response to adalimumab, her chronic recalcitrant condition continues to flare on her current maintenance dose.   The patient's adalimumab dose was intensified to 80 mg weekly in Oct 2022 and she initially experienced an improvement in the management of her chronic HS. However, now after 12 months on this maintenance regimen the patient has continued to experience chronic flares in their HS. Ms. Curtis consistently & appropriately administers her adalimumab once weekly with an estimated PDC score (percent of days covered) of 90% adherence.   The patient's trough adalimumab activity was drawn on 10/09/2023. The results showed her adalimumab drug concentration level was 10.71 ?g/mL and adalimumab neutralizing antibodies were not detected. Due to the patient sub-therapeutic trough level of adalimumab and the undetectable anti-drug antibody, this supports that dose optimization to the maximally beneficial drug tough concentration is recommended for this patient.   There is ample evidence that higher doses may be required for a subset of patients with HS that have insufficient response or have progressive efficacy loss after initial response to adalimumab 40mg/week. There was recent data presented at the Symposium for Hidradenitis Suppurativa in 2018 that demonstrated many HS patients get improved control with 80mg weekly adalimumab.  Furthermore, a recent paper published in 2020 showed improvement in the severity of patients' HS as well as reduction in pain and improvement in quality of life following dose escalation of adalimumab from 40mg/week to 80mg/week.16  Liza CC, Rey H, Ronit Corey RD, et al. Adalimumab Dose Intensification in Recalcitrant Hidradenitis Suppurativa/Acne Inversa. Dermatology. 2020;236(1):25-30. PMID: 42207488  There is limited literature available in optimized therapeutic drug monitoring (TDM) of adalimumab in the treatment of Hidradenitis suppurativa. However, their are numerous published studies examining TDM in the treatment of IBD (irritable bowel disease) and this data may be used to guide recommendations in HS.  A treat-to-target therapeutic approach is emerging as the new standard of care for treating IBD and a published study examined the association of serum adalimumab concentrations during maintenance therapy and remission of Crohn s disease (CD).17 This study found that adalimumab concentrations ?12 ?g/mL were associated with remission in CD and more specifically they identified that concentrations of 16-20 ?g/mL were associated with an even greater remission rate. Furthermore, this supports the safety and need to intensify the patients maintenance regimen due to their drug levels below 12 ?g/mL and we would aim for a therapeutic goal level of adalimumab activity between 16-20 ?g/mL.17    In my clinical opinion as a dermatologist, supported by the medical literature described above, the patients adherence, clinical response to adalimumab dose intensification in the past, poor response to numerous other medications in the past, and low adalimumab trough levels, that adalimumab dose escalation to 160 mg weekly with close therapeutic monitoring is the most appropriate next step in treating this patient's HS and restoring their quality of life.      On behalf of this patient, I would be grateful for your  prompt approval of this medication. This patient will clearly continue to suffer without treatment and no other superior or equivalent options exist at this point. Please let me know if I can provide any further support, clarification, or assistance that will help result in coverage of this medication.  Thank you for your time and consideration.  I am happy to discuss this further and would appreciate the chance to do so if this medication is not approved.      Sincerely,    Jean Kenny MD, FAAD, FACP     Departments of Internal Medicine and Dermatology  [facility]  [phone]    Office Contact:   Archana Agosto  Pharmacy Liaison Dermatology  P: [phone]  F: [phone]  [email or web address]    References:  1.  Julio P, Jazlyn A, Grover K, Gus P, Joan J. Quality of life impairment in hidradenitis suppurativa: a study of 61 cases. J Am Acad Dermatol. 2007;56(4):621-3. PMID: 15765085  2.  Cam IH, Refugio M, Yesy C, et al. Uncovering burden disparity: A comparative analysis of the impact of moderate-to-severe psoriasis and hidradenitis suppurativa. J Am Acad Dermatol. 2017;77(6):1038-46. PMID: 43322999  3.  Hans ZS, Tera LK, Jon DC, et al. Pain, Psychological Comorbidities, Disability, and Impaired Quality of Life in Hidradenitis Suppurativa [corrected]. Curr Pain Headache Rep. 2017;21(12):49. PMID: 09699361  4.  Frankjk AJ, van sarah Ciara HH, Ines S, et al. Depression in patients with hidradenitis suppurativa. J Eur Acad Dermatol Venereol. 2013;27(4):473-8. PMID: 85490176  5.  Neeta ARCHULETA, Linda V, Virgie M, Valentin A, Oscar SANDOVAL. Opioid, alcohol, and cannabis misuse among patients with hidradenitis suppurativa: A population-based analysis in the [place]. J Am Acad Dermatol. 2018;79(3):495-500.e1. PMID: 91343166  6.  Forrest SEVILLA, Ese TATUM, zoey Scott AD, et al. Sexual health and quality of life are impaired in hidradenitis suppurativa: a  multicentre cross-sectional study. Br J Dermatol. 2017;176(4):1042-7. PMID: 94663580  7. Samaria C, Evangelina R, Daniel ARCHULETA. Sexual Distress in Patients with Hidradenitis Suppurativa: A Cross-Sectional Study. J Clin Med. 2019;8(4):E532. PMID: 55116157  8.  Gilberto L, Carmelo AD, Fadi GH, Abhijeet GBE, Raman A. Increased Suicide Risk in Patients with Hidradenitis Suppurativa. J Invest Dermatol. 2018;138(1):52-7. PMID: 92270353  9. Terell AB, Timbo MARTIN, Allan DA, et al. Two Phase 3 Trials of Adalimumab for Hidradenitis Suppurativa. N Engl J Med. 2016;375(5):422-34. PMID: 87407185  10.  Liza TREVIÑO, Timbo MARTIN, Ida EP, et al. Long-term adalimumab efficacy in patients with moderate-to-severe hidradenitis suppurativa/acne inversa: 3-year results of a phase 3 open-label extension study. J Am Acad Dermatol. 2019;80(1):60-69.e2. PMID: 01340893  11.  Galen N, Refugio M, Antoinette H, Marcus Y, Timbo M. Reduction in pain scores and improvement in depressive symptoms in patients with hidradenitis suppurativa treated with adalimumab in a phase 2, randomized, placebo-controlled trial. Dermatol Online J. 2016;22(3):60926/[id number]. PMID: 35713226  12. Michi A, Cris C, Darius A, et al. North American clinical management guidelines for hidradenitis suppurativa: A publication from the [place] and [place] Hidradenitis Suppurativa Foundations: Part II: Topical, intralesional, and systemic medical management. J Am Acad Dermatol. 2019;81(1):. PMID: 99711552  13. Tabby JR, Iker F, Quentin D, et al. [place] Association of Dermatologists guidelines for the management of hidradenitis suppurativa (acne inversa) 2018. Br J Dermatol. 2019;180(5):1009-17. PMID: 52851696  14. Liza CC, Richard FG, Baldemar MCKEON, et al. Hidradenitis suppurativa/acne inversa: a practical framework for treatment optimization - systematic review and recommendations from the HS ALLIANCE working group. J Eur Acad  Dermatol Venereol. 2019;33(1):19-31. PMID: 28921837  15. Liza TREVIÑO, Rubin N, Emtestam L, et al.  S1 guideline for the treatment of hidradenitis suppurativa/acne inversa. J Eur Acad Dermatol Venereol. 2015;29(4):619-44. PMID: 58793695  16.  Liza TREVIÑO, Rey H, Ronit Corey RD, et al. Adalimumab Dose Intensification in Recalcitrant Hidradenitis Suppurativa/Acne Inversa. Dermatology. 2020;236(1):25-30. PMID: 63352122   17. Natasha ARCHULETA., Papangeline K, Srini B, Nathen A. Maintenance Adalimumab Concentrations Are Associated with Biochemical, Endoscopic, and Histologic Remission in Infammatory Bowel Disease. Digestive diseases and sciences. 2018;63(11):1861-1208. PMID: 64936503

## 2023-11-21 NOTE — PATIENT INSTRUCTIONS
"Recommendations from today's MTM visit:                                                       I sent a prescription for Humira 160 mg once weekly. I will help work on the PA & submit appeal to insurance.   Pending coverage: Increase Humira to 160 mg once weekly, then recheck level in around 2-3 months (Goal level between 16 to 20 micrograms/mL)     Resent orders for Resorcinol 15% cream to [facility] (P#: [phone]). Please call them to set up delivery.     Looked into Rinvoq income criteria and unfortunately you are not eligible, however we could consider retrying for insurance coverage of Rinvoq when you switch to your new plan in 2024    Called [facility] pharmacy to discuss applying savings card to roflumilast. Due to lower cost of roflumilast 500 mcg tablets vs 250 mcg tablets - I sent a new prescription for roflumilast 500 mcg tablet to split for the first 4 weeks.   Instructions: Take roflumilast 0.5 tablet daily x 4 weeks, then increase to 1 tablet daily thereafter.    Start Bupropion  mg tablet once daily for 3 days, then increase to 1 tablet twice daily thereafter. Take 2nd dose no later than 4:00pm to avoid disruption to sleep.   Recommend taking bupropion for at least one week prior to quit date.   Typically recommend taking for at least 12 weeks. May consider taking for a longer-duration if it has been helpful in smoking cessation   Please reach out right away if you notice any changes in your mood after starting this.     Follow-up: with me (Nicky Hartman [facility]) by [email or web address] message with updates on Humira coverage, then follow up for an MTM visit in around 3 months (2/20/24)     It was great speaking with you today.  I value your experience and would be very thankful for your time in providing feedback in our clinic survey. In the next few days, you may receive an email or text message from [employer] with a link to a survey related to your  clinical pharmacist.\"     To schedule another MTM " appointment, please call the clinic directly or you may call the [facility] scheduling line at [phone] or toll-free at [phone].     My Clinical Pharmacist's contact information:                                                      Please feel free to contact me with any questions or concerns you have.      Nicky Hartman, PharmD  Medication Therapy Management Pharmacist   [facility]

## 2023-11-21 NOTE — TELEPHONE ENCOUNTER
PA Initiation    Medication: HUMIRA PEN 80 MG/0.8ML SC PNKT  Insurance Company: [employer] ([facility]) - Phone [phone] Fax [phone]  Pharmacy Filling the Rx: [facility] - [place], [place] - [address]  Filling Pharmacy Phone:    Filling Pharmacy Fax:    Start Date: 11/21/2023    Key: [id number]

## 2023-11-24 NOTE — TELEPHONE ENCOUNTER
Received voicemail from patient on 11/24.    Attempted to call patient back, no answer.  __    Archana Ortez, Senior Perioperative Coordinator, on 11/24/2023  P: [phone]

## 2023-11-24 NOTE — TELEPHONE ENCOUNTER
PRIOR AUTHORIZATION DENIED    Medication: HUMIRA PEN 80 MG/0.8ML SC PNKT  Insurance Company: [email or web address] ([facility]) - Phone [phone] Fax [phone]  Denial Date: 11/23/2023  Denial Rational:   Appeal Information:[phone]  Patient Notified:

## 2023-11-24 NOTE — TELEPHONE ENCOUNTER
Left voicemail for patient regarding rescheduling surgery with Dr. Mendoza.    Provided direct contact number to discuss.    P: [phone]    __    Archana Ortez, Senior Perioperative Coordinator, on 11/24/2023 at 2:43 PM

## 2023-11-27 NOTE — TELEPHONE ENCOUNTER
Working on appeal for Humira 160 mg weekly maintenance dose. Need to discuss recommendations for this further with provider. Was unable to find any literature or case reviews to support  adalimumab therapeutic target level of 16 to 20 ug/mL. Alternatively, may consider reloading with adalimumab 160 mg x 1 dose, then continue with 80 mg once weekly to increase adalimumab serum concentration. Will discuss this further with Dr. Kenny and complete appeal letter after this.     Nicky Hartman, PharmD  MTM Pharmacist

## 2023-11-28 ENCOUNTER — MYC MEDICAL ADVICE (OUTPATIENT)
Dept: DERMATOLOGY | Facility: CLINIC | Age: 48
End: 2023-11-28
Payer: COMMERCIAL

## 2023-12-05 NOTE — TELEPHONE ENCOUNTER
Spoke with patient regarding rescheduling surgery with Dr. Mendoza to 2/5 due to opening.    Patient in agreement of this.    Patient is requesting information regarding recovery time. Will ask RNCC Jaqueline to send this via [employer] per patient request.     __    Archana Ortez, Senior Perioperative Coordinator, on 12/5/2023 at 5:04 PM  P: [phone]

## 2023-12-07 NOTE — TELEPHONE ENCOUNTER
Routing to Archana - please submit appeal letter from 11/27/23. Thank you.     Nicky Hartman, PharmD  MTM Pharmacist

## 2023-12-07 NOTE — TELEPHONE ENCOUNTER
Medication Appeal Initiation    Medication: HUMIRA PEN 80 MG/0.8ML SC PNKT  Appeal Start Date:  12/7/2023  Insurance Company: [employer] Phone: [phone]  Insurance Fax: [phone]  Comments:   faxed appeal

## 2023-12-12 ENCOUNTER — MYC MEDICAL ADVICE (OUTPATIENT)
Dept: RHEUMATOLOGY | Facility: CLINIC | Age: 48
End: 2023-12-12
Payer: COMMERCIAL

## 2023-12-12 NOTE — TELEPHONE ENCOUNTER
Please advise, all appeal have been denied and per notes earlier in the year patient doesn't qualify for patient assistance program due income

## 2023-12-12 NOTE — TELEPHONE ENCOUNTER
MEDICATION APPEAL DENIED    Medication: HUMIRA PEN 80 MG/0.8ML SC PNKT  Insurance Company: [employer]  Denial Date: 12/8/2023  Denial Reason(s):   Second Level Appeal Information: not available  Patient Notified              :

## 2023-12-13 ENCOUNTER — TELEPHONE (OUTPATIENT)
Dept: PLASTIC SURGERY | Facility: CLINIC | Age: 48
End: 2023-12-13
Payer: COMMERCIAL

## 2023-12-13 NOTE — TELEPHONE ENCOUNTER
FATOUMATA, sent mychart to reschedule the following appointment:    Appt on 2/14/24 with Dr. Mendoza due to the provider not being available. Per Jaqueline SOMMER RN, reschedule with Cherelle Au on 2/13/24 instead at the end of clinic (12 pm, 1220 pm, 1240 pm). Left direct #

## 2023-12-15 ENCOUNTER — TELEPHONE (OUTPATIENT)
Dept: DERMATOLOGY | Facility: CLINIC | Age: 48
End: 2023-12-15
Payer: COMMERCIAL

## 2023-12-15 NOTE — TELEPHONE ENCOUNTER
Prior Authorization Retail Medication Request    Medication/Dose: roflumilast (DALIRESP) 500 MCG TABS tablet  Diagnosis and ICD code (if different than what is on RX):  see chart  New/renewal/insurance change PA/secondary ins. PA:   Previously Tried and Failed:  see chart  Rationale:  see chart    Insurance   Primary: [employer]   Insurance ID:  [id number]     Secondary (if applicable): N/A  Insurance ID:  N/A

## 2023-12-20 ENCOUNTER — MYC MEDICAL ADVICE (OUTPATIENT)
Dept: DERMATOLOGY | Facility: CLINIC | Age: 48
End: 2023-12-20
Payer: COMMERCIAL

## 2023-12-20 NOTE — TELEPHONE ENCOUNTER
PA Initiation    Medication: ROFLUMILAST 500 MCG PO TABS  Insurance Company: [employer] ([facility]) - Phone [phone] Fax [phone]  Pharmacy Filling the Rx: [facility] [address]  Filling Pharmacy Phone: [phone]  Filling Pharmacy Fax: [phone]  Start Date: 12/20/2023

## 2023-12-20 NOTE — TELEPHONE ENCOUNTER
Prior Authorization Not Needed per Insurance    Medication: ROFLUMILAST 500 MCG PO TABS  Insurance Company: [employer] ([facility]) - Phone [phone] Fax [phone]  Expected CoPay: $    Pharmacy Filling the Rx: [facility] [address]  Pharmacy Notified: n  Patient Notified: n    Denial on file in 10/16 encounter. Any considerations for further coverage will need to be completed there.

## 2023-12-27 NOTE — TELEPHONE ENCOUNTER
Rescheduled surgery per patient request due to work trip the week of 2/5.     Spoke with the patient and was able to confirm all scheduled information.     Surgery is rescheduled with Dr. Mendoza   Date: 2/29   Location: [facility]      H&P to be completed by: PAC clinic - scheduled on 2/12, video visit    Post-op: rescheduled to 3/6 with Dr. Mendoza, [facility]      Patient will receive surgery arrival and start time from PAC. Patient is aware that if times change after they see PAC, they will receive a call from the pre-admission nurses 1-2 days prior to surgery with updated arrival time and NPO instructions.       Patient questions/concerns: N/A       __    Archana Ortez, Senior Perioperative Coordinator, on 12/27/2023 at 4:14 PM  P: [phone]

## 2023-12-28 ENCOUNTER — VIRTUAL VISIT (OUTPATIENT)
Dept: DERMATOLOGY | Facility: CLINIC | Age: 48
End: 2023-12-28
Payer: COMMERCIAL

## 2023-12-28 DIAGNOSIS — L73.2 HIDRADENITIS SUPPURATIVA: Primary | ICD-10-CM

## 2023-12-28 PROCEDURE — 99443 PR PHYSICIAN TELEPHONE EVALUATION 21-30 MIN: CPT | Performed by: DERMATOLOGY

## 2023-12-28 RX ORDER — OXYCODONE AND ACETAMINOPHEN 5; 325 MG/1; MG/1
TABLET ORAL
Qty: 56 TABLET | Refills: 0 | Status: ON HOLD | OUTPATIENT
Start: 2023-12-28 | End: 2024-02-29

## 2023-12-28 ASSESSMENT — PAIN SCALES - GENERAL: PAINLEVEL: MODERATE PAIN (4)

## 2023-12-28 NOTE — LETTER
12/28/2023       RE: Klarissa Curtis  [address]  [place] [address]     Dear Colleague,    Thank you for referring your patient, Klarissa Curtis, to the [facility] [place] at [facility]. Please see a copy of my visit note below.    [facility] Dermatology Note  Encounter Date: Dec 28, 2023  Telephone ([phone]). Location of teledermatologist: [facility] [place]. Start time:   6:48am  End time: 7:25pmn    Dermatology Problem List:  1. Hidradenitis suppurativa: diagnosed age 11, initially on waistline (used to weigh 300 lbs) which cleared up -> intermittent outbreaks -> for the past 10 years has had in bilateral axilla, groin, thighs, and buttocks.   - Humira, Percocet, resorcinol cream  - Chantix started 11/16//23 for smoking quit date of 11/15/23 before right arm plastic surgery in mid-January 2024  - Flare plan: ILK 60 injections, Prednisone 60 mg x 1 week, 50 mg x 1 week, 40 mg x 1 week (she does not like to taper below 40 mg as she flares)  - I&D x2 to lesions on the R axilla - 8/4/22  - Prior: gabapentin, Xeljanz, infliximab, prednisone taper, clobetasol, topical morphine, ILK, oral antibiotics, rifampin, sirolimus 2 mg (started 4/22/21) finasteride, spironolactone (6/2019- 9/5/19, stopped due to abnormal uterine bleeding), topical morphine gel, and percocet for severe pain, Cosentyx, belbuca  2. Keratosis pilaris  3. L olecranon bursitis.   4. Dermatographis  -  allegra 180mg  5. Eruption NOS,   - punch biopsy 8/5/2021: superficial and deep perivascular and interstitial infiltrate of neutrophils, with focal leukocytoclasis and bluish, amorphous material suggestive of neutrophil degranulation, and lesser amounts of eosinophils and lymphocytes. Background dermal edema is noted. Focal neutrophilic epitheliotropism is noted.  - Resolved  6. Pink linear patches,  ddx: follicular dermatitis vs lichen spinulosus vs KP, posterior shoulders   - amlactin, lidex   7. Seborrheic dermatitis  - desonide 0.05% ointment, ketoconazole 2% shampoo     Soc Hx: [place]  of her company has offered to help support with insurance however he can    _____________________________________    Assessment & Plan:   # Hidradenitis suppurativa, Stage II.   - Proceed with excision and regional flap reconstruction of right axilla as scheduled for 2/29/24 with Dr. Mendoza.  - Continue Percocet PRN for pain. Refilled. If continues to require more freuqnet doing will switch back to bupronerphine, but NOT buprenoprhine/naloxone. - Percocet refilled today  - Continue calcium 1200 mg and vitamin D 2000 IUs  - Marijuana for pain PRN  - Kenalog injections administered 9/28/23, 11/16/23     2) Eruption NOS - Upper chest and back  - international unit(s) did biopsy a previous eruption in the same area. Histology was not specific for any specific dermatosios. DDx: dermatitis vs PMLE vs REM vs dermal hypersensitivity reaction  - The prior eruption was not steroid responsive, but recently thsi rsh hs been more steroid responsive.   - She has lidex at home, will have her use that on the rash.    Follow-up:  6 weeks via phone    Staff and Scribe:     Jean Kenny MD, FAAD, FACP     Departments of Internal Medicine and Dermatology  [facility]  [phone]        ______________________________________    CC: Follow-up HS    HPI:  Ms. Klarissa Curtis is a(n) 48 year old female who presents today for follow-up  for HS.    Rt arm flaring and left arm flaring  Left groin tunnel has been draining     Left buttock new lesion    Her current regimen is humira 80-mg once a week, she got the roflumilast which she hasn't started yet.     Resorcinol cream twice a day does seem helping for HS flares    quit date of  1/123      Medications:  Current Outpatient Medications   Medication    adalimumab (HUMIRA *CF* PEN-CD/UC/HS STARTER) 80 MG/0.8ML pen kit    adalimumab (HUMIRA PEN) 80 MG/0.8ML pen kit    buPROPion (WELLBUTRIN SR) 150 MG 12 hr tablet    calcium carbonate (OS-MERCY) 1500 (600 Ca) MG tablet    fexofenadine (ALLEGRA) 180 MG tablet    fluocinonide (LIDEX) 0.05 % external ointment    ketoconazole (NIZORAL) 2 % external shampoo    medical cannabis (Patient's own supply)    oxyCODONE-acetaminophen (PERCOCET) 5-325 MG tablet    Resorcinol POWD    roflumilast (DALIRESP) 250 MCG TABS tablet    roflumilast (DALIRESP) 500 MCG TABS tablet    roflumilast (DALIRESP) 500 MCG TABS tablet    traZODone (DESYREL) 150 MG tablet    VITAMIN D3 50 MCG (2000 UT) tablet     Current Facility-Administered Medications   Medication    triamcinolone acetonide (KENALOG-10) injection 40 mg    triamcinolone acetonide (KENALOG-10) injection 60 mg      Past Medical/Surgical History:   Patient Active Problem List   Diagnosis    Hidradenitis suppurativa    Fatigue

## 2023-12-28 NOTE — PROGRESS NOTES
[facility] Dermatology Note  Encounter Date: Dec 28, 2023  Telephone ([phone]). Location of teledermatologist: [facility]MAT[facility] [place]. Start time:   6:48am  End time: 7:25pmn    Dermatology Problem List:  1. Hidradenitis suppurativa: diagnosed age 11, initially on waistline (used to weigh 300 lbs) which cleared up -> intermittent outbreaks -> for the past 10 years has had in bilateral axilla, groin, thighs, and buttocks.   - Humira, Percocet, resorcinol cream  - Chantix started 11/16//23 for smoking quit date of 11/15/23 before right arm plastic surgery in mid-January 2024  - Flare plan: ILK 60 injections, Prednisone 60 mg x 1 week, 50 mg x 1 week, 40 mg x 1 week (she does not like to taper below 40 mg as she flares)  - I&D x2 to lesions on the R axilla - 8/4/22  - Prior: gabapentin, Xeljanz, infliximab, prednisone taper, clobetasol, topical morphine, ILK, oral antibiotics, rifampin, sirolimus 2 mg (started 4/22/21) finasteride, spironolactone (6/2019- 9/5/19, stopped due to abnormal uterine bleeding), topical morphine gel, and percocet for severe pain, Cosentyx, belbuca  2. Keratosis pilaris  3. L olecranon bursitis.   4. Dermatographis  -  allegra 180mg  5. Eruption NOS,   - punch biopsy 8/5/2021: superficial and deep perivascular and interstitial infiltrate of neutrophils, with focal leukocytoclasis and bluish, amorphous material suggestive of neutrophil degranulation, and lesser amounts of eosinophils and lymphocytes. Background dermal edema is noted. Focal neutrophilic epitheliotropism is noted.  - Resolved  6. Pink linear patches, ddx: follicular dermatitis vs lichen spinulosus vs KP, posterior shoulders   - amlactin, lidex   7. Seborrheic dermatitis  - desonide 0.05% ointment, ketoconazole 2% shampoo     Soc Hx: She works [place] an [place].  of her company has offered to help support with insurance however he  can    _____________________________________    Assessment & Plan:   # Hidradenitis suppurativa, Stage II.   - Proceed with excision and regional flap reconstruction of right axilla as scheduled for 2/29/24 with Dr. Mendoza.  - Continue Percocet PRN for pain. Refilled. If continues to require more freuqnet doing will switch back to bupronerphine, but NOT buprenoprhine/naloxone. - Percocet refilled today  - Continue calcium 1200 mg and vitamin D 2000 IUs  - Marijuana for pain PRN  - Kenalog injections administered 9/28/23, 11/16/23     2) Eruption NOS - Upper chest and back  - international unit(s) did biopsy a previous eruption in the same area. Histology was not specific for any specific dermatosios. DDx: dermatitis vs PMLE vs REM vs dermal hypersensitivity reaction  - The prior eruption was not steroid responsive, but recently thsi rsh hs been more steroid responsive.   - She has lidex at home, will have her use that on the rash.    Follow-up:  6 weeks via phone    Staff and Scribe:     Jean Kenny MD, FAAD, FACP     Departments of Internal Medicine and Dermatology  [facility]  [phone]        ______________________________________    CC: Follow-up HS    HPI:  Ms. Klarissa Curtis is a(n) 48 year old female who presents today for follow-up  for HS.    Rt arm flaring and left arm flaring  Left groin tunnel has been draining     Left buttock new lesion    Her current regimen is humira 80-mg once a week, she got the roflumilast which she hasn't started yet.     Resorcinol cream twice a day does seem helping for HS flares    quit date of 1/123      Medications:  Current Outpatient Medications   Medication    adalimumab (HUMIRA *CF* PEN-CD/UC/HS STARTER) 80 MG/0.8ML pen kit    adalimumab (HUMIRA PEN) 80 MG/0.8ML pen kit    buPROPion (WELLBUTRIN SR) 150 MG 12 hr tablet    calcium carbonate (OS-MERCY) 1500 (600 Ca) MG tablet    fexofenadine (ALLEGRA) 180 MG tablet    fluocinonide  (LIDEX) 0.05 % external ointment    ketoconazole (NIZORAL) 2 % external shampoo    medical cannabis (Patient's own supply)    oxyCODONE-acetaminophen (PERCOCET) 5-325 MG tablet    Resorcinol POWD    roflumilast (DALIRESP) 250 MCG TABS tablet    roflumilast (DALIRESP) 500 MCG TABS tablet    roflumilast (DALIRESP) 500 MCG TABS tablet    traZODone (DESYREL) 150 MG tablet    VITAMIN D3 50 MCG (2000 UT) tablet     Current Facility-Administered Medications   Medication    triamcinolone acetonide (KENALOG-10) injection 40 mg    triamcinolone acetonide (KENALOG-10) injection 60 mg      Past Medical/Surgical History:   Patient Active Problem List   Diagnosis    Hidradenitis suppurativa    Fatigue

## 2023-12-28 NOTE — NURSING NOTE
Dermatology Rooming Note    Klarissa Curtis's goals for this visit include:   Chief Complaint   Patient presents with    Derm Problem     HS follow up.  Pain is constant, not overwhelming.  Area of concern: armpit, groin, buttocks      Jen Min CMA

## 2023-12-30 NOTE — PATIENT INSTRUCTIONS
Message from billing: It took a very long time for the copay assistance company to get back to us about the history of the money and where it went.  What they told us is that the patient was approved for assistance on the 2 Remicade infusions back on 12.09.19 and 12.23.19.  This program only covers the drug and not the costs of the administration or any other visits.  They approved $2025.48 for 12.09 and $987.14 for 12.23 which covers the cost of the Remicade after insurance paid.  These funds were loaded onto a virtual debit card and it was used on 05.28.2020 to payoff part of her outstanding bills during this time.  Initially we had struggled to see where this money had gone but I was able to locate the payments:  From what I can see the patients current outstanding bills are related to telephone and office visits with the derm providers. The 2 infusion visits left her with bills that totaled $3,944.22 and $3012.62 of that was paid off by the Remicade copay assistance program.  It appears that she has an insurance plan with a pretty high deductible and out of pocket max that is causing her to have quite expensive bills for all doctor visits and not just infusion related things.

## 2024-01-02 ENCOUNTER — TELEPHONE (OUTPATIENT)
Dept: DERMATOLOGY | Facility: CLINIC | Age: 49
End: 2024-01-02
Payer: COMMERCIAL

## 2024-01-05 ENCOUNTER — TELEPHONE (OUTPATIENT)
Dept: DERMATOLOGY | Facility: CLINIC | Age: 49
End: 2024-01-05
Payer: COMMERCIAL

## 2024-01-05 NOTE — TELEPHONE ENCOUNTER
Lvm sent my chart msg for patient to scheduled the following:    Appointment type: Return HS  Provider: Dr. Kenny  Return date: 02-15-24  Specialty phone number: [phone]

## 2024-01-11 ENCOUNTER — OFFICE VISIT (OUTPATIENT)
Dept: DERMATOLOGY | Facility: CLINIC | Age: 49
End: 2024-01-11
Payer: COMMERCIAL

## 2024-01-11 ENCOUNTER — PATIENT OUTREACH (OUTPATIENT)
Dept: CARE COORDINATION | Facility: CLINIC | Age: 49
End: 2024-01-11

## 2024-01-11 VITALS — WEIGHT: 253 LBS | BODY MASS INDEX: 39.57 KG/M2

## 2024-01-11 DIAGNOSIS — L73.2 HIDRADENITIS SUPPURATIVA: Primary | ICD-10-CM

## 2024-01-11 DIAGNOSIS — Z71.89 COUNSELING AND COORDINATION OF CARE: Primary | ICD-10-CM

## 2024-01-11 DIAGNOSIS — F32.A DEPRESSION, UNSPECIFIED DEPRESSION TYPE: ICD-10-CM

## 2024-01-11 PROCEDURE — 99214 OFFICE O/P EST MOD 30 MIN: CPT | Mod: 25 | Performed by: DERMATOLOGY

## 2024-01-11 PROCEDURE — 11900 INJECT SKIN LESIONS </W 7: CPT | Performed by: DERMATOLOGY

## 2024-01-11 RX ORDER — DULOXETIN HYDROCHLORIDE 60 MG/1
60 CAPSULE, DELAYED RELEASE ORAL DAILY
Qty: 90 CAPSULE | Refills: 3 | Status: SHIPPED | OUTPATIENT
Start: 2024-01-11 | End: 2024-01-11

## 2024-01-11 ASSESSMENT — PAIN SCALES - GENERAL: PAINLEVEL: SEVERE PAIN (7)

## 2024-01-11 NOTE — NURSING NOTE
Drug Administration Record    Prior to injection, verified patient identity using patient's name and date of birth.  Due to injection administration, patient instructed to remain in clinic for 15 minutes  afterwards, and to report any adverse reaction to me immediately.    Drug Name: triamcinolone acetonide(kenalog)  Dose: 5mL of triamcinolone 10mg/mL, 50mg dose  Route administered: ID  Amount of waste(mL):none    Drug Name: triamcinolone acetonide(kenalog)  Dose: 1mL of triamcinolone 10mg/mL, 10mg dose  Route administered: ID  Amount of waste(mL):4ml  Reason for waste: Single use vial    SEE MAR if needed

## 2024-01-11 NOTE — PATIENT INSTRUCTIONS
PLAN  - Start duloxetine 60mg daily after 1 week increase to 60mg twice a day  4 week phone follow-up

## 2024-01-11 NOTE — NURSING NOTE
Dermatology Rooming Note    Klarissa Curtis's goals for this visit include:   Chief Complaint   Patient presents with    Derm Problem     HS flare.  Buttocks and right armpit.       Jen Min CMA

## 2024-01-11 NOTE — LETTER
1/11/2024       RE: Klarissa Curtis  [address]  [place] [address]     Dear Colleague,    Thank you for referring your patient, Klarissa Curtis, to the [facility] DERMATOLOGY [facility] [place] at [facility]. Please see a copy of my visit note below.    [facility] Dermatology Note  Encounter Date: Jan 11, 2024  Office Visit     Dermatology Problem List:  1. Hidradenitis suppurativa: diagnosed age 11, initially on waistline (used to weigh 300 lbs) which cleared up -> intermittent outbreaks -> for the past 10 years has had in bilateral axilla, groin, thighs, and buttocks.   - Humira, Percocet, Resorcinol  - Chantix started 11/16//23 for smoking quit date of 11/15/23 before right arm plastic surgery in mid-January 2024  - Flare plan: ILK 60 injections, Prednisone 60 mg x 1 week, 50 mg x 1 week, 40 mg x 1 week (she does not like to taper below 40 mg as she flares)  - I&D x2 to lesions on the R axilla - 8/4/22  - Prior: gabapentin, Xeljanz, infliximab, prednisone taper, clobetasol, topical morphine, ILK, oral antibiotics, rifampin, sirolimus 2 mg (started 4/22/21) finasteride, spironolactone (6/2019- 9/5/19, stopped due to abnormal uterine bleeding), topical morphine gel, and percocet for severe pain, Cosentyx, belbuca  2. Keratosis pilaris  3. L olecranon bursitis.   4. Dermatographism  - Tx: fexofenadine 360 mg BID  5. Eruption NOS. Both resolved.  - ears, upper chest, and back, responded to Lidex. Started ~9/28/23; resolved ~1/11/24.  - punch biopsy 8/5/21: superficial and deep perivascular and interstitial infiltrate of neutrophils, with focal leukocytoclasis and bluish, amorphous material suggestive of neutrophil degranulation, and lesser amounts of eosinophils and lymphocytes. Background dermal edema is noted. Focal neutrophilic epitheliotropism is noted.  6. Pink linear patches, ddx: follicular dermatitis vs lichen  spinulosus vs KP, posterior shoulders   - amlactin, lidex   7. Seborrheic dermatitis  - Tx: desonide 0.05% ointment, ketoconazole 2% shampoo     SHx: [place]  of her company has offered to help support with insurance however he can    ____________________________________________    Assessment & Plan:  # Hidradenitis suppurativa, Stage II. Flaring in axillae and buttocks today.  - Proceed with excision and regional flap reconstruction of right axilla as scheduled for 2/29/24 with Dr. Mendoza.  - For prevention:   - Continue infliximab 80 mg weekly.   - Continue roflumilast 500 mcg QD. Just started Depression can be a side effect. Discussed with patient in detail. Patient will contact clinic if current episode persists and/or worsens.   - Continue calcium 1200 mg and vitamin D 2000 Ius  - For flares:   - ILK injection performed today. See procedure note below.    - Continue Resorcinol BID. Do not apply to open wounds.   - For pain:  - Continue Percocet PRN for pain. Refilled. If continues to require more freuqnet doing will switch back to bupronerphine, but NOT buprenoprhine/naloxone.  - Continue marijuana PRN.    # Eruption NOS - Upper chest and back. Resolved.    # Depression.  - Re-start duloxetine. Take 1 capsule (60 mg) by mouth daily.    Procedures Performed:   - Intra-lesional triamcinolone procedure note. After verbal consent and review of risk of pain and skin thinning/atrophy, positioning and cleansing with isopropyl alcohol, 6 total mL of triamcinolone 10 mg/mL was injected into 6 lesions on the right axilla and buttock. The patient tolerated the procedure well and left the dermatology clinic in good condition.    Follow-up: 4 month(s) virtually (telephone with photos), or earlier for new or changing lesions    Staff and Scribe:     Scribe Disclosure:   Catrachita SIEGEL, am serving as a scribe to document services personally performed by Jean Marco  MD Zoya based on data collection and the provider's statements to me.     Provider Disclosure:   The documentation recorded by the scribe accurately reflects the services I personally performed and the decisions made by me.    Jean Kenny MD, FAAD    Departments of Internal Medicine and Dermatology  [facility]  [phone]    ____________________________________________    CC: Derm Problem (HS flare.  Buttocks and right armpit.  )    HPI:  Ms. Klarissa Curtis is a(n) 48 year old female who presents today for follow-up  for HS.    Last seen by me virtually on 12/28/23. At the time, she was scheduled to proceed with excision and regional flap reconstruction of right axilla with Dr. Mendoza for 2/29/24. She was to continue Percocet PRN pain along with marijuana. Also was to continue calcium 1200 mg and vitamin D 2000 IU. For an eruption on the upper chest and back, she was to use Lidex.    Today, patient is reporting a flare in her right axilla and buttocks. The lesions drain detention and then fill back up. She is amenable to injections. Additionally, she has been experiencing depressed mood with increased episodes of sadness and feeling as if she cannot get out of bed. She is feeling as if she is becoming more recluse, feeling not like herself. She believes it may be seasonal depression or pre-menopause. Resorcinol was tried on an open sore, but it burned, so she did not try it again.    Patient is otherwise feeling well, without additional skin concerns.    HS Nurse Assessment        11/16/2023     1:01 PM 12/28/2023     3:48 PM 1/11/2024     1:40 PM   Nurse Assessment Data   Over the past 30 days how many old lesions flared back up? 10 10 5-10   Over the past 30 days how many new lesions did you get? 2 2 4   Over the past week, how many dressing changes do you do each day? 3+ 0 0   Over the past week, has your wound drainage been: Severe Mild Moderate   Rate your HS overall  from 0 - 10 (0 = no disease, 10 = worst) over the past week:  7 5 7   Rate your pain score from 0 - 10 (0 = no disease, 10 = worst) for the most painful/symptomatic lesion in the past week:  9 7 10 - Worst Pain   Over the past week, how much has HS influenced your quality of life? extremely moderately very much     Labs Reviewed:  N/A    Physical Exam:  Vitals: Wt 114.8 kg (253 lb)   BMI 39.57 kg/m    SKIN: Full skin, which includes the head/face, both arms, chest, back, abdomen,both legs, genitalia and/or groin buttocks, digits and/or nails, was examined.  - Poorly demarcated scaly plaque and a few crusty papules on the posterior neck.  - Chest: Clear. Cystic papule present.  - Buttocks: 3 inflammatory papules. No nodules, tunnels, or abscesses.  - No other lesions of concern on areas examined.     HS Data      5/25/2023     4:01 PM 9/28/2023    12:58 PM 1/11/2024     2:55 PM   HS Exam Data   LC Type LC1 LC1 LC1   Clinical Subtypes Regular type Regular type Regular type   Acne? No No No   Dissecting Cellulitis? Yes No No   Visual analogue score (0-100) 60  60   Total Julian Stage II II II   Total Inflammatory Nodules 3 6 5   Total Abcesses 1 0 1   Total Draining Tunnels 4 3 3   Total Abscess and Nodule Count 4 6 6   IHS4 Score  21 18 19   Total  HASI Score 48  12   HS-PGA 4 4        Medications:  Current Outpatient Medications   Medication    adalimumab (HUMIRA *CF* PEN-CD/UC/HS STARTER) 80 MG/0.8ML pen kit    adalimumab (HUMIRA PEN) 80 MG/0.8ML pen kit    buPROPion (WELLBUTRIN SR) 150 MG 12 hr tablet    calcium carbonate (OS-MERCY) 1500 (600 Ca) MG tablet    fexofenadine (ALLEGRA) 180 MG tablet    fluocinonide (LIDEX) 0.05 % external ointment    ketoconazole (NIZORAL) 2 % external shampoo    medical cannabis (Patient's own supply)    oxyCODONE-acetaminophen (PERCOCET) 5-325 MG tablet    Resorcinol POWD    roflumilast (DALIRESP) 500 MCG TABS tablet    roflumilast (DALIRESP) 500 MCG TABS tablet    traZODone (DESYREL)  150 MG tablet    VITAMIN D3 50 MCG (2000 UT) tablet     Current Facility-Administered Medications   Medication    triamcinolone acetonide (KENALOG-10) injection 40 mg    triamcinolone acetonide (KENALOG-10) injection 60 mg      Past Medical History:   Patient Active Problem List   Diagnosis    Hidradenitis suppurativa    Fatigue     Past Medical History:   Diagnosis Date    NO ACTIVE PROBLEMS         CC No referring provider defined for this encounter. on close of this encounter.

## 2024-01-11 NOTE — PROGRESS NOTES
[facility] Dermatology Note  Encounter Date: Jan 11, 2024  Office Visit     Dermatology Problem List:  1. Hidradenitis suppurativa: diagnosed age 11, initially on waistline (used to weigh 300 lbs) which cleared up -> intermittent outbreaks -> for the past 10 years has had in bilateral axilla, groin, thighs, and buttocks.   - Humira, Percocet, Resorcinol  - Chantix started 11/16//23 for smoking quit date of 11/15/23 before right arm plastic surgery in mid-January 2024  - Flare plan: ILK 60 injections, Prednisone 60 mg x 1 week, 50 mg x 1 week, 40 mg x 1 week (she does not like to taper below 40 mg as she flares)  - I&D x2 to lesions on the R axilla - 8/4/22  - Prior: gabapentin, Xeljanz, infliximab, prednisone taper, clobetasol, topical morphine, ILK, oral antibiotics, rifampin, sirolimus 2 mg (started 4/22/21) finasteride, spironolactone (6/2019- 9/5/19, stopped due to abnormal uterine bleeding), topical morphine gel, and percocet for severe pain, Cosentyx, belbuca  2. Keratosis pilaris  3. L olecranon bursitis.   4. Dermatographism  - Tx: fexofenadine 360 mg BID  5. Eruption NOS. Both resolved.  - ears, upper chest, and back, responded to Lidex. Started ~9/28/23; resolved ~1/11/24.  - punch biopsy 8/5/21: superficial and deep perivascular and interstitial infiltrate of neutrophils, with focal leukocytoclasis and bluish, amorphous material suggestive of neutrophil degranulation, and lesser amounts of eosinophils and lymphocytes. Background dermal edema is noted. Focal neutrophilic epitheliotropism is noted.  6. Pink linear patches, ddx: follicular dermatitis vs lichen spinulosus vs KP, posterior shoulders   - amlactin, lidex   7. Seborrheic dermatitis  - Tx: desonide 0.05% ointment, ketoconazole 2% shampoo     SHx: [place] works [place] an [place]  of her company has offered to help support with insurance however he  can    ____________________________________________    Assessment & Plan:  # Hidradenitis suppurativa, Stage II. Flaring in axillae and buttocks today.  - Proceed with excision and regional flap reconstruction of right axilla as scheduled for 2/29/24 with Dr. Mendoza.  - For prevention:   - Continue infliximab 80 mg weekly.   - Continue roflumilast 500 mcg QD. Just started Depression can be a side effect. Discussed with patient in detail. Patient will contact clinic if current episode persists and/or worsens.   - Continue calcium 1200 mg and vitamin D 2000 Ius  - For flares:   - ILK injection performed today. See procedure note below.    - Continue Resorcinol BID. Do not apply to open wounds.   - For pain:  - Continue Percocet PRN for pain. Refilled. If continues to require more freuqnet doing will switch back to bupronerphine, but NOT buprenoprhine/naloxone.  - Continue marijuana PRN.    # Eruption NOS - Upper chest and back. Resolved.    # Depression.  - Re-start duloxetine. Take 1 capsule (60 mg) by mouth daily.    Procedures Performed:   - Intra-lesional triamcinolone procedure note. After verbal consent and review of risk of pain and skin thinning/atrophy, positioning and cleansing with isopropyl alcohol, 6 total mL of triamcinolone 10 mg/mL was injected into 6 lesions on the right axilla and buttock. The patient tolerated the procedure well and left the dermatology clinic in good condition.    Follow-up: 4 month(s) virtually (telephone with photos), or earlier for new or changing lesions    Staff and Scribe:     Scribe Disclosure:   I, Catrachita Cheek, am serving as a scribe to document services personally performed by Jean Kenny MD based on data collection and the provider's statements to me.     Provider Disclosure:   The documentation recorded by the scribe accurately reflects the services I personally performed and the decisions made by me.    Jean Kenny MD, FAAD  Assistant  Professor  Departments of Internal Medicine and Dermatology  [facility]  [phone]    ____________________________________________    CC: Derm Problem (HS flare.  Buttocks and right armpit.  )    HPI:  Ms. Klarissa Curtis is a(n) 48 year old female who presents today for follow-up  for HS.    Last seen by me virtually on 12/28/23. At the time, she was scheduled to proceed with excision and regional flap reconstruction of right axilla with Dr. Mendoza for 2/29/24. She was to continue Percocet PRN pain along with marijuana. Also was to continue calcium 1200 mg and vitamin D 2000 IU. For an eruption on the upper chest and back, she was to use Lidex.    Today, patient is reporting a flare in her right axilla and buttocks. The lesions drain senior care and then fill back up. She is amenable to injections. Additionally, she has been experiencing depressed mood with increased episodes of sadness and feeling as if she cannot get out of bed. She is feeling as if she is becoming more recluse, feeling not like herself. She believes it may be seasonal depression or pre-menopause. Resorcinol was tried on an open sore, but it burned, so she did not try it again.    Patient is otherwise feeling well, without additional skin concerns.    HS Nurse Assessment        11/16/2023     1:01 PM 12/28/2023     3:48 PM 1/11/2024     1:40 PM   Nurse Assessment Data   Over the past 30 days how many old lesions flared back up? 10 10 5-10   Over the past 30 days how many new lesions did you get? 2 2 4   Over the past week, how many dressing changes do you do each day? 3+ 0 0   Over the past week, has your wound drainage been: Severe Mild Moderate   Rate your HS overall from 0 - 10 (0 = no disease, 10 = worst) over the past week:  7 5 7   Rate your pain score from 0 - 10 (0 = no disease, 10 = worst) for the most painful/symptomatic lesion in the past week:  9 7 10 - Worst Pain   Over the past week, how much has HS influenced your  quality of life? extremely moderately very much     Labs Reviewed:  N/A    Physical Exam:  Vitals: Wt 114.8 kg (253 lb)   BMI 39.57 kg/m    SKIN: Full skin, which includes the head/face, both arms, chest, back, abdomen,both legs, genitalia and/or groin buttocks, digits and/or nails, was examined.  - Poorly demarcated scaly plaque and a few crusty papules on the posterior neck.  - Chest: Clear. Cystic papule present.  - Buttocks: 3 inflammatory papules. No nodules, tunnels, or abscesses.  - No other lesions of concern on areas examined.     HS Data      5/25/2023     4:01 PM 9/28/2023    12:58 PM 1/11/2024     2:55 PM   HS Exam Data   LC Type LC1 LC1 LC1   Clinical Subtypes Regular type Regular type Regular type   Acne? No No No   Dissecting Cellulitis? Yes No No   Visual analogue score (0-100) 60  60   Total Julian Stage II II II   Total Inflammatory Nodules 3 6 5   Total Abcesses 1 0 1   Total Draining Tunnels 4 3 3   Total Abscess and Nodule Count 4 6 6   IHS4 Score  21 18 19   Total  HASI Score 48  12   HS-PGA 4 4        Medications:  Current Outpatient Medications   Medication    adalimumab (HUMIRA *CF* PEN-CD/UC/HS STARTER) 80 MG/0.8ML pen kit    adalimumab (HUMIRA PEN) 80 MG/0.8ML pen kit    buPROPion (WELLBUTRIN SR) 150 MG 12 hr tablet    calcium carbonate (OS-MERCY) 1500 (600 Ca) MG tablet    fexofenadine (ALLEGRA) 180 MG tablet    fluocinonide (LIDEX) 0.05 % external ointment    ketoconazole (NIZORAL) 2 % external shampoo    medical cannabis (Patient's own supply)    oxyCODONE-acetaminophen (PERCOCET) 5-325 MG tablet    Resorcinol POWD    roflumilast (DALIRESP) 500 MCG TABS tablet    roflumilast (DALIRESP) 500 MCG TABS tablet    traZODone (DESYREL) 150 MG tablet    VITAMIN D3 50 MCG (2000 UT) tablet     Current Facility-Administered Medications   Medication    triamcinolone acetonide (KENALOG-10) injection 40 mg    triamcinolone acetonide (KENALOG-10) injection 60 mg      Past Medical History:   Patient  Active Problem List   Diagnosis    Hidradenitis suppurativa    Fatigue     Past Medical History:   Diagnosis Date    NO ACTIVE PROBLEMS         CC No referring provider defined for this encounter. on close of this encounter.

## 2024-01-11 NOTE — PROGRESS NOTES
Social Work - Intervention  [facility]  Data/Intervention:    Patient Name: Klarissa Curtis Goes By: Maura    /Age: 1975 (48 year old)     Visit Type: telephone  Referral Source: Dermatology- Dr. Kenny  Reason for Referral: Billing questions/concerns    Collaborated With:    -Maura- [phone]  -Dr. Kenny     Psychosocial Information/Concerns:  Message received from Dr. Salmon noting that Maura has billing questions/concerns and asked that I call her.      Intervention/Education/Resources Provided:  I contacted Maura and introduced myself and noted the reason for my outreach.   Maura explained she is confused about her account balance and said she makes a payment for what appears to be her current balance, then immediately is notified of another bill that is due, sometimes from 2-3 years ago. Maura explained not understanding why this is happening as she has always had the same account, and after she makes a payment her account appears up to date. I explained that unfortunately, I cannot see the ins and outs of what has been happening with her billing so really cannot shed any light on this and advised that she call the billing department directly. Maura said she has intended to do this but her mental health hasn't been great, she has been in a lot of pain, and her day gets away from her with work etc. Maura reported it is feasible for her to call them though, and will put it on her calendar for next week to call them. I explained that this phone call would be the best first step, and depending on what she finds out there may need to call her insurance to verify things as well. Maura did question if she is getting services that for some reason aren't covered. I explained the financial assistance/jason care program, should there be truly outstanding bills that Maura has financial concerns regarding paying. Maura said she has looked into this program already and is a good amount over income  "unfortunately.     I inquired about mental health supports and Maura said she is going back on a mental health medication that she stopped 6 months ago and she said Dr. Kenny is going to help her titrate up on it. Maura said she has more supports than she needs and has realized she needs to remind herself to let other people be there for her. Maura acknowledged having a very full plate right now and that things are overwhelming. Maura said she has been in pain for the last week so she is very sensitive right now. Maura reported she can't stop crying today and noted she isn't usually a person to cry for herself. Maura reported she doesn't have a mental health therapist anymore but should have one. She said she had been seeing someone but they switched locations and she didn't end up following up. I explained the [facility] intake team and how they can help her get connected with a therapist, within or outside the [facility] system. Maura is agreeable to Dr. Kenny placing a mental health referral which would trigger this intake team to reach out to her, but noted that phone calls can be hard and often end in phone tag due to her work schedule etc. I offered to connect with the intake team and ask if they can reach out to her via My Chart instead of phone and Maura said she would appreciate this.   Maura denied any acute mental health concerns and denied suicidal ideation. Maura reported that she just wants to be in bed due to pain and said  \"I'm not me anymore\" because of the pain. Maura said she is sick and tired of being sick and tired. I provided supportive listening and emotional support. Maura discussed how things were easier when she was younger because she was healthy, but now she is breaking and said \"breaking by myself is not easy\". Maura said her mom is going to be helping her after upcoming surgery but she worries because her mom has her own life. We discussed finding a balance between not " overwhelming others but also asking for help when needed and meeting her own needs as well, both physically and mentally, even if with the help from her mom or others. Maura expressed understanding and agreement.     Maura discussed really enjoying her job and getting to travel to [place] recently for work.     Maura denied any further SW needs or questions at this time, was very appreciative of my call, and has my contact information and knows she can reach out in the future if needed. I did let Maura know to notify me if she is still having issues after contacting the billing office or can't reach anyone that way.     I sent Dr. Salmon a message asking that he place a mental health  referral so the behavioral health intake team can contact Maura to help her get scheduled with a therapist.     I contacted the behavioral health intake team and spoke with Xiomara and explained that a referral should be pending soon for help getting connected with a therapist and asked that outreach be made by My Chart for scheduling. Xiomara said she can absolutely do that, said that when we hung up she would try calling Maura quickly to schedule, and if Maura didn't answer she would follow it up with a My Chart message.     Assessment/Plan:  No specific follow up is planned at this time but I will remain available for assistance as needed.      Provided patient/family with contact information and availability.    JACKSON Harrell, [facility] [place]    [facility]  Ph: [phone], Pgr: [phone]  1/11/2024

## 2024-01-17 ENCOUNTER — PRE VISIT (OUTPATIENT)
Dept: SURGERY | Facility: CLINIC | Age: 49
End: 2024-01-17

## 2024-01-17 ENCOUNTER — OFFICE VISIT (OUTPATIENT)
Dept: PLASTIC SURGERY | Facility: CLINIC | Age: 49
End: 2024-01-17
Payer: COMMERCIAL

## 2024-01-17 ENCOUNTER — MYC MEDICAL ADVICE (OUTPATIENT)
Dept: DERMATOLOGY | Facility: CLINIC | Age: 49
End: 2024-01-17

## 2024-01-17 VITALS — OXYGEN SATURATION: 99 % | HEART RATE: 87 BPM | SYSTOLIC BLOOD PRESSURE: 132 MMHG | DIASTOLIC BLOOD PRESSURE: 75 MMHG

## 2024-01-17 DIAGNOSIS — L73.2 HIDRADENITIS SUPPURATIVA: Primary | ICD-10-CM

## 2024-01-17 PROCEDURE — 99214 OFFICE O/P EST MOD 30 MIN: CPT | Performed by: PLASTIC SURGERY

## 2024-01-17 ASSESSMENT — PAIN SCALES - GENERAL: PAINLEVEL: MILD PAIN (2)

## 2024-01-17 NOTE — LETTER
1/17/2024       RE: Klarissa Curtis  [address]  [place] [facility] [place] [facility] [place] [facility] [place] [address]       Dear Colleague,    Thank you for referring your patient, Klarissa Curtis, to the [facility] PLASTIC AND RECONSTRU[facility]IVE SURGERY CLINIC [place] at [facility]. Please see a copy of my visit note below.    PREOPERATIVE VISIT NOTE     PRESENTING COMPLAINT:  Preop visit for upcoming right axillary hidradenitis suppurativa excision and flap reconstruction on 2/29/2024.     HISTORY OF PRESENTING COMPLAINT: The patient is here for a pre-op visit for the patient's surgery.  The patient is being seen in the presence of my nurse. There is no change since the patient's consultation. Please see the consult note for details.     The patient quit all forms of nicotine 1 week ago.  She aims to remain off of it until surgery which is a good 6 to 7 weeks from now.  She will get preop clearance.  She has not spoken to Dr. Kenny about her immunotherapy.  I have instructed that she discuss the plan for her immune therapy perioperatively with Dr. Kenny.  Went over the planned procedure with the patient in detail.  All her questions were answered.  She understands she may require a another surgery in the future for thinning and revising the flap.  She understands she may require a skin graft in addition to the flap.  The major risks of pain, infection, wound problems, delayed wound healing, requirement of further surgeries, injury to deeper structures, scar contracture, and recurrence were explained.  She understood everything.    Additionally the patient has been on narcotics for her pain control over the last few years on and off.  I have encouraged her to discuss pain management with Dr. Kenny who is her primary pain medicine prescriber.  I also encouraged her to discuss meeting with a pain specialist prior to the surgery.  She understood.     ASSESSMENT AND PLAN:  Based  upon the above findings, the patient is here for a preop visit for upcoming surgery.  I had a rena, detailed discussion with the patient in the presence of my nurse (who was present from beginning to end) about the proposed surgery. I was very clear and detailed about overview of surgery, perioperative plans, and expectations. All risks, benefits and alternatives of the surgery including but not limited to pain, infection, bleeding, scarring, asymmetry, seromas, hematomas, wound breakdown, wound dehiscence, prominent scar, hypertrophic scar, keloid scar, requirement of further surgeries, skin/tissue necrosis, nerve/muscle/deeper structure injury, DVT, PE, MI, CVA, pneumonia, renal failure and death, were explained in detail. The patient agreed and understood them all and had all the questions answered to the patient's satisfaction, and the patient wants to proceed with surgery. I look forward to helping the patient out in the near future.  All questions were answered.  The patient was happy with the visit.    Total time spent in the encounter today including chart review, visit itself, and post-visit paperwork was 30 minutes.         Again, thank you for allowing me to participate in the care of your patient.      Sincerely,    BORIS Mendoza MD

## 2024-01-17 NOTE — NURSING NOTE
Pre Op Teaching Note:       Pre and Post op Patient Education    Relevant Diagnosis:  HS  Surgical procedure:  R axillary wound excision and regional flap reconstruction    Patient demonstrates understanding of the following:  Date of surgery:  2/29/24  Location of surgery:  [address] [place] [address]  History and Physical and any other testing necessary prior to surgery: Yes, discussed with pt need for pre-op within 30 days of surgery with PCP.    Patient demonstrates understanding of the following:    Pre-op showering/scrub information with PCMX Soap: Yes, soap given in clinic today  Blood thinner medications discussed and when to stop (if applicable):  Per PCP at pre-op. Pt will discuss post op pain plan with Dr Kenny, medical marijuana with PAC provider at pre-op    Post-op follow-up:  Discussed how to contact the hospital, nurse, and clinic scheduling staff if necessary. (See packet information)    Instructional materials used/given/mailed:  [place] Surgery Packet per surgery scheduler, post op teaching sheet, Soap.  Pt advised that final arrival information to come closer to the surgery date by PAN nurses.

## 2024-01-17 NOTE — NURSING NOTE
Chief Complaint   Patient presents with    RECHECK     Pre-op consult, DOS 2/29/24.       Vitals:    01/17/24 0800   BP: 132/75   BP Location: Right arm   Patient Position: Sitting   Cuff Size: Adult Large   Pulse: 87   SpO2: 99%       There is no height or weight on file to calculate BMI.    Patient reports mild R axilla pain (2/10).    Rylan Harris, EMT

## 2024-01-24 RX ORDER — DULOXETIN HYDROCHLORIDE 30 MG/1
CAPSULE, DELAYED RELEASE ORAL
Qty: 180 CAPSULE | Refills: 3 | Status: SHIPPED | OUTPATIENT
Start: 2024-01-24 | End: 2024-07-04

## 2024-01-25 DIAGNOSIS — F32.A DEPRESSION, UNSPECIFIED DEPRESSION TYPE: Primary | ICD-10-CM

## 2024-01-26 ENCOUNTER — PATIENT OUTREACH (OUTPATIENT)
Dept: PLASTIC SURGERY | Facility: CLINIC | Age: 49
End: 2024-01-26
Payer: COMMERCIAL

## 2024-01-26 NOTE — TELEPHONE ENCOUNTER
Called pt to relay response from Dr Kenny about pre-surgery planning. He advised that she stay on humira, referred to pain clinic and can help manage post op pain. Pt will reach out to Dr Kenny about the pain management plan prior to surgery so that she understands how this will work for her.

## 2024-01-29 ENCOUNTER — VIRTUAL VISIT (OUTPATIENT)
Dept: PSYCHOLOGY | Facility: CLINIC | Age: 49
End: 2024-01-29
Attending: DERMATOLOGY
Payer: COMMERCIAL

## 2024-01-29 DIAGNOSIS — L73.2 HIDRADENITIS SUPPURATIVA: ICD-10-CM

## 2024-01-29 ASSESSMENT — PATIENT HEALTH QUESTIONNAIRE - PHQ9
10. IF YOU CHECKED OFF ANY PROBLEMS, HOW DIFFICULT HAVE THESE PROBLEMS MADE IT FOR YOU TO DO YOUR WORK, TAKE CARE OF THINGS AT HOME, OR GET ALONG WITH OTHER PEOPLE: SOMEWHAT DIFFICULT
SUM OF ALL RESPONSES TO PHQ QUESTIONS 1-9: 13
SUM OF ALL RESPONSES TO PHQ QUESTIONS 1-9: 13

## 2024-01-29 NOTE — PROGRESS NOTES
Patient is on a Trip in [place] to return next week. No visit today since Writer is licensed only in [place].

## 2024-02-08 ENCOUNTER — VIRTUAL VISIT (OUTPATIENT)
Dept: DERMATOLOGY | Facility: CLINIC | Age: 49
End: 2024-02-08
Payer: COMMERCIAL

## 2024-02-08 DIAGNOSIS — G47.00 INSOMNIA, UNSPECIFIED TYPE: ICD-10-CM

## 2024-02-08 DIAGNOSIS — L73.2 HIDRADENITIS SUPPURATIVA: Primary | ICD-10-CM

## 2024-02-08 PROCEDURE — 99441 PR PHYSICIAN TELEPHONE EVALUATION 5-10 MIN: CPT | Performed by: DERMATOLOGY

## 2024-02-08 ASSESSMENT — PAIN SCALES - GENERAL: PAINLEVEL: MODERATE PAIN (5)

## 2024-02-08 NOTE — NURSING NOTE
Dermatology Rooming Note    Klarissa Curtis's goals for this visit include:   Chief Complaint   Patient presents with    Derm Problem     Discuss pain management after surgery on 2/29.  Currently on Percocet prn and Medical Marijuana.        Jen Min CMA

## 2024-02-08 NOTE — PROGRESS NOTES
[facility] Dermatology Note  Encounter Date: 2/9/23  Telephone.  Location of teledermatologist: [facility] DERMATOLOGY [facility] [place]. Start time: 5:56 pm. End time: 6:05pm    Dermatology Problem List:  1. Hidradenitis suppurativa: diagnosed age 11, initially on waistline (used to weigh 300 lbs) which cleared up -> intermittent outbreaks -> for the past 10 years has had in bilateral axilla, groin, thighs, and buttocks.   - Humira, Percocet, Resorcinol  - Chantix started 11/16//23 for smoking quit date of 11/15/23 before right arm plastic surgery in mid-January 2024  - Flare plan: ILK 60 injections, Prednisone 60 mg x 1 week, 50 mg x 1 week, 40 mg x 1 week (she does not like to taper below 40 mg as she flares)  - I&D x2 to lesions on the R axilla - 8/4/22  - Prior: gabapentin, Xeljanz, infliximab, prednisone taper, clobetasol, topical morphine, ILK, oral antibiotics, rifampin, sirolimus 2 mg (started 4/22/21) finasteride, spironolactone (6/2019- 9/5/19, stopped due to abnormal uterine bleeding), topical morphine gel, and percocet for severe pain, Cosentyx, belbuca  2. Keratosis pilaris  3. L olecranon bursitis.   4. Dermatographism  - Tx: fexofenadine 360 mg BID  5. Eruption NOS. Both resolved.  - ears, upper chest, and back, responded to Lidex. Started ~9/28/23; resolved ~1/11/24.  - punch biopsy 8/5/21: superficial and deep perivascular and interstitial infiltrate of neutrophils, with focal leukocytoclasis and bluish, amorphous material suggestive of neutrophil degranulation, and lesser amounts of eosinophils and lymphocytes. Background dermal edema is noted. Focal neutrophilic epitheliotropism is noted.  6. Pink linear patches, ddx: follicular dermatitis vs lichen spinulosus vs KP, posterior shoulders   - amlactin, lidex   7. Seborrheic dermatitis  - Tx: desonide 0.05% ointment, ketoconazole 2% shampoo     SHx: [place]  of her  company has offered to help support with insurance however he can    __________________________________________    Assessment & Plan:   # Hidradenitis suppurativa,  - Currently flaring in b/l axillae and rt buttocks  - Pan for excision in Rt axillae on 2/29/24 with Dr Mendoza  - For prevention:              - Continue Adalimumab 80 mg weekly.   - Continue calcium 1200 mg and vitamin D 2000    - Continue Resorcinol BID. Do not apply to open wounds.   - For pain:  Restarted duloxetine 60mg daily (also for depression. Mood not better yet.l   - Continue marijuana PRN.  - Refilled percet. PDMP checked. Takes up to 6 percocet when in a lot of pain and 2 on average. Some days none,. Gerard plan for 3 week siof two a day on average and then icnreae aftyer surgery to 6 a day for 1 week and then will follow-up with.      # Eruption NOS - Upper chest and back. Resolved.       Follow-up: 4 weeks viap phone    Staff and Scribe:   Scribe Disclosure:   I, Maria Luisa Florentino, am serving as a scribe; to document services personally performed by Jean Kenny MD -based on data collection and the provider's statements to me.       Electronically signed by:  Provider Disclosure:   The documentation recorded by the scribe accurately reflects the services I personally performed and the decisions made by me.    Jean Kenny MD, FAAD    Departments of Internal Medicine and Dermatology  [facility]  [phone]    ____________________________________________    CC:Follow-up    HPI:  Ms. Klarissa Curtis is a(n) 48 year old female who presents today as a return patient for HS, last seen by myself 01/11/2024, at which she was to continue adalimumab 80 mg weekly, roflumilast 500 mg daily, resorcinol bid. Last visit we also restrted duloxetine,. She stopped the roflimlast because of headaches.     Plan for surgery 2/29/24    Patient is otherwise feeling well, without additional skin  concerns.    Medications:  Current Outpatient Medications   Medication    adalimumab (HUMIRA *CF* PEN-CD/UC/HS STARTER) 80 MG/0.8ML pen kit    adalimumab (HUMIRA PEN) 80 MG/0.8ML pen kit    buPROPion (WELLBUTRIN SR) 150 MG 12 hr tablet    calcium carbonate (OS-MERCY) 1500 (600 Ca) MG tablet    DULoxetine (CYMBALTA) 30 MG capsule    fexofenadine (ALLEGRA) 180 MG tablet    fluocinonide (LIDEX) 0.05 % external ointment    ketoconazole (NIZORAL) 2 % external shampoo    medical cannabis (Patient's own supply)    oxyCODONE-acetaminophen (PERCOCET) 5-325 MG tablet    Resorcinol POWD    roflumilast (DALIRESP) 500 MCG TABS tablet    roflumilast (DALIRESP) 500 MCG TABS tablet    traZODone (DESYREL) 150 MG tablet    VITAMIN D3 50 MCG (2000 UT) tablet     Current Facility-Administered Medications   Medication    triamcinolone acetonide (KENALOG-10) injection 40 mg    triamcinolone acetonide (KENALOG-10) injection 60 mg      Past Medical/Surgical History:   Patient Active Problem List   Diagnosis    Hidradenitis suppurativa    Fatigue     Past Medical History:   Diagnosis Date    NO ACTIVE PROBLEMS

## 2024-02-08 NOTE — LETTER
2/8/2024       RE: Klarissa Curtis  [address]  [place] [address]     Dear Colleague,    Thank you for referring your patient, Klarissa Curtis, to the [facility] DERMATOLOGY CLINIC [place] at [facility]. Please see a copy of my visit note below.    [facility] Dermatology Note  Encounter Date: 2/9/23  Telephone.  Location of teledermatologist: [facility] DERMATOLOGY CLINIC [place]. Start time: 5:56 pm. End time: 6:05pm    Dermatology Problem List:  1. Hidradenitis suppurativa: diagnosed age 11, initially on waistline (used to weigh 300 lbs) which cleared up -> intermittent outbreaks -> for the past 10 years has had in bilateral axilla, groin, thighs, and buttocks.   - Humira, Percocet, Resorcinol  - Chantix started 11/16//23 for smoking quit date of 11/15/23 before right arm plastic surgery in mid-January 2024  - Flare plan: ILK 60 injections, Prednisone 60 mg x 1 week, 50 mg x 1 week, 40 mg x 1 week (she does not like to taper below 40 mg as she flares)  - I&D x2 to lesions on the R axilla - 8/4/22  - Prior: gabapentin, Xeljanz, infliximab, prednisone taper, clobetasol, topical morphine, ILK, oral antibiotics, rifampin, sirolimus 2 mg (started 4/22/21) finasteride, spironolactone (6/2019- 9/5/19, stopped due to abnormal uterine bleeding), topical morphine gel, and percocet for severe pain, Cosentyx, belbuca  2. Keratosis pilaris  3. L olecranon bursitis.   4. Dermatographism  - Tx: fexofenadine 360 mg BID  5. Eruption NOS. Both resolved.  - ears, upper chest, and back, responded to Lidex. Started ~9/28/23; resolved ~1/11/24.  - punch biopsy 8/5/21: superficial and deep perivascular and interstitial infiltrate of neutrophils, with focal leukocytoclasis and bluish, amorphous material suggestive of neutrophil degranulation, and lesser amounts of eosinophils and lymphocytes. Background dermal edema is noted.  Focal neutrophilic epitheliotropism is noted.  6. Pink linear patches, ddx: follicular dermatitis vs lichen spinulosus vs KP, posterior shoulders   - amlactin, lidex   7. Seborrheic dermatitis  - Tx: desonide 0.05% ointment, ketoconazole 2% shampoo     SHx: [place] works [place] an [place]  of her company has offered to help support with insurance however he can    __________________________________________    Assessment & Plan:   # Hidradenitis suppurativa,  - Currently flaring in b/l axillae and rt buttocks  - Pan for excision in Rt axillae on 2/29/24 with Dr Mendoza  - For prevention:              - Continue Adalimumab 80 mg weekly.   - Continue calcium 1200 mg and vitamin D 2000    - Continue Resorcinol BID. Do not apply to open wounds.   - For pain:  Restarted duloxetine 60mg daily (also for depression. Mood not better yet.l   - Continue marijuana PRN.  - Refilled percet. PDMP checked. Takes up to 6 percocet when in a lot of pain and 2 on average. Some days none,. Gerard plan for 3 week siof two a day on average and then icnreae aftyer surgery to 6 a day for 1 week and then will follow-up with.      # Eruption NOS - Upper chest and back. Resolved.       Follow-up: 4 weeks viap phone    Staff and Scribe:   Scribe Disclosure:   I, Maria Luisa Florentino, am serving as a scribe; to document services personally performed by Jean Kenny MD -based on data collection and the provider's statements to me.       Electronically signed by:  Provider Disclosure:   The documentation recorded by the scribe accurately reflects the services I personally performed and the decisions made by me.    Jean Kenny MD, FAAD    Departments of Internal Medicine and Dermatology  [facility]  [phone]    ____________________________________________    CC:Follow-up    HPI:  Ms. Klarissa Curtis is a(n) 48 year old female who presents today as a return patient for HS, last  seen by myself 01/11/2024, at which she was to continue adalimumab 80 mg weekly, roflumilast 500 mg daily, resorcinol bid. Last visit we also restrted duloxetine,. She stopped the roflimlast because of headaches.     Plan for surgery 2/29/24    Patient is otherwise feeling well, without additional skin concerns.    Medications:  Current Outpatient Medications   Medication    adalimumab (HUMIRA *CF* PEN-CD/UC/HS STARTER) 80 MG/0.8ML pen kit    adalimumab (HUMIRA PEN) 80 MG/0.8ML pen kit    buPROPion (WELLBUTRIN SR) 150 MG 12 hr tablet    calcium carbonate (OS-MERCY) 1500 (600 Ca) MG tablet    DULoxetine (CYMBALTA) 30 MG capsule    fexofenadine (ALLEGRA) 180 MG tablet    fluocinonide (LIDEX) 0.05 % external ointment    ketoconazole (NIZORAL) 2 % external shampoo    medical cannabis (Patient's own supply)    oxyCODONE-acetaminophen (PERCOCET) 5-325 MG tablet    Resorcinol POWD    roflumilast (DALIRESP) 500 MCG TABS tablet    roflumilast (DALIRESP) 500 MCG TABS tablet    traZODone (DESYREL) 150 MG tablet    VITAMIN D3 50 MCG (2000 UT) tablet     Current Facility-Administered Medications   Medication    triamcinolone acetonide (KENALOG-10) injection 40 mg    triamcinolone acetonide (KENALOG-10) injection 60 mg      Past Medical/Surgical History:   Patient Active Problem List   Diagnosis    Hidradenitis suppurativa    Fatigue     Past Medical History:   Diagnosis Date    NO ACTIVE PROBLEMS

## 2024-02-09 RX ORDER — TRAZODONE HYDROCHLORIDE 150 MG/1
150 TABLET ORAL AT BEDTIME
Qty: 90 TABLET | Refills: 1 | Status: SHIPPED | OUTPATIENT
Start: 2024-02-09 | End: 2024-03-28

## 2024-02-09 RX ORDER — OXYCODONE AND ACETAMINOPHEN 5; 325 MG/1; MG/1
TABLET ORAL
Qty: 102 TABLET | Refills: 0 | Status: ON HOLD | OUTPATIENT
Start: 2024-02-09 | End: 2024-03-01

## 2024-02-12 ENCOUNTER — VIRTUAL VISIT (OUTPATIENT)
Dept: SURGERY | Facility: CLINIC | Age: 49
End: 2024-02-12
Payer: COMMERCIAL

## 2024-02-12 ENCOUNTER — ANESTHESIA EVENT (OUTPATIENT)
Dept: SURGERY | Facility: CLINIC | Age: 49
DRG: 585 | End: 2024-02-12
Payer: COMMERCIAL

## 2024-02-12 ENCOUNTER — PRE VISIT (OUTPATIENT)
Dept: SURGERY | Facility: CLINIC | Age: 49
End: 2024-02-12

## 2024-02-12 DIAGNOSIS — Z01.818 PRE-OP EVALUATION: Primary | ICD-10-CM

## 2024-02-12 DIAGNOSIS — L73.2 HIDRADENITIS SUPPURATIVA: ICD-10-CM

## 2024-02-12 PROCEDURE — 99204 OFFICE O/P NEW MOD 45 MIN: CPT | Mod: 95 | Performed by: NURSE PRACTITIONER

## 2024-02-12 ASSESSMENT — PAIN SCALES - GENERAL: PAINLEVEL: SEVERE PAIN (6)

## 2024-02-12 ASSESSMENT — LIFESTYLE VARIABLES: TOBACCO_USE: 1

## 2024-02-12 NOTE — H&P
Pre-Operative H & P     CC:  Preoperative exam to assess for increased cardiopulmonary risk while undergoing surgery and anesthesia.    Date of Encounter: 2/12/2024  Primary Care Physician:  Jean Kenny     Reason for visit:   Encounter Diagnoses   Name Primary?    Pre-op evaluation Yes    Hidradenitis suppurativa        HPI  Klarissa Curtis is a 48 year old female who presents for pre-operative H & P in preparation for  Procedure Information       Case: [id number] Date/Time: 02/29/24 1530    Procedure: Right axillary wound excision and regional flap reconstruction, SPY (Right: Update)    Anesthesia type: General    Diagnosis: Hidradenitis suppurativa [L73.2]    Pre-op diagnosis: Hidradenitis suppurativa [L73.2]    Location:  OR [address] [place] [address] OR    Providers: BORIS Mendoza MD            The patient presents to the PAC virtually today in preparation for the above scheduled procedure with comorbid conditions including previous smoker, obesity, depression and hidradenitis suppurativa.    The patient was diagnosed with hidradenitis suppurative at age 11 and over the years and used many different medications to manage it.  She is followed by Dr. Kenny in Dermatology and is currently on immunotherapy for management and percocet for pain control.  She was seen by Dr. Mendoza in Plastic surgery for evaluation and treatment of Hidradenitis suppurativa of right axilla that continues despite medical management.  Dr. Alvarez counseled the patient on the findings and treatment options.  Per Dr. Mendoza's 9/26/23 note, the patient was to discuss her immunotherapy with Dr. Kenny in preparation for the upcoming procedure. The patient has now been scheduled for the procedure as listed above.      History is obtained from the patient and chart review    Hx of abnormal bleeding or anti-platelet use: denies    Menstrual history: No LMP recorded (lmp unknown). Patient is perimenopausal.: Perimenopausal     Past  Medical History  Past Medical History:   Diagnosis Date    NO ACTIVE PROBLEMS        Past Surgical History  Past Surgical History:   Procedure Laterality Date    BUNIONECTOMY      x2    OTHER SURGICAL HISTORY      right pointer finger foreign body removal       Prior to Admission Medications  Current Outpatient Medications   Medication Sig Dispense Refill    adalimumab (HUMIRA *CF* PEN-CD/UC/HS STARTER) 80 MG/0.8ML pen kit Inject 0.8 mLs (80 mg) Subcutaneous once a week (Patient taking differently: Inject 80 mg Subcutaneous once a week Sundays) 3.2 mL 5    calcium carbonate (OS-MERCY) 1500 (600 Ca) MG tablet Take 1 tablet by mouth 2 times daily      DULoxetine (CYMBALTA) 30 MG capsule Take 30mg (1 pill) for 1 week and if tolerating increase to 60mg (2 pills) daily (Patient taking differently: Take 30 mg by mouth every morning Take 30mg (1 pill) for 1 week and if tolerating increase to 60mg (2 pills) daily) 180 capsule 3    fexofenadine (ALLEGRA) 180 MG tablet Take 1 tablet (180 mg) by mouth 2 times daily 90 tablet 11    fluocinonide (LIDEX) 0.05 % external ointment Apply topically 2 times daily (Patient taking differently: Apply topically as needed) 60 g 1    medical cannabis (Patient's own supply) See Admin Instructions (The purpose of this order is to document that the patient reports taking medical cannabis.  This is not a prescription, and is not used to certify that the patient has a qualifying medical condition.)      oxyCODONE-acetaminophen (PERCOCET) 5-325 MG tablet Take 1-2 percocet every 6-8 hrs as needed for pain 102 tablet 0    oxyCODONE-acetaminophen (PERCOCET) 5-325 MG tablet 5-10mg q6h prn as needed for pain 56 tablet 0    Resorcinol POWD Resorcinol 15% in vanicream twice a day (Patient taking differently: as needed Resorcinol 15% in vanicream) 30 g 11    traZODone (DESYREL) 150 MG tablet Take 1 tablet (150 mg) by mouth at bedtime 90 tablet 1    traZODone (DESYREL) 150 MG tablet Take 150 mg by mouth  nightly as needed for sleep      adalimumab (HUMIRA PEN) 80 MG/0.8ML pen kit Inject 1.6 mLs (160 mg) Subcutaneous once a week (Patient not taking: Reported on 2024) 6.4 mL 3    roflumilast (DALIRESP) 500 MCG TABS tablet Take 0.5 tablet (250 mcg) by mouth daily for 4 weeks, then increase to 1 tablet (500 mcg) by mouth daily thereafter. (Patient not taking: Reported on 2024) 90 tablet 3    roflumilast (DALIRESP) 500 MCG TABS tablet Take 1 tablet (500 mcg) by mouth daily (Patient not taking: Reported on 2024) 90 tablet 3    VITAMIN D3 50 MCG (2000 UT) tablet TAKE 1 TABLET BY MOUTH EVERY DAY (Patient not taking: Reported on 2024) 90 tablet 1       Allergies  Allergies   Allergen Reactions    Penicillins Hives, Itching and Rash    Keflex [Cephalexin] Other (See Comments)     Not true allergy- intolerance       Social History  Social History     Socioeconomic History    Marital status: Single     Spouse name: Not on file    Number of children: 0    Years of education: Not on file    Highest education level: Not on file   Occupational History    Occupation:    Tobacco Use    Smoking status: Former     Packs/day: 0.50     Years: 20.00     Additional pack years: 0.00     Total pack years: 10.00     Types: Cigarettes     Quit date: 1/3/2024     Years since quittin.1    Smokeless tobacco: Never   Substance and Sexual Activity    Alcohol use: Not Currently    Drug use: Yes     Types: Marijuana     Comment: Medical marijuana    Sexual activity: Not on file   Other Topics Concern    Parent/sibling w/ CABG, MI or angioplasty before 65F 55M? Not Asked   Social History Narrative    Not on file     Social Determinants of Health     Financial Resource Strain: Not on file   Food Insecurity: Not on file   Transportation Needs: Not on file   Physical Activity: Not on file   Stress: Not on file   Social Connections: Not on file   Interpersonal Safety: Not on file   Housing Stability: Not on file        Family History  Family History   Problem Relation Age of Onset    Diabetes Sister     Hyperlipidemia Sister     Breast Cancer Mother     Arthritis Mother     Myocardial Infarction Father 47       Review of Systems  The complete review of systems is negative other than noted in the HPI or here.   Anesthesia Evaluation   Pt has had prior anesthetic. Type: General and Regional.    History of anesthetic complications  - .  Woke during general anesthesia in the past.  Reports it takes more medication that what is standard practice..    ROS/MED HX  ENT/Pulmonary:     (+)                tobacco use (Quit 1/22/24), Past use,                       Neurologic:  - neg neurologic ROS     Cardiovascular: Comment: Denies cardiac symptoms including chest pain, SOB, palpitations, syncope, ANGUIANO, orthopnea, or PND.       (-) taking anticoagulants/antiplatelets   METS/Exercise Tolerance: >4 METS Comment: Does not do regular exercise but is very active.     Hematologic:  - neg hematologic  ROS     Musculoskeletal: Comment: Reports that she typically needs more medication than the standard dosage.  She needs to Allegra when she take Percocet because of the itching.     Bunionectomy, b/l,  at age ~10  Left redo bunionectomy in early 20's with GA          GI/Hepatic:  - neg GI/hepatic ROS     Renal/Genitourinary:  - neg Renal ROS     Endo:     (+)               Obesity,       Psychiatric/Substance Use: Comment: Insomnia >> Trazodone    (+) psychiatric history (stable) depression and anxiety  H/O chronic opiod use .  (-) alcohol abuse history   Infectious Disease:  - neg infectious disease ROS     Malignancy:  - neg malignancy ROS     Other:  - neg other ROS    (+)  , H/O Chronic Pain,         Virtual visit -  No vitals were obtained    Physical Exam  Constitutional: Awake, alert, cooperative, no apparent distress, and appears stated age.  Eyes: Pupils equal  HENT: Normocephalic  Respiratory: non labored breathing   Neurologic: Awake,  alert, oriented to name, place and time.   Neuropsychiatric: Calm, cooperative. Normal affect.      Prior Labs/Diagnostic Studies   All labs and imaging personally reviewed    Latest Reference Range & Units 10/09/23 08:13   Sodium 135 - 145 mmol/L 139   Potassium 3.4 - 5.3 mmol/L 4.3   Chloride 98 - 107 mmol/L 103   Carbon Dioxide (CO2) 22 - 29 mmol/L 29   Urea Nitrogen 6.0 - 20.0 mg/dL 9.5   Creatinine 0.51 - 0.95 mg/dL 0.64   GFR Estimate >60 mL/min/1.73m2 >90   Calcium 8.6 - 10.0 mg/dL 8.7   Anion Gap 7 - 15 mmol/L 7   Albumin 3.5 - 5.2 g/dL 3.7   Protein Total 6.4 - 8.3 g/dL 7.0   Alkaline Phosphatase 35 - 104 U/L 61   ALT 0 - 50 U/L 10   AST 0 - 45 U/L 12   Bilirubin Total <=1.2 mg/dL 0.4   Cholesterol <200 mg/dL 207 (H)   Glucose 70 - 99 mg/dL 87   HDL Cholesterol >=50 mg/dL 75   LDL Cholesterol Calculated <=100 mg/dL 105 (H)   Non HDL Cholesterol <130 mg/dL 132 (H)   Triglycerides <150 mg/dL 136   WBC 4.0 - 11.0 10e3/uL 8.7   Hemoglobin 11.7 - 15.7 g/dL 13.4   Hematocrit 35.0 - 47.0 % 41.9   Platelet Count 150 - 450 10e3/uL 438   RBC Count 3.80 - 5.20 10e6/uL 4.37   MCV 78 - 100 fL 96   MCH 26.5 - 33.0 pg 30.7   MCHC 31.5 - 36.5 g/dL 32.0   RDW 10.0 - 15.0 % 12.7   % Neutrophils % 51   % Lymphocytes % 40   % Monocytes % 6   % Eosinophils % 2   % Basophils % 0   Absolute Basophils 0.0 - 0.2 10e3/uL 0.0   Absolute Eosinophils 0.0 - 0.7 10e3/uL 0.2   Absolute Immature Granulocytes <=0.4 10e3/uL 0.0   Absolute Lymphocytes 0.8 - 5.3 10e3/uL 3.5   Absolute Monocytes 0.0 - 1.3 10e3/uL 0.6   % Immature Granulocytes % 0   Absolute Neutrophils 1.6 - 8.3 10e3/uL 4.4   Quantiferon-TB Gold Plus Result Negative  Negative   TB1 Ag minus Nil Value IU/mL 0.02   TB2 Ag minus Nil Value IU/mL 0.02   Mitogen minus Nil Result IU/mL 5.29   Nil Result IU/mL 0.02   QUANTIFERON-TB GOLD PLUS  Rpt   Adalimumab Activity >=0.65 ug/mL 10.71   Adalimumab Neutralizing Antibody Not Detected  Not Detected   EER Adalimumab  See Note   (H): Data  is abnormally high  Rpt: View report in Results Review for more information      The patient's records and results personally reviewed by this provider.     Outside records reviewed from: Care Everywhere      Assessment    Klarissa Curtis is a 48 year old female seen as a PAC referral for risk assessment and optimization for anesthesia.    Plan/Recommendations  Pt will be optimized for the proposed procedure.  See below for details on the assessment, risk, and preoperative recommendations    ANESTHESIA  - Reports she typically needs more medication than what is usually prescribed.  Did wake once during general anesthesia.     NEUROLOGY  - Pain 2/2 Hidradinitis suppurativa treated with Percocet.  Reports pain tends to ebb and flow.  When pain does flare, needs to take Percocet 2 tablets to get under control.    ~ Scheduled to be seen in the pain clinic on 2/19/2024 for further evaluation and treatment options    - No history of TIA, CVA or seizure    -Post Op delirium risk factors:  No risk identified    ENT  - No current airway concerns.  Will need to be reassessed day of surgery.  Mallampati: Unable to assess  TM: Unable to assess    CARDIAC  - No history of CAD, Hypertension, and Afib    -  Denies cardiac symptoms.    - METS (Metabolic Equivalents)  Patient performs 4 or more METS exercise without symptoms            Total Score: 0      - RCRI-Very low risk: Class 1 0.4% complication rate            Total Score: 0        PULMONARY  - FEDERICA Low Risk            Total Score: 1    FEDERICA: BMI over 35 kg/m2      - Denies asthma or inhaler use    - Tobacco History   ~ Last cigarette on 1/22/2024 per patient report.      History   Smoking Status    Former    Packs/day: 0.50    Years: 20.00    Types: Cigarettes    Quit date: 1/3/2024   Smokeless Tobacco    Never       GI  - Denies h/o GERD    - PONV High Risk  Total Score: 3           1 AN PONV: Pt is Female    1 AN PONV: Patient is not a current smoker    1 AN PONV:  "Intended Post Op Opioids        /RENAL  - Baseline Creatinine  see above     ENDOCRINE    - BMI: Estimated body mass index is 39.57 kg/m  as calculated from the following:    Height as of 9/26/23: 1.703 m (5' 7.05\").    Weight as of 1/11/24: 114.8 kg (253 lb).  Obesity (BMI >30)  ~  Consideration for careful lifting and postioning techniques       - No history of Diabetes Mellitus      DERM  - Hidradenitis suppurative follows with Dr. Kenny  with right axillary wound improved with immunotherapy but not resolved>>above procedure scheduled with Dr. Mendoza. Patient with multiple questions re: upcoming procedure. Instructed to contact Dr. Mendoza's office. 47   ~ immunotherapy>>Humira weekly. Patient reports that Dr. Kenny today her to continue Humira uninterrupted for above procedure  ~ unable to take roflumilast 2/2 headaches       HEME  - VTE Low Risk 0.26%            Total Score: 0      - No history of abnormal bleeding or antiplatelet use.        PSYCH  - Cannabis use   ~ abstain for 24 hours prior to surgery    - depression and anxiety   ~ duloxetine    - insomnia   ~ trazodone    Different anesthesia methods/types have been discussed with the patient, but they are aware that the final plan will be decided by the assigned anesthesia provider on the date of service.      The patient is optimized for their procedure. AVS with information on surgery time/arrival time, meds and NPO status given by nursing staff. No further diagnostic testing indicated.    Please refer to the physical examination documented by the anesthesiologist in the anesthesia record on the day of surgery.    Video-Visit Details    Type of service:  Video Visit    Provider received verbal consent for a Video Visit from the patient? Yes     Originating Location (pt. Location): Home    Distant Location (provider location):  Off-site  Mode of Communication:  Video Conference via [employer]  On the day of service:     Prep time: 12 minutes  Visit " time: 19 minutes  Documentation time: 16 minutes  ------------------------------------------  Total time: 48 minutes      SERAFIN Kennedy CNP  Preoperative Assessment Center  [facility]  Cl[facility] and [facility] Center  Phone: [phone]  Fax: [phone]

## 2024-02-12 NOTE — PATIENT INSTRUCTIONS
Preparing for Your Surgery      Name:  Klarissa Curtis   MRN:  [id number]   :  1975   Today's Date:  2024       Arriving for surgery:  Surgery date:  24  Arrival time:  1:30 pm  Surgery time: 3:30 pm    Please come to:     Please come to:      [facility] [address]  [address] [place]  [address]      The [facility] Patient /Visitor Ramp is located at [address]. Patients and visitors who self-park will receive the reduced hospital parking rate. If the Patient /Visitor Ramp is full, please follow the signs to the  parking located at the main hospital entrance.     parking is available ( 24 hours/ 7 days a week)    Discounted parking pass options are available for patients and visitors. They can be purchased at the [employer] desk at the main hospital entrance.    -    Stop at the security desk and they will direct surgery patients to the 3rd floor Surgery Waiting Room. [phone]     -  If you are in need of directions, wheelchair or escort please stop at the Information/security desk in the lobby.       What can I eat or drink?  -  You may eat and drink normally up to 8 hours prior to arrival time. (Until 5:30 am)  -  You may have clear liquids until 2 hours prior to arrival time. (Until 11:30 am)    Examples of clear liquids:  Water  Clear broth  Juices (apple, white grape, white cranberry  and cider) without pulp  Noncarbonated, powder based beverages  (lemonade and Nawaf-Aid)  Sodas (Sprite, 7-Up, ginger ale and seltzer)  Coffee or tea (without milk or cream)  Gatorade    -  No Alcohol or cannabis products for at least 24 hours before surgery.     Which medicines can I take?    Hold Aspirin for 7 days before surgery.   Hold Multivitamins for 7 days before surgery.  Hold Supplements for 7 days before surgery.  Hold Ibuprofen (Advil, Motrin) for 1 day(s) before surgery--unless otherwise directed  by surgeon.  Hold Naproxen (Aleve) for 4 days before surgery.    -  DO NOT take these medications the day of surgery:  Calcium  Fexofenadine (Allegra)  Ointments/lotions/creams      -  PLEASE TAKE these medications per your usual routine:  Humira Cassandra Pratt will verify this and notify you if it is different  Duloxetine (Cymbalta)  Percocet if needed  Trazodone    How do I prepare myself?  - Please take 2 showers (one the night prior to surgery and one the morning of surgery) using Scrubcare or Hibiclens soap.    Use this soap only from the neck to your toes.     Leave the soap on your skin for one minute--then rinse thoroughly.      You may use your own shampoo and conditioner. No other hair products.   - Please remove all jewelry and body piercings.  - No lotions, deodorants or fragrance.  - No makeup or fingernail polish.   - Bring your ID and insurance card.    -If you use a CPAP machine, please bring the CPAP machine, tubing, and mask to hospital.    -If you have a Deep Brain Stimulator, Spinal Cord Stimulator, or any Neuro Stimulator device---you must bring the remote control to the hospital.      ALL PATIENTS GOING HOME THE SAME DAY OF SURGERY ARE REQUIRED TO HAVE A RESPONSIBLE ADULT TO DRIVE AND BE IN ATTENDANCE WITH THEM FOR 24 HOURS FOLLOWING SURGERY.    Covid testing policy as of 12/06/2022  Your surgeon will notify and schedule you for a COVID test if one is needed before surgery--please direct any questions or COVID symptoms to your surgeon      Questions or Concerns:    - For any questions regarding the day of surgery or your hospital stay, please contact the Pre Admission Nursing Office at [phone].       - If you have health changes between today and your surgery, please call your surgeon.       - For questions after surgery, please call your surgeons office.           Current Visitor Guidelines    You may have 2 visitors in the pre op area.    Visiting hours: 8 a.m. to 8:30 p.m.    Patients  confirmed or suspected to have symptoms of COVID 19 or flu:     No visitors allowed for adult patients.   Children (under age 18) can have 1 named visitor.     People who are sick or showing symptoms of COVID 19 or flu:    Are not allowed to visit patients--we can only make exceptions in special situations.       Please follow these guidelines for your visit:          Please maintain social distance          Masking is optional--however at times you may be asked to wear a mask for the safety of yourself and others     Clean your hands with alcohol hand . Do this when you arrive at and leave the building and patient room,    And again after you touch your mask or anything in the room.     Go directly to and from the room you are visiting.     Stay in the patient s room during your visit. Limit going to other places in the hospital as much as possible     Leave bags and jackets at home or in the car.     For everyone s health, please don t come and go during your visit. That includes for smoking   during your visit.

## 2024-02-12 NOTE — PROGRESS NOTES
Maura is a 48 year old who is being evaluated via a billable video visit.      How would you like to obtain your AVS? MyChart  If the video visit is dropped, the invitation should be resent by: Text to cell phone: [phone]    HPI           Objective    Vitals - Patient Reported  Pain Score: Severe Pain (6)  Pain Loc: Other - see comment (Right armpit)        Physical Exam

## 2024-02-19 ENCOUNTER — OFFICE VISIT (OUTPATIENT)
Dept: ANESTHESIOLOGY | Facility: CLINIC | Age: 49
End: 2024-02-19
Attending: DERMATOLOGY
Payer: COMMERCIAL

## 2024-02-19 VITALS
HEIGHT: 67 IN | BODY MASS INDEX: 39.71 KG/M2 | WEIGHT: 253 LBS | DIASTOLIC BLOOD PRESSURE: 84 MMHG | HEART RATE: 115 BPM | OXYGEN SATURATION: 99 % | SYSTOLIC BLOOD PRESSURE: 124 MMHG

## 2024-02-19 DIAGNOSIS — L98.9 PAINFUL SKIN LESION: ICD-10-CM

## 2024-02-19 DIAGNOSIS — L73.2 HIDRADENITIS SUPPURATIVA: Primary | ICD-10-CM

## 2024-02-19 DIAGNOSIS — F11.90 CHRONIC, CONTINUOUS USE OF OPIOIDS: ICD-10-CM

## 2024-02-19 DIAGNOSIS — M79.2 NEUROPATHIC PAIN: ICD-10-CM

## 2024-02-19 PROCEDURE — 99205 OFFICE O/P NEW HI 60 MIN: CPT

## 2024-02-19 RX ORDER — PREGABALIN 75 MG/1
CAPSULE ORAL
Qty: 90 CAPSULE | Refills: 0 | Status: ON HOLD | OUTPATIENT
Start: 2024-02-19 | End: 2024-06-12

## 2024-02-19 RX ORDER — PREGABALIN 150 MG/1
150 CAPSULE ORAL 2 TIMES DAILY
Qty: 60 CAPSULE | Refills: 1 | Status: SHIPPED | OUTPATIENT
Start: 2024-02-19 | End: 2024-06-18

## 2024-02-19 ASSESSMENT — PAIN SCALES - PAIN ENJOYMENT GENERAL ACTIVITY SCALE (PEG)
INTERFERED_ENJOYMENT_LIFE: 9
PEG_TOTALSCORE: 9
INTERFERED_ENJOYMENT_LIFE: 9
AVG_PAIN_PASTWEEK: 9
INTERFERED_GENERAL_ACTIVITY: 9
PEG_TOTALSCORE: 9
INTERFERED_GENERAL_ACTIVITY: 9
AVG_PAIN_PASTWEEK: 9

## 2024-02-19 ASSESSMENT — PAIN SCALES - GENERAL: PAINLEVEL: WORST PAIN (10)

## 2024-02-19 NOTE — NURSING NOTE
Patient presents with:  Consult: Consult scaring of the skin      Worst Pain (10)     Pain Medications       Opioid Combinations Refills Start End     oxyCODONE-acetaminophen (PERCOCET) 5-325 MG tablet 0 2024 --    Sig: Take 1-2 percocet every 6-8 hrs as needed for pain    Class: E-Prescribe    Earliest Fill Date: 2024     oxyCODONE-acetaminophen (PERCOCET) 5-325 MG tablet 0 2023 --    Si-10mg q6h prn as needed for pain    Class: E-Prescribe    Earliest Fill Date: 2023            What medications are you using for pain? Percocet, Medical Cannabis    (New patients only) Have you been seen by another pain clinic/ provider? no    (Return Patients only) What refills are you needing today? No    Expectation Plan in place after surgery

## 2024-02-19 NOTE — LETTER
2/19/2024       RE: Klarissa Curtis  [id number]  [facility] [address]     Dear Colleague,    Thank you for referring your patient, Klarissa Curtis, to the [facility] FOR [facility]HENSIVE PAIN MANAGEMENT [place] at [facility]. Please see a copy of my visit note below.      [facility]     Date of visit: 2/19/2024    Assessment:  Klarissa Curtis is a 48 year old female with a past medical history significant for hidradenitis suppurativa & depression who presents with complaints of hidradenitis suppurativa related pain.     Hidradenitis suppurativa: Symptom onset at age 11, diagnosed at age 39. Persistent flares with worsening pain in bilateral axilla, groin, and buttocks. Has tried multiple pain modalities with inadequate pain control. Multiple lesions visualized on exam: nodular abscesses, draining tracts, honeycomb lesion pattern and fibrotic scarring identified. Pain symptom etiology is likely multifactorial with hx of hidradenitis suppurativa, neuropathic involvement and overlying myofascial component.     Assigned to [facility] nursing team.     Visit diagnoses:   1. Hidradenitis suppurativa    2. Painful skin lesion    3. Neuropathic pain    4. Chronic, continuous use of opioids      Plan:  The following recommendations were given to the patient. Diagnosis, treatment options, risks, benefits, and alternatives were discussed, and all questions were answered. The patient expressed understanding of the plan for management.     I am recommending a multidisciplinary treatment plan to help this patient better manage her pain.  This includes:     Pain Physical Therapy:  NO - deferred for now     Pain Psychologist to address relaxation, behavioral change, coping style, and other factors important to improvement.  NO - deferred for now    Diagnostic Studies: 10/2023 CMP & CBC within normal limits.     Medication Management:    Lyrica 75 mg at bedtime x1 week, 75mg twice daily x1 week, then 75 mg in AM and 150 mg at bedtime x1 week, then increase to 150 mg twice daily. Discussed monitoring for pain benefit and possible side effects such as sedation. She will contact clinic with concerns for sedation/other side effects.   Continue percocet 5-325 mg tabs PRN for breakthrough pain. Will discuss changing dose to  mg tabs with Dr. Kenny. Current daily dose of oxycodone 35-40 mg/day. Advised this dosage is reasonable to continue until surgery on 2/29/24.   Consider TCA in future to optimize pain control and sleep benefit.   Will discuss restarting Belbuca films with Dr. Kenny.   Will discuss adding ketamine/amitriptyline topical cream with Dr. Kenny in future.     Potential procedures:   Plastic surgery scheduled 2/29/24 for right axillary wound excision and flap reconstruction.    Other Orders/Referrals:   Recommend inpatient pain consult upon hospital admission after surgery as needed for support with optimizing pain control.     Follow up with SERAFIN Miranda CNP in 6-8 weeks.     Review of Electronic Chart: Today I have also reviewed available medical information in the patient's medical record at [facility] ([facility]) and Care Everywhere (if available), including relevant provider notes, laboratory work, and imaging.     BENJAMIN Nichole, RN, LEYDA Student  [facility]     I saw and examined this patient with the DNP student, agree with the student's findings and plan of care as documented in the student's note, and made appropriate revisions as needed.     Lesa Osman DNP, SERAFIN, AGNP-C  [facility]     -------------------------------------------------------------------    Subjective     Reason for consultation:    Klarissa Curtis is a 48 year old female who is seen in consultation today at the request of Jean Kenny MD (Derm) for evaluation of her pain issues and  recommendations for management, with specific emphasis on  Hidradenitis suppurativa [L73.2]  - Primary      My clinical question is:Help with pain recommendations, espexially perioperative. Reason for Referral:Comprehensive Pain Evaluation Are there any red flags that may impact the assessment or management of the patient?No Red Flags Provider, please review opioid agreement in the process instructions above. Do you agree to these terms?Yes    Please see the [facility] [place] health questionnaire which the patient completed and reviewed with me in detail (if available).     Review of [place] Prescription Monitoring Program (): No concern for abuse or misuse of controlled medications based on this report. Reviewed - oxycodone from Dr. Kenny    Review of Electronic Chart: Today I have also reviewed available medical information in the patient's medical record at [facility] ([facility]), including relevant provider notes, laboratory work, and imaging.     Pain medications are being prescribed by Dr. Kenny.     Chief Complaint:    Chief Complaint   Patient presents with    Consult     Consult scaring of the skin     HPI:   Klarissa Curtis is a 48 year old female presents with a chief complaint of hidradenitis suppurativa pain.     Goals: overall pain management + post-op pain management   - ADLs impacted, pt feels helpless with pain uncontrolled.   - Has plastic surgery scheduled for right axillary wound: last Wednesday had injections into woundbed  - Affected sites: Bilateral axillary, groin, buttocks    - Diagnosed 9 years ago, symptoms started when she was 11.  - Dr. Lees recommended switching to buprenorphine   - Percocet PRN for flare pain.     The pain has been present for 35 years.    The pain is 2 in severity. When she reaches a level 10 severity, it will last for days.   The pain is described as burning, sharp, stabbing with a hot poker during flares. Difficulty to breathe.   The  pain is alleviated by:   - Percocet: helps with sleep     - kenalog injections   - Medical cannabis   It is exacerbated by activity.     Things that were not helpful, but tried, include:   - Topicals morphine  - Gabapentin   - Cosentyx  - Belbuca   The patient otherwise denies bowel or bladder incontinence, parasthesias, weakness, saddle anesthesia, unintentional weight loss, or fever/chills/sweats. NO  Klarissa KATHE Curtis has not been seen at a pain clinic in the past.    -She takes oxycodone, 10-15 mg dose needed to achieve analgesic benefit.   -She appreciates benefit from steroid injections (triamcinolone) with Dr. Kenny (Derm).   -She has upcoming surgery of right axillary on 2/29/24.  -She is on medical cannabis, finds helpful at bedtime for sleep  -Topical morphine solicite gel, not helpful.     Consider ketamine 5%-amitriptyline 2% topical, other topicals outside of morphine   -Neuropathics with surgery coming up    -She tried Belbuca in the past, 450 mcg BID, states she started with 1/2 film but then she did not get up to 1 film/day, did not take daily, only with pain flares  -She has been following with Dr. Kenny, who has been managing her pain  -She works for company that runs [employer] [place]. She has been with this company since 2018.   -HS lesions are mixed, bilateral axillary, groin, perineal area.   -Her 71 year old mother, retired nurse, is coming up for surgery and then afterwards.   -Plan is to stay overnight after surgery on 2/29/24.   -She takes 10-15 mg of oxy over 1-2 hours, total 7-8 tabs/day (60 MME).   -She uses cannabis vape throughout the day.   -She tried gabapentin 900 mg TID about 6 months ago, no benefit but no side effects.   -She does not drink alcohol anymore.     Pain Questionnaire    What number best describes your pain right now: 9  (0 = No pain to 10 = Worst pain imaginable)    How would you describe the pain? burning, sharp, throbbing,  pressure    Which of the following worsen your pain? walking, exercise    Which of the following improve or reduce your pain? lying down, medication, relaxation    What number best describes your average pain for the past week: 9  (0 = No pain to 10 = Worst pain imaginable)    What number best describes your LOWEST pain in past 24 hours: 9  (0 = No pain to 10 = Worst pain imaginable)    What number best describes your WORST pain in past 24 hours: 9  (0 = No pain to 10 = Worst pain imaginable)    When is your pain worst? Constant    What non-medicine treatments have you already had for your pain? physical therapy    Have you tried treating your pain with medication? Yes    If yes, please answer the below questions -     What topical medications have you tried in the past but are no longer taking? Gabapentin (Neurontin) gel    What anti-convulsants (seizure medicines) have you tried in the past but are no longer taking? Gabapentin (Neurontin)    What anti-depressant medication have you tried in the past but are no longer taking? Bupropion (Wellbutrin), Fluoxetine (Prozac)    What sleep aid medications have you tried in the past but are no longer taking? None    What opioid medications have you tried in the past but are no longer taking? Codeine (Tylenol #3), Morphine (MR Contin, Astrid), Oxycodone (Percocet, OxyContin), Tramadol (Ultram)    What NSAID medications have you tried in the past but are no longer taking? Ibuprofen (Advil, Motrin), Naproxen (Aleve)    What muscle relaxer medications have you tried in the past but are no longer taking? Cyclobenzaprine (Flexeril)    What anti-migraine medications have you tried in the past but are no longer taking? None    Are you currently taking medications for your pain? Yes    If yes, please answer below -     During the past month, list all the medications that you have used for pain. Please list drug name, dose, and frequency taking: percocet, medical cannabis    Current  Pain Treatments:    Medications:    - Percocet 5-325 mg PRN for breakthrough pain. Will discuss dose adjustment to  mg with Dr. Kenny.   - Lyrica 75 mg at bedtime x1 week, 75mg BID x1 week, 75/150 x1 week, titrate to goal dose 150 mg BID.      2. Other therapies:    Medical cannabis   Plastic surgery & reconstruction of right axillary wound   Kenalog injections - Derm    Current Outpatient Medications   Medication    adalimumab (HUMIRA *CF* PEN-CD/UC/HS STARTER) 80 MG/0.8ML pen kit    adalimumab (HUMIRA PEN) 80 MG/0.8ML pen kit    calcium carbonate (OS-MERCY) 1500 (600 Ca) MG tablet    DULoxetine (CYMBALTA) 30 MG capsule    fexofenadine (ALLEGRA) 180 MG tablet    fluocinonide (LIDEX) 0.05 % external ointment    medical cannabis (Patient's own supply)    naloxone (NARCAN) 4 MG/0.1ML nasal spray    oxyCODONE-acetaminophen (PERCOCET) 5-325 MG tablet    oxyCODONE-acetaminophen (PERCOCET) 5-325 MG tablet    pregabalin (LYRICA) 150 MG capsule    pregabalin (LYRICA) 75 MG capsule    Resorcinol POWD    roflumilast (DALIRESP) 500 MCG TABS tablet    roflumilast (DALIRESP) 500 MCG TABS tablet    traZODone (DESYREL) 150 MG tablet    traZODone (DESYREL) 150 MG tablet    VITAMIN D3 50 MCG (2000 UT) tablet     Current Facility-Administered Medications   Medication    triamcinolone acetonide (KENALOG-10) injection 40 mg    triamcinolone acetonide (KENALOG-10) injection 60 mg     Allergies   Allergen Reactions    Penicillins Hives, Itching and Rash    Keflex [Cephalexin] Other (See Comments)     Not true allergy- intolerance      Past Medical History:   Diagnosis Date    NO ACTIVE PROBLEMS      Past Surgical History:   Procedure Laterality Date    BUNIONECTOMY      x2    OTHER SURGICAL HISTORY      right pointer finger foreign body removal     Family History   Problem Relation Age of Onset    Diabetes Sister     Hyperlipidemia Sister     Breast Cancer Mother     Arthritis Mother     Myocardial Infarction Father 47     Social  History     Socioeconomic History    Marital status: Single    Number of children: 0   Occupational History    Occupation:    Tobacco Use    Smoking status: Former     Packs/day: 0.50     Years: 20.00     Additional pack years: 0.00     Total pack years: 10.00     Types: Cigarettes     Quit date: 1/3/2024     Years since quittin.1    Smokeless tobacco: Never   Substance and Sexual Activity    Alcohol use: Not Currently    Drug use: Yes     Types: Marijuana     Comment: Medical marijuana      ROS: 10 point ROS neg other than the symptoms noted above in the HPI.    Objective    Diagnostic Testing - Imaging/Labs:    Labs:    10/2023 CMP: GFR >90, AST/ALT 12/10  10/2023 CBC: WNL    Imaging:   None    Physical Exam  Constitutional:       Appearance: Normal appearance.   HENT:      Head: Normocephalic.   Pulmonary:      Effort: Pulmonary effort is normal.   Abdominal:      Palpations: Abdomen is soft.   Musculoskeletal:      Cervical back: Normal range of motion.   Skin:     General: Skin is warm.      Comments: Multiple HS lesions identified in bilateral axilla, groin, and buttocks. Nodular abscesses, draining tracts, honeycomb pattern and fibrotic scarring visualized.    Neurological:      General: No focal deficit present.      Mental Status: She is alert.   Psychiatric:         Mood and Affect: Mood normal.      Comments: Tearful at times.        Musculoskeletal exam:  Normal gait.     Neurologic exam:  CN:  Cranial nerves 2-12 are grossly intact.    BILLING TIME DOCUMENTATION:   The total TIME spent on this patient on the date of the encounter/appointment was 60 minutes.      TOTAL TIME includes:   Time spent preparing to see the patient (reviewing records and tests)   Time spent face to face (or over the phone) with the patient   Time spent ordering tests, medications, procedures and referrals   Time spent Referring and communicating with other healthcare professionals   Time spent documenting  clinical information in Epic       Again, thank you for allowing me to participate in the care of your patient.      Sincerely,    SERAFIN Miarnda CNP

## 2024-02-19 NOTE — PROGRESS NOTES
[facility]     Date of visit: 2/19/2024    Assessment:  Klarissa Curtis is a 48 year old female with a past medical history significant for hidradenitis suppurativa & depression who presents with complaints of hidradenitis suppurativa related pain.     Hidradenitis suppurativa: Symptom onset at age 11, diagnosed at age 39. Persistent flares with worsening pain in bilateral axilla, groin, and buttocks. Has tried multiple pain modalities with inadequate pain control. Multiple lesions visualized on exam: nodular abscesses, draining tracts, honeycomb lesion pattern and fibrotic scarring identified. Pain symptom etiology is likely multifactorial with hx of hidradenitis suppurativa, neuropathic involvement and overlying myofascial component.     [place] [facility] [place].     Visit diagnoses:   1. Hidradenitis suppurativa    2. Painful skin lesion    3. Neuropathic pain    4. Chronic, continuous use of opioids      Plan:  The following recommendations were given to the patient. Diagnosis, treatment options, risks, benefits, and alternatives were discussed, and all questions were answered. The patient expressed understanding of the plan for management.     I am recommending a multidisciplinary treatment plan to help this patient better manage her pain.  This includes:     Pain Physical Therapy:  NO - deferred for now     Pain Psychologist to address relaxation, behavioral change, coping style, and other factors important to improvement.  NO - deferred for now    Diagnostic Studies: 10/2023 CMP & CBC within normal limits.     Medication Management:   Lyrica 75 mg at bedtime x1 week, 75mg twice daily x1 week, then 75 mg in AM and 150 mg at bedtime x1 week, then increase to 150 mg twice daily. Discussed monitoring for pain benefit and possible side effects such as sedation. She will contact clinic with concerns for sedation/other side effects.   Continue percocet 5-325 mg tabs PRN for  breakthrough pain. Will discuss changing dose to  mg tabs with Dr. Kenny. Current daily dose of oxycodone 35-40 mg/day. Advised this dosage is reasonable to continue until surgery on 2/29/24.   Consider TCA in future to optimize pain control and sleep benefit.   Will discuss restarting Belbuca films with Dr. Kenny.   Will discuss adding ketamine/amitriptyline topical cream with Dr. Kenny in future.     Potential procedures:   Plastic surgery scheduled 2/29/24 for right axillary wound excision and flap reconstruction.    Other Orders/Referrals:   Recommend inpatient pain consult upon hospital admission after surgery as needed for support with optimizing pain control.     Follow up with SERAFIN Miranda CNP in 6-8 weeks.     Review of Electronic Chart: Today I have also reviewed available medical information in the patient's medical record at [facility] [place] ([facility]) and Care Everywhere (if available), including relevant provider notes, laboratory work, and imaging.     BENJAMIN Nichole, RN, LEYDA Student  [facility]     I saw and examined this patient with the DNP student, agree with the student's findings and plan of care as documented in the student's note, and made appropriate revisions as needed.     Lesa Osman DNP, APRN, AGNP-C  [facility]     -------------------------------------------------------------------    Subjective     Reason for consultation:    Klarissa Curtis is a 48 year old female who is seen in consultation today at the request of Jean Kenny MD (Derm) for evaluation of her pain issues and recommendations for management, with specific emphasis on  Hidradenitis suppurativa [L73.2]  - Primary      My clinical question is:Help with pain recommendations, espexially perioperative. Reason for Referral:Comprehensive Pain Evaluation Are there any red flags that may impact the assessment or management of the patient?No Red Flags Provider,  please review opioid agreement in the process instructions above. Do you agree to these terms?Yes    Please see the [facility] health questionnaire which the patient completed and reviewed with me in detail (if available).     Review of [place] Prescription Monitoring Program (): No concern for abuse or misuse of controlled medications based on this report. Reviewed - oxycodone from Dr. Kenny    Review of Electronic Chart: Today I have also reviewed available medical information in the patient's medical record at [facility] ([facility] including relevant provider notes, laboratory work, and imaging.     Pain medications are being prescribed by Dr. Kenny.     Chief Complaint:    Chief Complaint   Patient presents with    Consult     Consult scaring of the skin     HPI:   Klarissa Curtis is a 48 year old female presents with a chief complaint of hidradenitis suppurativa pain.     Goals: overall pain management + post-op pain management   - ADLs impacted, pt feels helpless with pain uncontrolled.   - Has plastic surgery scheduled for right axillary wound: last Wednesday had injections into woundbed  - Affected sites: Bilateral axillary, groin, buttocks    - Diagnosed 9 years ago, symptoms started when she was 11.  - Dr. Lees recommended switching to buprenorphine   - Percocet PRN for flare pain.     The pain has been present for 35 years.    The pain is 2 in severity. When she reaches a level 10 severity, it will last for days.   The pain is described as burning, sharp, stabbing with a hot poker during flares. Difficulty to breathe.   The pain is alleviated by:   - Percocet: helps with sleep     - kenalog injections   - Medical cannabis   It is exacerbated by activity.     Things that were not helpful, but tried, include:   - Topicals morphine  - Gabapentin   - Cosentyx  - Belbuca   The patient otherwise denies bowel or bladder incontinence, parasthesias, weakness, saddle  anesthesia, unintentional weight loss, or fever/chills/sweats. NO  Klarissa KATHE Curtis has not been seen at a pain clinic in the past.    -She takes oxycodone, 10-15 mg dose needed to achieve analgesic benefit.   -She appreciates benefit from steroid injections (triamcinolone) with Dr. Kenny (Derm).   -She has upcoming surgery of right axillary on 2/29/24.  -She is on medical cannabis, finds helpful at bedtime for sleep  -Topical morphine solicite gel, not helpful.     Consider ketamine 5%-amitriptyline 2% topical, other topicals outside of morphine   -Neuropathics with surgery coming up    -She tried Belbuca in the past, 450 mcg BID, states she started with 1/2 film but then she did not get up to 1 film/day, did not take daily, only with pain flares  -She has been following with Dr. Kenny, who has been managing her pain  -She works for company that runs [employer] [place]. She has been with this company since 2018.   -HS lesions are mixed, bilateral axillary, groin, perineal area.   -Her 71 year old mother, retired nurse, is coming up for surgery and then afterwards.   -Plan is to stay overnight after surgery on 2/29/24.   -She takes 10-15 mg of oxy over 1-2 hours, total 7-8 tabs/day (60 MME).   -She uses cannabis vape throughout the day.   -She tried gabapentin 900 mg TID about 6 months ago, no benefit but no side effects.   -She does not drink alcohol anymore.     Pain Questionnaire    What number best describes your pain right now: 9  (0 = No pain to 10 = Worst pain imaginable)    How would you describe the pain? burning, sharp, throbbing, pressure    Which of the following worsen your pain? walking, exercise    Which of the following improve or reduce your pain? lying down, medication, relaxation    What number best describes your average pain for the past week: 9  (0 = No pain to 10 = Worst pain imaginable)    What number best describes your LOWEST pain in past 24 hours: 9  (0  = No pain to 10 = Worst pain imaginable)    What number best describes your WORST pain in past 24 hours: 9  (0 = No pain to 10 = Worst pain imaginable)    When is your pain worst? Constant    What non-medicine treatments have you already had for your pain? physical therapy    Have you tried treating your pain with medication? Yes    If yes, please answer the below questions -     What topical medications have you tried in the past but are no longer taking? Gabapentin (Neurontin) gel    What anti-convulsants (seizure medicines) have you tried in the past but are no longer taking? Gabapentin (Neurontin)    What anti-depressant medication have you tried in the past but are no longer taking? Bupropion (Wellbutrin), Fluoxetine (Prozac)    What sleep aid medications have you tried in the past but are no longer taking? None    What opioid medications have you tried in the past but are no longer taking? Codeine (Tylenol #3), Morphine (MR Contin, Astrid), Oxycodone (Percocet, OxyContin), Tramadol (Ultram)    What NSAID medications have you tried in the past but are no longer taking? Ibuprofen (Advil, Motrin), Naproxen (Aleve)    What muscle relaxer medications have you tried in the past but are no longer taking? Cyclobenzaprine (Flexeril)    What anti-migraine medications have you tried in the past but are no longer taking? None    Are you currently taking medications for your pain? Yes    If yes, please answer below -     During the past month, list all the medications that you have used for pain. Please list drug name, dose, and frequency taking: percocet, medical cannabis    Current Pain Treatments:    Medications:    - Percocet 5-325 mg PRN for breakthrough pain. Will discuss dose adjustment to  mg with Dr. Kenny.   - Lyrica 75 mg at bedtime x1 week, 75mg BID x1 week, 75/150 x1 week, titrate to goal dose 150 mg BID.      2. Other therapies:    Medical cannabis   Plastic surgery & reconstruction of right axillary  wound   Kenalog injections - Derm    Current Outpatient Medications   Medication    adalimumab (HUMIRA *CF* PEN-CD/UC/HS STARTER) 80 MG/0.8ML pen kit    adalimumab (HUMIRA PEN) 80 MG/0.8ML pen kit    calcium carbonate (OS-MERCY) 1500 (600 Ca) MG tablet    DULoxetine (CYMBALTA) 30 MG capsule    fexofenadine (ALLEGRA) 180 MG tablet    fluocinonide (LIDEX) 0.05 % external ointment    medical cannabis (Patient's own supply)    naloxone (NARCAN) 4 MG/0.1ML nasal spray    oxyCODONE-acetaminophen (PERCOCET) 5-325 MG tablet    oxyCODONE-acetaminophen (PERCOCET) 5-325 MG tablet    pregabalin (LYRICA) 150 MG capsule    pregabalin (LYRICA) 75 MG capsule    Resorcinol POWD    roflumilast (DALIRESP) 500 MCG TABS tablet    roflumilast (DALIRESP) 500 MCG TABS tablet    traZODone (DESYREL) 150 MG tablet    traZODone (DESYREL) 150 MG tablet    VITAMIN D3 50 MCG (2000 UT) tablet     Current Facility-Administered Medications   Medication    triamcinolone acetonide (KENALOG-10) injection 40 mg    triamcinolone acetonide (KENALOG-10) injection 60 mg     Allergies   Allergen Reactions    Penicillins Hives, Itching and Rash    Keflex [Cephalexin] Other (See Comments)     Not true allergy- intolerance      Past Medical History:   Diagnosis Date    NO ACTIVE PROBLEMS      Past Surgical History:   Procedure Laterality Date    BUNIONECTOMY      x2    OTHER SURGICAL HISTORY      right pointer finger foreign body removal     Family History   Problem Relation Age of Onset    Diabetes Sister     Hyperlipidemia Sister     Breast Cancer Mother     Arthritis Mother     Myocardial Infarction Father 47     Social History     Socioeconomic History    Marital status: Single    Number of children: 0   Occupational History    Occupation:    Tobacco Use    Smoking status: Former     Packs/day: 0.50     Years: 20.00     Additional pack years: 0.00     Total pack years: 10.00     Types: Cigarettes     Quit date: 1/3/2024     Years since quitting:  0.1    Smokeless tobacco: Never   Substance and Sexual Activity    Alcohol use: Not Currently    Drug use: Yes     Types: Marijuana     Comment: Medical marijuana      ROS: 10 point ROS neg other than the symptoms noted above in the HPI.    Objective    Diagnostic Testing - Imaging/Labs:    Labs:    10/2023 CMP: GFR >90, AST/ALT 12/10  10/2023 CBC: WNL    Imaging:   None    Physical Exam  Constitutional:       Appearance: Normal appearance.   HENT:      Head: Normocephalic.   Pulmonary:      Effort: Pulmonary effort is normal.   Abdominal:      Palpations: Abdomen is soft.   Musculoskeletal:      Cervical back: Normal range of motion.   Skin:     General: Skin is warm.      Comments: Multiple HS lesions identified in bilateral axilla, groin, and buttocks. Nodular abscesses, draining tracts, honeycomb pattern and fibrotic scarring visualized.    Neurological:      General: No focal deficit present.      Mental Status: She is alert.   Psychiatric:         Mood and Affect: Mood normal.      Comments: Tearful at times.        Musculoskeletal exam:  Normal gait.     Neurologic exam:  CN:  Cranial nerves 2-12 are grossly intact.    BILLING TIME DOCUMENTATION:   The total TIME spent on this patient on the date of the encounter/appointment was 60 minutes.      TOTAL TIME includes:   Time spent preparing to see the patient (reviewing records and tests)   Time spent face to face (or over the phone) with the patient   Time spent ordering tests, medications, procedures and referrals   Time spent Referring and communicating with other healthcare professionals   Time spent documenting clinical information in Epic

## 2024-02-22 NOTE — PATIENT INSTRUCTIONS
Pain Physical Therapy:  NO - deferred for now     Pain Psychologist to address relaxation, behavioral change, coping style, and other factors important to improvement.  NO - deferred for now    Diagnostic Studies: 10/2023 CMP & CBC within normal limits.     Medication Management:   Lyrica 75 mg at bedtime x1 week, 75mg twice daily x1 week, then 75 mg in AM and 150 mg at bedtime x1 week, then increase to 150 mg twice daily. Discussed monitoring for pain benefit and possible side effects such as sedation. She will contact clinic with concerns for sedation/other side effects.   Continue percocet 5-325 mg tabs PRN for breakthrough pain. Will discuss changing dose to  mg tabs with Dr. Kenny. Current daily dose of oxycodone 35-40 mg/day. Advised this dosage is reasonable to continue until surgery on 2/29/24.   Consider TCA in future to optimize pain control and sleep benefit.   Will discuss restarting Belbuca films with Dr. Kenny.   Will discuss adding ketamine/amitriptyline topical cream with Dr. Kenny in future.     Potential procedures:   Plastic surgery scheduled 2/29/24 for right axillary wound excision and flap reconstruction.    Other Orders/Referrals:   Recommend inpatient pain consult upon hospital admission after surgery as needed for support with optimizing pain control.     Follow up with SERAFIN Miranda CNP in 6-8 weeks.

## 2024-02-26 ENCOUNTER — PATIENT OUTREACH (OUTPATIENT)
Dept: PLASTIC SURGERY | Facility: CLINIC | Age: 49
End: 2024-02-26
Payer: COMMERCIAL

## 2024-02-26 NOTE — TELEPHONE ENCOUNTER
Spoke with pt, informed her of message from Dr Kenny (via PAC provider) that she does not need to interrupt or modify humara for surgery, she can continue the med as normal.

## 2024-02-28 RX ORDER — OXYCODONE HYDROCHLORIDE 5 MG/1
5 TABLET ORAL
Status: CANCELLED | OUTPATIENT
Start: 2024-02-28

## 2024-02-28 RX ORDER — ONDANSETRON 4 MG/1
4 TABLET, ORALLY DISINTEGRATING ORAL EVERY 30 MIN PRN
Status: CANCELLED | OUTPATIENT
Start: 2024-02-28

## 2024-02-28 RX ORDER — OXYCODONE HYDROCHLORIDE 10 MG/1
10 TABLET ORAL
Status: CANCELLED | OUTPATIENT
Start: 2024-02-28

## 2024-02-28 RX ORDER — NALOXONE HYDROCHLORIDE 0.4 MG/ML
0.1 INJECTION, SOLUTION INTRAMUSCULAR; INTRAVENOUS; SUBCUTANEOUS
Status: CANCELLED | OUTPATIENT
Start: 2024-02-28

## 2024-02-28 RX ORDER — ONDANSETRON 2 MG/ML
4 INJECTION INTRAMUSCULAR; INTRAVENOUS EVERY 30 MIN PRN
Status: CANCELLED | OUTPATIENT
Start: 2024-02-28

## 2024-02-28 ASSESSMENT — LIFESTYLE VARIABLES: TOBACCO_USE: 1

## 2024-02-28 NOTE — ANESTHESIA PREPROCEDURE EVALUATION
Anesthesia Pre-Procedure Evaluation    Patient: Klarissa Curtis   MRN: [id number] : 1975        Procedure : Procedure(s):  Right axillary wound excision and regional flap reconstruction, SPY          This is a 48F previous smoker, with obesity, depression and hidradenitis suppurativa.    The patient was diagnosed with hidradenitis suppurative at age 11 and over the years and is currently on immunotherapy for management and percocet for pain control.  Per Dr. Mendoza's 23 note, the patient was to discuss her immunotherapy with her dermatologist.    Past Medical History:   Diagnosis Date    NO ACTIVE PROBLEMS       Past Surgical History:   Procedure Laterality Date    BUNIONECTOMY      x2    OTHER SURGICAL HISTORY      right pointer finger foreign body removal      Allergies   Allergen Reactions    Penicillins Hives, Itching and Rash    Keflex [Cephalexin] Other (See Comments)     Not true allergy- intolerance      Social History     Tobacco Use    Smoking status: Former     Packs/day: 0.50     Years: 20.00     Additional pack years: 0.00     Total pack years: 10.00     Types: Cigarettes     Quit date: 1/3/2024     Years since quittin.1    Smokeless tobacco: Never   Substance Use Topics    Alcohol use: Not Currently      Wt Readings from Last 1 Encounters:   24 114.8 kg (253 lb)        Anesthesia Evaluation   Pt has had prior anesthetic. Type: General and Regional.    History of anesthetic complications  - .  Woke during general anesthesia in the past.  Reports it takes more medication that what is standard practice..    ROS/MED HX  ENT/Pulmonary:     (+)                tobacco use (Quit 24), Past use,                       Neurologic:  - neg neurologic ROS     Cardiovascular: Comment: Denies cardiac symptoms including chest pain, SOB, palpitations, syncope, ANGUIANO, orthopnea, or PND.       (-) taking anticoagulants/antiplatelets   METS/Exercise Tolerance: >4 METS Comment: Does not do  "regular exercise but is very active.     Hematologic:  - neg hematologic  ROS     Musculoskeletal: Comment: hidradenitis suppurativa (on humira and breakthough ILK/steroids and antihisatmines), percocet use for flare pain    Bunionectomy, b/l,  at age ~10  Left redo bunionectomy in early 20's with GA          GI/Hepatic:  - neg GI/hepatic ROS     Renal/Genitourinary:  - neg Renal ROS     Endo:     (+)               Obesity,       Psychiatric/Substance Use: Comment: Insomnia managed on Trazodone    (+) psychiatric history (stable) depression and anxiety  H/O chronic opiod use .  (-) alcohol abuse history   Infectious Disease:  - neg infectious disease ROS     Malignancy:  - neg malignancy ROS     Other:  - neg other ROS    (+)  , H/O Chronic Pain,            OUTSIDE LABS:  CBC:   Lab Results   Component Value Date    WBC 8.7 10/09/2023    WBC 8.2 06/01/2022    HGB 13.4 10/09/2023    HGB 12.5 06/01/2022    HCT 41.9 10/09/2023    HCT 38.4 06/01/2022     10/09/2023     06/01/2022     BMP:   Lab Results   Component Value Date     10/09/2023     06/01/2022    POTASSIUM 4.3 10/09/2023    POTASSIUM 4.0 06/01/2022    CHLORIDE 103 10/09/2023    CHLORIDE 103 06/01/2022    CO2 29 10/09/2023    CO2 30 06/01/2022    BUN 9.5 10/09/2023    BUN 14 06/01/2022    CR 0.64 10/09/2023    CR 0.67 06/01/2022    GLC 87 10/09/2023    GLC 77 06/01/2022     COAGS: No results found for: \"PTT\", \"INR\", \"FIBR\"  POC: No results found for: \"BGM\", \"HCG\", \"HCGS\"  HEPATIC:   Lab Results   Component Value Date    ALBUMIN 3.7 10/09/2023    PROTTOTAL 7.0 10/09/2023    ALT 10 10/09/2023    AST 12 10/09/2023    ALKPHOS 61 10/09/2023    BILITOTAL 0.4 10/09/2023     OTHER:   Lab Results   Component Value Date    A1C 5.3 11/21/2019    MERCY 8.7 10/09/2023    TSH 0.32 (L) 03/20/2020    T4 1.09 03/20/2020       Anesthesia Plan    ASA Status:  3       Anesthesia Type: General.     - Airway: ETT   Induction: Intravenous.   Maintenance: " "Inhalation.   Techniques and Equipment:     - Lines/Monitors: BIS     Consents            Postoperative Care    Pain management: IV analgesics.   PONV prophylaxis: Ondansetron (or other 5HT-3), Dexamethasone or Solumedrol     Comments:               Alexandro Coffman MD    I have reviewed the pertinent notes and labs in the chart from the past 30 days and (re)examined the patient.  Any updates or changes from those notes are reflected in this note.              # Obesity: Estimated body mass index is 39.63 kg/m  as calculated from the following:    Height as of 2/19/24: 1.702 m (5' 7\").    Weight as of 2/19/24: 114.8 kg (253 lb).      "

## 2024-02-29 ENCOUNTER — HOSPITAL ENCOUNTER (INPATIENT)
Facility: CLINIC | Age: 49
LOS: 1 days | Discharge: HOME | DRG: 585 | End: 2024-03-01
Attending: PLASTIC SURGERY | Admitting: PLASTIC SURGERY
Payer: COMMERCIAL

## 2024-02-29 ENCOUNTER — ANESTHESIA (OUTPATIENT)
Dept: SURGERY | Facility: CLINIC | Age: 49
DRG: 585 | End: 2024-02-29
Payer: COMMERCIAL

## 2024-02-29 DIAGNOSIS — L73.2 HIDRADENITIS SUPPURATIVA: Primary | ICD-10-CM

## 2024-02-29 LAB
ABO/RH(D): NORMAL
ANTIBODY SCREEN: NEGATIVE
CREAT SERPL-MCNC: 0.53 MG/DL (ref 0.51–0.95)
EGFRCR SERPLBLD CKD-EPI 2021: >90 ML/MIN/1.73M2
GLUCOSE BLDC GLUCOMTR-MCNC: 97 MG/DL (ref 70–99)
GRAM STAIN RESULT: ABNORMAL
HGB BLD-MCNC: 14.3 G/DL (ref 11.7–15.7)
SPECIMEN EXPIRATION DATE: NORMAL

## 2024-02-29 PROCEDURE — B31HZZZ FLUOROSCOPY OF RIGHT UPPER EXTREMITY ARTERIES: ICD-10-PCS | Performed by: PLASTIC SURGERY

## 2024-02-29 PROCEDURE — 710N000010 HC RECOVERY PHASE 1, LEVEL 2, PER MIN: Performed by: PLASTIC SURGERY

## 2024-02-29 PROCEDURE — 250N000011 HC RX IP 250 OP 636: Performed by: STUDENT IN AN ORGANIZED HEALTH CARE EDUCATION/TRAINING PROGRAM

## 2024-02-29 PROCEDURE — 258N000003 HC RX IP 258 OP 636: Performed by: PHYSICIAN ASSISTANT

## 2024-02-29 PROCEDURE — 87070 CULTURE OTHR SPECIMN AEROBIC: CPT | Performed by: PLASTIC SURGERY

## 2024-02-29 PROCEDURE — 15734 MUSCLE-SKIN GRAFT TRUNK: CPT | Mod: GC | Performed by: PLASTIC SURGERY

## 2024-02-29 PROCEDURE — 272N000001 HC OR GENERAL SUPPLY STERILE: Performed by: PLASTIC SURGERY

## 2024-02-29 PROCEDURE — 36415 COLL VENOUS BLD VENIPUNCTURE: CPT | Performed by: NURSE PRACTITIONER

## 2024-02-29 PROCEDURE — 88305 TISSUE EXAM BY PATHOLOGIST: CPT | Mod: TC | Performed by: PLASTIC SURGERY

## 2024-02-29 PROCEDURE — 88305 TISSUE EXAM BY PATHOLOGIST: CPT | Mod: 26 | Performed by: PATHOLOGY

## 2024-02-29 PROCEDURE — 250N000011 HC RX IP 250 OP 636: Performed by: PHYSICIAN ASSISTANT

## 2024-02-29 PROCEDURE — 250N000009 HC RX 250: Performed by: ANESTHESIOLOGY

## 2024-02-29 PROCEDURE — 250N000025 HC SEVOFLURANE, PER MIN: Performed by: PLASTIC SURGERY

## 2024-02-29 PROCEDURE — 14020 TIS TRNFR S/A/L 10 SQ CM/<: CPT | Performed by: ANESTHESIOLOGY

## 2024-02-29 PROCEDURE — 250N000011 HC RX IP 250 OP 636: Performed by: ANESTHESIOLOGY

## 2024-02-29 PROCEDURE — 0KXF0Z5 TRANSFER RIGHT TRUNK MUSCLE, LATISSIMUS DORSI MYOCUTANEOUS FLAP, OPEN APPROACH: ICD-10-PCS | Performed by: PLASTIC SURGERY

## 2024-02-29 PROCEDURE — 120N000002 HC R&B MED SURG/OB UMMC

## 2024-02-29 PROCEDURE — 999N000141 HC STATISTIC PRE-PROCEDURE NURSING ASSESSMENT: Performed by: PLASTIC SURGERY

## 2024-02-29 PROCEDURE — 258N000003 HC RX IP 258 OP 636: Performed by: ANESTHESIOLOGY

## 2024-02-29 PROCEDURE — 87075 CULTR BACTERIA EXCEPT BLOOD: CPT | Performed by: PLASTIC SURGERY

## 2024-02-29 PROCEDURE — 250N000011 HC RX IP 250 OP 636: Performed by: PLASTIC SURGERY

## 2024-02-29 PROCEDURE — 258N000003 HC RX IP 258 OP 636: Performed by: PLASTIC SURGERY

## 2024-02-29 PROCEDURE — 87205 SMEAR GRAM STAIN: CPT | Performed by: PLASTIC SURGERY

## 2024-02-29 PROCEDURE — 250N000013 HC RX MED GY IP 250 OP 250 PS 637: Performed by: PHYSICIAN ASSISTANT

## 2024-02-29 PROCEDURE — 250N000009 HC RX 250: Performed by: PLASTIC SURGERY

## 2024-02-29 PROCEDURE — 85018 HEMOGLOBIN: CPT | Performed by: NURSE PRACTITIONER

## 2024-02-29 PROCEDURE — 82565 ASSAY OF CREATININE: CPT | Performed by: NURSE PRACTITIONER

## 2024-02-29 PROCEDURE — 360N000077 HC SURGERY LEVEL 4, PER MIN: Performed by: PLASTIC SURGERY

## 2024-02-29 PROCEDURE — 258N000003 HC RX IP 258 OP 636: Performed by: STUDENT IN AN ORGANIZED HEALTH CARE EDUCATION/TRAINING PROGRAM

## 2024-02-29 PROCEDURE — 250N000009 HC RX 250: Performed by: STUDENT IN AN ORGANIZED HEALTH CARE EDUCATION/TRAINING PROGRAM

## 2024-02-29 PROCEDURE — 11451 EXC SKN HDRDNT AX COMPLEX: CPT | Mod: RT | Performed by: PLASTIC SURGERY

## 2024-02-29 PROCEDURE — 03B50ZZ EXCISION OF RIGHT AXILLARY ARTERY, OPEN APPROACH: ICD-10-PCS | Performed by: PLASTIC SURGERY

## 2024-02-29 PROCEDURE — 370N000017 HC ANESTHESIA TECHNICAL FEE, PER MIN: Performed by: PLASTIC SURGERY

## 2024-02-29 PROCEDURE — 14020 TIS TRNFR S/A/L 10 SQ CM/<: CPT | Performed by: NURSE ANESTHETIST, CERTIFIED REGISTERED

## 2024-02-29 PROCEDURE — 86900 BLOOD TYPING SEROLOGIC ABO: CPT | Performed by: NURSE PRACTITIONER

## 2024-02-29 PROCEDURE — 37799 UNLISTED PX VASCULAR SURGERY: CPT | Mod: GC | Performed by: PLASTIC SURGERY

## 2024-02-29 RX ORDER — ACETAMINOPHEN 325 MG/1
650 TABLET ORAL EVERY 4 HOURS PRN
Status: DISCONTINUED | OUTPATIENT
Start: 2024-03-03 | End: 2024-03-01 | Stop reason: HOSPADM

## 2024-02-29 RX ORDER — POLYETHYLENE GLYCOL 3350 17 G/17G
17 POWDER, FOR SOLUTION ORAL DAILY
Status: DISCONTINUED | OUTPATIENT
Start: 2024-03-01 | End: 2024-03-01 | Stop reason: HOSPADM

## 2024-02-29 RX ORDER — PROPOFOL 10 MG/ML
INJECTION, EMULSION INTRAVENOUS PRN
Status: DISCONTINUED | OUTPATIENT
Start: 2024-02-29 | End: 2024-02-29

## 2024-02-29 RX ORDER — DEXAMETHASONE SODIUM PHOSPHATE 4 MG/ML
INJECTION, SOLUTION INTRA-ARTICULAR; INTRALESIONAL; INTRAMUSCULAR; INTRAVENOUS; SOFT TISSUE PRN
Status: DISCONTINUED | OUTPATIENT
Start: 2024-02-29 | End: 2024-02-29

## 2024-02-29 RX ORDER — KETOROLAC TROMETHAMINE 30 MG/ML
15 INJECTION, SOLUTION INTRAMUSCULAR; INTRAVENOUS EVERY 6 HOURS PRN
Status: DISCONTINUED | OUTPATIENT
Start: 2024-02-29 | End: 2024-03-01 | Stop reason: HOSPADM

## 2024-02-29 RX ORDER — LIDOCAINE 40 MG/G
CREAM TOPICAL
Status: DISCONTINUED | OUTPATIENT
Start: 2024-02-29 | End: 2024-02-29 | Stop reason: HOSPADM

## 2024-02-29 RX ORDER — FENTANYL CITRATE 50 UG/ML
INJECTION, SOLUTION INTRAMUSCULAR; INTRAVENOUS PRN
Status: DISCONTINUED | OUTPATIENT
Start: 2024-02-29 | End: 2024-02-29

## 2024-02-29 RX ORDER — INDOCYANINE GREEN AND WATER 25 MG
KIT INJECTION PRN
Status: DISCONTINUED | OUTPATIENT
Start: 2024-02-29 | End: 2024-02-29

## 2024-02-29 RX ORDER — AMOXICILLIN 250 MG
1 CAPSULE ORAL 2 TIMES DAILY
Status: DISCONTINUED | OUTPATIENT
Start: 2024-02-29 | End: 2024-03-01 | Stop reason: HOSPADM

## 2024-02-29 RX ORDER — BISACODYL 10 MG
10 SUPPOSITORY, RECTAL RECTAL DAILY PRN
Status: DISCONTINUED | OUTPATIENT
Start: 2024-03-03 | End: 2024-03-01 | Stop reason: HOSPADM

## 2024-02-29 RX ORDER — PROCHLORPERAZINE MALEATE 5 MG
10 TABLET ORAL EVERY 6 HOURS PRN
Status: DISCONTINUED | OUTPATIENT
Start: 2024-02-29 | End: 2024-03-01 | Stop reason: HOSPADM

## 2024-02-29 RX ORDER — ONDANSETRON 2 MG/ML
INJECTION INTRAMUSCULAR; INTRAVENOUS PRN
Status: DISCONTINUED | OUTPATIENT
Start: 2024-02-29 | End: 2024-02-29

## 2024-02-29 RX ORDER — SODIUM CHLORIDE, SODIUM LACTATE, POTASSIUM CHLORIDE, CALCIUM CHLORIDE 600; 310; 30; 20 MG/100ML; MG/100ML; MG/100ML; MG/100ML
INJECTION, SOLUTION INTRAVENOUS CONTINUOUS
Status: DISCONTINUED | OUTPATIENT
Start: 2024-02-29 | End: 2024-03-01

## 2024-02-29 RX ORDER — NALOXONE HYDROCHLORIDE 0.4 MG/ML
0.4 INJECTION, SOLUTION INTRAMUSCULAR; INTRAVENOUS; SUBCUTANEOUS
Status: DISCONTINUED | OUTPATIENT
Start: 2024-02-29 | End: 2024-03-01 | Stop reason: HOSPADM

## 2024-02-29 RX ORDER — CLINDAMYCIN PHOSPHATE 900 MG/50ML
900 INJECTION, SOLUTION INTRAVENOUS SEE ADMIN INSTRUCTIONS
Status: DISCONTINUED | OUTPATIENT
Start: 2024-02-29 | End: 2024-02-29 | Stop reason: HOSPADM

## 2024-02-29 RX ORDER — HYDROMORPHONE HCL IN WATER/PF 6 MG/30 ML
0.2 PATIENT CONTROLLED ANALGESIA SYRINGE INTRAVENOUS EVERY 5 MIN PRN
Status: DISCONTINUED | OUTPATIENT
Start: 2024-02-29 | End: 2024-02-29 | Stop reason: HOSPADM

## 2024-02-29 RX ORDER — CEFAZOLIN SODIUM/WATER 2 G/20 ML
SYRINGE (ML) INTRAVENOUS PRN
Status: DISCONTINUED | OUTPATIENT
Start: 2024-02-29 | End: 2024-02-29

## 2024-02-29 RX ORDER — METHOCARBAMOL 750 MG/1
750 TABLET, FILM COATED ORAL EVERY 6 HOURS PRN
Status: DISCONTINUED | OUTPATIENT
Start: 2024-02-29 | End: 2024-03-01

## 2024-02-29 RX ORDER — PREGABALIN 75 MG/1
75 CAPSULE ORAL 2 TIMES DAILY
Status: DISCONTINUED | OUTPATIENT
Start: 2024-02-29 | End: 2024-03-01 | Stop reason: HOSPADM

## 2024-02-29 RX ORDER — NALOXONE HYDROCHLORIDE 0.4 MG/ML
0.1 INJECTION, SOLUTION INTRAMUSCULAR; INTRAVENOUS; SUBCUTANEOUS
Status: DISCONTINUED | OUTPATIENT
Start: 2024-02-29 | End: 2024-02-29 | Stop reason: HOSPADM

## 2024-02-29 RX ORDER — ONDANSETRON 4 MG/1
4 TABLET, ORALLY DISINTEGRATING ORAL EVERY 6 HOURS PRN
Status: DISCONTINUED | OUTPATIENT
Start: 2024-02-29 | End: 2024-03-01 | Stop reason: HOSPADM

## 2024-02-29 RX ORDER — OXYCODONE HYDROCHLORIDE 10 MG/1
10 TABLET ORAL EVERY 4 HOURS PRN
Status: DISCONTINUED | OUTPATIENT
Start: 2024-02-29 | End: 2024-03-01 | Stop reason: HOSPADM

## 2024-02-29 RX ORDER — HYDROMORPHONE HCL IN WATER/PF 6 MG/30 ML
0.2 PATIENT CONTROLLED ANALGESIA SYRINGE INTRAVENOUS
Status: DISCONTINUED | OUTPATIENT
Start: 2024-02-29 | End: 2024-03-01 | Stop reason: HOSPADM

## 2024-02-29 RX ORDER — OXYCODONE HYDROCHLORIDE 5 MG/1
5 TABLET ORAL EVERY 4 HOURS PRN
Status: DISCONTINUED | OUTPATIENT
Start: 2024-02-29 | End: 2024-03-01 | Stop reason: HOSPADM

## 2024-02-29 RX ORDER — DIPHENHYDRAMINE HCL 25 MG
25 CAPSULE ORAL EVERY 6 HOURS PRN
Status: DISCONTINUED | OUTPATIENT
Start: 2024-02-29 | End: 2024-02-29 | Stop reason: HOSPADM

## 2024-02-29 RX ORDER — NALOXONE HYDROCHLORIDE 0.4 MG/ML
0.2 INJECTION, SOLUTION INTRAMUSCULAR; INTRAVENOUS; SUBCUTANEOUS
Status: DISCONTINUED | OUTPATIENT
Start: 2024-02-29 | End: 2024-03-01 | Stop reason: HOSPADM

## 2024-02-29 RX ORDER — DULOXETIN HYDROCHLORIDE 30 MG/1
60 CAPSULE, DELAYED RELEASE ORAL EVERY MORNING
Status: DISCONTINUED | OUTPATIENT
Start: 2024-03-01 | End: 2024-03-01 | Stop reason: HOSPADM

## 2024-02-29 RX ORDER — BUPIVACAINE HYDROCHLORIDE 2.5 MG/ML
INJECTION, SOLUTION INFILTRATION; PERINEURAL PRN
Status: DISCONTINUED | OUTPATIENT
Start: 2024-02-29 | End: 2024-02-29 | Stop reason: HOSPADM

## 2024-02-29 RX ORDER — CIPROFLOXACIN 2 MG/ML
400 INJECTION, SOLUTION INTRAVENOUS EVERY 12 HOURS
Status: DISCONTINUED | OUTPATIENT
Start: 2024-02-29 | End: 2024-03-01

## 2024-02-29 RX ORDER — LIDOCAINE 40 MG/G
CREAM TOPICAL
Status: DISCONTINUED | OUTPATIENT
Start: 2024-02-29 | End: 2024-03-01 | Stop reason: HOSPADM

## 2024-02-29 RX ORDER — FENTANYL CITRATE 50 UG/ML
50 INJECTION, SOLUTION INTRAMUSCULAR; INTRAVENOUS EVERY 5 MIN PRN
Status: DISCONTINUED | OUTPATIENT
Start: 2024-02-29 | End: 2024-02-29 | Stop reason: HOSPADM

## 2024-02-29 RX ORDER — SODIUM CHLORIDE, SODIUM LACTATE, POTASSIUM CHLORIDE, CALCIUM CHLORIDE 600; 310; 30; 20 MG/100ML; MG/100ML; MG/100ML; MG/100ML
INJECTION, SOLUTION INTRAVENOUS CONTINUOUS
Status: DISCONTINUED | OUTPATIENT
Start: 2024-02-29 | End: 2024-02-29 | Stop reason: HOSPADM

## 2024-02-29 RX ORDER — LABETALOL 20 MG/4 ML (5 MG/ML) INTRAVENOUS SYRINGE
PRN
Status: DISCONTINUED | OUTPATIENT
Start: 2024-02-29 | End: 2024-02-29

## 2024-02-29 RX ORDER — DIPHENHYDRAMINE HYDROCHLORIDE 50 MG/ML
25 INJECTION INTRAMUSCULAR; INTRAVENOUS EVERY 6 HOURS PRN
Status: DISCONTINUED | OUTPATIENT
Start: 2024-02-29 | End: 2024-02-29 | Stop reason: HOSPADM

## 2024-02-29 RX ORDER — CLINDAMYCIN PHOSPHATE 900 MG/50ML
900 INJECTION, SOLUTION INTRAVENOUS
Status: DISCONTINUED | OUTPATIENT
Start: 2024-02-29 | End: 2024-02-29 | Stop reason: HOSPADM

## 2024-02-29 RX ORDER — ONDANSETRON 2 MG/ML
4 INJECTION INTRAMUSCULAR; INTRAVENOUS EVERY 6 HOURS PRN
Status: DISCONTINUED | OUTPATIENT
Start: 2024-02-29 | End: 2024-03-01 | Stop reason: HOSPADM

## 2024-02-29 RX ORDER — ACETAMINOPHEN 325 MG/1
975 TABLET ORAL EVERY 8 HOURS
Status: DISCONTINUED | OUTPATIENT
Start: 2024-02-29 | End: 2024-03-01 | Stop reason: HOSPADM

## 2024-02-29 RX ORDER — FENTANYL CITRATE 50 UG/ML
25 INJECTION, SOLUTION INTRAMUSCULAR; INTRAVENOUS EVERY 5 MIN PRN
Status: DISCONTINUED | OUTPATIENT
Start: 2024-02-29 | End: 2024-02-29 | Stop reason: HOSPADM

## 2024-02-29 RX ORDER — ONDANSETRON 4 MG/1
4 TABLET, ORALLY DISINTEGRATING ORAL EVERY 30 MIN PRN
Status: DISCONTINUED | OUTPATIENT
Start: 2024-02-29 | End: 2024-02-29 | Stop reason: HOSPADM

## 2024-02-29 RX ORDER — DIPHENHYDRAMINE HCL 25 MG
25 CAPSULE ORAL EVERY 6 HOURS PRN
Status: DISCONTINUED | OUTPATIENT
Start: 2024-02-29 | End: 2024-03-01 | Stop reason: HOSPADM

## 2024-02-29 RX ORDER — ONDANSETRON 2 MG/ML
4 INJECTION INTRAMUSCULAR; INTRAVENOUS EVERY 30 MIN PRN
Status: DISCONTINUED | OUTPATIENT
Start: 2024-02-29 | End: 2024-02-29 | Stop reason: HOSPADM

## 2024-02-29 RX ORDER — KETAMINE HYDROCHLORIDE 10 MG/ML
INJECTION INTRAMUSCULAR; INTRAVENOUS PRN
Status: DISCONTINUED | OUTPATIENT
Start: 2024-02-29 | End: 2024-02-29

## 2024-02-29 RX ORDER — FEXOFENADINE HCL 180 MG/1
180 TABLET ORAL 2 TIMES DAILY
Status: DISCONTINUED | OUTPATIENT
Start: 2024-02-29 | End: 2024-03-01 | Stop reason: HOSPADM

## 2024-02-29 RX ORDER — DIPHENHYDRAMINE HYDROCHLORIDE 50 MG/ML
25 INJECTION INTRAMUSCULAR; INTRAVENOUS EVERY 6 HOURS PRN
Status: DISCONTINUED | OUTPATIENT
Start: 2024-02-29 | End: 2024-03-01 | Stop reason: HOSPADM

## 2024-02-29 RX ORDER — HYDROMORPHONE HCL IN WATER/PF 6 MG/30 ML
0.4 PATIENT CONTROLLED ANALGESIA SYRINGE INTRAVENOUS EVERY 5 MIN PRN
Status: DISCONTINUED | OUTPATIENT
Start: 2024-02-29 | End: 2024-02-29 | Stop reason: HOSPADM

## 2024-02-29 RX ORDER — HYDROMORPHONE HCL IN WATER/PF 6 MG/30 ML
0.4 PATIENT CONTROLLED ANALGESIA SYRINGE INTRAVENOUS
Status: DISCONTINUED | OUTPATIENT
Start: 2024-02-29 | End: 2024-03-01 | Stop reason: HOSPADM

## 2024-02-29 RX ORDER — KETOROLAC TROMETHAMINE 30 MG/ML
INJECTION, SOLUTION INTRAMUSCULAR; INTRAVENOUS PRN
Status: DISCONTINUED | OUTPATIENT
Start: 2024-02-29 | End: 2024-02-29

## 2024-02-29 RX ORDER — LIDOCAINE HYDROCHLORIDE 20 MG/ML
INJECTION, SOLUTION INFILTRATION; PERINEURAL PRN
Status: DISCONTINUED | OUTPATIENT
Start: 2024-02-29 | End: 2024-02-29

## 2024-02-29 RX ADMIN — METHOCARBAMOL 750 MG: 500 TABLET ORAL at 18:42

## 2024-02-29 RX ADMIN — Medication 30 MG: at 14:57

## 2024-02-29 RX ADMIN — DEXMEDETOMIDINE HYDROCHLORIDE 8 MCG: 100 INJECTION, SOLUTION INTRAVENOUS at 15:43

## 2024-02-29 RX ADMIN — Medication 10 MG: at 15:52

## 2024-02-29 RX ADMIN — HYDROMORPHONE HYDROCHLORIDE 0.4 MG: 0.2 INJECTION, SOLUTION INTRAMUSCULAR; INTRAVENOUS; SUBCUTANEOUS at 18:22

## 2024-02-29 RX ADMIN — KETOROLAC TROMETHAMINE 15 MG: 30 INJECTION, SOLUTION INTRAMUSCULAR at 16:57

## 2024-02-29 RX ADMIN — Medication 10 MG: at 16:11

## 2024-02-29 RX ADMIN — DEXAMETHASONE SODIUM PHOSPHATE 6 MG: 4 INJECTION, SOLUTION INTRA-ARTICULAR; INTRALESIONAL; INTRAMUSCULAR; INTRAVENOUS; SOFT TISSUE at 15:23

## 2024-02-29 RX ADMIN — DOCUSATE SODIUM 50 MG AND SENNOSIDES 8.6 MG 1 TABLET: 8.6; 5 TABLET, FILM COATED ORAL at 21:19

## 2024-02-29 RX ADMIN — LIDOCAINE HYDROCHLORIDE 100 MG: 20 INJECTION, SOLUTION INFILTRATION; PERINEURAL at 14:57

## 2024-02-29 RX ADMIN — FENTANYL CITRATE 50 MCG: 50 INJECTION INTRAMUSCULAR; INTRAVENOUS at 15:26

## 2024-02-29 RX ADMIN — SUGAMMADEX 200 MG: 100 INJECTION, SOLUTION INTRAVENOUS at 17:38

## 2024-02-29 RX ADMIN — SODIUM CHLORIDE, POTASSIUM CHLORIDE, SODIUM LACTATE AND CALCIUM CHLORIDE: 600; 310; 30; 20 INJECTION, SOLUTION INTRAVENOUS at 14:57

## 2024-02-29 RX ADMIN — Medication 30 MG: at 15:26

## 2024-02-29 RX ADMIN — HYDROMORPHONE HYDROCHLORIDE 0.4 MG: 0.2 INJECTION, SOLUTION INTRAMUSCULAR; INTRAVENOUS; SUBCUTANEOUS at 21:19

## 2024-02-29 RX ADMIN — PROPOFOL 200 MG: 10 INJECTION, EMULSION INTRAVENOUS at 14:57

## 2024-02-29 RX ADMIN — Medication 20 MG: at 16:23

## 2024-02-29 RX ADMIN — PREGABALIN 75 MG: 75 CAPSULE ORAL at 23:50

## 2024-02-29 RX ADMIN — VANCOMYCIN HYDROCHLORIDE 1250 MG: 10 INJECTION, POWDER, LYOPHILIZED, FOR SOLUTION INTRAVENOUS at 23:07

## 2024-02-29 RX ADMIN — SODIUM CHLORIDE, POTASSIUM CHLORIDE, SODIUM LACTATE AND CALCIUM CHLORIDE: 600; 310; 30; 20 INJECTION, SOLUTION INTRAVENOUS at 18:25

## 2024-02-29 RX ADMIN — LABETALOL 20 MG/4 ML (5 MG/ML) INTRAVENOUS SYRINGE 5 MG: at 17:17

## 2024-02-29 RX ADMIN — Medication 50 MG: at 14:57

## 2024-02-29 RX ADMIN — Medication 2 G: at 15:11

## 2024-02-29 RX ADMIN — FEXOFENADINE HCL 180 MG: 180 TABLET ORAL at 21:25

## 2024-02-29 RX ADMIN — Medication 10 MG: at 16:43

## 2024-02-29 RX ADMIN — SODIUM CHLORIDE, POTASSIUM CHLORIDE, SODIUM LACTATE AND CALCIUM CHLORIDE: 600; 310; 30; 20 INJECTION, SOLUTION INTRAVENOUS at 21:25

## 2024-02-29 RX ADMIN — ONDANSETRON 4 MG: 2 INJECTION INTRAMUSCULAR; INTRAVENOUS at 16:43

## 2024-02-29 RX ADMIN — FENTANYL CITRATE 50 MCG: 50 INJECTION INTRAMUSCULAR; INTRAVENOUS at 15:04

## 2024-02-29 RX ADMIN — FENTANYL CITRATE 50 MCG: 50 INJECTION INTRAMUSCULAR; INTRAVENOUS at 15:57

## 2024-02-29 RX ADMIN — DEXMEDETOMIDINE HYDROCHLORIDE 8 MCG: 100 INJECTION, SOLUTION INTRAVENOUS at 17:52

## 2024-02-29 RX ADMIN — MIDAZOLAM 2 MG: 1 INJECTION INTRAMUSCULAR; INTRAVENOUS at 14:52

## 2024-02-29 RX ADMIN — FENTANYL CITRATE 50 MCG: 50 INJECTION INTRAMUSCULAR; INTRAVENOUS at 16:10

## 2024-02-29 RX ADMIN — HYDROMORPHONE HYDROCHLORIDE 0.4 MG: 0.2 INJECTION, SOLUTION INTRAMUSCULAR; INTRAVENOUS; SUBCUTANEOUS at 18:37

## 2024-02-29 RX ADMIN — FENTANYL CITRATE 50 MCG: 50 INJECTION, SOLUTION INTRAMUSCULAR; INTRAVENOUS at 18:06

## 2024-02-29 RX ADMIN — FENTANYL CITRATE 50 MCG: 50 INJECTION, SOLUTION INTRAMUSCULAR; INTRAVENOUS at 17:57

## 2024-02-29 RX ADMIN — HYDROMORPHONE HYDROCHLORIDE 0.2 MG: 0.2 INJECTION, SOLUTION INTRAMUSCULAR; INTRAVENOUS; SUBCUTANEOUS at 18:56

## 2024-02-29 RX ADMIN — DEXMEDETOMIDINE HYDROCHLORIDE 8 MCG: 100 INJECTION, SOLUTION INTRAVENOUS at 15:51

## 2024-02-29 RX ADMIN — HYDROMORPHONE HYDROCHLORIDE 0.5 MG: 1 INJECTION, SOLUTION INTRAMUSCULAR; INTRAVENOUS; SUBCUTANEOUS at 16:49

## 2024-02-29 RX ADMIN — INDOCYANINE GREEN AND WATER 12.5 MG: KIT at 16:38

## 2024-02-29 RX ADMIN — ACETAMINOPHEN 975 MG: 325 TABLET, FILM COATED ORAL at 21:19

## 2024-02-29 RX ADMIN — DEXMEDETOMIDINE HYDROCHLORIDE 8 MCG: 100 INJECTION, SOLUTION INTRAVENOUS at 16:48

## 2024-02-29 ASSESSMENT — ACTIVITIES OF DAILY LIVING (ADL)
ADLS_ACUITY_SCORE: 20

## 2024-02-29 NOTE — ANESTHESIA CARE TRANSFER NOTE
Patient: Klarissa Curtis    Procedure: Procedure(s):  Right axillary wound excision,Right muscle-sparing Latissimus musculo-cutaneous flap, SPY       Diagnosis: Hidradenitis suppurativa [L73.2]  Diagnosis Additional Information: No value filed.    Anesthesia Type:   General     Note:    Oropharynx: oropharynx clear of all foreign objects and spontaneously breathing  Level of Consciousness: awake  Oxygen Supplementation: face mask  Level of Supplemental Oxygen (L/min / FiO2): 4  Independent Airway: airway patency satisfactory and stable  Dentition: dentition unchanged  Vital Signs Stable: post-procedure vital signs reviewed and stable  Report to RN Given: handoff report given  Patient transferred to: PACU    Handoff Report: Identifed the Patient, Identified the Reponsible Provider, Reviewed the pertinent medical history, Discussed the surgical course, Reviewed Intra-OP anesthesia mangement and issues during anesthesia, Set expectations for post-procedure period and Allowed opportunity for questions and acknowledgement of understanding      Vitals:  Vitals Value Taken Time   /82 02/29/24 1754   Temp     Pulse 84 02/29/24 1755   Resp 14    SpO2 100 % 02/29/24 1755   Vitals shown include unfiled device data.    Electronically Signed By: SERAFIN Lombardi CRNA  February 29, 2024  5:55 PM

## 2024-02-29 NOTE — OP NOTE
PREOPERATIVE DIAGNOSIS: Right axillary hidradenitis suppurativa Julian grade 3.     POSTOPERATIVE DIAGNOSIS: As above     PROCEDURES:   1.  Right axillary wound excision including skin and subcutaneous tissue dimensions approximately 15 x 10 cm using electrocautery.  2.  Right muscle-sparing latissimus dorsi musculocutaneous rotation flap reconstruction of right axilla.  3.  Intraoperative chemical angiography using the spy Elite system with supervision of IV ICG dye injection and interpretation of angiogram.  Indication of use was to evaluate the blood flow to the flap prior to inset.     SURGEON: Shaniqua Mendoza MD      RESIDENT: Brian Kaplan MD.    PA: Cherelle Au     ANESTHESIA: General anesthesia with endotracheal intubation.      COMPLICATIONS: Nil.      DRAINS: x2 15 Pashto ANALILIA drains.     SPECIMENS: right axillary wound for permanent sections; right anterior chest wall abscess fluid for cultures and sensitivity.     BLOOD LOSS: 100 mL        DESCRIPTION OF PROCEDURE: After informed consent was taken from the patient, the proper site and procedure was ascertained with them and they were appropriately marked, they were taken to the operating room. They were placed in a supine position with their knees comfortably flexed with pillows underneath them, and pneumoboots placed and running prior to induction of anesthesia. Preoperative antibiotics were given in the OR and appropriately redosed during the case. General anesthesia was administered without any complications.      The patient was placed in a lateral position with the right side up held with a beanbag and an appropriately placed axillary roll.  He was appropriately padded and secured.  Her  right arm was free and appropriately prepped and draped within the surgical site.  I began by first excising the chronically inflamed and affected left axillary tissue including skin and subcutaneous tissue.  Additionally there was a right upper chest wall  abscess contiguous with the axillary hidradenitis that needed to be drained.  I made a cut right over the abscess and drained frankly purulent material from there.  It was sent for cultures and sensitivity.  I thoroughly excised the underlying a rind and curetted the area thoroughly.  Hemostasis was ensured.  I then irrigated the wound with antibiotic irrigation and Betadine.  I then reprepped the area and changed my gloves.  I then began the reconstructive portion.  I marked out the anterior border of the latissimus muscle and dopplered out the anterior perforators along with the trajectory of the descending branch of the thoracodorsal vessel.  Based on these Doppler out blood vessels I designed a vertically oriented flap.  I then went ahead and cut the island of skin dissected using electrocautery down to the underlying fascia.  Posteriorly I identified the latissimus muscle.  I then anteriorly identified the serratus muscle.  I then elevated the flap from distal to proximal and included the lateral aspect of the latissimus muscle which housed the descending branch of the thoracodorsal vessel.  I continued my dissection towards the axilla.  Once I was able to get good rotation into the defect of the axilla, I stop my dissection towards the axilla.  The cephalad portion of the flap was released from the underlying fascia.  There were excellent Doppler signal vessels within the elevated flap.  I then temporarily inset the flap and got primary closure of the donor site defect.  I then carried out the spy angiogram to ensure that the rotated flap had good vascularization.  It did.  The distal tip was excised.  I then placed 2 drains and secured it and then also ensured hemostasis.  I then closed the incisions using 0 PDS suture in a deep layer 2-0 Monocryl suture in a deep dermal layer and 3-0 Stratafix suture for the skin in the donor site on the back.  The flap was inset using 2-0 Monocryl suture in a deep dermal  layer, followed by 3-0 nylon suture in interrupted fashion and staples.  The donor site was covered with surgical tape and glue.  The abscess cavity was packed with Betadine soaked gauze.  The axilla was covered with Xeroform dry gauze and a wrap.  The patient was placed in a shoulder sling keeping the arm abducted to prevent pressure on the flap. The patient tolerated the procedure well. All counts were correct at the end of the case. The patient was extubated and sent to recovery room in stable condition.

## 2024-02-29 NOTE — BRIEF OP NOTE
[facility] Brief Operative Note    Pre-operative diagnosis: Hidradenitis suppurativa [L73.2]   Post-operative diagnosis * No post-op diagnosis entered *     Procedure: Procedure(s):  Right axillary wound excision,Right muscle-sparing Latissimus musculo-cutaneous flap, SPY   Surgeon(s): Surgeon(s) and Role:     * BORIS Mendoza MD - Primary   Estimated blood loss: 100 mL    Specimens: ID Type Source Tests Collected by Time Destination   1 : Right Axillary Wound Tissue Axilla, Right SURGICAL PATHOLOGY EXAM BORIS Mendoza MD 2/29/2024  4:00 PM    A : Right Chest Wall Abscess Abscess Chest ANAEROBIC BACTERIAL CULTURE ROUTINE, GRAM STAIN, AEROBIC BACTERIAL CULTURE ROUTINE BORIS Mendoza MD 2/29/2024  3:47 PM       Findings: R axillary HS excision with muscle sparing lat flap, R breast abscess- cultures sent, area left open, packed with betadine/kerlix    Plan:  -Admit to plastic surgery  -Activity restrictions: R arm aBducted to ~30 degrees, avoid arm against side of body to avoid compressing flap. Wear brace when out of bed and moving around, ok to not wear it in bed  -ANALILIA drain care: strip, empty and record output q4-8 hours  -start IV vanc and cipro  -follow up cultures  -initial dressing change tomorrow with PRS, then nursing (TID packing to open wound)

## 2024-02-29 NOTE — ANESTHESIA PROCEDURE NOTES
Airway       Patient location during procedure: OR       Procedure Start/Stop Times: 2/29/2024 3:01 PM  Staff -        CRNA: Cassie Carrera APRN CRNA       Performed By: CRNA  Consent for Airway        Urgency: elective  Indications and Patient Condition       Indications for airway management: deion-procedural       Induction type:intravenous       Mask difficulty assessment: 1 - vent by mask    Final Airway Details       Final airway type: endotracheal airway       Successful airway: ETT - single  Endotracheal Airway Details        ETT size (mm): 7.0       Cuffed: yes       Successful intubation technique: direct laryngoscopy       DL Blade Type: MAC 3       Grade View of Cords: 1       Adjucts: stylet       Position: Right       Measured from: lips       Secured at (cm): 22       Bite block used: None    Post intubation assessment        Placement verified by: capnometry, equal breath sounds and chest rise        Number of attempts at approach: 1       Number of other approaches attempted: 0       Secured with: pink tape       Ease of procedure: easy       Dentition: Intact and Unchanged    Medication(s) Administered   Medication Administration Time: 2/29/2024 3:01 PM

## 2024-03-01 VITALS
TEMPERATURE: 99.3 F | SYSTOLIC BLOOD PRESSURE: 121 MMHG | WEIGHT: 242.29 LBS | DIASTOLIC BLOOD PRESSURE: 64 MMHG | HEIGHT: 67 IN | HEART RATE: 72 BPM | OXYGEN SATURATION: 96 % | BODY MASS INDEX: 38.03 KG/M2 | RESPIRATION RATE: 18 BRPM

## 2024-03-01 LAB
CREAT SERPL-MCNC: 0.49 MG/DL (ref 0.51–0.95)
EGFRCR SERPLBLD CKD-EPI 2021: >90 ML/MIN/1.73M2
GLUCOSE BLDC GLUCOMTR-MCNC: 155 MG/DL (ref 70–99)

## 2024-03-01 PROCEDURE — 250N000013 HC RX MED GY IP 250 OP 250 PS 637: Performed by: PHYSICIAN ASSISTANT

## 2024-03-01 PROCEDURE — 36415 COLL VENOUS BLD VENIPUNCTURE: CPT | Performed by: PLASTIC SURGERY

## 2024-03-01 PROCEDURE — 250N000013 HC RX MED GY IP 250 OP 250 PS 637: Performed by: STUDENT IN AN ORGANIZED HEALTH CARE EDUCATION/TRAINING PROGRAM

## 2024-03-01 PROCEDURE — 82565 ASSAY OF CREATININE: CPT | Performed by: PLASTIC SURGERY

## 2024-03-01 PROCEDURE — 250N000011 HC RX IP 250 OP 636: Performed by: PHYSICIAN ASSISTANT

## 2024-03-01 RX ORDER — HYDROXYZINE HYDROCHLORIDE 25 MG/1
25 TABLET, FILM COATED ORAL EVERY 6 HOURS PRN
Status: DISCONTINUED | OUTPATIENT
Start: 2024-03-01 | End: 2024-03-01 | Stop reason: HOSPADM

## 2024-03-01 RX ORDER — DOXYCYCLINE 100 MG/1
100 CAPSULE ORAL EVERY 12 HOURS
Qty: 20 CAPSULE | Refills: 0 | Status: SHIPPED | OUTPATIENT
Start: 2024-03-01 | End: 2024-03-11

## 2024-03-01 RX ORDER — ONDANSETRON 4 MG/1
4 TABLET, ORALLY DISINTEGRATING ORAL EVERY 8 HOURS PRN
Qty: 10 TABLET | Refills: 0 | Status: ON HOLD | OUTPATIENT
Start: 2024-03-01 | End: 2024-06-12

## 2024-03-01 RX ORDER — HYDROXYZINE HYDROCHLORIDE 50 MG/1
50 TABLET, FILM COATED ORAL EVERY 6 HOURS PRN
Qty: 20 TABLET | Refills: 0 | Status: ON HOLD | OUTPATIENT
Start: 2024-03-01 | End: 2024-06-12

## 2024-03-01 RX ORDER — HYDROMORPHONE HYDROCHLORIDE 2 MG/1
2 TABLET ORAL EVERY 6 HOURS PRN
Qty: 20 TABLET | Refills: 0 | Status: SHIPPED | OUTPATIENT
Start: 2024-03-01 | End: 2024-03-06

## 2024-03-01 RX ORDER — METHOCARBAMOL 750 MG/1
750 TABLET, FILM COATED ORAL 3 TIMES DAILY PRN
Qty: 30 TABLET | Refills: 0 | Status: SHIPPED | OUTPATIENT
Start: 2024-03-01 | End: 2024-05-08

## 2024-03-01 RX ORDER — DOXYCYCLINE 100 MG/1
100 CAPSULE ORAL EVERY 12 HOURS SCHEDULED
Status: DISCONTINUED | OUTPATIENT
Start: 2024-03-01 | End: 2024-03-01 | Stop reason: HOSPADM

## 2024-03-01 RX ORDER — OXYCODONE HYDROCHLORIDE 5 MG/1
5 TABLET ORAL EVERY 6 HOURS PRN
Qty: 15 TABLET | Refills: 0 | Status: SHIPPED | OUTPATIENT
Start: 2024-03-01 | End: 2024-03-01

## 2024-03-01 RX ORDER — HYDROXYZINE HYDROCHLORIDE 25 MG/1
50 TABLET, FILM COATED ORAL EVERY 6 HOURS PRN
Status: DISCONTINUED | OUTPATIENT
Start: 2024-03-01 | End: 2024-03-01 | Stop reason: HOSPADM

## 2024-03-01 RX ORDER — METHOCARBAMOL 750 MG/1
750 TABLET, FILM COATED ORAL 3 TIMES DAILY
Status: DISCONTINUED | OUTPATIENT
Start: 2024-03-01 | End: 2024-03-01 | Stop reason: HOSPADM

## 2024-03-01 RX ORDER — DOCUSATE SODIUM 100 MG/1
100 CAPSULE, LIQUID FILLED ORAL 2 TIMES DAILY
Qty: 100 CAPSULE | Refills: 0 | Status: ON HOLD | OUTPATIENT
Start: 2024-03-01 | End: 2024-06-12

## 2024-03-01 RX ADMIN — ACETAMINOPHEN 975 MG: 325 TABLET, FILM COATED ORAL at 14:40

## 2024-03-01 RX ADMIN — HYDROMORPHONE HYDROCHLORIDE 0.4 MG: 0.2 INJECTION, SOLUTION INTRAMUSCULAR; INTRAVENOUS; SUBCUTANEOUS at 09:36

## 2024-03-01 RX ADMIN — HYDROMORPHONE HYDROCHLORIDE 0.4 MG: 0.2 INJECTION, SOLUTION INTRAMUSCULAR; INTRAVENOUS; SUBCUTANEOUS at 02:20

## 2024-03-01 RX ADMIN — FEXOFENADINE HCL 180 MG: 180 TABLET ORAL at 11:01

## 2024-03-01 RX ADMIN — CIPROFLOXACIN 400 MG: 2 INJECTION, SOLUTION INTRAVENOUS at 01:02

## 2024-03-01 RX ADMIN — DOXYCYCLINE HYCLATE 100 MG: 100 CAPSULE ORAL at 09:48

## 2024-03-01 RX ADMIN — HYDROMORPHONE HYDROCHLORIDE 0.4 MG: 0.2 INJECTION, SOLUTION INTRAMUSCULAR; INTRAVENOUS; SUBCUTANEOUS at 00:08

## 2024-03-01 RX ADMIN — DOCUSATE SODIUM 50 MG AND SENNOSIDES 8.6 MG 1 TABLET: 8.6; 5 TABLET, FILM COATED ORAL at 11:01

## 2024-03-01 RX ADMIN — METHOCARBAMOL 750 MG: 750 TABLET ORAL at 14:41

## 2024-03-01 RX ADMIN — METHOCARBAMOL 750 MG: 750 TABLET ORAL at 11:01

## 2024-03-01 RX ADMIN — HYDROMORPHONE HYDROCHLORIDE 0.4 MG: 0.2 INJECTION, SOLUTION INTRAMUSCULAR; INTRAVENOUS; SUBCUTANEOUS at 04:59

## 2024-03-01 RX ADMIN — KETOROLAC TROMETHAMINE 15 MG: 30 INJECTION, SOLUTION INTRAMUSCULAR; INTRAVENOUS at 00:56

## 2024-03-01 RX ADMIN — ACETAMINOPHEN 975 MG: 325 TABLET, FILM COATED ORAL at 05:00

## 2024-03-01 RX ADMIN — PREGABALIN 75 MG: 75 CAPSULE ORAL at 09:49

## 2024-03-01 RX ADMIN — DULOXETINE HYDROCHLORIDE 60 MG: 30 CAPSULE, DELAYED RELEASE ORAL at 11:01

## 2024-03-01 RX ADMIN — HYDROMORPHONE HYDROCHLORIDE 0.4 MG: 0.2 INJECTION, SOLUTION INTRAMUSCULAR; INTRAVENOUS; SUBCUTANEOUS at 15:10

## 2024-03-01 RX ADMIN — POLYETHYLENE GLYCOL 3350 17 G: 17 POWDER, FOR SOLUTION ORAL at 11:01

## 2024-03-01 RX ADMIN — HYDROMORPHONE HYDROCHLORIDE 0.4 MG: 0.2 INJECTION, SOLUTION INTRAMUSCULAR; INTRAVENOUS; SUBCUTANEOUS at 18:30

## 2024-03-01 ASSESSMENT — ACTIVITIES OF DAILY LIVING (ADL)
ADLS_ACUITY_SCORE: 22
ADLS_ACUITY_SCORE: 29
ADLS_ACUITY_SCORE: 29
ADLS_ACUITY_SCORE: 28
ADLS_ACUITY_SCORE: 29
DEPENDENT_IADLS:: INDEPENDENT
ADLS_ACUITY_SCORE: 29
ADLS_ACUITY_SCORE: 22
ADLS_ACUITY_SCORE: 29
ADLS_ACUITY_SCORE: 22
ADLS_ACUITY_SCORE: 29
ADLS_ACUITY_SCORE: 29

## 2024-03-01 NOTE — PLAN OF CARE
Goal Outcome Evaluation:                      VSS, afebrile, sba - ambulated to bathroom few times   No bm this shift but is passing gas   2 lillie drains stripped and emptied - teaching provided and pt demonstrated   Pain managed with prn iv dilaudid 0.4 mg and scheduled pain meds   Plan will be for discharge this evening - discharge orders are in   Dressing changed at the bedside and teaching provided   Social work consult in   2 piv SL no other acute events continue to follow poc

## 2024-03-01 NOTE — DISCHARGE SUMMARY
[facility] Discharge Summary    Klarissa Curtis MRN: [id number]   YOB: 1975 Age: 48 year old     Date of Admission:  2/29/2024  Date of Discharge::  03/01/24   Admitting Physician:  BORIS Mendoza MD  Discharge Physician:  Brian Kaplan MD  Primary Care Physician:         Jean Kenny          Admission Diagnoses:   Hidradenitis suppurativa [L73.2]          Discharge Diagnosis:   Same          Procedures:   1.  Right axillary wound excision including skin and subcutaneous tissue dimensions approximately 15 x 10 cm using electrocautery.  2.  Right muscle-sparing latissimus dorsi musculocutaneous rotation flap reconstruction of right axilla.  3.  Intraoperative chemical angiography using the spy Elite system with supervision of IV ICG dye injection and interpretation of angiogram.  Indication of use was to evaluate the blood flow to the flap prior to inset.        Non-operative procedures:   None          Consultations:   None          Brief History of Illness:   48F hx hidradenitis           Hospital Course:   The patient underwent the above operation on 2/29/24, which she tolerated well without complications. She was transferred to the floor for routine postoperative care and the remainder of her hospitalization was unremarkable. At the time of surgery she had an abscess in her axilla we drained and conducted packing for. By the time of discharge she and her mom were able to perform this dressing change. ABX were started Vanc/cipro and changed to doxy for discharge.     At the time of discharge, she was tolerating a regular diet, ambulating, voiding spontaneously without difficulty, and pain was controlled with oral pain medications. The patient was discharged home in stable and improved condition. She will follow up in clinic in 1 weeks.         Final Pathology Result:   Pending at time of discharge         Medications Prior to Admission:     Facility-Administered Medications  Prior to Admission   Medication Dose Route Frequency Provider Last Rate Last Admin    [DISCONTINUED] triamcinolone acetonide (KENALOG-10) injection 40 mg  40 mg Intradermal Once Jean Kenny MD        [DISCONTINUED] triamcinolone acetonide (KENALOG-10) injection 60 mg  60 mg Intra-Lesional Once Jean Kenny MD         Medications Prior to Admission   Medication Sig Dispense Refill Last Dose    adalimumab (HUMIRA *CF* PEN-CD/UC/HS STARTER) 80 MG/0.8ML pen kit Inject 0.8 mLs (80 mg) Subcutaneous once a week (Patient taking differently: Inject 80 mg Subcutaneous once a week Sundays) 3.2 mL 5 Past Week    DULoxetine (CYMBALTA) 30 MG capsule Take 30mg (1 pill) for 1 week and if tolerating increase to 60mg (2 pills) daily 180 capsule 3 2/28/2024 at 1000    fexofenadine (ALLEGRA) 180 MG tablet Take 1 tablet (180 mg) by mouth 2 times daily 90 tablet 11 2/28/2024 at 1000    medical cannabis (Patient's own supply) See Admin Instructions (The purpose of this order is to document that the patient reports taking medical cannabis.  This is not a prescription, and is not used to certify that the patient has a qualifying medical condition.)   2/28/2024 at 1000    naloxone (NARCAN) 4 MG/0.1ML nasal spray Spray 1 spray (4 mg) into one nostril alternating nostrils as needed for opioid reversal every 2-3 minutes until assistance arrives 0.2 mL 0 Unknown    pregabalin (LYRICA) 75 MG capsule Start 75 mg at bedtime x 1 week, then increase to 75 mg BID x 1 week, then increase to 75 mg in morning and 150 mg at bedtime x 1 week, then increase to 150 mg BID. 90 capsule 0 2/28/2024 at 2200    Resorcinol POWD Resorcinol 15% in vanicream twice a day (Patient taking differently: as needed Resorcinol 15% in vanicream) 30 g 11 Unknown    traZODone (DESYREL) 150 MG tablet Take 1 tablet (150 mg) by mouth at bedtime (Patient taking differently: Take 150 mg by mouth nightly as needed for sleep) 90 tablet 1 Past Week    VITAMIN D3 50  MCG (2000 UT) tablet TAKE 1 TABLET BY MOUTH EVERY DAY 90 tablet 1 Unknown    adalimumab (HUMIRA PEN) 80 MG/0.8ML pen kit Inject 1.6 mLs (160 mg) Subcutaneous once a week 6.4 mL 3 Duplicate    pregabalin (LYRICA) 150 MG capsule Take 1 capsule (150 mg) by mouth 2 times daily 60 capsule 1 See dose below    roflumilast (DALIRESP) 500 MCG TABS tablet Take 0.5 tablet (250 mcg) by mouth daily for 4 weeks, then increase to 1 tablet (500 mcg) by mouth daily thereafter. (Patient not taking: Reported on 2/29/2024) 90 tablet 3 Not Taking    roflumilast (DALIRESP) 500 MCG TABS tablet Take 1 tablet (500 mcg) by mouth daily (Patient not taking: Reported on 2/29/2024) 90 tablet 3 Not Taking    [DISCONTINUED] fluocinonide (LIDEX) 0.05 % external ointment Apply topically 2 times daily (Patient taking differently: Apply topically as needed) 60 g 1 More than a month    [DISCONTINUED] oxyCODONE-acetaminophen (PERCOCET) 5-325 MG tablet Take 1-2 percocet every 6-8 hrs as needed for pain 102 tablet 0 2/28/2024 at 0800            Discharge Medications:     Current Discharge Medication List        START taking these medications    Details   docusate sodium (COLACE) 100 MG capsule Take 1 capsule (100 mg) by mouth 2 times daily  Qty: 100 capsule, Refills: 0    Associated Diagnoses: Hidradenitis suppurativa      doxycycline hyclate (VIBRAMYCIN) 100 MG capsule Take 1 capsule (100 mg) by mouth every 12 hours for 10 days  Qty: 20 capsule, Refills: 0    Associated Diagnoses: Hidradenitis suppurativa      HYDROmorphone (DILAUDID) 2 MG tablet Take 1 tablet (2 mg) by mouth every 6 hours as needed for breakthrough pain  Qty: 20 tablet, Refills: 0    Associated Diagnoses: Hidradenitis suppurativa      hydrOXYzine HCl (ATARAX) 50 MG tablet Take 1 tablet (50 mg) by mouth every 6 hours as needed for other, itching or anxiety (adjuvant pain)  Qty: 20 tablet, Refills: 0    Associated Diagnoses: Hidradenitis suppurativa      methocarbamol (ROBAXIN) 750 MG  tablet Take 1 tablet (750 mg) by mouth 3 times daily as needed for muscle spasms  Qty: 30 tablet, Refills: 0    Associated Diagnoses: Hidradenitis suppurativa      ondansetron (ZOFRAN ODT) 4 MG ODT tab Take 1 tablet (4 mg) by mouth every 8 hours as needed for nausea  Qty: 10 tablet, Refills: 0    Associated Diagnoses: Hidradenitis suppurativa           CONTINUE these medications which have NOT CHANGED    Details   !! adalimumab (HUMIRA *CF* PEN-CD/UC/HS STARTER) 80 MG/0.8ML pen kit Inject 0.8 mLs (80 mg) Subcutaneous once a week  Qty: 3.2 mL, Refills: 5    Comments: MTM pharmacist.  Associated Diagnoses: Hidradenitis suppurativa      DULoxetine (CYMBALTA) 30 MG capsule Take 30mg (1 pill) for 1 week and if tolerating increase to 60mg (2 pills) daily  Qty: 180 capsule, Refills: 3    Associated Diagnoses: Hidradenitis suppurativa; Depression, unspecified depression type      fexofenadine (ALLEGRA) 180 MG tablet Take 1 tablet (180 mg) by mouth 2 times daily  Qty: 90 tablet, Refills: 11    Associated Diagnoses: Dermatographism      medical cannabis (Patient's own supply) See Admin Instructions (The purpose of this order is to document that the patient reports taking medical cannabis.  This is not a prescription, and is not used to certify that the patient has a qualifying medical condition.)      naloxone (NARCAN) 4 MG/0.1ML nasal spray Spray 1 spray (4 mg) into one nostril alternating nostrils as needed for opioid reversal every 2-3 minutes until assistance arrives  Qty: 0.2 mL, Refills: 0    Associated Diagnoses: Chronic, continuous use of opioids      !! pregabalin (LYRICA) 75 MG capsule Start 75 mg at bedtime x 1 week, then increase to 75 mg BID x 1 week, then increase to 75 mg in morning and 150 mg at bedtime x 1 week, then increase to 150 mg BID.  Qty: 90 capsule, Refills: 0    Associated Diagnoses: Hidradenitis suppurativa; Neuropathic pain; Painful skin lesion      Resorcinol POWD Resorcinol 15% in vanicream  twice a day  Qty: 30 g, Refills: 11    Associated Diagnoses: Hidradenitis suppurativa      traZODone (DESYREL) 150 MG tablet Take 1 tablet (150 mg) by mouth at bedtime  Qty: 90 tablet, Refills: 1    Associated Diagnoses: Insomnia, unspecified type      VITAMIN D3 50 MCG (2000 UT) tablet TAKE 1 TABLET BY MOUTH EVERY DAY  Qty: 90 tablet, Refills: 1    Associated Diagnoses: Hidradenitis suppurativa      !! adalimumab (HUMIRA PEN) 80 MG/0.8ML pen kit Inject 1.6 mLs (160 mg) Subcutaneous once a week  Qty: 6.4 mL, Refills: 3    Associated Diagnoses: Hidradenitis suppurativa      !! pregabalin (LYRICA) 150 MG capsule Take 1 capsule (150 mg) by mouth 2 times daily  Qty: 60 capsule, Refills: 1    Associated Diagnoses: Hidradenitis suppurativa; Neuropathic pain; Painful skin lesion      !! roflumilast (DALIRESP) 500 MCG TABS tablet Take 0.5 tablet (250 mcg) by mouth daily for 4 weeks, then increase to 1 tablet (500 mcg) by mouth daily thereafter.  Qty: 90 tablet, Refills: 3    Comments: Please apply savings card. BIN: [id number] ; PCN: GDC ; Grp: PILAR ; ID: [id number]  Associated Diagnoses: Hidradenitis suppurativa      !! roflumilast (DALIRESP) 500 MCG TABS tablet Take 1 tablet (500 mcg) by mouth daily  Qty: 90 tablet, Refills: 3    Comments: Please apply savings card. BIN: [id number] ; PCN: GDC ; Grp: PILAR ; ID: [id number]  Associated Diagnoses: Hidradenitis suppurativa       !! - Potential duplicate medications found. Please discuss with provider.        STOP taking these medications       fluocinonide (LIDEX) 0.05 % external ointment Comments:   Reason for Stopping:         oxyCODONE-acetaminophen (PERCOCET) 5-325 MG tablet Comments:   Reason for Stopping:                    Day of Discharge Physical Exam:   Temp:  [97.3  F (36.3  C)-98.3  F (36.8  C)] 97.8  F (36.6  C)  Pulse:  [64-98] 64  Resp:  [12-18] 18  BP: ()/(59-89) 115/60  SpO2:  [96 %-100 %] 97 %  Gen: no acute distress  CV: regular rate  Pulm: non-labored,  symmetric chest expansion  Right axilla flap pink/warm without areas of compromise so far, suture/staples in-tact, back site without hematoma, anterior axillary abscess site packing changed no residual purulence or erythema     Jpx2 SSF minimal outpt         Discharge Instructions and Follow-Up:        Primary Care - Care Coordination Referral      Reason for your hospital stay    Post-operative pain control and dressing changes     Activity    Your activity upon discharge: NO pressure to flap area in axilla, light use of RUE with no overhead reaching no lifting more than 10-15lbs, right arm must stay abducted at 15-30 degrees to avoid flap compression, wear sling with spacer while out of bed, encourage ambulation     Follow Up ([facility])    Follow-up in Plastic Surgery Clinic (3/6/24 at 11:30AM) with Dr. Mendoza    Appointments on [place] and/or [facility] (with [facility] or [facility] provider or service). Call [phone] if you haven't heard regarding these appointments within 7 days of discharge.     Diet    Follow this diet upon discharge: regular           Home Health Care:        None           Discharge Disposition:     Discharged to home      Condition at discharge: Stable    Patient discussed with staff on day of discharge.    Brian Kaplan MD - PGY 8  Plastic Surgery Resident  Pager [phone]

## 2024-03-01 NOTE — PLAN OF CARE
Goal Outcome Evaluation:      Plan of Care Reviewed With: patient      AVSS, alert and oriented x 4. Has pain managed by IV dilaudid given once. Has 2 ANALILIA drains on lateral chest to bulb suction with very little output. Bandages on right axilla reinforced with tape. Up with an assist of 2. Purewick on. No acute events this shift, continue with care as planned.

## 2024-03-01 NOTE — DISCHARGE INSTRUCTIONS
LATISSIMUS FLAP AXILLA/BREAST RECONSTRUCTION POST-OPERATIVE INSTRUCTIONS    Instructions  What are my post-operative instructions?  ?  Have someone drive you home after surgery and help you at home for 1-2      days.  ?  Get plenty of rest.  ?  Follow balanced diet.  ?  Decreased activity may promote constipation, so you may want to add      more raw fruit to your diet, and be sure to increase fluid intake.  ? Take pain medication as prescribed.   ?  Do not drink alcohol when taking pain medications.  ?  Even when not taking pain medications, no alcohol for 3 weeks as it      causes fluid retention.  ?  If you are taking vitamins with iron, resume these as tolerated.  ?  Do not smoke. Smoking delays healing and increases the risk of      complications.    Activities  ?  Start walking as soon as possible, this helps to reduce swelling and     lowers the chance of blood clots.  ?  You may use your arms for activities of daily living for the first three     weeks, but do not push over your head until 3 weeks post-op. Keep your RIGHT arm away from your body about 15-30 degrees to avoid flap compression. Wearing the sling with spacer can help with this while walking.   ?  Do not drive until you are no longer taking any pain medications     (narcotics).  ?  Unless stated on this form, discuss your time off work with your surgeon.  ?  No driving for 4 weeks. When abdominal area will allow for sudden      braking, you may resume driving.  ?  No heavy lifting for 6 to 8 weeks.    Incision Care  ?  You may shower 72 hours after surgery with drains in place and the open wound.  ?  No tub soaking, bathing,or swimming for 6 to 8 weeks.  ?  Avoid exposing scars to sun for at least 12 months.  ?  Always use a strong sunblock, if sun exposure is unavoidable (SPF 50 or     greater).  ?  Pack the right axillary abscess cavity twice daily with clean saline moistened gauze and cover with dry dressing. Ensure no dressing compresses the  flap.  Keep surgical tape on back incision on until it peels off. May remove the yellow Vaseline gauze over the other incisions and leave open to air starting 48hr after surgery.   ?  Keep incisions clean and inspect daily for signs of infection. Staples/stitches will be taken out in clinic in 2-3 weeks.    What to Expect  ?  Maximum discomfort will occur the first few days following surgery; you      may experience incision discomfort and generalized discomfort in your      breasts and abdomen.  ?  Oozing can be expected.     Appearance  ?  The majority of swelling will subside in 3-4 weeks, but some swelling may      persist for up to 3 months.    Follow-Up Care  ?  Your 1st post-operative visit will be scheduled for about a week after       surgery. Some drains may be taken out during this visit.    Please note my office will call you 1-2 business days after your procedure to check up on how you're doing and to schedule your post-operative appointment.     When to Call:  ?  If you have increased swelling or bruising.  ?  If swelling and redness persist after a few days.  ?  If you have increased redness along the incision.  ?  If you have severe or increased pain not relieved by medication.  ?  If you have any side effects to medications; such as, rash, nausea,      headache, vomiting.  ?  If you have an oral temperature over 100.4 degrees.  ?  If you have any yellowish or greenish drainage from the incisions or      notice a foul odor.  ?  If you have bleeding from the incisions that is difficult to control with      light pressure.  ?  If you have loss of feeling or motion.    For Medical Questions, Please Call:  ?   [phone], Monday - Friday, 8 a.m. - 4:30 p.m.  ?   After hours and on weekends, call Hospital Paging at [phone] and       ask for the Plastic Surgery Resident on call.

## 2024-03-01 NOTE — PROGRESS NOTES
Care Management Discharge Note    Discharge Date: 03/01/2024       Discharge Disposition: Home    Discharge Services: None    Discharge DME: None    Discharge Transportation: family or friend will provide    Private pay costs discussed: Not applicable    Does the patient's insurance plan have a 3 day qualifying hospital stay waiver?  No    PAS Confirmation Code:  NA  Patient/family educated on Medicare website which has current facility and service quality ratings:  NA    Education Provided on the Discharge Plan:  YES  Persons Notified of Discharge Plans: patient, bedside  Patient/Family in Agreement with the Plan: yes    Handoff Referral Completed: Yes    Additional Information:  RNCC met with patient at bedside. Patient stated she declined home care due to she does not want to hurt her mother's feelings as her mother is an RN. RNCC advocated for home care as a teaching and monitoring opportunity. Patient stated she will reach out to her PCP if she decides she needs home care. Patient has specialty follow-up in 5 days with Dr Mendoza.   Future Appointments   Date Time Provider Department Center   3/6/2024 11:45 AM BORIS Mendoza MD [facility]   5/30/2024  4:30 PM Jean Kenny MD [facility]     Shanell Shetty RNCC Float  Covering for 5A  Phone [phone]  Pager [phone]  Nurse Coordinator     Social Work and Care Management Department     For Weekend & Holiday on call RN Care Coordinator:  (Tasks: Home care, home infusion, medical equipment, transportation, IMM & RAMIRES forms, etc.)  [place] (0800 - 1630) Saturday and Sunday    Units: 5A, 5B, & 5C: [phone]    Units: 6B, 6C, 6D: [phone]    Units: 7A, 7B, 7C, 7D: [phone]    Units: 6A/ICU : [phone]     [facility] ([phone]) Saturday and Sunday    Units: 6 Med/Surg, 8A, 10 ICU, & Pediatric Units: [phone]    Units: 5 Ortho, 5Med/Surg & WB ED: [phone]

## 2024-03-01 NOTE — PLAN OF CARE
"Goal Outcome Evaluation:      Plan of Care Reviewed With: patient    Overall Patient Progress: improvingOverall Patient Progress: improving     BP 99/78 (BP Location: Left arm)   Pulse 69   Temp 97.9  F (36.6  C) (Oral)   Resp 18   Ht 1.702 m (5' 7\")   Wt 109.9 kg (242 lb 4.6 oz)   SpO2 97%   BMI 37.95 kg/m      Neuro: A&Ox4. Neuro intact  Cardiac: Afebrile, VSS.   Respiratory: weaned to room air. Capno wdl.  GI/: Voiding spontaneously. No BM this shift. No flatus  Diet/appetite: Tolerating clear diet. Denies nausea. Diet advanced to regular by MD   Activity: Up with assist of 1 to bsc. Sling bedside. Arm rolls in place and elevated per order.   Pain: increased pain with activity. Maintained at tolerable level with scheduled tylenol; prn toradol x1 and IV dilaudid x3. Pt reports oxycodone does not provide pain relief.  Skin: Dressing to R axilla reinforced x1. Small amt of serosang drainage present  Lines: PIV x2- tko NS and LR infusing at 75 ml/hr  Drains: ANALILIA x2 to bulb suction. Haile bloody output present - small amt in one and moderate amt in other. Output recorded. Drain care performed per order.    Rested btwn cares. Mother present bedside. Able to make needs known. Continue to monitor. Notify MD of changes/concerns        "

## 2024-03-01 NOTE — ANESTHESIA POSTPROCEDURE EVALUATION
Patient: Klarissa Curtis    Procedure: Procedure(s):  Right axillary wound excision,Right muscle-sparing Latissimus musculo-cutaneous flap, SPY       Anesthesia Type:  General    Note:  Disposition: Inpatient   Postop Pain Control: Uneventful            Sign Out: Well controlled pain   PONV: No   Neuro/Psych: Uneventful            Sign Out: Acceptable/Baseline neuro status   Airway/Respiratory: Uneventful            Sign Out: Acceptable/Baseline resp. status   CV/Hemodynamics: Uneventful            Sign Out: Acceptable CV status; No obvious hypovolemia; No obvious fluid overload   Other NRE: NONE   DID A NON-ROUTINE EVENT OCCUR? No           Last vitals:  Vitals Value Taken Time   /71 02/29/24 1842   Temp 36.8  C (98.3  F) 02/29/24 1754   Pulse 80 02/29/24 1845   Resp 17 02/29/24 1830   SpO2 99 % 02/29/24 1845   Vitals shown include unfiled device data.    Electronically Signed By: Parish Vazquez MD  February 29, 2024  6:47 PM

## 2024-03-01 NOTE — PROGRESS NOTES
"Plastic Surgery Progress Note    A/P: 48F hx hidradenitis s/p R ms-Latissimus flap for axillary coverage and abscess I&D on 2/29/24 recovering as expected.    -Saline WTD TID packing to abscess cavity at right axilla/breast, teach patient/mom to perform dressing changes and how to strip/record drains  -Doxycycline, follow final cx results   -Regular diet, bowel regimen  -Activity: NO pressure to flap area in axilla, light use of RUE with no overhead reaching, right arm must stay abducted at 15-30 degrees to avoid flap compression, wear sling with spacer while out of bed, encourage ambulation  -Dispo: may discharge as early as this afternoon if doing well with pain control and is able to perform own dressing changes    Brian Kaplan MD - PGY 8  Plastic Surgery Resident  Pager [phone]  ------------------------------------------------------------    S/24h: No acute events overnight. Pain controlled. Tolerated liquids overnight, eager to try regular diet. Was able to ambulate to restroom.     O: BP 99/78 (BP Location: Left arm)   Pulse 69   Temp 97.9  F (36.6  C) (Oral)   Resp 18   Ht 1.702 m (5' 7\")   Wt 109.9 kg (242 lb 4.6 oz)   SpO2 97%   BMI 37.95 kg/m     Gen: no acute distress  CV: regular rate  Pulm: non-labored, symmetric chest expansion  Right axilla flap pink/warm without areas of compromise so far, suture/staples in-tact, back site without hematoma, anterior axillary abscess site packing changed no residual purulence or erythema    Jpx2 SSF minimal outpt    BMP  Recent Labs   Lab 03/01/24  0651 03/01/24  0526 02/29/24  1323 02/29/24  1258   CR  --  0.49* 0.53  --    *  --   --  97     CBC  Recent Labs   Lab 02/29/24  1323   HGB 14.3         "

## 2024-03-01 NOTE — PHARMACY-VANCOMYCIN DOSING SERVICE
Pharmacy Vancomycin Initial Note  Date of Service 2024  Patient's  1975  48 year old, female    Indication: Abscess    Current estimated CrCl = Estimated Creatinine Clearance: 164 mL/min (based on SCr of 0.53 mg/dL).    Creatinine for last 3 days  2024:  1:23 PM Creatinine 0.53 mg/dL    Recent Vancomycin Level(s) for last 3 days  No results found for requested labs within last 3 days.      Vancomycin IV Administrations (past 72 hours)        No vancomycin orders with administrations in past 72 hours.                    Nephrotoxins and other renal medications (From now, onward)      Start     Dose/Rate Route Frequency Ordered Stop    24 2300  ketorolac (TORADOL) injection 15 mg         15 mg Intravenous EVERY 6 HOURS PRN 24 2259            Contrast Orders - past 72 hours (72h ago, onward)      None            Insight[employer] Prediction of Planned Initial Vancomycin Regimen  Regimen: 1250 mg IV every 12 hours.  Start time: 20:06 on 2024  Exposure target: AUC24 (range)400-600 mg/L.hr   AUC24,ss: 454 mg/L.hr  Probability of AUC24 > 400: 64 %  Ctrough,ss: 14.2 mg/L  Probability of Ctrough,ss > 20: 22 %  Probability of nephrotoxicity (Lodise NIYA ): 9 %       Plan:  Start vancomycin 1250 mg IV every 12 hours.   Vancomycin monitoring method: AUC  Vancomycin therapeutic monitoring goal: 400-600 mg*h/L  Pharmacy will check vancomycin levels as appropriate in 1-3 Days.    Serum creatinine levels will be ordered daily for the first week of therapy and at least twice weekly for subsequent weeks.      Dale Sorensen, LorenD

## 2024-03-01 NOTE — PHARMACY-ADMISSION MEDICATION HISTORY
Pharmacist Admission Medication History    Admission medication history is complete. The information provided in this note is only as accurate as the sources available at the time of the update.    Information Source(s): Spoke to patient in person. Patient brought in med bottles. Reviewed medication dispense history. [place]  search.     Changes made to PTA medication list:  Added: None  Deleted: Duplicate Percocet, trazodone entries   Changed: None    Pertinent Information:   Lyrica: Patient titrating dose up. Just completed week of 75 mg HS. Starting 75 mg BID.   Humira: Self-injects at home. Takes weekly on Sundays.   Results of  Search from 2/29:  Pregabalin 75 mg #70 for 28 day supply, last picked up 2/20/24  Oxycodone-APAP 5-325 mg #102 for 13 day supply, last picked up 2/10/24    Prior to Admission medications    Medication Sig Last Dose Taking? Auth Provider Long Term End Date   adalimumab (HUMIRA *CF* PEN-CD/UC/HS STARTER) 80 MG/0.8ML pen kit Inject 0.8 mLs (80 mg) Subcutaneous once a week  Patient taking differently: Inject 80 mg Subcutaneous once a week Sundays Past Week Yes Jean Kenny MD Yes    DULoxetine (CYMBALTA) 30 MG capsule Take 30mg (1 pill) for 1 week and if tolerating increase to 60mg (2 pills) daily 2/28/2024 at 1000 Yes Jean Kenny MD Yes    fexofenadine (ALLEGRA) 180 MG tablet Take 1 tablet (180 mg) by mouth 2 times daily 2/28/2024 at 1000 Yes Jean Kenny MD     fluocinonide (LIDEX) 0.05 % external ointment Apply topically 2 times daily  Patient taking differently: Apply topically as needed More than a month Yes Jean Kenny MD     medical cannabis (Patient's own supply) See Admin Instructions (The purpose of this order is to document that the patient reports taking medical cannabis.  This is not a prescription, and is not used to certify that the patient has a qualifying medical condition.) 2/28/2024 at 1000 Yes Reported, Patient     naloxone  (NARCAN) 4 MG/0.1ML nasal spray Spray 1 spray (4 mg) into one nostril alternating nostrils as needed for opioid reversal every 2-3 minutes until assistance arrives Unknown Yes Lesa Osman APRN CNP Yes    oxyCODONE-acetaminophen (PERCOCET) 5-325 MG tablet Take 1-2 percocet every 6-8 hrs as needed for pain 2/28/2024 at 0800 Yes Jean Kenny MD     pregabalin (LYRICA) 75 MG capsule Start 75 mg at bedtime x 1 week, then increase to 75 mg BID x 1 week, then increase to 75 mg in morning and 150 mg at bedtime x 1 week, then increase to 150 mg BID. 2/28/2024 at 2200 Yes Lesa Osman APRN CNP Yes    Resorcinol POWD Resorcinol 15% in vanicream twice a day  Patient taking differently: as needed Resorcinol 15% in vanicream Unknown Yes Jean Kenny MD Yes    traZODone (DESYREL) 150 MG tablet Take 1 tablet (150 mg) by mouth at bedtime  Patient taking differently: Take 150 mg by mouth nightly as needed for sleep Past Week Yes Jean Kenny MD Yes    VITAMIN D3 50 MCG (2000 UT) tablet TAKE 1 TABLET BY MOUTH EVERY DAY Unknown Yes Jean Kenny MD     adalimumab (HUMIRA PEN) 80 MG/0.8ML pen kit Inject 1.6 mLs (160 mg) Subcutaneous once a week Duplicate  Jean Kenny MD Yes    pregabalin (LYRICA) 150 MG capsule Take 1 capsule (150 mg) by mouth 2 times daily See dose below  Lesa Osman APRN CNP Yes    roflumilast (DALIRESP) 500 MCG TABS tablet Take 0.5 tablet (250 mcg) by mouth daily for 4 weeks, then increase to 1 tablet (500 mcg) by mouth daily thereafter.  Patient not taking: Reported on 2/29/2024 Not Taking  Jean Kenny MD Yes    roflumilast (DALIRESP) 500 MCG TABS tablet Take 1 tablet (500 mcg) by mouth daily  Patient not taking: Reported on 2/29/2024 Not Taking  Jean Kenny MD Yes      Medication History Completed By: Dale Sorensen [facility] 2/29/2024 8:29 PM

## 2024-03-01 NOTE — CONSULTS
Care Management Initial Consult    General Information  Assessment completed with: Patient,    Type of CM/SW Visit: Initial Assessment    Primary Care Provider verified and updated as needed: Yes   Readmission within the last 30 days: no previous admission in last 30 days      Reason for Consult: discharge planning  Advance Care Planning: Advance Care Planning Reviewed: questions answered  Provided blank   Communication Assessment  Patient's communication style: spoken language (English or Bilingual)    Hearing Difficulty or Deaf: no   Wear Glasses or Blind: yes    Cognitive  Cognitive/Neuro/Behavioral: WDL                      Living Environment:   People in home: alone     Current living Arrangements: apartment      Able to return to prior arrangements: yes       Family/Social Support:  Care provided by: self  Provides care for: no one  Marital Status: Single  Parent(s), Sibling(s), Other (specify) (neice)          Description of Support System: Supportive, Involved    Support Assessment: Adequate family and caregiver support, Adequate social supports    Current Resources:   Patient receiving home care services: No     Community Resources: None  Equipment currently used at home: none  Supplies currently used at home: None    Employment/Financial:  Employment Status: employed full-time     Employment/ Comments: Manager of Guest Experience, at airports/othe locations. On [employer]  Financial Concerns: none   Referral to Financial Worker: No       Does the patient's insurance plan have a 3 day qualifying hospital stay waiver?  No    Lifestyle & Psychosocial Needs:  Social Determinants of Health     Food Insecurity: Not on file   Depression: Not at risk (2/12/2024)    PHQ-2     PHQ-2 Score: 2   Recent Concern: Depression - At risk (1/29/2024)    PHQ-2     PHQ-2 Score: 6   Housing Stability: Not on file   Tobacco Use: Medium Risk (2/19/2024)    Patient History     Smoking Tobacco Use: Former     Smokeless Tobacco  Use: Never     Passive Exposure: Not on file   Financial Resource Strain: Not on file   Alcohol Use: Not on file   Transportation Needs: Not on file   Physical Activity: Not on file   Interpersonal Safety: Not on file   Stress: Not on file   Social Connections: Not on file       Functional Status:  Prior to admission patient needed assistance:   Dependent ADLs:: Independent  Dependent IADLs:: Independent  Assesssment of Functional Status: Not at baseline with ADL Functioning    Mental Health Status:  Mental Health Status: Current Concern  Mental Health Management: Medication    Chemical Dependency Status:  Chemical Dependency Status: No Current Concerns           Values/Beliefs:  Spiritual, Cultural Beliefs, Confucianist Practices, Values that affect care: no          Values/Beliefs Comment: Catholic/Zoroastrian    Additional Information:  A/P: 48F hx hidradenitis s/p R ms-Latissimus flap for axillary coverage and abscess I&D on 2/29/24 recovering as expected.     RNCC completed CM assessment due to elevated risk score and need for discharge planning. RNCC met with pt at bedside and introduced RNCC role. Patient states she lives alone in an an apartmetn but her mother will be here for 2 weeks to assist. Patient stated she will have ROM and weight restrictions with her right arm in addition to packing wound. Patient stated she is working on pain management. Patient stated she has a very mobile position which requires travel and is on leave currently. No financial concerns. Patient stated that she has dealt with depression and is stable on medication with talk therapy pending. Patient does not have a HCD and RNCC provided a blank.   Patient stated she is agreeable to SNV if home care is obtained.     RNCC sent referral to [facility] and placed SN Home Care order. .     RNCC will continue to follow and assist with discharge planning as needed.      Shanell Shetty RNCC Float  Covering for 5A  Phone [phone]  Pager  [phone]    Nurse Coordinator     Social Work and Care Management Department     For Weekend & Holiday on call RN Care Coordinator:  (Tasks: Home care, home infusion, medical equipment, transportation, IMM & RAMIRES forms, etc.)  [place] (0800 - 1630) Saturday and Sunday    Units: 5A, 5B, & 5C: [phone]    Units: 6B, 6C, 6D: [phone]    Units: 7A, 7B, 7C, 7D: [phone]    Units: 6A/ICU : [phone]     [facility] ([phone]) Saturday and Sunday    Units: 6 Med/Surg, 8A, 10 ICU, & Pediatric Units: [phone]    Units: 5 Ortho, 5Med/Surg & WB ED: [phone]

## 2024-03-02 NOTE — PLAN OF CARE
Goal Outcome Evaluation:   [id number]  PATIENT is A&O x4, stable VS, IV dilaudid given for pain prior discharge. Voids with adequate just prior discharge. No BM but passing gas. Incision dressing done in the Morning and it is intact.  Denies nausea/vomiting. Incision care teaching done and incision care supplies given,. Patient understood entire teaching. All discharge medications are sent with patient home including dilaudid. Right arm is placed in sling in proper method. Transportation ordered for patient.  Patient is on regular and ate and tolerated well.

## 2024-03-03 LAB — BACTERIA ABSC ANAEROBE+AEROBE CULT: NORMAL

## 2024-03-04 ENCOUNTER — TELEPHONE (OUTPATIENT)
Dept: DERMATOLOGY | Facility: CLINIC | Age: 49
End: 2024-03-04
Payer: COMMERCIAL

## 2024-03-04 LAB
PATH REPORT.COMMENTS IMP SPEC: NORMAL
PATH REPORT.COMMENTS IMP SPEC: NORMAL
PATH REPORT.FINAL DX SPEC: NORMAL
PATH REPORT.GROSS SPEC: NORMAL
PATH REPORT.MICROSCOPIC SPEC OTHER STN: NORMAL
PATH REPORT.RELEVANT HX SPEC: NORMAL
PHOTO IMAGE: NORMAL

## 2024-03-04 NOTE — LETTER
March 11, 2024      [employer]  - Appeals/ Clinical Review Dept.  Patient: Klarissa Curtis  ID#: [id number]  From the office of Jean Kenny      To Whom It May Concern,     I am writing this letter of medical necessity as a dermatologist caring for Klarissa Curtis with regard to the medical necessity of adalimumab to treat the patient's diagnosis of Hidradenitis Suppurativa (L73.2).     Treatment requested: Adalimumab  Dose: 80 mg weekly for maintenance   Requested Dose: 160 mg subcutaneous injection once weekly   Prior treatments tried:  - Humira (adalimumab) 80 mg once weekly (10/2020 to 10/2021, restarted 10/2022 to Present): moderately effective, continues to experience flares but has helped limit new flares in HS  - Humira (adalimumab) 40 mg weekly (11/2018 to 09/2019): not effective   - Infliximab 5 mg/kg infusion every 4 weeks (12/2019 to 04/2020): not effective, limited response   - Cosentyx (secukinumab) 300 mg every 28 days (10/2021 to 03/2022): not effective, limited response  - Xeljanz (tofacitinib) 10 mg twice daily (03/2022 to 09/2022): not effective   - Rapamune (sirolimus) 2 mg daily (04/2021 to 09/2021): not effective, developed psoriasis-form rash  - Intralesional kenalog injections intermittently: short-term/temporary response  - Oral prednisone tapers as needed: short-term/temporary response  - Hormonal agents: spironolactone (stopped due to abnormal uterine bleeding), metformin, finasteride  - Oral antibiotics: doxycyline 100 mg twice daily, dapsone 100 mg daily, clindamycin 300 mg twice daily plus rifampin 600 mg daily   - Topical therapies: clindamycin 1% solution, clobetasol 0.05% cream, desonide 0.05% ointment, resorcinol 15% cream      Evidence of disease severity: Julian stage II (III is most severe)     I recently prescribed this patient adalimumab, however the medication was denied. I have reviewed the patient's diagnosis, care plan, and clinical guidelines for treatment and  request a formal appeal of your denial for adalimumab.     I am writing to highlight the severe nature of this patient's condition. They have suffered from hidradenitis suppurativa (HS) for many years and have extensive disease affecting the bilateral axilla, groin, gluteal cleft, etc. The severity of their skin condition has affected their life in many ways including but not limited to chronic pain, depression/anxiety, impaired personal/intimate relationships, ability to perform activities of daily living, missed work/disability, etc. They have tried numerous treatments listed above, however, they have had inadequate clinical response or side effects from the treatments. Due to the severity of their condition and the significant impact this disease has had on their quality of life, I strongly believe this patient needs continued access to adalimumab.    My recommendation for adalimumab dose intensification to 80 mg weekly to treat this patient's HS is based on and supported by the following1-10:   Adalimumab is one of two FDA approved medications for the treatment of moderate-severe Hidradenitis suppurativa including Cosentyx (secukinumab) which the patient tried for over 3 months without clinical improvement in HS symptoms. In addition, the patient has an inadequate response to numerous other systemic and topical therapies discussed above.   The patient has She has been on her current dose of adalimumab 80 mg weekly Oct 2022 and has been tolerating this without issues. In addition, she has experienced the most substantial clinical response with Humira (adalimumab) vs numerous alternatives treatment for her chronic recurrent HS since initiating the higher maintenance dosing regimen. Furthermore, given her history of low adalimumab levels & recalcitrant condition, reducing her maintenance dosing regimen would result in recurrent severe flares & increase the risk for adalimumab antibody development   Patient s body  weight is 245 lbs with a BMI of 38.4 kg/m .  Given the patient's BMI is over 30  kg/m  the level of Humira should be on the higher end to be considered an effective dose.    A study done by [employer] showed that patients with a BMI of at least 30 had a significantly shorter time to dose escalation, the need for a maintenance dose escalation and not purely a re-introduction of the medication bolus is warranted.8  There is ample evidence that higher doses may be required for a subset of patients with HS that have insufficient response or have progressive efficacy loss after initial response to adalimumab 40mg/week. There was recent data presented at the Symposium for Hidradenitis Suppurativa in 2018 that demonstrated many HS patients get improved control with 80mg weekly adalimumab. Furthermore, a recent paper published in 2020 showed improvement in the severity of patients' HS as well as reduction in pain and improvement in quality of life following dose escalation of adalimumab from 40mg/week to 80mg/week.9  Liza CC, Rey H, Ronit Corey RD, et al. Adalimumab Dose Intensification in Recalcitrant Hidradenitis Suppurativa/Acne Inversa. Dermatology. 2020;236(1):25-30. PMID: 45608197  There is limited literature available in optimized therapeutic drug monitoring (TDM) of adalimumab in the treatment of Hidradenitis suppurativa. However, their are numerous published studies examining TDM in the treatment of IBD (irritable bowel disease) and this data may be used to guide recommendations in HS.  A treat-to-target therapeutic approach is emerging as the new standard of care for treating IBD and a published study examined the association of serum adalimumab concentrations during maintenance therapy and remission of Crohn s disease (CD).10 This study found that adalimumab concentrations ?12 ?g/mL were associated with remission in CD and more specifically they identified that concentrations of 16-20 ?g/mL were  associated with an even greater remission rate. Furthermore, this supports the safety and need to intensify the patients maintenance regimen due to their drug levels below 12 ?g/mL and we would aim for a therapeutic goal level of adalimumab activity between 16-20 ?g/mL.10      In my clinical opinion as a dermatologist, supported by the medical literature described above, the patients adherence, clinical response to adalimumab dose intensification in the past, poor response to numerous other medications in the past, and low adalimumab trough levels, that adalimumab dose escalation to 80 mg weekly with close therapeutic monitoring is the most appropriate next step in treating this patient's HS and restoring their quality of life.      On behalf of this patient, I would be grateful for your prompt approval of this medication. This patient will clearly continue to suffer without treatment and no other superior or equivalent options exist at this point. Please let me know if I can provide any further support, clarification, or assistance that will help result in coverage of this medication.  Thank you for your time and consideration.  I am happy to discuss this further and would appreciate the chance to do so if this medication is not approved.      Sincerely,     Jean Kenny MD  [address] [place] [id number]     Office contact:  Archana Agosto  Pharmacy Liaison Dermatology  P: [phone]  F: [phone]  [email or web address]       References:  Terell BRAVO, Timbo MARTIN, Allan DA, et al. Two Phase 3 Trials of Adalimumab for Hidradenitis Suppurativa. N Engl J Med. 2016;375(5):422-34. PMID: 51489346  Liza TREVIÑO, Timbo MARTIN, Ida EP, et al. Long-term adalimumab efficacy in patients with moderate-to-severe hidradenitis suppurativa/acne inversa: 3-year results of a phase 3 open-label extension study. J Am Acad Dermatol. 2019;80(1):60-69.e2. PMID: 76753320  Galen FRANK, Refugio M, Antoinette H, Marcus Y, Timbo ESCALANTE.  Reduction in pain scores and improvement in depressive symptoms in patients with hidradenitis suppurativa treated with adalimumab in a phase 2, randomized, placebo-controlled trial. Dermatol Online J. 2016;22(3):67544/nl96c7598i. PMID: 85652903  Michi ARCHULETA, Cris C, Darius ARCHULETA, et al. North American clinical management guidelines for hidradenitis suppurativa: A publication from the [place] and [place] Hidradenitis Suppurativa Foundations: Part II: Topical, intralesional, and systemic medical management. J Am Acad Dermatol. 2019;81(1):. PMID: 81534104  Tabby JR, Dong F, Quentin D, et al. [place] Association of Dermatologists guidelines for the management of hidradenitis suppurativa (acne inversa) 2018. Br J Dermatol. 2019;180(5):1009-17. PMID: 38820808  Liza TREVIÑO, Richard FG, Baldemar MCKEON, et al. Hidradenitis suppurativa/acne inversa: a practical framework for treatment optimization - systematic review and recommendations from the HS ALLIANCE working group. J Eur Acad Dermatol Venereol. 2019;33(1):19-31. PMID: 84416440  Liza TREVIÑO, Scottie N, Elijah L, et al.  S1 guideline for the treatment of hidradenitis suppurativa/acne inversa. J Eur Acad Dermatol Venereol. 2015;29(4):619-44. PMID: 83655074  Brittni M, et al. PTU-107Weight adjusted anti-TNF therapy favours obese patients with Crohn's disease. BMJ Journals. 2012; 61(2). https://doi.org/10.1136/wnulvm-6594-403488k.107  Liza TREVIÑO, Rey H, Ronit Corey RD, et al. Adalimumab Dose Intensification in Recalcitrant Hidradenitis Suppurativa/Acne Inversa. Dermatology. 2020;236(1):25-30. PMID: 42712448   Natasha ARCHULETA., Christoph K, Srini B, Nathen ARCHULETA. Maintenance Adalimumab Concentrations Are Associated with Biochemical, Endoscopic, and Histologic Remission in Infammatory Bowel Disease. Digestive diseases and sciences. 2018;63(11):4557-3782. PMID: 47125004

## 2024-03-04 NOTE — TELEPHONE ENCOUNTER
PA Initiation    Medication: HUMIRA (2 PEN) 80 MG/0.8ML SC PNKT  Insurance Company: [employer] ([facility]) - Phone [phone] Fax [phone]  Pharmacy Filling the Rx: [facility] - [place], [place] - [address]  Filling Pharmacy Phone:    Filling Pharmacy Fax:    Start Date: 3/4/2024    Key: [id number]

## 2024-03-05 ENCOUNTER — MYC REFILL (OUTPATIENT)
Dept: DERMATOLOGY | Facility: CLINIC | Age: 49
End: 2024-03-05
Payer: COMMERCIAL

## 2024-03-05 DIAGNOSIS — L50.3 DERMATOGRAPHISM: ICD-10-CM

## 2024-03-05 DIAGNOSIS — L73.2 HIDRADENITIS SUPPURATIVA: ICD-10-CM

## 2024-03-05 LAB
BACTERIA ABSC ANAEROBE+AEROBE CULT: ABNORMAL

## 2024-03-06 ENCOUNTER — OFFICE VISIT (OUTPATIENT)
Dept: PLASTIC SURGERY | Facility: CLINIC | Age: 49
End: 2024-03-06
Payer: COMMERCIAL

## 2024-03-06 VITALS
TEMPERATURE: 98 F | DIASTOLIC BLOOD PRESSURE: 72 MMHG | HEART RATE: 89 BPM | BODY MASS INDEX: 38.45 KG/M2 | SYSTOLIC BLOOD PRESSURE: 99 MMHG | WEIGHT: 245 LBS | HEIGHT: 67 IN | OXYGEN SATURATION: 98 %

## 2024-03-06 DIAGNOSIS — Z98.890 S/P FLAP GRAFT: Primary | ICD-10-CM

## 2024-03-06 DIAGNOSIS — L73.2 HIDRADENITIS SUPPURATIVA: ICD-10-CM

## 2024-03-06 PROCEDURE — 99024 POSTOP FOLLOW-UP VISIT: CPT | Performed by: PLASTIC SURGERY

## 2024-03-06 RX ORDER — OXYCODONE AND ACETAMINOPHEN 5; 325 MG/1; MG/1
TABLET ORAL
Qty: 126 TABLET | Refills: 0 | Status: SHIPPED | OUTPATIENT
Start: 2024-03-06 | End: 2024-05-08

## 2024-03-06 ASSESSMENT — PAIN SCALES - GENERAL: PAINLEVEL: MODERATE PAIN (5)

## 2024-03-06 NOTE — PROGRESS NOTES
PRESENTING COMPLAINT:  Post-operative visit s/p right axillary hidradenitis suppurativa excision and right muscle-sparing latissimus musculocutaneous flap reconstruction done 2/29/2024.     HISTORY OF PRESENTING COMPLAINT: The patient is here for post-operative visit.  The patient is being seen in the presence of my nurse.     Patient doing extremely well.  Pain is well-controlled.  The abscess pocket has been packed twice a day.  On doxycycline.  ANALILIA drainage in the back is about 40 to 50 cc a day in the front is about 30 cc a day    On exam: Vital signs stable afebrile.  The flap is healing in well.  The abscess pocket is clean.  No cellulitis.  No evidence for infection seroma hematoma.     ASSESSMENT AND PLAN:  Based upon the above findings, the patient is here for post-operative visit.     Will keep the ANALILIA drains in place for another week.  See us back next week.  Continue with twice daily dressing changes.  May get the area wet.  Range of motion exercises of the shoulder were shown.  1 more week of antibiotics.    All questions were answered.  The patient was happy with the visit.

## 2024-03-06 NOTE — NURSING NOTE
"Chief Complaint   Patient presents with    ANTOLIN Lorenzo, is being seen today for a 1 week post-op DOS 2/29/24       Vitals:    03/06/24 1209   BP: 99/72   BP Location: Left arm   Patient Position: Chair   Cuff Size: Adult Large   Pulse: 89   Temp: 98  F (36.7  C)   TempSrc: Oral   SpO2: 98%   Weight: 111.1 kg (245 lb)   Height: 1.702 m (5' 7\")       Body mass index is 38.37 kg/m .      Carmela Haley LPN    "

## 2024-03-06 NOTE — LETTER
3/6/2024       RE: Klarissa Curtis  [address]  [facility] [place] [address]     Dear Colleague,    Thank you for referring your patient, Klarissa Curtis, to the [facility] RECON[facility]GERY CLINIC [place] at [facility]. Please see a copy of my visit note below.    PRESENTING COMPLAINT:  Post-operative visit s/p right axillary hidradenitis suppurativa excision and right muscle-sparing latissimus musculocutaneous flap reconstruction done 2/29/2024.     HISTORY OF PRESENTING COMPLAINT: The patient is here for post-operative visit.  The patient is being seen in the presence of my nurse.     Patient doing extremely well.  Pain is well-controlled.  The abscess pocket has been packed twice a day.  On doxycycline.  ANALILIA drainage in the back is about 40 to 50 cc a day in the front is about 30 cc a day    On exam: Vital signs stable afebrile.  The flap is healing in well.  The abscess pocket is clean.  No cellulitis.  No evidence for infection seroma hematoma.     ASSESSMENT AND PLAN:  Based upon the above findings, the patient is here for post-operative visit.     Will keep the ANALILIA drains in place for another week.  See us back next week.  Continue with twice daily dressing changes.  May get the area wet.  Range of motion exercises of the shoulder were shown.  1 more week of antibiotics.    All questions were answered.  The patient was happy with the visit.      Again, thank you for allowing me to participate in the care of your patient.      Sincerely,    BORIS Mendoza MD

## 2024-03-06 NOTE — TELEPHONE ENCOUNTER
PRIOR AUTHORIZATION DENIED    Medication: HUMIRA (2 PEN) 80 MG/0.8ML SC PNKT  Insurance Company: [employer] ([facility]) - Phone [phone] Fax [phone]  Denial Date: 3/5/2024  Denial Reason(s):     Appeal Information: [phone]  Patient Notified:

## 2024-03-07 DIAGNOSIS — L50.3 DERMATOGRAPHISM: ICD-10-CM

## 2024-03-07 NOTE — CONFIDENTIAL NOTE
Pt lost her fexofenadine and pain meds. Today is her last day of dilauded. Will refill her percocet today.PDMP checked.

## 2024-03-07 NOTE — TELEPHONE ENCOUNTER
Vitamin D3 50 mcg (2000 units) tablet       Last Written Prescription Date:  2-17-23  Last Fill Quantity: 90,   # refills: 1  Last Office Visit : 2-8-24  Future Office visit:  -5-30-24    Routing refill request to provider for review/approval because:  Failed protocol: diagnosis  Gap in RF    fexofenadine (ALLEGRA) 180 MG tablet       Take 1 tablet (180 mg) by mouth 2 times daily   Last Written Prescription Date:  3-2-23  Last Fill Quantity: 90,   # refills: 11    Routing refill request to provider for review/approval because:  Disp amt does not match directions

## 2024-03-11 NOTE — TELEPHONE ENCOUNTER
Medication Appeal Initiation    Medication: HUMIRA (2 PEN) 80 MG/0.8ML SC PNKT  Appeal Start Date:  3/11/2024  Insurance Company: [employer] Phone: [phone]  Insurance Fax: [phone]  Comments:   faxed appeal

## 2024-03-11 NOTE — TELEPHONE ENCOUNTER
Routing to Derm liaisons to submit to insurance on behalf of patient. Please see attached appeal from 3/11/24.     Nicky Hartman, PharmD  [facility] Pharmacist

## 2024-03-12 RX ORDER — FEXOFENADINE HCL 180 MG/1
180 TABLET ORAL 2 TIMES DAILY
Qty: 180 TABLET | Refills: 3 | Status: ON HOLD | OUTPATIENT
Start: 2024-03-12 | End: 2024-06-16

## 2024-03-12 RX ORDER — CHOLECALCIFEROL (VITAMIN D3) 50 MCG
1 TABLET ORAL DAILY
Qty: 90 TABLET | Refills: 3 | Status: SHIPPED | OUTPATIENT
Start: 2024-03-12

## 2024-03-13 ENCOUNTER — OFFICE VISIT (OUTPATIENT)
Dept: PLASTIC SURGERY | Facility: CLINIC | Age: 49
End: 2024-03-13
Payer: COMMERCIAL

## 2024-03-13 VITALS
SYSTOLIC BLOOD PRESSURE: 111 MMHG | BODY MASS INDEX: 39.71 KG/M2 | DIASTOLIC BLOOD PRESSURE: 66 MMHG | HEIGHT: 67 IN | OXYGEN SATURATION: 97 % | HEART RATE: 82 BPM | WEIGHT: 253 LBS

## 2024-03-13 DIAGNOSIS — L73.2 HIDRADENITIS SUPPURATIVA: ICD-10-CM

## 2024-03-13 DIAGNOSIS — Z98.890 S/P FLAP GRAFT: Primary | ICD-10-CM

## 2024-03-13 PROCEDURE — 99024 POSTOP FOLLOW-UP VISIT: CPT | Performed by: PLASTIC SURGERY

## 2024-03-13 RX ORDER — FEXOFENADINE HCL 180 MG/1
180 TABLET ORAL 2 TIMES DAILY
Qty: 90 TABLET | Refills: 11 | OUTPATIENT
Start: 2024-03-13

## 2024-03-13 ASSESSMENT — PAIN SCALES - GENERAL: PAINLEVEL: MILD PAIN (3)

## 2024-03-13 NOTE — NURSING NOTE
"Chief Complaint   Patient presents with    RECHECK     Maura, is being seen today for a PO DOS 2/29 R axillary lat flap closure for HS.       Vitals:    03/13/24 1029   BP: 111/66   BP Location: Left arm   Patient Position: Chair   Cuff Size: Adult Large   Pulse: 82   SpO2: 97%   Weight: 114.8 kg (253 lb)   Height: 1.702 m (5' 7\")       Body mass index is 39.63 kg/m .      Carmela Haley LPN    "

## 2024-03-13 NOTE — TELEPHONE ENCOUNTER
Spoke with insurance and appeal has been received and is currently pending. They state it will be completed by 3/26. Will check weekly for update. Case ID [id number]

## 2024-03-13 NOTE — PROGRESS NOTES
PRESENTING COMPLAINT:  Post-operative visit s/p right axillary hidradenitis suppurativa excision and right muscle-sparing latissimus musculocutaneous flap reconstruction done 2/29/2024.     HISTORY OF PRESENTING COMPLAINT: The patient is here for post-operative visit.  The patient is being seen in the presence of my nurse.      Patient doing extremely well.  Pain is well-controlled.  The abscess pocket has been packed twice a day.  Off doxycycline.  ANALILIA drainage in the back is about 30 cc a day in the front is about 30 cc a day.     On exam: Vital signs stable afebrile.  The flap is healing in well.  The abscess pocket is clean.  No cellulitis.  No evidence for infection seroma hematoma.     ASSESSMENT AND PLAN:  Based upon the above findings, the patient is here for post-operative visit.      Took out one of the ANALILIA drains that was going to the flap.  The other ANALILIA drain will stay in another week..  See us back next week.  Continue with twice daily dressing changes.  May get the area wet.  Range of motion exercises of the shoulder were shown.       All questions were answered.  The patient was happy with the visit.

## 2024-03-13 NOTE — LETTER
3/13/2024       RE: Klarissa Curtis  [address]  [facility] [address]     Dear Colleague,    Thank you for referring your patient, Klarissa Curtis, to the [facility] PLASTIC [facility] RECON[facility] SURGERY CLINIC [place] at [facility]. Please see a copy of my visit note below.      PRESENTING COMPLAINT:  Post-operative visit s/p right axillary hidradenitis suppurativa excision and right muscle-sparing latissimus musculocutaneous flap reconstruction done 2/29/2024.     HISTORY OF PRESENTING COMPLAINT: The patient is here for post-operative visit.  The patient is being seen in the presence of my nurse.      Patient doing extremely well.  Pain is well-controlled.  The abscess pocket has been packed twice a day.  Off doxycycline.  ANALILIA drainage in the back is about 30 cc a day in the front is about 30 cc a day.     On exam: Vital signs stable afebrile.  The flap is healing in well.  The abscess pocket is clean.  No cellulitis.  No evidence for infection seroma hematoma.     ASSESSMENT AND PLAN:  Based upon the above findings, the patient is here for post-operative visit.      Took out one of the ANALILIA drains that was going to the flap.  The other ANALILIA drain will stay in another week..  See us back next week.  Continue with twice daily dressing changes.  May get the area wet.  Range of motion exercises of the shoulder were shown.       All questions were answered.  The patient was happy with the visit.          Again, thank you for allowing me to participate in the care of your patient.      Sincerely,    BORIS Mendoza MD

## 2024-03-15 ENCOUNTER — TELEPHONE (OUTPATIENT)
Dept: DERMATOLOGY | Facility: CLINIC | Age: 49
End: 2024-03-15
Payer: COMMERCIAL

## 2024-03-15 NOTE — TELEPHONE ENCOUNTER
[facility] Call Center    Phone Message    May a detailed message be left on voicemail: yes     Reason for Call: Medication Question or concern regarding medication   Prescription Clarification  Name of Medication: Humira   Prescribing Provider: Dr. Kenny   Pharmacy: NA   What on the order needs clarification? Pavan from [facility] is calling to ask for the dose of the Humira? Please call to discuss and it is okay to leave a voice message. Thanks       Action Taken: Message routed to:  [facility] Derm    Travel Screening: Not Applicable

## 2024-03-18 ENCOUNTER — TELEPHONE (OUTPATIENT)
Dept: DERMATOLOGY | Facility: CLINIC | Age: 49
End: 2024-03-18
Payer: COMMERCIAL

## 2024-03-18 NOTE — TELEPHONE ENCOUNTER
PA Needed    Medication: HUMIRA  QTY/DS: 4 per 28ds  NEW INS: No  Insurance Company: [facility]  Pharmacy Filling the Rx: [facility]  PA : 2024  Date of last fill: 24

## 2024-03-19 ENCOUNTER — OFFICE VISIT (OUTPATIENT)
Dept: PLASTIC SURGERY | Facility: CLINIC | Age: 49
End: 2024-03-19
Attending: PLASTIC SURGERY
Payer: COMMERCIAL

## 2024-03-19 VITALS
DIASTOLIC BLOOD PRESSURE: 66 MMHG | RESPIRATION RATE: 16 BRPM | SYSTOLIC BLOOD PRESSURE: 112 MMHG | HEART RATE: 78 BPM | OXYGEN SATURATION: 98 % | TEMPERATURE: 97.9 F

## 2024-03-19 DIAGNOSIS — L98.9 PAINFUL SKIN LESION: ICD-10-CM

## 2024-03-19 DIAGNOSIS — M79.2 NEUROPATHIC PAIN: ICD-10-CM

## 2024-03-19 DIAGNOSIS — L03.111 CELLULITIS OF RIGHT AXILLA: ICD-10-CM

## 2024-03-19 DIAGNOSIS — Z98.890 S/P FLAP GRAFT: Primary | ICD-10-CM

## 2024-03-19 DIAGNOSIS — L73.2 HIDRADENITIS SUPPURATIVA: ICD-10-CM

## 2024-03-19 PROCEDURE — 99024 POSTOP FOLLOW-UP VISIT: CPT | Performed by: PLASTIC SURGERY

## 2024-03-19 PROCEDURE — 99211 OFF/OP EST MAY X REQ PHY/QHP: CPT | Performed by: PLASTIC SURGERY

## 2024-03-19 RX ORDER — SULFAMETHOXAZOLE/TRIMETHOPRIM 800-160 MG
1 TABLET ORAL 2 TIMES DAILY
Qty: 14 TABLET | Refills: 0 | Status: SHIPPED | OUTPATIENT
Start: 2024-03-19 | End: 2024-03-26

## 2024-03-19 ASSESSMENT — PAIN SCALES - GENERAL: PAINLEVEL: MODERATE PAIN (5)

## 2024-03-19 NOTE — LETTER
3/19/2024         RE: Klarissa Curtis  [address]  [facility] [address]        Dear Colleague,    Thank you for referring your patient, Klarissa Curtis, to the [facility]. Please see a copy of my visit note below.    PRESENTING COMPLAINT:  Post-operative visit s/p right axillary hidradenitis suppurativa excision and right muscle-sparing latissimus musculocutaneous flap reconstruction done 2/29/2024.     HISTORY OF PRESENTING COMPLAINT: The patient is here for post-operative visit.  The patient is being seen in the presence of my nurse.      Overall doing okay.  Had some irritation around her ANALILIA drain site.  ANALILIA drainage is less than 25 cc a day.     On exam: Vital signs stable afebrile.  The flap is healing in well.  The abscess pocket is clean.  No cellulitis.  No evidence for infection seroma hematoma.  Distal tip has some necrotic skin but intact.  ANALILIA drainage is serous.    She does have ANNAMARIE drain site erythema.     ASSESSMENT AND PLAN:  Based upon the above findings, the patient is here for post-operative visit.      Will put her on a 7-day course of Bactrim.  Some of the staples were removed.  The ANALILIA drain was removed.  See us back next week for removing the remainder of the staples and sutures.    All questions were answered.  The patient was happy with the visit.      BORIS Mendoza MD

## 2024-03-19 NOTE — PROGRESS NOTES
PRESENTING COMPLAINT:  Post-operative visit s/p right axillary hidradenitis suppurativa excision and right muscle-sparing latissimus musculocutaneous flap reconstruction done 2/29/2024.     HISTORY OF PRESENTING COMPLAINT: The patient is here for post-operative visit.  The patient is being seen in the presence of my nurse.      Overall doing okay.  Had some irritation around her ANALILIA drain site.  ANALILIA drainage is less than 25 cc a day.     On exam: Vital signs stable afebrile.  The flap is healing in well.  The abscess pocket is clean.  No cellulitis.  No evidence for infection seroma hematoma.  Distal tip has some necrotic skin but intact.  ANALILIA drainage is serous.    She does have ANNAMARIE drain site erythema.     ASSESSMENT AND PLAN:  Based upon the above findings, the patient is here for post-operative visit.      Will put her on a 7-day course of Bactrim.  Some of the staples were removed.  The ANALILIA drain was removed.  See us back next week for removing the remainder of the staples and sutures.    All questions were answered.  The patient was happy with the visit.

## 2024-03-19 NOTE — TELEPHONE ENCOUNTER
Prescriptions for 75 mg capsules and 150 mg capsules were sent to pt's pharmacy on 2/19/24 at pt's last office visit.     Pt was directed to increase to a goal dose of 150 mg BID.    A request for 75 mg capsules came to clinic, LPN sent Fadel Partnerst message to the pt, asking for them to confirm their current dose.     Per  150 mg Capsule prescription has not been filled at the pharmacy.     Anum Figueredo LPN

## 2024-03-19 NOTE — NURSING NOTE
"Oncology Rooming Note    March 19, 2024 8:56 AM   Klarissa Curtis is a 49 year old female who presents for:    Chief Complaint   Patient presents with    Oncology Clinic Visit     S/P flap graft; POST-OP     Initial Vitals: /66 (BP Location: Left arm, Patient Position: Sitting, Cuff Size: Adult Regular)   Pulse 78   Temp 97.9  F (36.6  C) (Oral)   Resp 16   LMP  (LMP Unknown)   SpO2 98%  Estimated body mass index is 39.63 kg/m  as calculated from the following:    Height as of 3/13/24: 1.702 m (5' 7\").    Weight as of 3/13/24: 114.8 kg (253 lb). There is no height or weight on file to calculate BSA.  Moderate Pain (5) Comment: w/ movement & deep breath   No LMP recorded (lmp unknown). Patient is perimenopausal.  Allergies reviewed: Yes  Medications reviewed: Yes    Medications: Medication refills not needed today.  Pharmacy name entered into EPIC:    [id number] [place] [address]  [id number] [place], [place] - [address] AT [facility] [place] - [address] [place] [address]    Frailty Screening:   Is the patient here for a new oncology consult visit in cancer care? 2. No      Clinical concerns: none       Reina Moran              "

## 2024-03-20 ENCOUNTER — TELEPHONE (OUTPATIENT)
Dept: DERMATOLOGY | Facility: CLINIC | Age: 49
End: 2024-03-20
Payer: COMMERCIAL

## 2024-03-20 RX ORDER — PREGABALIN 75 MG/1
CAPSULE ORAL
Qty: 70 CAPSULE | Refills: 1 | OUTPATIENT
Start: 2024-03-20

## 2024-03-20 NOTE — TELEPHONE ENCOUNTER
[facility] Call Center    Phone Message    May a detailed message be left on voicemail: yes     Reason for Call: Other: [facility] called and wanted to know if we have received an appeal from them. They faxed it yesterday. They hung up before I was able to get their call back number. If you have their number and have not received anything please call them back. Thanks      Action Taken: Message routed to:  [facility] ([facility] DERM    Travel Screening: Not Applicable

## 2024-03-20 NOTE — TELEPHONE ENCOUNTER
Left message for Pavan at [facility] to discuss Humira. Appeal is currently pending in another open encounter. Closing this encounter

## 2024-03-20 NOTE — TELEPHONE ENCOUNTER
Pt replied that they are taking Pregabalin 150 mg BID.     LPN confirmed that the prescription was still at the pharmacy on the pt's profile.     Pt notified via [employer] that the prescription was at the pharmacy.     Anum Figueredo LPN

## 2024-03-20 NOTE — TELEPHONE ENCOUNTER
Left message for Pavan at [facility] to discuss Humira. Appeal is currently pending in another open encounter. Closing this encounter

## 2024-03-20 NOTE — TELEPHONE ENCOUNTER
Left message for Pavan at [facility] to discuss Simeonira. Pavan had left message with clinic asking if we received appeal form and we have not received. Will check back again with insurance if we dont hear back from Pavan

## 2024-03-22 ENCOUNTER — MEDICAL CORRESPONDENCE (OUTPATIENT)
Dept: HEALTH INFORMATION MANAGEMENT | Facility: CLINIC | Age: 49
End: 2024-03-22

## 2024-03-27 ENCOUNTER — OFFICE VISIT (OUTPATIENT)
Dept: PLASTIC SURGERY | Facility: CLINIC | Age: 49
End: 2024-03-27
Payer: COMMERCIAL

## 2024-03-27 VITALS
DIASTOLIC BLOOD PRESSURE: 76 MMHG | OXYGEN SATURATION: 99 % | SYSTOLIC BLOOD PRESSURE: 115 MMHG | WEIGHT: 240.5 LBS | BODY MASS INDEX: 37.75 KG/M2 | HEIGHT: 67 IN | HEART RATE: 72 BPM

## 2024-03-27 DIAGNOSIS — Z98.890 S/P FLAP GRAFT: Primary | ICD-10-CM

## 2024-03-27 PROCEDURE — 99024 POSTOP FOLLOW-UP VISIT: CPT | Performed by: PLASTIC SURGERY

## 2024-03-27 ASSESSMENT — PAIN SCALES - GENERAL: PAINLEVEL: MILD PAIN (2)

## 2024-03-27 NOTE — NURSING NOTE
"Chief Complaint   Patient presents with    Surgical Followup     Post-op, DOS 2/29/24. Remove some sutures and staples.       Vitals:    03/27/24 0948   BP: 115/76   BP Location: Left arm   Patient Position: Sitting   Cuff Size: Adult Regular   Pulse: 72   SpO2: 99%   Weight: 109.1 kg (240 lb 8 oz)   Height: 1.702 m (5' 7\")       Body mass index is 37.67 kg/m .    Patient reports mild R axillary pain (2/10).    Rylan Harris, EMT    "

## 2024-03-27 NOTE — PROGRESS NOTES
PRESENTING COMPLAINT:  Post-operative visit s/p right axillary hidradenitis suppurativa excision and right muscle-sparing latissimus musculocutaneous flap reconstruction done 2/29/2024.     HISTORY OF PRESENTING COMPLAINT: The patient is here for post-operative visit.  The patient is being seen in the presence of my nurse.      Doing much better.  Very happy with results.     On exam: Vital signs stable afebrile.  The flap is healing in well.  The abscess pocket is clean.  Contracted quite a bit.  No cellulitis.  No evidence for infection seroma hematoma.  Distal tip has some necrotic skin but intact.          ASSESSMENT AND PLAN:  Based upon the above findings, the patient is here for post-operative visit.      The rest of the sutures and staples were removed.  See us back in about 3 to 4 weeks.     All questions were answered.  The patient was happy with the visit.

## 2024-03-27 NOTE — LETTER
3/27/2024       RE: Klarissa Curtis  [address]  [facility] [address]       Dear Colleague,    Thank you for referring your patient, Klarissa Curtis, to the [facility] [place] at [facility]. Please see a copy of my visit note below.    PRESENTING COMPLAINT:  Post-operative visit s/p right axillary hidradenitis suppurativa excision and right muscle-sparing latissimus musculocutaneous flap reconstruction done 2/29/2024.     HISTORY OF PRESENTING COMPLAINT: The patient is here for post-operative visit.  The patient is being seen in the presence of my nurse.      Doing much better.  Very happy with results.     On exam: Vital signs stable afebrile.  The flap is healing in well.  The abscess pocket is clean.  Contracted quite a bit.  No cellulitis.  No evidence for infection seroma hematoma.  Distal tip has some necrotic skin but intact.          ASSESSMENT AND PLAN:  Based upon the above findings, the patient is here for post-operative visit.      The rest of the sutures and staples were removed.  See us back in about 3 to 4 weeks.     All questions were answered.  The patient was happy with the visit.      Again, thank you for allowing me to participate in the care of your patient.      Sincerely,    BORIS Mendoza MD

## 2024-03-28 ENCOUNTER — MYC REFILL (OUTPATIENT)
Dept: DERMATOLOGY | Facility: CLINIC | Age: 49
End: 2024-03-28
Payer: COMMERCIAL

## 2024-03-28 DIAGNOSIS — G47.00 INSOMNIA, UNSPECIFIED TYPE: ICD-10-CM

## 2024-03-28 NOTE — TELEPHONE ENCOUNTER
MEDICATION APPEAL APPROVED    Medication: HUMIRA (2 PEN) 80 MG/0.8ML SC PNKT  Authorization Effective Date: 3/26/2024  Authorization Expiration Date: 3/26/2025  Approved Dose/Quantity: 4 for 28 days  Reference #: Key: [id number]   Appeal Insurance Company: [facility]  Expected CoPay: $       CoPay Card Available: No  Financial Assistance Needed: no  Filling [facility]: [facility] [address]  Patient Notified: no  Comments:

## 2024-03-28 NOTE — TELEPHONE ENCOUNTER
Appeal was approved but still rejecting at pharmacy. Spoke with insurance and they have to update their system, rep states will take 24 hours. Will try again tomorrow

## 2024-04-01 RX ORDER — TRAZODONE HYDROCHLORIDE 150 MG/1
225 TABLET ORAL AT BEDTIME
Qty: 135 TABLET | Refills: 1 | Status: SHIPPED | OUTPATIENT
Start: 2024-04-01

## 2024-04-01 NOTE — TELEPHONE ENCOUNTER
Per patient request sent refill for updated dosing instructions for trazodone 225 mg (1.5 tablets) nightly. Patient is tolerating without issue & finds helpful for sleep maintenance.     Nicky Hartman, PharmD  [facility] Pharmacist

## 2024-04-15 ENCOUNTER — TELEPHONE (OUTPATIENT)
Dept: SURGERY | Facility: CLINIC | Age: 49
End: 2024-04-15
Payer: COMMERCIAL

## 2024-04-15 NOTE — TELEPHONE ENCOUNTER
Attempted to call patient regarding MyChart message. Will respond to assess the patient's concern.    Claudia Vivas RN

## 2024-04-15 NOTE — TELEPHONE ENCOUNTER
Patient returned call. Assessed site. Minimal pain, minimal drainage, as it has been for the past two weeks, no warmth. She has an appointment scheduled on Wednesday. Encouraged the patient to call back if anything changes.    Claudia Vivas RN

## 2024-04-16 DIAGNOSIS — L73.2 HIDRADENITIS SUPPURATIVA: ICD-10-CM

## 2024-04-17 ENCOUNTER — OFFICE VISIT (OUTPATIENT)
Dept: PLASTIC SURGERY | Facility: CLINIC | Age: 49
End: 2024-04-17
Payer: COMMERCIAL

## 2024-04-17 VITALS
HEIGHT: 67 IN | OXYGEN SATURATION: 95 % | BODY MASS INDEX: 37.67 KG/M2 | SYSTOLIC BLOOD PRESSURE: 114 MMHG | DIASTOLIC BLOOD PRESSURE: 80 MMHG | HEART RATE: 84 BPM

## 2024-04-17 DIAGNOSIS — Z98.890 S/P FLAP GRAFT: Primary | ICD-10-CM

## 2024-04-17 PROCEDURE — 99024 POSTOP FOLLOW-UP VISIT: CPT | Performed by: PLASTIC SURGERY

## 2024-04-17 ASSESSMENT — PAIN SCALES - GENERAL: PAINLEVEL: MILD PAIN (3)

## 2024-04-17 NOTE — PROGRESS NOTES
PRESENTING COMPLAINT:  Post-operative visit s/p right axillary hidradenitis suppurativa excision and right muscle-sparing latissimus musculocutaneous flap reconstruction done 2/29/2024.     HISTORY OF PRESENTING COMPLAINT: The patient is here for post-operative visit.  The patient is being seen in the presence of my nurse.      Doing much better.  Very happy with results.     On exam: Vital signs stable afebrile.  The flap is healing in well.  The most proximal tip has a scab with an open area no cellulitis.       ASSESSMENT AND PLAN:  Based upon the above findings, the patient is here for post-operative visit.     Advised packing that area 2 times a day continue with showers.  This will heal in secondarily over a few weeks.  I will see her back in 4 to 6 weeks.     All questions were answered.  The patient was happy with the visit.

## 2024-04-17 NOTE — LETTER
4/17/2024       RE: Klarissa Curtis  [address]  [facility] [address]       Dear Colleague,    Thank you for referring your patient, Klarissa Curtis, to the [facility]GERY CLIN[facility] [place] at [facility]. Please see a copy of my visit note below.    PRESENTING COMPLAINT:  Post-operative visit s/p right axillary hidradenitis suppurativa excision and right muscle-sparing latissimus musculocutaneous flap reconstruction done 2/29/2024.     HISTORY OF PRESENTING COMPLAINT: The patient is here for post-operative visit.  The patient is being seen in the presence of my nurse.      Doing much better.  Very happy with results.     On exam: Vital signs stable afebrile.  The flap is healing in well.  The most proximal tip has a scab with an open area no cellulitis.       ASSESSMENT AND PLAN:  Based upon the above findings, the patient is here for post-operative visit.     Advised packing that area 2 times a day continue with showers.  This will heal in secondarily over a few weeks.  I will see her back in 4 to 6 weeks.     All questions were answered.  The patient was happy with the visit.     Again, thank you for allowing me to participate in the care of your patient.      Sincerely,    BORIS Mendoza MD

## 2024-04-25 RX ORDER — ADALIMUMAB 80MG/0.8ML
KIT SUBCUTANEOUS
Qty: 8 EACH | Refills: 11 | Status: SHIPPED | OUTPATIENT
Start: 2024-04-25 | End: 2024-05-08

## 2024-04-30 ENCOUNTER — OFFICE VISIT (OUTPATIENT)
Dept: PLASTIC SURGERY | Facility: CLINIC | Age: 49
End: 2024-04-30
Attending: PLASTIC SURGERY
Payer: COMMERCIAL

## 2024-04-30 VITALS
DIASTOLIC BLOOD PRESSURE: 81 MMHG | HEART RATE: 123 BPM | RESPIRATION RATE: 18 BRPM | OXYGEN SATURATION: 96 % | WEIGHT: 240.5 LBS | BODY MASS INDEX: 37.67 KG/M2 | TEMPERATURE: 97.8 F | SYSTOLIC BLOOD PRESSURE: 115 MMHG

## 2024-04-30 DIAGNOSIS — L73.2 HIDRADENITIS SUPPURATIVA: ICD-10-CM

## 2024-04-30 DIAGNOSIS — Z98.890 S/P FLAP GRAFT: Primary | ICD-10-CM

## 2024-04-30 PROCEDURE — 99213 OFFICE O/P EST LOW 20 MIN: CPT | Performed by: PLASTIC SURGERY

## 2024-04-30 PROCEDURE — 99024 POSTOP FOLLOW-UP VISIT: CPT | Performed by: PLASTIC SURGERY

## 2024-04-30 NOTE — PROGRESS NOTES
PRESENTING COMPLAINT:  Post-operative visit s/p right axillary hidradenitis suppurativa excision and right muscle-sparing latissimus musculocutaneous flap reconstruction done 2/29/2024.     HISTORY OF PRESENTING COMPLAINT: The patient is here for post-operative visit.  The patient is being seen in the presence of my nurse.      Doing much better.  Very happy with results.     On exam: Vital signs stable afebrile.  The flap is healing in well.  The most proximal tip scab has fallen off with an open area no cellulitis.       ASSESSMENT AND PLAN:  Based upon the above findings, the patient is here for post-operative visit.      Advised packing that area 2 times a day continue with showers.  This will heal in secondarily over a few weeks.  I will see her back as needed.     All questions were answered.  The patient was happy with the visit.

## 2024-04-30 NOTE — NURSING NOTE
"Oncology Rooming Note    April 30, 2024 8:56 AM   Klarissa Curtis is a 49 year old female who presents for:    Chief Complaint   Patient presents with    Oncology Clinic Visit     Post op     Initial Vitals: /81   Pulse (!) 123   Temp 97.8  F (36.6  C) (Oral)   Resp 18   Wt 109.1 kg (240 lb 8 oz)   SpO2 96%   BMI 37.67 kg/m   Estimated body mass index is 37.67 kg/m  as calculated from the following:    Height as of 4/17/24: 1.702 m (5' 7\").    Weight as of this encounter: 109.1 kg (240 lb 8 oz). Body surface area is 2.27 meters squared.  Data Unavailable Comment: Data Unavailable   No LMP recorded. Patient is perimenopausal.  Allergies reviewed: Yes  Medications reviewed: Yes    Medications: Medication refills not needed today.  Pharmacy name entered into EPIC:    [id number] [place] [facility] - [place], [place] - [address] [place] [address]  [id number] [place] - [address] AT [facility]  [facility] - [place], [place] - [address] [place] [address] [facility] - [place], [place] - [address]    Frailty Screening:   Is the patient here for a new oncology consult visit in cancer care? 2. No      Clinical concerns: None       Amanda Miramontes, ANCELMO            "

## 2024-04-30 NOTE — LETTER
4/30/2024         RE: Klarissa Curtis  [address]  [place] [place] [address]        Dear Colleague,    Thank you for referring your patient, Klarissa Curtis, to the [facility]. Please see a copy of my visit note below.    PRESENTING COMPLAINT:  Post-operative visit s/p right axillary hidradenitis suppurativa excision and right muscle-sparing latissimus musculocutaneous flap reconstruction done 2/29/2024.     HISTORY OF PRESENTING COMPLAINT: The patient is here for post-operative visit.  The patient is being seen in the presence of my nurse.      Doing much better.  Very happy with results.     On exam: Vital signs stable afebrile.  The flap is healing in well.  The most proximal tip scab has fallen off with an open area no cellulitis.       ASSESSMENT AND PLAN:  Based upon the above findings, the patient is here for post-operative visit.      Advised packing that area 2 times a day continue with showers.  This will heal in secondarily over a few weeks.  I will see her back as needed.     All questions were answered.  The patient was happy with the visit.               BORIS Mendoza MD

## 2024-05-06 ENCOUNTER — VIRTUAL VISIT (OUTPATIENT)
Dept: RHEUMATOLOGY | Facility: CLINIC | Age: 49
End: 2024-05-06
Attending: DERMATOLOGY
Payer: COMMERCIAL

## 2024-05-06 DIAGNOSIS — L73.2 HIDRADENITIS SUPPURATIVA: Primary | ICD-10-CM

## 2024-05-06 DIAGNOSIS — G89.29 OTHER CHRONIC PAIN: ICD-10-CM

## 2024-05-06 NOTE — Clinical Note
Update - she is doing well after her HS excision in right axilla but left axilla & groin lesions continue to flare. Unfortunately, she's tried & failed most of the alternatives. Most recently she didn't tolerate roflumilast due severe migraines. A couple options: (1) we could retry for insurance coverage of Rinvoq - but I'm just not confident we'll get this covered since she's also on Humira without any other labeled indications/dx for the Rinvoq & she doesn't qualify for free drug (due to income) ; (2) I didn't see that she's tried MTX in the past or that she has any health reasons to necessarily avoid this? so she could try a low dose as an adjunct to help boost her Humira levels some (Oct 2024 Humira level=10.7 but 160 mg weekly was denied by insurance). You see her on 5/30/24 so I figured you'll discuss next tx steps then & just let me know if you make any med changes.   Nicky

## 2024-05-06 NOTE — Clinical Note
5/6/2024       RE: Klarissa Curtis  [address]  [facility] [address]     Dear Colleague,    Thank you for referring your patient, Klarissa Curtis, to the [facility] RHEUMATOLOGY [facility] [place] at [facility]. Please see a copy of my visit note below.    Medication Therapy Management (MTM) Encounter    ASSESSMENT:                            Medication Adherence/Access: See below for considerations    Hidradenitis suppurativa:   Needs improvement. Scheduled for plastic surgery of right axilla in Feb 2024. Due to current adalimumab level of 10 ug/mL and no detection of adalimumab neutralizing antibody - per Dr. Kenny's recommendation will attempt to increase Humira to 160 mg weekly with goal adalimumab level of 16-20 ug/mL. Was unable to find any literature or case reviews to support this target, however did find that levels > 20 ug/mL are consider supratherapeutic. Alternatively, could consider trial of one time reload of adalimumab 160 mg then continue with 80 mg weekly and recheck trough levels. Will discuss this dose escalation further with the provider. Future considerations include genetic screening such as [facility] - [employer]. Will resend orders for resorcinol 15% cream to [facility]. Also discussed starting roflumilast, will contact patients pharmacy to help provide savings card information to lower cost/copay.    Smoking cessation:   Improving, continues to reduce quantity of cigarettes over the last few weeks and has set a quit date for 12/1/23. Chantix was recently denied by patients insurance. Based on patient preferences and history, patient would benefit from bupropion, one to two weeks prior to quit date. Provided education on dosing, administration, potential side effects, and time to efficacy.     Weight Management:   Working on lifestyle interventions at this time. Discussed we can re-attempt a prior  authorization for [employer] when plan turns over in 2024 to see if new pharmacy benefits are willing to cover.     PLAN:                            - Consider retrying rinvoq coverage process - can talk to Dr. Kenny about this   - I'll       Resent orders for Humira 160 mg once weekly. Will work on appeal to insurance.   Discuss the following with Dr. Kenny:   Guidance/recommendation for goal adalimumab level between 16 to 20 ug/mL   Option to instead try for reload of adalimumab 160 mg x 1 dose, then continue with 80 mg weekly thereafter     Resent orders for Resorcinol 15% cream to [facility] (P#: [phone])     Looked into Rinvoq income criteria and you are not eligible, however we could consider retrying for insurance coverage when plan turns over in 2024    Called [facility] pharmacy to discuss applying savings card to roflumilast. Due to lower cost of roflumilast 500 mcg - sent a new prescription over for roflumilast 500 mcg tablet.   Instructions: Take roflumilast 0.5 tablets daily x 4 weeks, then increase to 1 tablet daily thereafter.    Start Bupropion  mg tablet once daily for 3 days, then increase to 1 tablet twice daily thereafter. Take 2nd dose no later than 4:00pm.   Recommend taking bupropion for at least one week prior to quit date.   Typically recommend taking for at least 12 weeks. May consider taking for a longer-duration to prevent relapse in successful quit attempt.    Please reach out right away if you notice any changes in your mood after starting this.     Follow-up: with me (Nicky Hartman [facility]) by [x+1] message with updates on Humira coverage, then follow up for an MTM visit in around 3 months (2/20/24)     SUBJECTIVE/OBJECTIVE:                          Maura Curtis is a 48 year old female called for a follow-up visit from 10/2/23.       Reason for visit: Humira dose increase.    Medication Adherence/Access:   [facility] [employer] [facility] - [employer]. Enrolled in a higher tier plan/coverage  for 2024.   Rinvoq coverage:   - PA & appeal reattempted in Nov. Denied 11/10/23.   - PAP: annual income must be <= $87,480, does not qualify   Humira coverage:   - 80 mg weekly covered thru 3/26/25    Hidradenitis suppurativa/Seborrheic dermatitis:   - Humira (adalimumab) 80 mg injection once weekly   - Resorcinol 15% cream twice daily as needed for flares (has not started, has not heard from [facility])   - fluocinonide 0.05% ointment: as needed   - ILK injection intermittently (last dose 11/16/23)   Side effects: None.    Symptoms: chronic flaring of right arm - scheduled for surgery for this 2/8/24. Also reports HS flares along left arm. Intermittent outbreaks over the past 10 years affecting bilateral axilla, groin, thighs, buttocks.      Julian Stage II     Status post right axillary HS excision with flar reconstruction on 2/29/24. Reports pain in this area has significantly improved without issues. Flaring in left armpit & groin which has been very painful. Has tried spironolactone & finasteride & metformin but didn't find either very effective.     Specialist: Dr. Jean Kenny MD, Dermatology. Last visit on 2/8/24. The following was recommended:   - Currently flaring in b/l axillae and rt buttocks. Plan for excision in Rt axillae on 2/29/24 with Dr Mendoza  - For prevention:              - Continue Adalimumab 80 mg weekly.  - Continue calcium 1200 mg and vitamin D 2000    - Continue Resorcinol BID. Do not apply to open wounds.     Previous treatment:   - infliximab infusion every 4 weeks (2019, not effective)   - Cosentyx (secukinumab) 300 mg every 28 days (Mar 2022, not effective)   - Xeljanz (tofacitinib) 10 mg twice daily (in addition to Cosentyx, not effective)   - Rapamune (sirolimus) 2 mg daily (2021)   - Intralesional kenalog injections intermittently and oral prednisone tapers   - Hormonal agents: spironolactone (Jun 2019 to Sept 2019; stopped due to abnormal uterine bleeding), metformin,  finasteride  - Oral antibiotics: doxycyline, dapsone, clindamycin plus rifampin  - Topical therapies: clindamycin 1% solution, clobetasol 0.05% cream, desonide 0.05%  - roflumilast 250 mcg daily x 4 weeks, then 500 mcg daily (***): started to give really bad migraines   - Rinvoq (upadacitinib) 15 mg tablet daily: not covered, makes too much money for free meds     Component      Latest Ref Rng 10/9/2023  8:13 AM   Adalimumab Activity      >=0.65 ug/mL 10.71    Adalimumab Neutralizing Antibody      Not Detected  Not Detected         Proportion of Days Covered for Adalimumab (from 5/23/2023 to 11/18/2023):     87%  of total days covered  (157/180 days) Recent Dispenses  11/02/2023 80 MG/0.8ML PNKT (disp 4, 28d supply)   10/02/2023 80 MG/0.8ML PNKT (disp 4, 28d supply)   08/17/2023 80 MG/0.8ML PNKT (disp 4, 28d supply)   07/11/2023 80 MG/0.8ML PNKT (disp 4, 28d supply)   06/16/2023 80 MG/0.8ML PNKT (disp 4, 28d supply)        Pain:   - Lyrica (pregabalin) 150 mg twice daily   - Duloxetine 60 mg daily in AM   - Marijuana as needed   - Percocet as needed   No current side effects.       Reports she started Lyrica instead of gabapentin.     Follows with SERAFIN Bautista CNP with Pain Medicine.     Smoking Cessation:   - No current medications.     Patient was prescribed Chantix (varenicline) for smoking cessation, however insurance won't cover this since she has not tried nicotine replacement therapies or bupropion in the past. Not interested in pursuing coverage for Chantix at this time. She would be interested in starting bupropion if this is covered because she feels it could help her success. She has not taken this in the past, is aware with any antidepressant the importance of reporting changes/worsening in mood or thoughts of self harm.     Patient reports she has significantly cut back the number of cigarettes she smokes each week, no longer will go outside at night to smoke. Has set a quit date for 12/1/23 before  her right arm plastic surgery in mid-January 2024         Allergies/ADRs: Reviewed in chart  Past Medical History: Reviewed in chart  Tobacco: She reports that she quit smoking about 4 months ago. Her smoking use included cigarettes. She started smoking about 20 years ago. She has a 10 pack-year smoking history. She has never used smokeless tobacco.  Nicotine/Tobacco Cessation Plan:   See notes above, patient has set a quit date for 12/1/23.  ----------------  {JAVIER?:[id number]}  I spent 17 minutes with this patient today. All changes were made via collaborative practice agreement with Jean Kenny. A copy of the visit note was provided to the patient's provider(s).    A summary of these recommendations was sent via [employer].    Nicky Hartman, Sim  Medication Therapy Management Pharmacist   [facility] Dermatology    Telemedicine Visit Details  Type of service:  Telephone visit  Start Time:  10:30am  End Time:  10:47am     Medication Therapy Recommendations  No medication therapy recommendations to display          Medication Therapy Management (MTM) Encounter    ASSESSMENT:                            Medication Adherence/Access: See below for considerations    Hidradenitis suppurativa:   Improved with excision of HS lesion in right axilla in Feb 2024, but intermittent flares persist in left axilla & groin. Patient has tried & failed various medications including roflumilast (PDE-4 inhibitor). Future considerations include a trial of methotrexate as an adjunct to boost Humira levels and/or could retry for insurance coverage of Rinvoq. Tried roflumilast for a few weeks but stopped due to migraines. Recommend she continue Humira 80 mg weekly for now and can discuss alternative treatment options at upcoming Derm appointment.     Pain:   Improved with excision of HS lesion in right axilla, but persists with chronic flares in other regions.    PLAN:                            Continue Humira 80 mg once  weekly   Scheduled for next follow-up with Dr. Kenny on 5/30/24. Considerations include:   Trial of low dose methotrexate as adjunct to boost Humira levels   Retry insurance coverage of Rinvoq (not eligible for free drug program)    Follow-up: with me if needed for medication changes at upcoming Derm appt and with me (Nicky Hartman [facility]) for an MTM follow up by phone in around 6 months (11/2/24)    SUBJECTIVE/OBJECTIVE:                          Maura Curtis is a 48 year old female called for a follow-up visit from 10/2/23.       Reason for visit: Humira dose increase.    Medication Adherence/Access:   [facility] [employer] - [employer]. Enrolled in a higher tier plan/coverage for 2024.   Rinvoq coverage:   - PA & appeal reattempted in Nov. Denied 11/10/23.   - PAP: annual income must be <= $87,480, does not qualify   Humira coverage:   - 80 mg weekly covered thru 3/26/25    Hidradenitis suppurativa/Seborrheic dermatitis:   - Humira (adalimumab) 80 mg injection once weekly   - Resorcinol 15% cream twice daily as needed for flares (has not started, has not heard from [facility])   - fluocinonide 0.05% ointment: as needed   - ILK injection intermittently (last dose 11/16/23)   Side effects: None.    Patient reports her chronic HS lesions along bilateral axilla, groin, thighs, and buttocks continue to intermittently flare. Flaring in left axilla & groin which has been painful. Reports recent HS excision of right axilla on 2/29/24 and that the pain in this area she previously had with HS flares has substantially improved & that this area has healed without issues.     Patient most recently tried roflumilast for a few weeks but she experienced bad migraines & self-stopped.     Iesha Stage II     Specialist: Dr. Jean Kenny MD, Dermatology. Last visit on 2/8/24. Plan was to continue current regimen & complete excision surgery.     Previous treatment:   - infliximab infusion every 4 weeks (2019, not effective)   - Cosentyx  (secukinumab) 300 mg every 28 days (Mar 2022, not effective)   - Xeljanz (tofacitinib) 10 mg twice daily (in addition to Cosentyx, not effective)   - Rapamune (sirolimus) 2 mg daily (2021)   - Intralesional kenalog injections intermittently and oral prednisone tapers   - Hormonal agents: spironolactone (Jun 2019 to Sept 2019; stopped due to abnormal uterine bleeding), metformin, finasteride  - Oral antibiotics: doxycyline, dapsone, clindamycin plus rifampin  - Topical therapies: clindamycin 1% solution, clobetasol 0.05% cream, desonide 0.05%  - roflumilast 250 mcg daily x 4 weeks, then 500 mcg daily: caused bad migraines   - Rinvoq (upadacitinib) 15 mg tablet daily: not covered by insurance     Attempted to get Humira 160 mg weekly covered due to lower levels but this was denied.   Component      Latest Ref Rng 10/9/2023  8:13 AM   Adalimumab Activity      >=0.65 ug/mL 10.71    Adalimumab Neutralizing Antibody      Not Detected  Not Detected      Pain:   - Lyrica (pregabalin) 150 mg twice daily   - Duloxetine 60 mg daily in AM   - Marijuana as needed   - Percocet as needed (limits use for severe acute pain)   No current side effects.       Reports she was starting on pregabalin in substitution for gabapentin by pain clinic provider & has found this to be helpful. Reports she needs a refill of her as needed Percocet from Dr. Kenny & that she has reached out to him about this.     Specialist: SERAFIN Bautista CNP with Pain Medicine.     Allergies/ADRs: Reviewed in chart  Past Medical History: Reviewed in chart  Tobacco: She reports that she quit smoking about 4 months ago. Her smoking use included cigarettes. She started smoking about 20 years ago. She has a 10 pack-year smoking history. She has never used smokeless tobacco.  Nicotine/Tobacco Cessation Plan:   See notes above, patient has set a quit date for 12/1/23.  ----------------    I spent 17 minutes with this patient today. All changes were made via  collaborative practice agreement with Jean Kenny. A copy of the visit note was provided to the patient's provider(s).    A summary of these recommendations was sent via Arara.    Nicky Hartman PharmD  Medication Therapy Management Pharmacist   [facility] Dermatology    Telemedicine Visit Details  Type of service:  Telephone visit  Start Time:  10:30am  End Time:  10:47am     Medication Therapy Recommendations  No medication therapy recommendations to display            Again, thank you for allowing me to participate in the care of your patient.      Sincerely,    Nicky Hartman RPH

## 2024-05-06 NOTE — PROGRESS NOTES
Medication Therapy Management (MTM) Encounter    ASSESSMENT:                            Medication Adherence/Access: See below for considerations    Hidradenitis suppurativa:   Improved with excision of HS lesion in right axilla in Feb 2024, but intermittent flares persist in left axilla & groin. Patient has tried & failed various medications including roflumilast (PDE-4 inhibitor). Future considerations include a trial of methotrexate as an adjunct to boost Humira levels and/or could retry for insurance coverage of Rinvoq. Tried roflumilast for a few weeks but stopped due to migraines. Recommend she continue Humira 80 mg weekly for now and can discuss alternative treatment options at upcoming Derm appointment.     Pain:   Improved with excision of HS lesion in right axilla, but persists with chronic flares in other regions.    PLAN:                            Continue Humira 80 mg once weekly   Scheduled for next follow-up with Dr. Kenny on 5/30/24. Considerations include:   Trial of low dose methotrexate as adjunct to boost Humira levels   Retry insurance coverage of Rinvoq (not eligible for free drug program)    Follow-up: with me if needed for medication changes at upcoming Derm appt and with me (Nicky Hartman, [facility]) for an MTM follow up by phone in around 6 months (11/2/24)    SUBJECTIVE/OBJECTIVE:                          Maura Curtis is a 48 year old female called for a follow-up visit from 10/2/23.       Reason for visit: Humira dose increase.    Medication Adherence/Access:   [facility] Choice Plus - [employer]. Enrolled in a higher tier plan/coverage for 2024.   Rinvoq coverage:   - PA & appeal reattempted in Nov. Denied 11/10/23.   - PAP: annual income must be <= $87,480, does not qualify   Humira coverage:   - 80 mg weekly covered thru 3/26/25    Hidradenitis suppurativa/Seborrheic dermatitis:   - Humira (adalimumab) 80 mg injection once weekly   - Resorcinol 15% cream twice daily as needed for flares (has not  started, has not heard from [facility])   - fluocinonide 0.05% ointment: as needed   - ILK injection intermittently (last dose 11/16/23)   Side effects: None.    Patient reports her chronic HS lesions along bilateral axilla, groin, thighs, and buttocks continue to intermittently flare. Flaring in left axilla & groin which has been painful. Reports recent HS excision of right axilla on 2/29/24 and that the pain in this area she previously had with HS flares has substantially improved & that this area has healed without issues.     Patient most recently tried roflumilast for a few weeks but she experienced bad migraines & self-stopped.     Iesha Stage II     Specialist: Dr. Jean Kenny MD, Dermatology. Last visit on 2/8/24. Plan was to continue current regimen & complete excision surgery.     Previous treatment:   - infliximab infusion every 4 weeks (2019, not effective)   - Cosentyx (secukinumab) 300 mg every 28 days (Mar 2022, not effective)   - Xeljanz (tofacitinib) 10 mg twice daily (in addition to Cosentyx, not effective)   - Rapamune (sirolimus) 2 mg daily (2021)   - Intralesional kenalog injections intermittently and oral prednisone tapers   - Hormonal agents: spironolactone (Jun 2019 to Sept 2019; stopped due to abnormal uterine bleeding), metformin, finasteride  - Oral antibiotics: doxycyline, dapsone, clindamycin plus rifampin  - Topical therapies: clindamycin 1% solution, clobetasol 0.05% cream, desonide 0.05%  - roflumilast 250 mcg daily x 4 weeks, then 500 mcg daily: caused bad migraines   - Rinvoq (upadacitinib) 15 mg tablet daily: not covered by insurance     Attempted to get Humira 160 mg weekly covered due to lower levels but this was denied.   Component      Latest Ref Rng 10/9/2023  8:13 AM   Adalimumab Activity      >=0.65 ug/mL 10.71    Adalimumab Neutralizing Antibody      Not Detected  Not Detected      Pain:   - Lyrica (pregabalin) 150 mg twice daily   - Duloxetine 60 mg daily in AM   -  Marijuana as needed   - Percocet as needed (limits use for severe acute pain)   No current side effects.       Reports she was starting on pregabalin in substitution for gabapentin by pain clinic provider & has found this to be helpful. Reports she needs a refill of her as needed Percocet from Dr. Kenny & that she has reached out to him about this.     Specialist: SERAFIN Bautista CNP with Pain Medicine.     Allergies/ADRs: Reviewed in chart  Past Medical History: Reviewed in chart  Tobacco: She reports that she quit smoking about 4 months ago. Her smoking use included cigarettes. She started smoking about 20 years ago. She has a 10 pack-year smoking history. She has never used smokeless tobacco.  Nicotine/Tobacco Cessation Plan:   See notes above, patient has set a quit date for 12/1/23.  ----------------    I spent 17 minutes with this patient today. All changes were made via collaborative practice agreement with Jean Kenny. A copy of the visit note was provided to the patient's provider(s).    A summary of these recommendations was sent via CREAT.    Nicky Hartman, PharmD  Medication Therapy Management Pharmacist   [facility] Dermatology    Telemedicine Visit Details  Type of service:  Telephone visit  Start Time:  10:30am  End Time:  10:47am     Medication Therapy Recommendations  No medication therapy recommendations to display

## 2024-05-06 NOTE — Clinical Note
Humira continuation / Therigy / 6 month follow-up due 11//2/24 / Check Derm note for med changes after 5/30/24 appt

## 2024-05-08 NOTE — PATIENT INSTRUCTIONS
"Recommendations from today's MTM visit:                                                      Continue Humira 80 mg once weekly   Scheduled for next follow-up with Dr. Kenny on 5/30/24. Considerations include:   Trial of low dose methotrexate as adjunct to boost Humira levels   Retry insurance coverage of Rinvoq (not eligible for free drug program)    Follow-up: with me if needed for medication changes at upcoming Derm appt and with me (Nicky Hartman, [facility]) for an MTM follow up by phone in around 6 months (11/2/24)    It was great speaking with you today.  I value your experience and would be very thankful for your time in providing feedback in our clinic survey. In the next few days, you may receive an email or text message from [employer] with a link to a survey related to your  clinical pharmacist.\"     To schedule another MTM appointment, please call the clinic directly or you may call the MTM scheduling line at [phone] or toll-free at [phone].     My Clinical Pharmacist's contact information:                                                      Please feel free to contact me with any questions or concerns you have.      Nicky Hartman, PharmD  MTM Pharmacist  [facility] Dermatology   "

## 2024-05-09 DIAGNOSIS — L73.2 HIDRADENITIS SUPPURATIVA: ICD-10-CM

## 2024-05-09 RX ORDER — OXYCODONE AND ACETAMINOPHEN 5; 325 MG/1; MG/1
TABLET ORAL
Qty: 90 TABLET | Refills: 0 | Status: SHIPPED | OUTPATIENT
Start: 2024-05-09 | End: 2024-05-30

## 2024-05-16 NOTE — TELEPHONE ENCOUNTER
I called [employer] to check the status of this appeal.  Per the rep the appeal was approved.  They are faxing me the approval letter.    Eladia   16-May-2024 09:48

## 2024-05-23 ENCOUNTER — MYC MEDICAL ADVICE (OUTPATIENT)
Dept: DERMATOLOGY | Facility: CLINIC | Age: 49
End: 2024-05-23
Payer: COMMERCIAL

## 2024-05-28 ENCOUNTER — TRANSFERRED RECORDS (OUTPATIENT)
Dept: HEALTH INFORMATION MANAGEMENT | Facility: CLINIC | Age: 49
End: 2024-05-28
Payer: COMMERCIAL

## 2024-05-30 ENCOUNTER — TRANSFERRED RECORDS (OUTPATIENT)
Dept: HEALTH INFORMATION MANAGEMENT | Facility: CLINIC | Age: 49
End: 2024-05-30

## 2024-05-30 ENCOUNTER — OFFICE VISIT (OUTPATIENT)
Dept: DERMATOLOGY | Facility: CLINIC | Age: 49
End: 2024-05-30
Attending: DERMATOLOGY
Payer: COMMERCIAL

## 2024-05-30 VITALS — BODY MASS INDEX: 38.97 KG/M2 | WEIGHT: 248.8 LBS

## 2024-05-30 DIAGNOSIS — L73.2 HIDRADENITIS SUPPURATIVA: ICD-10-CM

## 2024-05-30 PROCEDURE — 99214 OFFICE O/P EST MOD 30 MIN: CPT | Performed by: DERMATOLOGY

## 2024-05-30 RX ORDER — MELOXICAM 15 MG/1
15 TABLET ORAL DAILY
Status: ON HOLD | COMMUNITY
Start: 2024-05-28 | End: 2024-06-12

## 2024-05-30 RX ORDER — OXYCODONE AND ACETAMINOPHEN 5; 325 MG/1; MG/1
TABLET ORAL
Qty: 90 TABLET | Refills: 0 | Status: SHIPPED | OUTPATIENT
Start: 2024-05-30 | End: 2024-07-01

## 2024-05-30 NOTE — PROGRESS NOTES
"HS Nurse Assessment        1/11/2024     1:40 PM 2/8/2024     3:00 PM 5/30/2024     4:13 PM   Nurse Assessment Data   Over the past 30 days how many old lesions flared back up? 5-10 10 10   Over the past 30 days how many new lesions did you get? 4 2 unknown \"probably a few\"   Over the past week, how many dressing changes do you do each day? 0 0 1   Over the past week, has your wound drainage been: Moderate Moderate Moderate   Rate your HS overall from 0 - 10 (0 = no disease, 10 = worst) over the past week:  7 5 4   Rate your pain score from 0 - 10 (0 = no disease, 10 = worst) for the most painful/symptomatic lesion in the past week:  10 - Worst Pain 8 2   Over the past week, how much has HS influenced your quality of life? very much very much moderately           "

## 2024-05-30 NOTE — LETTER
5/30/2024       RE: Klarissa Curtis  [address]  [facility] [address]     Dear Colleague,    Thank you for referring your patient, Klarissa Curtis, to the [facility]. Please see a copy of my visit note below.    [facility] Dermatology Note  Encounter Date: May 30, 2024  Office Visit   Time IN: 4:45p  Time Out: 5:15p    Dermatology Problem List:  1. Hidradenitis suppurativa: diagnosed age 11,   - Current: Humira 80mg weekly, Percocet, Resorcinol  - s/p right axillary  excision and right muscle-sparing latissimus musculocutaneous flap reconstruction 2/29/2024.   - Prior: gabapentin, Xeljanz, infliximab, prednisone taper, clobetasol, topical morphine, ILK, oral antibiotics, rifampin, sirolimus 2 mg (started 4/22/21) finasteride, spironolactone (6/2019- 9/5/19, stopped due to abnormal uterine bleeding), topical morphine gel, and percocet for severe pain, Cosentyx, belbuca  2. Keratosis pilaris  3. L olecranon bursitis.   4. Dermatographism  - Tx: fexofenadine 360 mg BID  5. Eruption NOS. Both resolved.  - ears, upper chest, and back, responded to Lidex. Started ~9/28/23; resolved ~1/11/24.  - punch biopsy 8/5/21: superficial and deep perivascular and interstitial infiltrate of neutrophils, with focal leukocytoclasis and bluish, amorphous material suggestive of neutrophil degranulation, and lesser amounts of eosinophils and lymphocytes. Background dermal edema is noted. Focal neutrophilic epitheliotropism is noted.  6. Pink linear patches, ddx: follicular dermatitis vs lichen spinulosus vs KP, posterior shoulders   - amlactin, lidex   7. Seborrheic dermatitis  SHx: [place]     ____________________________________________    Assessment & Plan:    # Hidradenitis suppurativa,              - She is not sure adalimumab is helping much.  "Continue Adalimumab 80 mg weekly.for now   - Continue calcium 1200 mg and vitamin D 2000    - Continue Resorcinol BID. Do not apply to open wounds.   - Plan for clinical trial. Will have Shonda reach out. She is unsure which trial she is most interested in.     - For pain:  -  duloxetine 60mg daily   - Continue marijuana PRN.  - Refilled percet. PDMP checked.      # Eruption NOS   - Lidex ointment BID PRN      Follow-up: 12 weeks via phone    Staff and Scribe:     Scribe Disclosure:   I, Maria Luisa Florentino, am serving as a scribe; to document services personally performed by Jean Kenny MD -based on data collection and the provider's statements to me.     Provider Disclosure:  I agree with above History, Review of Systems, Physical exam and Plan.  I have reviewed the content of the documentation and have edited it as needed. I have personally performed the services documented here and the documentation accurately represents those services and the decisions I have made.      Electronically signed by:  Jean Kenny MD, FAAD, FACP     Departments of Internal Medicine and Dermatology  [facility]    ____________________________________________    CC: Derm Problem (Maura is following up today for new areas.)    HPI:  Ms. Klarissa Curtis is a(n) 49 year old female who presents today for follow-up  for HS  Last seen by me virtual visit on 2/08/2024    Today, she presents, for follow-up.   She still gets new lesions and has moderate effect on QOL  She is not sure whether adalimumab is very helpful.   Healing well s/p surgery for rt axilla    Patient is otherwise feeling well, without additional skin concerns.    HS Nurse Assessment        1/11/2024     1:40 PM 2/8/2024     3:00 PM 5/30/2024     4:13 PM   Nurse Assessment Data   Over the past 30 days how many old lesions flared back up? 5-10 10 10   Over the past 30 days how many new lesions did you get? 4 2 unknown \"probably a few\" "   Over the past week, how many dressing changes do you do each day? 0 0 1   Over the past week, has your wound drainage been: Moderate Moderate Moderate   Rate your HS overall from 0 - 10 (0 = no disease, 10 = worst) over the past week:  7 5 4   Rate your pain score from 0 - 10 (0 = no disease, 10 = worst) for the most painful/symptomatic lesion in the past week:  10 - Worst Pain 8 2   Over the past week, how much has HS influenced your quality of life? very much very much moderately     Physical Exam:  Vitals: Wt 112.9 kg (248 lb 12.8 oz)   BMI 38.97 kg/m    SKIN: Full skin, which includes the head/face, both arms, chest, back, abdomen,both legs, genitalia and/or groin buttocks, digits and/or nails, was examined.  - Left axilla: 3 non draining tunnels  - Left thigh: 1 non draining tunnel=  - No other lesions of concern on areas examined.     HS Data      5/25/2023     4:01 PM 9/28/2023    12:58 PM 1/11/2024     2:55 PM   HS Exam Data   LC Type LC1 LC1 LC1   Clinical Subtypes Regular type Regular type Regular type   Acne? No No No   Dissecting Cellulitis? Yes No No   Visual analogue score (0-100) 60  60   Total Julian Stage II II II   Total Inflammatory Nodules 3 6 5   Total Abcesses 1 0 1   Total Draining Tunnels 4 3 3   Total Abscess and Nodule Count 4 6 6   IHS4 Score  21 18 19   Total  HASI Score 48  12   HS-PGA 4 4          Medications:  Current Outpatient Medications   Medication Sig Dispense Refill    adalimumab (HUMIRA *CF*) 80 MG/0.8ML pen kit Inject 0.8 mLs (80 mg) Subcutaneous once a week 3.2 mL 11    DULoxetine (CYMBALTA) 30 MG capsule Take 30mg (1 pill) for 1 week and if tolerating increase to 60mg (2 pills) daily 180 capsule 3    fexofenadine (ALLEGRA) 180 MG tablet Take 1 tablet (180 mg) by mouth 2 times daily 180 tablet 3    medical cannabis (Patient's own supply) See Admin Instructions (The purpose of this order is to document that the patient reports taking medical cannabis.  This is not a  prescription, and is not used to certify that the patient has a qualifying medical condition.)      meloxicam (MOBIC) 15 MG tablet 15 mg daily      naloxone (NARCAN) 4 MG/0.1ML nasal spray Spray 1 spray (4 mg) into one nostril alternating nostrils as needed for opioid reversal every 2-3 minutes until assistance arrives 0.2 mL 0    oxyCODONE-acetaminophen (PERCOCET) 5-325 MG tablet Take 1-2 percocet every 6-8 hrs as needed for pain 90 tablet 0    pregabalin (LYRICA) 150 MG capsule Take 1 capsule (150 mg) by mouth 2 times daily 60 capsule 1    Resorcinol POWD Resorcinol 15% in vanicream twice a day 30 g 11    traZODone (DESYREL) 150 MG tablet Take 1.5 tablets (225 mg) by mouth at bedtime 135 tablet 1    Vitamin D3 50 mcg (2000 units) tablet Take 1 tablet (50 mcg) by mouth daily 90 tablet 3    docusate sodium (COLACE) 100 MG capsule Take 1 capsule (100 mg) by mouth 2 times daily (Patient not taking: Reported on 4/17/2024) 100 capsule 0    hydrOXYzine HCl (ATARAX) 50 MG tablet Take 1 tablet (50 mg) by mouth every 6 hours as needed for other, itching or anxiety (adjuvant pain) (Patient not taking: Reported on 3/13/2024) 20 tablet 0    ondansetron (ZOFRAN ODT) 4 MG ODT tab Take 1 tablet (4 mg) by mouth every 8 hours as needed for nausea (Patient not taking: Reported on 4/17/2024) 10 tablet 0    pregabalin (LYRICA) 75 MG capsule Start 75 mg at bedtime x 1 week, then increase to 75 mg BID x 1 week, then increase to 75 mg in morning and 150 mg at bedtime x 1 week, then increase to 150 mg BID. (Patient not taking: Reported on 4/17/2024) 90 capsule 0     No current facility-administered medications for this visit.      Past Medical History:   Patient Active Problem List   Diagnosis    Hidradenitis suppurativa    Fatigue     CC Jean Kenny MD  [address] [place] [address]

## 2024-05-30 NOTE — PROGRESS NOTES
[facility] Dermatology Note  Encounter Date: May 30, 2024  Office Visit   Time IN: 4:45p  Time Out: 5:15p    Dermatology Problem List:  1. Hidradenitis suppurativa: diagnosed age 11,   - Current: Humira 80mg weekly, Percocet, Resorcinol  - s/p right axillary  excision and right muscle-sparing latissimus musculocutaneous flap reconstruction 2/29/2024.   - Prior: gabapentin, Xeljanz, infliximab, prednisone taper, clobetasol, topical morphine, ILK, oral antibiotics, rifampin, sirolimus 2 mg (started 4/22/21) finasteride, spironolactone (6/2019- 9/5/19, stopped due to abnormal uterine bleeding), topical morphine gel, and percocet for severe pain, Cosentyx, belbuca  2. Keratosis pilaris  3. L olecranon bursitis.   4. Dermatographism  - Tx: fexofenadine 360 mg BID  5. Eruption NOS. Both resolved.  - ears, upper chest, and back, responded to Lidex. Started ~9/28/23; resolved ~1/11/24.  - punch biopsy 8/5/21: superficial and deep perivascular and interstitial infiltrate of neutrophils, with focal leukocytoclasis and bluish, amorphous material suggestive of neutrophil degranulation, and lesser amounts of eosinophils and lymphocytes. Background dermal edema is noted. Focal neutrophilic epitheliotropism is noted.  6. Pink linear patches, ddx: follicular dermatitis vs lichen spinulosus vs KP, posterior shoulders   - amlactin, lidex   7. Seborrheic dermatitis  SHx: [place]     ____________________________________________    Assessment & Plan:    # Hidradenitis suppurativa,              - She is not sure adalimumab is helping much. Continue Adalimumab 80 mg weekly.for now   - Continue calcium 1200 mg and vitamin D 2000    - Continue Resorcinol BID. Do not apply to open wounds.   - Plan for clinical trial. Will have Shonda reach out. She is unsure which trial she is most interested in.     - For pain:  -  duloxetine 60mg daily   - Continue marijuana PRN.  -  "Refilled percet. PDMP checked.      # Eruption NOS   - Lidex ointment BID PRN      Follow-up: 12 weeks via phone    Staff and Scribe:     Scribe Disclosure:   I, Maria Luisa Florentino, am serving as a scribe; to document services personally performed by Jean Kenny MD -based on data collection and the provider's statements to me.     Provider Disclosure:  I agree with above History, Review of Systems, Physical exam and Plan.  I have reviewed the content of the documentation and have edited it as needed. I have personally performed the services documented here and the documentation accurately represents those services and the decisions I have made.      Electronically signed by:  Jean Kenny MD, FAAD, FACP     Departments of Internal Medicine and Dermatology  [facility]    ____________________________________________    CC: Derm Problem (Maura is following up today for new areas.)    HPI:  Ms. Klarissa Curtis is a(n) 49 year old female who presents today for follow-up  for HS  Last seen by me virtual visit on 2/08/2024    Today, she presents, for follow-up.   She still gets new lesions and has moderate effect on QOL  She is not sure whether adalimumab is very helpful.   Healing well s/p surgery for rt axilla    Patient is otherwise feeling well, without additional skin concerns.    HS Nurse Assessment        1/11/2024     1:40 PM 2/8/2024     3:00 PM 5/30/2024     4:13 PM   Nurse Assessment Data   Over the past 30 days how many old lesions flared back up? 5-10 10 10   Over the past 30 days how many new lesions did you get? 4 2 unknown \"probably a few\"   Over the past week, how many dressing changes do you do each day? 0 0 1   Over the past week, has your wound drainage been: Moderate Moderate Moderate   Rate your HS overall from 0 - 10 (0 = no disease, 10 = worst) over the past week:  7 5 4   Rate your pain score from 0 - 10 (0 = no disease, 10 = worst) for the most " painful/symptomatic lesion in the past week:  10 - Worst Pain 8 2   Over the past week, how much has HS influenced your quality of life? very much very much moderately     Physical Exam:  Vitals: Wt 112.9 kg (248 lb 12.8 oz)   BMI 38.97 kg/m    SKIN: Full skin, which includes the head/face, both arms, chest, back, abdomen,both legs, genitalia and/or groin buttocks, digits and/or nails, was examined.  - Left axilla: 3 non draining tunnels  - Left thigh: 1 non draining tunnel=  - No other lesions of concern on areas examined.     HS Data      5/25/2023     4:01 PM 9/28/2023    12:58 PM 1/11/2024     2:55 PM   HS Exam Data   LC Type LC1 LC1 LC1   Clinical Subtypes Regular type Regular type Regular type   Acne? No No No   Dissecting Cellulitis? Yes No No   Visual analogue score (0-100) 60  60   Total Julian Stage II II II   Total Inflammatory Nodules 3 6 5   Total Abcesses 1 0 1   Total Draining Tunnels 4 3 3   Total Abscess and Nodule Count 4 6 6   IHS4 Score  21 18 19   Total  HASI Score 48  12   HS-PGA 4 4          Medications:  Current Outpatient Medications   Medication Sig Dispense Refill    adalimumab (HUMIRA *CF*) 80 MG/0.8ML pen kit Inject 0.8 mLs (80 mg) Subcutaneous once a week 3.2 mL 11    DULoxetine (CYMBALTA) 30 MG capsule Take 30mg (1 pill) for 1 week and if tolerating increase to 60mg (2 pills) daily 180 capsule 3    fexofenadine (ALLEGRA) 180 MG tablet Take 1 tablet (180 mg) by mouth 2 times daily 180 tablet 3    medical cannabis (Patient's own supply) See Admin Instructions (The purpose of this order is to document that the patient reports taking medical cannabis.  This is not a prescription, and is not used to certify that the patient has a qualifying medical condition.)      meloxicam (MOBIC) 15 MG tablet 15 mg daily      naloxone (NARCAN) 4 MG/0.1ML nasal spray Spray 1 spray (4 mg) into one nostril alternating nostrils as needed for opioid reversal every 2-3 minutes until assistance arrives 0.2 mL  0    oxyCODONE-acetaminophen (PERCOCET) 5-325 MG tablet Take 1-2 percocet every 6-8 hrs as needed for pain 90 tablet 0    pregabalin (LYRICA) 150 MG capsule Take 1 capsule (150 mg) by mouth 2 times daily 60 capsule 1    Resorcinol POWD Resorcinol 15% in vanicream twice a day 30 g 11    traZODone (DESYREL) 150 MG tablet Take 1.5 tablets (225 mg) by mouth at bedtime 135 tablet 1    Vitamin D3 50 mcg (2000 units) tablet Take 1 tablet (50 mcg) by mouth daily 90 tablet 3    docusate sodium (COLACE) 100 MG capsule Take 1 capsule (100 mg) by mouth 2 times daily (Patient not taking: Reported on 4/17/2024) 100 capsule 0    hydrOXYzine HCl (ATARAX) 50 MG tablet Take 1 tablet (50 mg) by mouth every 6 hours as needed for other, itching or anxiety (adjuvant pain) (Patient not taking: Reported on 3/13/2024) 20 tablet 0    ondansetron (ZOFRAN ODT) 4 MG ODT tab Take 1 tablet (4 mg) by mouth every 8 hours as needed for nausea (Patient not taking: Reported on 4/17/2024) 10 tablet 0    pregabalin (LYRICA) 75 MG capsule Start 75 mg at bedtime x 1 week, then increase to 75 mg BID x 1 week, then increase to 75 mg in morning and 150 mg at bedtime x 1 week, then increase to 150 mg BID. (Patient not taking: Reported on 4/17/2024) 90 capsule 0     No current facility-administered medications for this visit.      Past Medical History:   Patient Active Problem List   Diagnosis    Hidradenitis suppurativa    Fatigue     CC Jean Kenny MD  [address] [place] [address] on close of this encounter.

## 2024-06-10 ENCOUNTER — TRANSFERRED RECORDS (OUTPATIENT)
Dept: HEALTH INFORMATION MANAGEMENT | Facility: CLINIC | Age: 49
End: 2024-06-10
Payer: COMMERCIAL

## 2024-06-12 ENCOUNTER — OFFICE VISIT (OUTPATIENT)
Dept: PLASTIC SURGERY | Facility: CLINIC | Age: 49
End: 2024-06-12
Payer: COMMERCIAL

## 2024-06-12 ENCOUNTER — HOSPITAL ENCOUNTER (INPATIENT)
Facility: CLINIC | Age: 49
LOS: 4 days | Discharge: HOME | DRG: 580 | End: 2024-06-16
Attending: HOSPITALIST | Admitting: HOSPITALIST
Payer: COMMERCIAL

## 2024-06-12 ENCOUNTER — TELEPHONE (OUTPATIENT)
Dept: FAMILY MEDICINE | Facility: CLINIC | Age: 49
End: 2024-06-12

## 2024-06-12 ENCOUNTER — TRANSFERRED RECORDS (OUTPATIENT)
Dept: HEALTH INFORMATION MANAGEMENT | Facility: CLINIC | Age: 49
End: 2024-06-12

## 2024-06-12 ENCOUNTER — APPOINTMENT (OUTPATIENT)
Dept: MRI IMAGING | Facility: CLINIC | Age: 49
DRG: 580 | End: 2024-06-12
Payer: COMMERCIAL

## 2024-06-12 VITALS
SYSTOLIC BLOOD PRESSURE: 130 MMHG | HEART RATE: 74 BPM | OXYGEN SATURATION: 100 % | DIASTOLIC BLOOD PRESSURE: 78 MMHG | BODY MASS INDEX: 38.97 KG/M2 | HEIGHT: 67 IN

## 2024-06-12 DIAGNOSIS — L50.3 DERMATOGRAPHISM: ICD-10-CM

## 2024-06-12 DIAGNOSIS — Z98.890 S/P FLAP GRAFT: Primary | ICD-10-CM

## 2024-06-12 DIAGNOSIS — M00.9 PYOGENIC ARTHRITIS OF RIGHT HAND, DUE TO UNSPECIFIED ORGANISM (H): Primary | ICD-10-CM

## 2024-06-12 DIAGNOSIS — L08.9 SOFT TISSUE INFECTION: ICD-10-CM

## 2024-06-12 LAB
ANION GAP SERPL CALCULATED.3IONS-SCNC: 9 MMOL/L (ref 7–15)
BASOPHILS # BLD AUTO: 0 10E3/UL (ref 0–0.2)
BASOPHILS NFR BLD AUTO: 0 %
BUN SERPL-MCNC: 15.1 MG/DL (ref 6–20)
CALCIUM SERPL-MCNC: 8.5 MG/DL (ref 8.6–10)
CHLORIDE SERPL-SCNC: 104 MMOL/L (ref 98–107)
CREAT SERPL-MCNC: 0.63 MG/DL (ref 0.51–0.95)
CRP SERPL-MCNC: 4.63 MG/L
DEPRECATED HCO3 PLAS-SCNC: 27 MMOL/L (ref 22–29)
EGFRCR SERPLBLD CKD-EPI 2021: >90 ML/MIN/1.73M2
EOSINOPHIL # BLD AUTO: 0.1 10E3/UL (ref 0–0.7)
EOSINOPHIL NFR BLD AUTO: 1 %
ERYTHROCYTE [DISTWIDTH] IN BLOOD BY AUTOMATED COUNT: 14.4 % (ref 10–15)
ERYTHROCYTE [SEDIMENTATION RATE] IN BLOOD BY WESTERGREN METHOD: 24 MM/HR (ref 0–30)
GLUCOSE SERPL-MCNC: 75 MG/DL (ref 70–99)
HCT VFR BLD AUTO: 39.5 % (ref 35–47)
HGB BLD-MCNC: 12.8 G/DL (ref 11.7–15.7)
IMM GRANULOCYTES # BLD: 0.1 10E3/UL
IMM GRANULOCYTES NFR BLD: 1 %
LYMPHOCYTES # BLD AUTO: 4.2 10E3/UL (ref 0.8–5.3)
LYMPHOCYTES NFR BLD AUTO: 32 %
MCH RBC QN AUTO: 29.5 PG (ref 26.5–33)
MCHC RBC AUTO-ENTMCNC: 32.4 G/DL (ref 31.5–36.5)
MCV RBC AUTO: 91 FL (ref 78–100)
MONOCYTES # BLD AUTO: 0.5 10E3/UL (ref 0–1.3)
MONOCYTES NFR BLD AUTO: 4 %
NEUTROPHILS # BLD AUTO: 8.1 10E3/UL (ref 1.6–8.3)
NEUTROPHILS NFR BLD AUTO: 62 %
NRBC # BLD AUTO: 0 10E3/UL
NRBC BLD AUTO-RTO: 0 /100
PLATELET # BLD AUTO: 411 10E3/UL (ref 150–450)
POTASSIUM SERPL-SCNC: 3.8 MMOL/L (ref 3.4–5.3)
RBC # BLD AUTO: 4.34 10E6/UL (ref 3.8–5.2)
SODIUM SERPL-SCNC: 140 MMOL/L (ref 135–145)
WBC # BLD AUTO: 13.1 10E3/UL (ref 4–11)

## 2024-06-12 PROCEDURE — 99222 1ST HOSP IP/OBS MODERATE 55: CPT | Performed by: HOSPITALIST

## 2024-06-12 PROCEDURE — 255N000002 HC RX 255 OP 636: Mod: JZ | Performed by: HOSPITALIST

## 2024-06-12 PROCEDURE — 85025 COMPLETE CBC W/AUTO DIFF WBC: CPT

## 2024-06-12 PROCEDURE — 250N000013 HC RX MED GY IP 250 OP 250 PS 637: Performed by: NURSE PRACTITIONER

## 2024-06-12 PROCEDURE — 99222 1ST HOSP IP/OBS MODERATE 55: CPT | Performed by: NURSE PRACTITIONER

## 2024-06-12 PROCEDURE — 250N000013 HC RX MED GY IP 250 OP 250 PS 637: Performed by: HOSPITALIST

## 2024-06-12 PROCEDURE — 120N000001 HC R&B MED SURG/OB

## 2024-06-12 PROCEDURE — 99212 OFFICE O/P EST SF 10 MIN: CPT | Performed by: PLASTIC SURGERY

## 2024-06-12 PROCEDURE — 73220 MRI UPPR EXTREMITY W/O&W/DYE: CPT | Mod: RT

## 2024-06-12 PROCEDURE — 36415 COLL VENOUS BLD VENIPUNCTURE: CPT

## 2024-06-12 PROCEDURE — A9585 GADOBUTROL INJECTION: HCPCS | Mod: JZ | Performed by: HOSPITALIST

## 2024-06-12 PROCEDURE — 250N000011 HC RX IP 250 OP 636: Performed by: HOSPITALIST

## 2024-06-12 PROCEDURE — 258N000003 HC RX IP 258 OP 636: Mod: JZ | Performed by: HOSPITALIST

## 2024-06-12 PROCEDURE — 85652 RBC SED RATE AUTOMATED: CPT

## 2024-06-12 PROCEDURE — 80048 BASIC METABOLIC PNL TOTAL CA: CPT | Performed by: HOSPITALIST

## 2024-06-12 PROCEDURE — 86140 C-REACTIVE PROTEIN: CPT

## 2024-06-12 RX ORDER — HYDROMORPHONE HYDROCHLORIDE 2 MG/1
2 TABLET ORAL EVERY 4 HOURS PRN
Status: DISCONTINUED | OUTPATIENT
Start: 2024-06-12 | End: 2024-06-12

## 2024-06-12 RX ORDER — HYDROMORPHONE HYDROCHLORIDE 4 MG/1
4 TABLET ORAL
Status: DISCONTINUED | OUTPATIENT
Start: 2024-06-12 | End: 2024-06-12

## 2024-06-12 RX ORDER — HYDROMORPHONE HYDROCHLORIDE 4 MG/1
4 TABLET ORAL
Status: DISCONTINUED | OUTPATIENT
Start: 2024-06-12 | End: 2024-06-13

## 2024-06-12 RX ORDER — GADOBUTROL 604.72 MG/ML
10 INJECTION INTRAVENOUS ONCE
Status: COMPLETED | OUTPATIENT
Start: 2024-06-12 | End: 2024-06-12

## 2024-06-12 RX ORDER — CALCIUM CARBONATE 500(1250)
1 TABLET ORAL 2 TIMES DAILY WITH MEALS
COMMUNITY

## 2024-06-12 RX ORDER — HYDROMORPHONE HYDROCHLORIDE 1 MG/ML
1 INJECTION, SOLUTION INTRAMUSCULAR; INTRAVENOUS; SUBCUTANEOUS EVERY 4 HOURS PRN
Status: DISCONTINUED | OUTPATIENT
Start: 2024-06-12 | End: 2024-06-12

## 2024-06-12 RX ORDER — ACETAMINOPHEN 325 MG/1
975 TABLET ORAL 3 TIMES DAILY
Status: DISCONTINUED | OUTPATIENT
Start: 2024-06-12 | End: 2024-06-16 | Stop reason: HOSPADM

## 2024-06-12 RX ORDER — NALOXONE HYDROCHLORIDE 0.4 MG/ML
0.2 INJECTION, SOLUTION INTRAMUSCULAR; INTRAVENOUS; SUBCUTANEOUS
Status: DISCONTINUED | OUTPATIENT
Start: 2024-06-12 | End: 2024-06-16 | Stop reason: HOSPADM

## 2024-06-12 RX ORDER — OXYCODONE HYDROCHLORIDE 5 MG/1
10 TABLET ORAL EVERY 4 HOURS PRN
Status: DISCONTINUED | OUTPATIENT
Start: 2024-06-12 | End: 2024-06-12

## 2024-06-12 RX ORDER — LIDOCAINE 40 MG/G
CREAM TOPICAL
Status: DISCONTINUED | OUTPATIENT
Start: 2024-06-12 | End: 2024-06-16 | Stop reason: HOSPADM

## 2024-06-12 RX ORDER — OXYCODONE HYDROCHLORIDE 5 MG/1
5 TABLET ORAL EVERY 4 HOURS PRN
Status: DISCONTINUED | OUTPATIENT
Start: 2024-06-12 | End: 2024-06-12

## 2024-06-12 RX ORDER — BISACODYL 10 MG
10 SUPPOSITORY, RECTAL RECTAL DAILY PRN
Status: DISCONTINUED | OUTPATIENT
Start: 2024-06-12 | End: 2024-06-16 | Stop reason: HOSPADM

## 2024-06-12 RX ORDER — HYDROXYZINE HYDROCHLORIDE 25 MG/1
25 TABLET, FILM COATED ORAL 3 TIMES DAILY PRN
Status: DISCONTINUED | OUTPATIENT
Start: 2024-06-12 | End: 2024-06-12

## 2024-06-12 RX ORDER — HYDROXYZINE HYDROCHLORIDE 25 MG/1
25 TABLET, FILM COATED ORAL EVERY 6 HOURS PRN
Status: DISCONTINUED | OUTPATIENT
Start: 2024-06-12 | End: 2024-06-16 | Stop reason: HOSPADM

## 2024-06-12 RX ORDER — ACETAMINOPHEN 650 MG/1
650 SUPPOSITORY RECTAL 3 TIMES DAILY
Status: DISCONTINUED | OUTPATIENT
Start: 2024-06-12 | End: 2024-06-16 | Stop reason: HOSPADM

## 2024-06-12 RX ORDER — HYDROMORPHONE HYDROCHLORIDE 4 MG/1
8 TABLET ORAL EVERY 4 HOURS PRN
Status: DISCONTINUED | OUTPATIENT
Start: 2024-06-12 | End: 2024-06-12

## 2024-06-12 RX ORDER — HYDROMORPHONE HYDROCHLORIDE 1 MG/ML
0.5 INJECTION, SOLUTION INTRAMUSCULAR; INTRAVENOUS; SUBCUTANEOUS EVERY 4 HOURS PRN
Status: DISCONTINUED | OUTPATIENT
Start: 2024-06-12 | End: 2024-06-13

## 2024-06-12 RX ORDER — NALOXONE HYDROCHLORIDE 0.4 MG/ML
0.4 INJECTION, SOLUTION INTRAMUSCULAR; INTRAVENOUS; SUBCUTANEOUS
Status: DISCONTINUED | OUTPATIENT
Start: 2024-06-12 | End: 2024-06-16 | Stop reason: HOSPADM

## 2024-06-12 RX ORDER — OXYCODONE HYDROCHLORIDE 15 MG/1
15 TABLET ORAL EVERY 4 HOURS PRN
Status: DISCONTINUED | OUTPATIENT
Start: 2024-06-12 | End: 2024-06-12

## 2024-06-12 RX ORDER — HYDROMORPHONE HYDROCHLORIDE 1 MG/ML
1 INJECTION, SOLUTION INTRAMUSCULAR; INTRAVENOUS; SUBCUTANEOUS ONCE
Qty: 1 ML | Refills: 0 | Status: COMPLETED | OUTPATIENT
Start: 2024-06-12 | End: 2024-06-12

## 2024-06-12 RX ORDER — HYDROMORPHONE HYDROCHLORIDE 2 MG/1
2 TABLET ORAL
Status: DISCONTINUED | OUTPATIENT
Start: 2024-06-12 | End: 2024-06-12

## 2024-06-12 RX ORDER — AMOXICILLIN 250 MG
1 CAPSULE ORAL 2 TIMES DAILY
Status: DISCONTINUED | OUTPATIENT
Start: 2024-06-12 | End: 2024-06-16 | Stop reason: HOSPADM

## 2024-06-12 RX ORDER — PREGABALIN 75 MG/1
150 CAPSULE ORAL 2 TIMES DAILY
Status: DISCONTINUED | OUTPATIENT
Start: 2024-06-12 | End: 2024-06-16 | Stop reason: HOSPADM

## 2024-06-12 RX ORDER — HYDROMORPHONE HYDROCHLORIDE 4 MG/1
4 TABLET ORAL
Status: CANCELLED | OUTPATIENT
Start: 2024-06-12

## 2024-06-12 RX ORDER — CEFEPIME HYDROCHLORIDE 2 G/1
2 INJECTION, POWDER, FOR SOLUTION INTRAVENOUS EVERY 8 HOURS
Status: DISCONTINUED | OUTPATIENT
Start: 2024-06-12 | End: 2024-06-14

## 2024-06-12 RX ORDER — POLYETHYLENE GLYCOL 3350 17 G/17G
17 POWDER, FOR SOLUTION ORAL DAILY
Status: DISCONTINUED | OUTPATIENT
Start: 2024-06-13 | End: 2024-06-16 | Stop reason: HOSPADM

## 2024-06-12 RX ADMIN — CEFEPIME 2 G: 2 INJECTION, POWDER, FOR SOLUTION INTRAVENOUS at 15:09

## 2024-06-12 RX ADMIN — ACETAMINOPHEN 975 MG: 325 TABLET ORAL at 20:00

## 2024-06-12 RX ADMIN — CEFEPIME 2 G: 2 INJECTION, POWDER, FOR SOLUTION INTRAVENOUS at 22:44

## 2024-06-12 RX ADMIN — SENNOSIDES AND DOCUSATE SODIUM 1 TABLET: 8.6; 5 TABLET ORAL at 20:00

## 2024-06-12 RX ADMIN — HYDROXYZINE HYDROCHLORIDE 25 MG: 25 TABLET ORAL at 18:44

## 2024-06-12 RX ADMIN — PREGABALIN 150 MG: 75 CAPSULE ORAL at 20:00

## 2024-06-12 RX ADMIN — GADOBUTROL 10 ML: 604.72 INJECTION INTRAVENOUS at 18:02

## 2024-06-12 RX ADMIN — HYDROMORPHONE HYDROCHLORIDE 4 MG: 4 TABLET ORAL at 16:35

## 2024-06-12 RX ADMIN — ACETAMINOPHEN 975 MG: 325 TABLET ORAL at 15:08

## 2024-06-12 RX ADMIN — VANCOMYCIN HYDROCHLORIDE 2000 MG: 5 INJECTION, POWDER, LYOPHILIZED, FOR SOLUTION INTRAVENOUS at 15:09

## 2024-06-12 RX ADMIN — HYDROMORPHONE HYDROCHLORIDE 1 MG: 1 INJECTION, SOLUTION INTRAMUSCULAR; INTRAVENOUS; SUBCUTANEOUS at 15:08

## 2024-06-12 RX ADMIN — HYDROMORPHONE HYDROCHLORIDE 6 MG: 4 TABLET ORAL at 20:00

## 2024-06-12 ASSESSMENT — COLUMBIA-SUICIDE SEVERITY RATING SCALE - C-SSRS
2. HAVE YOU ACTUALLY HAD ANY THOUGHTS OF KILLING YOURSELF IN THE PAST MONTH?: NO
6. HAVE YOU EVER DONE ANYTHING, STARTED TO DO ANYTHING, OR PREPARED TO DO ANYTHING TO END YOUR LIFE?: NO
1. IN THE PAST MONTH, HAVE YOU WISHED YOU WERE DEAD OR WISHED YOU COULD GO TO SLEEP AND NOT WAKE UP?: NO
6. HAVE YOU EVER DONE ANYTHING, STARTED TO DO ANYTHING, OR PREPARED TO DO ANYTHING TO END YOUR LIFE?: NO
2. HAVE YOU ACTUALLY HAD ANY THOUGHTS OF KILLING YOURSELF SINCE LAST CONTACT?: NO

## 2024-06-12 ASSESSMENT — ACTIVITIES OF DAILY LIVING (ADL)
ADLS_ACUITY_SCORE: 20

## 2024-06-12 ASSESSMENT — PAIN SCALES - GENERAL: PAINLEVEL: NO PAIN (0)

## 2024-06-12 NOTE — PLAN OF CARE
Problem: Adult Inpatient Plan of Care  Goal: Optimal Comfort and Wellbeing  Intervention: Monitor Pain and Promote Comfort  Recent Flowsheet Documentation  Taken 6/12/2024 1635 by Macey Ny RN  Pain Management Interventions:   medication (see MAR)   cold applied     Problem: Pain Acute  Goal: Optimal Pain Control and Function  Intervention: Develop Pain Management Plan  Recent Flowsheet Documentation  Taken 6/12/2024 1635 by Macey Ny, RN  Pain Management Interventions:   medication (see MAR)   cold applied      Goal Outcome Evaluation: Pt R index finger/hand redness, swelling, and pain. Dilaudid po PRN for pain management. NPO after midnight until [facility] team sees her.

## 2024-06-12 NOTE — PROGRESS NOTES
Patient vital signs are at baseline: Yes, on RA  Patient able to ambulate as they were prior to admission or with assist devices provided by therapies during their stay:  Yes  Patient MUST void prior to discharge:  Yes  Patient able to tolerate oral intake:  Yes  Pain has adequate pain control using Oral analgesics:  No,  Reason:  Severe pain; PRN IV Dilaudid administered  Does patient have an identified :  Yes  Has goal D/C date and time been discussed with patient:  Yes    A&Ox4. CMS intact. Rated severe pain, PRN IV Dilaudid administered. Ice therapy utilized. Pt NPO. Pt independent in the room. Call light within reach.

## 2024-06-12 NOTE — CONSULTS
`[facility] ACUTE PAIN SERVICE CONSULTATION   [facility], [facility], [place]     Date of Admission:  6/12/2024  Date of Consult (When I saw the patient): 06/12/24     Assessment/Plan:     Klarissa Curtis is a 49 year old female who was admitted on 6/12/2024.  Pain team was asked to see the patient for acute pain of the right index finger on chronic pain with opioid dependence. Admitted as a direct admit for evaluation of finger erythema. History of chronic pain due to Hydradenitis suppurativa, chronic opioid use.  Describes pain as 8/10 and aching, throbbing in the right hand. The patient does not smoke and denies chemical dependency history.     Home MME: She takes 10-15 mg of Oxycodone, total 7-8 tabs/day (60 MME)     Pain team will sign off. Discussed with Orthopedics and Hospital Medicine Service. Consult was discontinued after pain team already saw patient. Please re-consult if needed.     PLAN:   1) Pain is consistent with acute pain of the right index finger in the setting of chronic pain on chronic opioid therapy. MRI of the Hand to eval for abscess or tenosynovitis pending. Antibiotics per Hospital Medicine Service. Follows with [facility]  Pain clinic for hidradenitis suppurativa pain, present for 35 years, affected sites are bilateral axillary, groin, buttocks. At home, she is on Perccoet for pain flares. She reports she takes up to 15 mg of percocet 1-3 times daily when in a pain flare and then might go several days without it. Buprenorphine and ketamine has been recommended by Primary Care Provider and discussed with pain clinic. She does use medical cannabis at home.   Multimodal Medication Therapy  Topical: none  NSAID'S: hold for now pending work up - consider NSAID if nor surgery planned or for post op pain   Steroids: none   Muscle Relaxants: none   Adjuvants: APAP tid, Lyrica 150 mg bid, add hydroxyzine prn for itching and pain. Will not be using medical  cannabis while admitted.   Antidepressants/anxiolytics: home trazodone and cymbalta not ordered - defer to Hospital Medicine Service   Opioids: dilaudid 2-8 mg q4h prn ordered - 2 mg for mild pain, 6 mg for moderate pain, 8 mg for severe pain. Home med is Percocet 5-325 take 1-2 tablets every 6-8 hours prn - however per patient she will take different as prescribed as described above. Recommend dilaudid 4-6 mg q3h prn  IV Pain medication: dilaudid 0.5 mg q4h prn for BTP  Non-medication interventions: Ice, Rest, PT, OT, and Distraction (TV, Music, Reading)  Constipation Prophylaxis: add Bisacodyl Suppository, Senna-docusate, and Miralax    -Opioid prescriber has been Jean Kenny, Primary Care Provider   -MN  pulled from system on 6/12/24. This indicates ongoing fills for Percocet 5-325 mg, Pregabalin   5/31/24 Percocet 5-325 mg #90 for 11 days by Jean Kenny, Primary Care Provider    5/9/24 Percocet 5-325 mg #90 for 12 days   4/27/24 Lyrica 150 mg #60 for 30 days by Lesa Osman   3/20/24 Lyrica 150 mg #60 for 20 days by Lesa Osman  3/6/24 Percocet 5-325 mg #126 for 16 days by Primary Care Provider   3/1/24 dilaudid 1 mg #20 for 5 days     Discharge Recommendations - We recommend prescribing the following at the time of discharge: TBD. Has nasal naloxone at home.       History of Present Illness (HPI):       Klarissa Curtis is a 49 year old female who presented for pain of the right index finger. The patient reports that Saturday noted an area on her right index finger on the medial side that looked like a bug bite. Slowly over the weekend the swelling and redness of the finger worsened. She noted that on Monday the redness was starting to extend into the entire finger. Yesterday she was able to move the finger and notes that today has been unable to move the finger and it is stuck in a flexed position. No fever, chills, nausea or vomiting, chest pain, shortness of breath. .     Per MN  review, the  patient does have an opioid tolerance. Opioid induced side effects noted and include: constipation .      Reviewed medical record, labs, imaging, ED note, and care everywhere.     Past pain treatments have included: Cymbalta, hydroxyzine, medical cannabis, meloxicam, Percocet, Lyrica, plastic surgery, kenalog injections, topical morphine not helpful), Gabapentin (not helpful), cosentyx, belbuca (he tried Belbuca in the past, 450 mcg BID, states she started with 1/2 film but then she did not get up to 1 film/day, did not take daily, only with pain flares)     Medical History   PAST MEDICAL HISTORY:   Past Medical History:   Diagnosis Date    NO ACTIVE PROBLEMS        PAST SURGICAL HISTORY:   Past Surgical History:   Procedure Laterality Date    BUNIONECTOMY      x2    GRAFT FLAP PEDICLE EXTREMITY (LOCATION) Right 2024    Procedure: Right axillary wound excision,Right muscle-sparing Latissimus musculo-cutaneous flap, SPY;  Surgeon: BORIS Mendoza MD;  Location: [facility] OR    OTHER SURGICAL HISTORY      right pointer finger foreign body removal       FAMILY HISTORY:   Family History   Problem Relation Age of Onset    Diabetes Sister     Hyperlipidemia Sister     Breast Cancer Mother     Arthritis Mother     Myocardial Infarction Father 47       SOCIAL HISTORY:   Social History     Tobacco Use    Smoking status: Former     Current packs/day: 0.00     Average packs/day: 0.5 packs/day for 20.0 years (10.0 ttl pk-yrs)     Types: Cigarettes     Start date: 1/3/2004     Quit date: 1/3/2024     Years since quittin.4    Smokeless tobacco: Never   Substance Use Topics    Alcohol use: Not Currently        HEALTH & LIFESTYLE PRACTICES  Tobacco:  reports that she quit smoking about 5 months ago. Her smoking use included cigarettes. She started smoking about 20 years ago. She has a 10 pack-year smoking history. She has never used smokeless tobacco.  Alcohol:  reports that she does not currently use alcohol.  Illicit  drugs:  reports current drug use. Drug: Marijuana.    Allergies  Allergies   Allergen Reactions    Penicillins Hives, Itching and Rash     Reaction occurred as child.       Problem List  Patient Active Problem List    Diagnosis Date Noted    Septic arthritis (H) 06/12/2024     Priority: Medium    Hidradenitis suppurativa 06/27/2019     Priority: Medium    Fatigue 01/10/2018     Priority: Medium       Prior to Admission Medications   Medications Prior to Admission   Medication Sig Dispense Refill Last Dose    adalimumab (HUMIRA *CF*) 80 MG/0.8ML pen kit Inject 0.8 mLs (80 mg) Subcutaneous once a week 3.2 mL 11     docusate sodium (COLACE) 100 MG capsule Take 1 capsule (100 mg) by mouth 2 times daily (Patient not taking: Reported on 4/17/2024) 100 capsule 0     DULoxetine (CYMBALTA) 30 MG capsule Take 30mg (1 pill) for 1 week and if tolerating increase to 60mg (2 pills) daily 180 capsule 3     fexofenadine (ALLEGRA) 180 MG tablet Take 1 tablet (180 mg) by mouth 2 times daily 180 tablet 3     hydrOXYzine HCl (ATARAX) 50 MG tablet Take 1 tablet (50 mg) by mouth every 6 hours as needed for other, itching or anxiety (adjuvant pain) (Patient not taking: Reported on 3/13/2024) 20 tablet 0     medical cannabis (Patient's own supply) See Admin Instructions (The purpose of this order is to document that the patient reports taking medical cannabis.  This is not a prescription, and is not used to certify that the patient has a qualifying medical condition.)       meloxicam (MOBIC) 15 MG tablet 15 mg daily       naloxone (NARCAN) 4 MG/0.1ML nasal spray Spray 1 spray (4 mg) into one nostril alternating nostrils as needed for opioid reversal every 2-3 minutes until assistance arrives 0.2 mL 0     ondansetron (ZOFRAN ODT) 4 MG ODT tab Take 1 tablet (4 mg) by mouth every 8 hours as needed for nausea (Patient not taking: Reported on 4/17/2024) 10 tablet 0     oxyCODONE-acetaminophen (PERCOCET) 5-325 MG tablet Take 1-2 percocet every  6-8 hrs as needed for pain 90 tablet 0     pregabalin (LYRICA) 150 MG capsule Take 1 capsule (150 mg) by mouth 2 times daily 60 capsule 1     pregabalin (LYRICA) 75 MG capsule Start 75 mg at bedtime x 1 week, then increase to 75 mg BID x 1 week, then increase to 75 mg in morning and 150 mg at bedtime x 1 week, then increase to 150 mg BID. (Patient not taking: Reported on 4/17/2024) 90 capsule 0     Resorcinol POWD Resorcinol 15% in vanicream twice a day 30 g 11     traZODone (DESYREL) 150 MG tablet Take 1.5 tablets (225 mg) by mouth at bedtime 135 tablet 1     Vitamin D3 50 mcg (2000 units) tablet Take 1 tablet (50 mcg) by mouth daily 90 tablet 3        Review of Systems  Complete ROS reviewed, unless noted in HPI, all other systems reviewed (with patient) and all others found to be negative.      Objective:     Physical Exam:  /76 (BP Location: Right arm, Patient Position: Semi-Gloria's, Cuff Size: Adult Regular)   Pulse 68   Temp 97.8  F (36.6  C) (Oral)   Resp 16   SpO2 97%   Weight:   There were no vitals filed for this visit.   There is no height or weight on file to calculate BMI.    General Appearance:  Alert, cooperative, no distress   Head:  Normocephalic, without obvious abnormality, atraumatic   Eyes:  PERRL, conjunctiva/corneas clear, EOM's intact   ENT/Throat: Lips, mucosa, and tongue normal; teeth and gums normal   Lymph/Neck: Supple, symmetrical, trachea midline   Lungs:   Clear to auscultation bilaterally, respirations unlabored   Chest Wall:  No tenderness or deformity   Cardiovascular/Heart:  Regular rate and rhythm, S1, S2 normal,no murmur, rub or gallop.    Abdomen:   Soft, non-tender, bowel sounds active all four quadrants,  no masses, no organomegaly   Musculoskeletal: Right index finger with erythema, swelling, tenderness and limited ROM   Skin: Skin warm, dry   Neurologic: Alert and oriented X 3, Moves all 4 extremities     Psych: Affect is appropriate      Imaging: Reviewed I have  personally reviewed pertinent notes, labs, tests, and radiologic imaging in patient's chart.  Labs: Reviewed I have personally reviewed pertinent notes, labs, tests, and radiologic imaging in patient's chart.  Notes: Reviewed I have personally reviewed pertinent notes, labs, tests, and radiologic imaging in patient's chart.    Total time spent 65 minutes with greater than 50% in consultation, education and coordination of care.   Also discussed with RN and Orthopedics  Treatment plan includes: multimodal pain approach, Hospital Medicine Service for medical management, Orthopedics.   Patient educated regarding: multimodal pain approach and medications as listed above.   Elements of Medical Decision Making as described above. Acute or chronic illness or injury or surgery. High risk therapy including opioids, high risk drug therapy including oral and/or parenteral controlled substances.      Patient is understanding of the plan. All questions and concerns addressed to patient's satisfaction.     Thank you for this consultation.    Bernadette BETANCOURT, FNP-C  Acute Care Pain Management Program   [facility]   Monday-Friday 8a-4p   Page via Epic or [employer]

## 2024-06-12 NOTE — PROGRESS NOTES
PRESENTING COMPLAINT:  Post-operative visit s/p right axillary hidradenitis suppurativa excision and right muscle-sparing latissimus musculocutaneous flap reconstruction done 2/29/2024.     HISTORY OF PRESENTING COMPLAINT: The patient is here for post-operative visit.  The patient is being seen in the presence of my nurse.      Doing much better.  Very happy with results. Healed.         ASSESSMENT AND PLAN:  Based upon the above findings, the patient is here for post-operative visit.      Plan is to see us back in 3-6 months to plan revision of her flap.      All questions were answered.  The patient was happy with the visit.

## 2024-06-12 NOTE — NURSING NOTE
"Chief Complaint   Patient presents with    Surgical Followup     Post-op, DOS 2/29/24.       Vitals:    06/12/24 0855   BP: 130/78   BP Location: Left arm   Patient Position: Sitting   Cuff Size: Adult Regular   Pulse: 74   SpO2: 100%   Height: 1.702 m (5' 7\")       Body mass index is 38.97 kg/m .    Rylan Harris, EMT    "

## 2024-06-12 NOTE — PHARMACY-VANCOMYCIN DOSING SERVICE
Pharmacy Vancomycin Initial Note  Date of Service 2024  Patient's  1975  49 year old, female    Indication: Skin and Soft Tissue Infection    Current estimated CrCl = CrCl cannot be calculated (Patient's most recent lab result is older than the maximum 30 days allowed.).    Creatinine for last 3 days  No results found for requested labs within last 3 days.    Recent Vancomycin Level(s) for last 3 days  No results found for requested labs within last 3 days.      Vancomycin IV Administrations (past 72 hours)        No vancomycin orders with administrations in past 72 hours.                    Nephrotoxins and other renal medications (From now, onward)      Start     Dose/Rate Route Frequency Ordered Stop    24 1500  vancomycin (VANCOCIN) 2,000 mg in 0.9% NaCl 500 mL intermittent infusion         2,000 mg  over 2 Hours Intravenous ONCE 24 1443              Contrast Orders - past 72 hours (72h ago, onward)      None            [employer] Prediction of Planned Initial Vancomycin Regimen  Loading dose: 2,000 mg IV x 1  @ 1509  Regimen: 1250 mg IV every 12 hours.  Start time: 03:09 on 2024  Exposure target: AUC24 (range)400-600 mg/L.hr   AUC24,ss: 460 mg/L.hr  Probability of AUC24 > 400: 65 %  Ctrough,ss: 14.5 mg/L  Probability of Ctrough,ss > 20: 25 %  Probability of nephrotoxicity (Lodise NIYA ): 10 %          Plan:  Start vancomycin 2,000 mg IV x 1 followed by 1,250 mg IV q12h.   Vancomycin monitoring method: AUC  Vancomycin therapeutic monitoring goal: 400-600 mg*h/L  Pharmacy will check vancomycin levels as appropriate in 1-3 Days.    Serum creatinine levels will be ordered daily for the first week of therapy and at least twice weekly for subsequent weeks.      Thank you for the consult.   June Henley, PharmD, BCPS

## 2024-06-12 NOTE — CONSULTS
ORTHOPEDIC CONSULTATION    Consultation  Klarissa Curtis,  1975, MRN [id number]    [facility]  Cellulitis    PCP: Jean Kenny, [phone]   Code status:  Prior       Extended Emergency Contact Information  Primary Emergency Contact: Lena Magaña  Mobile Phone: [phone]  Relation: Mother  Secondary Emergency Contact: Rosie Newman  Mobile Phone: [phone]  Relation: Sister         IMPRESSION:  Right index finger cellulitis vs tenosynovitis      PLAN:  This patient was discussed with Dr. Blackwell and Pat, on-call surgeon for [facility] and they are in agreement with the following plan.     - MR Hand to eval for abscess or tenosynovitis   - CBC, ESR, CRP  - NPO until results of MRI and labs  - Can start ABX Dr. Piña aware and will order  - Pain management consult to assist with pain management    Thank you for including [facility] in the care of Klarissa Curtis. It has been a pleasure participating in [facility] care.        CHIEF COMPLAINT: <principal problem not specified>    HISTORY OF PRESENT ILLNESS:  The patient is seen in orthopedic consultation at the request of Dr. Piña.  The patient is a 49 year old female with moderate pain of the right   index finger . The patient reports that Saturday noted an area on her right index finger on the medial side that looked like a bug bite. She did not think anything of it at that time. Slowly over the weekend the swelling and redness of the finger worsened. She noted that on Monday the redness was starting to extend into the entire finger. Yesterday she was able to move the finger and notes that today has been unable to move the finger and it is stuck in a flexed position. No fever, chills, nausea or vomiting. No other issues or symptoms    PAST MEDICAL HISTORY:  Past Medical History:   Diagnosis Date    NO ACTIVE PROBLEMS           ALLERGIES:   Review of patient's allergies indicates   Allergies   Allergen Reactions     Penicillins Hives, Itching and Rash    Keflex [Cephalexin] Other (See Comments)     Not true allergy- intolerance         MEDICATIONS UPON ADMISSION:  Medications were reviewed.  They include:   Medications Prior to Admission   Medication Sig Dispense Refill Last Dose    adalimumab (HUMIRA *CF*) 80 MG/0.8ML pen kit Inject 0.8 mLs (80 mg) Subcutaneous once a week 3.2 mL 11     docusate sodium (COLACE) 100 MG capsule Take 1 capsule (100 mg) by mouth 2 times daily (Patient not taking: Reported on 4/17/2024) 100 capsule 0     DULoxetine (CYMBALTA) 30 MG capsule Take 30mg (1 pill) for 1 week and if tolerating increase to 60mg (2 pills) daily 180 capsule 3     fexofenadine (ALLEGRA) 180 MG tablet Take 1 tablet (180 mg) by mouth 2 times daily 180 tablet 3     hydrOXYzine HCl (ATARAX) 50 MG tablet Take 1 tablet (50 mg) by mouth every 6 hours as needed for other, itching or anxiety (adjuvant pain) (Patient not taking: Reported on 3/13/2024) 20 tablet 0     medical cannabis (Patient's own supply) See Admin Instructions (The purpose of this order is to document that the patient reports taking medical cannabis.  This is not a prescription, and is not used to certify that the patient has a qualifying medical condition.)       meloxicam (MOBIC) 15 MG tablet 15 mg daily       naloxone (NARCAN) 4 MG/0.1ML nasal spray Spray 1 spray (4 mg) into one nostril alternating nostrils as needed for opioid reversal every 2-3 minutes until assistance arrives 0.2 mL 0     ondansetron (ZOFRAN ODT) 4 MG ODT tab Take 1 tablet (4 mg) by mouth every 8 hours as needed for nausea (Patient not taking: Reported on 4/17/2024) 10 tablet 0     oxyCODONE-acetaminophen (PERCOCET) 5-325 MG tablet Take 1-2 percocet every 6-8 hrs as needed for pain 90 tablet 0     pregabalin (LYRICA) 150 MG capsule Take 1 capsule (150 mg) by mouth 2 times daily 60 capsule 1     pregabalin (LYRICA) 75 MG capsule Start 75 mg at bedtime x 1 week, then increase to 75 mg BID x 1  week, then increase to 75 mg in morning and 150 mg at bedtime x 1 week, then increase to 150 mg BID. (Patient not taking: Reported on 4/17/2024) 90 capsule 0     Resorcinol POWD Resorcinol 15% in vanicream twice a day 30 g 11     traZODone (DESYREL) 150 MG tablet Take 1.5 tablets (225 mg) by mouth at bedtime 135 tablet 1     Vitamin D3 50 mcg (2000 units) tablet Take 1 tablet (50 mcg) by mouth daily 90 tablet 3          SOCIAL HISTORY:   she  reports that she quit smoking about 5 months ago. Her smoking use included cigarettes. She started smoking about 20 years ago. She has a 10 pack-year smoking history. She has never used smokeless tobacco. She reports that she does not currently use alcohol. She reports current drug use. Drug: Marijuana.    FAMILY HISTORY:  family history includes Arthritis in her mother; Breast Cancer in her mother; Diabetes in her sister; Hyperlipidemia in her sister; Myocardial Infarction (age of onset: 47) in her father.      REVIEW OF SYSTEMS:   Reviewed with patient. See HPI, otherwise negative       PHYSICAL EXAMINATION:  Vitals: Temp:  [97.8  F (36.6  C)] 97.8  F (36.6  C)  Pulse:  [68-74] 68  Resp:  [16] 16  BP: (124-130)/(76-78) 124/76  SpO2:  [97 %-100 %] 97 %  General: On examination, the patient is resting comfortably, NAD, awake, and alert and oriented to person, place, time, and, and general circumstances   SKIN: There is moderate swelling and erythema to the right index finger that extends into the MCP joint of the right hand. The erythema is marked and from entering the room to exiting the room the erythema has extended partially into the dorsum of the right hand. Pictures in the media tab  Pulses:  radial pulse is intact and equal bilaterally  Sensation: intact and equal bilaterally to the distal upper extremities.  Tenderness: tenderness to palpation of the right 2nd metacarpal becoming increasingly tender along the MCP joint. Tenderness is primarily located at the PIP joint.  "DIP joint is tender but not as bad as the MCP or PIP. No other tenderness noted in the hand  ROM: able to make full fist without the index finger, can wiggle all  fingers except the index finger and can give thumbs up. Wrist ROM not compromised  Contralateral side= Full range of motion, Negative joint instability findings, 5/5 motor groups about the joint, Non-tender.       RADIOGRAPHIC EVALUATION:  Personally reviewed    PERTINENT LABS:  Lab Results: personally reviewed.   @BRIEFLAB[NA,K,CL,CO2,BUN,CREATININE,GLUCOSE,GLUCOSE @  Lab Results   Component Value Date    WBC 8.7 10/09/2023    WBC 7.8 04/29/2021    HGB 14.3 02/29/2024    HGB 13.8 04/29/2021    HCT 41.9 10/09/2023    HCT 42.6 04/29/2021    MCV 96 10/09/2023    MCV 95 04/29/2021     10/09/2023     04/29/2021       Clinically Significant Risk Factors Present on Admission         # Obesity: Estimated body mass index is 38.97 kg/m  as calculated from the following:    Height as of an earlier encounter on 6/12/24: 1.702 m (5' 7\").    Weight as of 5/30/24: 112.9 kg (248 lb 12.8 oz).             Lynn Diaz PA-C, POOL  Date: 6/12/2024  Time: 2:05 PM    CC1:   Ti Piña MD    CC2:   Jean Kenny   "

## 2024-06-12 NOTE — MEDICATION SCRIBE - ADMISSION MEDICATION HISTORY
Medication Scribe Admission Medication History    Admission medication history is complete. The information provided in this note is only as accurate as the sources available at the time of the update.    Information Source(s): Patient and CareEverywhere/SureScripts via in-person    Pertinent Information: Patient is putting adalimumab on hold for now as she is waiting to hear back if she is a candidate for a clinical research trial. Did forget last dose on Sunday, so last dose was 2 Sundays ago.     Patient is taking 60 mg of duloxetine right now, but mentioned that it may need to be increased in the future.     Changes made to PTA medication list:  Added:   Calcium 500 mg  Deleted:   Docusate 100 mg   Hydroxyzine 50 mg   Meloxicam 15 mg - last dose yesterday, not taking anymore  Ondansetron 4 mg ODT   Pregabalin 75 mg - on higher dose  Changed: None    Allergies reviewed with patient and updates made in EHR: yes    Medication History Completed By: Flavio Sanchez 6/12/2024 3:11 PM    PTA Med List   Medication Sig Last Dose    adalimumab (HUMIRA *CF*) 80 MG/0.8ML pen kit Inject 0.8 mLs (80 mg) Subcutaneous once a week Past Month at 6/2    calcium carbonate (OS-MERCY) 500 MG TABS Take 1 tablet by mouth 2 times daily (with meals) Past Month at month ago    DULoxetine (CYMBALTA) 30 MG capsule Take 30mg (1 pill) for 1 week and if tolerating increase to 60mg (2 pills) daily 6/12/2024 at AM; 60 mg    fexofenadine (ALLEGRA) 180 MG tablet Take 1 tablet (180 mg) by mouth 2 times daily 6/11/2024 at 2000    medical cannabis (Patient's own supply) See Admin Instructions (The purpose of this order is to document that the patient reports taking medical cannabis.  This is not a prescription, and is not used to certify that the patient has a qualifying medical condition.) 6/11/2024 at PM    naloxone (NARCAN) 4 MG/0.1ML nasal spray Spray 1 spray (4 mg) into one nostril alternating nostrils as needed for opioid reversal every 2-3  minutes until assistance arrives never used    oxyCODONE-acetaminophen (PERCOCET) 5-325 MG tablet Take 1-2 percocet every 6-8 hrs as needed for pain Past Week at 6/10    pregabalin (LYRICA) 150 MG capsule Take 1 capsule (150 mg) by mouth 2 times daily 6/12/2024 at AM; 1 of 2    Resorcinol POWD Resorcinol 15% in vanicream twice a day (Patient taking differently: Resorcinol 15% in vanicream twice a day PRN) Past Week at PRN few days ago    traZODone (DESYREL) 150 MG tablet Take 1.5 tablets (225 mg) by mouth at bedtime 6/11/2024 at HS    Vitamin D3 50 mcg (2000 units) tablet Take 1 tablet (50 mcg) by mouth daily Past Month at 2 wks ago

## 2024-06-12 NOTE — TELEPHONE ENCOUNTER
Received request for direct admission for this patient from Dr. Stewart from [facility].  50 y/o F w/hx hydradenitis suppurativa, swelling and redness in right index finger for five days.  Concern for cellulitis and abscess.  Recommend patient to be NPO.    Medically appropriate for direct admission to [facility].

## 2024-06-13 ENCOUNTER — ANESTHESIA EVENT (OUTPATIENT)
Dept: SURGERY | Facility: CLINIC | Age: 49
DRG: 580 | End: 2024-06-13
Payer: COMMERCIAL

## 2024-06-13 ENCOUNTER — ANESTHESIA (OUTPATIENT)
Dept: SURGERY | Facility: CLINIC | Age: 49
DRG: 580 | End: 2024-06-13
Payer: COMMERCIAL

## 2024-06-13 LAB
BACTERIA SPEC CULT: ABNORMAL
CRP SERPL-MCNC: 12.8 MG/L
GRAM STAIN RESULT: ABNORMAL
GRAM STAIN RESULT: ABNORMAL
HOLD SPECIMEN: NORMAL

## 2024-06-13 PROCEDURE — 87205 SMEAR GRAM STAIN: CPT | Performed by: ORTHOPAEDIC SURGERY

## 2024-06-13 PROCEDURE — 999N000141 HC STATISTIC PRE-PROCEDURE NURSING ASSESSMENT: Performed by: ORTHOPAEDIC SURGERY

## 2024-06-13 PROCEDURE — 250N000009 HC RX 250: Performed by: ORTHOPAEDIC SURGERY

## 2024-06-13 PROCEDURE — 99232 SBSQ HOSP IP/OBS MODERATE 35: CPT | Performed by: HOSPITALIST

## 2024-06-13 PROCEDURE — 87075 CULTR BACTERIA EXCEPT BLOOD: CPT | Performed by: ORTHOPAEDIC SURGERY

## 2024-06-13 PROCEDURE — 250N000013 HC RX MED GY IP 250 OP 250 PS 637: Performed by: ORTHOPAEDIC SURGERY

## 2024-06-13 PROCEDURE — 258N000001 HC RX 258: Performed by: ORTHOPAEDIC SURGERY

## 2024-06-13 PROCEDURE — 250N000011 HC RX IP 250 OP 636: Performed by: NURSE ANESTHETIST, CERTIFIED REGISTERED

## 2024-06-13 PROCEDURE — 120N000001 HC R&B MED SURG/OB

## 2024-06-13 PROCEDURE — 272N000001 HC OR GENERAL SUPPLY STERILE: Performed by: ORTHOPAEDIC SURGERY

## 2024-06-13 PROCEDURE — 0PBT0ZZ EXCISION OF RIGHT FINGER PHALANX, OPEN APPROACH: ICD-10-PCS | Performed by: ORTHOPAEDIC SURGERY

## 2024-06-13 PROCEDURE — 258N000003 HC RX IP 258 OP 636: Mod: JZ | Performed by: ANESTHESIOLOGY

## 2024-06-13 PROCEDURE — 250N000013 HC RX MED GY IP 250 OP 250 PS 637: Performed by: NURSE PRACTITIONER

## 2024-06-13 PROCEDURE — 258N000003 HC RX IP 258 OP 636: Mod: JZ | Performed by: HOSPITALIST

## 2024-06-13 PROCEDURE — 370N000017 HC ANESTHESIA TECHNICAL FEE, PER MIN: Performed by: ORTHOPAEDIC SURGERY

## 2024-06-13 PROCEDURE — 250N000011 HC RX IP 250 OP 636: Performed by: ORTHOPAEDIC SURGERY

## 2024-06-13 PROCEDURE — 250N000013 HC RX MED GY IP 250 OP 250 PS 637: Performed by: HOSPITALIST

## 2024-06-13 PROCEDURE — 258N000003 HC RX IP 258 OP 636: Mod: JZ | Performed by: ORTHOPAEDIC SURGERY

## 2024-06-13 PROCEDURE — 250N000011 HC RX IP 250 OP 636: Mod: JZ | Performed by: ANESTHESIOLOGY

## 2024-06-13 PROCEDURE — 360N000075 HC SURGERY LEVEL 2, PER MIN: Performed by: ORTHOPAEDIC SURGERY

## 2024-06-13 PROCEDURE — 250N000011 HC RX IP 250 OP 636: Mod: JZ | Performed by: HOSPITALIST

## 2024-06-13 PROCEDURE — 87186 SC STD MICRODIL/AGAR DIL: CPT | Performed by: ORTHOPAEDIC SURGERY

## 2024-06-13 PROCEDURE — 36415 COLL VENOUS BLD VENIPUNCTURE: CPT

## 2024-06-13 PROCEDURE — 86140 C-REACTIVE PROTEIN: CPT

## 2024-06-13 RX ORDER — DEXAMETHASONE SODIUM PHOSPHATE 4 MG/ML
4 INJECTION, SOLUTION INTRA-ARTICULAR; INTRALESIONAL; INTRAMUSCULAR; INTRAVENOUS; SOFT TISSUE
Status: CANCELLED | OUTPATIENT
Start: 2024-06-13

## 2024-06-13 RX ORDER — ONDANSETRON 2 MG/ML
4 INJECTION INTRAMUSCULAR; INTRAVENOUS EVERY 30 MIN PRN
Status: CANCELLED | OUTPATIENT
Start: 2024-06-13

## 2024-06-13 RX ORDER — SODIUM CHLORIDE, SODIUM LACTATE, POTASSIUM CHLORIDE, CALCIUM CHLORIDE 600; 310; 30; 20 MG/100ML; MG/100ML; MG/100ML; MG/100ML
INJECTION, SOLUTION INTRAVENOUS CONTINUOUS
Status: CANCELLED | OUTPATIENT
Start: 2024-06-13

## 2024-06-13 RX ORDER — ONDANSETRON 4 MG/1
4 TABLET, ORALLY DISINTEGRATING ORAL EVERY 30 MIN PRN
Status: CANCELLED | OUTPATIENT
Start: 2024-06-13

## 2024-06-13 RX ORDER — OXYCODONE HYDROCHLORIDE 5 MG/1
10 TABLET ORAL
Status: CANCELLED | OUTPATIENT
Start: 2024-06-13

## 2024-06-13 RX ORDER — SODIUM CHLORIDE, SODIUM LACTATE, POTASSIUM CHLORIDE, CALCIUM CHLORIDE 600; 310; 30; 20 MG/100ML; MG/100ML; MG/100ML; MG/100ML
INJECTION, SOLUTION INTRAVENOUS CONTINUOUS
Status: DISCONTINUED | OUTPATIENT
Start: 2024-06-13 | End: 2024-06-13 | Stop reason: HOSPADM

## 2024-06-13 RX ORDER — HYDROMORPHONE HYDROCHLORIDE 2 MG/1
8 TABLET ORAL
Status: DISCONTINUED | OUTPATIENT
Start: 2024-06-13 | End: 2024-06-16 | Stop reason: HOSPADM

## 2024-06-13 RX ORDER — DEXAMETHASONE SODIUM PHOSPHATE 4 MG/ML
INJECTION, SOLUTION INTRA-ARTICULAR; INTRALESIONAL; INTRAMUSCULAR; INTRAVENOUS; SOFT TISSUE PRN
Status: DISCONTINUED | OUTPATIENT
Start: 2024-06-13 | End: 2024-06-13

## 2024-06-13 RX ORDER — BUPIVACAINE HYDROCHLORIDE 5 MG/ML
INJECTION, SOLUTION EPIDURAL; INTRACAUDAL
Status: COMPLETED | OUTPATIENT
Start: 2024-06-13 | End: 2024-06-13

## 2024-06-13 RX ORDER — FEXOFENADINE HCL 180 MG/1
180 TABLET ORAL AT BEDTIME
Status: DISCONTINUED | OUTPATIENT
Start: 2024-06-13 | End: 2024-06-16 | Stop reason: HOSPADM

## 2024-06-13 RX ORDER — ONDANSETRON 2 MG/ML
INJECTION INTRAMUSCULAR; INTRAVENOUS PRN
Status: DISCONTINUED | OUTPATIENT
Start: 2024-06-13 | End: 2024-06-13

## 2024-06-13 RX ORDER — DULOXETIN HYDROCHLORIDE 60 MG/1
60 CAPSULE, DELAYED RELEASE ORAL DAILY
Status: DISCONTINUED | OUTPATIENT
Start: 2024-06-13 | End: 2024-06-16 | Stop reason: HOSPADM

## 2024-06-13 RX ORDER — FENTANYL CITRATE 50 UG/ML
25 INJECTION, SOLUTION INTRAMUSCULAR; INTRAVENOUS EVERY 5 MIN PRN
Status: CANCELLED | OUTPATIENT
Start: 2024-06-13

## 2024-06-13 RX ORDER — PROPOFOL 10 MG/ML
INJECTION, EMULSION INTRAVENOUS CONTINUOUS PRN
Status: DISCONTINUED | OUTPATIENT
Start: 2024-06-13 | End: 2024-06-13

## 2024-06-13 RX ORDER — HYDROMORPHONE HYDROCHLORIDE 1 MG/ML
1 INJECTION, SOLUTION INTRAMUSCULAR; INTRAVENOUS; SUBCUTANEOUS ONCE
Status: COMPLETED | OUTPATIENT
Start: 2024-06-13 | End: 2024-06-13

## 2024-06-13 RX ORDER — FENTANYL CITRATE 50 UG/ML
50 INJECTION, SOLUTION INTRAMUSCULAR; INTRAVENOUS EVERY 5 MIN PRN
Status: CANCELLED | OUTPATIENT
Start: 2024-06-13

## 2024-06-13 RX ORDER — HYDROMORPHONE HYDROCHLORIDE 2 MG/1
6 TABLET ORAL
Status: DISCONTINUED | OUTPATIENT
Start: 2024-06-13 | End: 2024-06-16 | Stop reason: HOSPADM

## 2024-06-13 RX ORDER — OXYCODONE HYDROCHLORIDE 5 MG/1
5 TABLET ORAL
Status: CANCELLED | OUTPATIENT
Start: 2024-06-13

## 2024-06-13 RX ORDER — HYDROMORPHONE HCL IN WATER/PF 6 MG/30 ML
0.2 PATIENT CONTROLLED ANALGESIA SYRINGE INTRAVENOUS EVERY 5 MIN PRN
Status: CANCELLED | OUTPATIENT
Start: 2024-06-13

## 2024-06-13 RX ORDER — PROPOFOL 10 MG/ML
INJECTION, EMULSION INTRAVENOUS PRN
Status: DISCONTINUED | OUTPATIENT
Start: 2024-06-13 | End: 2024-06-13

## 2024-06-13 RX ORDER — FENTANYL CITRATE 50 UG/ML
25-100 INJECTION, SOLUTION INTRAMUSCULAR; INTRAVENOUS
Status: DISCONTINUED | OUTPATIENT
Start: 2024-06-13 | End: 2024-06-13 | Stop reason: HOSPADM

## 2024-06-13 RX ORDER — HYDROMORPHONE HYDROCHLORIDE 2 MG/1
2 TABLET ORAL
Status: DISCONTINUED | OUTPATIENT
Start: 2024-06-13 | End: 2024-06-16 | Stop reason: HOSPADM

## 2024-06-13 RX ORDER — ACETAMINOPHEN 325 MG/1
975 TABLET ORAL ONCE
Status: COMPLETED | OUTPATIENT
Start: 2024-06-13 | End: 2024-06-13

## 2024-06-13 RX ORDER — NALOXONE HYDROCHLORIDE 0.4 MG/ML
0.1 INJECTION, SOLUTION INTRAMUSCULAR; INTRAVENOUS; SUBCUTANEOUS
Status: CANCELLED | OUTPATIENT
Start: 2024-06-13

## 2024-06-13 RX ORDER — HYDROMORPHONE HCL IN WATER/PF 6 MG/30 ML
0.4 PATIENT CONTROLLED ANALGESIA SYRINGE INTRAVENOUS EVERY 5 MIN PRN
Status: CANCELLED | OUTPATIENT
Start: 2024-06-13

## 2024-06-13 RX ORDER — GINSENG 100 MG
CAPSULE ORAL PRN
Status: DISCONTINUED | OUTPATIENT
Start: 2024-06-13 | End: 2024-06-13 | Stop reason: HOSPADM

## 2024-06-13 RX ORDER — ACETAMINOPHEN 325 MG/1
975 TABLET ORAL ONCE
Status: DISCONTINUED | OUTPATIENT
Start: 2024-06-13 | End: 2024-06-13

## 2024-06-13 RX ORDER — LIDOCAINE 40 MG/G
CREAM TOPICAL
Status: DISCONTINUED | OUTPATIENT
Start: 2024-06-13 | End: 2024-06-13 | Stop reason: HOSPADM

## 2024-06-13 RX ORDER — HYDROMORPHONE HYDROCHLORIDE 1 MG/ML
1 INJECTION, SOLUTION INTRAMUSCULAR; INTRAVENOUS; SUBCUTANEOUS
Status: DISCONTINUED | OUTPATIENT
Start: 2024-06-13 | End: 2024-06-16 | Stop reason: HOSPADM

## 2024-06-13 RX ADMIN — SENNOSIDES AND DOCUSATE SODIUM 1 TABLET: 8.6; 5 TABLET ORAL at 20:46

## 2024-06-13 RX ADMIN — HYDROMORPHONE HYDROCHLORIDE 6 MG: 4 TABLET ORAL at 07:00

## 2024-06-13 RX ADMIN — PROPOFOL 150 MCG/KG/MIN: 10 INJECTION, EMULSION INTRAVENOUS at 15:23

## 2024-06-13 RX ADMIN — ACETAMINOPHEN 975 MG: 325 TABLET ORAL at 09:24

## 2024-06-13 RX ADMIN — HYDROMORPHONE HYDROCHLORIDE 8 MG: 4 TABLET ORAL at 12:08

## 2024-06-13 RX ADMIN — SODIUM CHLORIDE, POTASSIUM CHLORIDE, SODIUM LACTATE AND CALCIUM CHLORIDE: 600; 310; 30; 20 INJECTION, SOLUTION INTRAVENOUS at 15:16

## 2024-06-13 RX ADMIN — MIDAZOLAM HYDROCHLORIDE 2 MG: 1 INJECTION, SOLUTION INTRAMUSCULAR; INTRAVENOUS at 15:01

## 2024-06-13 RX ADMIN — HYDROXYZINE HYDROCHLORIDE 25 MG: 25 TABLET ORAL at 12:09

## 2024-06-13 RX ADMIN — TRAZODONE HYDROCHLORIDE 225 MG: 150 TABLET ORAL at 20:46

## 2024-06-13 RX ADMIN — CEFEPIME 2 G: 2 INJECTION, POWDER, FOR SOLUTION INTRAVENOUS at 16:36

## 2024-06-13 RX ADMIN — HYDROMORPHONE HYDROCHLORIDE 1 MG: 1 INJECTION, SOLUTION INTRAMUSCULAR; INTRAVENOUS; SUBCUTANEOUS at 09:24

## 2024-06-13 RX ADMIN — HYDROMORPHONE HYDROCHLORIDE 1 MG: 1 INJECTION, SOLUTION INTRAMUSCULAR; INTRAVENOUS; SUBCUTANEOUS at 13:26

## 2024-06-13 RX ADMIN — PREGABALIN 150 MG: 75 CAPSULE ORAL at 09:24

## 2024-06-13 RX ADMIN — ONDANSETRON 4 MG: 2 INJECTION INTRAMUSCULAR; INTRAVENOUS at 15:27

## 2024-06-13 RX ADMIN — SENNOSIDES AND DOCUSATE SODIUM 1 TABLET: 8.6; 5 TABLET ORAL at 09:26

## 2024-06-13 RX ADMIN — HYDROMORPHONE HYDROCHLORIDE 6 MG: 4 TABLET ORAL at 03:11

## 2024-06-13 RX ADMIN — VANCOMYCIN HYDROCHLORIDE 1250 MG: 5 INJECTION, POWDER, LYOPHILIZED, FOR SOLUTION INTRAVENOUS at 03:24

## 2024-06-13 RX ADMIN — DEXAMETHASONE SODIUM PHOSPHATE 4 MG: 4 INJECTION, SOLUTION INTRA-ARTICULAR; INTRALESIONAL; INTRAMUSCULAR; INTRAVENOUS; SOFT TISSUE at 15:27

## 2024-06-13 RX ADMIN — BUPIVACAINE HYDROCHLORIDE 30 ML: 5 INJECTION, SOLUTION EPIDURAL; INTRACAUDAL at 15:05

## 2024-06-13 RX ADMIN — FEXOFENADINE HYDROCHLORIDE 180 MG: 180 TABLET ORAL at 20:46

## 2024-06-13 RX ADMIN — ACETAMINOPHEN 975 MG: 325 TABLET ORAL at 20:45

## 2024-06-13 RX ADMIN — DULOXETINE HYDROCHLORIDE 60 MG: 60 CAPSULE, DELAYED RELEASE PELLETS ORAL at 12:47

## 2024-06-13 RX ADMIN — FENTANYL CITRATE 50 MCG: 50 INJECTION, SOLUTION INTRAMUSCULAR; INTRAVENOUS at 15:02

## 2024-06-13 RX ADMIN — VANCOMYCIN HYDROCHLORIDE 1250 MG: 5 INJECTION, POWDER, LYOPHILIZED, FOR SOLUTION INTRAVENOUS at 17:57

## 2024-06-13 RX ADMIN — PROPOFOL 50 MG: 10 INJECTION, EMULSION INTRAVENOUS at 15:23

## 2024-06-13 RX ADMIN — ACETAMINOPHEN 975 MG: 325 TABLET ORAL at 13:25

## 2024-06-13 RX ADMIN — PREGABALIN 150 MG: 75 CAPSULE ORAL at 20:45

## 2024-06-13 RX ADMIN — CEFEPIME 2 G: 2 INJECTION, POWDER, FOR SOLUTION INTRAVENOUS at 09:24

## 2024-06-13 ASSESSMENT — ACTIVITIES OF DAILY LIVING (ADL)
ADLS_ACUITY_SCORE: 20
ADLS_ACUITY_SCORE: 22
ADLS_ACUITY_SCORE: 20
ADLS_ACUITY_SCORE: 22
ADLS_ACUITY_SCORE: 20
ADLS_ACUITY_SCORE: 24
ADLS_ACUITY_SCORE: 24
ADLS_ACUITY_SCORE: 22
ADLS_ACUITY_SCORE: 20
ADLS_ACUITY_SCORE: 22
ADLS_ACUITY_SCORE: 22
ADLS_ACUITY_SCORE: 24
ADLS_ACUITY_SCORE: 20
ADLS_ACUITY_SCORE: 22
ADLS_ACUITY_SCORE: 20
ADLS_ACUITY_SCORE: 20

## 2024-06-13 ASSESSMENT — LIFESTYLE VARIABLES: TOBACCO_USE: 1

## 2024-06-13 NOTE — PROGRESS NOTES
"Orthopedic Progress Note      Assessment:    Right index finger cellulitis with possible septic arthritis     Plan:   - NPO surgery this afternoon, schedule pending  - continue pain control  - Would not hesitate to get pain team consult  - continue elevation   - Continue ABX      Subjective:  Pain: moderarte  Chest pain, SOB: no  Nausea, Vomiting:  no  Lightheadedness, Dizziness:  no  Neuro:  Patient denies new onset numbness or paresthesias    Patient notes that her finger feels worse this morning, the erythema and redness around the PIP joint has worsened and is more painful today.  She does note that the erythema has stopped spreading into her hand but is still very tender.  MRI revealed possible fluid collection along the PIP joint but no tenosynovitis.  Will likely plan for washout of the PIP joint today at 3    Objective:  /61 (BP Location: Right arm)   Pulse 65   Temp 98.1  F (36.7  C) (Oral)   Resp 16   SpO2 97%   The patient is A&Ox3. Appears comfortable.   Sensation is intact.  Able to make full fist excluding the index finger, can wiggle all fingers except for the index finger.  Swelling and erythema around the index finger PIP joint has worsened today as well as exquisite tenderness over the PIP joint.  There is an area of firmness as well as an area of fluctuance along the ulnar aspect of the PIP joint  Radial pulse intact.  Calves are soft and non-tender. Negative Cele's.      Pertinent Labs   Lab Results: personally reviewed.   No results found for: \"INR\", \"PROTIME\"  Lab Results   Component Value Date    WBC 13.1 (H) 06/12/2024    HGB 12.8 06/12/2024    HCT 39.5 06/12/2024    MCV 91 06/12/2024     06/12/2024     Lab Results   Component Value Date     06/12/2024    CO2 27 06/12/2024         Report completed by:  Lynn Diaz PA-C/[facility]    Date: 6/13/2024  Time: 8:38 AM    "

## 2024-06-13 NOTE — ANESTHESIA CARE TRANSFER NOTE
Patient: Klarissa Curtis    Procedure: Procedure(s):  IRRIGATION AND DEBRIDEMENT, RIGHT INDEX FINGER       Diagnosis: Pyogenic arthritis of right hand, due to unspecified organism (H) [M00.9]  Diagnosis Additional Information: No value filed.    Anesthesia Type:   MAC     Note:    Oropharynx: oropharynx clear of all foreign objects  Level of Consciousness: awake  Oxygen Supplementation: nasal cannula  Level of Supplemental Oxygen (L/min / FiO2): 3  Independent Airway: airway patency satisfactory and stable  Dentition: dentition unchanged  Vital Signs Stable: post-procedure vital signs reviewed and stable  Report to RN Given: handoff report given  Patient transferred to: Medical/Surgical Unit    Handoff Report: Identifed the Patient, Identified the Reponsible Provider, Reviewed the pertinent medical history, Discussed the surgical course, Reviewed Intra-OP anesthesia mangement and issues during anesthesia, Set expectations for post-procedure period and Allowed opportunity for questions and acknowledgement of understanding    Vitals:  Vitals Value Taken Time   /78 06/13/24 1616   Temp     Pulse     Resp 20 06/13/24 1616   SpO2 95 % 06/13/24 1616       Electronically Signed By: SERAFIN Lewis CRNA  June 13, 2024  4:16 PM

## 2024-06-13 NOTE — PROGRESS NOTES
"[facility]    Medicine Progress Note - Hospitalist Service    Date of Admission:  6/12/2024    Assessment & Plan      Klarissa Curtis is a 49 year old immunocompromised female presenting as a direct admit for evaluation of finger erythema.  She is clinically stable.  MRI did not demonstrate evidence of tenosynovitis, though possible deep tissue infection in the second digit was noted.  There is interval worsening of swelling at the second right PIP, raising concern for septic arthritis.  Awaiting decision on operative intervention per orthopaedics.    # Right index PIP septic arthritis, tenosynovitis  - empiric vanc, cefepime    # Hydradenitis suppurativa  - pregabalin  - duloxetine          Diet: NPO per Anesthesia Guidelines for Procedure/Surgery Except for: Meds      Gomes Catheter: Not present  Lines: None     Cardiac Monitoring: None  Code Status: Full Code      Clinically Significant Risk Factors Present on Admission                              # Obesity: Estimated body mass index is 38.97 kg/m  as calculated from the following:    Height as of an earlier encounter on 6/12/24: 1.702 m (5' 7\").    Weight as of 5/30/24: 112.9 kg (248 lb 12.8 oz).       # Financial/Environmental Concerns:           Disposition Plan     Medically Ready for Discharge:              Ti Piña MD  Hospitalist Service  [facility]  Securely message with [employer] (more info)  Text page via [employer] Paging/Directory   ______________________________________________________________________    Interval History   No significant change in pain in hand.  No fevers or chills.    Physical Exam   Vital Signs: Temp: 98.2  F (36.8  C) Temp src: Oral BP: 107/57 Pulse: 90   Resp: 18 SpO2: 96 % O2 Device: None (Room air)    Weight: 0 lbs 0 oz    Gen:  lying in bed in no extremis  Neuro: alert, conversant  CV:  nl rate, regular rhythm  Pulm: no acute resp distress, ctab anteriorly  MSK:  modest " interval improvement in proximal extension of erythema into the right hand; interval worsening of swelling at the second right PIP    Medical Decision Making             Data

## 2024-06-13 NOTE — ANESTHESIA PREPROCEDURE EVALUATION
Anesthesia Pre-Procedure Evaluation    Patient: Klarissa Curtis   MRN: [id number] : 1975        Procedure : Procedure(s):  IRRIGATION AND DEBRIDEMENT, RIGHT INDEX FINGER          Past Medical History:   Diagnosis Date    NO ACTIVE PROBLEMS       Past Surgical History:   Procedure Laterality Date    BUNIONECTOMY      x2    GRAFT FLAP PEDICLE EXTREMITY (LOCATION) Right 2024    Procedure: Right axillary wound excision,Right muscle-sparing Latissimus musculo-cutaneous flap, SPY;  Surgeon: BORIS Mendoza MD;  Location: [facility] OR    OTHER SURGICAL HISTORY      right pointer finger foreign body removal      Allergies   Allergen Reactions    Penicillins Hives, Itching and Rash     Reaction occurred as child.      Social History     Tobacco Use    Smoking status: Former     Current packs/day: 0.00     Average packs/day: 0.5 packs/day for 20.0 years (10.0 ttl pk-yrs)     Types: Cigarettes     Start date: 1/3/2004     Quit date: 1/3/2024     Years since quittin.4    Smokeless tobacco: Never   Substance Use Topics    Alcohol use: Not Currently      Wt Readings from Last 1 Encounters:   24 112.9 kg (248 lb 12.8 oz)        Anesthesia Evaluation   Pt has had prior anesthetic. Type: General and MAC.    No history of anesthetic complications       ROS/MED HX  ENT/Pulmonary:     (+)                tobacco use, Past use,                    (-) sleep apnea   Neurologic:    (-) no CVA and no TIA   Cardiovascular:  - neg cardiovascular ROS     METS/Exercise Tolerance:     Hematologic:       Musculoskeletal:   (+)  arthritis,             GI/Hepatic:    (-) GERD   Renal/Genitourinary:    (-) renal disease   Endo:     (+)               Obesity,    (-) Type II DM   Psychiatric/Substance Use:     (+) psychiatric history anxiety       Infectious Disease:       Malignancy:       Other:      (+)  , H/O Chronic Pain,         Physical Exam    Airway        Mallampati: III   TM distance: > 3 FB   Neck ROM: full  "    Respiratory Devices and Support         Dental       (+) Minor Abnormalities - some fillings, tiny chips      Cardiovascular          Rhythm and rate: regular     Pulmonary           breath sounds clear to auscultation           OUTSIDE LABS:  CBC:   Lab Results   Component Value Date    WBC 13.1 (H) 06/12/2024    WBC 8.7 10/09/2023    HGB 12.8 06/12/2024    HGB 14.3 02/29/2024    HCT 39.5 06/12/2024    HCT 41.9 10/09/2023     06/12/2024     10/09/2023     BMP:   Lab Results   Component Value Date     06/12/2024     10/09/2023    POTASSIUM 3.8 06/12/2024    POTASSIUM 4.3 10/09/2023    CHLORIDE 104 06/12/2024    CHLORIDE 103 10/09/2023    CO2 27 06/12/2024    CO2 29 10/09/2023    BUN 15.1 06/12/2024    BUN 9.5 10/09/2023    CR 0.63 06/12/2024    CR 0.49 (L) 03/01/2024    GLC 75 06/12/2024     (H) 03/01/2024     COAGS: No results found for: \"PTT\", \"INR\", \"FIBR\"  POC: No results found for: \"BGM\", \"HCG\", \"HCGS\"  HEPATIC:   Lab Results   Component Value Date    ALBUMIN 3.7 10/09/2023    PROTTOTAL 7.0 10/09/2023    ALT 10 10/09/2023    AST 12 10/09/2023    ALKPHOS 61 10/09/2023    BILITOTAL 0.4 10/09/2023     OTHER:   Lab Results   Component Value Date    A1C 5.3 11/21/2019    MERCY 8.5 (L) 06/12/2024    TSH 0.32 (L) 03/20/2020    T4 1.09 03/20/2020    SED 24 06/12/2024       Anesthesia Plan    ASA Status:  3    NPO Status:  NPO Appropriate    Anesthesia Type: MAC.     - Reason for MAC: immobility needed, straight local not clinically adequate      Maintenance: TIVA.        Consents    Anesthesia Plan(s) and associated risks, benefits, and realistic alternatives discussed. Questions answered and patient/representative(s) expressed understanding.     - Discussed: Risks, Benefits and Alternatives for BOTH SEDATION and the PROCEDURE were discussed     - Discussed with:             Postoperative Care    Pain management: IV analgesics, Oral pain medications, Peripheral nerve block (Single " Shot), Multi-modal analgesia.   PONV prophylaxis: Ondansetron (or other 5HT-3), Dexamethasone or Solumedrol     Comments:    Other Comments: Supraclavicular block, Propofol MAC TIVA           June Bowling MD    I have reviewed the pertinent notes and labs in the chart from the past 30 days and (re)examined the patient.  Any updates or changes from those notes are reflected in this note.

## 2024-06-13 NOTE — ANESTHESIA PROCEDURE NOTES
Supraclavicular Procedure Note    Pre-Procedure   Staff -        Anesthesiologist:  June Bowling MD       Performed By: anesthesiologist       Location: pre-op       Procedure Start/Stop Times: 6/13/2024 3:05 PM and 6/13/2024 3:18 PM       Pre-Anesthestic Checklist: patient identified, IV checked, site marked, risks and benefits discussed, informed consent, monitors and equipment checked, pre-op evaluation, at physician/surgeon's request and post-op pain management  Timeout:       Correct Patient: Yes        Correct Procedure: Yes        Correct Site: Yes        Correct Position: Yes        Correct Laterality: Yes        Site Marked: Yes  Procedure Documentation  Procedure: Supraclavicular       Laterality: right       Patient Position: supine       Patient Prep/Sterile Barriers: sterile gloves, mask       Skin prep: Chloraprep       Needle Type: short bevel       Needle Gauge: 20.        Needle Length (Inches): 4        Ultrasound guided       1. Ultrasound was used to identify targeted nerve, plexus, vascular marker, or fascial plane and place a needle adjacent to it in real-time.       2. Ultrasound was used to visualize the spread of anesthetic in close proximity to the above referenced structure.       3. A permanent image is entered into the patient's record.       4. The visualized anatomic structures appeared normal.       5. There were no apparent abnormal pathologic findings.    Assessment/Narrative         The placement was negative for: blood aspirated, painful injection and site bleeding       Paresthesias: No.       Bolus given via needle. no blood aspirated via catheter.        Secured via.        Insertion/Infusion Method: Single Shot       Complications: none       Injection made incrementally with aspirations every 5 mL.    Medication(s) Administered   Bupivacaine 0.5% PF (Infiltration) - Infiltration   30 mL - 6/13/2024 3:05:00 PM  Medication Administration Time: 6/13/2024 3:05 PM      FOR [facility]  "([facility]) ONLY:   Pain Team Contact information: please page the Pain Team Via [employer]. Search \"Pain\". During daytime hours, please page the attending first. At night please page the resident first.      "

## 2024-06-13 NOTE — PLAN OF CARE
Problem: Adult Inpatient Plan of Care  Goal: Absence of Hospital-Acquired Illness or Injury  Intervention: Identify and Manage Fall Risk  Recent Flowsheet Documentation  Taken 6/13/2024 0130 by Paulette Gallegos RN  Safety Promotion/Fall Prevention:   nonskid shoes/slippers when out of bed   room door open   room near nurse's station   safety round/check completed     Problem: Adult Inpatient Plan of Care  Goal: Optimal Comfort and Wellbeing  Outcome: Progressing   Goal Outcome Evaluation:      Plan of Care Reviewed With: patient    Overall Patient Progress: improvingOverall Patient Progress: improving  Pt alert and oriented, vss on room air, independent, fully continent, NPO as per orders, pain meds given.

## 2024-06-13 NOTE — BRIEF OP NOTE
[facility]    Brief Operative Note    Pre-operative diagnosis: Pyogenic arthritis of right hand, due to unspecified organism (H) [M00.9]  Post-operative diagnosis Right index finger subcutaneous abscess    Procedure: IRRIGATION AND DEBRIDEMENT, RIGHT INDEX FINGER, Right - Finger    Surgeon: Surgeons and Role:     * Brian Blackwell MD - Primary  Anesthesia: Choice with Block   Estimated Blood Loss: Minimal    Drains: None  Specimens:   ID Type Source Tests Collected by Time Destination   A : right index finger fluid for cultures Synovial fluid Finger, Right ANAEROBIC BACTERIAL CULTURE ROUTINE, GRAM STAIN, AEROBIC BACTERIAL CULTURE ROUTINE Brian Blackwell MD 6/13/2024  3:35 PM      Findings:   Subcutaneous purulence, no pus in PIP joint or flexor sheath .  Complications: None.  Implants: * No implants in log *

## 2024-06-13 NOTE — PROGRESS NOTES
Patient vital signs are at baseline: Yes, on RA  Patient able to ambulate as they were prior to admission or with assist devices provided by therapies during their stay:  Yes  Patient MUST void prior to discharge:  Yes  Patient able to tolerate oral intake:  Yes, but NPO  Pain has adequate pain control using Oral analgesics:  No,  Reason:  Severe pain, IV Dilaudid administered  Does patient have an identified :  Yes  Has goal D/C date and time been discussed with patient:  Yes    A&Ox4. CMS intact. Pt has been NPO since yesterday. Pt is scheduled to have surgery this afternoon around 1500. Pt independent in the room. Call light within reach and called appropriately.

## 2024-06-13 NOTE — ANESTHESIA POSTPROCEDURE EVALUATION
Patient: Klarissa Curtis    Procedure: Procedure(s):  IRRIGATION AND DEBRIDEMENT, RIGHT INDEX FINGER       Anesthesia Type:  MAC    Note:  Disposition: Inpatient   Postop Pain Control: Uneventful            Sign Out: Well controlled pain   PONV: No   Neuro/Psych: Uneventful            Sign Out: Acceptable/Baseline neuro status   Airway/Respiratory: Uneventful            Sign Out: Acceptable/Baseline resp. status; O2 supplementation               Oxygen: Nasal Cannula   CV/Hemodynamics: Uneventful            Sign Out: Acceptable CV status; No obvious hypovolemia; No obvious fluid overload   Other NRE: NONE   DID A NON-ROUTINE EVENT OCCUR? No       Last vitals:  Vitals:    06/13/24 1616 06/13/24 1656 06/13/24 1715   BP: 130/78 112/67 110/68   Pulse:  65 63   Resp: 20 22 17   Temp: 36.8  C (98.2  F) 36.4  C (97.6  F) 36.6  C (97.8  F)   SpO2: 95% 92% 91%       Electronically Signed By: Logan Watson MD  June 13, 2024  6:02 PM

## 2024-06-13 NOTE — BRIEF OP NOTE
[facility]    Brief Operative Note    Pre-operative diagnosis: Pyogenic arthritis of right hand, due to unspecified organism (H) [M00.9]  Post-operative diagnosis Right index finger subcutaneous abscess    Procedure: IRRIGATION AND DEBRIDEMENT, RIGHT INDEX FINGER, Right - Finger    Surgeon: Surgeons and Role:     * Brian Blackwell MD - Primary  Anesthesia: Choice with Block   Estimated Blood Loss: Minimal    Drains: None  Specimens:   ID Type Source Tests Collected by Time Destination   A : right index finger fluid for cultures Synovial fluid Finger, Right ANAEROBIC BACTERIAL CULTURE ROUTINE, GRAM STAIN, AEROBIC BACTERIAL CULTURE ROUTINE Brian Blackwell MD 6/13/2024  3:35 PM      Findings:   Subcutaneous purulence. No PIP or flexor sheath pus .  Complications: None.  Implants: * No implants in log *      Post Op Plan  Leave in dressing until tomorrow morning.  Tomorrow morning start warm water soaks 3-4 times per day  Clean dressing reapply between soaks  F/up on cultures  -Conitnue Vanco and Cefepime for now  Elevate Right hand as much as possible    Brian Blackwell MD

## 2024-06-14 ENCOUNTER — MYC MEDICAL ADVICE (OUTPATIENT)
Dept: DERMATOLOGY | Facility: CLINIC | Age: 49
End: 2024-06-14

## 2024-06-14 DIAGNOSIS — L73.2 HIDRADENITIS SUPPURATIVA: ICD-10-CM

## 2024-06-14 DIAGNOSIS — F32.A DEPRESSION, UNSPECIFIED DEPRESSION TYPE: ICD-10-CM

## 2024-06-14 PROCEDURE — 258N000003 HC RX IP 258 OP 636: Mod: JZ | Performed by: INTERNAL MEDICINE

## 2024-06-14 PROCEDURE — 120N000001 HC R&B MED SURG/OB

## 2024-06-14 PROCEDURE — 250N000011 HC RX IP 250 OP 636: Mod: JZ | Performed by: INTERNAL MEDICINE

## 2024-06-14 PROCEDURE — 250N000013 HC RX MED GY IP 250 OP 250 PS 637: Performed by: ORTHOPAEDIC SURGERY

## 2024-06-14 PROCEDURE — 250N000011 HC RX IP 250 OP 636: Mod: JZ | Performed by: HOSPITALIST

## 2024-06-14 PROCEDURE — 99222 1ST HOSP IP/OBS MODERATE 55: CPT | Performed by: INTERNAL MEDICINE

## 2024-06-14 PROCEDURE — 250N000011 HC RX IP 250 OP 636: Performed by: ORTHOPAEDIC SURGERY

## 2024-06-14 PROCEDURE — 99232 SBSQ HOSP IP/OBS MODERATE 35: CPT | Performed by: HOSPITALIST

## 2024-06-14 PROCEDURE — 258N000003 HC RX IP 258 OP 636: Mod: JZ | Performed by: ORTHOPAEDIC SURGERY

## 2024-06-14 RX ORDER — ENOXAPARIN SODIUM 100 MG/ML
40 INJECTION SUBCUTANEOUS DAILY
Status: DISCONTINUED | OUTPATIENT
Start: 2024-06-14 | End: 2024-06-16 | Stop reason: HOSPADM

## 2024-06-14 RX ADMIN — TRAZODONE HYDROCHLORIDE 225 MG: 150 TABLET ORAL at 23:03

## 2024-06-14 RX ADMIN — ACETAMINOPHEN 975 MG: 325 TABLET ORAL at 14:14

## 2024-06-14 RX ADMIN — ENOXAPARIN SODIUM 40 MG: 100 INJECTION SUBCUTANEOUS at 12:19

## 2024-06-14 RX ADMIN — CEFEPIME 2 G: 2 INJECTION, POWDER, FOR SOLUTION INTRAVENOUS at 01:16

## 2024-06-14 RX ADMIN — ACETAMINOPHEN 975 MG: 325 TABLET ORAL at 21:53

## 2024-06-14 RX ADMIN — SENNOSIDES AND DOCUSATE SODIUM 1 TABLET: 8.6; 5 TABLET ORAL at 21:53

## 2024-06-14 RX ADMIN — HYDROMORPHONE HYDROCHLORIDE 2 MG: 2 TABLET ORAL at 17:30

## 2024-06-14 RX ADMIN — PREGABALIN 150 MG: 75 CAPSULE ORAL at 10:09

## 2024-06-14 RX ADMIN — HYDROMORPHONE HYDROCHLORIDE 2 MG: 2 TABLET ORAL at 23:02

## 2024-06-14 RX ADMIN — DULOXETINE HYDROCHLORIDE 60 MG: 60 CAPSULE, DELAYED RELEASE PELLETS ORAL at 10:09

## 2024-06-14 RX ADMIN — FEXOFENADINE HYDROCHLORIDE 180 MG: 180 TABLET ORAL at 21:55

## 2024-06-14 RX ADMIN — CEFEPIME 2 G: 2 INJECTION, POWDER, FOR SOLUTION INTRAVENOUS at 06:09

## 2024-06-14 RX ADMIN — VANCOMYCIN HYDROCHLORIDE 1250 MG: 5 INJECTION, POWDER, LYOPHILIZED, FOR SOLUTION INTRAVENOUS at 07:00

## 2024-06-14 RX ADMIN — ACETAMINOPHEN 975 MG: 325 TABLET ORAL at 10:09

## 2024-06-14 RX ADMIN — SENNOSIDES AND DOCUSATE SODIUM 1 TABLET: 8.6; 5 TABLET ORAL at 10:09

## 2024-06-14 RX ADMIN — POLYETHYLENE GLYCOL 3350 17 G: 17 POWDER, FOR SOLUTION ORAL at 10:09

## 2024-06-14 RX ADMIN — DAPTOMYCIN 500 MG: 500 INJECTION, POWDER, LYOPHILIZED, FOR SOLUTION INTRAVENOUS at 22:41

## 2024-06-14 RX ADMIN — PREGABALIN 150 MG: 75 CAPSULE ORAL at 21:53

## 2024-06-14 RX ADMIN — HYDROXYZINE HYDROCHLORIDE 25 MG: 25 TABLET ORAL at 23:02

## 2024-06-14 ASSESSMENT — ACTIVITIES OF DAILY LIVING (ADL)
ADLS_ACUITY_SCORE: 22
ADLS_ACUITY_SCORE: 21
ADLS_ACUITY_SCORE: 22
ADLS_ACUITY_SCORE: 22
ADLS_ACUITY_SCORE: 21
ADLS_ACUITY_SCORE: 22
ADLS_ACUITY_SCORE: 22
ADLS_ACUITY_SCORE: 21
ADLS_ACUITY_SCORE: 22
ADLS_ACUITY_SCORE: 21
ADLS_ACUITY_SCORE: 22

## 2024-06-14 NOTE — PROGRESS NOTES
"[facility]    Medicine Progress Note - Hospitalist Service    Date of Admission:  6/12/2024    Assessment & Plan   Klarissa Curtis is a 49 year old immunocompromised female admitted with infection at the right PIP.  She is clinically table.  Operative culture demonstrating GPCs in cluster, suspicious for staph aureus.  Sample was sent from soft tissue specimen.  Per operative report, appearance of joint space and sheath were benign.  Continue empiric abx pending further identification.  ID consulted given anticipated possible need for IV abx on discharge.    # Right second PIP infection  - empiric vanc, cefepime  - ID consult    # Hydradenitis suppurativa  - pregabalin  - duloxetine          Diet: Regular Diet Adult    Gomes Catheter: Not present  Lines: None     Cardiac Monitoring: None  Code Status: Full Code      Clinically Significant Risk Factors                               # Obesity: Estimated body mass index is 38.97 kg/m  as calculated from the following:    Height as of an earlier encounter on 6/12/24: 1.702 m (5' 7\").    Weight as of 5/30/24: 112.9 kg (248 lb 12.8 oz)., PRESENT ON ADMISSION     # Financial/Environmental Concerns:           Disposition Plan                  Ti Piña MD  Hospitalist Service  [facility]  Securely message with [employer] (more info)  Text page via [employer] Paging/Directory   ______________________________________________________________________    Interval History   Denies chest pain/pressure or dyspnea. Pain is improved.    Physical Exam   Vital Signs: Temp: 97.9  F (36.6  C) Temp src: Oral BP: 120/57 Pulse: 51   Resp: 14 SpO2: 96 % O2 Device: None (Room air) Oxygen Delivery: 2 LPM  Weight: 0 lbs 0 oz    Gen:  sitting in bed in no extremis  Neuro: alert, conversant  CV:  bradycardia, regular rhythm  Pulm:  no acute resp distress, ctab anteriorly  MSK:  patient soaking hand in bath, erythema at right hand overall " improved    Medical Decision Making             Data

## 2024-06-14 NOTE — PLAN OF CARE
A/O. VSS on RA. Capnography on overnight. Denies pain. CMS intact. RUE: numbness and tingling present, able to move /wiggle fingers. Independent with activities. Calls appropriately. No adverse event overnight.    Goal Outcome Evaluation:    Problem: Adult Inpatient Plan of Care  Goal: Absence of Hospital-Acquired Illness or Injury  Intervention: Prevent Skin Injury  Recent Flowsheet Documentation  Taken 6/14/2024 0100 by Brie Francisco RN  Body Position: position changed independently     Problem: Adult Inpatient Plan of Care  Goal: Absence of Hospital-Acquired Illness or Injury  Intervention: Prevent Infection  Recent Flowsheet Documentation  Taken 6/14/2024 0100 by Brie Francisco RN  Infection Prevention:   cohorting utilized   hand hygiene promoted   rest/sleep promoted   single patient room provided     Problem: Adult Inpatient Plan of Care  Goal: Optimal Comfort and Wellbeing  Outcome: Progressing     Problem: Pain Acute  Goal: Optimal Pain Control and Function  Outcome: Progressing  Intervention: Prevent or Manage Pain  Recent Flowsheet Documentation  Taken 6/14/2024 0100 by Brie Francisco RN  Medication Review/Management: medications reviewed

## 2024-06-14 NOTE — PROGRESS NOTES
I have received messaged that status is incorrect.   I have ordered the correct status.    Arnoldo Moran   Hospitalist Service  [facility]   Securely message with the [employer] (learn more here)  Text page via [employer] Paging/Directory

## 2024-06-14 NOTE — CONSULTS
"[facility]  General ID Service Consult      Patient: Klarissa Curtis  YOB: 1975, MRN: [id number]  Date of Admission:  6/12/2024  Date of Consult: 06/14/2024  Consult Requested by: Ti Piña MD  Admission Diagnosis: Cellulitis  Septic arthritis (H)  Consult Question: finger infection    ID Assessment & Plan     Right index finger irrigation and debridement, deep down to bone and joint   Significant tissue purulence found though no evidence of septic arthritis, confirmed by Ortho  [facility]..staph aureus?  Hx of left lebow infection 2020, only staph epi grew  MSSA carrier 2023  HS, s/p flap to rt axilla 2/29/24    PLAN  IV daptomycin x now  The plan is PO abx guided by cultures  Confirmed with ortho    Danilo Teixeira M.D.    ______________________________________________________________________        History of Present Illness   Per Ortho  49 year old female with moderate pain of the right   index finger . The patient reports that Saturday noted an area on her right index finger on the medial side that looked like a bug bite. She did not think anything of it at that time. Slowly over the weekend the swelling and redness of the finger worsened. She noted that on Monday the redness was starting to extend into the entire finger. Yesterday she was able to move the finger and notes that today has been unable to move the finger and it is stuck in a flexed position. No fever, chills, nausea or vomiting. No other issues or symptoms \"    Seh feels better  Recent work trip  No other trauma to hands    Radiology personally reviewed  Ruth  .  Soft tissue edema and enhancement involving the second digit and dorsal hand is consistent with cellulitis.   2.  Small areas of nonenhancement around the second PIP joint and dorsal to the second metacarpal may represent necrotic tissue or developing phlegmonous collections. No discrete abscess.  3.  No evidence of acute " osteomyelitis.  4.  No significant tenosynovitis. The visualized tendons and ligaments appear intact.      Review of Systems   The 10 point Review of Systems is negative other than noted in the HPI or here.     Past Medical History    Past Medical History:   Diagnosis Date    NO ACTIVE PROBLEMS        Past Surgical History   Past Surgical History:   Procedure Laterality Date    BUNIONECTOMY      x2    GRAFT FLAP PEDICLE EXTREMITY (LOCATION) Right 2024    Procedure: Right axillary wound excision,Right muscle-sparing Latissimus musculo-cutaneous flap, SPY;  Surgeon: BORIS Mendoza MD;  Location: [facility] OR    IRRIGATION AND DEBRIDEMENT HAND, COMBINED Right 2024    Procedure: IRRIGATION AND DEBRIDEMENT, RIGHT INDEX FINGER;  Surgeon: Brian Blackwell MD;  Location: [facility]    OTHER SURGICAL HISTORY      right pointer finger foreign body removal       Social History   Social History     Tobacco Use    Smoking status: Former     Current packs/day: 0.00     Average packs/day: 0.5 packs/day for 20.0 years (10.0 ttl pk-yrs)     Types: Cigarettes     Start date: 1/3/2004     Quit date: 1/3/2024     Years since quittin.4    Smokeless tobacco: Never   Substance Use Topics    Alcohol use: Not Currently    Drug use: Yes     Types: Marijuana     Comment: Medical marijuana       Family History   I have reviewed this patient's family history and updated it with pertinent information if needed.  Family History   Problem Relation Age of Onset    Diabetes Sister     Hyperlipidemia Sister     Breast Cancer Mother     Arthritis Mother     Myocardial Infarction Father 47       Medications   I have reviewed this patient's current medications    Allergies   Allergies   Allergen Reactions    Penicillins Hives, Itching and Rash     Reaction occurred as child.       Physical Exam   Vital Signs: Temp: 97.9  F (36.6  C) Temp src: Oral BP: 120/57 Pulse: 51   Resp: 14 SpO2: 96 % O2 Device: None (Room air) Oxygen  Delivery: 2 LPM  Weight: 0 lbs 0 oz    Gen. appearance nontoxic  Eyes no conjunctivitis or icterus  Neck no stiffness or neck vein distention, no LN  Heart  No edema  Lungs breathing comfortably  Abdomen soft not tender  Extremities no synovitis, trace edema; finger/hand wrapped  Skin  no rash or emboli  Neurologic alert oriented no focal deficits      Data   Inflammatory Markers   Recent Labs   Lab Test 06/13/24  0744 06/12/24  1452   SED  --  24   CRPI 12.80* 4.63        Hematology Studies   Recent Labs   Lab Test 06/12/24  1452 02/29/24  1323 10/09/23  0813 06/01/22  1358 11/22/21  1254 11/01/21  1136 10/05/21  1125 07/21/21  0947 04/29/21  0943 03/20/20  1402 09/05/19  1746   WBC 13.1*  --  8.7 8.2 7.9 8.0 10.1   < > 7.8  --  7.2   ANEU  --   --   --   --   --   --   --   --  5.4  --  3.7   AEOS  --   --   --   --   --   --   --   --  0.1  --  0.2   HGB 12.8 14.3 13.4 12.5 13.0 12.9 13.5   < > 13.8   < > 14.2   MCV 91  --  96 99 96 95 95   < > 95  --  95     --  438 348 426 350 356   < > 435  --  403    < > = values in this interval not displayed.       Metabolic Studies   Recent Labs   Lab Test 06/12/24  1452 03/01/24  0526 02/29/24  1323 10/09/23  0813 06/01/22  1358 11/01/21  1136 10/05/21  1125     --   --  139 136 133 135   POTASSIUM 3.8  --   --  4.3 4.0 4.1 4.2   CHLORIDE 104  --   --  103 103 103 103   CO2 27  --   --  29 30 26 25   BUN 15.1  --   --  9.5 14 16 18   CR 0.63 0.49* 0.53 0.64 0.67 0.71 0.70   GFRESTIMATED >90 >90 >90 >90 >90 >90 >90       Hepatic Studies    Recent Labs   Lab Test 10/09/23  0813 06/01/22  1358 11/01/21  1136 10/05/21  1125 07/21/21  0947 04/29/21  0943   BILITOTAL 0.4 0.6 0.5 0.5 0.4 0.8   ALKPHOS 61 55 57 58 66 63   ALBUMIN 3.7 3.2* 3.1* 3.4 3.5 3.3*   AST 12 13 12 15 14 9   ALT 10 16 16 19 18 13       Most Recent 6 Bacteria Isolates From Any Culture (See EPIC Reports for Culture Details):No lab results found.    Urine Studies    Recent Labs   Lab Test  "10/09/23  0816 11/01/21  1213 10/05/21  1137 07/21/21  0950 04/29/21  0950   LEUKEST Negative Negative Negative Negative Negative   WBCU 0-5 0-5 0-5 1 0       Vancomycin Levels  No lab results found.    Invalid input(s): \"VANCO\"    Hepatitis B Testing No lab results found.  Hepatitis C Testing   No results found for: \"HCVAB\", \"HQTG\", \"HCGENO\", \"HCPCR\", \"HQTRNA\", \"HEPRNA\"  HIVTesting No lab results found.    Respiratory Virus Testing    No results found for: \"RS\", \"FLUAG\"  COVID-19 Antibody Results, Testing for Immunity           No data to display              COVID-19 PCR Results           No data to display                "

## 2024-06-14 NOTE — PLAN OF CARE
"Day RN ([id number])    Pt arrived to unit from [address] [place] [address] bed at around 1530.  Transferred from wheelchair to bed with SBA - tolerated well.  VSS and afebrile.  Rating pain as manageable at this time.  Orientated to room, call light, and staff.  Will continue to monitor.    Pt A/O x4.  VSS and afebrile.  Pain managed adequately with scheduled PO tylenol and one dose thus far of 2mg PO PRN dilaudid per pt request during soak/dressing change of hand.  CMS intact - baseline tingling of hand, slightly weaker ability to  with R hand as expected.  Dressing CDI - RN completed third soak/dressing change of the day at around 1710.  New non-adherent gauze, kerlex, and ace wrap applied.  Incision purple/blue and swollen, but looks to be improving per pt (\"did they mean to make it match the color of my eyes?\" - good humor about situation).  Surgical site (R flank and breast) from 3 months ago significant scar but healing well.  Up independently.  Voiding adequately.  Tolerating regular diet well.  IV dapto is plan here in hospital.  Plan is home on discharge once medically stable.  Will continue to monitor.   "

## 2024-06-14 NOTE — PLAN OF CARE
Patient is alert and oriented. VSS. Tolerating diet. Right hand elevated at all times. Pain adequately controlled with tylenol. Patient endorses numbness from nerve block and tingling to right hand. Able to wiggle fingers. Voiding without issues. Ambulating with SBA. Plan of care ongoing.    Problem: Adult Inpatient Plan of Care  Goal: Absence of Hospital-Acquired Illness or Injury  Intervention: Identify and Manage Fall Risk  Recent Flowsheet Documentation  Taken 6/13/2024 1700 by Shanell Lazo RN  Safety Promotion/Fall Prevention:   safety round/check completed   clutter free environment maintained   nonskid shoes/slippers when out of bed   patient and family education     Problem: Adult Inpatient Plan of Care  Goal: Absence of Hospital-Acquired Illness or Injury  Intervention: Prevent Infection  Recent Flowsheet Documentation  Taken 6/13/2024 1700 by Shanell Lazo, RN  Infection Prevention:   rest/sleep promoted   single patient room provided

## 2024-06-14 NOTE — PROGRESS NOTES
"Orthopedic Progress Note      Assessment: 1 Day Post-Op  S/P Procedure(s):  IRRIGATION AND DEBRIDEMENT, RIGHT INDEX FINGER @    Plan:   - Continue PT/OT  - Weightbearing status: NWB right index finger, but encourage ROM   - 3-4 times daily soaks with warm soapy water  - redress after soaks  - ID consult for abx coverage  - Discharge planning: pending ID and pain     Subjective:  Pain: minimal still numb from block  Chest pain, SOB: no  Nausea, Vomiting:  no  Lightheadedness, Dizziness:  no  Neuro:  Patient denies new onset numbness or paresthesias    Patient is doing very well this morning notes that her finger still pretty numb from the block but is able to wiggle her finger and do range of motion while doing her soak this morning.  Pain is well-managed at this time.  Feels that her finger erythema and swelling is also improved    Objective:  /57 (BP Location: Left arm)   Pulse 51   Temp 97.9  F (36.6  C) (Oral)   Resp 14   SpO2 96%   The patient is A&Ox3. Appears comfortable.   Sensation is intact.  Able to wiggle all fingers bend her right index finger at the PIP and DIP joints.  Able to make full fist thumbs up okay sign and oppose her finger to to her thumb  Radial pulse intact.  The incision is covered. Dressing C/D/I.      Pertinent Labs   Lab Results: personally reviewed.   No results found for: \"INR\", \"PROTIME\"  Lab Results   Component Value Date    WBC 13.1 (H) 06/12/2024    HGB 12.8 06/12/2024    HCT 39.5 06/12/2024    MCV 91 06/12/2024     06/12/2024     Lab Results   Component Value Date     06/12/2024    CO2 27 06/12/2024         Report completed by:  Lynn Diaz PA-C/[facility]    Date: 6/14/2024  Time: 11:09 AM    "

## 2024-06-14 NOTE — PLAN OF CARE
Maura is A & O x4, independent. R index finger wrapped, 2x soak with soapy water, splint and wrap in place between soaks. Pain managed with scheduled meds. IV Vanco given, discontinued. ID consult. IV SL.     VSS. Transfering to 2E. Report given to LARRY Pablo.

## 2024-06-15 DIAGNOSIS — L73.2 HIDRADENITIS SUPPURATIVA: ICD-10-CM

## 2024-06-15 DIAGNOSIS — L98.9 PAINFUL SKIN LESION: ICD-10-CM

## 2024-06-15 DIAGNOSIS — M79.2 NEUROPATHIC PAIN: ICD-10-CM

## 2024-06-15 PROBLEM — L08.9 SOFT TISSUE INFECTION: Status: ACTIVE | Noted: 2024-06-12

## 2024-06-15 LAB
CK SERPL-CCNC: 37 U/L (ref 26–192)
CREAT SERPL-MCNC: 0.64 MG/DL (ref 0.51–0.95)
EGFRCR SERPLBLD CKD-EPI 2021: >90 ML/MIN/1.73M2
ERYTHROCYTE [DISTWIDTH] IN BLOOD BY AUTOMATED COUNT: 13.9 % (ref 10–15)
HCT VFR BLD AUTO: 39 % (ref 35–47)
HGB BLD-MCNC: 12.6 G/DL (ref 11.7–15.7)
MCH RBC QN AUTO: 29.2 PG (ref 26.5–33)
MCHC RBC AUTO-ENTMCNC: 32.3 G/DL (ref 31.5–36.5)
MCV RBC AUTO: 91 FL (ref 78–100)
PLATELET # BLD AUTO: 361 10E3/UL (ref 150–450)
RBC # BLD AUTO: 4.31 10E6/UL (ref 3.8–5.2)
WBC # BLD AUTO: 9.7 10E3/UL (ref 4–11)

## 2024-06-15 PROCEDURE — 250N000011 HC RX IP 250 OP 636: Mod: JZ | Performed by: HOSPITALIST

## 2024-06-15 PROCEDURE — 250N000013 HC RX MED GY IP 250 OP 250 PS 637: Performed by: ORTHOPAEDIC SURGERY

## 2024-06-15 PROCEDURE — 250N000011 HC RX IP 250 OP 636: Mod: JZ | Performed by: INTERNAL MEDICINE

## 2024-06-15 PROCEDURE — 99207 PR NO CHARGE LOS: CPT | Performed by: INTERNAL MEDICINE

## 2024-06-15 PROCEDURE — 36415 COLL VENOUS BLD VENIPUNCTURE: CPT | Performed by: INTERNAL MEDICINE

## 2024-06-15 PROCEDURE — 258N000003 HC RX IP 258 OP 636: Mod: JZ | Performed by: INTERNAL MEDICINE

## 2024-06-15 PROCEDURE — 120N000001 HC R&B MED SURG/OB

## 2024-06-15 PROCEDURE — 82550 ASSAY OF CK (CPK): CPT | Performed by: INTERNAL MEDICINE

## 2024-06-15 PROCEDURE — 99232 SBSQ HOSP IP/OBS MODERATE 35: CPT | Performed by: HOSPITALIST

## 2024-06-15 PROCEDURE — 82565 ASSAY OF CREATININE: CPT | Performed by: INTERNAL MEDICINE

## 2024-06-15 PROCEDURE — 85027 COMPLETE CBC AUTOMATED: CPT | Performed by: INTERNAL MEDICINE

## 2024-06-15 RX ADMIN — HYDROMORPHONE HYDROCHLORIDE 2 MG: 2 TABLET ORAL at 07:51

## 2024-06-15 RX ADMIN — TRAZODONE HYDROCHLORIDE 225 MG: 150 TABLET ORAL at 22:54

## 2024-06-15 RX ADMIN — HYDROMORPHONE HYDROCHLORIDE 2 MG: 2 TABLET ORAL at 13:30

## 2024-06-15 RX ADMIN — DULOXETINE HYDROCHLORIDE 60 MG: 60 CAPSULE, DELAYED RELEASE PELLETS ORAL at 07:53

## 2024-06-15 RX ADMIN — ACETAMINOPHEN 975 MG: 325 TABLET ORAL at 13:30

## 2024-06-15 RX ADMIN — HYDROMORPHONE HYDROCHLORIDE 2 MG: 2 TABLET ORAL at 23:01

## 2024-06-15 RX ADMIN — PREGABALIN 150 MG: 75 CAPSULE ORAL at 07:53

## 2024-06-15 RX ADMIN — SENNOSIDES AND DOCUSATE SODIUM 1 TABLET: 8.6; 5 TABLET ORAL at 07:53

## 2024-06-15 RX ADMIN — DAPTOMYCIN 500 MG: 500 INJECTION, POWDER, LYOPHILIZED, FOR SOLUTION INTRAVENOUS at 21:11

## 2024-06-15 RX ADMIN — SENNOSIDES AND DOCUSATE SODIUM 1 TABLET: 8.6; 5 TABLET ORAL at 20:10

## 2024-06-15 RX ADMIN — ACETAMINOPHEN 975 MG: 325 TABLET ORAL at 20:10

## 2024-06-15 RX ADMIN — HYDROXYZINE HYDROCHLORIDE 25 MG: 25 TABLET ORAL at 08:56

## 2024-06-15 RX ADMIN — ACETAMINOPHEN 975 MG: 325 TABLET ORAL at 07:53

## 2024-06-15 RX ADMIN — PREGABALIN 150 MG: 75 CAPSULE ORAL at 20:10

## 2024-06-15 RX ADMIN — ENOXAPARIN SODIUM 40 MG: 100 INJECTION SUBCUTANEOUS at 07:56

## 2024-06-15 RX ADMIN — FEXOFENADINE HYDROCHLORIDE 180 MG: 180 TABLET ORAL at 22:54

## 2024-06-15 RX ADMIN — HYDROMORPHONE HYDROCHLORIDE 2 MG: 2 TABLET ORAL at 19:04

## 2024-06-15 RX ADMIN — HYDROMORPHONE HYDROCHLORIDE 6 MG: 2 TABLET ORAL at 03:29

## 2024-06-15 ASSESSMENT — ACTIVITIES OF DAILY LIVING (ADL)
ADLS_ACUITY_SCORE: 22

## 2024-06-15 NOTE — PLAN OF CARE
Goal Outcome Evaluation:      Plan of Care Reviewed With: patient    Overall Patient Progress: improvingOverall Patient Progress: improving       VSS. Pain controlled with tylenol and po dilaudid. Soaks done per orders, pt will be doing her own dressing changes at home, she was able to demonstrate doing the dressing change. PIV ANN'd between abx.

## 2024-06-15 NOTE — PLAN OF CARE
Goal: Absence of Hospital-Acquired Illness or Injury  Outcome: Progressing  Intervention: Identify and Manage Fall Risk  Recent Flowsheet Documentation  Taken 6/14/2024 2000 by Barb Feliciano RN  Safety Promotion/Fall Prevention:   clutter free environment maintained   increased rounding and observation   increase visualization of patient   room organization consistent   safety round/check completed  Intervention: Prevent Skin Injury  Recent Flowsheet Documentation  Taken 6/14/2024 2000 by Barb Feliciano RN  Body Position: position changed independently  Intervention: Prevent Infection  Recent Flowsheet Documentation  Taken 6/14/2024 2000 by Barb Feliciano RN  Infection Prevention: hand hygiene promoted  Goal: Optimal Comfort and Wellbeing  Outcome: Progressing  Goal: Readiness for Transition of Care  Outcome: Progressing     Problem: Pain Acute  Goal: Optimal Pain Control and Function  Outcome: Progressing  Intervention: Prevent or Manage Pain  Recent Flowsheet Documentation  Taken 6/14/2024 2000 by Barb Feliciano RN  Medication Review/Management: medications reviewed  Intervention: Optimize Psychosocial Wellbeing  Recent Flowsheet Documentation  Taken 6/14/2024 2000 by Barb Feliciano RN  Supportive Measures: active listening utilized     Problem: Infection  Goal: Absence of Infection Signs and Symptoms  Outcome: Progressing  Intervention: Prevent or Manage Infection  Recent Flowsheet Documentation  Taken 6/14/2024 2000 by Barb Feliciano RN  Infection Management: aseptic technique maintained   Goal Outcome Evaluation:       Patient VSS. Pain treated with x2 PO dilaudid and tylenol. X1 soapy soak this shift with dressing change. Patient states swelling in right finger is going down. Patient up independently. Voiding spontanously. Care and education on-going.  Barb Feliciano RN

## 2024-06-15 NOTE — PROGRESS NOTES
Brief ID Follow-up Note    Chart reviewed. Waiting on susceptibilities from culture - not back yet    Continue daptomycin    Will follow. Call with questions    Christiano Loredo MD  [facility]  Direct messaging: [facility] Paging   On-Call ID provider: [phone], option: 9

## 2024-06-15 NOTE — PROGRESS NOTES
[facility]         Assessment and Plan:   Assessment:   POD #2 S/P right index finger I&D. Date of surgery 6/13/24 by Dr. Blackwell.  Cultures: 4+ Staph aureus.  Showing signs of clinical improvement.      Plan:   - Continue PT/OT.  - Weightbearing status: NWB right index finger, but encourage ROM.  - Continue 3-4 times daily soaks with warm soapy water.  - Redress after soaks, soft dressing.  - Continue IV abx per ID.  - Discharge planning: pending ID and pain. Anticipate discharge on oral antibiotics per ID recs.              Interval History:   Patient reports improvement in pain and right index finger motion.  Moderate pain in finger.  States she is feeling better overall.          Significant Problems:     Patient Active Problem List   Diagnosis    Hidradenitis suppurativa    Fatigue    Soft tissue infection             Review of Systems:   CONSTITUTIONAL: NEGATIVE for fever, chills, change in weight  ENT/MOUTH: NEGATIVE for ear, mouth and throat problems  RESP: NEGATIVE for significant cough or SOB  CV: NEGATIVE for chest pain, palpitations or peripheral edema    OBJECTIVE:  Patient laying comfortably in bed watching tv.  Alert and oriented. Very pleasant and conversational.  Right index finger unwrapped to reveal intact incision over dorsal PIP joint and proximal phalanx, sutures intact. Finger is pink and perfused. Moderate swelling of index finger. Mild purulent drainage. Moderate tenderness. Able to wiggle finger, flexion and extension. NVI. Brisk cap refill. Sensation to light touch intact.           Medications:     Current Facility-Administered Medications   Medication Dose Route Frequency Provider Last Rate Last Admin    acetaminophen (TYLENOL) tablet 975 mg  975 mg Oral TID Brian Blackwell MD   975 mg at 06/15/24 0753    Or    acetaminophen (TYLENOL) Suppository 650 mg  650 mg Rectal TID Brian Blackwell MD        bisacodyl (DULCOLAX) suppository 10 mg  10 mg Rectal Daily  PRN Brian Blackwell MD        DAPTOmycin (CUBICIN) 500 mg in sodium chloride 0.9 % 100 mL intermittent infusion  6 mg/kg (Adjusted) Intravenous Q24H Danilo Teixeira MD   500 mg at 06/14/24 2241    DULoxetine (CYMBALTA) DR capsule 60 mg  60 mg Oral Daily Brian Blackwell MD   60 mg at 06/15/24 0753    enoxaparin ANTICOAGULANT (LOVENOX) injection 40 mg  40 mg Subcutaneous Daily Ti Piña MD   40 mg at 06/15/24 0756    fexofenadine (ALLEGRA) tablet 180 mg  180 mg Oral At Bedtime Brian Blackwell MD   180 mg at 06/14/24 2155    HYDROmorphone (DILAUDID) tablet 6 mg  6 mg Oral Q3H PRN Brian Blackwell MD   6 mg at 06/15/24 0329    Or    HYDROmorphone (DILAUDID) tablet 8 mg  8 mg Oral Q3H PRN Brian Blackwell MD   8 mg at 06/13/24 1208    Or    HYDROmorphone (DILAUDID) tablet 2 mg  2 mg Oral Q3H PRN Brian Blackwell MD   2 mg at 06/15/24 0751    HYDROmorphone (PF) (DILAUDID) injection 1 mg  1 mg Intravenous Q3H PRN Brian Blackwell MD   1 mg at 06/13/24 1326    hydrOXYzine HCl (ATARAX) tablet 25 mg  25 mg Oral Q6H PRN Brian Blackwell MD   25 mg at 06/14/24 2302    lidocaine (LMX4) cream   Topical Q1H PRN Brian Blackwell MD        lidocaine 1 % 0.1-1 mL  0.1-1 mL Other Q1H PRN Brian Blackwell MD        naloxone (NARCAN) injection 0.2 mg  0.2 mg Intravenous Q2 Min PRN Brian Blackwell MD        Or    naloxone (NARCAN) injection 0.4 mg  0.4 mg Intravenous Q2 Min PRN Brian Blackwell MD        Or    naloxone (NARCAN) injection 0.2 mg  0.2 mg Intramuscular Q2 Min PRN Brian Blackwell MD        Or    naloxone (NARCAN) injection 0.4 mg  0.4 mg Intramuscular Q2 Min PRN Brian Blackwell MD        polyethylene glycol (MIRALAX) Packet 17 g  17 g Oral Daily Brian Blackwell MD   17 g at 06/14/24 1009    pregabalin (LYRICA) capsule 150 mg  150 mg Oral BID Brian Blackwell MD   150 mg at 06/15/24 075    senna-docusate  (SENOKOT-S/PERICOLACE) 8.6-50 MG per tablet 1 tablet  1 tablet Oral BID Brian Blackwell MD   1 tablet at 06/15/24 0753    sodium chloride (PF) 0.9% PF flush 3 mL  3 mL Intracatheter Q8H Brian Blackwell MD   3 mL at 06/14/24 1415    sodium chloride (PF) 0.9% PF flush 3 mL  3 mL Intracatheter q1 min prn Brian Blackwell MD   3 mL at 06/12/24 2002    traZODone (DESYREL) half-tab 225 mg  225 mg Oral At Bedtime Brian Blackwell MD   225 mg at 06/14/24 2303        -          Data:   All laboratory and imaging data have been reviewed by myself.    Right Finger Cultures:  4+ Staphylococcus aureus         Kyra Hui PA-C  [facility]  [phone]

## 2024-06-15 NOTE — PROGRESS NOTES
"[facility]    Medicine Progress Note - Hospitalist Service    Date of Admission:  6/12/2024    Assessment & Plan   Klarissa Curtis is a 49 year old immunocompromised female admitted with infection at the right PIP.  She is clinically table.  Op cultures with staph aureus, awaiting differentiation of MSSA/MRSA, ID consulted for abx management.    # Right second PIP infection  - empiric vanc, cefepime  - ID consult    # Hydradenitis suppurativa  - pregabalin  - duloxetine          Diet: Regular Diet Adult      Gomes Catheter: Not present  Lines: None     Cardiac Monitoring: None  Code Status: Full Code      Clinically Significant Risk Factors                               # Obesity: Estimated body mass index is 38.26 kg/m  as calculated from the following:    Height as of an earlier encounter on 6/12/24: 1.702 m (5' 7\").    Weight as of this encounter: 110.8 kg (244 lb 4.8 oz)., PRESENT ON ADMISSION     # Financial/Environmental Concerns:           Disposition Plan     Medically Ready for Discharge:              Ti Piña MD  Hospitalist Service  [facility]  Securely message with [employer] (more info)  Text page via [employer] Paging/Directory   ______________________________________________________________________    Interval History   Denies nausea, vomiting, or abdominal pain.  There has been improvement in hand pain.    Physical Exam   Vital Signs: Temp: 98.3  F (36.8  C) Temp src: Oral BP: 118/65 Pulse: 60   Resp: 16 SpO2: 97 % O2 Device: None (Room air)    Weight: 244 lbs 4.8 oz    Gen: lying in bed in no extremis  Neuro: alert, conversant  CV:  bradycardia, regular rhythm  Pulm: No acute resp distress, ctab anteriorly  MSK:  overall improvement in erythema and swelling in the right hand    Medical Decision Making             Data   Reviewed:  Cr 0.64    WBC 9.7  Hgb 13  Plts 361  "

## 2024-06-16 VITALS
HEART RATE: 72 BPM | BODY MASS INDEX: 38.26 KG/M2 | SYSTOLIC BLOOD PRESSURE: 123 MMHG | DIASTOLIC BLOOD PRESSURE: 61 MMHG | RESPIRATION RATE: 16 BRPM | OXYGEN SATURATION: 97 % | WEIGHT: 244.3 LBS | TEMPERATURE: 98.3 F

## 2024-06-16 LAB — BACTERIA SNV CULT: ABNORMAL

## 2024-06-16 PROCEDURE — 250N000013 HC RX MED GY IP 250 OP 250 PS 637: Performed by: HOSPITALIST

## 2024-06-16 PROCEDURE — 250N000013 HC RX MED GY IP 250 OP 250 PS 637: Performed by: ORTHOPAEDIC SURGERY

## 2024-06-16 PROCEDURE — 99238 HOSP IP/OBS DSCHRG MGMT 30/<: CPT | Performed by: HOSPITALIST

## 2024-06-16 PROCEDURE — 250N000011 HC RX IP 250 OP 636: Mod: JZ | Performed by: HOSPITALIST

## 2024-06-16 PROCEDURE — 99232 SBSQ HOSP IP/OBS MODERATE 35: CPT | Performed by: INTERNAL MEDICINE

## 2024-06-16 RX ORDER — FEXOFENADINE HCL 180 MG/1
180 TABLET ORAL DAILY
Qty: 30 TABLET | Refills: 0 | Status: SHIPPED | OUTPATIENT
Start: 2024-06-16 | End: 2024-07-02

## 2024-06-16 RX ORDER — HYDROMORPHONE HYDROCHLORIDE 2 MG/1
2 TABLET ORAL 3 TIMES DAILY PRN
Qty: 21 TABLET | Refills: 0 | Status: SHIPPED | OUTPATIENT
Start: 2024-06-16 | End: 2024-06-23

## 2024-06-16 RX ORDER — POLYETHYLENE GLYCOL 3350 17 G/17G
17 POWDER, FOR SOLUTION ORAL DAILY
COMMUNITY
Start: 2024-06-16 | End: 2024-06-16

## 2024-06-16 RX ORDER — SENNOSIDES 8.6 MG
1 TABLET ORAL DAILY PRN
COMMUNITY
Start: 2024-06-16 | End: 2024-06-16

## 2024-06-16 RX ORDER — SENNOSIDES 8.6 MG
1 TABLET ORAL 2 TIMES DAILY
COMMUNITY
Start: 2024-06-16 | End: 2024-08-09

## 2024-06-16 RX ORDER — POLYETHYLENE GLYCOL 3350 17 G/17G
17 POWDER, FOR SOLUTION ORAL 2 TIMES DAILY
COMMUNITY
Start: 2024-06-16

## 2024-06-16 RX ORDER — CEPHALEXIN 500 MG/1
500 CAPSULE ORAL 4 TIMES DAILY
Qty: 28 CAPSULE | Refills: 0 | Status: DISCONTINUED | OUTPATIENT
Start: 2024-06-16 | End: 2024-06-16 | Stop reason: HOSPADM

## 2024-06-16 RX ORDER — CEPHALEXIN 500 MG/1
500 CAPSULE ORAL 4 TIMES DAILY
Qty: 28 CAPSULE | Refills: 0 | Status: SHIPPED | OUTPATIENT
Start: 2024-06-16 | End: 2024-08-09

## 2024-06-16 RX ORDER — ACETAMINOPHEN 500 MG
1000 TABLET ORAL 3 TIMES DAILY
COMMUNITY
Start: 2024-06-16 | End: 2024-06-23

## 2024-06-16 RX ORDER — CEPHALEXIN 500 MG/1
500 CAPSULE ORAL ONCE
Status: DISCONTINUED | OUTPATIENT
Start: 2024-06-16 | End: 2024-06-16

## 2024-06-16 RX ADMIN — DULOXETINE HYDROCHLORIDE 60 MG: 60 CAPSULE, DELAYED RELEASE PELLETS ORAL at 08:41

## 2024-06-16 RX ADMIN — POLYETHYLENE GLYCOL 3350 17 G: 17 POWDER, FOR SOLUTION ORAL at 08:45

## 2024-06-16 RX ADMIN — PREGABALIN 150 MG: 75 CAPSULE ORAL at 08:42

## 2024-06-16 RX ADMIN — ACETAMINOPHEN 975 MG: 325 TABLET ORAL at 08:41

## 2024-06-16 RX ADMIN — ENOXAPARIN SODIUM 40 MG: 100 INJECTION SUBCUTANEOUS at 08:56

## 2024-06-16 RX ADMIN — CEPHALEXIN 500 MG: 500 CAPSULE ORAL at 13:12

## 2024-06-16 RX ADMIN — SENNOSIDES AND DOCUSATE SODIUM 1 TABLET: 8.6; 5 TABLET ORAL at 08:41

## 2024-06-16 RX ADMIN — CEPHALEXIN 500 MG: 500 CAPSULE ORAL at 08:42

## 2024-06-16 ASSESSMENT — ACTIVITIES OF DAILY LIVING (ADL)
ADLS_ACUITY_SCORE: 22

## 2024-06-16 NOTE — PROGRESS NOTES
[facility]         Assessment and Plan:   Assessment:   POD #3 S/P right index finger I&D. Date of surgery 6/13/24 by Dr. Blackwell.  Cultures: 4+ Staph aureus - resistant only to Clindamycin.           Plan:   ID recommends discharge on oral Keflex.  Ortho ok with discharge today.  Continue 3-4 times daily warm water soaks at home followed by new dressing changes.  Routine hygiene in shower ok, no need to cover wound in shower.  Gentle AROM ok.  Follow-up with Dr. Blackwell this week outpatient at [facility].  Patient can call [phone] to make appointment or schedule online.            Interval History:   Patient reports improvement in pain and right index finger motion.  Moderate pain in finger with stiffness, although improving.  States she is feeling better overall and is ready to go home.    OBJECTIVE:  Patient laying comfortably in bed watching tv.  Alert and oriented. Very pleasant and conversational.  Right index finger unwrapped to reveal intact incision over dorsal PIP joint and proximal phalanx, sutures intact. Superficial layer of skin sloughing off. Finger is pink and perfused. Mild swelling of index finger. Minimal purulent drainage. Moderate tenderness. Able to wiggle finger, flexion and extension. NVI. Brisk cap refill. Sensation to light touch intact.           Significant Problems:     Patient Active Problem List   Diagnosis    Hidradenitis suppurativa    Fatigue    Soft tissue infection             Review of Systems:   The Review of Systems is negative other than noted in the HPI          Medications:     Current Facility-Administered Medications   Medication Dose Route Frequency Provider Last Rate Last Admin    acetaminophen (TYLENOL) tablet 975 mg  975 mg Oral TID Brian Blackwell MD   975 mg at 06/16/24 0841    Or    acetaminophen (TYLENOL) Suppository 650 mg  650 mg Rectal TID Brian Blackwell MD        bisacodyl (DULCOLAX) suppository 10 mg  10 mg Rectal  Daily PRN Brian Blackwell MD        cephALEXin (KEFLEX) capsule 500 mg  500 mg Oral 4x Daily Ti Piña MD   500 mg at 06/16/24 0842    DULoxetine (CYMBALTA) DR capsule 60 mg  60 mg Oral Daily Brian Blackwell MD   60 mg at 06/16/24 0841    enoxaparin ANTICOAGULANT (LOVENOX) injection 40 mg  40 mg Subcutaneous Daily Ti Piña MD   40 mg at 06/16/24 0856    fexofenadine (ALLEGRA) tablet 180 mg  180 mg Oral At Bedtime Brian Blackwell MD   180 mg at 06/15/24 2254    HYDROmorphone (DILAUDID) tablet 6 mg  6 mg Oral Q3H PRN Brian Blackwell MD   6 mg at 06/15/24 0329    Or    HYDROmorphone (DILAUDID) tablet 8 mg  8 mg Oral Q3H PRN Brian Blackwell MD   8 mg at 06/13/24 1208    Or    HYDROmorphone (DILAUDID) tablet 2 mg  2 mg Oral Q3H PRN Brian Blackwell MD   2 mg at 06/15/24 2301    HYDROmorphone (PF) (DILAUDID) injection 1 mg  1 mg Intravenous Q3H PRN Brina Blackwell MD   1 mg at 06/13/24 1326    hydrOXYzine HCl (ATARAX) tablet 25 mg  25 mg Oral Q6H PRN Brian Blackwell MD   25 mg at 06/15/24 0856    lidocaine (LMX4) cream   Topical Q1H PRN Brian Blackwell MD        lidocaine 1 % 0.1-1 mL  0.1-1 mL Other Q1H PRN Brian Blackwell MD        naloxone (NARCAN) injection 0.2 mg  0.2 mg Intravenous Q2 Min PRN Brian Blackwell MD        Or    naloxone (NARCAN) injection 0.4 mg  0.4 mg Intravenous Q2 Min PRN Brian Blackwell MD        Or    naloxone (NARCAN) injection 0.2 mg  0.2 mg Intramuscular Q2 Min PRN Brian Blackwell MD        Or    naloxone (NARCAN) injection 0.4 mg  0.4 mg Intramuscular Q2 Min PRN Brian Blackwell MD        polyethylene glycol (MIRALAX) Packet 17 g  17 g Oral Daily Brian Blackwell MD   17 g at 06/16/24 0845    pregabalin (LYRICA) capsule 150 mg  150 mg Oral BID Brian Blackwell MD   150 mg at 06/16/24 0842    senna-docusate (SENOKOT-S/PERICOLACE) 8.6-50 MG per tablet 1 tablet  1 tablet Oral  BID Brian Blackwell MD   1 tablet at 06/16/24 0841    sodium chloride (PF) 0.9% PF flush 3 mL  3 mL Intracatheter Q8H Brian Blackwell MD   3 mL at 06/16/24 0856    sodium chloride (PF) 0.9% PF flush 3 mL  3 mL Intracatheter q1 min prn Brian Blackwell MD   3 mL at 06/12/24 2002    traZODone (DESYREL) half-tab 225 mg  225 mg Oral At Bedtime Brian Blackwell MD   225 mg at 06/15/24 2254        -          Data:   All laboratory data reviewed  Cultures: 4+ Staph aureus, sensitive to everything except resistant to Clindamycin.      Attestation:  I have reviewed today's vital signs, notes, medications, labs and imaging.     Kyra Hui PA-C  [facility] - Hand Surgery  [phone]

## 2024-06-16 NOTE — DISCHARGE INSTRUCTIONS
Continue 3-4 times daily warm water soaks at home followed by new dressing changes.  Routine hygiene in shower ok, no need to cover wound in shower.  Gentle AROM ok.  Follow-up with Dr. Blackwell this week outpatient at [facility].  Patient can call [phone] to make appointment or schedule online.

## 2024-06-16 NOTE — PROGRESS NOTES
"[facility]    Medicine Progress Note - Hospitalist Service    Date of Admission:  6/12/2024    Assessment & Plan   Klarissa Curtis is a 49 year old immunocompromised female admitted with infection at the right PIP.  She is clinically table.  MSSA demonstrated from cultures.  Transition to oral antibiotics and anticipate discharge today.    # Right second PIP infection  - transition to oral antibiotics today    # Constipation  - senna and miralax bid    # Hydradenitis suppurativa  - pregabalin  - duloxetine    Addendum:  Discussed with ID, one week cephalexin appropriate.'    Per nursing, patient tolerated cephalexin.          Diet: Regular Diet Adult      Gomes Catheter: Not present  Lines: None     Cardiac Monitoring: None  Code Status: Full Code      Clinically Significant Risk Factors                               # Obesity: Estimated body mass index is 38.26 kg/m  as calculated from the following:    Height as of an earlier encounter on 6/12/24: 1.702 m (5' 7\").    Weight as of this encounter: 110.8 kg (244 lb 4.8 oz)., PRESENT ON ADMISSION     # Financial/Environmental Concerns:           Disposition Plan     Medically Ready for Discharge:              Ti Piña MD  Hospitalist Service  [facility]  Securely message with [employer] (more info)  Text page via [employer] Paging/Directory   ______________________________________________________________________    Interval History   Reports finger pain with movement.  Denies fevers or chills.  Endorses flatus.  Has not had a bowel movement.    Physical Exam   Vital Signs: Temp: 98.6  F (37  C) Temp src: Oral BP: 105/54 Pulse: 66   Resp: 16 SpO2: 97 % O2 Device: None (Room air)    Weight: 244 lbs 4.8 oz    Gen:  sitting in bed in no extremis  Neuro: alert, conversant  CV:  nl rate, regular rhythm  Pulm: no acute resp distress, ctab  MSK:  right hand with significantly improved erythema associated with second " [facility]    Medical Decision Making             Data

## 2024-06-16 NOTE — PROGRESS NOTES
Infectious Diseases Progress Note  [facility]    Date of visit: 06/16/2024       ID Assessment & Plan     Right index finger irrigation and debridement, deep down to bone and joint   Significant tissue purulence found though no evidence of septic arthritis, confirmed by Ortho  [facility]. 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   Follow-up with ortho, okay to extend antibiotics up to another week if signs of infection persist    Christiano Loredo MD  [facility]  Direct messaging: [employer] Paging   On-Call ID provider: [phone], option: 9         ______________________________________________________________________        SUBJECTIVE / INTERVAL HISTORY:     No events. Feels well. Still with swelling, but overall better      Physical Exam   Vital Signs: Temp: 98.3  F (36.8  C) Temp src: Oral BP: 123/61 Pulse: 72   Resp: 16 SpO2: 97 % O2 Device: None (Room air)    Weight: 244 lbs 4.8 oz    Gen. appearance nontoxic  Eyes no conjunctivitis or icterus  Lungs breathing comfortably  Extremities. Right index finger with local swelling around incision site. Minimal redness/swelling distal or proximal to incision site  Skin  no rash or emboli  Neurologic alert oriented no focal deficits      Data   Inflammatory Markers   Recent Labs   Lab Test 06/13/24  0744 06/12/24  1452   SED  --  24   CRPI 12.80* 4.63        Hematology Studies   Recent Labs   Lab Test 06/15/24  0740 06/12/24  1452 02/29/24  1323 10/09/23  0813 06/01/22  1358 11/22/21  1254 11/01/21  1136 07/21/21  0947 04/29/21  0943 03/20/20  1402 09/05/19  1746   WBC 9.7 13.1*  --  8.7 8.2 7.9 8.0   < > 7.8  --  7.2   ANEU  --   --   --   --   --   --   --   --  5.4  --  3.7   AEOS  --   --   --   --   --   --   --   --  0.1  --  0.2   HGB 12.6 12.8 14.3 13.4 12.5 13.0 12.9   < >  "13.8   < > 14.2   MCV 91 91  --  96 99 96 95   < > 95  --  95    411  --  438 348 426 350   < > 435  --  403    < > = values in this interval not displayed.       Metabolic Studies   Recent Labs   Lab Test 06/15/24  0740 06/12/24  1452 03/01/24  0526 02/29/24  1323 10/09/23  0813 06/01/22  1358 11/01/21  1136 10/05/21  1125   NA  --  140  --   --  139 136 133 135   POTASSIUM  --  3.8  --   --  4.3 4.0 4.1 4.2   CHLORIDE  --  104  --   --  103 103 103 103   CO2  --  27  --   --  29 30 26 25   BUN  --  15.1  --   --  9.5 14 16 18   CR 0.64 0.63 0.49* 0.53 0.64 0.67 0.71 0.70   GFRESTIMATED >90 >90 >90 >90 >90 >90 >90 >90       Hepatic Studies    Recent Labs   Lab Test 10/09/23  0813 06/01/22  1358 11/01/21  1136 10/05/21  1125 07/21/21  0947 04/29/21  0943   BILITOTAL 0.4 0.6 0.5 0.5 0.4 0.8   ALKPHOS 61 55 57 58 66 63   ALBUMIN 3.7 3.2* 3.1* 3.4 3.5 3.3*   AST 12 13 12 15 14 9   ALT 10 16 16 19 18 13       Most Recent 6 Bacteria Isolates From Any Culture (See EPIC Reports for Culture Details):No lab results found.    Urine Studies    Recent Labs   Lab Test 10/09/23  0816 11/01/21  1213 10/05/21  1137 07/21/21  0950 04/29/21  0950   LEUKEST Negative Negative Negative Negative Negative   WBCU 0-5 0-5 0-5 1 0       Vancomycin Levels  No lab results found.    Invalid input(s): \"VANCO\"    Hepatitis B Testing No lab results found.  Hepatitis C Testing   No results found for: \"HCVAB\", \"HQTG\", \"HCGENO\", \"HCPCR\", \"HQTRNA\", \"HEPRNA\"  HIVTesting No lab results found.    Respiratory Virus Testing    No results found for: \"RS\", \"FLUAG\"  COVID-19 Antibody Results, Testing for Immunity           No data to display              COVID-19 PCR Results           No data to display                "

## 2024-06-16 NOTE — PLAN OF CARE
Goal: Absence of Hospital-Acquired Illness or Injury  Outcome: Progressing  Intervention: Identify and Manage Fall Risk  Recent Flowsheet Documentation  Taken 6/15/2024 2000 by Barb Feliciano RN  Safety Promotion/Fall Prevention:   clutter free environment maintained   lighting adjusted   nonskid shoes/slippers when out of bed   patient and family education   safety round/check completed  Intervention: Prevent Skin Injury  Recent Flowsheet Documentation  Taken 6/15/2024 2000 by Barb Feliciano RN  Body Position: position changed independently  Intervention: Prevent Infection  Recent Flowsheet Documentation  Taken 6/15/2024 2000 by Barb Feliciano RN  Infection Prevention: hand hygiene promoted  Goal: Optimal Comfort and Wellbeing  Outcome: Progressing  Goal: Readiness for Transition of Care  Outcome: Progressing     Problem: Pain Acute  Goal: Optimal Pain Control and Function  Outcome: Progressing  Intervention: Prevent or Manage Pain  Recent Flowsheet Documentation  Taken 6/15/2024 2000 by Barb Feliciano RN  Medication Review/Management: medications reviewed     Problem: Infection  Goal: Absence of Infection Signs and Symptoms  Outcome: Progressing   Goal Outcome Evaluation:       Patient VSS. Pain controlled with tylenol and PO dilaudid. Patient states her pain was much better than yesterday. Swelling has decreased and patient is able to move finger. Daptomycin given per order. Warm soapy soak done x1 this shift at 2000 last night. Care and education on-going.  Barb Feliciano RN

## 2024-06-16 NOTE — DISCHARGE SUMMARY
"[facility]  Hospitalist Discharge Summary      Date of Admission:  6/12/2024  Date of Discharge:  6/16/2024  Discharging Provider: Ti Piña MD  Discharge Service: Hospitalist Service    Discharge Diagnoses   Right second PIP skin and soft tissue infection  Constipation    Clinically Significant Risk Factors     # Obesity: Estimated body mass index is 38.26 kg/m  as calculated from the following:    Height as of an earlier encounter on 6/12/24: 1.702 m (5' 7\").    Weight as of this encounter: 110.8 kg (244 lb 4.8 oz).       Follow-ups Needed After Discharge   Follow-up Appointments     Follow-up and recommended labs and tests       Follow up with primary care provider, Jean Kenny, within 7 days   for hospital follow- up.  The following labs/tests are recommended:  - Follow-up with Dr. Blackwell this week outpatient at [facility].    Patient can call [phone] to make appointment or schedule online.  - per ID:  Follow-up with ortho, okay to extend antibiotics up to another   week if signs of infection persist            Unresulted Labs Ordered in the Past 30 Days of this Admission       Date and Time Order Name Status Description    6/13/2024  3:35 PM Anaerobic Bacterial Culture Routine Preliminary         These results will be followed up by orthopaedics    Discharge Disposition   Discharged to home  Condition at discharge: Stable    Hospital Course   The patient was admitted with skin/soft tissue infection originating from the right second PIP.  She was treated with IV antibiotics and evaluated by orthopaedics.  She had a washout with orthopaedics, with intraoperative findings reassuring against involvement of the joint or tendon sheath.  Cultures (from subcutaneous tissue) grew MSSA and she was transitioned to oral antibiotics and discharged.    Consultations This Hospital Stay   PHARMACY TO DOSE VANCO  PAIN MANAGEMENT ADULT IP CONSULT  INFECTIOUS DISEASES IP " CONSULT  INFECTIOUS DISEASES IP CONSULT    Code Status   Full Code    Time Spent on this Encounter   I, Ti Piña MD, personally saw the patient today and spent less than or equal to 30 minutes discharging this patient.       Ti Piña MD  [address] [address]  Phone: [phone]  Fax: [phone]  ______________________________________________________________________    Physical Exam   Vital Signs: Temp: 98.3  F (36.8  C) Temp src: Oral BP: 123/61 Pulse: 72   Resp: 16 SpO2: 97 % O2 Device: None (Room air)    Weight: 244 lbs 4.8 oz    See progress note       Primary Care Physician   Jean Kenny    Discharge Orders      Reason for your hospital stay    You were admitted to the hospital with an infection in your right index finger.  You had surgery to clear out the infection and were treated with antibiotics.  You had improvement and were discharged home.     When to contact your care team    Seek medical attention if you have any of the following: persistent fevers, worsening redness in the hand, drainage from the wound, numbness or tingling in the hand, or worsening pain in the hand.     Activity    Your activity upon discharge:    Gentle active range of motion okay per orthopaedics     Wound care and dressings    Instructions to care for your wound at home:  Continue 3-4 times daily warm water soaks at home followed by new dressing changes.  Routine hygiene in shower ok, no need to cover wound in shower.     Follow-up and recommended labs and tests     Follow up with primary care provider, Jean Kenny, within 7 days for hospital follow- up.  The following labs/tests are recommended:  - Follow-up with Dr. Blackwell this week outpatient at [facility].  Patient can call [phone] to make appointment or schedule online.  - per ID:  Follow-up with orthodhruv to extend antibiotics up to another week if  signs of infection persist     Diet    Follow this diet upon discharge: Orders Placed This Encounter      Regular Diet Adult       Significant Results and Procedures   Most Recent 3 CBC's:  Recent Labs   Lab Test 06/15/24  0740 06/12/24  1452 02/29/24  1323 10/09/23  0813   WBC 9.7 13.1*  --  8.7   HGB 12.6 12.8 14.3 13.4   MCV 91 91  --  96    411  --  438     Most Recent 3 BMP's:  Recent Labs   Lab Test 06/15/24  0740 06/12/24  1452 03/01/24  0651 03/01/24  0526 02/29/24  1323 02/29/24  1258 10/09/23  0813 06/01/22  1358   NA  --  140  --   --   --   --  139 136   POTASSIUM  --  3.8  --   --   --   --  4.3 4.0   CHLORIDE  --  104  --   --   --   --  103 103   CO2  --  27  --   --   --   --  29 30   BUN  --  15.1  --   --   --   --  9.5 14   CR 0.64 0.63  --  0.49*   < >  --  0.64 0.67   ANIONGAP  --  9  --   --   --   --  7 3   MERCY  --  8.5*  --   --   --   --  8.7 8.9   GLC  --  75 155*  --   --  97 87 77    < > = values in this interval not displayed.   ,   Results for orders placed or performed during the hospital encounter of 06/12/24   MR Hand Right w/o & w Contrast    Narrative    EXAM: MR HAND RIGHT W/O and W CONTRAST  LOCATION: [facility]  DATE: 6/12/2024    INDICATION: rule out abscess and or tenosynovitis  COMPARISON: None.  TECHNIQUE: Routine. Additional postgadolinium T1 sequences were obtained.  IV CONTRAST: 10mL Gadavist    FINDINGS:     TENDONS:   -Extensor tendons: No tendon tear, tendinopathy, or tenosynovitis.  -Flexor tendons: No tear, tendinopathy, or tenosynovitis.    LIGAMENTS:  -Visualized collateral and capsular ligaments: Normal.    JOINTS AND BONES:   -No fracture or bone marrow edema. Normal articular cartilage. No joint effusion or synovitis.    MUSCLES AND SOFT TISSUES:   -There is marked soft tissue edema of the second digit which extends proximally into the dorsal hand. This demonstrates enhancement on postcontrast sequences consistent with  "cellulitis. No discrete fluid collection. There are small areas of   nonenhancement around the second PIP joint and dorsal to the second metacarpal (series 7, images 19 and 39). No muscle atrophy or edema.      Impression    IMPRESSION:  1.  Soft tissue edema and enhancement involving the second digit and dorsal hand is consistent with cellulitis.   2.  Small areas of nonenhancement around the second PIP joint and dorsal to the second metacarpal may represent necrotic tissue or developing phlegmonous collections. No discrete abscess.  3.  No evidence of acute osteomyelitis.  4.  No significant tenosynovitis. The visualized tendons and ligaments appear intact.     POC US Guidance Needle Placement    Narrative    Ultrasound was performed as guidance to an anesthesia procedure.  Click   \"PACS images\" hyperlink below to view any stored images.  For specific   procedure details, view procedure note authored by anesthesia.   POC US Guidance Needle Placement    Narrative    Ultrasound was performed as guidance to an anesthesia procedure.  Click   \"PACS images\" hyperlink below to view any stored images.  For specific   procedure details, view procedure note authored by anesthesia.       Discharge Medications   Current Discharge Medication List        START taking these medications    Details   acetaminophen (TYLENOL) 500 MG tablet Take 2 tablets (1,000 mg) by mouth 3 times daily for 7 days    Associated Diagnoses: Soft tissue infection      cephALEXin (KEFLEX) 500 MG capsule Take 1 capsule (500 mg) by mouth 4 times daily  Qty: 28 capsule, Refills: 0    Associated Diagnoses: Soft tissue infection      HYDROmorphone (DILAUDID) 2 MG tablet Take 1 tablet (2 mg) by mouth 3 times daily as needed for moderate to severe pain  Qty: 21 tablet, Refills: 0    Associated Diagnoses: Soft tissue infection      polyethylene glycol (MIRALAX) 17 GM/Dose powder Take 17 g by mouth 2 times daily    Associated Diagnoses: Soft tissue infection    "   sennosides (SENOKOT) 8.6 MG tablet Take 1 tablet by mouth 2 times daily    Associated Diagnoses: Soft tissue infection           CONTINUE these medications which have CHANGED    Details   fexofenadine (ALLEGRA) 180 MG tablet Take 1 tablet (180 mg) by mouth daily  Qty: 30 tablet, Refills: 0    Associated Diagnoses: Dermatographism           CONTINUE these medications which have NOT CHANGED    Details   adalimumab (HUMIRA *CF*) 80 MG/0.8ML pen kit Inject 0.8 mLs (80 mg) Subcutaneous once a week  Qty: 3.2 mL, Refills: 11    Associated Diagnoses: Hidradenitis suppurativa      calcium carbonate (OS-MERCY) 500 MG TABS Take 1 tablet by mouth 2 times daily (with meals)      DULoxetine (CYMBALTA) 30 MG capsule Take 30mg (1 pill) for 1 week and if tolerating increase to 60mg (2 pills) daily  Qty: 180 capsule, Refills: 3    Associated Diagnoses: Hidradenitis suppurativa; Depression, unspecified depression type      medical cannabis (Patient's own supply) See Admin Instructions (The purpose of this order is to document that the patient reports taking medical cannabis.  This is not a prescription, and is not used to certify that the patient has a qualifying medical condition.)      naloxone (NARCAN) 4 MG/0.1ML nasal spray Spray 1 spray (4 mg) into one nostril alternating nostrils as needed for opioid reversal every 2-3 minutes until assistance arrives  Qty: 0.2 mL, Refills: 0    Associated Diagnoses: Chronic, continuous use of opioids      oxyCODONE-acetaminophen (PERCOCET) 5-325 MG tablet Take 1-2 percocet every 6-8 hrs as needed for pain  Qty: 90 tablet, Refills: 0    Associated Diagnoses: Hidradenitis suppurativa      pregabalin (LYRICA) 150 MG capsule Take 1 capsule (150 mg) by mouth 2 times daily  Qty: 60 capsule, Refills: 1    Associated Diagnoses: Hidradenitis suppurativa; Neuropathic pain; Painful skin lesion      Resorcinol POWD Resorcinol 15% in vanicream twice a day  Qty: 30 g, Refills: 11    Associated Diagnoses:  Hidradenitis suppurativa      traZODone (DESYREL) 150 MG tablet Take 1.5 tablets (225 mg) by mouth at bedtime  Qty: 135 tablet, Refills: 1    Associated Diagnoses: Insomnia, unspecified type      Vitamin D3 50 mcg (2000 units) tablet Take 1 tablet (50 mcg) by mouth daily  Qty: 90 tablet, Refills: 3    Associated Diagnoses: Hidradenitis suppurativa           Allergies   Allergies   Allergen Reactions    Penicillins Hives, Itching and Rash     Reaction occurred as child.

## 2024-06-16 NOTE — PLAN OF CARE
"  Problem: Adult Inpatient Plan of Care  Goal: Plan of Care Review  Description: The Plan of Care Review/Shift note should be completed every shift.  The Outcome Evaluation is a brief statement about your assessment that the patient is improving, declining, or no change.  This information will be displayed automatically on your shift  note.  Outcome: Adequate for Care Transition  Goal: Patient-Specific Goal (Individualized)  Description: You can add care plan individualizations to a care plan. Examples of Individualization might be:  \"Parent requests to be called daily at 9am for status\", \"I have a hard time hearing out of my right ear\", or \"Do not touch me to wake me up as it startles  me\".  Outcome: Adequate for Care Transition  Goal: Absence of Hospital-Acquired Illness or Injury  Outcome: Adequate for Care Transition  Goal: Optimal Comfort and Wellbeing  Outcome: Adequate for Care Transition  Intervention: Monitor Pain and Promote Comfort  Recent Flowsheet Documentation  Taken 6/16/2024 0900 by Heather Braxton, RN  Pain Management Interventions: medication (see MAR)  Goal: Readiness for Transition of Care  Outcome: Adequate for Care Transition     Problem: Pain Acute  Goal: Optimal Pain Control and Function  Outcome: Adequate for Care Transition  Intervention: Develop Pain Management Plan  Recent Flowsheet Documentation  Taken 6/16/2024 0900 by Heather Braxton, RN  Pain Management Interventions: medication (see MAR)     Problem: Infection  Goal: Absence of Infection Signs and Symptoms  Outcome: Adequate for Care Transition     "

## 2024-06-17 ENCOUNTER — TELEPHONE (OUTPATIENT)
Dept: DERMATOLOGY | Facility: CLINIC | Age: 49
End: 2024-06-17
Payer: COMMERCIAL

## 2024-06-17 NOTE — TELEPHONE ENCOUNTER
Refill request      Medication: pregabalin (LYRICA) 150 MG capsule    Sig: Take 1 capsule (150 mg) by mouth 2 times daily - Oral    Dispensed: 60  Refills: 1  SOLD to the pt on: 4/30/24    Last clinic appointment: 2/19/24  Next clinic appointment: Not Scheduled    Last Drug Screen Collected: Not on file  Controlled Substance Agreement signed: Not on file      Preferred pharmacy:    [address]    Refill request routed to the provider to review.

## 2024-06-17 NOTE — TELEPHONE ENCOUNTER
Patient confirmed scheduled appointment:     Date: 10/3  Time: 4:30 PM  Visit type: Return hidradenitis - telephone visit  Provider: Dr. Kenny  Location: [facility] [place]

## 2024-06-18 ENCOUNTER — TELEPHONE (OUTPATIENT)
Dept: ANESTHESIOLOGY | Facility: CLINIC | Age: 49
End: 2024-06-18
Payer: COMMERCIAL

## 2024-06-18 RX ORDER — PREGABALIN 150 MG/1
150 CAPSULE ORAL 2 TIMES DAILY
Qty: 60 CAPSULE | Refills: 0 | Status: SHIPPED | OUTPATIENT
Start: 2024-06-18 | End: 2024-08-09

## 2024-06-18 NOTE — TELEPHONE ENCOUNTER
Chart reviewed - Request appears appropriate. Refilled for 30 day supply.     She is overdue for follow-up.  Please advise her to make an appointment with me before her next refill is needed.    Lesa Osman DNP, APRN, AGNP-C  [facility]

## 2024-06-18 NOTE — TELEPHONE ENCOUNTER
Patient confirmed scheduled appointment:     Date: 7/1  Time: 8:30  Visit type: Return pain  Provider: Lesa Osman  Location: [facility] [place]

## 2024-06-24 ENCOUNTER — HOSPITAL ENCOUNTER (EMERGENCY)
Facility: CLINIC | Age: 49
Discharge: HOME | End: 2024-06-24
Attending: EMERGENCY MEDICINE | Admitting: EMERGENCY MEDICINE
Payer: COMMERCIAL

## 2024-06-24 VITALS
HEART RATE: 112 BPM | RESPIRATION RATE: 18 BRPM | SYSTOLIC BLOOD PRESSURE: 147 MMHG | OXYGEN SATURATION: 97 % | DIASTOLIC BLOOD PRESSURE: 87 MMHG | TEMPERATURE: 97.6 F

## 2024-06-24 DIAGNOSIS — K92.1 BLOOD IN STOOL: ICD-10-CM

## 2024-06-24 LAB
ALBUMIN SERPL BCG-MCNC: 3.9 G/DL (ref 3.5–5.2)
ALP SERPL-CCNC: 83 U/L (ref 40–150)
ALT SERPL W P-5'-P-CCNC: 22 U/L (ref 0–50)
ANION GAP SERPL CALCULATED.3IONS-SCNC: 13 MMOL/L (ref 7–15)
AST SERPL W P-5'-P-CCNC: 20 U/L (ref 0–45)
BASOPHILS # BLD AUTO: 0.1 10E3/UL (ref 0–0.2)
BASOPHILS NFR BLD AUTO: 1 %
BILIRUB SERPL-MCNC: 0.4 MG/DL
BUN SERPL-MCNC: 8.6 MG/DL (ref 6–20)
CALCIUM SERPL-MCNC: 9.4 MG/DL (ref 8.6–10)
CHLORIDE SERPL-SCNC: 102 MMOL/L (ref 98–107)
CREAT SERPL-MCNC: 0.59 MG/DL (ref 0.51–0.95)
DEPRECATED HCO3 PLAS-SCNC: 23 MMOL/L (ref 22–29)
EGFRCR SERPLBLD CKD-EPI 2021: >90 ML/MIN/1.73M2
EOSINOPHIL # BLD AUTO: 0.3 10E3/UL (ref 0–0.7)
EOSINOPHIL NFR BLD AUTO: 3 %
ERYTHROCYTE [DISTWIDTH] IN BLOOD BY AUTOMATED COUNT: 13.7 % (ref 10–15)
GLUCOSE SERPL-MCNC: 113 MG/DL (ref 70–99)
HCT VFR BLD AUTO: 45.1 % (ref 35–47)
HGB BLD-MCNC: 15.1 G/DL (ref 11.7–15.7)
IMM GRANULOCYTES # BLD: 0 10E3/UL
IMM GRANULOCYTES NFR BLD: 0 %
LYMPHOCYTES # BLD AUTO: 2.5 10E3/UL (ref 0.8–5.3)
LYMPHOCYTES NFR BLD AUTO: 27 %
MCH RBC QN AUTO: 30 PG (ref 26.5–33)
MCHC RBC AUTO-ENTMCNC: 33.5 G/DL (ref 31.5–36.5)
MCV RBC AUTO: 90 FL (ref 78–100)
MONOCYTES # BLD AUTO: 0.6 10E3/UL (ref 0–1.3)
MONOCYTES NFR BLD AUTO: 6 %
NEUTROPHILS # BLD AUTO: 5.7 10E3/UL (ref 1.6–8.3)
NEUTROPHILS NFR BLD AUTO: 63 %
NRBC # BLD AUTO: 0 10E3/UL
NRBC BLD AUTO-RTO: 0 /100
PLATELET # BLD AUTO: 413 10E3/UL (ref 150–450)
POTASSIUM SERPL-SCNC: 3.9 MMOL/L (ref 3.4–5.3)
PROT SERPL-MCNC: 7.9 G/DL (ref 6.4–8.3)
RBC # BLD AUTO: 5.04 10E6/UL (ref 3.8–5.2)
SODIUM SERPL-SCNC: 138 MMOL/L (ref 135–145)
WBC # BLD AUTO: 9.1 10E3/UL (ref 4–11)

## 2024-06-24 PROCEDURE — 36415 COLL VENOUS BLD VENIPUNCTURE: CPT | Performed by: EMERGENCY MEDICINE

## 2024-06-24 PROCEDURE — 80053 COMPREHEN METABOLIC PANEL: CPT | Performed by: EMERGENCY MEDICINE

## 2024-06-24 PROCEDURE — 85025 COMPLETE CBC W/AUTO DIFF WBC: CPT | Performed by: EMERGENCY MEDICINE

## 2024-06-24 PROCEDURE — 99283 EMERGENCY DEPT VISIT LOW MDM: CPT

## 2024-06-24 RX ORDER — HYDROCORTISONE ACETATE 25 MG/1
25 SUPPOSITORY RECTAL 2 TIMES DAILY
Qty: 28 SUPPOSITORY | Refills: 0 | Status: SHIPPED | OUTPATIENT
Start: 2024-06-24 | End: 2024-07-08

## 2024-06-24 ASSESSMENT — COLUMBIA-SUICIDE SEVERITY RATING SCALE - C-SSRS
1. IN THE PAST MONTH, HAVE YOU WISHED YOU WERE DEAD OR WISHED YOU COULD GO TO SLEEP AND NOT WAKE UP?: NO
6. HAVE YOU EVER DONE ANYTHING, STARTED TO DO ANYTHING, OR PREPARED TO DO ANYTHING TO END YOUR LIFE?: NO
2. HAVE YOU ACTUALLY HAD ANY THOUGHTS OF KILLING YOURSELF IN THE PAST MONTH?: NO

## 2024-06-24 ASSESSMENT — ACTIVITIES OF DAILY LIVING (ADL)
ADLS_ACUITY_SCORE: 33
ADLS_ACUITY_SCORE: 33

## 2024-06-24 NOTE — ED PROVIDER NOTES
Emergency Department Note      History of Present Illness     Chief Complaint  Rectal Bleeding    HPI  Klarissa Curtis is a 49 year old female who presents to the emergency room with what appears to be blood in her toilet today.  She states that she was in her normal state of health and this morning to the bathroom and bowel movement that was soft and brown she states, no pain or constipation, and then noticed bright red blood on the toilet paper.  She stayed on the toilet and then urinated and then looked on the toilet and saw that there was acting more blood in the toilet after.  Denies any pain, does endorse mild anal itching.  Has never had a hemorrhoid before but thinks that might be what she has.    No chest pain, no abdominal pain, no nausea or vomiting.      Independent Historian  No    Review of External Notes  Yes I reviewed the patient's last admission and discharge summary from June 12 of this year the patient was seen for infected bug bite of her right finger.      Past Medical History   Medical History and Problem List  Past Medical History:   Diagnosis Date    NO ACTIVE PROBLEMS        Medications  adalimumab (HUMIRA *CF*) 80 MG/0.8ML pen kit  calcium carbonate (OS-MERCY) 500 MG TABS  cephALEXin (KEFLEX) 500 MG capsule  DULoxetine (CYMBALTA) 30 MG capsule  fexofenadine (ALLEGRA) 180 MG tablet  medical cannabis (Patient's own supply)  naloxone (NARCAN) 4 MG/0.1ML nasal spray  oxyCODONE-acetaminophen (PERCOCET) 5-325 MG tablet  polyethylene glycol (MIRALAX) 17 GM/Dose powder  pregabalin (LYRICA) 150 MG capsule  Resorcinol POWD  sennosides (SENOKOT) 8.6 MG tablet  traZODone (DESYREL) 150 MG tablet  Vitamin D3 50 mcg (2000 units) tablet        Surgical History   Past Surgical History:   Procedure Laterality Date    BUNIONECTOMY      x2    GRAFT FLAP PEDICLE EXTREMITY (LOCATION) Right 2/29/2024    Procedure: Right axillary wound excision,Right muscle-sparing Latissimus musculo-cutaneous flap, SPY;   Surgeon: BORIS Mendoza MD;  Location: [facility] OR    IRRIGATION AND DEBRIDEMENT HAND, COMBINED Right 6/13/2024    Procedure: IRRIGATION AND DEBRIDEMENT, RIGHT INDEX FINGER;  Surgeon: Brian Blackwell MD;  Location: [facility] OR    OTHER SURGICAL HISTORY      right pointer finger foreign body removal         Physical Exam   Patient Vitals for the past 24 hrs:   BP Temp Temp src Pulse Resp SpO2   06/24/24 0740 -- -- -- -- -- 97 %   06/24/24 0739 (!) 147/87 97.6  F (36.4  C) Temporal 112 18 --       Physical Exam  Vitals: reviewed by me  General: Pt seen on [facility], cooperative, and alert to conversation  Eyes: Tracking well, clear conjunctiva BL  ENT: MMM, midline trachea.   Lungs: No tachypnea, no accessory muscle use. No respiratory distress.   CV: Rate as above  MSK: no joint effusion.  No evidence of trauma  Skin: No rash  :  exam done with RN chaperone in the room.  Very small external hemorrhoid noted, nonthrombosed, soft stool, no blood.  Vaginal vault unremarkable, no bleeding or lesions noted.  Neuro: Clear speech and no facial droop.  Psych: Not RIS, no e/o AH/VH      Diagnostics   Lab Results   Labs Ordered and Resulted from Time of ED Arrival to Time of ED Departure   COMPREHENSIVE METABOLIC PANEL - Abnormal       Result Value    Sodium 138      Potassium 3.9      Carbon Dioxide (CO2) 23      Anion Gap 13      Urea Nitrogen 8.6      Creatinine 0.59      GFR Estimate >90      Calcium 9.4      Chloride 102      Glucose 113 (*)     Alkaline Phosphatase 83      AST 20      ALT 22      Protein Total 7.9      Albumin 3.9      Bilirubin Total 0.4     CBC WITH PLATELETS AND DIFFERENTIAL    WBC Count 9.1      RBC Count 5.04      Hemoglobin 15.1      Hematocrit 45.1      MCV 90      MCH 30.0      MCHC 33.5      RDW 13.7      Platelet Count 413      % Neutrophils 63      % Lymphocytes 27      % Monocytes 6      % Eosinophils 3      % Basophils 1      % Immature Granulocytes 0       NRBCs per 100 WBC 0      Absolute Neutrophils 5.7      Absolute Lymphocytes 2.5      Absolute Monocytes 0.6      Absolute Eosinophils 0.3      Absolute Basophils 0.1      Absolute Immature Granulocytes 0.0      Absolute NRBCs 0.0           ED Course    Medications Administered  Medications - No data to display    Procedures  Procedures     Social Determinants of Health adding to complexity of care  Stress/Adjustment Disorders      Disposition  The patient was discharged.       Medical Decision Making / Diagnosis           MDM  This is a very pleasant 49-year-old female who presents to the emergency room with bleeding in her stool, likely due to a hemorrhoid.  She has small external hemorrhoid here, but also describes symptoms of an internal hemorrhoid with some itching.  There is no lesions I can see, and she is quite clear that this is not coming from her vagina or urethra.  Her hemoglobin is reassuring, as are her vital signs, and I do think that she stable to go home and follow-up with her regular doctor.  Highly reliable appearing patient, I do believe she will come back if anything gets worse we will treat for hemorrhoids at this time.        ICD-10 Codes:    ICD-10-CM    1. Blood in stool  K92.1            Discharge Medications  Discharge Medication List as of 6/24/2024  9:10 AM        START taking these medications    Details   hydrocortisone (ANUSOL-HC) 25 MG suppository Place 1 suppository (25 mg) rectally 2 times daily for 14 days, Disp-28 suppository, R-0, Local Print                    Sven Hunt MD  06/24/24 1111

## 2024-06-27 LAB — BACTERIA SNV CULT: NORMAL

## 2024-07-01 ENCOUNTER — MYC MEDICAL ADVICE (OUTPATIENT)
Dept: DERMATOLOGY | Facility: CLINIC | Age: 49
End: 2024-07-01

## 2024-07-01 ENCOUNTER — OFFICE VISIT (OUTPATIENT)
Dept: ANESTHESIOLOGY | Facility: CLINIC | Age: 49
End: 2024-07-01
Payer: COMMERCIAL

## 2024-07-01 ENCOUNTER — TELEPHONE (OUTPATIENT)
Dept: ANESTHESIOLOGY | Facility: CLINIC | Age: 49
End: 2024-07-01

## 2024-07-01 VITALS
HEART RATE: 87 BPM | HEIGHT: 67 IN | SYSTOLIC BLOOD PRESSURE: 138 MMHG | WEIGHT: 244 LBS | DIASTOLIC BLOOD PRESSURE: 84 MMHG | OXYGEN SATURATION: 98 % | BODY MASS INDEX: 38.3 KG/M2

## 2024-07-01 DIAGNOSIS — M25.562 CHRONIC PAIN OF LEFT KNEE: ICD-10-CM

## 2024-07-01 DIAGNOSIS — L73.2 HIDRADENITIS SUPPURATIVA: ICD-10-CM

## 2024-07-01 DIAGNOSIS — G89.29 CHRONIC PAIN OF LEFT KNEE: ICD-10-CM

## 2024-07-01 DIAGNOSIS — L98.9 PAINFUL SKIN LESION: ICD-10-CM

## 2024-07-01 DIAGNOSIS — M79.2 NEUROPATHIC PAIN: Primary | ICD-10-CM

## 2024-07-01 DIAGNOSIS — F11.90 CHRONIC, CONTINUOUS USE OF OPIOIDS: ICD-10-CM

## 2024-07-01 DIAGNOSIS — L50.3 DERMATOGRAPHISM: ICD-10-CM

## 2024-07-01 PROCEDURE — 99215 OFFICE O/P EST HI 40 MIN: CPT

## 2024-07-01 RX ORDER — CELECOXIB 100 MG/1
100-200 CAPSULE ORAL 2 TIMES DAILY PRN
Qty: 120 CAPSULE | Refills: 1 | OUTPATIENT
Start: 2024-07-01

## 2024-07-01 RX ORDER — CELECOXIB 100 MG/1
100-200 CAPSULE ORAL 2 TIMES DAILY PRN
Qty: 120 CAPSULE | Refills: 1 | Status: SHIPPED | OUTPATIENT
Start: 2024-07-01 | End: 2024-08-12

## 2024-07-01 RX ORDER — OXYCODONE AND ACETAMINOPHEN 5; 325 MG/1; MG/1
1-2 TABLET ORAL EVERY 6 HOURS PRN
Qty: 90 TABLET | Refills: 0 | Status: SHIPPED | OUTPATIENT
Start: 2024-07-01 | End: 2024-08-20

## 2024-07-01 ASSESSMENT — PAIN SCALES - GENERAL: PAINLEVEL: SEVERE PAIN (6)

## 2024-07-01 NOTE — Clinical Note
Hi Dr. Kenny,  I saw Maura today for follow up in the pain clinic and reaching out to collaborate on opioid management. I refilled her percocet for #90 tabs today, but did not have her complete controlled substance agreement yet until we had a chance to communicate about the longer term plan.   What are your thoughts on continuing to manage oxycodone (I can weigh in as needed) versus having pain clinic take over for now and also continue optimizing non-opioid modalities? I am open either way and advised her to follow up with me in 4-6 weeks, at which time we will complete CSA if needed (pending our communication in between visits).   Thanks, Lesa Osman, DNP, APRN, AGNP-C [facility]

## 2024-07-01 NOTE — PATIENT INSTRUCTIONS
Medications:    Continue Lyrica 150mg twice daily    Stop all nsaids, start celebrex    celecoxib (CELEBREX) 100 MG capsule - Take 1-2 capsules (100-200 mg) by mouth 2 times daily as needed for moderate pain    oxyCODONE-acetaminophen (PERCOCET) 5-325 MG tablet- Take 1-2 tablets by mouth every 6 hours as needed for severe pain Take 1-2 percocet every 6-8 hrs as needed for pain. Max 6-8 tabs per day.    For refills of opioid medications - You MUST call (Or MyChart) the clinic DIRECTLY and at least 7 days before you run out of your medication.    Recommended Follow up:      Follow up 4-6 weeks       To speak with a nurse, schedule/reschedule/cancel a clinic appointment, or request a medication refill call: [phone].    You can also reach us by [employer]: [email or web address]

## 2024-07-01 NOTE — PROGRESS NOTES
[facility]     Date of visit: 7/1/2024      Assessment:   Klarissa Curtis is a 49 year old female with a past medical history significant for hidradenitis suppurativa & depression who presents with complaints of hidradenitis suppurativa related pain.      Hidradenitis suppurativa: Symptom onset at age 11, diagnosed at age 39. Persistent flares with worsening pain in bilateral axilla, groin, and buttocks. Has tried multiple pain modalities with inadequate pain control. Multiple lesions visualized on exam: nodular abscesses, draining tracts, honeycomb lesion pattern and fibrotic scarring identified. Pain symptom etiology is likely multifactorial with hx of hidradenitis suppurativa, neuropathic involvement and overlying myofascial component.      Assigned to [facility] [place] nursing team.     Visit Diagnoses:  1. Neuropathic pain    2. Chronic pain of left knee    3. Hidradenitis suppurativa    4. Painful skin lesion    5. Chronic, continuous use of opioids        Plan:  Diagnosis reviewed, treatment option addressed, and risk/benifits discussed.  Self-care instructions given.  I am recommending a multidisciplinary treatment plan to help this patient better manage their pain.      Pain Physical Therapy:  NO - deferred for now      Pain Psychologist to address relaxation, behavioral change, coping style, and other factors important to improvement.  NO - deferred for now     Diagnostic Studies:   Labs on 6/24/24 reviewed:  CBC is WNL  CMP - hepatic function intact, GFR >90     Medication Management:   NSAIDS -currently takes naproxen OTC, reports taking 5 tabs per dose.  Advised against overuse due to concerns for nephrotoxicity.  Recommend alternative NSAID, Celebrex 100 to 200 mg twice daily as needed.  Advised against concurrent use of other NSAIDs.  Lyrica - current dose 150 mg BID. Appreciates benefit, no side effects. May consider TID dosing in future.   Continue percocet 5-325 mg tabs, 1-2 tabs every  6-8 hours PRN for breakthrough pain, max 6-8 tabs/day. Agreed to refill for #90 tabs today, will discuss ongoing management with Dr. Kenny. We will need CSA at follow up, if I will be taking over prescribing.   Consider TCA in future to optimize pain control and sleep benefit, buprenorphine retrial in future may be considered.   Will discuss adding ketamine/amitriptyline topical cream with Dr. Kenny in future.      Potential procedures:   Plastic surgery scheduled 2/29/24 for right axillary wound excision and flap reconstruction.  Reports pain symptoms have significantly improved on right side, no new HS lesions since surgery.      Other Orders/Referrals:   Message sent to Dr. Kenny to collaborate on medication/opioid management.      Follow up with SERAFIN Miranda CNP in 4-6 weeks for in person visit.       Review of Electronic Chart: Today I have also reviewed available medical information in the patient's medical record at [facility] ([facility]) and Care Everywhere (if available), including relevant provider notes, laboratory work, and imaging.     Lesa Osman DNP, SERAFIN, AGNP-C  [facility] Management     -------------------------------------------------    Subjective:    Chief complaint:   Chief Complaint   Patient presents with    Follow Up     Follow-up Chronic Painful Skin Lesions       Interval history:  Klarissa Curtis is a 49 year old female last seen on 2/19/24.  They are a patient of mine seen in follow up.     Recommendations/plan at the last visit included:  Pain Physical Therapy:  NO - deferred for now      Pain Psychologist to address relaxation, behavioral change, coping style, and other factors important to improvement.  NO - deferred for now     Diagnostic Studies: 10/2023 CMP & CBC within normal limits.      Medication Management:   Lyrica 75 mg at bedtime x1 week, 75mg twice daily x1 week, then 75 mg in AM and 150 mg at bedtime x1 week, then increase to 150 mg twice  daily. Discussed monitoring for pain benefit and possible side effects such as sedation. She will contact clinic with concerns for sedation/other side effects.   Continue percocet 5-325 mg tabs PRN for breakthrough pain. Will discuss changing dose to  mg tabs with Dr. Kenny. Current daily dose of oxycodone 35-40 mg/day. Advised this dosage is reasonable to continue until surgery on 24.   Consider TCA in future to optimize pain control and sleep benefit.   Will discuss restarting Belbuca films with Dr. Kenny.   Will discuss adding ketamine/amitriptyline topical cream with Dr. Kenny in future.      Potential procedures:   Plastic surgery scheduled 24 for right axillary wound excision and flap reconstruction.     Other Orders/Referrals:   Recommend inpatient pain consult upon hospital admission after surgery as needed for support with optimizing pain control.      Follow up with SERAFIN Miranda CNP in 6-8 weeks.     Since her last visit, Klarissa KATHE Curtis reports:  -She's had a lot of different medical issues going on since last visit.   -She has meniscal tear and steroid injection not effective, will be trying Synvisc.   -She had staph injection to right index finger from bug bite.   -She was started on Lyrica at last visit, current dose 150 mg BID. She appreciates benefit, no side effects outside of mild tiredness.   -Dr. Kenny manages oxycodone PRN for flares, this helps take the edge off but not profound benefit.   -Left arm and groin flare right now with HS.   -She is trying a clinical trial, will have antibiotics x 1 week for , then reintroduce healthy gut althea.   -She notes that right arm HS lesions are stable following surgery on 24.  -She is dealing with knee pain, taking NSAIDs.   -She needs a refill of Percocet, not sure what the plan is long term for the management.   Pain Information:   Pain ratin/10 on a 0-10 scale.      HPI from initial visit with me on  2/19/24:  Klarissa Curtis is a 48 year old female presents with a chief complaint of hidradenitis suppurativa pain.      Goals: overall pain management + post-op pain management   - ADLs impacted, pt feels helpless with pain uncontrolled.   - Has plastic surgery scheduled for right axillary wound: last Wednesday had injections into woundbed  - Affected sites: Bilateral axillary, groin, buttocks    - Diagnosed 9 years ago, symptoms started when she was 11.  - Dr. Lees recommended switching to buprenorphine   - Percocet PRN for flare pain.      The pain has been present for 35 years.    The pain is 2 in severity. When she reaches a level 10 severity, it will last for days.   The pain is described as burning, sharp, stabbing with a hot poker during flares. Difficulty to breathe.   The pain is alleviated by:   - Percocet: helps with sleep     - kenalog injections   - Medical cannabis   It is exacerbated by activity.     Things that were not helpful, but tried, include:   - Topicals morphine  - Gabapentin   - Cosentyx  - Belbuca   The patient otherwise denies bowel or bladder incontinence, parasthesias, weakness, saddle anesthesia, unintentional weight loss, or fever/chills/sweats. NO  Klarissa Curtis has not been seen at a pain clinic in the past.     -She takes oxycodone, 10-15 mg dose needed to achieve analgesic benefit.   -She appreciates benefit from steroid injections (triamcinolone) with Dr. Kenny (Derm).   -She has upcoming surgery of right axillary on 2/29/24.  -She is on medical cannabis, finds helpful at bedtime for sleep  -Topical morphine solicite gel, not helpful.      Consider ketamine 5%-amitriptyline 2% topical, other topicals outside of morphine   -Neuropathics with surgery coming up     -She tried Belbuca in the past, 450 mcg BID, states she started with 1/2 film but then she did not get up to 1 film/day, did not take daily, only with pain flares  -She has been following with Dr. 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 She has been with this company since 2018.   -HS lesions are mixed, bilateral axillary, groin, perineal area.   -Her 71 year old mother, retired nurse, is coming up for surgery and then afterwards.   -Plan is to stay overnight after surgery on 2/29/24.   -She takes 10-15 mg of oxy over 1-2 hours, total 7-8 tabs/day (60 MME).   -She uses cannabis vape throughout the day.   -She tried gabapentin 900 mg TID about 6 months ago, no benefit but no side effects.   -She does not drink alcohol anymore.         Current Pain Treatments:    Medications:               - Percocet 5-325 mg PRN for breakthrough pain. 6-8 tabs/day              - Lyrica 150 mg BID.    -Celebrex 100-200 mg BID PRN                2. Other therapies:               Medical cannabis              Plastic surgery & reconstruction of right axillary wound              Kenalog injections - Derm      Current MME: 45-60/day     Review of [place] Prescription Monitoring Program (): No concern for abuse or misuse of controlled medications based on this report. Reviewed - appears appropriate.     Annual Controlled Substance Agreement/UDS due date: Deferred - will plan to complete at next appointment, if I will be taking over opioid management.    Past pain treatments:  Naproxen     Medications:  Current Outpatient Medications   Medication Sig Dispense Refill    adalimumab (HUMIRA *CF*) 80 MG/0.8ML pen kit Inject 0.8 mLs (80 mg) Subcutaneous once a week 3.2 mL 11    calcium carbonate (OS-MERCY) 500 MG TABS Take 1 tablet by mouth 2 times daily (with meals)      celecoxib (CELEBREX) 100 MG capsule Take 1-2 capsules (100-200 mg) by mouth 2 times daily as needed for moderate pain 120 capsule 1    DULoxetine (CYMBALTA) 30 MG capsule Take 30mg (1 pill) for 1 week and if tolerating increase to 60mg (2 pills) daily 180 capsule 3    fexofenadine (ALLEGRA) 180  "MG tablet Take 1 tablet (180 mg) by mouth daily 30 tablet 0    hydrocortisone (ANUSOL-HC) 25 MG suppository Place 1 suppository (25 mg) rectally 2 times daily for 14 days 28 suppository 0    medical cannabis (Patient's own supply) See Admin Instructions (The purpose of this order is to document that the patient reports taking medical cannabis.  This is not a prescription, and is not used to certify that the patient has a qualifying medical condition.)      naloxone (NARCAN) 4 MG/0.1ML nasal spray Spray 1 spray (4 mg) into one nostril alternating nostrils as needed for opioid reversal every 2-3 minutes until assistance arrives 0.2 mL 0    oxyCODONE-acetaminophen (PERCOCET) 5-325 MG tablet Take 1-2 tablets by mouth every 6 hours as needed for severe pain Take 1-2 percocet every 6-8 hrs as needed for pain 90 tablet 0    polyethylene glycol (MIRALAX) 17 GM/Dose powder Take 17 g by mouth 2 times daily      pregabalin (LYRICA) 150 MG capsule Take 1 capsule (150 mg) by mouth 2 times daily Appointment required for additional refills. 60 capsule 0    Resorcinol POWD Resorcinol 15% in vanicream twice a day (Patient taking differently: Resorcinol 15% in vanicream twice a day PRN) 30 g 11    traZODone (DESYREL) 150 MG tablet Take 1.5 tablets (225 mg) by mouth at bedtime 135 tablet 1    Vitamin D3 50 mcg (2000 units) tablet Take 1 tablet (50 mcg) by mouth daily 90 tablet 3    cephALEXin (KEFLEX) 500 MG capsule Take 1 capsule (500 mg) by mouth 4 times daily 28 capsule 0    sennosides (SENOKOT) 8.6 MG tablet Take 1 tablet by mouth 2 times daily         Medical History: any changes in medical history since they were last seen? Yes - see chart, numerous ED/admissions for multiple health concerns.       Objective:    Physical Exam:  Blood pressure 138/84, pulse 87, height 1.702 m (5' 7\"), weight 110.7 kg (244 lb), SpO2 98%, not currently breastfeeding.  Constitutional: Well developed, well nourished, appears stated age.  Gait: Intact "   HEENT: Head atraumatic, normocephalic. Eyes without conjunctival injection or jaundice. Oropharynx clear. Neck supple. No obvious neck masses.  Skin: No rash, lesions, or petechiae of exposed skin.   Psychiatric/mental status: Alert, without lethargy or stupor. Speech fluent. Appropriate affect. Mood normal. Able to follow commands without difficulty.     Diagnostic Tests/Imaging/Labs:  Reviewed recent CBC and CMP on 6/24/24 - stable/no concerns, GFR >90    BILLING TIME DOCUMENTATION:   The total TIME spent on this patient on the date of the encounter/appointment was 45 minutes.      TOTAL TIME includes:   Time spent preparing to see the patient (reviewing records and tests)   Time spent face to face (or over the phone) with the patient   Time spent ordering tests, medications, procedures and referrals   Time spent Referring and communicating with other healthcare professionals   Time spent documenting clinical information in Epic

## 2024-07-01 NOTE — NURSING NOTE
Patient presents with:  Follow Up: Follow-up Chronic Painful Skin Lesions      Severe Pain (6)     Pain Medications       Opioid Combinations Refills Start End     oxyCODONE-acetaminophen (PERCOCET) 5-325 MG tablet 0 5/30/2024 --    Sig: Take 1-2 percocet every 6-8 hrs as needed for pain    Class: E-Prescribe    Earliest Fill Date: 5/30/2024            What medications are you using for pain? Oxycodone, Medical Cannabis    (New patients only) Have you been seen by another pain clinic/ provider? no    (Return Patients only) What refills are you needing today? Yes Oxycodone

## 2024-07-01 NOTE — TELEPHONE ENCOUNTER
Patient confirmed scheduled appointment:     Date: 8/12  Time: 8:30 AM  Visit type: Return pain  Provider: Lesa Osman  [place] [facility] [place]onal Notes:

## 2024-07-01 NOTE — LETTER
7/1/2024       RE: Klarissa Curtis  [address]  [facility] [address]     Dear Colleague,    Thank you for referring your patient, Klarissa Curtis, to the [facility] [place] at [facility]. Please see a copy of my visit note below.    [facility]     Date of visit: 7/1/2024      Assessment:   Klarissa Curtsi is a 49 year old female with a past medical history significant for hidradenitis suppurativa & depression who presents with complaints of hidradenitis suppurativa related pain.      Hidradenitis suppurativa: Symptom onset at age 11, diagnosed at age 39. Persistent flares with worsening pain in bilateral axilla, groin, and buttocks. Has tried multiple pain modalities with inadequate pain control. Multiple lesions visualized on exam: nodular abscesses, draining tracts, honeycomb lesion pattern and fibrotic scarring identified. Pain symptom etiology is likely multifactorial with hx of hidradenitis suppurativa, neuropathic involvement and overlying myofascial component.      Assigned to [facility] nursing team.     Visit Diagnoses:  1. Neuropathic pain    2. Chronic pain of left knee    3. Hidradenitis suppurativa    4. Painful skin lesion    5. Chronic, continuous use of opioids        Plan:  Diagnosis reviewed, treatment option addressed, and risk/benifits discussed.  Self-care instructions given.  I am recommending a multidisciplinary treatment plan to help this patient better manage their pain.      Pain Physical Therapy:  NO - deferred for now      Pain Psychologist to address relaxation, behavioral change, coping style, and other factors important to improvement.  NO - deferred for now     Diagnostic Studies:   Labs on 6/24/24 reviewed:  CBC is WNL  CMP - hepatic function intact, GFR >90     Medication Management:   NSAIDS -currently takes naproxen OTC, reports taking 5 tabs per  dose.  Advised against overuse due to concerns for nephrotoxicity.  Recommend alternative NSAID, Celebrex 100 to 200 mg twice daily as needed.  Advised against concurrent use of other NSAIDs.  Lyrica - current dose 150 mg BID. Appreciates benefit, no side effects. May consider TID dosing in future.   Continue percocet 5-325 mg tabs, 1-2 tabs every 6-8 hours PRN for breakthrough pain, max 6-8 tabs/day. Agreed to refill for #90 tabs today, will discuss ongoing management with Dr. Kenny. We will need CSA at follow up, if I will be taking over prescribing.   Consider TCA in future to optimize pain control and sleep benefit, buprenorphine retrial in future may be considered.   Will discuss adding ketamine/amitriptyline topical cream with Dr. Kenny in future.      Potential procedures:   Plastic surgery scheduled 2/29/24 for right axillary wound excision and flap reconstruction.  Reports pain symptoms have significantly improved on right side, no new HS lesions since surgery.      Other Orders/Referrals:   Message sent to Dr. Kenny to collaborate on medication/opioid management.      Follow up with SERAFIN Miranda CNP in 4-6 weeks for in person visit.       Review of Electronic Chart: Today I have also reviewed available medical information in the patient's medical record at [facility] and Care Everywhere (if available), including relevant provider notes, laboratory work, and imaging.     Lesa Osman DNP, SERAFIN, AGNP-C  [facility]     -------------------------------------------------    Subjective:    Chief complaint:   Chief Complaint   Patient presents with    Follow Up     Follow-up Chronic Painful Skin Lesions       Interval history:  Klarissa Curtis is a 49 year old female last seen on 2/19/24.  They are a patient of mine seen in follow up.     Recommendations/plan at the last visit included:  Pain Physical Therapy:  NO - deferred for now      Pain Psychologist to  address relaxation, behavioral change, coping style, and other factors important to improvement.  NO - deferred for now     Diagnostic Studies: 10/2023 CMP & CBC within normal limits.      Medication Management:   Lyrica 75 mg at bedtime x1 week, 75mg twice daily x1 week, then 75 mg in AM and 150 mg at bedtime x1 week, then increase to 150 mg twice daily. Discussed monitoring for pain benefit and possible side effects such as sedation. She will contact clinic with concerns for sedation/other side effects.   Continue percocet 5-325 mg tabs PRN for breakthrough pain. Will discuss changing dose to  mg tabs with Dr. Kenny. Current daily dose of oxycodone 35-40 mg/day. Advised this dosage is reasonable to continue until surgery on 2/29/24.   Consider TCA in future to optimize pain control and sleep benefit.   Will discuss restarting Belbuca films with Dr. Kenny.   Will discuss adding ketamine/amitriptyline topical cream with Dr. Kenny in future.      Potential procedures:   Plastic surgery scheduled 2/29/24 for right axillary wound excision and flap reconstruction.     Other Orders/Referrals:   Recommend inpatient pain consult upon hospital admission after surgery as needed for support with optimizing pain control.      Follow up with SERAFIN Miranda CNP in 6-8 weeks.     Since her last visit, Klarissa Curtis reports:  -She's had a lot of different medical issues going on since last visit.   -She has meniscal tear and steroid injection not effective, will be trying Synvisc.   -She had staph injection to right index finger from bug bite.   -She was started on Lyrica at last visit, current dose 150 mg BID. She appreciates benefit, no side effects outside of mild tiredness.   -Dr. Kenny manages oxycodone PRN for flares, this helps take the edge off but not profound benefit.   -Left arm and groin flare right now with HS.   -She is trying a clinical trial, will have antibiotics x 1 week for , then  reintroduce healthy gut althea.   -She notes that right arm HS lesions are stable following surgery on 24.  -She is dealing with knee pain, taking NSAIDs.   -She needs a refill of Percocet, not sure what the plan is long term for the management.   Pain Information:   Pain ratin/10 on a 0-10 scale.      HPI from initial visit with me on 24:  Klarissa Curtis is a 48 year old female presents with a chief complaint of hidradenitis suppurativa pain.      Goals: overall pain management + post-op pain management   - ADLs impacted, pt feels helpless with pain uncontrolled.   - Has plastic surgery scheduled for right axillary wound: last Wednesday had injections into woundbed  - Affected sites: Bilateral axillary, groin, buttocks    - Diagnosed 9 years ago, symptoms started when she was 11.  - Dr. Lees recommended switching to buprenorphine   - Percocet PRN for flare pain.      The pain has been present for 35 years.    The pain is 2 in severity. When she reaches a level 10 severity, it will last for days.   The pain is described as burning, sharp, stabbing with a hot poker during flares. Difficulty to breathe.   The pain is alleviated by:   - Percocet: helps with sleep     - kenalog injections   - Medical cannabis   It is exacerbated by activity.     Things that were not helpful, but tried, include:   - Topicals morphine  - Gabapentin   - Cosentyx  - Belbuca   The patient otherwise denies bowel or bladder incontinence, parasthesias, weakness, saddle anesthesia, unintentional weight loss, or fever/chills/sweats. NO  Klarissa Curtis has not been seen at a pain clinic in the past.     -She takes oxycodone, 10-15 mg dose needed to achieve analgesic benefit.   -She appreciates benefit from steroid injections (triamcinolone) with Dr. Kenny (Derm).   -She has upcoming surgery of right axillary on 24.  -She is on medical cannabis, finds helpful at bedtime for sleep  -Topical morphine solicite gel, not  helpful.      Consider ketamine 5%-amitriptyline 2% topical, other topicals outside of morphine   -Neuropathics with surgery coming up     -She tried Belbuca in the past, 450 mcg BID, states she started with 1/2 film but then she did not get up to 1 film/day, did not take daily, only with pain flares  -She has been following with Dr. 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 She has been with this company since 2018.   -HS lesions are mixed, bilateral axillary, groin, perineal area.   -Her 71 year old mother, retired nurse, is coming up for surgery and then afterwards.   -Plan is to stay overnight after surgery on 2/29/24.   -She takes 10-15 mg of oxy over 1-2 hours, total 7-8 tabs/day (60 MME).   -She uses cannabis vape throughout the day.   -She tried gabapentin 900 mg TID about 6 months ago, no benefit but no side effects.   -She does not drink alcohol anymore.         Current Pain Treatments:    Medications:               - Percocet 5-325 mg PRN for breakthrough pain. 6-8 tabs/day              - Lyrica 150 mg BID.    -Celebrex 100-200 mg BID PRN                2. Other therapies:               Medical cannabis              Plastic surgery & reconstruction of right axillary wound              Kenalog injections - Derm      Current MME: 45-60/day     Review of [place] Prescription Monitoring Program (): No concern for abuse or misuse of controlled medications based on this report. Reviewed - appears appropriate.     Annual Controlled Substance Agreement/UDS due date: Deferred - will plan to complete at next appointment, if I will be taking over opioid management.    Past pain treatments:  Naproxen     Medications:  Current Outpatient Medications   Medication Sig Dispense Refill    adalimumab (HUMIRA *CF*) 80 MG/0.8ML pen kit Inject 0.8 mLs (80 mg) Subcutaneous once a week 3.2 mL 11    calcium carbonate (OS-MERCY) 500 MG TABS Take  1 tablet by mouth 2 times daily (with meals)      celecoxib (CELEBREX) 100 MG capsule Take 1-2 capsules (100-200 mg) by mouth 2 times daily as needed for moderate pain 120 capsule 1    DULoxetine (CYMBALTA) 30 MG capsule Take 30mg (1 pill) for 1 week and if tolerating increase to 60mg (2 pills) daily 180 capsule 3    fexofenadine (ALLEGRA) 180 MG tablet Take 1 tablet (180 mg) by mouth daily 30 tablet 0    hydrocortisone (ANUSOL-HC) 25 MG suppository Place 1 suppository (25 mg) rectally 2 times daily for 14 days 28 suppository 0    medical cannabis (Patient's own supply) See Admin Instructions (The purpose of this order is to document that the patient reports taking medical cannabis.  This is not a prescription, and is not used to certify that the patient has a qualifying medical condition.)      naloxone (NARCAN) 4 MG/0.1ML nasal spray Spray 1 spray (4 mg) into one nostril alternating nostrils as needed for opioid reversal every 2-3 minutes until assistance arrives 0.2 mL 0    oxyCODONE-acetaminophen (PERCOCET) 5-325 MG tablet Take 1-2 tablets by mouth every 6 hours as needed for severe pain Take 1-2 percocet every 6-8 hrs as needed for pain 90 tablet 0    polyethylene glycol (MIRALAX) 17 GM/Dose powder Take 17 g by mouth 2 times daily      pregabalin (LYRICA) 150 MG capsule Take 1 capsule (150 mg) by mouth 2 times daily Appointment required for additional refills. 60 capsule 0    Resorcinol POWD Resorcinol 15% in vanicream twice a day (Patient taking differently: Resorcinol 15% in vanicream twice a day PRN) 30 g 11    traZODone (DESYREL) 150 MG tablet Take 1.5 tablets (225 mg) by mouth at bedtime 135 tablet 1    Vitamin D3 50 mcg (2000 units) tablet Take 1 tablet (50 mcg) by mouth daily 90 tablet 3    cephALEXin (KEFLEX) 500 MG capsule Take 1 capsule (500 mg) by mouth 4 times daily 28 capsule 0    sennosides (SENOKOT) 8.6 MG tablet Take 1 tablet by mouth 2 times daily         Medical History: any changes in medical  "history since they were last seen? Yes - see chart, numerous ED/admissions for multiple health concerns.       Objective:    Physical Exam:  Blood pressure 138/84, pulse 87, height 1.702 m (5' 7\"), weight 110.7 kg (244 lb), SpO2 98%, not currently breastfeeding.  Constitutional: Well developed, well nourished, appears stated age.  Gait: Intact   HEENT: Head atraumatic, normocephalic. Eyes without conjunctival injection or jaundice. Oropharynx clear. Neck supple. No obvious neck masses.  Skin: No rash, lesions, or petechiae of exposed skin.   Psychiatric/mental status: Alert, without lethargy or stupor. Speech fluent. Appropriate affect. Mood normal. Able to follow commands without difficulty.     Diagnostic Tests/Imaging/Labs:  Reviewed recent CBC and CMP on 6/24/24 - stable/no concerns, GFR >90    BILLING TIME DOCUMENTATION:   The total TIME spent on this patient on the date of the encounter/appointment was 45 minutes.      TOTAL TIME includes:   Time spent preparing to see the patient (reviewing records and tests)   Time spent face to face (or over the phone) with the patient   Time spent ordering tests, medications, procedures and referrals   Time spent Referring and communicating with other healthcare professionals   Time spent documenting clinical information in Epic             Again, thank you for allowing me to participate in the care of your patient.      Sincerely,    SERAFIN Miranda CNP    "

## 2024-07-02 RX ORDER — FEXOFENADINE HCL 180 MG/1
180 TABLET ORAL DAILY
Qty: 30 TABLET | Refills: 0 | Status: SHIPPED | OUTPATIENT
Start: 2024-07-02 | End: 2024-07-27

## 2024-07-04 RX ORDER — DULOXETIN HYDROCHLORIDE 30 MG/1
CAPSULE, DELAYED RELEASE ORAL
Qty: 270 CAPSULE | Refills: 3 | Status: SHIPPED | OUTPATIENT
Start: 2024-07-04

## 2024-07-12 ENCOUNTER — TRANSFERRED RECORDS (OUTPATIENT)
Dept: HEALTH INFORMATION MANAGEMENT | Facility: CLINIC | Age: 49
End: 2024-07-12
Payer: COMMERCIAL

## 2024-07-22 ENCOUNTER — TRANSFERRED RECORDS (OUTPATIENT)
Dept: HEALTH INFORMATION MANAGEMENT | Facility: CLINIC | Age: 49
End: 2024-07-22
Payer: COMMERCIAL

## 2024-07-23 ENCOUNTER — HOSPITAL ENCOUNTER (OUTPATIENT)
Dept: RESEARCH | Facility: CLINIC | Age: 49
Discharge: HOME | End: 2024-07-23
Attending: DERMATOLOGY | Admitting: DERMATOLOGY
Payer: COMMERCIAL

## 2024-07-23 PROCEDURE — 510N000009 HC RESEARCH FACILITY, PER 15 MIN

## 2024-07-23 PROCEDURE — 510N000017 HC CRU PATIENT CARE, PER 15 MIN

## 2024-07-23 NOTE — ADDENDUM NOTE
Encounter addended by: Graciela Montez RN on: 7/23/2024 10:42 AM   Actions taken: Charge Capture section accepted

## 2024-07-27 DIAGNOSIS — L50.3 DERMATOGRAPHISM: ICD-10-CM

## 2024-07-27 RX ORDER — FEXOFENADINE HCL 180 MG/1
180 TABLET ORAL DAILY
Qty: 90 TABLET | Refills: 4 | Status: SHIPPED | OUTPATIENT
Start: 2024-07-27

## 2024-07-30 ENCOUNTER — HOSPITAL ENCOUNTER (OUTPATIENT)
Dept: RESEARCH | Facility: CLINIC | Age: 49
Discharge: HOME | End: 2024-07-30
Attending: DERMATOLOGY
Payer: COMMERCIAL

## 2024-07-30 VITALS
RESPIRATION RATE: 18 BRPM | WEIGHT: 239.2 LBS | DIASTOLIC BLOOD PRESSURE: 83 MMHG | HEART RATE: 96 BPM | SYSTOLIC BLOOD PRESSURE: 113 MMHG | BODY MASS INDEX: 38.44 KG/M2 | OXYGEN SATURATION: 96 % | HEIGHT: 66 IN | TEMPERATURE: 98.9 F

## 2024-07-30 DIAGNOSIS — L73.2 HIDRADENITIS SUPPURATIVA: Primary | ICD-10-CM

## 2024-07-30 PROCEDURE — 300N000003 HC RESEARCH SPECIMEN PROCESSING, SIMPLE

## 2024-07-30 PROCEDURE — 510N000009 HC RESEARCH FACILITY, PER 15 MIN

## 2024-07-30 PROCEDURE — 510N000017 HC CRU PATIENT CARE, PER 15 MIN

## 2024-07-30 NOTE — ADDENDUM NOTE
Encounter addended by: Eleuterio Schneider on: 7/30/2024 2:42 PM   Actions taken: Charge Capture section accepted

## 2024-07-30 NOTE — ADDENDUM NOTE
Encounter addended by: Graciela Montez RN on: 7/30/2024 9:18 AM   Actions taken: Charge Capture section accepted

## 2024-08-02 ENCOUNTER — TRANSFERRED RECORDS (OUTPATIENT)
Dept: HEALTH INFORMATION MANAGEMENT | Facility: CLINIC | Age: 49
End: 2024-08-02
Payer: COMMERCIAL

## 2024-08-05 DIAGNOSIS — L73.2 HIDRADENITIS SUPPURATIVA: Primary | ICD-10-CM

## 2024-08-06 ENCOUNTER — HOSPITAL ENCOUNTER (OUTPATIENT)
Dept: RESEARCH | Facility: CLINIC | Age: 49
Discharge: HOME | End: 2024-08-06
Attending: DERMATOLOGY
Payer: COMMERCIAL

## 2024-08-06 PROCEDURE — 510N000009 HC RESEARCH FACILITY, PER 15 MIN

## 2024-08-06 PROCEDURE — 300N000003 HC RESEARCH SPECIMEN PROCESSING, SIMPLE

## 2024-08-09 ENCOUNTER — OFFICE VISIT (OUTPATIENT)
Dept: INTERNAL MEDICINE | Facility: CLINIC | Age: 49
End: 2024-08-09
Payer: COMMERCIAL

## 2024-08-09 ENCOUNTER — LAB (OUTPATIENT)
Dept: LAB | Facility: CLINIC | Age: 49
End: 2024-08-09
Payer: COMMERCIAL

## 2024-08-09 VITALS
WEIGHT: 244.9 LBS | SYSTOLIC BLOOD PRESSURE: 121 MMHG | BODY MASS INDEX: 39.36 KG/M2 | HEART RATE: 96 BPM | DIASTOLIC BLOOD PRESSURE: 81 MMHG | OXYGEN SATURATION: 98 % | HEIGHT: 66 IN

## 2024-08-09 DIAGNOSIS — Z00.00 ROUTINE GENERAL MEDICAL EXAMINATION AT A HEALTH CARE FACILITY: Primary | ICD-10-CM

## 2024-08-09 DIAGNOSIS — R45.84 ANHEDONIA: ICD-10-CM

## 2024-08-09 DIAGNOSIS — L98.9 PAINFUL SKIN LESION: ICD-10-CM

## 2024-08-09 DIAGNOSIS — M79.2 NEUROPATHIC PAIN: ICD-10-CM

## 2024-08-09 DIAGNOSIS — Z12.31 VISIT FOR SCREENING MAMMOGRAM: ICD-10-CM

## 2024-08-09 DIAGNOSIS — Z12.11 SCREEN FOR COLON CANCER: ICD-10-CM

## 2024-08-09 DIAGNOSIS — Z12.4 CERVICAL CANCER SCREENING: ICD-10-CM

## 2024-08-09 DIAGNOSIS — L73.2 HIDRADENITIS SUPPURATIVA: ICD-10-CM

## 2024-08-09 PROBLEM — R53.83 FATIGUE: Status: RESOLVED | Noted: 2018-01-10 | Resolved: 2024-08-09

## 2024-08-09 PROBLEM — G47.00 INSOMNIA, UNSPECIFIED TYPE: Status: ACTIVE | Noted: 2024-08-09

## 2024-08-09 LAB — TSH SERPL DL<=0.005 MIU/L-ACNC: 1.1 UIU/ML (ref 0.3–4.2)

## 2024-08-09 PROCEDURE — 87624 HPV HI-RISK TYP POOLED RSLT: CPT | Performed by: INTERNAL MEDICINE

## 2024-08-09 PROCEDURE — 99000 SPECIMEN HANDLING OFFICE-LAB: CPT | Performed by: PATHOLOGY

## 2024-08-09 PROCEDURE — 99213 OFFICE O/P EST LOW 20 MIN: CPT | Mod: 25 | Performed by: INTERNAL MEDICINE

## 2024-08-09 PROCEDURE — 90677 PCV20 VACCINE IM: CPT | Performed by: INTERNAL MEDICINE

## 2024-08-09 PROCEDURE — 36415 COLL VENOUS BLD VENIPUNCTURE: CPT | Performed by: PATHOLOGY

## 2024-08-09 PROCEDURE — 99396 PREV VISIT EST AGE 40-64: CPT | Mod: 25 | Performed by: INTERNAL MEDICINE

## 2024-08-09 PROCEDURE — G0145 SCR C/V CYTO,THINLAYER,RESCR: HCPCS | Performed by: INTERNAL MEDICINE

## 2024-08-09 PROCEDURE — 84443 ASSAY THYROID STIM HORMONE: CPT | Performed by: PATHOLOGY

## 2024-08-09 PROCEDURE — 90471 IMMUNIZATION ADMIN: CPT | Performed by: INTERNAL MEDICINE

## 2024-08-09 RX ORDER — PREGABALIN 150 MG/1
150 CAPSULE ORAL 2 TIMES DAILY
Qty: 60 CAPSULE | Refills: 0 | Status: SHIPPED | OUTPATIENT
Start: 2024-08-09 | End: 2024-08-12

## 2024-08-09 SDOH — HEALTH STABILITY: PHYSICAL HEALTH: ON AVERAGE, HOW MANY DAYS PER WEEK DO YOU ENGAGE IN MODERATE TO STRENUOUS EXERCISE (LIKE A BRISK WALK)?: 3 DAYS

## 2024-08-09 SDOH — HEALTH STABILITY: PHYSICAL HEALTH: ON AVERAGE, HOW MANY MINUTES DO YOU ENGAGE IN EXERCISE AT THIS LEVEL?: 20 MIN

## 2024-08-09 ASSESSMENT — SOCIAL DETERMINANTS OF HEALTH (SDOH): HOW OFTEN DO YOU GET TOGETHER WITH FRIENDS OR RELATIVES?: ONCE A WEEK

## 2024-08-09 NOTE — PATIENT INSTRUCTIONS
Some insurances only cover some vaccines if you get them at the pharmacy and not in clinic.  You can either check your insurance coverage or just plan to get the following vaccines at the pharmacy: Shingrix (shingles vaccine) and Tetanus    You can get the following vaccines either in clinic or the pharmacy: COVID    Mammogram was ordered.  Please call [phone] to schedule.      Patient Education   Preventive Care Advice   This is general advice given by our system to help you stay healthy. However, your care team may have specific advice just for you. Please talk to your care team about your preventive care needs.  Nutrition  Eat 5 or more servings of fruits and vegetables each day.  Try wheat bread, brown rice and whole grain pasta (instead of white bread, rice, and pasta).  Get enough calcium and vitamin D. Check the label on foods and aim for 100% of the RDA (recommended daily allowance).  Lifestyle  Exercise at least 150 minutes each week  (30 minutes a day, 5 days a week).  Do muscle strengthening activities 2 days a week. These help control your weight and prevent disease.  No smoking.  Wear sunscreen to prevent skin cancer.  Have a dental exam and cleaning every 6 months.  Yearly exams  See your health care team every year to talk about:  Any changes in your health.  Any medicines your care team has prescribed.  Preventive care, family planning, and ways to prevent chronic diseases.  Shots (vaccines)   HPV shots (up to age 26), if you've never had them before.  Hepatitis B shots (up to age 59), if you've never had them before.  COVID-19 shot: Get this shot when it's due.  Flu shot: Get a flu shot every year.  Tetanus shot: Get a tetanus shot every 10 years.  Pneumococcal, hepatitis A, and RSV shots: Ask your care team if you need these based on your risk.  Shingles shot (for age 50 and up)  General health tests  Diabetes screening:  Starting at age 35, Get screened for diabetes at least every 3  years.  If you are younger than age 35, ask your care team if you should be screened for diabetes.  Cholesterol test: At age 39, start having a cholesterol test every 5 years, or more often if advised.  Bone density scan (DEXA): At age 50, ask your care team if you should have this scan for osteoporosis (brittle bones).  Hepatitis C: Get tested at least once in your life.  STIs (sexually transmitted infections)  Before age 24: Ask your care team if you should be screened for STIs.  After age 24: Get screened for STIs if you're at risk. You are at risk for STIs (including HIV) if:  You are sexually active with more than one person.  You don't use condoms every time.  You or a partner was diagnosed with a sexually transmitted infection.  If you are at risk for HIV, ask about PrEP medicine to prevent HIV.  Get tested for HIV at least once in your life, whether you are at risk for HIV or not.  Cancer screening tests  Cervical cancer screening: If you have a cervix, begin getting regular cervical cancer screening tests starting at age 21.  Breast cancer scan (mammogram): If you've ever had breasts, begin having regular mammograms starting at age 40. This is a scan to check for breast cancer.  Colon cancer screening: It is important to start screening for colon cancer at age 45.  Have a colonoscopy test every 10 years (or more often if you're at risk) Or, ask your provider about stool tests like a FIT test every year or Cologuard test every 3 years.  To learn more about your testing options, visit:   .  For help making a decision, visit:   https://bit.ly/qr53330.  Prostate cancer screening test: If you have a prostate, ask your care team if a prostate cancer screening test (PSA) at age 55 is right for you.  Lung cancer screening: If you are a current or former smoker ages 50 to 80, ask your care team if ongoing lung cancer screenings are right for you.  For informational purposes only. Not to replace the advice of your  health care provider. Copyright   2023 [facility]. All rights reserved. Clinically reviewed by the [facility]. [place] [id number] - REV 01/24.

## 2024-08-09 NOTE — PROGRESS NOTES
Preventive Care Visit  [facility]  Nolan Elliott MD, Internal Medicine  Aug 9, 2024      Assessment & Plan     Routine general medical examination at a health care facility    Pap smear: done today  Immunizations: Prevnar 20 today.  Discussed getting Shingrix and tetanus vaccines at pharmacy.  COVID booster in fall.  She had Hep B vaccines in the past for work.  Low risk for Hep A so vaccine declined  Lab Studies: normal glucose this year.  Cholesterol checked in past year and LDL just a bit high at 105.  She had HIV screening in the past for a work place exposure, no at risk exposures since then.  Low risk for Hep C  Mammogram: ordered  Colonoscopy/FIT: ordered  Advanced care planning: materials provided     Anhedonia    Maura has been having a few month of anhedonia despite feeling that her depression is well controlled.  She has also had trouble losing weight, so we'll check a TSH.  Recent CMP and CBC are normal.  She does note starting menopause, so could be related to hormonal changes.  Recommend re-establishing with a therapist and continuing to be involved in community activities.      - TSH with free T4 reflex; Future  - Adult Mental Health  Referral; Future    Screen for colon cancer    - Colonoscopy Screening  Referral; Future    Visit for screening mammogram    - MA Screening Bilateral w/ Mitesh; Future    Cervical cancer screening    - Pap Screen with HPV - Recommended Age 30 - 65 Years        Counseling  Appropriate preventive services were addressed with this patient via screening, questionnaire, or discussion as appropriate for fall prevention, nutrition, physical activity, Tobacco-use cessation, weight loss and cognition.  Checklist reviewing preventive services available has been given to the patient.  Reviewed patient's diet, addressing concerns and/or questions.   She is at risk for lack of exercise and has been provided with information  to increase physical activity for the benefit of her well-being.   The patient was instructed to see the dentist every 6 months.   She is at risk for psychosocial distress and has been provided with information to reduce risk.           Return in about 53 weeks (around 8/15/2025) for Annual Wellness Visit.    Bertha Lorenzo is a 49 year old, presenting for the following:  Follow Up (Pt would like to discuss autoimmune disease (Hidradenitis suppurativa))        8/9/2024     4:10 PM   Additional Questions   Roomed by Alisha ASHTON   Accompanied by N/A        Health Care Directive  Patient does not have a Health Care Directive or Living Will: Discussed advance care planning with patient; information given to patient to review.    History of Present Illness       Reason for visit:  I have not had a real check up in 30 years    She eats 0-1 servings of fruits and vegetables daily.She consumes 0 sweetened beverage(s) daily.She exercises with enough effort to increase her heart rate 30 to 60 minutes per day.  She exercises with enough effort to increase her heart rate 4 days per week.   She is taking medications regularly.    Maura has a history of HS following with dermatology and the pain clinic.  She is making some progress with improvement of the HS.      She reports low drive to do things if she doesn't have things to do.  Has low energy.   Ongoing for the past few months.  Not feeling particularly depressed.  She is on duloxetine for depression and feels like that is working well.  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  No periods for the last few months.  Her therapist left to a new location; hasn't re-established yet.           8/9/2024   General Health   How would you rate your overall physical health? Good   Feel stress (tense, anxious, or unable to sleep) Only a little      (!) STRESS CONCERN      8/9/2024    Nutrition   Three or more servings of calcium each day? Yes   Diet: Regular (no restrictions)   How many servings of fruit and vegetables per day? (!) 2-3   How many sweetened beverages each day? 0-1            2024   Exercise   Days per week of moderate/strenous exercise 3 days   Average minutes spent exercising at this level 20 min            2024   Social Factors   Frequency of gathering with friends or relatives Once a week   Worry food won't last until get money to buy more No   Food not last or not have enough money for food? No   Do you have housing? (Housing is defined as stable permanent housing and does not include staying ouside in a car, in a tent, in an abandoned building, in an overnight shelter, or couch-surfing.) Yes   Are you worried about losing your housing? No   Lack of transportation? No   Unable to get utilities (heat,electricity)? No            2024   Fall Risk   Fallen 2 or more times in the past year? No   Trouble with walking or balance? No             2024   Dental   Dentist two times every year? (!) NO- has a broken tooth, plans to work on this next year after dealing with medical problems this year               Today's PHQ-2 Score:       2024     4:17 PM   PHQ-2 ( 1999 Pf[employer])   Q1: Little interest or pleasure in doing things 2   Q2: Feeling down, depressed or hopeless 0   PHQ-2 Score 2         2024   Substance Use   Alcohol more than 3/day or more than 7/wk No   Do you use any other substances recreationally? No        Social History     Tobacco Use    Smoking status: Former     Current packs/day: 0.00     Average packs/day: 0.5 packs/day for 20.0 years (10.0 ttl pk-yrs)     Types: Cigarettes     Start date: 1/3/2004     Quit date: 1/3/2024     Years since quittin.6    Smokeless tobacco: Never   Substance Use Topics    Alcohol use: Not Currently    Drug use: Yes     Types: Marijuana     Comment: Medical marijuana                  2024   One time HIV  "Screening   Previous HIV test? Yes          8/9/2024   STI Screening   New sexual partner(s) since last STI/HIV test? No        History of abnormal Pap smear: see below        Latest Ref Rng & Units 9/27/2019     5:05 PM 9/26/2019     5:15 PM   PAP / HPV   PAP (Historical)  ASC-US     HPV 16 DNA NEG^Negative  Negative    HPV 18 DNA NEG^Negative  Negative    Other HR HPV NEG^Negative  Negative      ASCVD Risk   The 10-year ASCVD risk score (Lee GORDON, et al., 2019) is: 0.8%    Values used to calculate the score:      Age: 49 years      Sex: Female      Is Non- : No      Diabetic: No      Tobacco smoker: No      Systolic Blood Pressure: 121 mmHg      Is BP treated: No      HDL Cholesterol: 75 mg/dL      Total Cholesterol: 207 mg/dL            8/9/2024   Contraception/Family Planning   Questions about contraception or family planning No           Reviewed and updated as needed this visit by Provider     Meds  Problems                   10 point ROS of systems including Constitutional, Eyes, Respiratory, Cardiovascular, Gastroenterology, Genitourinary, Integumentary, Muscularskeletal, Psychiatric were all negative except for pertinent positives noted in my HPI.      Objective    Exam  /81 (BP Location: Right arm, Patient Position: Sitting, Cuff Size: Adult Regular)   Pulse 96   Ht 1.679 m (5' 6.1\")   Wt 111.1 kg (244 lb 14.4 oz)   SpO2 98%   BMI 39.41 kg/m     Estimated body mass index is 39.41 kg/m  as calculated from the following:    Height as of this encounter: 1.679 m (5' 6.1\").    Weight as of this encounter: 111.1 kg (244 lb 14.4 oz).    Physical Exam  GENERAL: alert and no distress  EYES: Eyes grossly normal to inspection, PERRL and conjunctivae and sclerae normal  HENT: ear canals and TM's normal, nose and mouth without ulcers or lesions  NECK: no adenopathy, no asymmetry, masses, or scars  RESP: lungs clear to auscultation - no rales, rhonchi or wheezes  CV: " regular rate and rhythm, normal S1 S2, no S3 or S4, no murmur, click or rub, no peripheral edema  ABDOMEN: soft, nontender, no hepatosplenomegaly, no masses and bowel sounds normal   (female): normal female external genitalia, normal urethral meatus, normal vaginal mucosa, Pap performed  MS: no gross musculoskeletal defects noted, no edema  SKIN: no suspicious lesions or rashes  NEURO: Normal strength and tone, mentation intact and speech normal  PSYCH: mentation appears normal, affect normal/bright        Signed Electronically by: Nolan Elliott MD

## 2024-08-09 NOTE — TELEPHONE ENCOUNTER
Chart reviewed - Request appears appropriate. Refilled for 30 day supply.     Lesa Osman DNP, APRN, AGNP-C  [facility]

## 2024-08-09 NOTE — TELEPHONE ENCOUNTER
Refill request    Medication: pregabalin (LYRICA) 150 MG capsule     Sig: Take 1 capsule (150 mg) by mouth 2 times daily.    Dispensed: 60  Refills: 0  SOLD to the pt on: 6/22/24      Last clinic appointment: 7/1/24  Next clinic appointment: 8/12/24    Last Drug Screen Collected: none needed  Controlled Substance Agreement signed: none needed      Preferred pharmacy:    [address]    Refill request routed to the provider to review.

## 2024-08-12 ENCOUNTER — OFFICE VISIT (OUTPATIENT)
Dept: ANESTHESIOLOGY | Facility: CLINIC | Age: 49
End: 2024-08-12
Payer: COMMERCIAL

## 2024-08-12 VITALS
DIASTOLIC BLOOD PRESSURE: 84 MMHG | OXYGEN SATURATION: 90 % | HEART RATE: 94 BPM | RESPIRATION RATE: 16 BRPM | SYSTOLIC BLOOD PRESSURE: 117 MMHG

## 2024-08-12 DIAGNOSIS — M25.562 CHRONIC PAIN OF LEFT KNEE: ICD-10-CM

## 2024-08-12 DIAGNOSIS — M79.2 NEUROPATHIC PAIN: ICD-10-CM

## 2024-08-12 DIAGNOSIS — Z79.899 CONTROLLED SUBSTANCE AGREEMENT SIGNED: ICD-10-CM

## 2024-08-12 DIAGNOSIS — G89.4 CHRONIC PAIN SYNDROME: Primary | ICD-10-CM

## 2024-08-12 DIAGNOSIS — L73.2 HIDRADENITIS SUPPURATIVA: ICD-10-CM

## 2024-08-12 DIAGNOSIS — L73.2 HIDRADENITIS SUPPURATIVA: Primary | ICD-10-CM

## 2024-08-12 DIAGNOSIS — G89.29 CHRONIC PAIN OF LEFT KNEE: ICD-10-CM

## 2024-08-12 DIAGNOSIS — R53.82 CHRONIC FATIGUE: ICD-10-CM

## 2024-08-12 DIAGNOSIS — F11.90 CHRONIC, CONTINUOUS USE OF OPIOIDS: ICD-10-CM

## 2024-08-12 DIAGNOSIS — Z51.81 ENCOUNTER FOR THERAPEUTIC DRUG MONITORING: ICD-10-CM

## 2024-08-12 DIAGNOSIS — L29.9 ITCHING: ICD-10-CM

## 2024-08-12 DIAGNOSIS — L98.9 PAINFUL SKIN LESION: ICD-10-CM

## 2024-08-12 LAB
CREAT UR-MCNC: 129 MG/DL
HPV HR 12 DNA CVX QL NAA+PROBE: NEGATIVE
HPV16 DNA CVX QL NAA+PROBE: NEGATIVE
HPV18 DNA CVX QL NAA+PROBE: NEGATIVE
HUMAN PAPILLOMA VIRUS FINAL DIAGNOSIS: NORMAL

## 2024-08-12 PROCEDURE — 80375 DRUG/SUBSTANCE NOS 1-3: CPT

## 2024-08-12 PROCEDURE — 99215 OFFICE O/P EST HI 40 MIN: CPT

## 2024-08-12 PROCEDURE — 99000 SPECIMEN HANDLING OFFICE-LAB: CPT | Performed by: PATHOLOGY

## 2024-08-12 PROCEDURE — 99417 PROLNG OP E/M EACH 15 MIN: CPT

## 2024-08-12 PROCEDURE — 80353 DRUG SCREENING COCAINE: CPT

## 2024-08-12 RX ORDER — PREGABALIN 150 MG/1
150 CAPSULE ORAL 2 TIMES DAILY
Qty: 180 CAPSULE | Refills: 0 | Status: SHIPPED | OUTPATIENT
Start: 2024-08-12 | End: 2024-10-03

## 2024-08-12 RX ORDER — CELECOXIB 100 MG/1
100-200 CAPSULE ORAL 2 TIMES DAILY PRN
Qty: 120 CAPSULE | Refills: 1 | Status: SHIPPED | OUTPATIENT
Start: 2024-08-12 | End: 2024-08-19

## 2024-08-12 RX ORDER — HYDROXYZINE HYDROCHLORIDE 25 MG/1
25-50 TABLET, FILM COATED ORAL 3 TIMES DAILY PRN
Qty: 60 TABLET | Refills: 1 | Status: SHIPPED | OUTPATIENT
Start: 2024-08-12

## 2024-08-12 ASSESSMENT — PAIN SCALES - GENERAL: PAINLEVEL: MODERATE PAIN (4)

## 2024-08-12 NOTE — LETTER
8/12/2024       RE: Klarissa Curtis  [address]  [place] [address]     Dear Colleague,    Thank you for referring your patient, Klarissa Curtis, to the [facility] [place] at [facility]. Please see a copy of my visit note below.    [facility]     Date of visit: 8/12/2024      Assessment:   Klarissa Curtis is a 49 year old female with a past medical history significant for hidradenitis suppurativa & depression who presents with complaints of hidradenitis suppurativa related pain.      Hidradenitis suppurativa: Symptom onset at age 11, diagnosed at age 39. Persistent flares with worsening pain in bilateral axilla, groin, and buttocks. Has tried multiple pain modalities with inadequate pain control. Multiple lesions visualized on exam: nodular abscesses, draining tracts, honeycomb lesion pattern and fibrotic scarring identified. Pain symptom etiology is likely multifactorial with hx of hidradenitis suppurativa, neuropathic involvement and overlying myofascial component.      [place] [facility] [place]     Visit Diagnoses:  1. Chronic pain syndrome    2. Chronic pain of left knee    3. Hidradenitis suppurativa    4. Neuropathic pain    5. Painful skin lesion    6. Chronic, continuous use of opioids    7. Controlled substance agreement signed    8. Encounter for therapeutic drug monitoring    9. Chronic fatigue    10. Itching        Plan:  Diagnosis reviewed, treatment option addressed, and risk/benifits discussed.  Self-care instructions given.  I am recommending a multidisciplinary treatment plan to help this patient better manage their pain.      Pain Physical Therapy:  Not at this time.      Pain Psychologist to address relaxation, behavioral change, coping style, and other factors important to improvement.  NO - deferred for now     Diagnostic Studies:   Labs on 6/24/24  "reviewed:  CBC is WNL  CMP - hepatic function intact, GFR >90     Medication Management:   Hydroxyzine 25-50 mg three times daily as needed for itching/pain. Cautioned about potential sedation effects.   Medical cannabis - certification for [place] program renewed today.   NSAIDS - trial Celebrex 100 to 200 mg twice daily as needed.  Advised against concurrent use of other NSAIDs.  Lyrica - current dose 150 mg twice daily. Appreciates benefit, no side effects. May consider TID dosing in future.   Percocet 5-325 mg tabs, 1-2 tabs every 6-8 hours PRN for breakthrough pain, max 6-8 tabs/day. Last refill on 7/1/24 for #90 tabs. Will refill in between visits for #90 tabs when needed.   Controlled substance agreement and drug screen completed on 8/12/24.   Naloxone last sent to pharmacy on 2/19/24.   Consider TCA in future to optimize pain control and sleep benefit, buprenorphine retrial in future may be considered.      Potential procedures:   None      Other Orders/Referrals:   Mental Health - referral placed today for health psychology therapy. Scheduling phone number is [phone].      Follow up with SERAFIN Miranda CNP in 10-12 weeks or sooner if needed.       Review of Electronic Chart: Today I have also reviewed available medical information in the patient's medical record at [facility] ([facility]) and Care Everywhere (if available), including relevant provider notes, laboratory work, and imaging.     Lesa Osman DNP, APRN, AGNP-C  [facility]     -------------------------------------------------    Subjective:    Chief complaint:   Chief Complaint   Patient presents with     RECHECK     Pain     Return pain, states she is on a \"clinical trail\"       Interval history:  Klarissa Curtis is a 49 year old female last seen on 7/1/24.  They are a patient of mine seen in follow up.     Recommendations/plan at the last visit included:  Pain Physical Therapy:  NO - deferred for now      Pain " Psychologist to address relaxation, behavioral change, coping style, and other factors important to improvement.  NO - deferred for now     Diagnostic Studies:   Labs on 6/24/24 reviewed:  CBC is WNL  CMP - hepatic function intact, GFR >90     Medication Management:   NSAIDS -currently takes naproxen OTC, reports taking 5 tabs per dose.  Advised against overuse due to concerns for nephrotoxicity.  Recommend alternative NSAID, Celebrex 100 to 200 mg twice daily as needed.  Advised against concurrent use of other NSAIDs.  Lyrica - current dose 150 mg BID. Appreciates benefit, no side effects. May consider TID dosing in future.   Continue percocet 5-325 mg tabs, 1-2 tabs every 6-8 hours PRN for breakthrough pain, max 6-8 tabs/day. Agreed to refill for #90 tabs today, will discuss ongoing management with Dr. Kenny. We will need CSA at follow up, if I will be taking over prescribing.   Consider TCA in future to optimize pain control and sleep benefit, buprenorphine retrial in future may be considered.   Will discuss adding ketamine/amitriptyline topical cream with Dr. Kenny in future.      Potential procedures:   Plastic surgery scheduled 2/29/24 for right axillary wound excision and flap reconstruction.  Reports pain symptoms have significantly improved on right side, no new HS lesions since surgery.      Other Orders/Referrals:   Message sent to Dr. Kenny to collaborate on medication/opioid management.      Follow up with SERAFIN Miranda CNP in 4-6 weeks for in person visit.     Since her last visit, Klarissa Curtis reports:  -She has been dealing with left knee meniscal tear.   -She is overwhelmed right now with conditions.   -She is interested in  referral for health psych.   -She has a hard time dealing with all of this on her own while trying to continue to be there for others in people.   -She continues to take percocet PRN. Does not needs refill. She is aware we need to establish CSA.   -She needs  medical cannabis renewed. Last certified through [facility].   -Denies hx of severe cardiac/hepatic disease, no personal or family hx of schizophrenia/schizophrenia disorder.     Pain Information:   Pain ratin/10 on a 0-10 scale.      Interval history from last visit on 24:  -She's had a lot of different medical issues going on since last visit.   -She has meniscal tear and steroid injection not effective, will be trying Synvisc.   -She had staph injection to right index finger from bug bite.   -She was started on Lyrica at last visit, current dose 150 mg BID. She appreciates benefit, no side effects outside of mild tiredness.   -Dr. Kenny manages oxycodone PRN for flares, this helps take the edge off but not profound benefit.   -Left arm and groin flare right now with HS.   -She is trying a clinical trial, will have antibiotics x 1 week for , then reintroduce healthy gut althea.   -She notes that right arm HS lesions are stable following surgery on 24.  -She is dealing with knee pain, taking NSAIDs.   -She needs a refill of Percocet, not sure what the plan is long term for the management.   Pain Information:              Pain ratin/10 on a 0-10 scale.        Current Pain Treatments:    Medications:               - Percocet 5-325 mg PRN for breakthrough pain. 6-8 tabs/day              - Lyrica 150 mg BID.               -Celebrex 100-200 mg BID PRN   Hydroxyzine 25-50 mg TID PRN                2. Other therapies:               Medical cannabis - renewed today              Plastic surgery & reconstruction of right axillary wound              Derm - Dr. Kenny         Current MME: 0-60/day      Review of [place] Prescription Monitoring Program (): No concern for abuse or misuse of controlled medications based on this report. Reviewed - appears appropriate.      Annual Controlled Substance Agreement/UDS due date: Completed on 24     Past pain treatments:  Naproxen        Medications:  Current Outpatient Medications   Medication Sig Dispense Refill     hydrOXYzine HCl (ATARAX) 25 MG tablet Take 1-2 tablets (25-50 mg) by mouth 3 times daily as needed for itching 60 tablet 1     pregabalin (LYRICA) 150 MG capsule Take 1 capsule (150 mg) by mouth 2 times daily Appointment required for additional refills. 180 capsule 0     adalimumab (HUMIRA *CF*) 80 MG/0.8ML pen kit Inject 0.8 mLs (80 mg) Subcutaneous once a week 3.2 mL 11     calcium carbonate (OS-MERCY) 500 MG TABS Take 1 tablet by mouth 2 times daily (with meals)       celecoxib (CELEBREX) 100 MG capsule Take 1 capsule (100 mg) by mouth 2 times daily as needed for moderate pain 60 capsule 1     DULoxetine (CYMBALTA) 30 MG capsule 90mg daily 270 capsule 3     fexofenadine (ALLEGRA) 180 MG tablet Take 1 tablet (180 mg) by mouth daily 90 tablet 4     medical cannabis (Patient's own supply) See Admin Instructions (The purpose of this order is to document that the patient reports taking medical cannabis.  This is not a prescription, and is not used to certify that the patient has a qualifying medical condition.)       naloxone (NARCAN) 4 MG/0.1ML nasal spray Spray 1 spray (4 mg) into one nostril alternating nostrils as needed for opioid reversal every 2-3 minutes until assistance arrives (Patient not taking: Reported on 8/9/2024) 0.2 mL 0     oxyCODONE-acetaminophen (PERCOCET) 5-325 MG tablet Take 1-2 tablets by mouth every 6 hours as needed for severe pain Take 1-2 percocet every 6-8 hrs as needed for pain. May fill/start 8/20/24. 90 tablet 0     polyethylene glycol (MIRALAX) 17 GM/Dose powder Take 17 g by mouth 2 times daily (Patient not taking: Reported on 8/9/2024)       Resorcinol POWD Resorcinol 15% in vanicream twice a day (Patient taking differently: Resorcinol 15% in vanicream twice a day PRN) 30 g 11     study - MTP-101-C vs. placebo, IDS# [id number], capsule Take 1 capsule by mouth daily for 14 days Biohazardous/biological  product - use precautions. 14 capsule 0     traZODone (DESYREL) 150 MG tablet Take 1.5 tablets (225 mg) by mouth at bedtime 135 tablet 1     Vitamin D3 50 mcg (2000 units) tablet Take 1 tablet (50 mcg) by mouth daily 90 tablet 3       Medical History: any changes in medical history since they were last seen? No      Objective:    Physical Exam:  Blood pressure 117/84, pulse 94, resp. rate 16, SpO2 90%, not currently breastfeeding.  Constitutional: Well developed, well nourished, appears stated age.  Gait: Intact   HEENT: Head atraumatic, normocephalic. Eyes without conjunctival injection or jaundice. Oropharynx clear. Neck supple. No obvious neck masses.  Skin: No rash, lesions, or petechiae of exposed skin.   Psychiatric/mental status: Alert, without lethargy or stupor. Speech fluent. Appropriate affect. Mood normal. Able to follow commands without difficulty.     Diagnostic Tests/Imaging/Labs:  Reviewed last CMP, hepatic and renal function intact.     BILLING TIME DOCUMENTATION:   The total TIME spent on this patient on the date of the encounter/appointment was 55 minutes.      TOTAL TIME includes:   Time spent preparing to see the patient (reviewing records and tests)   Time spent face to face (or over the phone) with the patient   Time spent ordering tests, medications, procedures and referrals   Time spent Referring and communicating with other healthcare professionals   Time spent documenting clinical information in Epic             Again, thank you for allowing me to participate in the care of your patient.      Sincerely,    SERAFIN Miranda CNP

## 2024-08-12 NOTE — PROGRESS NOTES
[facility] Pain Management     Date of visit: 8/12/2024      Assessment:   Klarissa Curtis is a 49 year old female with a past medical history significant for hidradenitis suppurativa & depression who presents with complaints of hidradenitis suppurativa related pain.      Hidradenitis suppurativa: Symptom onset at age 11, diagnosed at age 39. Persistent flares with worsening pain in bilateral axilla, groin, and buttocks. Has tried multiple pain modalities with inadequate pain control. Multiple lesions visualized on exam: nodular abscesses, draining tracts, honeycomb lesion pattern and fibrotic scarring identified. Pain symptom etiology is likely multifactorial with hx of hidradenitis suppurativa, neuropathic involvement and overlying myofascial component.      Assigned to [facility] nursing team.     Visit Diagnoses:  1. Chronic pain syndrome    2. Chronic pain of left knee    3. Hidradenitis suppurativa    4. Neuropathic pain    5. Painful skin lesion    6. Chronic, continuous use of opioids    7. Controlled substance agreement signed    8. Encounter for therapeutic drug monitoring    9. Chronic fatigue    10. Itching        Plan:  Diagnosis reviewed, treatment option addressed, and risk/benifits discussed.  Self-care instructions given.  I am recommending a multidisciplinary treatment plan to help this patient better manage their pain.      Pain Physical Therapy:  Not at this time.      Pain Psychologist to address relaxation, behavioral change, coping style, and other factors important to improvement.  NO - deferred for now     Diagnostic Studies:   Labs on 6/24/24 reviewed:  CBC is WNL  CMP - hepatic function intact, GFR >90     Medication Management:   Hydroxyzine 25-50 mg three times daily as needed for itching/pain. Cautioned about potential sedation effects.   Medical cannabis - certification for [place] program renewed today.   NSAIDS - trial Celebrex 100 to 200 mg twice daily as needed.  Advised against  "concurrent use of other NSAIDs.  Lyrica - current dose 150 mg twice daily. Appreciates benefit, no side effects. May consider TID dosing in future.   Percocet 5-325 mg tabs, 1-2 tabs every 6-8 hours PRN for breakthrough pain, max 6-8 tabs/day. Last refill on 7/1/24 for #90 tabs. Will refill in between visits for #90 tabs when needed.   Controlled substance agreement and drug screen completed on 8/12/24.   Naloxone last sent to pharmacy on 2/19/24.   Consider TCA in future to optimize pain control and sleep benefit, buprenorphine retrial in future may be considered.      Potential procedures:   None      Other Orders/Referrals:   Mental Health - referral placed today for health psychology therapy. Scheduling phone number is [phone].      Follow up with SERAFIN Miranda CNP in 10-12 weeks or sooner if needed.       Review of Electronic Chart: Today I have also reviewed available medical information in the patient's medical record at [facility] ([facility]) and Care Everywhere (if available), including relevant provider notes, laboratory work, and imaging.     Lesa Osman DNP, SERAFIN, AGNP-C  [facility] Pain Management     -------------------------------------------------    Subjective:    Chief complaint:   Chief Complaint   Patient presents with    RECHECK    Pain     Return pain, states she is on a \"clinical trail\"       Interval history:  Klarissa Curtis is a 49 year old female last seen on 7/1/24.  They are a patient of mine seen in follow up.     Recommendations/plan at the last visit included:  Pain Physical Therapy:  NO - deferred for now      Pain Psychologist to address relaxation, behavioral change, coping style, and other factors important to improvement.  NO - deferred for now     Diagnostic Studies:   Labs on 6/24/24 reviewed:  CBC is WNL  CMP - hepatic function intact, GFR >90     Medication Management:   NSAIDS -currently takes naproxen OTC, reports taking 5 tabs per dose.  Advised " against overuse due to concerns for nephrotoxicity.  Recommend alternative NSAID, Celebrex 100 to 200 mg twice daily as needed.  Advised against concurrent use of other NSAIDs.  Lyrica - current dose 150 mg BID. Appreciates benefit, no side effects. May consider TID dosing in future.   Continue percocet 5-325 mg tabs, 1-2 tabs every 6-8 hours PRN for breakthrough pain, max 6-8 tabs/day. Agreed to refill for #90 tabs today, will discuss ongoing management with Dr. Kenny. We will need CSA at follow up, if I will be taking over prescribing.   Consider TCA in future to optimize pain control and sleep benefit, buprenorphine retrial in future may be considered.   Will discuss adding ketamine/amitriptyline topical cream with Dr. Kenny in future.      Potential procedures:   Plastic surgery scheduled 24 for right axillary wound excision and flap reconstruction.  Reports pain symptoms have significantly improved on right side, no new HS lesions since surgery.      Other Orders/Referrals:   Message sent to Dr. Kenny to collaborate on medication/opioid management.      Follow up with SERAFIN Miranda CNP in 4-6 weeks for in person visit.     Since her last visit, Klarissa ARCHULETA [place] reports:  -She has been dealing with left knee meniscal tear.   -She is overwhelmed right now with conditions.   -She is interested in  referral for health psych.   -She has a hard time dealing with all of this on her own while trying to continue to be there for others in people.   -She continues to take percocet PRN. Does not needs refill. She is aware we need to establish CSA.   -She needs medical cannabis renewed. Last certified through [facility].   -Denies hx of severe cardiac/hepatic disease, no personal or family hx of schizophrenia/schizophrenia disorder.     Pain Information:   Pain ratin/10 on a 0-10 scale.      Interval history from last visit on 24:  -She's had a lot of different medical issues going  on since last visit.   -She has meniscal tear and steroid injection not effective, will be trying Synvisc.   -She had staph injection to right index finger from bug bite.   -She was started on Lyrica at last visit, current dose 150 mg BID. She appreciates benefit, no side effects outside of mild tiredness.   -Dr. Kenny manages oxycodone PRN for flares, this helps take the edge off but not profound benefit.   -Left arm and groin flare right now with HS.   -She is trying a clinical trial, will have antibiotics x 1 week for , then reintroduce healthy gut althea.   -She notes that right arm HS lesions are stable following surgery on 24.  -She is dealing with knee pain, taking NSAIDs.   -She needs a refill of Percocet, not sure what the plan is long term for the management.   Pain Information:              Pain ratin/10 on a 0-10 scale.        Current Pain Treatments:    Medications:               - Percocet 5-325 mg PRN for breakthrough pain. 6-8 tabs/day              - Lyrica 150 mg BID.               -Celebrex 100-200 mg BID PRN   Hydroxyzine 25-50 mg TID PRN                2. Other therapies:               Medical cannabis - renewed today              Plastic surgery & reconstruction of right axillary wound              Derm - Dr. Kenny         Current MME: 0-60/day      Review of [place] Prescription Monitoring Program (): No concern for abuse or misuse of controlled medications based on this report. Reviewed - appears appropriate.      Annual Controlled Substance Agreement/UDS due date: Completed on 24     Past pain treatments:  Naproxen       Medications:  Current Outpatient Medications   Medication Sig Dispense Refill    hydrOXYzine HCl (ATARAX) 25 MG tablet Take 1-2 tablets (25-50 mg) by mouth 3 times daily as needed for itching 60 tablet 1    pregabalin (LYRICA) 150 MG capsule Take 1 capsule (150 mg) by mouth 2 times daily Appointment required for additional refills. 180 capsule 0     adalimumab (HUMIRA *CF*) 80 MG/0.8ML pen kit Inject 0.8 mLs (80 mg) Subcutaneous once a week 3.2 mL 11    calcium carbonate (OS-MERCY) 500 MG TABS Take 1 tablet by mouth 2 times daily (with meals)      celecoxib (CELEBREX) 100 MG capsule Take 1 capsule (100 mg) by mouth 2 times daily as needed for moderate pain 60 capsule 1    DULoxetine (CYMBALTA) 30 MG capsule 90mg daily 270 capsule 3    fexofenadine (ALLEGRA) 180 MG tablet Take 1 tablet (180 mg) by mouth daily 90 tablet 4    medical cannabis (Patient's own supply) See Admin Instructions (The purpose of this order is to document that the patient reports taking medical cannabis.  This is not a prescription, and is not used to certify that the patient has a qualifying medical condition.)      naloxone (NARCAN) 4 MG/0.1ML nasal spray Spray 1 spray (4 mg) into one nostril alternating nostrils as needed for opioid reversal every 2-3 minutes until assistance arrives (Patient not taking: Reported on 8/9/2024) 0.2 mL 0    oxyCODONE-acetaminophen (PERCOCET) 5-325 MG tablet Take 1-2 tablets by mouth every 6 hours as needed for severe pain Take 1-2 percocet every 6-8 hrs as needed for pain. May fill/start 8/20/24. 90 tablet 0    polyethylene glycol (MIRALAX) 17 GM/Dose powder Take 17 g by mouth 2 times daily (Patient not taking: Reported on 8/9/2024)      Resorcinol POWD Resorcinol 15% in vanicream twice a day (Patient taking differently: Resorcinol 15% in vanicream twice a day PRN) 30 g 11    study - MTP-101-C vs. placebo, IDS# [id number], capsule Take 1 capsule by mouth daily for 14 days Biohazardous/biological product - use precautions. 14 capsule 0    traZODone (DESYREL) 150 MG tablet Take 1.5 tablets (225 mg) by mouth at bedtime 135 tablet 1    Vitamin D3 50 mcg (2000 units) tablet Take 1 tablet (50 mcg) by mouth daily 90 tablet 3       Medical History: any changes in medical history since they were last seen? No      Objective:    Physical Exam:  Blood pressure 117/84, pulse  94, resp. rate 16, SpO2 90%, not currently breastfeeding.  Constitutional: Well developed, well nourished, appears stated age.  Gait: Intact   HEENT: Head atraumatic, normocephalic. Eyes without conjunctival injection or jaundice. Oropharynx clear. Neck supple. No obvious neck masses.  Skin: No rash, lesions, or petechiae of exposed skin.   Psychiatric/mental status: Alert, without lethargy or stupor. Speech fluent. Appropriate affect. Mood normal. Able to follow commands without difficulty.     Diagnostic Tests/Imaging/Labs:  Reviewed last CMP, hepatic and renal function intact.     BILLING TIME DOCUMENTATION:   The total TIME spent on this patient on the date of the encounter/appointment was 55 minutes.      TOTAL TIME includes:   Time spent preparing to see the patient (reviewing records and tests)   Time spent face to face (or over the phone) with the patient   Time spent ordering tests, medications, procedures and referrals   Time spent Referring and communicating with other healthcare professionals   Time spent documenting clinical information in Epic

## 2024-08-12 NOTE — LETTER
Opioid / Opioid Plus Controlled Substance Agreement    This is an agreement between you and your provider about the safe and appropriate use of controlled substance/opioids prescribed by your care team. Controlled substances are medicines that can cause physical and mental dependence (abuse).    There are strict laws about having and using these medicines. We here at [facility] are committing to working with you in your efforts to get better. To support you in this work, we ll help you schedule regular office appointments for medicine refills. If we must cancel or change your appointment for any reason, we ll make sure you have enough medicine to last until your next appointment.     As a Provider, I will:  Listen carefully to your concerns and treat you with respect.   Recommend a treatment plan that I believe is in your best interest. This plan may involve therapies other than opioid pain medication.   Talk with you often about the possible benefits, and the risk of harm of any medicine that we prescribe for you.   Provide a plan on how to taper (discontinue or go off) using this medicine if the decision is made to stop its use.    As a Patient, I understand that opioid(s):   Are a controlled substance prescribed by my care team to help me function or work and manage my condition(s).   Are strong medicines and can cause serious side effects such as:  Drowsiness, which can seriously affect my driving ability  A lower breathing rate, enough to cause death  Harm to my thinking ability   Depression   Abuse of and addiction to this medicine  Need to be taken exactly as prescribed. Combining opioids with certain medicines or chemicals (such as illegal drugs, sedatives, sleeping pills, and benzodiazepines) can be dangerous or even fatal. If I stop opioids suddenly, I may have severe withdrawal symptoms.  Do not work for all types of pain nor for all patients. If they re not helpful, I may be asked to stop  them.      The risks, benefits and side effects of these medicine(s) were explained to me. I agree that:  I will take part in other treatments as advised by my care team. This may be psychiatry or counseling, physical therapy, behavioral therapy, group treatment or a referral to a specialist.     I will keep all my appointments. I understand that this is part of the monitoring of opioids. My care team may require an office visit for EVERY opioid/controlled substance refill. If I miss appointments or don t follow instructions, my care team may stop my medicine.    I will take my medicines as prescribed. I will not change the dose or schedule unless my care team tells me to. There will be no refills if I run out early.     I may be asked to come to the clinic and complete a urine drug test or complete a pill count at any time. If I don t give a urine sample or participate in a pill count, the care team may stop my medicine.    I will only receive prescriptions from this clinic for chronic pain. If I am treated by another provider for acute pain issues, I will tell them that I am taking opioid pain medication for chronic pain and that I have a treatment agreement with this provider. I will inform my [facility] care team within one business day if I am given a prescription for any pain medication by another healthcare provider. My [facility] care team can contact other providers and pharmacists about my use of any medicines.    It is up to me to make sure that I don t run out of my medicines on weekends or holidays. If my care team is willing to refill my opioid prescription without a visit, I must request refills only during office hours. Refills may take up to 7 business days to process. I will use one pharmacy to fill all my opioid and other controlled substance prescriptions. I will notify the clinic about any changes to my insurance or medication availability.    I am responsible for my prescriptions.  If the medicine/prescription is lost, stolen or destroyed, it will not be replaced. I also agree not to share controlled substance medicines with anyone.    I am aware I should not use any illegal or recreational drugs. I agree not to drink alcohol unless my care team says I can.       If I enroll in the [place] Medical Cannabis program, I will tell my care team prior to my next refill.     I will tell my care team right away if I become pregnant, have a new medical problem treated outside of my regular clinic, or have a change in my medications.    I understand that this medicine can affect my thinking, judgment and reaction time. Alcohol and drugs affect the brain and body, which can affect the safety of my driving. Being under the influence of alcohol or drugs can affect my decision-making, behaviors, personal safety, and the safety of others. Driving while impaired (DWI) can occur if a person is driving, operating, or in physical control of a car, motorcycle, boat, snowmobile, ATV, motorbike, off-road vehicle, or any other motor vehicle ([place] Statute 169A.20). I understand the risk if I choose to drive or operate any vehicle or machinery.    I understand that if I do not follow any of the conditions above, my prescriptions or treatment may be stopped or changed.          Opioids  What You Need to Know    What are opioids?   Opioids are pain medicines that must be prescribed by a doctor. They are also known as narcotics.     Examples are:   morphine (MS Contin, Astrid)  oxycodone (Oxycontin)  oxycodone and acetaminophen (Percocet)  hydrocodone and acetaminophen (Vicodin, Norco)   fentanyl patch (Duragesic)   hydromorphone (Dilaudid)   methadone  codeine (Tylenol #3)     What do opioids do well?   Opioids are best for severe short-term pain such as after a surgery or injury. They may work well for cancer pain. They may help some people with long-lasting (chronic) pain.     What do opioids NOT do well?   Opioids  never get rid of pain entirely, and they don t work well for most patients with chronic pain. Opioids don t reduce swelling, one of the causes of pain.                                    Other ways to manage chronic pain and improve function include:     Treat the health problem that may be causing pain  Anti-inflammation medicines, which reduce swelling and tenderness, such as ibuprofen (Advil, Motrin) or naproxen (Aleve)  Acetaminophen (Tylenol)  Antidepressants and anti-seizure medicines, especially for nerve pain  Topical treatments such as patches or creams  Injections or nerve blocks  Chiropractic or osteopathic treatment  Acupuncture, massage, deep breathing, meditation, visual imagery, aromatherapy  Use heat or ice at the pain site  Physical therapy   Exercise  Stop smoking  Take part in therapy       Risks and side effects     Talk to your doctor before you start or decide to keep taking opioids. Possible side effects include:    Lowering your breathing rate enough to cause death  Overdose, including death, especially if taking higher than prescribed doses  Worse depression symptoms; less pleasure in things you usually enjoy  Feeling tired or sluggish  Slower thoughts or cloudy thinking  Being more sensitive to pain over time; pain is harder to control  Trouble sleeping or restless sleep  Changes in hormone levels (for example, less testosterone)  Changes in sex drive or ability to have sex  Constipation  Unsafe driving  Itching and sweating  Dizziness  Nausea, throwing up and dry mouth    What else should I know about opioids?    Opioids may lead to dependence, tolerance, or addiction.    Dependence means that if you stop or reduce the medicine too quickly, you will have withdrawal symptoms. These include loose poop (diarrhea), jitters, flu-like symptoms, nervousness and tremors. Dependence is not the same as addiction.                     Tolerance means needing higher doses over time to get the same  effect. This may increase the chance of serious side effects.    Addiction is when people improperly use a substance that harms their body, their mind or their relations with others. Use of opiates can cause a relapse of addiction if you have a history of drug or alcohol abuse.    People who have used opioids for a long time may have a lower quality of life, worse depression, higher levels of pain and more visits to doctors.    You can overdose on opioids. Take these steps to lower your risk of overdose:    Recognize the signs:  Signs of overdose include decrease or loss of consciousness (blackout), slowed breathing, trouble waking up and blue lips. If someone is worried about overdose, they should call 911.    Talk to your doctor about Narcan (naloxone).   If you are at risk for overdose, you may be given a prescription for Narcan. This medicine very quickly reverses the effects of opioids.   If you overdose, a friend or family member can give you Narcan while waiting for the ambulance. They need to know the signs of overdose and how to give Narcan.     Don't use alcohol or street drugs.   Taking them with opioids can cause death.    Do not take any of these medicines unless your doctor says it s OK. Taking these with opioids can cause death:  Benzodiazepines, such as lorazepam (Ativan), alprazolam (Xanax) or diazepam (Valium)  Muscle relaxers, such as cyclobenzaprine (Flexeril)  Sleeping pills like zolpidem (Ambien)   Other opioids      How to keep you and other people safe while taking opioids:    Never share your opioids with others.  Opioid medicines are regulated by the Drug Enforcement Agency ([place]RI[place]). Selling or sharing medications is a criminal act.    2. Be sure to store opioids in a secure place, locked up if possible. Young children can easily swallow them and overdose.    3. When you are traveling with your medicines, keep them in the original bottles. If you use a pill box, be sure you also carry a copy  of your medicine list from your clinic or pharmacy.    4. Safe disposal of opioids    Most pharmacies have places to get rid of medicine, called disposal kiosks. Medicine disposal options are also available in every [facility]. Search your county and  medication disposal  to find more options. You can find more details at:  https://www.pca.Counts include 234 beds at the [facility].[place]./living-green/managing-unwanted-medications     I agree that my provider, clinic care team, and pharmacy may work with any city, state or federal law enforcement agency that investigates the misuse, sale, or other diversion of my controlled medicine. I will allow my provider to discuss my care with, or share a copy of, this agreement with any other treating provider, pharmacy or emergency room where I receive care.    I have read this agreement and have asked questions about anything I did not understand.    _______________________________________________________  Patient Signature - Klarisas Curtis _____________________                   Date     _______________________________________________________  Provider Signature - SERAFIN Miranda CNP   _____________________                   Date     _______________________________________________________  Witness Signature (required if provider not present while patient signing)   _____________________                   Date

## 2024-08-12 NOTE — NURSING NOTE
"Patient presents with:  RECHECK  Pain: Return pain, states she is on a \"clinical trail\"      Moderate Pain (4)     Pain Medications       Opioid Combinations Refills Start End     oxyCODONE-acetaminophen (PERCOCET) 5-325 MG tablet 0 7/1/2024 --    Sig - Route: Take 1-2 tablets by mouth every 6 hours as needed for severe pain Take 1-2 percocet every 6-8 hrs as needed for pain - Oral    Class: E-Prescribe    Earliest Fill Date: 7/1/2024            What medications are you using for pain? Percocet, medical cannabis    (Return Patients only) What refills are you needing today? Not sure    Expectations: none    Tiffany Hunt LPN    "

## 2024-08-12 NOTE — NURSING NOTE
RN reviewed AVS with patient. Patient to contact clinic if any questions/concerns. Patient verbalized understanding.    Brandie Ferrell RN

## 2024-08-13 ENCOUNTER — TELEPHONE (OUTPATIENT)
Dept: ANESTHESIOLOGY | Facility: CLINIC | Age: 49
End: 2024-08-13
Payer: COMMERCIAL

## 2024-08-13 ENCOUNTER — HOSPITAL ENCOUNTER (OUTPATIENT)
Dept: RESEARCH | Facility: CLINIC | Age: 49
Discharge: HOME | End: 2024-08-13
Attending: DERMATOLOGY
Payer: COMMERCIAL

## 2024-08-13 PROCEDURE — 510N000009 HC RESEARCH FACILITY, PER 15 MIN

## 2024-08-13 PROCEDURE — 510N000017 HC CRU PATIENT CARE, PER 15 MIN

## 2024-08-13 PROCEDURE — 300N000003 HC RESEARCH SPECIMEN PROCESSING, SIMPLE

## 2024-08-13 NOTE — ADDENDUM NOTE
Encounter addended by: Eleuterio Schneider on: 8/13/2024 3:44 PM   Actions taken: Charge Capture section accepted

## 2024-08-14 ENCOUNTER — TELEPHONE (OUTPATIENT)
Dept: INTERNAL MEDICINE | Facility: CLINIC | Age: 49
End: 2024-08-14
Payer: COMMERCIAL

## 2024-08-14 ENCOUNTER — HOSPITAL ENCOUNTER (OUTPATIENT)
Dept: RESEARCH | Facility: CLINIC | Age: 49
Discharge: HOME | End: 2024-08-14
Attending: DERMATOLOGY | Admitting: DERMATOLOGY
Payer: COMMERCIAL

## 2024-08-14 DIAGNOSIS — F32.A DEPRESSION, UNSPECIFIED DEPRESSION TYPE: ICD-10-CM

## 2024-08-14 DIAGNOSIS — L73.2 HIDRADENITIS SUPPURATIVA: ICD-10-CM

## 2024-08-14 LAB
BKR LAB AP GYN ADEQUACY: NORMAL
BKR LAB AP GYN INTERPRETATION: NORMAL
BKR LAB AP PREVIOUS ABNL DX: NORMAL
BKR LAB AP PREVIOUS ABNORMAL: NORMAL
PATH REPORT.COMMENTS IMP SPEC: NORMAL
PATH REPORT.COMMENTS IMP SPEC: NORMAL
PATH REPORT.RELEVANT HX SPEC: NORMAL
PREGABALIN UR QL CFM: PRESENT

## 2024-08-14 PROCEDURE — 510N000009 HC RESEARCH FACILITY, PER 15 MIN

## 2024-08-14 PROCEDURE — 11900 INJECT SKIN LESIONS </W 7: CPT

## 2024-08-14 PROCEDURE — 250N000011 HC RX IP 250 OP 636: Performed by: DERMATOLOGY

## 2024-08-14 RX ORDER — TRIAMCINOLONE ACETONIDE 40 MG/ML
40 INJECTION, SUSPENSION INTRA-ARTICULAR; INTRAMUSCULAR ONCE
Status: COMPLETED | OUTPATIENT
Start: 2024-08-14 | End: 2024-08-14

## 2024-08-14 RX ADMIN — TRIAMCINOLONE ACETONIDE 40 MG: 40 INJECTION, SUSPENSION INTRA-ARTICULAR; INTRAMUSCULAR at 13:24

## 2024-08-16 NOTE — TELEPHONE ENCOUNTER
Patient confirmed scheduled appointment:     Date: 10/29/24  Time: 9:15 AM  Visit type: Return pain  Visit mode: Virtual Visit  Provider: Lesa Osman  Location: [facility]    Additional Notes:   Pt confirmed will be in [place] for appt

## 2024-08-19 ENCOUNTER — HOSPITAL ENCOUNTER (OUTPATIENT)
Dept: MAMMOGRAPHY | Facility: CLINIC | Age: 49
Discharge: HOME | End: 2024-08-19
Attending: INTERNAL MEDICINE | Admitting: INTERNAL MEDICINE
Payer: COMMERCIAL

## 2024-08-19 DIAGNOSIS — Z12.31 VISIT FOR SCREENING MAMMOGRAM: ICD-10-CM

## 2024-08-19 PROCEDURE — 77063 BREAST TOMOSYNTHESIS BI: CPT

## 2024-08-19 RX ORDER — CELECOXIB 100 MG/1
100 CAPSULE ORAL 2 TIMES DAILY PRN
Qty: 60 CAPSULE | Refills: 1 | Status: SHIPPED | OUTPATIENT
Start: 2024-08-19

## 2024-08-19 NOTE — TELEPHONE ENCOUNTER
"Spoke to staff at [facility] pharmacy-they state that insurance will not cover 4 tabs of  the medication Celebrex a day.  It is written for 100mg 1-2 tabs a day. They have not filled the med nor has the patient picked it up. The script would need to be written as \"100mg 1 tablet twice a day\" or \"200mg 1 tablet twice a day\".  Will pass onto provider to adjust script as requested per pharmacy.  "

## 2024-08-19 NOTE — TELEPHONE ENCOUNTER
Signed Prescriptions:                        Disp   Refills    celecoxib (CELEBREX) 100 MG capsule        60 cap*1        Sig: Take 1 capsule (100 mg) by mouth 2 times daily as           needed for moderate pain  Authorizing Provider: GISSELLE KAUR      Renal function checked in June 2024 was WNL.     Signed for colleague who is out of office. Refill(s) appear consistent with ongoing management.     Gisselle BETANCOURT, RN CNP, FNP  [facility]  [facility] [place]

## 2024-08-20 ENCOUNTER — MYC REFILL (OUTPATIENT)
Dept: ANESTHESIOLOGY | Facility: CLINIC | Age: 49
End: 2024-08-20
Payer: COMMERCIAL

## 2024-08-20 DIAGNOSIS — L73.2 HIDRADENITIS SUPPURATIVA: ICD-10-CM

## 2024-08-20 RX ORDER — OXYCODONE AND ACETAMINOPHEN 5; 325 MG/1; MG/1
1-2 TABLET ORAL EVERY 6 HOURS PRN
Qty: 90 TABLET | Refills: 0 | Status: SHIPPED | OUTPATIENT
Start: 2024-08-20 | End: 2024-10-03

## 2024-08-20 NOTE — TELEPHONE ENCOUNTER
Refill request    Medication: oxyCODONE-acetaminophen (PERCOCET) 5-325 MG tablet    Sig: Take 1-2 tablets by mouth every 6 hours as needed for severe pain Take 1-2 percocet every 6-8 hrs as needed for pain    Dispensed: 90  Refills: 0  SOLD to the pt on: 7/1/24 (Controlled Substance)    Last clinic appointment: 8/12/24  Next clinic appointment: 10/29/24    Last Drug Screen Collected: 8/12/24  Controlled Substance Agreement signed: 8/12/24    Preferred pharmacy:    [address]    Refill request routed to the provider to review.

## 2024-08-20 NOTE — TELEPHONE ENCOUNTER
Chart reviewed - Request appears appropriate. Refilled for #90 tabs.     Lesa Osman, LEYDA, APRN, AGNP-C  [facility]

## 2024-08-21 NOTE — PATIENT INSTRUCTIONS
Pain Physical Therapy:  Not at this time.      Pain Psychologist to address relaxation, behavioral change, coping style, and other factors important to improvement.  NO - deferred for now     Diagnostic Studies:   Labs on 6/24/24 reviewed:  CBC is WNL  CMP - hepatic function intact, GFR >90     Medication Management:   Hydroxyzine 25-50 mg three times daily as needed for itching/pain. Cautioned about potential sedation effects.   [place]   NSAIDS - trial Celebrex 100 to 200 mg twice daily as needed.  Advised against concurrent use of other NSAIDs.  Lyrica - current dose 150 mg twice daily. Appreciates benefit, no side effects. May consider TID dosing in future.   Percocet 5-325 mg tabs, 1-2 tabs every 6-8 hours PRN for breakthrough pain, max 6-8 tabs/day. Last refill on 7/1/24 for #90 tabs. Will refill in between visits for #90 tabs when needed.   Controlled substance agreement and drug screen completed on 8/12/24.   Naloxone last sent to pharmacy on 2/19/24.   Consider TCA in future to optimize pain control and sleep benefit, buprenorphine retrial in future may be considered.      Potential procedures:   None      Other Orders/Referrals:   Mental Health - referral placed today for health psychology therapy. Scheduling phone number is [phone].      Follow up with SERAFIN Miranda CNP in 10-12 weeks or sooner if needed.

## 2024-09-03 ENCOUNTER — HOSPITAL ENCOUNTER (OUTPATIENT)
Dept: RESEARCH | Facility: CLINIC | Age: 49
Discharge: HOME | End: 2024-09-03
Attending: DERMATOLOGY | Admitting: DERMATOLOGY
Payer: COMMERCIAL

## 2024-09-03 VITALS
RESPIRATION RATE: 16 BRPM | BODY MASS INDEX: 38.2 KG/M2 | HEART RATE: 84 BPM | OXYGEN SATURATION: 99 % | DIASTOLIC BLOOD PRESSURE: 73 MMHG | WEIGHT: 243.39 LBS | TEMPERATURE: 97.5 F | HEIGHT: 67 IN | SYSTOLIC BLOOD PRESSURE: 127 MMHG

## 2024-09-03 DIAGNOSIS — L29.9 PRURITUS: ICD-10-CM

## 2024-09-03 DIAGNOSIS — B37.2 CANDIDIASIS OF SKIN: Primary | ICD-10-CM

## 2024-09-03 DIAGNOSIS — L01.00 IMPETIGO: ICD-10-CM

## 2024-09-03 DIAGNOSIS — L73.2 HIDRADENITIS SUPPURATIVA: ICD-10-CM

## 2024-09-03 DIAGNOSIS — L08.9 SOFT TISSUE INFECTION: ICD-10-CM

## 2024-09-03 DIAGNOSIS — F32.A DEPRESSION, UNSPECIFIED DEPRESSION TYPE: ICD-10-CM

## 2024-09-03 PROCEDURE — 510N000009 HC RESEARCH FACILITY, PER 15 MIN

## 2024-09-03 PROCEDURE — 87070 CULTURE OTHR SPECIMN AEROBIC: CPT | Performed by: DERMATOLOGY

## 2024-09-03 PROCEDURE — 510N000017 HC CRU PATIENT CARE, PER 15 MIN

## 2024-09-03 PROCEDURE — 300N000003 HC RESEARCH SPECIMEN PROCESSING, SIMPLE

## 2024-09-03 RX ORDER — CLOTRIMAZOLE 1 %
CREAM (GRAM) TOPICAL 2 TIMES DAILY
Qty: 30 G | Refills: 1 | Status: SHIPPED | OUTPATIENT
Start: 2024-09-03

## 2024-09-03 NOTE — ADDENDUM NOTE
Encounter addended by: Eleuterio Schneider on: 9/3/2024 3:12 PM   Actions taken: Charge Capture section accepted

## 2024-09-03 NOTE — ADDENDUM NOTE
Encounter addended by: Vi Suárez RN on: 9/3/2024 12:38 PM   Actions taken: Charge Capture section accepted

## 2024-09-05 DIAGNOSIS — L01.00 IMPETIGO: Primary | ICD-10-CM

## 2024-09-05 RX ORDER — MUPIROCIN 20 MG/G
OINTMENT TOPICAL 3 TIMES DAILY
Qty: 30 G | Refills: 0 | Status: SHIPPED | OUTPATIENT
Start: 2024-09-05 | End: 2024-09-10

## 2024-09-11 ENCOUNTER — OFFICE VISIT (OUTPATIENT)
Dept: PLASTIC SURGERY | Facility: CLINIC | Age: 49
End: 2024-09-11
Payer: COMMERCIAL

## 2024-09-11 VITALS
DIASTOLIC BLOOD PRESSURE: 78 MMHG | SYSTOLIC BLOOD PRESSURE: 112 MMHG | BODY MASS INDEX: 38.3 KG/M2 | HEART RATE: 79 BPM | HEIGHT: 67 IN | WEIGHT: 244 LBS | OXYGEN SATURATION: 98 %

## 2024-09-11 DIAGNOSIS — Z98.890 S/P FLAP GRAFT: Primary | ICD-10-CM

## 2024-09-11 DIAGNOSIS — L73.2 HIDRADENITIS SUPPURATIVA: ICD-10-CM

## 2024-09-11 PROCEDURE — 99214 OFFICE O/P EST MOD 30 MIN: CPT | Performed by: PLASTIC SURGERY

## 2024-09-11 RX ORDER — CEFAZOLIN SODIUM 2 G/50ML
2 SOLUTION INTRAVENOUS SEE ADMIN INSTRUCTIONS
OUTPATIENT
Start: 2024-09-11

## 2024-09-11 RX ORDER — CEFAZOLIN SODIUM 2 G/50ML
2 SOLUTION INTRAVENOUS
OUTPATIENT
Start: 2024-09-11

## 2024-09-11 ASSESSMENT — PAIN SCALES - GENERAL: PAINLEVEL: NO PAIN (0)

## 2024-09-11 NOTE — LETTER
9/11/2024       RE: Klarissa Curtis  [address]  [place] [facility] [address]     Dear Colleague,    Thank you for referring your patient, Klarissa Curtis, to the [facility] [place] [facility]. Please see a copy of my visit note below.       PRESENTING COMPLAINT:  Follow up visit for right axillary hidradenitis suppurativa excision and flap reconstruction in February 2024     HISTORY OF PRESENTING COMPLAINT: The patient is here for follow up.  The patient is being seen in the presence of my nurse.     Patient extremely happy with the results.  She would like her right axillary flap thinned out due to prominence.    On exam: Vital signs stable afebrile.  The right axilla is fully healed.  Full range of motion.  Has thickness to the flap in the posterior axillary area.     ASSESSMENT AND PLAN:  Based upon the above findings, the patient is here for follow up.     We offered revision of the axillary flap with thinning of the flaps.  Major risks of wound problems, infections, loss of the flap, requirement of further procedures, numbness, and systemic complications like DVTs and PEs were explained in detail.  She understood the more wants to proceed.  It has been 6 months since her index procedure and therefore we can proceed anytime.    All risks, benefits and alternatives were explained to the patient in detail, they were understood and agreed upon by the patient, they were acknowledged by the patient,   all the patient's questions were answered in detail to the patient's fullest understanding that they acknowledged, the team approach for treatment in the operating room was agreed upon by the patient, and proceeding with surgery was agreed upon by the patient.    All questions were answered.  The patient was happy with the visit.    Total time spent in the encounter today including chart review visit itself  and postvisit paperwork was 30 minutes.      Again, thank you for allowing me to participate in the care of your patient.      Sincerely,    BORIS Mendoza MD

## 2024-09-11 NOTE — NURSING NOTE
"Chief Complaint   Patient presents with    RECHECK     Maura, is being seen today for a post-op DOS 2/29/24R lat flap, follow up discuss next step ( flap thinning).       Vitals:    09/11/24 0846   BP: 112/78   BP Location: Left arm   Patient Position: Chair   Cuff Size: Adult Large   Pulse: 79   SpO2: 98%   Weight: 110.7 kg (244 lb)   Height: 1.695 m (5' 6.73\")       Body mass index is 38.53 kg/m .      Carmela Haley LPN    "

## 2024-09-11 NOTE — PROGRESS NOTES
PRESENTING COMPLAINT:  Follow up visit for right axillary hidradenitis suppurativa excision and flap reconstruction in February 2024     HISTORY OF PRESENTING COMPLAINT: The patient is here for follow up.  The patient is being seen in the presence of my nurse.     Patient extremely happy with the results.  She would like her right axillary flap thinned out due to prominence.    On exam: Vital signs stable afebrile.  The right axilla is fully healed.  Full range of motion.  Has thickness to the flap in the posterior axillary area.     ASSESSMENT AND PLAN:  Based upon the above findings, the patient is here for follow up.     We offered revision of the axillary flap with thinning of the flaps.  Major risks of wound problems, infections, loss of the flap, requirement of further procedures, numbness, and systemic complications like DVTs and PEs were explained in detail.  She understood the more wants to proceed.  It has been 6 months since her index procedure and therefore we can proceed anytime.    All risks, benefits and alternatives were explained to the patient in detail, they were understood and agreed upon by the patient, they were acknowledged by the patient,   all the patient's questions were answered in detail to the patient's fullest understanding that they acknowledged, the team approach for treatment in the operating room was agreed upon by the patient, and proceeding with surgery was agreed upon by the patient.    All questions were answered.  The patient was happy with the visit.    Total time spent in the encounter today including chart review visit itself and postvisit paperwork was 30 minutes.

## 2024-09-26 ENCOUNTER — TELEPHONE (OUTPATIENT)
Dept: PLASTIC SURGERY | Facility: CLINIC | Age: 49
End: 2024-09-26
Payer: COMMERCIAL

## 2024-09-26 NOTE — TELEPHONE ENCOUNTER
Left voicemail regarding surgery scheduling. Provided direct phone number for patient to discuss.    P: [phone]      Susanna Coffman on 9/26/2024 at 11:46 AM

## 2024-09-29 DIAGNOSIS — G47.00 INSOMNIA, UNSPECIFIED TYPE: ICD-10-CM

## 2024-10-03 ENCOUNTER — MYC REFILL (OUTPATIENT)
Dept: ANESTHESIOLOGY | Facility: CLINIC | Age: 49
End: 2024-10-03

## 2024-10-03 DIAGNOSIS — M79.2 NEUROPATHIC PAIN: ICD-10-CM

## 2024-10-03 DIAGNOSIS — L98.9 PAINFUL SKIN LESION: ICD-10-CM

## 2024-10-03 DIAGNOSIS — L73.2 HIDRADENITIS SUPPURATIVA: ICD-10-CM

## 2024-10-03 NOTE — TELEPHONE ENCOUNTER
Refill request    Message from the patient: I m out completely     Medication: pregabalin (LYRICA) 150 MG capsule    Sig: Take 1 capsule (150 mg) by mouth 2 times daily Appointment required for additional refills    Dispensed: 180  Refills: 0  SOLD to the pt on: 8/21/24    Last clinic appointment: 8/12/24  Next clinic appointment: 10/29/24    Last Drug Screen Collected: 8/12/24  Controlled Substance Agreement signed: 8/12/24    Preferred pharmacy:    [address]    Refill request routed to the provider to review.

## 2024-10-03 NOTE — TELEPHONE ENCOUNTER
Refill request    Message from the patient: I m going out of town and was not able to make it in for shots. Please     Medication:oxyCODONE-acetaminophen (PERCOCET) 5-325 MG tablet    Sig:  Take 1-2 tablets by mouth every 6 hours as needed for severe pain Take 1-2 percocet every 6-8 hrs as needed for pain.    Dispensed: 90  Refills: 0  SOLD to the pt on: 8/21/24     Last clinic appointment: 8/12/24  Next clinic appointment: 10/29/24    Last Drug Screen Collected: 8/12/24  Controlled Substance Agreement signed: 8/12/24    Preferred pharmacy:    [place] [id number]    Refill request routed to the provider to review.

## 2024-10-04 RX ORDER — OXYCODONE AND ACETAMINOPHEN 5; 325 MG/1; MG/1
1-2 TABLET ORAL EVERY 6 HOURS PRN
Qty: 90 TABLET | Refills: 0 | Status: SHIPPED | OUTPATIENT
Start: 2024-10-04 | End: 2024-10-29

## 2024-10-04 RX ORDER — PREGABALIN 150 MG/1
150 CAPSULE ORAL 2 TIMES DAILY
Qty: 180 CAPSULE | Refills: 1 | Status: SHIPPED | OUTPATIENT
Start: 2024-10-04 | End: 2024-10-29

## 2024-10-04 NOTE — TELEPHONE ENCOUNTER
Chart reviewed - Request appears appropriate. Refilled for #90 tabs.     Lesa Osman, LEYDA, APRN, AGNP-C  [facility]

## 2024-10-04 NOTE — TELEPHONE ENCOUNTER
traZODone (DESYREL) 150 MG tablet   135 tablet 1 4/1/2024     Last Office Visit : 5-  Future Office visit:  none    Serotonin Modulators

## 2024-10-04 NOTE — TELEPHONE ENCOUNTER
Chart reviewed - Request appears appropriate. Refilled for 90 day supply.     Lesa Osman DNP, APRN, AGNP-C  [facility]

## 2024-10-10 RX ORDER — TRAZODONE HYDROCHLORIDE 150 MG/1
225 TABLET ORAL AT BEDTIME
Qty: 135 TABLET | Refills: 3 | Status: SHIPPED | OUTPATIENT
Start: 2024-10-10

## 2024-10-17 PROBLEM — Z98.890 S/P FLAP GRAFT: Status: ACTIVE | Noted: 2024-09-11

## 2024-10-23 ENCOUNTER — TELEPHONE (OUTPATIENT)
Dept: GASTROENTEROLOGY | Facility: CLINIC | Age: 49
End: 2024-10-23
Payer: COMMERCIAL

## 2024-10-23 NOTE — TELEPHONE ENCOUNTER
"Endoscopy Scheduling Screen    Have you had any respiratory illness or flu-like symptoms in the last 10 days?  No    What is your communication preference for Instructions and/or Bowel Prep?   MyChart    What insurance is in the chart?  Other:  [facility]    Ordering/Referring Provider: DOUGLAS ALVAARDO    (If ordering provider performs procedure, schedule with ordering provider unless otherwise instructed. )    BMI: Estimated body mass index is 38.53 kg/m  as calculated from the following:    Height as of 9/11/24: 1.695 m (5' 6.73\").    Weight as of 9/11/24: 110.7 kg (244 lb).     Sedation Ordered  moderate sedation.   If patient BMI > 50 do not schedule in [facility].    If patient BMI > 45 do not schedule at [facility].    Are you taking methadone or Suboxone?  NO, No RN review required.    Have you been diagnosed and are being treated for severe PTSD or severe anxiety?  NO, No RN review required.    Are you taking any prescription medications for pain 3 or more times per week?   YES, Schedule with [facility]. (If patient has additional questions please InBasket to RN pool for .)    Do you have a history of malignant hyperthermia?  No    (Females) Are you currently pregnant?   No     Have you been diagnosed or told you have pulmonary hypertension?   No    Do you have an LVAD?  No    Have you been told you have moderate to severe sleep apnea?  No.    Have you been told you have COPD, asthma, or any other lung disease?  No    Do you have any heart conditions?  No     Have you ever had or are you waiting for an organ transplant?  No. Continue scheduling, no site restrictions.    Have you had a stroke or transient ischemic attack (TIA aka \"mini stroke\" in the last 6 months?   No    Have you been diagnosed with or been told you have cirrhosis of the liver?   No.    Are you currently on dialysis?   No    Do you need assistance transferring?   No    BMI: Estimated body mass index is 38.53 kg/m  as calculated from the following:    Height as " "of 9/11/24: 1.695 m (5' 6.73\").    Weight as of 9/11/24: 110.7 kg (244 lb).     Is patients BMI > 40 and scheduling location [facility]?  No    Do you take an injectable or oral medication for weight loss or diabetes (excluding insulin)?  No    Do you take the medication Naltrexone?  No    Do you take blood thinners?  No       Prep   Are you currently on dialysis or do you have chronic kidney disease?  No    Do you have a diagnosis of diabetes?  No    Do you have a diagnosis of cystic fibrosis (CF)?  No    On a regular basis do you go 3 -5 days between bowel movements?  No    BMI > 40?  No    Preferred Pharmacy:        [facility] [address] [facility] [address] [place] [address]  [address] [facility] [address]  Phone: [phone] Fax: [phone]        Final Scheduling Details     Procedure scheduled  Colonoscopy    Surgeon:       Date of procedure:  2/12/25     Pre-OP / PAC:   No - Not required for this site.    Location  [facility] - [facility] - Patient preference.    Sedation   MAC/Deep Sedation - Per exclusion criteria.      Patient Reminders:   You will receive a call from a Nurse to review instructions and health history.  This assessment must be completed prior to your procedure.  Failure to complete the Nurse assessment may result in the procedure being cancelled.      On the day of your procedure, please designate an adult(s) who can drive you home stay with you for the next 24 hours. The medicines used in the exam will make you sleepy. You will not be able to drive.      You cannot take public transportation, ride share services, or non-medical taxi service without a responsible caregiver.  Medical transport services are allowed with the requirement that a responsible caregiver will receive you at your destination.  We require that drivers and caregivers are confirmed prior to your procedure.  "

## 2024-10-28 NOTE — PROGRESS NOTES
Video-Visit Details    Type of service:  Video Visit     Originating Location (pt. Location): Home    Distant Location (provider location):  Off-site  Platform used for Video Visit: [facility]     Date of visit: 10/29/2024      Assessment:   Klarissa Curtis is a 49 year old female with a past medical history significant for hidradenitis suppurativa & depression who presents with complaints of hidradenitis suppurativa related pain.      Hidradenitis suppurativa: Symptom onset at age 11, diagnosed at age 39. Persistent flares with worsening pain in bilateral axilla, groin, and buttocks. Has tried multiple pain modalities with inadequate pain control. Multiple lesions visualized on exam: nodular abscesses, draining tracts, honeycomb lesion pattern and fibrotic scarring identified. Pain symptom etiology is likely multifactorial with hx of hidradenitis suppurativa, neuropathic involvement and overlying myofascial component.      Assigned to [facility] nursing team.    Visit Diagnoses:  1. Chronic pain syndrome    2. Painful skin lesion    3. Hidradenitis suppurativa    4. Neuropathic pain    5. Chronic, continuous use of opioids    6. Chronic pain of left knee    7. Encounter for monitoring opioid maintenance therapy        Plan:  Diagnosis reviewed, treatment option addressed, and risk/benifits discussed.  Self-care instructions given.  I am recommending a multidisciplinary treatment plan to help this patient better manage their pain.      Pain Physical Therapy:  Not at this time.      Pain Psychology: Referral for health psychology at last visit (see below).      Diagnostic Studies:   Labs on 6/24/24 reviewed:  CBC is WNL  CMP - hepatic function intact, GFR >90     Medication Management:   Hydroxyzine 25-50 mg three times daily as needed for itching/pain. Cautioned about potential sedation effects. May continue use if helpful.   [place]  8[place]12/24.   NSAIDS - Celebrex 100 to 200 mg twice daily as needed.  Advised against concurrent use of other NSAIDs. She ran out of medication in between visits (trouble with refill), notes some degree of benefit for pain and would like to continue. Refills sent to pharmacy today.   Lyrica - current dose 150 mg twice daily. Appreciates benefit, no side effects. {Pain control not optimized. Recommend adding third dose, goal 150 mg three times daily.   Percocet 5-325 mg tabs, 1-2 tabs every 6-8 hours PRN for breakthrough pain, max 6-8 tabs/day. Last filled on 10/4/24 for #90 tabs. Refill sent to pharmacy today.   Controlled substance agreement and drug screen completed on 8/12/24.   Naloxone last sent to pharmacy on 2/19/24.   Consider TCA in future to optimize pain control and sleep benefit, buprenorphine retrial in future may be considered.      Potential procedures:   None      Other Orders/Referrals:   Mental Health - referral placed at last visit for health psychology therapy. Scheduling phone number is [phone].      Follow up with SERAFIN Miranda CNP in 6-8 weeks or sooner if needed.      Review of Electronic Chart: Today I have also reviewed available medical information in the patient's medical record at [facility] ([facility] and Care Everywhere (if available), including relevant provider notes, laboratory work, and imaging.     Lesa Osman DNP, APRN, AGNP-C  [facility]     -------------------------------------------------    Subjective:    Chief complaint:   Chief Complaint   Patient presents with    Pain    RECHECK     Follow up- chronic pain       Interval history:  Klarissa Curtis is a 49 year old female last seen on 8/12/24.      Recommendations/plan at the last visit included:  Pain Physical Therapy:  Not at this time.      Pain Psychologist to address relaxation, behavioral change, coping style, and other factors important to improvement.  NO - deferred for now      Diagnostic Studies:   Labs on 6/24/24 reviewed:  CBC is WNL  CMP - hepatic function intact, GFR >90     Medication Management:   Hydroxyzine 25-50 mg three times daily as needed for itching/pain. Cautioned about potential sedation effects.   [place]   NSAIDS - trial Celebrex 100 to 200 mg twice daily as needed.  Advised against concurrent use of other NSAIDs.  Lyrica - current dose 150 mg twice daily. Appreciates benefit, no side effects. May consider TID dosing in future.   Percocet 5-325 mg tabs, 1-2 tabs every 6-8 hours PRN for breakthrough pain, max 6-8 tabs/day. Last refill on 7/1/24 for #90 tabs. Will refill in between visits for #90 tabs when needed.   Controlled substance agreement and drug screen completed on 8/12/24.   Naloxone last sent to pharmacy on 2/19/24.   Consider TCA in future to optimize pain control and sleep benefit, buprenorphine retrial in future may be considered.      Potential procedures:   None      Other Orders/Referrals:   Mental Health - referral placed today for health psychology therapy. Scheduling phone number is [phone].      Follow up with SERAFIN Miranda CNP in 10-12 weeks or sooner if needed.    Since her last visit, Klarissa Curtis reports:  -She had last visit with Dr. Kenny for clinical trial. No difference in HS symptoms.  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   -There was a lot of walking involved, then she would get hot and this flared HS symptoms.   -She has been taking oxycodone every day, 3-6 tabs. She is open to consider buprenorphine again.   -She takes Lyrica 150 mg and Celebrex 100 mg BID. Appreciates some degree of benefit with use. No side effects.   -She ran out of Celebrex and had trouble getting refill.   -She is open to trial higher dose of Lyrica and Celebrex    Pain Information:   Pain rating: Mild Pain (2)      HPI/Interval history from last  visit:  -She has been dealing with left knee meniscal tear.   -She is overwhelmed right now with conditions.   -She is interested in  referral for health psych.   -She has a hard time dealing with all of this on her own while trying to continue to be there for others in people.   -She continues to take percocet PRN. Does not needs refill. She is aware we need to establish CSA.   -She needs medical cannabis renewed. Last certified through [facility].   -Denies hx of severe cardiac/hepatic disease, no personal or family hx of schizophrenia/schizophrenia disorder.   Pain Information:              Pain ratin/10 on a 0-10 scale.        Current Pain Treatments:    Medications:               Percocet 5-325 mg PRN for breakthrough pain. 6-8 tabs/day              Lyrica 150 mg TID.               Celebrex 100-200 mg BID PRN              Hydroxyzine 25-50 mg TID PRN   Medical cannabis.                             Medical cannabis - renewed on 24              Plastic surgery & reconstruction of right axillary wound              Derm - Dr. Kenny         Current MME: 0-60/day      Review of [place] Prescription Monitoring Program (): No concern for abuse or misuse of controlled medications based on this report. Reviewed - appears appropriate.      Annual Controlled Substance Agreement/UDS due date: Completed on 24     Past pain treatments:  Naproxen       Medications:  Current Outpatient Medications   Medication Sig Dispense Refill    adalimumab (HUMIRA *CF*) 80 MG/0.8ML pen kit Inject 0.8 mLs (80 mg) Subcutaneous once a week 3.2 mL 11    calcium carbonate (OS-MERCY) 500 MG TABS Take 1 tablet by mouth 2 times daily (with meals)      camphor-menthol (DERMASARRA) 0.5-0.5 % external lotion Apply to itchy areas on buttocks every 4-6 hrs as needed for itching 59 mL 5    celecoxib (CELEBREX) 100 MG capsule Take 1-2 capsules (100-200 mg) by mouth 2 times daily as needed for moderate pain. 120 capsule 2     clotrimazole (LOTRIMIN) 1 % external cream Apply topically 2 times daily. 30 g 1    DULoxetine (CYMBALTA) 30 MG capsule 90mg daily 270 capsule 3    fexofenadine (ALLEGRA) 180 MG tablet Take 1 tablet (180 mg) by mouth daily 90 tablet 4    hydrOXYzine HCl (ATARAX) 25 MG tablet Take 1-2 tablets (25-50 mg) by mouth 3 times daily as needed for itching 60 tablet 1    medical cannabis (Patient's own supply) See Admin Instructions (The purpose of this order is to document that the patient reports taking medical cannabis.  This is not a prescription, and is not used to certify that the patient has a qualifying medical condition.)      naloxone (NARCAN) 4 MG/0.1ML nasal spray Spray 1 spray (4 mg) into one nostril alternating nostrils as needed for opioid reversal every 2-3 minutes until assistance arrives 0.2 mL 0    oxyCODONE-acetaminophen (PERCOCET) 5-325 MG tablet Take 1-2 tablets by mouth every 6 hours as needed for severe pain. Take 1-2 percocet every 6-8 hrs as needed for pain. May fill/start 10/29/24. 90 tablet 0    polyethylene glycol (MIRALAX) 17 GM/Dose powder Take 17 g by mouth 2 times daily      pregabalin (LYRICA) 150 MG capsule Take 1 capsule (150 mg) by mouth 3 times daily. 270 capsule 1    pregabalin (LYRICA) 75 MG capsule Start 75 mg in afternoon x 1 week, then increase to 150 mg. Continue 150 mg in morning and at bedtime. 7 capsule 0    Resorcinol POWD Resorcinol 15% in vanicream twice a day (Patient taking differently: Resorcinol 15% in vanicream twice a day PRN) 30 g 11    traZODone (DESYREL) 150 MG tablet Take 1.5 tablets (225 mg) by mouth at bedtime. 135 tablet 3    Vitamin D3 50 mcg (2000 units) tablet Take 1 tablet (50 mcg) by mouth daily 90 tablet 3       Medical History: any changes in medical history since they were last seen? No      Objective:    Physical Exam:  GENERAL: alert and no distress  EYES: Eyes grossly normal to inspection.  No discharge or erythema, or obvious scleral/conjunctival  abnormalities.  RESP: No audible wheeze, cough, or visible cyanosis.    SKIN: Visible skin clear. No significant rash, abnormal pigmentation or lesions.  NEURO: Cranial nerves grossly intact.  Mentation and speech appropriate for age.  PSYCH: Appropriate affect, tone, and pace of words     Diagnostic Tests/Imaging/Labs:  Reviewed last CBC and CMP, see above     BILLING TIME DOCUMENTATION:   The total TIME spent on this patient on the date of the encounter/appointment was 30 minutes.      TOTAL TIME includes:   Time spent preparing to see the patient (reviewing records and tests)   Time spent face to face (or over the phone) with the patient   Time spent ordering tests, medications, procedures and referrals   Time spent Referring and communicating with other healthcare professionals   Time spent documenting clinical information in Epic

## 2024-10-29 ENCOUNTER — TELEPHONE (OUTPATIENT)
Dept: ANESTHESIOLOGY | Facility: CLINIC | Age: 49
End: 2024-10-29

## 2024-10-29 ENCOUNTER — VIRTUAL VISIT (OUTPATIENT)
Dept: ANESTHESIOLOGY | Facility: CLINIC | Age: 49
End: 2024-10-29
Payer: COMMERCIAL

## 2024-10-29 ENCOUNTER — HOSPITAL ENCOUNTER (OUTPATIENT)
Dept: RESEARCH | Facility: CLINIC | Age: 49
Discharge: HOME | End: 2024-10-29
Attending: DERMATOLOGY | Admitting: DERMATOLOGY
Payer: COMMERCIAL

## 2024-10-29 DIAGNOSIS — L98.9 PAINFUL SKIN LESION: ICD-10-CM

## 2024-10-29 DIAGNOSIS — M79.2 NEUROPATHIC PAIN: ICD-10-CM

## 2024-10-29 DIAGNOSIS — L73.2 HIDRADENITIS SUPPURATIVA: ICD-10-CM

## 2024-10-29 DIAGNOSIS — M25.562 CHRONIC PAIN OF LEFT KNEE: ICD-10-CM

## 2024-10-29 DIAGNOSIS — Z51.81 ENCOUNTER FOR MONITORING OPIOID MAINTENANCE THERAPY: ICD-10-CM

## 2024-10-29 DIAGNOSIS — F11.90 CHRONIC, CONTINUOUS USE OF OPIOIDS: ICD-10-CM

## 2024-10-29 DIAGNOSIS — G89.29 CHRONIC PAIN OF LEFT KNEE: ICD-10-CM

## 2024-10-29 DIAGNOSIS — G89.4 CHRONIC PAIN SYNDROME: Primary | ICD-10-CM

## 2024-10-29 DIAGNOSIS — Z79.891 ENCOUNTER FOR MONITORING OPIOID MAINTENANCE THERAPY: ICD-10-CM

## 2024-10-29 DIAGNOSIS — L73.2 HIDRADENITIS SUPPURATIVA: Primary | ICD-10-CM

## 2024-10-29 PROCEDURE — 99214 OFFICE O/P EST MOD 30 MIN: CPT | Mod: 95

## 2024-10-29 PROCEDURE — 300N000003 HC RESEARCH SPECIMEN PROCESSING, SIMPLE

## 2024-10-29 PROCEDURE — 510N000017 HC CRU PATIENT CARE, PER 15 MIN

## 2024-10-29 PROCEDURE — 11900 INJECT SKIN LESIONS </W 7: CPT

## 2024-10-29 PROCEDURE — 250N000011 HC RX IP 250 OP 636: Performed by: DERMATOLOGY

## 2024-10-29 PROCEDURE — 510N000009 HC RESEARCH FACILITY, PER 15 MIN

## 2024-10-29 RX ORDER — TRIAMCINOLONE ACETONIDE 40 MG/ML
40 INJECTION, SUSPENSION INTRA-ARTICULAR; INTRAMUSCULAR ONCE
Status: COMPLETED | OUTPATIENT
Start: 2024-10-29 | End: 2024-10-29

## 2024-10-29 RX ORDER — OXYCODONE AND ACETAMINOPHEN 5; 325 MG/1; MG/1
1-2 TABLET ORAL EVERY 6 HOURS PRN
Qty: 90 TABLET | Refills: 0 | Status: SHIPPED | OUTPATIENT
Start: 2024-10-29

## 2024-10-29 RX ORDER — PREGABALIN 75 MG/1
CAPSULE ORAL
Qty: 7 CAPSULE | Refills: 0 | Status: SHIPPED | OUTPATIENT
Start: 2024-10-29

## 2024-10-29 RX ORDER — CELECOXIB 100 MG/1
100-200 CAPSULE ORAL 2 TIMES DAILY PRN
Qty: 120 CAPSULE | Refills: 2 | Status: SHIPPED | OUTPATIENT
Start: 2024-10-29 | End: 2024-11-05

## 2024-10-29 RX ORDER — PREGABALIN 150 MG/1
150 CAPSULE ORAL 3 TIMES DAILY
Qty: 270 CAPSULE | Refills: 1 | Status: SHIPPED | OUTPATIENT
Start: 2024-10-29

## 2024-10-29 RX ADMIN — TRIAMCINOLONE ACETONIDE 40 MG: 40 INJECTION, SUSPENSION INTRA-ARTICULAR; INTRAMUSCULAR at 08:20

## 2024-10-29 ASSESSMENT — PAIN SCALES - GENERAL: PAINLEVEL_OUTOF10: MILD PAIN (2)

## 2024-10-29 NOTE — ADDENDUM NOTE
Encounter addended by: Eleuterio Schneider on: 10/29/2024 2:21 PM   Actions taken: Charge Capture section accepted

## 2024-10-29 NOTE — TELEPHONE ENCOUNTER
Central Prior Authorization Team - Phone: [phone]     PA Initiation    Medication: CELECOXIB 100 MG PO CAPS  Insurance Company: [employer] ([facility]) - Phone [phone] Fax [phone]  Pharmacy Filling the Rx[id number] [place] [facility] [address] [place] [address] [address]  Filling Pharmacy Phone: [phone]  Filling Pharmacy Fax:    Start Date: 10/29/2024

## 2024-10-29 NOTE — LETTER
10/29/2024       RE: Klarissa Curtis  [address]  [facility] [id number]     Dear Colleague,    Thank you for referring your patient, Klarissa Curtis, to the [facility] [place] at [facility]. Please see a copy of my visit note below.    Video-Visit Details    Type of service:  Video Visit     Originating Location (pt. Location): Home    Distant Location (provider location):  Off-site  Platform used for Video Visit: [facility]     Date of visit: 10/29/2024      Assessment:   Klarissa Curtis is a 49 year old female with a past medical history significant for hidradenitis suppurativa & depression who presents with complaints of hidradenitis suppurativa related pain.      Hidradenitis suppurativa: Symptom onset at age 11, diagnosed at age 39. Persistent flares with worsening pain in bilateral axilla, groin, and buttocks. Has tried multiple pain modalities with inadequate pain control. Multiple lesions visualized on exam: nodular abscesses, draining tracts, honeycomb lesion pattern and fibrotic scarring identified. Pain symptom etiology is likely multifactorial with hx of hidradenitis suppurativa, neuropathic involvement and overlying myofascial component.      Assigned to [facility] nursing team.    Visit Diagnoses:  1. Chronic pain syndrome    2. Painful skin lesion    3. Hidradenitis suppurativa    4. Neuropathic pain    5. Chronic, continuous use of opioids    6. Chronic pain of left knee    7. Encounter for monitoring opioid maintenance therapy        Plan:  Diagnosis reviewed, treatment option addressed, and risk/benifits discussed.  Self-care instructions given.  I am recommending a multidisciplinary treatment plan to help this patient better manage their pain.      Pain Physical Therapy:  Not at this time.      Pain Psychology: Referral for health psychology at last  visit (see below).      Diagnostic Studies:   Labs on 6/24/24 reviewed:  CBC is WNL  CMP - hepatic function intact, GFR >90     Medication Management:   Hydroxyzine 25-50 mg three times daily as needed for itching/pain. Cautioned about potential sedation effects. May continue use if helpful.   Medical cannabis - [place] for [place] renewed 8/12/24.   NSAIDS - Celebrex 100 to 200 mg twice daily as needed.  Advised against concurrent use of other NSAIDs. She ran out of medication in between visits (trouble with refill), notes some degree of benefit for pain and would like to continue. Refills sent to pharmacy today.   Lyrica - current dose 150 mg twice daily. Appreciates benefit, no side effects. {Pain control not optimized. Recommend adding third dose, goal 150 mg three times daily.   Percocet 5-325 mg tabs, 1-2 tabs every 6-8 hours PRN for breakthrough pain, max 6-8 tabs/day. Last filled on 10/4/24 for #90 tabs. Refill sent to pharmacy today.   Controlled substance agreement and drug screen completed on 8/12/24.   Naloxone last sent to pharmacy on 2/19/24.   Consider TCA in future to optimize pain control and sleep benefit, buprenorphine retrial in future may be considered.      Potential procedures:   None      Other Orders/Referrals:   Mental Health - referral placed at last visit for health psychology therapy. Scheduling phone number is [phone].      Follow up with SERAFIN Miranda CNP in 6-8 weeks or sooner if needed.      Review of Electronic Chart: Today I have also reviewed available medical information in the patient's medical record at [facility] and Care Everywhere (if available), including relevant provider notes, laboratory work, and imaging.     Lesa Osman DNP, APRN, AGNP-C  [facility]     -------------------------------------------------    Subjective:    Chief complaint:   Chief Complaint   Patient presents with     Pain     RECHECK     Follow  up- chronic pain       Interval history:  Klarissa Curtis is a 49 year old female last seen on 8/12/24.      Recommendations/plan at the last visit included:  Pain Physical Therapy:  Not at this time.      Pain Psychologist to address relaxation, behavioral change, coping style, and other factors important to improvement.  NO - deferred for now     Diagnostic Studies:   Labs on 6/24/24 reviewed:  CBC is WNL  CMP - hepatic function intact, GFR >90     Medication Management:   Hydroxyzine 25-50 mg three times daily as needed for itching/pain. Cautioned about potential sedation effects.   [place] cannabis - [place] for [place] today.   NSAIDS - trial Celebrex 100 to 200 mg twice daily as needed.  Advised against concurrent use of other NSAIDs.  Lyrica - current dose 150 mg twice daily. Appreciates benefit, no side effects. May consider TID dosing in future.   Percocet 5-325 mg tabs, 1-2 tabs every 6-8 hours PRN for breakthrough pain, max 6-8 tabs/day. Last refill on 7/1/24 for #90 tabs. Will refill in between visits for #90 tabs when needed.   Controlled substance agreement and drug screen completed on 8/12/24.   Naloxone last sent to pharmacy on 2/19/24.   Consider TCA in future to optimize pain control and sleep benefit, buprenorphine retrial in future may be considered.      Potential procedures:   None      Other Orders/Referrals:   Mental Health - referral placed today for health psychology therapy. Scheduling phone number is [phone].      Follow up with SERAFIN Miranda CNP in 10-12 weeks or sooner if needed.    Since her last visit, Klarissa Curtis reports:  -She had last visit with Dr. Kenny for clinical trial. No difference in HS symptoms.  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   -There was a lot of walking involved, then she would get hot and this flared HS symptoms.   -She has been taking oxycodone every day, 3-6 tabs. She is open to consider  buprenorphine again.   -She takes Lyrica 150 mg and Celebrex 100 mg BID. Appreciates some degree of benefit with use. No side effects.   -She ran out of Celebrex and had trouble getting refill.   -She is open to trial higher dose of Lyrica and Celebrex    Pain Information:   Pain rating: Mild Pain (2)      HPI/Interval history from last visit:  -She has been dealing with left knee meniscal tear.   -She is overwhelmed right now with conditions.   -She is interested in  referral for health psych.   -She has a hard time dealing with all of this on her own while trying to continue to be there for others in people.   -She continues to take percocet PRN. Does not needs refill. She is aware we need to establish CSA.   -She needs medical cannabis renewed. Last certified through [facility].   -Denies hx of severe cardiac/hepatic disease, no personal or family hx of schizophrenia/schizophrenia disorder.   Pain Information:              Pain ratin/10 on a 0-10 scale.        Current Pain Treatments:    Medications:               Percocet 5-325 mg PRN for breakthrough pain. 6-8 tabs/day              Lyrica 150 mg TID.               Celebrex 100-200 mg BID PRN              Hydroxyzine 25-50 mg TID PRN   Medical cannabis.                             Medical cannabis - renewed on 24              Plastic surgery & reconstruction of right axillary wound              Derm - Dr. Kenny         Current MME: 0-60/day      Review of [place] Prescription Monitoring Program (): No concern for abuse or misuse of controlled medications based on this report. Reviewed - appears appropriate.      Annual Controlled Substance Agreement/UDS due date: Completed on 24     Past pain treatments:  Naproxen       Medications:  Current Outpatient Medications   Medication Sig Dispense Refill     adalimumab (HUMIRA *CF*) 80 MG/0.8ML pen kit Inject 0.8 mLs (80 mg) Subcutaneous once a week 3.2 mL 11     calcium carbonate  (OS-MERCY) 500 MG TABS Take 1 tablet by mouth 2 times daily (with meals)       camphor-menthol (DERMASARRA) 0.5-0.5 % external lotion Apply to itchy areas on buttocks every 4-6 hrs as needed for itching 59 mL 5     celecoxib (CELEBREX) 100 MG capsule Take 1-2 capsules (100-200 mg) by mouth 2 times daily as needed for moderate pain. 120 capsule 2     clotrimazole (LOTRIMIN) 1 % external cream Apply topically 2 times daily. 30 g 1     DULoxetine (CYMBALTA) 30 MG capsule 90mg daily 270 capsule 3     fexofenadine (ALLEGRA) 180 MG tablet Take 1 tablet (180 mg) by mouth daily 90 tablet 4     hydrOXYzine HCl (ATARAX) 25 MG tablet Take 1-2 tablets (25-50 mg) by mouth 3 times daily as needed for itching 60 tablet 1     medical cannabis (Patient's own supply) See Admin Instructions (The purpose of this order is to document that the patient reports taking medical cannabis.  This is not a prescription, and is not used to certify that the patient has a qualifying medical condition.)       naloxone (NARCAN) 4 MG/0.1ML nasal spray Spray 1 spray (4 mg) into one nostril alternating nostrils as needed for opioid reversal every 2-3 minutes until assistance arrives 0.2 mL 0     oxyCODONE-acetaminophen (PERCOCET) 5-325 MG tablet Take 1-2 tablets by mouth every 6 hours as needed for severe pain. Take 1-2 percocet every 6-8 hrs as needed for pain. May fill/start 10/29/24. 90 tablet 0     polyethylene glycol (MIRALAX) 17 GM/Dose powder Take 17 g by mouth 2 times daily       pregabalin (LYRICA) 150 MG capsule Take 1 capsule (150 mg) by mouth 3 times daily. 270 capsule 1     pregabalin (LYRICA) 75 MG capsule Start 75 mg in afternoon x 1 week, then increase to 150 mg. Continue 150 mg in morning and at bedtime. 7 capsule 0     Resorcinol POWD Resorcinol 15% in vanicream twice a day (Patient taking differently: Resorcinol 15% in vanicream twice a day PRN) 30 g 11     traZODone (DESYREL) 150 MG tablet Take 1.5 tablets (225 mg) by mouth at bedtime.  135 tablet 3     Vitamin D3 50 mcg (2000 units) tablet Take 1 tablet (50 mcg) by mouth daily 90 tablet 3       Medical History: any changes in medical history since they were last seen? No      Objective:    Physical Exam:  GENERAL: alert and no distress  EYES: Eyes grossly normal to inspection.  No discharge or erythema, or obvious scleral/conjunctival abnormalities.  RESP: No audible wheeze, cough, or visible cyanosis.    SKIN: Visible skin clear. No significant rash, abnormal pigmentation or lesions.  NEURO: Cranial nerves grossly intact.  Mentation and speech appropriate for age.  PSYCH: Appropriate affect, tone, and pace of words     Diagnostic Tests/Imaging/Labs:  Reviewed last CBC and CMP, see above     BILLING TIME DOCUMENTATION:   The total TIME spent on this patient on the date of the encounter/appointment was 30 minutes.      TOTAL TIME includes:   Time spent preparing to see the patient (reviewing records and tests)   Time spent face to face (or over the phone) with the patient   Time spent ordering tests, medications, procedures and referrals   Time spent Referring and communicating with other healthcare professionals   Time spent documenting clinical information in Epic       Again, thank you for allowing me to participate in the care of your patient.      Sincerely,    SERAFIN Miranda CNP

## 2024-10-29 NOTE — PATIENT INSTRUCTIONS
Pain Psychology: Referral for health psychology at last visit (see below).      Medication Management:   Hydroxyzine 25-50 mg three times daily as needed for itching/pain. Cautioned about potential sedation effects. May continue use if helpful.   [place] - [place] for [place] 8/12/24.   NSAIDS - Celebrex 100 to 200 mg twice daily as needed.  Advised against concurrent use of other NSAIDs. She ran out of medication in between visits (trouble with refill), notes some degree of benefit for pain and would like to continue. Refills sent to pharmacy today.   Lyrica - current dose 150 mg twice daily. Appreciates benefit, no side effects. Pain control not optimized. Recommend adding third dose, goal 150 mg three times daily.   Percocet 5-325 mg tabs, 1-2 tabs every 6-8 hours PRN for breakthrough pain, max 6-8 tabs/day. Last filled on 10/4/24 for #90 tabs. Refill sent to pharmacy today.      Other Orders/Referrals:   Mental Health - referral placed at last visit for health psychology therapy. Scheduling phone number is [phone].      Follow up with SERAFIN Miranda CNP in 6-8 weeks or sooner if needed.

## 2024-10-29 NOTE — NURSING NOTE
How will you be connecting to the visit? Mychart  How would you like to obtain your AVS? MyChart  If the video visit is dropped, the invitation should be resent by: Text to cell phone: [phone]  Will anyone else be joining your video visit? No  Are you currently in the [facility] yes  If patient encounters technical issues they should call [phone]       Patient presents with:  Pain  RECHECK: Follow up- chronic pain      Mild Pain (2)     Pain Medications       Opioid Combinations Refills Start End     oxyCODONE-acetaminophen (PERCOCET) 5-325 MG tablet 0 10/4/2024 --    Sig - Route: Take 1-2 tablets by mouth every 6 hours as needed for severe pain. Take 1-2 percocet every 6-8 hrs as needed for pain. May fill/start 10/4/24. - Oral    Class: E-Prescribe    Earliest Fill Date: 10/4/2024            What medications are you using for pain? Percocet     What refills are you needing today? Percocet    Expectations: to get better with no pain      Kateryna Singh, CMA

## 2024-10-29 NOTE — TELEPHONE ENCOUNTER
Prior Authorization Retail Medication Request    Medication/Dose: celecoxib (CELEBREX) 100 MG capsule-Take 1-2 capsules (100-200 mg) by mouth 2 times daily as needed for moderate pain.     Diagnosis and ICD code (if different than what is on RX):    New/renewal/insurance change PA/secondary ins. PA:  Previously Tried and Failed:    Rationale:    Insurance   Primary:   Insurance ID:    Secondary (if applicable):  Insurance ID:    Pharmacy Information (if different than what is on RX)  Name:    Phone:    Fax:  Clinic Information  Preferred routing pool for dept communication:

## 2024-10-30 NOTE — TELEPHONE ENCOUNTER
Central Prior Authorization Team - Phone: [phone]     PRIOR AUTHORIZATION DENIED    Medication: CELECOXIB 100 MG PO CAPS  Insurance Company: [employer] ([facility]) - Phone [phone] Fax [phone]  Denial Date: 10/29/2024    Denial Reason(s): plan covers up to 2 capsules daily. Must use higher strength that does not exceed plan limits.      Appeal Information:       Patient Notified: NO  Unfortunately, we cannot call the patient with denials because we do not know what next steps the MD will take nor can we give medical advice, please notify the patient of what they are to expect for the continuation of their therapy from the provider.

## 2024-10-30 NOTE — ADDENDUM NOTE
Encounter addended by: Dancer, Christine on: 10/30/2024 8:39 AM   Actions taken: Charge Capture section accepted

## 2024-10-31 DIAGNOSIS — G89.29 CHRONIC PAIN OF LEFT KNEE: ICD-10-CM

## 2024-10-31 DIAGNOSIS — M25.562 CHRONIC PAIN OF LEFT KNEE: ICD-10-CM

## 2024-10-31 RX ORDER — CELECOXIB 100 MG/1
100-200 CAPSULE ORAL 2 TIMES DAILY PRN
Qty: 120 CAPSULE | Refills: 2 | OUTPATIENT
Start: 2024-10-31

## 2024-10-31 NOTE — TELEPHONE ENCOUNTER
Current prescription is denied. Provider aware and will adjust concentration if appropriate.    Tammy Swartz, RNCC

## 2024-11-02 NOTE — TELEPHONE ENCOUNTER
Please contact patient with update and confirm she is agreeable to 200 mg capsules. We had discussed the 100-200 mg dose range at last visit.     Lesa Osman DNP, APRN, AGNP-C  [facility] Pain Management

## 2024-11-04 NOTE — TELEPHONE ENCOUNTER
RN attempted to contact patient with provider update. Mailbox is full. Writer will send MyChart.    Tammy Swartz RNCC

## 2024-11-05 RX ORDER — CELECOXIB 200 MG/1
200 CAPSULE ORAL 2 TIMES DAILY PRN
Qty: 60 CAPSULE | Refills: 2 | Status: SHIPPED | OUTPATIENT
Start: 2024-11-05

## 2024-11-05 NOTE — TELEPHONE ENCOUNTER
See other documentation in chart regarding this prescription.     Lesa Osman, DNP, APRN, AGNP-C  [facility] Management

## 2024-11-19 ENCOUNTER — MYC REFILL (OUTPATIENT)
Dept: ANESTHESIOLOGY | Facility: CLINIC | Age: 49
End: 2024-11-19
Payer: COMMERCIAL

## 2024-11-19 DIAGNOSIS — L98.9 PAINFUL SKIN LESION: ICD-10-CM

## 2024-11-19 DIAGNOSIS — L29.9 ITCHING: ICD-10-CM

## 2024-11-19 RX ORDER — HYDROXYZINE HYDROCHLORIDE 25 MG/1
25-50 TABLET, FILM COATED ORAL 3 TIMES DAILY PRN
Qty: 60 TABLET | Refills: 1 | Status: SHIPPED | OUTPATIENT
Start: 2024-11-19

## 2024-11-19 NOTE — TELEPHONE ENCOUNTER
Refill request    Medication: hydrOXYzine HCl (ATARAX) 25 MG tablet     Sig: Take 1-2 tablets (25-50 mg) by mouth 3 times daily as needed for itching     Dispensed: 60  Refills: 1    Last prescribed to patient: 8/12/24     Last clinic appointment: 10/29/24  Next clinic appointment: non listed    Preferred pharmacy:    [address]    Refill request routed to the provider to review.

## 2024-11-20 NOTE — TELEPHONE ENCOUNTER
Request appears appropriate, refill approved for #60 tabs +1 additional fill(s).     Lesa Osman DNP, APRN, AGNP-C  [facility]

## 2024-12-07 ENCOUNTER — MYC REFILL (OUTPATIENT)
Dept: ANESTHESIOLOGY | Facility: CLINIC | Age: 49
End: 2024-12-07
Payer: COMMERCIAL

## 2024-12-07 DIAGNOSIS — L73.2 HIDRADENITIS SUPPURATIVA: ICD-10-CM

## 2024-12-09 RX ORDER — OXYCODONE AND ACETAMINOPHEN 5; 325 MG/1; MG/1
1-2 TABLET ORAL EVERY 6 HOURS PRN
Qty: 90 TABLET | Refills: 0 | Status: SHIPPED | OUTPATIENT
Start: 2024-12-09

## 2024-12-09 NOTE — TELEPHONE ENCOUNTER
Request appears appropriate, refill approved for 30 day supply +0 additional fill(s).     Lesa Osman DNP, APRN, AGNP-C  [facility]

## 2024-12-09 NOTE — TELEPHONE ENCOUNTER
Refill request    Medication: oxyCODONE-acetaminophen (PERCOCET) 5-325 MG tablet     Sig: Take 1-2 tablets by mouth every 6 hours as needed for severe pain. Take 1-2 percocet every 6-8 hrs as needed for pain.     Dispensed: 90  Refills: 0  SOLD to the pt on: 11/2/24       Last clinic appointment: 10/29/24  Next clinic appointment: none listed    Last Drug Screen Collected: 8/12/24  Controlled Substance Agreement signed: 8/12/24      Preferred pharmacy:    [facility] [id number] [address]    Refill request routed to the provider to review.

## 2025-01-01 ENCOUNTER — TELEPHONE (OUTPATIENT)
Dept: DERMATOLOGY | Facility: CLINIC | Age: 50
End: 2025-01-01
Payer: COMMERCIAL

## 2025-01-01 NOTE — TELEPHONE ENCOUNTER
PA Needed    Medication: HUMIRA  QTY/DS: 4 per 28 ds  NEW INS: Yes  Insurance Company: [employer] (ID: [id number])   Pharmacy Filling the Rx: [facility]  PA : N/A  Date of last fill: 24

## 2025-01-06 ENCOUNTER — TELEPHONE (OUTPATIENT)
Dept: DERMATOLOGY | Facility: CLINIC | Age: 50
End: 2025-01-06
Payer: COMMERCIAL

## 2025-01-06 DIAGNOSIS — E66.812 CLASS 2 SEVERE OBESITY WITH SERIOUS COMORBIDITY AND BODY MASS INDEX (BMI) OF 38.0 TO 38.9 IN ADULT, UNSPECIFIED OBESITY TYPE (H): Primary | ICD-10-CM

## 2025-01-06 DIAGNOSIS — L73.2 HIDRADENITIS SUPPURATIVA: ICD-10-CM

## 2025-01-06 DIAGNOSIS — E66.01 CLASS 2 SEVERE OBESITY WITH SERIOUS COMORBIDITY AND BODY MASS INDEX (BMI) OF 38.0 TO 38.9 IN ADULT, UNSPECIFIED OBESITY TYPE (H): Primary | ICD-10-CM

## 2025-01-06 NOTE — TELEPHONE ENCOUNTER
Prior Authorization Retail Medication Request    Medication/Dose: Wegovy 0.25 mg #2 mL for 28 day supply and all subsequent doses    New Prior Authorization (Updated Insurance in 2025)     Insurance (see [employer] message from 1/6/25 for photos of insurance card)   Primary: [employer]   Insurance ID:  [id number]  RxBIN: [id number]  RxPCN: [id number]  RxGroup: [id number]    Clinic Information  Preferred routing pool for dept communication: Nicky Hartman [facility]

## 2025-01-07 NOTE — TELEPHONE ENCOUNTER
PRIOR AUTHORIZATION DENIED    Medication: SEMAGLUTIDE-WEIGHT MANAGEMENT 0.25 MG/0.5ML SC SOAJ  Insurance Company: [employer] - Phone [phone] Fax [phone]  Denial Date: 1/7/2025  Denial Reason(s): Plan exclusion    Appeal Information: N/A due to excluded status  Patient Notified: No, care team must notify

## 2025-01-07 NOTE — TELEPHONE ENCOUNTER
Prior Authorization Approval    Medication: HUMIRA (2 PEN) 80 MG/0.8ML SC AJKT  Authorization Effective Date: 1/7/2025  Authorization Expiration Date: 1/7/2026  Approved Dose/Quantity: 4 per 28 days  Reference #: [id number]   Insurance Company: [employer] - Phone [phone] Fax [phone]  Expected CoPay: $    CoPay Card Available: Yes    Financial Assistance Needed: On file  Which Pharmacy is filling the prescription: [facility] - [place], [place] - [address]  Pharmacy Notified: Yes  Patient Notified: By pharmacy

## 2025-01-07 NOTE — TELEPHONE ENCOUNTER
PA Initiation    Medication: HUMIRA (2 PEN) 80 MG/0.8ML SC AJKT  Insurance Company: [employer] - Phone [phone] Fax [phone]  Pharmacy Filling the Rx:    Filling Pharmacy Phone:    Filling Pharmacy Fax:    Start Date: 1/7/2025    [id number]

## 2025-01-08 NOTE — TELEPHONE ENCOUNTER
Per pharmacy, claim still not going through. Called [facility] at [phone] and spoke with Aury. The Humira 80mg drug was approved but the quantity is still under review. Case # for the quantity is [id number], turn around time is 5 business days.

## 2025-01-13 ENCOUNTER — MYC MEDICAL ADVICE (OUTPATIENT)
Dept: INTERNAL MEDICINE | Facility: CLINIC | Age: 50
End: 2025-01-13
Payer: COMMERCIAL

## 2025-01-29 ENCOUNTER — TELEPHONE (OUTPATIENT)
Dept: GASTROENTEROLOGY | Facility: CLINIC | Age: 50
End: 2025-01-29
Payer: COMMERCIAL

## 2025-01-29 NOTE — TELEPHONE ENCOUNTER
Pre visit planning completed.      Procedure details:    Patient scheduled for Colonoscopy on 2/12/25.     Arrival time: 1315. Procedure time 1415    Facility location: [facility]; [address] [place] [address], [address] Floor, [place] [phone]. Check in location: 5th Floor.    Sedation type: MAC    Pre op exam needed? No.    Indication for procedure: screening colonoscopy       Chart review:     Electronic implanted devices? No    Recent diagnosis of diverticulitis within the last 6 weeks? No      Medication review:    Diabetic? No    Anticoagulants? No    Weight loss medication/injectable? Yes. Semaglutide-Weight Management (WEGOVY). Weekly dosing of medication.  HOLD 7 days before procedure.  Follow up with managing provider.     Other medication HOLDING recommendations:  Cannabis/Marijuana: Stop night before procedure.      Prep for procedure:     Bowel prep recommendation: Extended Golytely. Bowel prep sent to    [place] [id number] [place] [address]    Due to: GLP-1 agonist medication noted in chart    Procedure information and instructions sent via amanda Hennessy RN  Endoscopy Procedure Pre Assessment   [phone] option 2

## 2025-01-29 NOTE — TELEPHONE ENCOUNTER
Attempted to contact patient in order to complete pre assessment questions.     No answer. Left message to return call to [phone] option 2    Callback communication sent via "[employer].      Fouzia Looney LPN  Endoscopy Procedure Pre Assessment

## 2025-02-05 NOTE — TELEPHONE ENCOUNTER
Pre assessment completed for upcoming procedure.   (Please see previous telephone encounter notes for complete details)    Patient returned call.       Procedure details:    Arrival time and facility location reviewed.    Pre op exam needed? No.    Designated  policy reviewed. Instructed to have someone stay 24  hours post procedure.       Medication review:    Medications reviewed. Please see supporting documentation below. Holding recommendations discussed (if applicable).       Prep for procedure:     Procedure prep instructions reviewed.        Any additional information needed:  N/A      Patient verbalized understanding and had no questions or concerns at this time.      Leatha French LPN  Endoscopy Procedure Pre Assessment   [phone] option 2

## 2025-02-10 ENCOUNTER — ANESTHESIA EVENT (OUTPATIENT)
Dept: SURGERY | Facility: AMBULATORY SURGERY CENTER | Age: 50
End: 2025-02-10
Payer: COMMERCIAL

## 2025-02-12 ENCOUNTER — HOSPITAL ENCOUNTER (OUTPATIENT)
Facility: AMBULATORY SURGERY CENTER | Age: 50
Discharge: HOME | End: 2025-02-12
Attending: INTERNAL MEDICINE
Payer: COMMERCIAL

## 2025-02-12 ENCOUNTER — ANESTHESIA (OUTPATIENT)
Dept: SURGERY | Facility: AMBULATORY SURGERY CENTER | Age: 50
End: 2025-02-12
Payer: COMMERCIAL

## 2025-02-12 VITALS
OXYGEN SATURATION: 98 % | DIASTOLIC BLOOD PRESSURE: 54 MMHG | RESPIRATION RATE: 14 BRPM | SYSTOLIC BLOOD PRESSURE: 93 MMHG | TEMPERATURE: 97.4 F | HEART RATE: 77 BPM

## 2025-02-12 VITALS — HEART RATE: 89 BPM

## 2025-02-12 LAB
COLONOSCOPY: NORMAL
HCG UR QL: NEGATIVE
INTERNAL QC OK POCT: NORMAL
POCT KIT EXPIRATION DATE: NORMAL
POCT KIT LOT NUMBER: NORMAL

## 2025-02-12 PROCEDURE — 88305 TISSUE EXAM BY PATHOLOGIST: CPT | Mod: 26 | Performed by: STUDENT IN AN ORGANIZED HEALTH CARE EDUCATION/TRAINING PROGRAM

## 2025-02-12 PROCEDURE — 88305 TISSUE EXAM BY PATHOLOGIST: CPT | Mod: TC | Performed by: INTERNAL MEDICINE

## 2025-02-12 RX ORDER — ONDANSETRON 4 MG/1
4 TABLET, ORALLY DISINTEGRATING ORAL EVERY 6 HOURS PRN
Status: DISCONTINUED | OUTPATIENT
Start: 2025-02-12 | End: 2025-02-13 | Stop reason: HOSPADM

## 2025-02-12 RX ORDER — LIDOCAINE 40 MG/G
CREAM TOPICAL
Status: DISCONTINUED | OUTPATIENT
Start: 2025-02-12 | End: 2025-02-12 | Stop reason: HOSPADM

## 2025-02-12 RX ORDER — LABETALOL HYDROCHLORIDE 5 MG/ML
10 INJECTION, SOLUTION INTRAVENOUS
Status: DISCONTINUED | OUTPATIENT
Start: 2025-02-12 | End: 2025-02-12 | Stop reason: HOSPADM

## 2025-02-12 RX ORDER — FENTANYL CITRATE 50 UG/ML
25 INJECTION, SOLUTION INTRAMUSCULAR; INTRAVENOUS EVERY 5 MIN PRN
Status: DISCONTINUED | OUTPATIENT
Start: 2025-02-12 | End: 2025-02-12 | Stop reason: HOSPADM

## 2025-02-12 RX ORDER — ONDANSETRON 2 MG/ML
4 INJECTION INTRAMUSCULAR; INTRAVENOUS EVERY 6 HOURS PRN
Status: DISCONTINUED | OUTPATIENT
Start: 2025-02-12 | End: 2025-02-13 | Stop reason: HOSPADM

## 2025-02-12 RX ORDER — NALOXONE HYDROCHLORIDE 0.4 MG/ML
0.4 INJECTION, SOLUTION INTRAMUSCULAR; INTRAVENOUS; SUBCUTANEOUS
Status: DISCONTINUED | OUTPATIENT
Start: 2025-02-12 | End: 2025-02-13 | Stop reason: HOSPADM

## 2025-02-12 RX ORDER — HYDROMORPHONE HYDROCHLORIDE 1 MG/ML
0.4 INJECTION, SOLUTION INTRAMUSCULAR; INTRAVENOUS; SUBCUTANEOUS EVERY 5 MIN PRN
Status: DISCONTINUED | OUTPATIENT
Start: 2025-02-12 | End: 2025-02-12 | Stop reason: HOSPADM

## 2025-02-12 RX ORDER — CEFAZOLIN SODIUM 2 G/50ML
2 SOLUTION INTRAVENOUS
Status: DISCONTINUED | OUTPATIENT
Start: 2025-02-12 | End: 2025-02-12 | Stop reason: HOSPADM

## 2025-02-12 RX ORDER — CEFAZOLIN SODIUM 2 G/50ML
2 SOLUTION INTRAVENOUS SEE ADMIN INSTRUCTIONS
Status: DISCONTINUED | OUTPATIENT
Start: 2025-02-12 | End: 2025-02-12 | Stop reason: HOSPADM

## 2025-02-12 RX ORDER — LIDOCAINE HYDROCHLORIDE 20 MG/ML
INJECTION, SOLUTION INFILTRATION; PERINEURAL PRN
Status: DISCONTINUED | OUTPATIENT
Start: 2025-02-12 | End: 2025-02-12

## 2025-02-12 RX ORDER — OXYCODONE HYDROCHLORIDE 5 MG/1
5 TABLET ORAL
Status: DISCONTINUED | OUTPATIENT
Start: 2025-02-12 | End: 2025-02-13 | Stop reason: HOSPADM

## 2025-02-12 RX ORDER — ONDANSETRON 4 MG/1
4 TABLET, ORALLY DISINTEGRATING ORAL EVERY 30 MIN PRN
Status: DISCONTINUED | OUTPATIENT
Start: 2025-02-12 | End: 2025-02-12 | Stop reason: HOSPADM

## 2025-02-12 RX ORDER — PROPOFOL 10 MG/ML
INJECTION, EMULSION INTRAVENOUS PRN
Status: DISCONTINUED | OUTPATIENT
Start: 2025-02-12 | End: 2025-02-12

## 2025-02-12 RX ORDER — ONDANSETRON 2 MG/ML
4 INJECTION INTRAMUSCULAR; INTRAVENOUS EVERY 30 MIN PRN
Status: DISCONTINUED | OUTPATIENT
Start: 2025-02-12 | End: 2025-02-12 | Stop reason: HOSPADM

## 2025-02-12 RX ORDER — ONDANSETRON 2 MG/ML
4 INJECTION INTRAMUSCULAR; INTRAVENOUS EVERY 30 MIN PRN
Status: DISCONTINUED | OUTPATIENT
Start: 2025-02-12 | End: 2025-02-13 | Stop reason: HOSPADM

## 2025-02-12 RX ORDER — OXYCODONE HYDROCHLORIDE 5 MG/1
10 TABLET ORAL
Status: DISCONTINUED | OUTPATIENT
Start: 2025-02-12 | End: 2025-02-13 | Stop reason: HOSPADM

## 2025-02-12 RX ORDER — FENTANYL CITRATE 50 UG/ML
50 INJECTION, SOLUTION INTRAMUSCULAR; INTRAVENOUS EVERY 5 MIN PRN
Status: DISCONTINUED | OUTPATIENT
Start: 2025-02-12 | End: 2025-02-12 | Stop reason: HOSPADM

## 2025-02-12 RX ORDER — DEXAMETHASONE SODIUM PHOSPHATE 10 MG/ML
4 INJECTION, SOLUTION INTRAMUSCULAR; INTRAVENOUS
Status: DISCONTINUED | OUTPATIENT
Start: 2025-02-12 | End: 2025-02-13 | Stop reason: HOSPADM

## 2025-02-12 RX ORDER — HYDROMORPHONE HYDROCHLORIDE 1 MG/ML
0.2 INJECTION, SOLUTION INTRAMUSCULAR; INTRAVENOUS; SUBCUTANEOUS EVERY 5 MIN PRN
Status: DISCONTINUED | OUTPATIENT
Start: 2025-02-12 | End: 2025-02-12 | Stop reason: HOSPADM

## 2025-02-12 RX ORDER — NALOXONE HYDROCHLORIDE 0.4 MG/ML
0.1 INJECTION, SOLUTION INTRAMUSCULAR; INTRAVENOUS; SUBCUTANEOUS
Status: DISCONTINUED | OUTPATIENT
Start: 2025-02-12 | End: 2025-02-12 | Stop reason: HOSPADM

## 2025-02-12 RX ORDER — FLUMAZENIL 0.1 MG/ML
0.2 INJECTION, SOLUTION INTRAVENOUS
Status: DISCONTINUED | OUTPATIENT
Start: 2025-02-12 | End: 2025-02-13 | Stop reason: HOSPADM

## 2025-02-12 RX ORDER — ACETAMINOPHEN 325 MG/1
975 TABLET ORAL ONCE
Status: DISCONTINUED | OUTPATIENT
Start: 2025-02-12 | End: 2025-02-12 | Stop reason: HOSPADM

## 2025-02-12 RX ORDER — PROPOFOL 10 MG/ML
INJECTION, EMULSION INTRAVENOUS CONTINUOUS PRN
Status: DISCONTINUED | OUTPATIENT
Start: 2025-02-12 | End: 2025-02-12

## 2025-02-12 RX ORDER — DEXAMETHASONE SODIUM PHOSPHATE 10 MG/ML
4 INJECTION, SOLUTION INTRAMUSCULAR; INTRAVENOUS
Status: DISCONTINUED | OUTPATIENT
Start: 2025-02-12 | End: 2025-02-12 | Stop reason: HOSPADM

## 2025-02-12 RX ORDER — NALOXONE HYDROCHLORIDE 0.4 MG/ML
0.2 INJECTION, SOLUTION INTRAMUSCULAR; INTRAVENOUS; SUBCUTANEOUS
Status: DISCONTINUED | OUTPATIENT
Start: 2025-02-12 | End: 2025-02-13 | Stop reason: HOSPADM

## 2025-02-12 RX ORDER — SODIUM CHLORIDE, SODIUM LACTATE, POTASSIUM CHLORIDE, CALCIUM CHLORIDE 600; 310; 30; 20 MG/100ML; MG/100ML; MG/100ML; MG/100ML
INJECTION, SOLUTION INTRAVENOUS CONTINUOUS
Status: DISCONTINUED | OUTPATIENT
Start: 2025-02-12 | End: 2025-02-12 | Stop reason: HOSPADM

## 2025-02-12 RX ORDER — PROCHLORPERAZINE MALEATE 10 MG
10 TABLET ORAL EVERY 6 HOURS PRN
Status: DISCONTINUED | OUTPATIENT
Start: 2025-02-12 | End: 2025-02-13 | Stop reason: HOSPADM

## 2025-02-12 RX ORDER — ONDANSETRON 4 MG/1
4 TABLET, ORALLY DISINTEGRATING ORAL EVERY 30 MIN PRN
Status: DISCONTINUED | OUTPATIENT
Start: 2025-02-12 | End: 2025-02-13 | Stop reason: HOSPADM

## 2025-02-12 RX ORDER — ONDANSETRON 2 MG/ML
4 INJECTION INTRAMUSCULAR; INTRAVENOUS
Status: DISCONTINUED | OUTPATIENT
Start: 2025-02-12 | End: 2025-02-12 | Stop reason: HOSPADM

## 2025-02-12 RX ORDER — NALOXONE HYDROCHLORIDE 0.4 MG/ML
0.1 INJECTION, SOLUTION INTRAMUSCULAR; INTRAVENOUS; SUBCUTANEOUS
Status: DISCONTINUED | OUTPATIENT
Start: 2025-02-12 | End: 2025-02-13 | Stop reason: HOSPADM

## 2025-02-12 RX ADMIN — SODIUM CHLORIDE, SODIUM LACTATE, POTASSIUM CHLORIDE, CALCIUM CHLORIDE: 600; 310; 30; 20 INJECTION, SOLUTION INTRAVENOUS at 14:32

## 2025-02-12 RX ADMIN — LIDOCAINE HYDROCHLORIDE 100 MG: 20 INJECTION, SOLUTION INFILTRATION; PERINEURAL at 14:36

## 2025-02-12 RX ADMIN — PROPOFOL 40 MG: 10 INJECTION, EMULSION INTRAVENOUS at 14:41

## 2025-02-12 RX ADMIN — PROPOFOL 40 MG: 10 INJECTION, EMULSION INTRAVENOUS at 14:38

## 2025-02-12 RX ADMIN — PROPOFOL 200 MCG/KG/MIN: 10 INJECTION, EMULSION INTRAVENOUS at 14:36

## 2025-02-12 ASSESSMENT — LIFESTYLE VARIABLES: TOBACCO_USE: 1

## 2025-02-12 NOTE — ANESTHESIA POSTPROCEDURE EVALUATION
Patient: Klarissa Curtis    Procedure: Procedure(s):  COLONOSCOPY, WITH POLYPECTOMY       Anesthesia Type:  MAC    Note:  Disposition: Outpatient   Postop Pain Control: Uneventful            Sign Out: Well controlled pain   PONV: No   Neuro/Psych: Uneventful            Sign Out: Acceptable/Baseline neuro status   Airway/Respiratory: Uneventful            Sign Out: Acceptable/Baseline resp. status   CV/Hemodynamics: Uneventful            Sign Out: Acceptable CV status; No obvious hypovolemia; No obvious fluid overload   Other NRE: NONE   DID A NON-ROUTINE EVENT OCCUR? No           Last vitals:  Vitals Value Taken Time   BP 93/54 02/12/25 1530   Temp 36.3  C (97.3  F) 02/12/25 1502   Pulse 77 02/12/25 1530   Resp 14 02/12/25 1515   SpO2 97 % 02/12/25 1532   Vitals shown include unfiled device data.    Electronically Signed By: Ti Samuels MD  February 12, 2025  3:35 PM

## 2025-02-12 NOTE — ANESTHESIA CARE TRANSFER NOTE
Patient: Klarissa Curtis    Procedure: Procedure(s):  COLONOSCOPY, WITH POLYPECTOMY       Diagnosis: Screen for colon cancer [Z12.11]  Diagnosis Additional Information: No value filed.    Anesthesia Type:   No value filed.     Note:    Oropharynx: oropharynx clear of all foreign objects and spontaneously breathing  Level of Consciousness: drowsy  Oxygen Supplementation: room air    Independent Airway: airway patency satisfactory and stable  Dentition: dentition unchanged  Vital Signs Stable: post-procedure vital signs reviewed and stable  Report to RN Given: handoff report given  Patient transferred to: Phase II    Handoff Report: Identifed the Patient, Identified the Reponsible Provider, Reviewed the pertinent medical history, Discussed the surgical course, Reviewed Intra-OP anesthesia mangement and issues during anesthesia, Set expectations for post-procedure period and Allowed opportunity for questions and acknowledgement of understanding      Vitals:  Vitals Value Taken Time   BP 85/57 02/12/25 1502   Temp 36.3  C (97.3  F) 02/12/25 1502   Pulse 89 02/12/25 1502   Resp 14 02/12/25 1502   SpO2 98 % 02/12/25 1504   Vitals shown include unfiled device data.    Electronically Signed By: SERAFIN Colby CRNA  February 12, 2025  3:05 PM

## 2025-02-12 NOTE — ANESTHESIA PREPROCEDURE EVALUATION
Anesthesia Pre-Procedure Evaluation    Patient: Klarissa Curtis   MRN: [id number] : 1975        Procedure : Procedure(s):  Colonoscopy          Past Medical History:   Diagnosis Date    NO ACTIVE PROBLEMS       Past Surgical History:   Procedure Laterality Date    BUNIONECTOMY      x2    GRAFT FLAP PEDICLE EXTREMITY (LOCATION) Right 2024    Procedure: Right axillary wound excision,Right muscle-sparing Latissimus musculo-cutaneous flap, SPY;  Surgeon: BORIS Mendoza MD;  Location: [facility] OR    IRRIGATION AND DEBRIDEMENT HAND, COMBINED Right 2024    Procedure: IRRIGATION AND DEBRIDEMENT, RIGHT INDEX FINGER;  Surgeon: Brian Blackwell MD;  Location: [facility]    OTHER SURGICAL HISTORY      right pointer finger foreign body removal      Allergies   Allergen Reactions    Penicillins Hives, Itching and Rash     Reaction occurred as child.      Social History     Tobacco Use    Smoking status: Former     Current packs/day: 0.00     Average packs/day: 0.5 packs/day for 20.0 years (10.0 ttl pk-yrs)     Types: Cigarettes     Start date: 1/3/2004     Quit date: 1/3/2024     Years since quittin.1    Smokeless tobacco: Never   Substance Use Topics    Alcohol use: Not Currently      Wt Readings from Last 1 Encounters:   24 110.7 kg (244 lb)        Anesthesia Evaluation   Pt has had prior anesthetic. Type: General and MAC.    No history of anesthetic complications       ROS/MED HX  ENT/Pulmonary:     (+)                tobacco use, Past use,                    (-) sleep apnea   Neurologic:    (-) no CVA and no TIA   Cardiovascular:  - neg cardiovascular ROS     METS/Exercise Tolerance:     Hematologic:       Musculoskeletal:   (+)  arthritis,             GI/Hepatic:    (-) GERD   Renal/Genitourinary:    (-) renal disease   Endo:     (+)               Obesity,    (-) Type II DM   Psychiatric/Substance Use:     (+) psychiatric history anxiety       Infectious Disease:       Malignancy:  "      Other:      (+)  , H/O Chronic Pain,         Physical Exam    Airway        Mallampati: II       Respiratory Devices and Support         Dental       (+) Minor Abnormalities - some fillings, tiny chips      Cardiovascular          Rhythm and rate: regular     Pulmonary                   OUTSIDE LABS:  CBC:   Lab Results   Component Value Date    WBC 9.1 06/24/2024    WBC 9.7 06/15/2024    HGB 15.1 06/24/2024    HGB 12.6 06/15/2024    HCT 45.1 06/24/2024    HCT 39.0 06/15/2024     06/24/2024     06/15/2024     BMP:   Lab Results   Component Value Date     06/24/2024     06/12/2024    POTASSIUM 3.9 06/24/2024    POTASSIUM 3.8 06/12/2024    CHLORIDE 102 06/24/2024    CHLORIDE 104 06/12/2024    CO2 23 06/24/2024    CO2 27 06/12/2024    BUN 8.6 06/24/2024    BUN 15.1 06/12/2024    CR 0.59 06/24/2024    CR 0.64 06/15/2024     (H) 06/24/2024    GLC 75 06/12/2024     COAGS: No results found for: \"PTT\", \"INR\", \"FIBR\"  POC:   Lab Results   Component Value Date    HCG Negative 02/12/2025     HEPATIC:   Lab Results   Component Value Date    ALBUMIN 3.9 06/24/2024    PROTTOTAL 7.9 06/24/2024    ALT 22 06/24/2024    AST 20 06/24/2024    ALKPHOS 83 06/24/2024    BILITOTAL 0.4 06/24/2024     OTHER:   Lab Results   Component Value Date    A1C 5.3 11/21/2019    MERCY 9.4 06/24/2024    TSH 1.10 08/09/2024    T4 1.09 03/20/2020    SED 24 06/12/2024       Anesthesia Plan    ASA Status:  3    NPO Status:  NPO Appropriate    Anesthesia Type: MAC.     - Reason for MAC: immobility needed              Consents    Anesthesia Plan(s) and associated risks, benefits, and realistic alternatives discussed. Questions answered and patient/representative(s) expressed understanding.     - Discussed:     - Discussed with:  Patient            Postoperative Care            Comments:               Maco Solitario MD    I have reviewed the pertinent notes and labs in the chart from the past 30 days and (re)examined " the patient.  Any updates or changes from those notes are reflected in this note.    Clinically Significant Risk Factors Present on Admission                                 # Financial/Environmental Concerns:

## 2025-02-12 NOTE — H&P
Klarissa Curtis  [id number]  female  49 year old      Reason for procedure/surgery: screening    Patient Active Problem List   Diagnosis    Hidradenitis suppurativa    Soft tissue infection    Insomnia, unspecified type    S/P flap graft       Past Surgical History:    Past Surgical History:   Procedure Laterality Date    BUNIONECTOMY      x2    GRAFT FLAP PEDICLE EXTREMITY (LOCATION) Right 2024    Procedure: Right axillary wound excision,Right muscle-sparing Latissimus musculo-cutaneous flap, SPY;  Surgeon: BORIS Mendoza MD;  Location: [facility]    IRRIGATION AND DEBRIDEMENT HAND, COMBINED Right 2024    Procedure: IRRIGATION AND DEBRIDEMENT, RIGHT INDEX FINGER;  Surgeon: Brian Blackwell MD;  Location: [facility]    OTHER SURGICAL HISTORY      right pointer finger foreign body removal       Past Medical History:   Past Medical History:   Diagnosis Date    NO ACTIVE PROBLEMS        Social History:   Social History     Tobacco Use    Smoking status: Former     Current packs/day: 0.00     Average packs/day: 0.5 packs/day for 20.0 years (10.0 ttl pk-yrs)     Types: Cigarettes     Start date: 1/3/2004     Quit date: 1/3/2024     Years since quittin.1    Smokeless tobacco: Never   Substance Use Topics    Alcohol use: Not Currently       Family History:   Family History   Problem Relation Age of Onset    Diabetes Sister     Hyperlipidemia Sister     Breast Cancer Mother     Arthritis Mother     Myocardial Infarction Father 47       Allergies:   Allergies   Allergen Reactions    Penicillins Hives, Itching and Rash     Reaction occurred as child.       Active Medications:   Current Outpatient Medications   Medication Sig Dispense Refill    adalimumab (HUMIRA *CF*) 80 MG/0.8ML pen kit Inject 0.8 mLs (80 mg) Subcutaneous once a week 3.2 mL 11    calcium carbonate (OS-MERCY) 500 MG TABS Take 1 tablet by mouth 2 times daily (with meals)      camphor-menthol (DERMASARRA) 0.5-0.5 % external lotion  Apply to itchy areas on buttocks every 4-6 hrs as needed for itching 59 mL 5    celecoxib (CELEBREX) 200 MG capsule Take 1 capsule (200 mg) by mouth 2 times daily as needed for moderate pain. 60 capsule 2    clotrimazole (LOTRIMIN) 1 % external cream Apply topically 2 times daily. 30 g 1    DULoxetine (CYMBALTA) 30 MG capsule 90mg daily 270 capsule 3    fexofenadine (ALLEGRA) 180 MG tablet Take 1 tablet (180 mg) by mouth daily 90 tablet 4    hydrOXYzine HCl (ATARAX) 25 MG tablet Take 1-2 tablets (25-50 mg) by mouth 3 times daily as needed for itching. 60 tablet 1    medical cannabis (Patient's own supply) See Admin Instructions (The purpose of this order is to document that the patient reports taking medical cannabis.  This is not a prescription, and is not used to certify that the patient has a qualifying medical condition.)      naloxone (NARCAN) 4 MG/0.1ML nasal spray Spray 1 spray (4 mg) into one nostril alternating nostrils as needed for opioid reversal every 2-3 minutes until assistance arrives 0.2 mL 0    oxyCODONE-acetaminophen (PERCOCET) 5-325 MG tablet Take 1-2 tablets by mouth every 6 hours as needed for severe pain. Take 1-2 percocet every 6-8 hrs as needed for pain. May fill/start 1/17/25 90 tablet 0    polyethylene glycol (MIRALAX) 17 GM/Dose powder Take 17 g by mouth 2 times daily      pregabalin (LYRICA) 150 MG capsule Take 1 capsule (150 mg) by mouth 3 times daily. 270 capsule 1    pregabalin (LYRICA) 75 MG capsule Start 75 mg in afternoon x 1 week, then increase to 150 mg. Continue 150 mg in morning and at bedtime. 7 capsule 0    Resorcinol POWD Resorcinol 15% in vanicream twice a day (Patient taking differently: Resorcinol 15% in vanicream twice a day PRN) 30 g 11    semaglutide-weight management (WEGOVY) 0.25 MG/0.5ML pen Inject 0.5 mLs (0.25 mg) subcutaneously once a week. 2 mL 0    traZODone (DESYREL) 150 MG tablet Take 1.5 tablets (225 mg) by mouth at bedtime. 135 tablet 3    Vitamin D3 50 mcg  (2000 units) tablet Take 1 tablet (50 mcg) by mouth daily 90 tablet 3       Systemic Review:   CONSTITUTIONAL: NEGATIVE for fever, chills, change in weight  ENT/MOUTH: NEGATIVE for ear, mouth and throat problems  RESP: NEGATIVE for significant cough or SOB  CV: NEGATIVE for chest pain, palpitations or peripheral edema    Physical Examination:   Vital Signs: There were no vitals taken for this visit.  GENERAL: healthy, alert and no distress  NECK: no adenopathy, no asymmetry, masses, or scars  RESP: lungs clear to auscultation - no rales, rhonchi or wheezes  CV: regular rate and rhythm, normal S1 S2, no S3 or S4, no murmur, click or rub, no peripheral edema and peripheral pulses strong  ABDOMEN: soft, nontender, no hepatosplenomegaly, no masses and bowel sounds normal  MS: no gross musculoskeletal defects noted, no edema    Plan: Appropriate to proceed as scheduled.      Garima Patino MD  2/12/2025    PCP:  Nolan Elliott

## 2025-02-26 ENCOUNTER — PATIENT OUTREACH (OUTPATIENT)
Dept: GASTROENTEROLOGY | Facility: CLINIC | Age: 50
End: 2025-02-26
Payer: COMMERCIAL

## 2025-02-26 PROBLEM — D12.6 ADENOMATOUS COLON POLYP: Status: ACTIVE | Noted: 2025-02-26

## 2025-02-26 NOTE — PROGRESS NOTES
[facility] Dermatology Note  Encounter Date: Feb 27, 2025  Office Visit     Dermatology Problem List:  1. Hidradenitis suppurativa: diagnosed age 11,   - Current: Humira 80mg weekly, Percocet, Resorcinol  - s/p right axillary  excision and right muscle-sparing latissimus musculocutaneous flap reconstruction 2/29/2024.   - Prior: gabapentin, nfliximab (1 dose and got charged), prednisone taper, clobetasol, topical morphine, ILK, oral antibiotics, rifampin, sirolimus 2 mg (started 4/22/21) finasteride, spironolactone (6/2019- 9/5/19, stopped due to abnormal uterine bleeding), topical morphine gel, and percocet for severe pain, Cosentyx, belbuca  2. Keratosis pilaris  3. L olecranon bursitis.   4. Dermatographism  - Tx: fexofenadine 360 mg BID  5. Eruption NOS. Both resolved.  - ears, upper chest, and back, responded to Lidex. Started ~9/28/23; resolved ~1/11/24.  - punch biopsy 8/5/21: superficial and deep perivascular and interstitial infiltrate of neutrophils, with focal leukocytoclasis and bluish, amorphous material suggestive of neutrophil degranulation, and lesser amounts of eosinophils and lymphocytes. Background dermal edema is noted. Focal neutrophilic epitheliotropism is noted.  6. Pink linear patches, ddx: follicular dermatitis vs lichen spinulosus vs KP, posterior shoulders   - amlactin, lidex   7. Seborrheic dermatitis  SHx: [place]     ____________________________________________    Assessment & Plan:  # Hidradenitis suppurativa,              - She is not sure adalimumab is helping much. Continue Adalimumab 80 mg weekly.for now   - Continue calcium 1200 mg and vitamin D 2000    - Continue Resorcinol BID. Do not apply to open wounds.    - Clobetasol ointment BID prn   - Start semaglutide. MTM referral placed.   The longitudinal plan of care for the diagnosis(es)/condition(s) as documented were addressed during this visit. Due to the  added complexity in care, I will continue to support Maura in the subsequent management and with ongoing continuity of care.      # Eruption  - Lidex ointment BID PRN    # Dermatographism  - Refilled fexofenadine    Follow-up: 12 weeks via phone    Staff and Scribe:     Scribe Disclosure:   I, Maria Luisa Florentino, am serving as a scribe; to document services personally performed by Jean Kenny MD -based on data collection and the provider's statements to me.     Provider Disclosure:  I agree with above History, Review of Systems, Physical exam and Plan.  I have reviewed the content of the documentation and have edited it as needed. I have personally performed the services documented here and the documentation accurately represents those services and the decisions I have made.      Electronically signed by:  Provider Disclosure:   The documentation recorded by the scribe accurately reflects the services I personally performed and the decisions made by me.    Jean Kenny MD, FAAD, FACP    Departments of Internal Medicine and Dermatology  [facility]          ____________________________________________    CC:  Chief Complaint   Patient presents with    Derm Problem     HS follow up; Pt reports new flares, specifically on groin and butt.       HPI:  Ms. Klarissa Curtis is a(n) 49 year old female who presents today as a return patient for HS.  Last seen in clinic on 5/30/24, by me.     Has a few smaller lesions but has continued to get new lesions and flares.    Patient is otherwise feeling well, without additional skin concerns.    Physical Exam:  GEN: NAD  SKIN:   - HS data lost   - Psoriasiform plaque surrounding some of the HS lesions  - No other lesions of concern on areas examined.     Medications:  Current Outpatient Medications   Medication Sig Dispense Refill    adalimumab (HUMIRA *CF*) 80 MG/0.8ML pen kit Inject 0.8 mLs (80 mg) Subcutaneous once a week 3.2 mL 11    calcium  carbonate (OS-MERCY) 500 MG TABS Take 1 tablet by mouth 2 times daily (with meals)      camphor-menthol (DERMASARRA) 0.5-0.5 % external lotion Apply to itchy areas on buttocks every 4-6 hrs as needed for itching 59 mL 5    celecoxib (CELEBREX) 200 MG capsule Take 1 capsule (200 mg) by mouth 2 times daily as needed for moderate pain. 60 capsule 2    clotrimazole (LOTRIMIN) 1 % external cream Apply topically 2 times daily. 30 g 1    DULoxetine (CYMBALTA) 30 MG capsule 90mg daily 270 capsule 3    fexofenadine (ALLEGRA) 180 MG tablet Take 1 tablet (180 mg) by mouth daily 90 tablet 4    hydrOXYzine HCl (ATARAX) 25 MG tablet Take 1-2 tablets (25-50 mg) by mouth 3 times daily as needed for itching. 60 tablet 1    medical cannabis (Patient's own supply) See Admin Instructions (The purpose of this order is to document that the patient reports taking medical cannabis.  This is not a prescription, and is not used to certify that the patient has a qualifying medical condition.)      naloxone (NARCAN) 4 MG/0.1ML nasal spray Spray 1 spray (4 mg) into one nostril alternating nostrils as needed for opioid reversal every 2-3 minutes until assistance arrives 0.2 mL 0    oxyCODONE-acetaminophen (PERCOCET) 5-325 MG tablet Take 1-2 tablets by mouth every 6 hours as needed for severe pain. Take 1-2 percocet every 6-8 hrs as needed for pain. May fill/start 1/17/25 90 tablet 0    polyethylene glycol (MIRALAX) 17 GM/Dose powder Take 17 g by mouth 2 times daily      pregabalin (LYRICA) 150 MG capsule Take 1 capsule (150 mg) by mouth 3 times daily. 270 capsule 1    pregabalin (LYRICA) 75 MG capsule Start 75 mg in afternoon x 1 week, then increase to 150 mg. Continue 150 mg in morning and at bedtime. 7 capsule 0    Resorcinol POWD Resorcinol 15% in vanicream twice a day (Patient taking differently: Resorcinol 15% in vanicream twice a day PRN) 30 g 11    semaglutide-weight management (WEGOVY) 0.25 MG/0.5ML pen Inject 0.5 mLs (0.25 mg)  subcutaneously once a week. 2 mL 0    traZODone (DESYREL) 150 MG tablet Take 1.5 tablets (225 mg) by mouth at bedtime. 135 tablet 3    Vitamin D3 50 mcg (2000 units) tablet Take 1 tablet (50 mcg) by mouth daily 90 tablet 3     No current facility-administered medications for this visit.      Past Medical History:   Patient Active Problem List   Diagnosis    Hidradenitis suppurativa    Soft tissue infection    Insomnia, unspecified type    S/P flap graft    Adenomatous colon polyp     Past Medical History:   Diagnosis Date    NO ACTIVE PROBLEMS

## 2025-02-27 ENCOUNTER — OFFICE VISIT (OUTPATIENT)
Dept: DERMATOLOGY | Facility: CLINIC | Age: 50
End: 2025-02-27
Payer: COMMERCIAL

## 2025-02-27 DIAGNOSIS — E66.01 CLASS 2 SEVERE OBESITY WITH SERIOUS COMORBIDITY AND BODY MASS INDEX (BMI) OF 38.0 TO 38.9 IN ADULT, UNSPECIFIED OBESITY TYPE (H): ICD-10-CM

## 2025-02-27 DIAGNOSIS — L50.3 DERMATOGRAPHISM: ICD-10-CM

## 2025-02-27 DIAGNOSIS — E66.812 CLASS 2 SEVERE OBESITY WITH SERIOUS COMORBIDITY AND BODY MASS INDEX (BMI) OF 38.0 TO 38.9 IN ADULT, UNSPECIFIED OBESITY TYPE (H): ICD-10-CM

## 2025-02-27 DIAGNOSIS — L73.2 HIDRADENITIS SUPPURATIVA: ICD-10-CM

## 2025-02-27 PROCEDURE — G2211 COMPLEX E/M VISIT ADD ON: HCPCS | Performed by: DERMATOLOGY

## 2025-02-27 PROCEDURE — 1125F AMNT PAIN NOTED PAIN PRSNT: CPT | Performed by: DERMATOLOGY

## 2025-02-27 PROCEDURE — 99214 OFFICE O/P EST MOD 30 MIN: CPT | Performed by: DERMATOLOGY

## 2025-02-27 RX ORDER — CLOBETASOL PROPIONATE 0.5 MG/G
CREAM TOPICAL 2 TIMES DAILY
Qty: 60 G | Refills: 0 | Status: SHIPPED | OUTPATIENT
Start: 2025-02-27

## 2025-02-27 RX ORDER — DAPSONE 100 MG/1
100 TABLET ORAL DAILY
Qty: 30 TABLET | Refills: 0 | Status: SHIPPED | OUTPATIENT
Start: 2025-02-27 | End: 2025-03-07

## 2025-02-27 RX ORDER — FEXOFENADINE HCL 180 MG/1
180 TABLET ORAL DAILY
Qty: 90 TABLET | Refills: 4 | Status: SHIPPED | OUTPATIENT
Start: 2025-02-27

## 2025-02-27 ASSESSMENT — PAIN SCALES - GENERAL: PAINLEVEL_OUTOF10: MILD PAIN (2)

## 2025-02-27 NOTE — LETTER
2/27/2025       RE: Klarissa Curtis  [address]  [facility] [address]     Dear Colleague,    Thank you for referring your patient, Klarissa Curtis, to the [facility] [place] [facility]. Please see a copy of my visit note below.    [facility] Dermatology Note  Encounter Date: Feb 27, 2025  Office Visit     Dermatology Problem List:  1. Hidradenitis suppurativa: diagnosed age 11,   - Current: Humira 80mg weekly, Percocet, Resorcinol  - s/p right axillary  excision and right muscle-sparing latissimus musculocutaneous flap reconstruction 2/29/2024.   - Prior: gabapentin, nfliximab (1 dose and got charged), prednisone taper, clobetasol, topical morphine, ILK, oral antibiotics, rifampin, sirolimus 2 mg (started 4/22/21) finasteride, spironolactone (6/2019- 9/5/19, stopped due to abnormal uterine bleeding), topical morphine gel, and percocet for severe pain, Cosentyx, belbuca  2. Keratosis pilaris  3. L olecranon bursitis.   4. Dermatographism  - Tx: fexofenadine 360 mg BID  5. Eruption NOS. Both resolved.  - ears, upper chest, and back, responded to Lidex. Started ~9/28/23; resolved ~1/11/24.  - punch biopsy 8/5/21: superficial and deep perivascular and interstitial infiltrate of neutrophils, with focal leukocytoclasis and bluish, amorphous material suggestive of neutrophil degranulation, and lesser amounts of eosinophils and lymphocytes. Background dermal edema is noted. Focal neutrophilic epitheliotropism is noted.  6. Pink linear patches, ddx: follicular dermatitis vs lichen spinulosus vs KP, posterior shoulders   - amlactin, lidex   7. Seborrheic dermatitis  SHx: [place] works [place] an [place]     ____________________________________________    Assessment & Plan:  # Hidradenitis suppurativa,              - She is not sure adalimumab is helping much. Continue Adalimumab 80  mg weekly.for now   - Continue calcium 1200 mg and vitamin D 2000    - Continue Resorcinol BID. Do not apply to open wounds.    - Clobetasol ointment BID prn   - Start semaglutide. MTM referral placed.   The longitudinal plan of care for the diagnosis(es)/condition(s) as documented were addressed during this visit. Due to the added complexity in care, I will continue to support Maura in the subsequent management and with ongoing continuity of care.      # Eruption  - Lidex ointment BID PRN    # Dermatographism  - Refilled fexofenadine    Follow-up: 12 weeks via phone    Staff and Scribe:     Scribe Disclosure:   I, Maria Luisa Florentino, am serving as a scribe; to document services personally performed by Jean Kenny MD -based on data collection and the provider's statements to me.     Provider Disclosure:  I agree with above History, Review of Systems, Physical exam and Plan.  I have reviewed the content of the documentation and have edited it as needed. I have personally performed the services documented here and the documentation accurately represents those services and the decisions I have made.      Electronically signed by:  Provider Disclosure:   The documentation recorded by the scribe accurately reflects the services I personally performed and the decisions made by me.    Jean Kenny MD, FAAD, FACP    Departments of Internal Medicine and Dermatology  [facility]          ____________________________________________    CC:  Chief Complaint   Patient presents with     Derm Problem     HS follow up; Pt reports new flares, specifically on groin and butt.       HPI:  Ms. Klarissa Curtis is a(n) 49 year old female who presents today as a return patient for HS.  Last seen in clinic on 5/30/24, by me.     Has a few smaller lesions but has continued to get new lesions and flares.    Patient is otherwise feeling well, without additional skin concerns.    Physical Exam:  GEN:  NAD  SKIN:   - HS data lost   - Psoriasiform plaque surrounding some of the HS lesions  - No other lesions of concern on areas examined.     Medications:  Current Outpatient Medications   Medication Sig Dispense Refill     adalimumab (HUMIRA *CF*) 80 MG/0.8ML pen kit Inject 0.8 mLs (80 mg) Subcutaneous once a week 3.2 mL 11     calcium carbonate (OS-MERCY) 500 MG TABS Take 1 tablet by mouth 2 times daily (with meals)       camphor-menthol (DERMASARRA) 0.5-0.5 % external lotion Apply to itchy areas on buttocks every 4-6 hrs as needed for itching 59 mL 5     celecoxib (CELEBREX) 200 MG capsule Take 1 capsule (200 mg) by mouth 2 times daily as needed for moderate pain. 60 capsule 2     clotrimazole (LOTRIMIN) 1 % external cream Apply topically 2 times daily. 30 g 1     DULoxetine (CYMBALTA) 30 MG capsule 90mg daily 270 capsule 3     fexofenadine (ALLEGRA) 180 MG tablet Take 1 tablet (180 mg) by mouth daily 90 tablet 4     hydrOXYzine HCl (ATARAX) 25 MG tablet Take 1-2 tablets (25-50 mg) by mouth 3 times daily as needed for itching. 60 tablet 1     medical cannabis (Patient's own supply) See Admin Instructions (The purpose of this order is to document that the patient reports taking medical cannabis.  This is not a prescription, and is not used to certify that the patient has a qualifying medical condition.)       naloxone (NARCAN) 4 MG/0.1ML nasal spray Spray 1 spray (4 mg) into one nostril alternating nostrils as needed for opioid reversal every 2-3 minutes until assistance arrives 0.2 mL 0     oxyCODONE-acetaminophen (PERCOCET) 5-325 MG tablet Take 1-2 tablets by mouth every 6 hours as needed for severe pain. Take 1-2 percocet every 6-8 hrs as needed for pain. May fill/start 1/17/25 90 tablet 0     polyethylene glycol (MIRALAX) 17 GM/Dose powder Take 17 g by mouth 2 times daily       pregabalin (LYRICA) 150 MG capsule Take 1 capsule (150 mg) by mouth 3 times daily. 270 capsule 1     pregabalin (LYRICA) 75 MG capsule  Start 75 mg in afternoon x 1 week, then increase to 150 mg. Continue 150 mg in morning and at bedtime. 7 capsule 0     Resorcinol POWD Resorcinol 15% in vanicream twice a day (Patient taking differently: Resorcinol 15% in vanicream twice a day PRN) 30 g 11     semaglutide-weight management (WEGOVY) 0.25 MG/0.5ML pen Inject 0.5 mLs (0.25 mg) subcutaneously once a week. 2 mL 0     traZODone (DESYREL) 150 MG tablet Take 1.5 tablets (225 mg) by mouth at bedtime. 135 tablet 3     Vitamin D3 50 mcg (2000 units) tablet Take 1 tablet (50 mcg) by mouth daily 90 tablet 3     No current facility-administered medications for this visit.      Past Medical History:   Patient Active Problem List   Diagnosis     Hidradenitis suppurativa     Soft tissue infection     Insomnia, unspecified type     S/P flap graft     Adenomatous colon polyp     Past Medical History:   Diagnosis Date     NO ACTIVE PROBLEMS          Again, thank you for allowing me to participate in the care of your patient.      Sincerely,    Jean Kenny MD

## 2025-02-27 NOTE — PATIENT INSTRUCTIONS
PLAN  - Continue humira 80mg weekly  - Semaglutide for HS   - Start dapsone 100mg daily   Labs every weeks for 1 month and then monthly x 3  - Clobetasol ointment twice a day to itchy spots    Follow-up phone in 12 weeks vias phone

## 2025-02-27 NOTE — NURSING NOTE
Dermatology Rooming Note    Klarissa Curtis's goals for this visit include:   Chief Complaint   Patient presents with    Derm Problem     HS follow up; Pt reports new flares, specifically on groin and butt.     Kenia Walker, Visit Facilitator

## 2025-03-04 ENCOUNTER — TELEPHONE (OUTPATIENT)
Dept: PLASTIC SURGERY | Facility: CLINIC | Age: 50
End: 2025-03-04
Payer: COMMERCIAL

## 2025-03-04 NOTE — TELEPHONE ENCOUNTER
Attempted to call patient regarding rescheduling surgery with Dr. Mendoza due to a change in availability on 3/19    Was unable to leave voicemail due to voicemail being full    Due to inability to connect with patient, along with a cancellation on 3/20, surgery was rescheduled to 3/20 to secure this time for Maura Saeed sent with new surgery date    Awaiting patient confirmation    P: [phone]    Archana Ortez on 3/4/2025 at 3:46 PM

## 2025-03-06 NOTE — TELEPHONE ENCOUNTER
Left voicemail for patient regarding rescheduling surgery with Dr. Reji Saeed message sent to patient as well regarding rescheduling    Rescheduled surgery on 3/20 per Dr. Mendoza's request    Dr. Mendoza's availability on 3/20 has now changed, and he instructed writer to reschedule surgery to a later date    Patient has a denial from insurance, which is being handled by the [facility]     Writer spoke with Jaqueline regarding the need to reschedule surgery again, who stated we may need to plan a date once we know more about insurance authorization     Tentatively scheduled surgery on 4/8 for now.     Plan to figure out a pre-op and post-op once we know more about insurance     We can also wait to reschedule until we get an answer from insurance    Requested patient follow up to let us know if she wants to keep tentative date or wait to reschedule      Archana Neelima on 3/6/2025 at 3:58 PM

## 2025-03-07 ENCOUNTER — VIRTUAL VISIT (OUTPATIENT)
Dept: PHARMACY | Facility: CLINIC | Age: 50
End: 2025-03-07
Attending: DERMATOLOGY
Payer: COMMERCIAL

## 2025-03-07 DIAGNOSIS — E66.812 CLASS 2 SEVERE OBESITY WITH SERIOUS COMORBIDITY AND BODY MASS INDEX (BMI) OF 38.0 TO 38.9 IN ADULT, UNSPECIFIED OBESITY TYPE (H): ICD-10-CM

## 2025-03-07 DIAGNOSIS — Z51.81 ENCOUNTER FOR THERAPEUTIC DRUG MONITORING: ICD-10-CM

## 2025-03-07 DIAGNOSIS — E66.01 CLASS 2 SEVERE OBESITY WITH SERIOUS COMORBIDITY AND BODY MASS INDEX (BMI) OF 38.0 TO 38.9 IN ADULT, UNSPECIFIED OBESITY TYPE (H): ICD-10-CM

## 2025-03-07 DIAGNOSIS — Z79.899 ENCOUNTER FOR LONG TERM CURRENT USE OF DAPSONE: ICD-10-CM

## 2025-03-07 DIAGNOSIS — L73.2 HIDRADENITIS SUPPURATIVA: Primary | ICD-10-CM

## 2025-03-07 RX ORDER — DAPSONE 100 MG/1
100 TABLET ORAL DAILY
Qty: 30 TABLET | Refills: 0 | Status: SHIPPED | OUTPATIENT
Start: 2025-03-07

## 2025-03-07 NOTE — PROGRESS NOTES
"Medication Therapy Management (MTM) Encounter    ASSESSMENT:                            Hidradenitis suppurativa:   Flares persists. Continues Humira 80 mg weekly for prevention; modestly effective but alternative options limited. Prescribed dapsone at last visit with dermatology. Found G6PD lab from 2021 which was within normal limits but advised patient to hold off on starting dapsone until I can confirm with Dr. Kenny if this lab should be repeated. Reviewed once dapsone is started she will need to check CBC weekly for first month, then monthly for 3 months and semiannually thereafter. May consider trying for coverage of Rinvoq in 2025. Options include: (1) Rinvoq 15 mg daily with option to increase to 30 mg daily for Atopic Dermatitis (due to labeled use would be eligible for bridge program); (2) Rinvoq at Crohn's disease dosing (not eligible for patient assistance program due income)-will discuss this with Dr. Kenny.    Weight Management:   Weight loss progressing with initiation of GLP-1 receptor agonist, semaglutide. Obtaining through outside compounding pharmacy.  Reviewed that due to unknown source I am unable to confirm the quality, safety or efficacy of this treatment; was able to find \"Certificate of Analysis\" lab testing is listed on their website. Provided education on strategies to minimize side effects and encouraged patient to continue on a lower dose as needed to improve tolerance.  She will continue to work with outside prescribers to adjust this dose.    PLAN:                            Continue Humira 80 mg once weekly     Confirm the following with Dr. Kenny:  Whether patient needs to obtain updated G6PD lab prior to dapsone start.  Last checked in 10/2021 and within normal limits.  Option to retry for coverage of Rinvoq for comorbid dermatitis or Crohn's dosing    Update patient on plan to start dapsone once confirmed with Dr. Kenny.  Need to recheck CBC weekly for first months, " then monthly for 3 months, then semiannually thereafter  Dapsone: Take 1 capsule (100 mg) by mouth daily. Administer with meals if GI upset occurs.    Receive second Shingrix vaccine at local pharmacy    Continue compounded semaglutide as directed by outside clinic     Follow-up: via [employer] message with updates on treatment plan moving forward then in around 6 months (9/7/25) if Humira is continued or 3 months after initiation of Rinvoq if started     SUBJECTIVE/OBJECTIVE:                          Maura Curtis is a 49 year old female called for a follow-up visit.       Reason for visit: Humira follow-up     Medication Adherence/Access:   Rinvoq coverage:   - PA & appeal previously denied. May retry in 2025 with new insurance  - PAP: annual income must be <= $87,480, does not qualify   Humira coverage:   - 80 mg weekly covered thru 3/26/25    Hidradenitis suppurativa/Seborrheic dermatitis:   - Humira (adalimumab) 80 mg injection once weekly   - Dapsone 100 mg daily   - Resorcinol 15% cream twice daily as needed for flares  - fluocinonide 0.05% ointment: as needed   - ILK injection intermittently   Side effects: None.    Thinks she may be getting dermatitis flares on buttocks and scalp.  HS continues to actively flare.  She would be interested in reconsidering initiation of Rinvoq if Dr. Kenny agrees with this plan.      Plans to start dapsone as prescribed by Dr. Kenny.    Component      Latest Ref Rng 10/5/2021  11:25 AM   Glucose-6-PO4 Dehydrogenase      9.9 - 16.6 U/g Hb 15.7      Specialist: Dr. Jean Kenny MD, Dermatology. Last visit 2/27/25. The following was recommended:   - Continue humira 80mg weekly  - Semaglutide for HS   - Start dapsone 100mg daily              Labs every weeks for 1 month and then monthly x 3  - Clobetasol ointment twice a day to itchy spots    Previous treatment:   - infliximab infusion every 4 weeks (2019, not effective)   - Cosentyx (secukinumab) 300 mg every 28 days (Mar  "2022, not effective)   - Xeljanz (tofacitinib) 10 mg twice daily (in addition to Cosentyx, not effective)   - Rapamune (sirolimus) 2 mg daily (2021)   - Intralesional kenalog injections intermittently and oral prednisone tapers   - Hormonal agents: spironolactone (Jun 2019 to Sept 2019; stopped due to abnormal uterine bleeding), metformin, finasteride  - Oral antibiotics: doxycyline, dapsone, clindamycin plus rifampin  - Topical therapies: clindamycin 1% solution, clobetasol 0.05% cream, desonide 0.05%  - roflumilast 250 mcg daily x 4 weeks, then 500 mcg daily: caused bad migraines   - Rinvoq (upadacitinib) 15 mg tablet daily: not covered by insurance     Pre-Biologic Screening:   Hep B Surface Antigen Negative (10/30/2018)    Hep B Surface Antibody  Hep B Core Antibody  Reactive for Hep B immunity (10/30/18)    Hep C Antibody  Non-reactive (10/30/18)    HIV Antigen Antibody Negative (10/30/18)    Quantiferon TB Gold Negative (7/21/21)       Immunization History   Covid-19 vaccine ([id number] version)  Due to receive   Influenza (annual) Due to receive, avoid live FluMist   Pneumococcal  Prevnar-20: 8/9/24 Up-to-date   Tetanus/Tdap  Due to receive   Shingrix Due for 2nd dose   All patients on biologics should avoid live vaccines (varicella/VZV, intranasal influenza, MMR, or yellow fever vaccine (if traveling))         Weight Management:   - Compounded semaglutide 0.6 mg weekly     Side effects: some heartburn at bedtime - takes omeprazole as needed.  Trying not to eat past 7-30-8:00 PM which helps      Shares she started compounded semaglutide through [facility] on 0.3 mg on 2/1/25 for the first 4 weeks, and is now on 0.6 mg. Has been on this dose 1 week so far. Their prescribing team is monitoring dose adjustments for her    Have tried to get Wegovy covered on multiple occasions but this is excluded from insurance coverage.       Patient reported home weights:  - Today (3/7/25): 225 lbs   - Baseline (2/1/25): 240 lbs " "  Wt Readings from Last 5 Encounters:   09/11/24 244 lb (110.7 kg)   09/03/24 243 lb 6.2 oz (110.4 kg)   08/09/24 244 lb 14.4 oz (111.1 kg)   07/30/24 239 lb 3.2 oz (108.5 kg)   07/01/24 244 lb (110.7 kg)     Estimated body mass index is 38.53 kg/m  as calculated from the following:    Height as of 9/11/24: 5' 6.73\" (1.695 m).    Weight as of 9/11/24: 244 lb (110.7 kg).    Allergies/ADRs: Reviewed in chart  Past Medical History: Reviewed in chart  Tobacco: She reports that she quit smoking about 14 months ago. Her smoking use included cigarettes. She started smoking about 21 years ago. She has a 10 pack-year smoking history. She has never used smokeless tobacco.  ----------------    I spent 30 minutes with this patient today. All changes were made via collaborative practice agreement with Jean Kenny. A copy of the visit note was provided to the patient's provider(s).    A summary of these recommendations was sent via [employer].    Nicky Hartman, PharmD  Medication Therapy Management Pharmacist   [facility]     Telemedicine Visit Details  The patient's medications can be safely assessed via a telemedicine encounter.  Type of service:  Telephone visit  Originating Location (pt. Location): Home    Distant Location (provider location):  Off-site  Start Time:  8:30AM  End Time:  9:00AM     Medication Therapy Recommendations  No medication therapy recommendations to display        "

## 2025-03-07 NOTE — Clinical Note
Questions:  (1) Do you want an updated G6PD lab before starting dapsone or OK to start? This was checked 10/2021 and within normal limits  (2) Since Humira has lost efficacy patient interested in retrying for Rinvoq; there are couple ways we could try to get this covered. I think option A would be our best chance to get covered if you are on board with this plan. > Option A: Rinvoq 15 mg daily with option to increase to 30 mg daily for Atopic Dermatitis (she has a history of this in chart; due to labeled use for AD would be eligible for  bridge program for free drug which is NOT income based)  > Option B: Rinvoq at Crohn's disease dosing (would need to get covered by insurance -were not successful in getting approved in the past & is NOT eligible for patient assistance program due her income)  Nicky

## 2025-03-11 ENCOUNTER — TELEPHONE (OUTPATIENT)
Dept: INTERNAL MEDICINE | Facility: CLINIC | Age: 50
End: 2025-03-11
Payer: COMMERCIAL

## 2025-03-11 NOTE — PATIENT INSTRUCTIONS
"Recommendations from today's MTM visit:                                                      Continue Humira 80 mg once weekly     I will confirm the following with Dr. Kenny:  Whether you needs to obtain updated G6PD lab prior to dapsone start.  Last checked in 10/2021 and within normal limits.  Option to retry for coverage of Rinvoq for comorbid dermatitis or Crohn's dosing    I will update you on plan to start dapsone once confirmed with Dr. Kenny.  Need to recheck CBC weekly for first months, then monthly for 3 months, then semiannually thereafter  Dapsone: Take 1 capsule (100 mg) by mouth daily. Administer with meals if GI upset occurs.    Receive second Shingrix vaccine at local pharmacy    Continue compounded semaglutide as directed by outside clinic     Follow-up: via [employer] message with updates on treatment plan moving forward then in around 6 months (9/7/25) if Humira is continued or 3 months after initiation of Rinvoq if started     It was great speaking with you today.  I value your experience and would be very thankful for your time in providing feedback in our clinic survey. In the next few days, you may receive an email or text message from [employer] with a link to a survey related to your  clinical pharmacist.\"     To schedule another MTM appointment, please call the clinic directly or you may call the MTM scheduling line at [phone] or toll-free at [phone].     My Clinical Pharmacist's contact information:                                                      Please feel free to contact me with any questions or concerns you have.      Nicky Hartman, PharmD  MTM Pharmacist  [facility]   "

## 2025-03-11 NOTE — TELEPHONE ENCOUNTER
Patient confirmed scheduled appointment:  Date: 8/18  Time: 11am  Visit type: Physical   Provider: PCP  Location: [facility] [place]  Additional notes: per check out - Return in about 53 weeks (around 8/15/2025) for Annual Wellness Visit

## 2025-03-13 RX ORDER — DAPSONE 100 MG/1
100 TABLET ORAL DAILY
Qty: 30 TABLET | Refills: 0 | Status: SHIPPED | OUTPATIENT
Start: 2025-03-13

## 2025-03-22 DIAGNOSIS — M25.562 CHRONIC PAIN OF LEFT KNEE: ICD-10-CM

## 2025-03-22 DIAGNOSIS — G89.29 CHRONIC PAIN OF LEFT KNEE: ICD-10-CM

## 2025-03-24 ENCOUNTER — TELEPHONE (OUTPATIENT)
Dept: ANESTHESIOLOGY | Facility: CLINIC | Age: 50
End: 2025-03-24
Payer: COMMERCIAL

## 2025-03-24 NOTE — TELEPHONE ENCOUNTER
Patient confirmed scheduled appointment:     Date: 4/15/25  Time: 9:15 AM  Visit type: Return Pain  Visit mode: In Person  Provider: Lesa Osman  [place] [facility] [place]    Additional Notes:

## 2025-03-24 NOTE — TELEPHONE ENCOUNTER
Refill request    Medication: celecoxib (CELEBREX) 200 MG capsule    Sig: Take 1 capsule (200 mg) by mouth 2 times daily as needed for moderate pain.     Dispensed: 60  Refills: 2    Last prescribed to patient: 11/5/24     Last clinic appointment: 10/29/24  Next clinic appointment: was to have follow up 6-8 weeks from 10/29-will have  reach out    Preferred pharmacy:    [place] [id number] [place] [address]    Refill request routed to the provider to review.

## 2025-03-25 RX ORDER — CELECOXIB 200 MG/1
200 CAPSULE ORAL 2 TIMES DAILY PRN
Qty: 60 CAPSULE | Refills: 1 | Status: SHIPPED | OUTPATIENT
Start: 2025-03-25

## 2025-03-25 NOTE — TELEPHONE ENCOUNTER
Request appears appropriate, refill approved for 30 day supply +1 additional fill(s).     Lesa Osman DNP, APRN, AGNP-C  [facility]

## 2025-04-01 NOTE — TELEPHONE ENCOUNTER
FUTURE VISIT INFORMATION      SURGERY INFORMATION:  Date: 5/21/25   [place] [facility] [place]   Surgeon:  BORIS Mendoza MD   Anesthesia Type:  General   Procedure: Right axillary flap revision and thinning   Consult: 9/11/24    RECORDS REQUESTED FROM:       Primary Care Provider: Nolan Elliott MD @ [facility]    Pertinent Medical History: N/A    Most recent EKG+ Tracing: N/A    Most recent ECHO: N/A    Most recent Cardiac Stress Test: N/A    Most recent Coronary Angiogram:N/ A    Most recent PFT's: N/A    Most recent Sleep Study: N/A

## 2025-04-02 ENCOUNTER — TELEPHONE (OUTPATIENT)
Dept: ANESTHESIOLOGY | Facility: CLINIC | Age: 50
End: 2025-04-02

## 2025-04-02 NOTE — TELEPHONE ENCOUNTER
Patient Contacted to alert the patient that their appointment scheduled at the [facility] Management Clinic has been moved by 15 minutes at the provider's request. It is now scheduled for:    Date: 4/15/25  Time: 9:15 AM check-in / 9:30 AM appointment  Provider: Lesa Osman  Mode: In Person    If the patient has scheduling questions, please message Lana Shipley via Teams or refer patient to Lana directly at [phone].

## 2025-04-10 ENCOUNTER — LAB (OUTPATIENT)
Dept: LAB | Facility: CLINIC | Age: 50
End: 2025-04-10
Payer: COMMERCIAL

## 2025-04-10 DIAGNOSIS — L73.2 HIDRADENITIS SUPPURATIVA: ICD-10-CM

## 2025-04-10 LAB
BASOPHILS # BLD AUTO: 0.1 10E3/UL (ref 0–0.2)
BASOPHILS NFR BLD AUTO: 1 %
EOSINOPHIL # BLD AUTO: 0.5 10E3/UL (ref 0–0.7)
EOSINOPHIL NFR BLD AUTO: 5 %
ERYTHROCYTE [DISTWIDTH] IN BLOOD BY AUTOMATED COUNT: 12.8 % (ref 10–15)
HCT VFR BLD AUTO: 43.6 % (ref 35–47)
HGB BLD-MCNC: 13.9 G/DL (ref 11.7–15.7)
IMM GRANULOCYTES # BLD: 0 10E3/UL
IMM GRANULOCYTES NFR BLD: 0 %
LYMPHOCYTES # BLD AUTO: 3.4 10E3/UL (ref 0.8–5.3)
LYMPHOCYTES NFR BLD AUTO: 34 %
MCH RBC QN AUTO: 31 PG (ref 26.5–33)
MCHC RBC AUTO-ENTMCNC: 31.9 G/DL (ref 31.5–36.5)
MCV RBC AUTO: 97 FL (ref 78–100)
MONOCYTES # BLD AUTO: 0.6 10E3/UL (ref 0–1.3)
MONOCYTES NFR BLD AUTO: 6 %
NEUTROPHILS # BLD AUTO: 5.4 10E3/UL (ref 1.6–8.3)
NEUTROPHILS NFR BLD AUTO: 54 %
PLATELET # BLD AUTO: 379 10E3/UL (ref 150–450)
RBC # BLD AUTO: 4.49 10E6/UL (ref 3.8–5.2)
WBC # BLD AUTO: 9.9 10E3/UL (ref 4–11)

## 2025-04-15 ENCOUNTER — OFFICE VISIT (OUTPATIENT)
Dept: ANESTHESIOLOGY | Facility: CLINIC | Age: 50
End: 2025-04-15
Payer: COMMERCIAL

## 2025-04-15 ENCOUNTER — OFFICE VISIT (OUTPATIENT)
Dept: PLASTIC SURGERY | Facility: CLINIC | Age: 50
End: 2025-04-15
Payer: COMMERCIAL

## 2025-04-15 VITALS
DIASTOLIC BLOOD PRESSURE: 68 MMHG | WEIGHT: 222.3 LBS | TEMPERATURE: 99.2 F | SYSTOLIC BLOOD PRESSURE: 98 MMHG | BODY MASS INDEX: 35.1 KG/M2 | HEART RATE: 91 BPM | OXYGEN SATURATION: 98 %

## 2025-04-15 VITALS
HEART RATE: 91 BPM | SYSTOLIC BLOOD PRESSURE: 98 MMHG | DIASTOLIC BLOOD PRESSURE: 68 MMHG | OXYGEN SATURATION: 98 % | RESPIRATION RATE: 16 BRPM

## 2025-04-15 DIAGNOSIS — G89.29 CHRONIC PAIN OF LEFT KNEE: ICD-10-CM

## 2025-04-15 DIAGNOSIS — Z98.890 S/P FLAP GRAFT: Primary | ICD-10-CM

## 2025-04-15 DIAGNOSIS — M25.562 CHRONIC PAIN OF LEFT KNEE: ICD-10-CM

## 2025-04-15 DIAGNOSIS — L73.2 HIDRADENITIS SUPPURATIVA: ICD-10-CM

## 2025-04-15 DIAGNOSIS — M79.2 NEUROPATHIC PAIN: ICD-10-CM

## 2025-04-15 DIAGNOSIS — L29.9 ITCHING: ICD-10-CM

## 2025-04-15 DIAGNOSIS — G89.4 CHRONIC PAIN SYNDROME: ICD-10-CM

## 2025-04-15 DIAGNOSIS — L98.9 PAINFUL SKIN LESION: ICD-10-CM

## 2025-04-15 DIAGNOSIS — R53.82 CHRONIC FATIGUE: ICD-10-CM

## 2025-04-15 DIAGNOSIS — Z79.891 ENCOUNTER FOR MONITORING OPIOID MAINTENANCE THERAPY: Primary | ICD-10-CM

## 2025-04-15 DIAGNOSIS — Z51.81 ENCOUNTER FOR MONITORING OPIOID MAINTENANCE THERAPY: Primary | ICD-10-CM

## 2025-04-15 DIAGNOSIS — F11.90 CHRONIC, CONTINUOUS USE OF OPIOIDS: ICD-10-CM

## 2025-04-15 PROCEDURE — 99213 OFFICE O/P EST LOW 20 MIN: CPT | Performed by: PLASTIC SURGERY

## 2025-04-15 PROCEDURE — 3078F DIAST BP <80 MM HG: CPT

## 2025-04-15 PROCEDURE — 3074F SYST BP LT 130 MM HG: CPT

## 2025-04-15 PROCEDURE — 1125F AMNT PAIN NOTED PAIN PRSNT: CPT

## 2025-04-15 PROCEDURE — 99213 OFFICE O/P EST LOW 20 MIN: CPT

## 2025-04-15 RX ORDER — OXYCODONE AND ACETAMINOPHEN 5; 325 MG/1; MG/1
1-2 TABLET ORAL EVERY 6 HOURS PRN
Qty: 30 TABLET | Refills: 0 | Status: SHIPPED | OUTPATIENT
Start: 2025-04-15

## 2025-04-15 RX ORDER — PREGABALIN 150 MG/1
150 CAPSULE ORAL 2 TIMES DAILY
Qty: 180 CAPSULE | Refills: 1 | Status: SHIPPED | OUTPATIENT
Start: 2025-04-15

## 2025-04-15 RX ORDER — CELECOXIB 200 MG/1
200 CAPSULE ORAL 2 TIMES DAILY PRN
Qty: 180 CAPSULE | Refills: 1 | Status: SHIPPED | OUTPATIENT
Start: 2025-04-15

## 2025-04-15 RX ORDER — HYDROXYZINE HYDROCHLORIDE 25 MG/1
25-50 TABLET, FILM COATED ORAL 3 TIMES DAILY PRN
Qty: 60 TABLET | Refills: 1 | Status: SHIPPED | OUTPATIENT
Start: 2025-04-15

## 2025-04-15 ASSESSMENT — PAIN SCALES - GENERAL
PAINLEVEL_OUTOF10: MILD PAIN (2)
PAINLEVEL_OUTOF10: MILD PAIN (2)

## 2025-04-15 NOTE — PROGRESS NOTES
[facility] Pain Management     Date of visit: 4/15/2025      Assessment:   Klarissa Curtis is a 50 year old female with a past medical history significant for hidradenitis suppurativa & depression who presents in follow up for:      Hidradenitis suppurativa: Symptom onset at age 11, diagnosed at age 39. Persistent flares with worsening pain in bilateral axilla, groin, and buttocks. Has tried multiple pain modalities with inadequate pain control. Multiple lesions visualized on exam: nodular abscesses, draining tracts, honeycomb lesion pattern and fibrotic scarring identified. Pain symptom etiology is likely multifactorial with hx of hidradenitis suppurativa, nociceptive and neuropathic components likely.     Today, Maura is seen in clinic. She reports pain has been fairly manageable since last visit, using significantly less oxycodone. She is experiencing significant fatigue and low mood, and we discussed potential referral to [facility] for counseling with emphasis on chronic pain. She is interested, though ideally would prefer option for in person and virtual visits. I will communicate with [facility] first, then have pain team update patient about referral.        [place] [facility] [place]    Visit Diagnoses:  1. Encounter for monitoring opioid maintenance therapy    2. Hidradenitis suppurativa    3. Chronic pain syndrome    4. Chronic fatigue    5. Painful skin lesion    6. Itching    7. Chronic, continuous use of opioids    8. Neuropathic pain    9. Chronic pain of left knee        Plan:  Diagnosis reviewed, treatment option addressed, and risk/benifits discussed.  Self-care instructions given.  I am recommending a multidisciplinary treatment plan to help this patient better manage their pain.      Pain Physical Therapy:  Not at this time.      Pain Psychology: Discussed referral to [facility] - will reach out to provider about new patient referral and wait time for initial visit. Patient advised  pain team will contact her with update soon.      Diagnostic Studies:   No new orders today.      Medication Management:   Hydroxyzine 25-50 mg three times daily as needed for itching/pain. Cautioned about potential sedation effects. May continue use if helpful.   Medical [place] - [place] for [place] program renewed 8/12/24.   NSAIDS - Celebrex 100 to 200 mg twice daily as needed.  Advised against concurrent use of other NSAIDs. She ran out of medication in between visits (trouble with refill), notes some degree of benefit for pain and would like to continue. Refills sent to pharmacy today.   Lyrica - current dose 150 mg twice daily. Appreciates benefit, no side effects. {Pain control not optimized. Recommend adding third dose, goal 150 mg three times daily.   Percocet 5-325 mg tabs, 1-2 tabs every 6-8 hours PRN for breakthrough pain, max 6-8 tabs/day. Last filled on 10/4/24 for #90 tabs. Refill sent to pharmacy today.   Controlled substance agreement and drug screen completed on 8/12/24.   Naloxone last sent to pharmacy on 4/15/25.   Consider TCA in future to optimize pain control and sleep benefit, buprenorphine retrial in future may be considered.      Potential procedures:   None      Other Orders/Referrals:   None      Follow up with SERAFIN Miranda CNP within 3-4 months       Review of Electronic Chart: Today I have also reviewed available medical information in the patient's medical record at [facility] ([facility]) and Care Everywhere (if available), including relevant provider notes, laboratory work, and imaging.     Lesa Osman DNP, SERAFIN, AGNP-C  [facility] Pain Management     -------------------------------------------------    Subjective:    Chief complaint:   Chief Complaint   Patient presents with    Pain    Pain Management     Follow up       Interval history:  Klarissa Curtis is a 50 year old female last seen on 10/29/24.      Recommendations/plan at the last visit included:  Pain  Physical Therapy:  Not at this time.      Pain Psychology: Referral for health psychology at last visit (see below).      Diagnostic Studies:   Labs on 6/24/24 reviewed:  CBC is WNL  CMP - hepatic function intact, GFR >90     Medication Management:   Hydroxyzine 25-50 mg three times daily as needed for itching/pain. Cautioned about potential sedation effects. May continue use if helpful.   [place] - [place] for [place] renewed 8/12/24.   NSAIDS - Celebrex 100 to 200 mg twice daily as needed.  Advised against concurrent use of other NSAIDs. She ran out of medication in between visits (trouble with refill), notes some degree of benefit for pain and would like to continue. Refills sent to pharmacy today.   Lyrica - current dose 150 mg twice daily. Appreciates benefit, no side effects. {Pain control not optimized. Recommend adding third dose, goal 150 mg three times daily.   Percocet 5-325 mg tabs, 1-2 tabs every 6-8 hours PRN for breakthrough pain, max 6-8 tabs/day. Last filled on 10/4/24 for #90 tabs. Refill sent to pharmacy today.   Controlled substance agreement and drug screen completed on 8/12/24.   Naloxone last sent to pharmacy on 2/19/24.   Consider TCA in future to optimize pain control and sleep benefit, buprenorphine retrial in future may be considered.      Potential procedures:   None      Other Orders/Referrals:   Mental Health - referral placed at last visit for health psychology therapy. Scheduling phone number is [phone].      Follow up with SERAFIN Miranda CNP in 6-8 weeks or sooner if needed.    Since her last visit, Klarissa Curtis reports:  -She reports pain has been fairly manageable since last visit.   -She has been staying busy, work is going well. She will be moving soon.   -She will be moving to a new apartment that has a patio and bigger place.   -She has not needed oxycodone as needed, last refill 1/17/25.  -She would like to continue oxycodone PRN, needs refill  and aware she will get #30 tabs today.   -She continues Lyrica and Celebrex, notable benefit and thinks this has been helping keep her baseline pain manageable.     Pain Information:   Pain rating: Mild Pain (2)      HPI/Interval Hx from last visit:  -She had last visit with Dr. Kenny for clinical trial. No difference in HS symptoms.  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   -There was a lot of walking involved, then she would get hot and this flared HS symptoms.   -She has been taking oxycodone every day, 3-6 tabs. She is open to consider buprenorphine again.   -She takes Lyrica 150 mg and Celebrex 100 mg BID. Appreciates some degree of benefit with use. No side effects.   -She ran out of Celebrex and had trouble getting refill.   -She is open to trial higher dose of Lyrica and Celebrex      Current Pain Treatments:      Medications:               Percocet 5-325 mg PRN for breakthrough pain. 6-8 tabs/day              Lyrica 150 mg TID.               Celebrex 100-200 mg BID PRN              Hydroxyzine 25-50 mg TID PRN              Medical cannabis.                             Medical cannabis - renewed on 8/12/24              Plastic surgery & reconstruction of right axillary wound              Derm - Dr. Kenny         Current MME: 0-60/day      Review of [place] Prescription Monitoring Program (): YES      Annual Controlled Substance Agreement/UDS due date: Completed on 8/12/24     Past pain treatments:  Naproxen      Medications:  Current Outpatient Medications   Medication Sig Dispense Refill    celecoxib (CELEBREX) 200 MG capsule Take 1 capsule (200 mg) by mouth 2 times daily as needed for moderate pain. 180 capsule 1    hydrOXYzine HCl (ATARAX) 25 MG tablet Take 1-2 tablets (25-50 mg) by mouth 3 times daily as needed for itching. 60 tablet 1    naloxone (NARCAN) 4 MG/0.1ML nasal spray Spray 1 spray (4 mg) into one nostril alternating nostrils as needed for opioid  reversal. every 2-3 minutes until assistance arrives 0.2 mL 0    oxyCODONE-acetaminophen (PERCOCET) 5-325 MG tablet Take 1-2 tablets by mouth every 6 hours as needed for severe pain. Take 1-2 percocet every 6-8 hrs as needed for pain. May fill/start now. 30 tablet 0    pregabalin (LYRICA) 150 MG capsule Take 1 capsule (150 mg) by mouth 2 times daily. 180 capsule 1    Adalimumab 80 MG/0.8ML AJKT Inject 80 mg subcutaneously every 7 days. Dispense 4 pens = 3.2 mL for 28 day supply. 4 each 11    calcium carbonate (OS-MERCY) 500 MG TABS Take 1 tablet by mouth 2 times daily (with meals)      camphor-menthol (DERMASARRA) 0.5-0.5 % external lotion Apply to itchy areas on buttocks every 4-6 hrs as needed for itching 59 mL 5    clobetasol propionate (TEMOVATE) 0.05 % external cream Apply topically 2 times daily. 60 g 0    clotrimazole (LOTRIMIN) 1 % external cream Apply topically 2 times daily. 30 g 1    dapsone (ACZONE) 100 MG tablet Take 1 tablet (100 mg) by mouth daily. (Patient not taking: Reported on 4/15/2025) 30 tablet 0    dapsone (ACZONE) 100 MG tablet Take 1 tablet (100 mg) by mouth daily. 30 tablet 0    DULoxetine (CYMBALTA) 30 MG capsule 90mg daily 270 capsule 3    fexofenadine (ALLEGRA) 180 MG tablet Take 1 tablet (180 mg) by mouth daily. 90 tablet 4    medical cannabis (Patient's own supply) See Admin Instructions (The purpose of this order is to document that the patient reports taking medical cannabis.  This is not a prescription, and is not used to certify that the patient has a qualifying medical condition.)      polyethylene glycol (MIRALAX) 17 GM/Dose powder Take 17 g by mouth 2 times daily      pregabalin (LYRICA) 75 MG capsule Start 75 mg in afternoon x 1 week, then increase to 150 mg. Continue 150 mg in morning and at bedtime. (Patient not taking: Reported on 4/15/2025) 7 capsule 0    Resorcinol POWD Resorcinol 15% in vanicream twice a day (Patient taking differently: Resorcinol 15% in vanicream twice a  day PRN) 30 g 11    semaglutide-weight management (WEGOVY) 0.25 MG/0.5ML pen Inject 0.5 mLs (0.25 mg) subcutaneously once a week. 2 mL 0    traZODone (DESYREL) 150 MG tablet Take 1.5 tablets (225 mg) by mouth at bedtime. 135 tablet 3    Vitamin D3 50 mcg (2000 units) tablet Take 1 tablet (50 mcg) by mouth daily 90 tablet 3       Medical History: any changes in medical history since they were last seen? No      Objective:    Physical Exam:  Blood pressure 98/68, pulse 91, resp. rate 16, SpO2 98%, not currently breastfeeding.  Constitutional: Well developed, well nourished, appears stated age.  Gait: Intact   HEENT: Head atraumatic, normocephalic. Eyes without conjunctival injection or jaundice. Oropharynx clear. Neck supple. No obvious neck masses.  Skin: No rash, lesions, or petechiae of exposed skin.   Psychiatric/mental status: Alert and appropriately engaged during visit.     Diagnostic Tests/Imaging/Labs:  Reviewed last CMP from 6/24/24.     BILLING TIME DOCUMENTATION:   The total TIME spent on this patient on the date of the encounter/appointment was 28 minutes.      TOTAL TIME includes:   Time spent preparing to see the patient (reviewing records and tests)   Time spent face to face (or over the phone) with the patient   Time spent ordering tests, medications, procedures and referrals   Time spent Referring and communicating with other healthcare professionals   Time spent documenting clinical information in Epic

## 2025-04-15 NOTE — PROGRESS NOTES
PREOPERATIVE VISIT NOTE     PRESENTING COMPLAINT:  Preop visit for upcoming right axillary flap thinning and recontouring scheduled for 5/21/2025.     HISTORY OF PRESENTING COMPLAINT: The patient is here for a pre-op visit for the patient's surgery.  The patient is being seen in the presence of my nurse. There is no change since the patient's consultation. Please see the consult note for details.     No change in history physical exam.  Went over the procedure with the patient in detail including revising and thinning of the flap as needed.  Major risks of wound problems, infections, bleeding, asymmetries were discussed in detail.  She understood and wants to proceed.    Preop scheduled.     ASSESSMENT AND PLAN:  Based upon the above findings, the patient is here for a preop visit for upcoming surgery.  I had a rena, detailed discussion with the patient in the presence of my nurse (who was present from beginning to end) about the proposed surgery. I was very clear and detailed about overview of surgery, perioperative plans, and expectations. All risks, benefits and alternatives of the surgery including but not limited to pain, infection, bleeding, scarring, asymmetry, seromas, hematomas, wound breakdown, wound dehiscence, prominent scar, hypertrophic scar, keloid scar, requirement of further surgeries, skin/tissue necrosis, nerve/muscle/deeper structure injury, DVT, PE, MI, CVA, pneumonia, renal failure and death, were explained in detail. The patient agreed and understood them all and had all the questions answered to the patient's satisfaction, and the patient wants to proceed with surgery. I look forward to helping the patient out in the near future.  All questions were answered.  The patient was happy with the visit.    Total time spent in the encounter today including chart review, visit itself, and post-visit paperwork was 30 minutes.

## 2025-04-15 NOTE — NURSING NOTE
Patient presents with:  Pain  Pain Management: Follow up      Data Unavailable     Pain Medications       Opioid Combinations Refills Start End     oxyCODONE-acetaminophen (PERCOCET) 5-325 MG tablet 0 1/17/2025 --    Sig - Route: Take 1-2 tablets by mouth every 6 hours as needed for severe pain. Take 1-2 percocet every 6-8 hrs as needed for pain. May fill/start 1/17/25 - Oral    Class: E-Prescribe    Earliest Fill Date: 1/17/2025    Prior authorization: Approved            What medications are you using for pain? Percocet occasionally, pregabalin, celebrex, hydroxizine    What refills are you needing today? Hydroxizine and percocet    Expectations: none    Tiffany Hunt LPN

## 2025-04-15 NOTE — NURSING NOTE
"Oncology Rooming Note    April 15, 2025 11:10 AM   Klarissa Curtis is a 50 year old female who presents for:    Chief Complaint   Patient presents with    Oncology Clinic Visit     Right axillary flap revision and thinning     Initial Vitals: BP 98/68   Pulse 91   Temp 99.2  F (37.3  C)   Wt 100.8 kg (222 lb 4.8 oz)   SpO2 98%   BMI 35.10 kg/m   Estimated body mass index is 35.1 kg/m  as calculated from the following:    Height as of 9/11/24: 1.695 m (5' 6.73\").    Weight as of this encounter: 100.8 kg (222 lb 4.8 oz). Body surface area is 2.18 meters squared.  Mild Pain (2) Comment: Data Unavailable   No LMP recorded. Patient is perimenopausal.  Allergies reviewed: Yes  Medications reviewed: Yes    Medications: Medication refills not needed today.  Pharmacy name entered into EPIC:    [id number] [place] [facility] - [place], [place] - [address]  [id number] [place], [place] - [address] [facility] [place], [place] - [address] [facility] - [place], [place] - [address] [facility] - [place], [place] - [address] [place] [address]    Frailty Screening:   Is the patient here for a new oncology consult visit in cancer care? 2. No    PHQ9:  Did this patient require a PHQ9?: No      Clinical concerns: Temp: 99.2; fatigue past week or more.      Arianne Barrett"

## 2025-04-15 NOTE — Clinical Note
Dave Enamorado,  Hope you're doing well!  I think this patient would do well working with you, hx of hidradenitis suppurativa and chronic pain. She also has hx of depression/anxiety that she is struggling to manage right now. Would you be able to see her?   Thanks, Lesa

## 2025-04-15 NOTE — NURSING NOTE
Pre Op Teaching Note:       Pre and Post op Patient Education    Relevant Diagnosis:  HS  Surgical procedure:  R axillary flap debulking    Patient demonstrates understanding of the following:  Date of surgery:  5/21/25  Location of surgery:  [facility] - 5th Floor  History and Physical and any other testing necessary prior to surgery: Yes, discussed with pt need for pre-op within 30 days of surgery with PCP.    Patient demonstrates understanding of the following:    Pre-op showering/scrub information with PCMX Soap: Yes, soap given in clinic today  Blood thinner medications discussed and when to stop (if applicable):  Per PCP at pre-op.     Post-op follow-up:  Discussed how to contact the hospital, nurse, and clinic scheduling staff if necessary. (See packet information)    Instructional materials used/given/mailed:  [facility] Packet per surgery scheduler, post op teaching sheet, Soap.  Pt advised that final arrival information to come closer to the surgery date by PAN nurses.

## 2025-04-16 ENCOUNTER — TELEPHONE (OUTPATIENT)
Dept: ANESTHESIOLOGY | Facility: CLINIC | Age: 50
End: 2025-04-16
Payer: COMMERCIAL

## 2025-04-16 NOTE — TELEPHONE ENCOUNTER
Patient confirmed scheduled appointment:     Date: 7/15/25   Time: 3:00 PM  Visit type: Return Pain  Visit mode: In Person  Provider: Lesa Osman  [place] [facility]    Additional Notes:

## 2025-04-24 NOTE — PATIENT INSTRUCTIONS
Pain team will reach out with update about referral to pain psychology.  No changes to medications today.   Follow up within 3-4 months, or sooner if needed.

## 2025-05-07 ENCOUNTER — PRE VISIT (OUTPATIENT)
Dept: SURGERY | Facility: CLINIC | Age: 50
End: 2025-05-07

## 2025-05-07 ENCOUNTER — ANESTHESIA EVENT (OUTPATIENT)
Dept: SURGERY | Facility: AMBULATORY SURGERY CENTER | Age: 50
End: 2025-05-07
Payer: COMMERCIAL

## 2025-05-07 ENCOUNTER — VIRTUAL VISIT (OUTPATIENT)
Dept: SURGERY | Facility: CLINIC | Age: 50
End: 2025-05-07
Payer: COMMERCIAL

## 2025-05-07 VITALS — HEIGHT: 67 IN | WEIGHT: 222 LBS | BODY MASS INDEX: 34.84 KG/M2

## 2025-05-07 DIAGNOSIS — Z01.818 PRE-OP EVALUATION: Primary | ICD-10-CM

## 2025-05-07 PROCEDURE — 98004 SYNCH AUDIO-VIDEO EST SF 10: CPT | Performed by: PHYSICIAN ASSISTANT

## 2025-05-07 ASSESSMENT — LIFESTYLE VARIABLES: TOBACCO_USE: 1

## 2025-05-07 ASSESSMENT — ENCOUNTER SYMPTOMS: SEIZURES: 0

## 2025-05-07 NOTE — PROGRESS NOTES
Maura is a 50 year old who is being evaluated via a billable video visit.      How would you like to obtain your AVS? Christophe Stone   Maura is a 50 year old, presenting for the following health issues:  Pre-Op Exam (/)          MONIKA Gandhi LPN          
no

## 2025-05-07 NOTE — PATIENT INSTRUCTIONS
Preparing for Your Surgery      Name:  Klarissa Curtis   MRN:  [id number]   :  1975   Today's Date:  2025       [employer] [place] [employer] [address] Construction Project                                Timeline 2025 -2025    This project will affect travel to the [facility], as well as the [facility].      Please check the [facility] [address] project website for the most up to date information and give yourself additional time to reach your destination.              Arriving for surgery:  Surgery date:  25   Arrival time:  10:20 am  Surgery time: 11:50 am    Restrictions due to COVID 19:    Please maintain social distance.  Masking is optional        parking is available for anyone with mobility limitations or disabilities. (Monday- Friday 7 am- 5 pm)    Please come to:    [facility]  [address] [place] [id number]    Check in on the 5th floor, Ambulatory Surgery Center.    What can I eat or drink?    -  You may eat and drink normally until 8 hours before arrival time  (Until 2:20 am)  -  You may have clear liquids until 2 hours before arrival time  (Until 8:20 am)    Examples of clear liquids:  Water  Clear broth  Juices (apple, white grape, white cranberry  and cider) without pulp  Noncarbonated, powder based beverages  (lemonade and Nawaf-Aid)  Sodas (Sprite, 7-Up, ginger ale and seltzer)  Coffee or tea (without milk or cream)  Gatorade    --No alcohol or cannabis products for at least 24 hours before surgery    Which medicines can I take?    Hold Aspirin for 7 days before surgery.   Hold Multivitamins for 7 days before surgery.  Hold Supplements for 7 days before surgery.  Hold Ibuprofen (Advil, Motrin) for 1 day before surgery--unless otherwise directed by surgeon.  Hold Naproxen (Aleve) for 4 days before surgery.  Hold Celebrex for 24 hours before surgery.  Hold Semaglutide  (Wegovy) for at least 7 days before surgery. Do not take your dose on 5/17/25.    -  DO NOT take the following medications the day of surgery:  Topical medications  Miralax     -  PLEASE TAKE the following medications per your usual routine:  Dapsone (Aczone)  Duloxetine (Cymbalta)  Fexofenadine (Allegra) if needed  Percocet if needed  Lamotrigine (Lyrica)  Trazodone       How do I prepare myself?  - Please take 2 showers (one the night prior to surgery and one the morning of surgery) using Scrubcare or Hibiclens soap.    Use this soap only from the neck to your toes. Avoid genital area      Leave the soap on your skin for one minute--then rinse thoroughly.      You may use your own shampoo and conditioner; no other hair products.   - Please remove all jewelry and body piercings.  - No lotions, deodorants or fragrance.  - No makeup or fingernail polish.   - Bring your ID and insurance card.    -If you have a Deep Brain Stimulator, a Spinal Cord Stimulator or any implanted Neuro device you must bring the remote to the Surgery Center          ALL PATIENTS ARE REQUIRED TO HAVE A RESPONSIBLE ADULT TO DRIVE AND BE IN ATTENDANCE WITH THEM FOR 24 HOURS FOLLOWING SURGERY       Covid testing policy as of 12/06/2022  Your surgeon will notify and schedule you for a COVID test if one is needed before surgery--please direct any questions or COVID symptoms to your surgeon      Questions or Concerns:    -For questions regarding the day of surgery please contact the Ambulatory Surgery Center at [phone].    -If you have health changes between today and your surgery please contact your surgeon.     For questions after surgery please call your surgeons office.

## 2025-05-07 NOTE — H&P
Pre-Operative H & P     CC:  Preoperative exam to assess for increased cardiopulmonary risk while undergoing surgery and anesthesia.    Date of Encounter: 5/7/2025  Primary Care Physician:  Nolan Elliott     Reason for visit:   Encounter Diagnosis   Name Primary?    Pre-op evaluation Yes       HPI  Klarissa Curtis is a 50 year old female who presents for pre-operative H & P in preparation for  Procedure Information       Case: [id number] Date/Time: 05/21/25 1150    Procedure: Right axillary flap revision and thinning (Right: Abdomen)    Anesthesia type: General    Diagnosis:       S/P flap graft [Z98.890]      Hidradenitis suppurativa [L73.2]    Pre-op diagnosis:       S/P flap graft [Z98.890]      Hidradenitis suppurativa [L73.2]    Location: [place] [address] / [facility]    Providers: BORIS Mendoza MD            Patient is being evaluated for comorbid conditions of insomnia, obesity, former tobacco use    Ms. Curtis has a history of hidradenitis suppurativa. She is s/p wound excision with right muscle sparing latissimus musculocutaneous flap 2/2024. She is now scheduled for the above procedure.     History is obtained from the patient and chart review    Hx of abnormal bleeding or anti-platelet use: denies    Menstrual history: No LMP recorded. Patient is perimenopausal.    Past Medical History  Past Medical History:   Diagnosis Date    NO ACTIVE PROBLEMS        Past Surgical History  Past Surgical History:   Procedure Laterality Date    BUNIONECTOMY      x2    COLONOSCOPY N/A 2/12/2025    Procedure: COLONOSCOPY, WITH POLYPECTOMY;  Surgeon: Garima Patino MD;  Location: [facility] OR    GRAFT FLAP PEDICLE EXTREMITY (LOCATION) Right 2/29/2024    Procedure: Right axillary wound excision,Right muscle-sparing Latissimus musculo-cutaneous flap, SPY;  Surgeon: BORIS Mendoza MD;  Location: [facility] OR    IRRIGATION AND DEBRIDEMENT HAND, COMBINED Right 6/13/2024     Procedure: IRRIGATION AND DEBRIDEMENT, RIGHT INDEX FINGER;  Surgeon: Brian Blackwell MD;  Location: [facility] OR    OTHER SURGICAL HISTORY      right pointer finger foreign body removal       Prior to Admission Medications  Current Outpatient Medications   Medication Sig Dispense Refill    Adalimumab 80 MG/0.8ML AJKT Inject 80 mg subcutaneously every 7 days. Dispense 4 pens = 3.2 mL for 28 day supply. 4 each 11    calcium carbonate (OS-MERCY) 500 MG TABS Take 1 tablet by mouth 2 times daily (with meals)      camphor-menthol (DERMASARRA) 0.5-0.5 % external lotion Apply to itchy areas on buttocks every 4-6 hrs as needed for itching 59 mL 5    celecoxib (CELEBREX) 200 MG capsule Take 1 capsule (200 mg) by mouth 2 times daily as needed for moderate pain. 180 capsule 1    clobetasol propionate (TEMOVATE) 0.05 % external cream Apply topically 2 times daily. 60 g 0    clotrimazole (LOTRIMIN) 1 % external cream Apply topically 2 times daily. 30 g 1    dapsone (ACZONE) 100 MG tablet Take 1 tablet (100 mg) by mouth daily. (Patient not taking: Reported on 4/15/2025) 30 tablet 0    dapsone (ACZONE) 100 MG tablet Take 1 tablet (100 mg) by mouth daily. 30 tablet 0    DULoxetine (CYMBALTA) 30 MG capsule 90mg daily 270 capsule 3    fexofenadine (ALLEGRA) 180 MG tablet Take 1 tablet (180 mg) by mouth daily. 90 tablet 4    hydrOXYzine HCl (ATARAX) 25 MG tablet Take 1-2 tablets (25-50 mg) by mouth 3 times daily as needed for itching. 60 tablet 1    medical cannabis (Patient's own supply) See Admin Instructions (The purpose of this order is to document that the patient reports taking medical cannabis.  This is not a prescription, and is not used to certify that the patient has a qualifying medical condition.)      naloxone (NARCAN) 4 MG/0.1ML nasal spray Spray 1 spray (4 mg) into one nostril alternating nostrils as needed for opioid reversal. every 2-3 minutes until assistance arrives 0.2 mL 0    oxyCODONE-acetaminophen  (PERCOCET) 5-325 MG tablet Take 1-2 tablets by mouth every 6 hours as needed for severe pain. Take 1-2 percocet every 6-8 hrs as needed for pain. May fill/start now. 30 tablet 0    polyethylene glycol (MIRALAX) 17 GM/Dose powder Take 17 g by mouth 2 times daily      pregabalin (LYRICA) 150 MG capsule Take 1 capsule (150 mg) by mouth 2 times daily. 180 capsule 1    pregabalin (LYRICA) 75 MG capsule Start 75 mg in afternoon x 1 week, then increase to 150 mg. Continue 150 mg in morning and at bedtime. (Patient not taking: Reported on 4/15/2025) 7 capsule 0    Resorcinol POWD Resorcinol 15% in vanicream twice a day (Patient taking differently: Resorcinol 15% in vanicream twice a day PRN) 30 g 11    semaglutide-weight management (WEGOVY) 0.25 MG/0.5ML pen Inject 0.5 mLs (0.25 mg) subcutaneously once a week. 2 mL 0    traZODone (DESYREL) 150 MG tablet Take 1.5 tablets (225 mg) by mouth at bedtime. 135 tablet 3    Vitamin D3 50 mcg (2000 units) tablet Take 1 tablet (50 mcg) by mouth daily 90 tablet 3       Allergies  Allergies   Allergen Reactions    Penicillins Hives, Itching and Rash     Reaction occurred as child.       Social History  Social History     Socioeconomic History    Marital status: Single     Spouse name: Not on file    Number of children: 0    Years of education: Not on file    Highest education level: Not on file   Occupational History    Occupation:    Tobacco Use    Smoking status: Former     Current packs/day: 0.00     Average packs/day: 0.5 packs/day for 20.0 years (10.0 ttl pk-yrs)     Types: Cigarettes     Start date: 1/3/2004     Quit date: 1/3/2024     Years since quittin.3    Smokeless tobacco: Never   Substance and Sexual Activity    Alcohol use: Not Currently    Drug use: Yes     Types: Marijuana     Comment: Medical marijuana    Sexual activity: Not on file   Other Topics Concern    Parent/sibling w/ CABG, MI or angioplasty before 65F 55M? Not Asked   Social History Narrative     Not on file     Social Drivers of Health     Financial Resource Strain: Low Risk  (8/9/2024)    Financial Resource Strain     Within the past 12 months, have you or your family members you live with been unable to get utilities (heat, electricity) when it was really needed?: No   Food Insecurity: Low Risk  (8/9/2024)    Food Insecurity     Within the past 12 months, did you worry that your food would run out before you got money to buy more?: No     Within the past 12 months, did the food you bought just not last and you didn t have money to get more?: No   Transportation Needs: Low Risk  (8/9/2024)    Transportation Needs     Within the past 12 months, has lack of transportation kept you from medical appointments, getting your medicines, non-medical meetings or appointments, work, or from getting things that you need?: No   Physical Activity: Insufficiently Active (8/9/2024)    Exercise Vital Sign     Days of Exercise per Week: 3 days     Minutes of Exercise per Session: 20 min   Stress: No Stress Concern Present (8/9/2024)    [place] [employer] - Occupational Stress Questionnaire     Feeling of Stress : Only a little   Social Connections: Unknown (8/9/2024)    Social Connection and Isolation Panel [NHANES]     Frequency of Communication with Friends and Family: Not on file     Frequency of Social Gatherings with Friends and Family: Once a week     Attends Sabianist Services: Not on file     Active Member of Clubs or Organizations: Not on file     Attends Club or Organization Meetings: Not on file     Marital Status: Not on file   Interpersonal Safety: Low Risk  (2/12/2025)    Interpersonal Safety     Do you feel physically and emotionally safe where you currently live?: Yes     Within the past 12 months, have you been hit, slapped, kicked or otherwise physically hurt by someone?: No     Within the past 12 months, have you been humiliated or emotionally abused in other ways by your partner  or ex-partner?: No   Housing Stability: Low Risk  (8/9/2024)    Housing Stability     Do you have housing? : Yes     Are you worried about losing your housing?: No       Family History  Family History   Problem Relation Age of Onset    Diabetes Sister     Hyperlipidemia Sister     Breast Cancer Mother     Arthritis Mother     Myocardial Infarction Father 47       Review of Systems  The complete review of systems is negative other than noted in the HPI or here.   Anesthesia Evaluation   Pt has had prior anesthetic. Type: General and Regional.    History of anesthetic complications   reports he woke during general in the past.    ROS/MED HX  ENT/Pulmonary:     (+)     FEDERICA risk factors,   obese,        tobacco use (Quit 1/22/24), Past use,                       Neurologic:  - neg neurologic ROS  (-) no seizures and no CVA   Cardiovascular:    (-) taking anticoagulants/antiplatelets   METS/Exercise Tolerance: >4 METS Comment: Walking, moving. Recently using treadmill. Denies CP or ANGUIANO    Hematologic:  - neg hematologic  ROS     Musculoskeletal: Comment: Bunionectomy, b/l,  at age ~10  Left redo bunionectomy in early 20's with GA      GI/Hepatic:     (+) GERD (intermittent), Asymptomatic on medication,                  Renal/Genitourinary:  - neg Renal ROS     Endo:     (+)               Obesity,       Psychiatric/Substance Use: Comment: Insomnia    (+) psychiatric history (stable) depression and anxiety    (-) alcohol abuse history   Infectious Disease:  - neg infectious disease ROS     Malignancy:  - neg malignancy ROS     Other:  - neg other ROS    (+)  , H/O Chronic Pain,         Virtual visit -  No vitals were obtained    Physical Exam  Constitutional: Awake, alert, cooperative, no apparent distress, and appears stated age.  HENT: Normocephalic  Respiratory: non labored breathing   Neurologic: Awake, alert, oriented to name, place and time.   Neuropsychiatric: Calm, cooperative. Normal affect.      Prior  "Labs/Diagnostic Studies   All labs and imaging personally reviewed     EKG/ stress test - if available please see in ROS above   No results found.       No data to display                  The patient's records and results personally reviewed by this provider.     Outside records reviewed from: Care Everywhere      Assessment    Klarissa Curtis is a 50 year old female seen as a PAC referral for risk assessment and optimization for anesthesia.    Plan/Recommendations  Pt will be optimized for the proposed procedure.  See below for details on the assessment, risk, and preoperative recommendations    NEUROLOGY  - No history of TIA, CVA or seizure  -chronic pain, denies current opioid use  -Post Op delirium risk factors:  No risk identified    ENT  - No current airway concerns.  Will need to be reassessed day of surgery.  Mallampati: Unable to assess  TM: Unable to assess    CARDIAC  - No history of CAD, Hypertension, and Afib  -denies cardiac symptoms   - METS (Metabolic Equivalents)  Patient performs 4 or more METS exercise without symptoms             Total Score: 0      RCRI-Very low risk: Class 1 0.4% complication rate             Total Score: 0        PULMONARY  FEDERICA Low Risk             Total Score: 1    FEDERICA: BMI over 35 kg/m2      - Denies asthma or inhaler use  - Tobacco History    History   Smoking Status    Former    Types: Cigarettes   Smokeless Tobacco    Never       GI  Endorses intermittent GERD  PONV High Risk  Total Score: 3           1 AN PONV: Pt is Female    1 AN PONV: Patient is not a current smoker    1 AN PONV: Intended Post Op Opioids        /RENAL  - Baseline Creatinine  WNL    ENDOCRINE    - BMI: Estimated body mass index is 35.1 kg/m  as calculated from the following:    Height as of 9/11/24: 1.695 m (5' 6.73\").    Weight as of 4/15/25: 100.8 kg (222 lb 4.8 oz).  Obesity (BMI >30)  -ill hold wegovy x7 days prior to procedure   - No history of Diabetes Mellitus    HEME  VTE Low Risk 0.26%   "           Total Score: 0      - No history of abnormal bleeding or antiplatelet use.      Different anesthesia methods/types have been discussed with the patient, but they are aware that the final plan will be decided by the assigned anesthesia provider on the date of service.    The patient is optimized for their procedure. AVS with information on surgery time/arrival time, meds and NPO status given by nursing staff. No further diagnostic testing indicated.    Please refer to the physical examination documented by the anesthesiologist in the anesthesia record on the day of surgery.    Video-Visit Details    Type of service:  Video Visit    Provider received verbal consent for a Video Visit from the patient? Yes     Originating Location (pt. Location): Home    Distant Location (provider location):  Off-site  Mode of Communication:  Video Conference via [employer]  On the day of service:     Prep time: 3 minutes  Visit time: 9 minutes  Documentation time: 4 minutes  ------------------------------------------  Total time: 16 minutes      Gemma Bowman PA-C  Preoperative Assessment Center  [facility]  [facility]  Phone: [phone]  Fax: [phone]

## 2025-05-20 ASSESSMENT — LIFESTYLE VARIABLES: TOBACCO_USE: 1

## 2025-05-20 ASSESSMENT — ENCOUNTER SYMPTOMS: SEIZURES: 0

## 2025-05-21 ENCOUNTER — ANESTHESIA (OUTPATIENT)
Dept: SURGERY | Facility: AMBULATORY SURGERY CENTER | Age: 50
End: 2025-05-21
Payer: COMMERCIAL

## 2025-05-21 ENCOUNTER — HOSPITAL ENCOUNTER (OUTPATIENT)
Facility: AMBULATORY SURGERY CENTER | Age: 50
Discharge: HOME | End: 2025-05-21
Attending: PLASTIC SURGERY
Payer: COMMERCIAL

## 2025-05-21 VITALS
TEMPERATURE: 97.6 F | RESPIRATION RATE: 14 BRPM | HEART RATE: 73 BPM | HEIGHT: 67 IN | WEIGHT: 212 LBS | SYSTOLIC BLOOD PRESSURE: 113 MMHG | DIASTOLIC BLOOD PRESSURE: 68 MMHG | OXYGEN SATURATION: 96 % | BODY MASS INDEX: 33.27 KG/M2

## 2025-05-21 DIAGNOSIS — Z98.890 S/P FLAP GRAFT: Primary | ICD-10-CM

## 2025-05-21 PROCEDURE — 88305 TISSUE EXAM BY PATHOLOGIST: CPT | Mod: TC | Performed by: PLASTIC SURGERY

## 2025-05-21 PROCEDURE — 88305 TISSUE EXAM BY PATHOLOGIST: CPT | Mod: 26 | Performed by: STUDENT IN AN ORGANIZED HEALTH CARE EDUCATION/TRAINING PROGRAM

## 2025-05-21 RX ORDER — ONDANSETRON 2 MG/ML
4 INJECTION INTRAMUSCULAR; INTRAVENOUS EVERY 30 MIN PRN
Status: DISCONTINUED | OUTPATIENT
Start: 2025-05-21 | End: 2025-05-21 | Stop reason: HOSPADM

## 2025-05-21 RX ORDER — ONDANSETRON 2 MG/ML
INJECTION INTRAMUSCULAR; INTRAVENOUS PRN
Status: DISCONTINUED | OUTPATIENT
Start: 2025-05-21 | End: 2025-05-21

## 2025-05-21 RX ORDER — ONDANSETRON 4 MG/1
4 TABLET, ORALLY DISINTEGRATING ORAL EVERY 30 MIN PRN
Status: DISCONTINUED | OUTPATIENT
Start: 2025-05-21 | End: 2025-05-21 | Stop reason: HOSPADM

## 2025-05-21 RX ORDER — PROPOFOL 10 MG/ML
INJECTION, EMULSION INTRAVENOUS CONTINUOUS PRN
Status: DISCONTINUED | OUTPATIENT
Start: 2025-05-21 | End: 2025-05-21

## 2025-05-21 RX ORDER — DEXAMETHASONE SODIUM PHOSPHATE 4 MG/ML
INJECTION, SOLUTION INTRA-ARTICULAR; INTRALESIONAL; INTRAMUSCULAR; INTRAVENOUS; SOFT TISSUE PRN
Status: DISCONTINUED | OUTPATIENT
Start: 2025-05-21 | End: 2025-05-21

## 2025-05-21 RX ORDER — OXYCODONE HYDROCHLORIDE 5 MG/1
5-10 TABLET ORAL EVERY 6 HOURS PRN
Qty: 8 TABLET | Refills: 0 | Status: SHIPPED | OUTPATIENT
Start: 2025-05-21

## 2025-05-21 RX ORDER — AMOXICILLIN 250 MG
1-2 CAPSULE ORAL 2 TIMES DAILY
Qty: 30 TABLET | Refills: 0 | Status: SHIPPED | OUTPATIENT
Start: 2025-05-21

## 2025-05-21 RX ORDER — LABETALOL HYDROCHLORIDE 5 MG/ML
10 INJECTION, SOLUTION INTRAVENOUS
Status: DISCONTINUED | OUTPATIENT
Start: 2025-05-21 | End: 2025-05-21 | Stop reason: HOSPADM

## 2025-05-21 RX ORDER — ONDANSETRON 4 MG/1
4 TABLET, ORALLY DISINTEGRATING ORAL EVERY 30 MIN PRN
Status: DISCONTINUED | OUTPATIENT
Start: 2025-05-21 | End: 2025-05-22 | Stop reason: HOSPADM

## 2025-05-21 RX ORDER — ACETAMINOPHEN 325 MG/1
975 TABLET ORAL ONCE
Status: COMPLETED | OUTPATIENT
Start: 2025-05-21 | End: 2025-05-21

## 2025-05-21 RX ORDER — PROPOFOL 10 MG/ML
INJECTION, EMULSION INTRAVENOUS PRN
Status: DISCONTINUED | OUTPATIENT
Start: 2025-05-21 | End: 2025-05-21

## 2025-05-21 RX ORDER — ONDANSETRON 4 MG/1
4 TABLET, ORALLY DISINTEGRATING ORAL EVERY 8 HOURS PRN
Qty: 4 TABLET | Refills: 0 | Status: SHIPPED | OUTPATIENT
Start: 2025-05-21

## 2025-05-21 RX ORDER — CEFAZOLIN SODIUM 2 G/50ML
2 SOLUTION INTRAVENOUS SEE ADMIN INSTRUCTIONS
Status: DISCONTINUED | OUTPATIENT
Start: 2025-05-21 | End: 2025-05-21 | Stop reason: HOSPADM

## 2025-05-21 RX ORDER — LIDOCAINE 40 MG/G
CREAM TOPICAL
Status: DISCONTINUED | OUTPATIENT
Start: 2025-05-21 | End: 2025-05-21 | Stop reason: HOSPADM

## 2025-05-21 RX ORDER — FENTANYL CITRATE 50 UG/ML
50 INJECTION, SOLUTION INTRAMUSCULAR; INTRAVENOUS EVERY 5 MIN PRN
Status: DISCONTINUED | OUTPATIENT
Start: 2025-05-21 | End: 2025-05-21 | Stop reason: HOSPADM

## 2025-05-21 RX ORDER — SODIUM CHLORIDE, SODIUM LACTATE, POTASSIUM CHLORIDE, CALCIUM CHLORIDE 600; 310; 30; 20 MG/100ML; MG/100ML; MG/100ML; MG/100ML
INJECTION, SOLUTION INTRAVENOUS CONTINUOUS PRN
Status: DISCONTINUED | OUTPATIENT
Start: 2025-05-21 | End: 2025-05-21

## 2025-05-21 RX ORDER — SODIUM CHLORIDE, SODIUM LACTATE, POTASSIUM CHLORIDE, CALCIUM CHLORIDE 600; 310; 30; 20 MG/100ML; MG/100ML; MG/100ML; MG/100ML
INJECTION, SOLUTION INTRAVENOUS CONTINUOUS
Status: DISCONTINUED | OUTPATIENT
Start: 2025-05-21 | End: 2025-05-21 | Stop reason: HOSPADM

## 2025-05-21 RX ORDER — ONDANSETRON 2 MG/ML
4 INJECTION INTRAMUSCULAR; INTRAVENOUS EVERY 30 MIN PRN
Status: DISCONTINUED | OUTPATIENT
Start: 2025-05-21 | End: 2025-05-22 | Stop reason: HOSPADM

## 2025-05-21 RX ORDER — OXYCODONE HYDROCHLORIDE 5 MG/1
10 TABLET ORAL
Status: DISCONTINUED | OUTPATIENT
Start: 2025-05-21 | End: 2025-05-22 | Stop reason: HOSPADM

## 2025-05-21 RX ORDER — DEXAMETHASONE SODIUM PHOSPHATE 10 MG/ML
4 INJECTION, SOLUTION INTRAMUSCULAR; INTRAVENOUS
Status: DISCONTINUED | OUTPATIENT
Start: 2025-05-21 | End: 2025-05-21 | Stop reason: HOSPADM

## 2025-05-21 RX ORDER — NALOXONE HYDROCHLORIDE 0.4 MG/ML
0.1 INJECTION, SOLUTION INTRAMUSCULAR; INTRAVENOUS; SUBCUTANEOUS
Status: DISCONTINUED | OUTPATIENT
Start: 2025-05-21 | End: 2025-05-22 | Stop reason: HOSPADM

## 2025-05-21 RX ORDER — HYDROMORPHONE HYDROCHLORIDE 1 MG/ML
0.2 INJECTION, SOLUTION INTRAMUSCULAR; INTRAVENOUS; SUBCUTANEOUS EVERY 5 MIN PRN
Status: DISCONTINUED | OUTPATIENT
Start: 2025-05-21 | End: 2025-05-21 | Stop reason: HOSPADM

## 2025-05-21 RX ORDER — NALOXONE HYDROCHLORIDE 0.4 MG/ML
0.1 INJECTION, SOLUTION INTRAMUSCULAR; INTRAVENOUS; SUBCUTANEOUS
Status: DISCONTINUED | OUTPATIENT
Start: 2025-05-21 | End: 2025-05-21 | Stop reason: HOSPADM

## 2025-05-21 RX ORDER — FENTANYL CITRATE 50 UG/ML
25 INJECTION, SOLUTION INTRAMUSCULAR; INTRAVENOUS EVERY 5 MIN PRN
Status: DISCONTINUED | OUTPATIENT
Start: 2025-05-21 | End: 2025-05-21 | Stop reason: HOSPADM

## 2025-05-21 RX ORDER — BUPIVACAINE HYDROCHLORIDE 5 MG/ML
INJECTION, SOLUTION PERINEURAL PRN
Status: DISCONTINUED | OUTPATIENT
Start: 2025-05-21 | End: 2025-05-21 | Stop reason: HOSPADM

## 2025-05-21 RX ORDER — OXYCODONE HYDROCHLORIDE 5 MG/1
5 TABLET ORAL
Status: COMPLETED | OUTPATIENT
Start: 2025-05-21 | End: 2025-05-21

## 2025-05-21 RX ORDER — CEFAZOLIN SODIUM 2 G/50ML
2 SOLUTION INTRAVENOUS
Status: COMPLETED | OUTPATIENT
Start: 2025-05-21 | End: 2025-05-21

## 2025-05-21 RX ORDER — LIDOCAINE HYDROCHLORIDE 20 MG/ML
INJECTION, SOLUTION INFILTRATION; PERINEURAL PRN
Status: DISCONTINUED | OUTPATIENT
Start: 2025-05-21 | End: 2025-05-21

## 2025-05-21 RX ORDER — DEXAMETHASONE SODIUM PHOSPHATE 10 MG/ML
4 INJECTION, SOLUTION INTRAMUSCULAR; INTRAVENOUS
Status: DISCONTINUED | OUTPATIENT
Start: 2025-05-21 | End: 2025-05-22 | Stop reason: HOSPADM

## 2025-05-21 RX ORDER — HYDROMORPHONE HYDROCHLORIDE 1 MG/ML
0.4 INJECTION, SOLUTION INTRAMUSCULAR; INTRAVENOUS; SUBCUTANEOUS EVERY 5 MIN PRN
Status: DISCONTINUED | OUTPATIENT
Start: 2025-05-21 | End: 2025-05-21 | Stop reason: HOSPADM

## 2025-05-21 RX ORDER — FENTANYL CITRATE 50 UG/ML
INJECTION, SOLUTION INTRAMUSCULAR; INTRAVENOUS PRN
Status: DISCONTINUED | OUTPATIENT
Start: 2025-05-21 | End: 2025-05-21

## 2025-05-21 RX ADMIN — DEXAMETHASONE SODIUM PHOSPHATE 4 MG: 4 INJECTION, SOLUTION INTRA-ARTICULAR; INTRALESIONAL; INTRAMUSCULAR; INTRAVENOUS; SOFT TISSUE at 12:34

## 2025-05-21 RX ADMIN — ONDANSETRON 4 MG: 2 INJECTION INTRAMUSCULAR; INTRAVENOUS at 13:17

## 2025-05-21 RX ADMIN — SODIUM CHLORIDE, SODIUM LACTATE, POTASSIUM CHLORIDE, CALCIUM CHLORIDE: 600; 310; 30; 20 INJECTION, SOLUTION INTRAVENOUS at 12:28

## 2025-05-21 RX ADMIN — Medication 50 MG: at 12:32

## 2025-05-21 RX ADMIN — FENTANYL CITRATE 25 MCG: 50 INJECTION, SOLUTION INTRAMUSCULAR; INTRAVENOUS at 14:01

## 2025-05-21 RX ADMIN — LIDOCAINE HYDROCHLORIDE 100 MG: 20 INJECTION, SOLUTION INFILTRATION; PERINEURAL at 12:32

## 2025-05-21 RX ADMIN — PROPOFOL 30 MG: 10 INJECTION, EMULSION INTRAVENOUS at 13:17

## 2025-05-21 RX ADMIN — FENTANYL CITRATE 100 MCG: 50 INJECTION, SOLUTION INTRAMUSCULAR; INTRAVENOUS at 12:32

## 2025-05-21 RX ADMIN — FENTANYL CITRATE 25 MCG: 50 INJECTION, SOLUTION INTRAMUSCULAR; INTRAVENOUS at 13:43

## 2025-05-21 RX ADMIN — FENTANYL CITRATE 25 MCG: 50 INJECTION, SOLUTION INTRAMUSCULAR; INTRAVENOUS at 13:55

## 2025-05-21 RX ADMIN — CEFAZOLIN SODIUM 2 G: 2 SOLUTION INTRAVENOUS at 12:22

## 2025-05-21 RX ADMIN — PROPOFOL 200 MG: 10 INJECTION, EMULSION INTRAVENOUS at 12:32

## 2025-05-21 RX ADMIN — OXYCODONE HYDROCHLORIDE 5 MG: 5 TABLET ORAL at 13:47

## 2025-05-21 RX ADMIN — PROPOFOL 200 MCG/KG/MIN: 10 INJECTION, EMULSION INTRAVENOUS at 12:34

## 2025-05-21 NOTE — ANESTHESIA PROCEDURE NOTES
Airway       Patient location during procedure: OR       Procedure Start/Stop Times: 5/21/2025 12:35 PM  Staff -        CRNA: Graciela Zendejas APRN CRNA       Performed By: CRNA  Consent for Airway        Urgency: elective  Indications and Patient Condition       Indications for airway management: deion-procedural       Induction type:intravenous       Mask difficulty assessment: 1 - vent by mask    Final Airway Details       Final airway type: endotracheal airway       Successful airway: ETT - single and Oral  Endotracheal Airway Details        ETT size (mm): 7.0       Cuffed: yes       Successful intubation technique: direct laryngoscopy       DL Blade Type: MAC 3       Grade View of Cords: 1       Adjucts: stylet       Position: Left       Measured from: lips       Secured at (cm): 22       Bite block used: Soft    Post intubation assessment        Placement verified by: capnometry and chest rise        Number of attempts at approach: 1       Number of other approaches attempted: 0       Secured with: tape       Ease of procedure: easy       Dentition: Intact and Unchanged    Medication(s) Administered   Medication Administration Time: 5/21/2025 12:35 PM

## 2025-05-21 NOTE — ANESTHESIA PREPROCEDURE EVALUATION
Anesthesia Pre-Procedure Evaluation    Patient: Klarissa Curtis   MRN: [id number] : 1975          Procedure : Procedure(s):  Right axillary flap revision and thinning         Past Medical History:   Diagnosis Date    Hidradenitis suppurativa     Insomnia     NO ACTIVE PROBLEMS       Past Surgical History:   Procedure Laterality Date    BUNIONECTOMY      x2    COLONOSCOPY N/A 2025    Procedure: COLONOSCOPY, WITH POLYPECTOMY;  Surgeon: Garima Patino MD;  Location: [facility] OR    GRAFT FLAP PEDICLE EXTREMITY (LOCATION) Right 2024    Procedure: Right axillary wound excision,Right muscle-sparing Latissimus musculo-cutaneous flap, SPY;  Surgeon: BORIS Mendoza MD;  Location: [facility] OR    IRRIGATION AND DEBRIDEMENT HAND, COMBINED Right 2024    Procedure: IRRIGATION AND DEBRIDEMENT, RIGHT INDEX FINGER;  Surgeon: Brian Blackwell MD;  Location: [facility]    OTHER SURGICAL HISTORY      right pointer finger foreign body removal      Allergies   Allergen Reactions    Penicillins Hives, Itching and Rash     Reaction occurred as child.      Social History     Tobacco Use    Smoking status: Former     Current packs/day: 0.00     Average packs/day: 0.5 packs/day for 20.0 years (10.0 ttl pk-yrs)     Types: Cigarettes     Start date: 1/3/2004     Quit date: 1/3/2024     Years since quittin.3     Passive exposure: Past    Smokeless tobacco: Never   Substance Use Topics    Alcohol use: Not Currently      Wt Readings from Last 1 Encounters:   25 100.7 kg (222 lb)        Anesthesia Evaluation   Pt has had prior anesthetic. Type: General and Regional.    History of anesthetic complications   reports he woke during general in the past.    ROS/MED HX  ENT/Pulmonary:     (+)     FEDERICA risk factors,   obese,        tobacco use (Quit 23), Past use,                       Neurologic:  - neg neurologic ROS  (-) no seizures and no CVA   Cardiovascular:    (-) taking  anticoagulants/antiplatelets   METS/Exercise Tolerance: >4 METS Comment: Walking, moving. Recently using treadmill. Denies CP or ANGUIANO    Hematologic:  - neg hematologic  ROS     Musculoskeletal: Comment: Bunionectomy, b/l,  at age ~10  Left redo bunionectomy in early 20's with GA    Hidradenitis suppurativa. She is s/p wound excision with right muscle sparing latissimus musculocutaneous flap 2/2024. Causes chronic pain, takes Percocet. Planning right axillary flap thinning and recontouring      GI/Hepatic:     (+) GERD (intermittent), Asymptomatic on medication,                  Renal/Genitourinary:  - neg Renal ROS     Endo:     (+)               Obesity,       Psychiatric/Substance Use: Comment: Insomnia    (+) psychiatric history (stable) depression and anxiety    (-) alcohol abuse history   Infectious Disease:  - neg infectious disease ROS     Malignancy:  - neg malignancy ROS     Other:  - neg other ROS    (+)  , H/O Chronic Pain,           Physical Exam  Airway  Mallampati: II  TM distance: >3 FB  Neck ROM: full  Upper bite lip test: II  Mouth opening: >= 4 cm    Cardiovascular - normal exam  Rhythm: regular  Rate: normal rate     Dental   (+) Multiple crowns, permanent bridges        Pulmonary - normal examBreath sounds clear to auscultation        Neurological - normal exam  She appears awake, alert and oriented x3.    Other Findings       OUTSIDE LABS:  CBC:   Lab Results   Component Value Date    WBC 8.1 04/18/2025    WBC 9.9 04/10/2025    HGB 14.3 04/18/2025    HGB 13.9 04/10/2025    HCT 42.5 04/18/2025    HCT 43.6 04/10/2025     04/18/2025     04/10/2025     BMP:   Lab Results   Component Value Date     06/24/2024     06/12/2024    POTASSIUM 3.9 06/24/2024    POTASSIUM 3.8 06/12/2024    CHLORIDE 102 06/24/2024    CHLORIDE 104 06/12/2024    CO2 23 06/24/2024    CO2 27 06/12/2024    BUN 8.6 06/24/2024    BUN 15.1 06/12/2024    CR 0.59 06/24/2024    CR 0.64 06/15/2024     (H)  "06/24/2024    GLC 75 06/12/2024     COAGS: No results found for: \"PTT\", \"INR\", \"FIBR\"  POC:   Lab Results   Component Value Date    HCG Negative 02/12/2025     HEPATIC:   Lab Results   Component Value Date    ALBUMIN 3.9 06/24/2024    PROTTOTAL 7.9 06/24/2024    ALT 22 06/24/2024    AST 20 06/24/2024    ALKPHOS 83 06/24/2024    BILITOTAL 0.4 06/24/2024     OTHER:   Lab Results   Component Value Date    A1C 5.3 11/21/2019    MERCY 9.4 06/24/2024    TSH 1.10 08/09/2024    T4 1.09 03/20/2020    SED 24 06/12/2024       Anesthesia Plan    ASA Status:  2      NPO Status: NPO Appropriate   Anesthesia Type: General.  Airway: oral.  Induction: intravenous.  Maintenance: TIVA.   Techniques and Equipment:       - Monitoring Plan: standard ASA monitoring     Consents    Anesthesia Plan(s) and associated risks, benefits, and realistic alternatives discussed. Questions answered and patient/representative(s) expressed understanding.     - Discussed: CRNA, surgeon     - Discussed with:  Patient        - Pt is DNR/DNI Status: no DNR     Blood Consent:      - Discussed with: not discussed.     Postoperative Care    Pain management: non-narcotic analgesics, plan for postoperative opioid use, multimodal analgesia.     Comments:                   Sven Quintana MD    I have reviewed the pertinent notes and labs in the chart from the past 30 days and (re)examined the patient.  Any updates or changes from those notes are reflected in this note.    Clinically Significant Risk Factors Present on Admission                             # Obesity: Estimated body mass index is 35.05 kg/m  as calculated from the following:    Height as of 5/7/25: 1.695 m (5' 6.73\").    Weight as of 5/7/25: 100.7 kg (222 lb).       # Financial/Environmental Concerns:                 "

## 2025-05-21 NOTE — DISCHARGE INSTRUCTIONS
SCAR REVISION POST-OPERATIVE INSTRUCTIONS    Instructions      Have someone drive you home after surgery and help you at home for 1-2      days.     Get plenty of rest.     Follow balanced diet.     Decreased activity may promote constipation, so you may want to add      more raw fruit to your diet, and be sure to increase fluid intake.     Take pain medication as prescribed. Do not take aspirin or any products      containing aspirin.     Do not drink alcohol when taking pain medications.     Even when not taking pain medications, no alcohol for 3 weeks as it      causes fluid retention.     If you are taking vitamins with iron, resume these as tolerated.     Do not smoke, as smoking delays healing and increases the risk of      complications.    Activities     Do not drive until you are no longer taking any pain medications      (narcotics).     Start walking as soon as possible, this helps to reduce swelling and       lowers the chance of blood clots.     Resume normal activities gradually.     Return to work in 1-2 days, depending upon extent of surgery     No strenuous work or stress on wound for 2-3 weeks.    Incision Care     You will have tape and glue over the incisions thus being waterproof.  You may shower from tomorrow.  You may have stitches that need to come out in 2 to 3 weeks.  If the drain is used you may have to empty it twice daily and record the output.     Avoid exposing scars to sun for at least 12 months.     Always use a strong sunblock, if sun exposure is unavoidable (SPF 50 or      greater).     Inspect daily for signs of infection.     No tub soaking, bathing, or swimming while sutures or drains are in place.     Keep area clean and dry for first 24 hours.     What to Expect     Some swelling and bruising.     May have slight bleeding from incision.  Apply 4 x 4 gauze with slight pressure to       control bleeding.     Skin grafts and flaps may take several weeks or months to heal; a  support garment or      bandage may be necessary for up to a year.    Appearance     Final results of surgery may take a year or more.    Follow-Up Care     If external sutures have been used, they will be removed in 5-14 days.    Please note my office will call you 1-2 business days after your procedure to check up on how you're doing and to schedule your post-operative appointment.        When to Call     If you have increased swelling or bruising.     If swelling and redness persist after a few days.     If you have increased redness along the incision.     If you have severe or increased pain not relieved by medication.     If you have any side effects to medications; such as, rash, nausea,      headache, vomiting.     If you have an oral temperature over 100.4 degrees.     If you have any yellowish or greenish drainage from the incisions or      notice a foul odor.     If you have bleeding from the incisions that is difficult to control with      light pressure.     If you have loss of feeling or motion.    For Medical Questions, Please Call:     [phone], Monday - Friday, 8 a.m. - 4:30 p.m.     After hours and on weekends, call Hospital Paging at [phone] and      ask for the Plastic Surgeon on call.  [facility]  Home Care Following Anesthesia  For 24 hours after surgery:  Get plenty of rest.  A responsible adult must stay with you for at least 24 hours after you leave the surgery center.  Do not drive or use heavy equipment.  If you have weakness or tingling, don't drive or use heavy equipment until this feeling goes away.   Do not drink alcohol.   Avoid strenuous or risky activities.  Ask for help when climbing stairs.  You may feel lightheaded.  IF so, sit for a few minutes before standing.  Have someone help you get up.   If you have nausea (feel sick to your stomach): Drink only clear liquids such as apple juice, ginger ale, broth or 7-Up.  Rest may also help.   Be sure to drink enough fluids.  Move to a regular diet as you feel able.   You may have a slight fever.  Call the doctor if your fever is over 100 F (37.7 C) (taken under the tongue) or lasts longer than 24 hours.  You may have a dry mouth, a sore throat, muscle aches or trouble sleeping. These should go away after 24 hours.  Do not make important or legal decisions.   It is recommended to avoid smoking.   If you use hormonal birth control (such as the pill, patch, ring or implants):  You will need a second form of birth control for 7 days (condoms, a diaphragm or contraceptive foam).  While in the surgery center, you received a medicine called Sugammadex.  Hormonal birth control (such as the pill, patch, ring or implants) will not work as well for a week after taking this medicine.         Tips for taking pain medications  To get the best pain relief possible, remember these points:  Take pain medications as directed, before pain becomes severe.  Pain medication can upset your stomach: taking it with food may help.  Constipation is a common side effect of pain medication. Drink plenty of  fluids.  Eat foods high in fiber. Take a stool softener if recommended by your doctor or pharmacist.  Do not drink alcohol, drive or operate machinery while taking pain medications.  Ask about other ways to control pain, such as with heat, ice or relaxation.    Tylenol/Acetaminophen Consumption    If you feel your pain relief is insufficient, you may take Tylenol/Acetaminophen in addition to your narcotic pain medication.   Be careful not to exceed 4,000 mg of Tylenol/Acetaminophen in a 24 hour period from all sources.  If you are taking extra strength Tylenol/acetaminophen (500 mg), the maximum dose is 8 tablets in 24 hours.  If you are taking regular strength acetaminophen (325 mg), the maximum dose is 12 tablets in 24 hours.    Call a doctor for any of the following:  Signs of infection (fever, growing tenderness at the  surgery site, a large amount of drainage or bleeding, severe pain, foul-smelling drainage, redness, swelling).  It has been over 8 to 10 hours since surgery and you are still not able to urinate (pass water).  Headache for over 24 hours.  Numbness, tingling or weakness the day after surgery (if you had spinal anesthesia).  Signs of Covid-19 infection (temperature over 100 degrees, shortness of breath, cough, loss of taste/smell, generalized body aches, persistent headache, chills, sore throat, nausea/vomiting/diarrhea)    Your doctor is:  Dr. Shaniqua Mendoza, Plastic Surgery: [phone]                    After hours and weekends call the hospital @ [phone] and ask for the resident on call for:  Plastics  For emergency care, call the:  [facility]:  [phone] (TTY for hearing impaired: [phone])

## 2025-05-21 NOTE — ANESTHESIA POSTPROCEDURE EVALUATION
Patient: Klarissa Curtis    Procedure: Procedure(s):  Right axillary flap revision and thinning       Anesthesia Type:  General    Note:  Disposition: Outpatient   Postop Pain Control: Uneventful            Sign Out: Well controlled pain   PONV: No   Neuro/Psych: Uneventful            Sign Out: Acceptable/Baseline neuro status   Airway/Respiratory: Uneventful            Sign Out: Acceptable/Baseline resp. status   CV/Hemodynamics: Uneventful            Sign Out: Acceptable CV status; No obvious hypovolemia; No obvious fluid overload   Other NRE: NONE   DID A NON-ROUTINE EVENT OCCUR? No       Last vitals:  Vitals Value Taken Time   /69 05/21/25 14:00   Temp 36.5  C (97.7  F) 05/21/25 14:02   Pulse 72 05/21/25 14:02   Resp 29 05/21/25 14:02   SpO2 94 % 05/21/25 14:02   Vitals shown include unfiled device data.    Electronically Signed By: Elizabeth Ruiz MD  May 21, 2025  2:03 PM

## 2025-05-21 NOTE — ANESTHESIA CARE TRANSFER NOTE
Patient: Klarissa Curtis    Procedure: Procedure(s):  Right axillary flap revision and thinning       Diagnosis: S/P flap graft [Z98.890]  Hidradenitis suppurativa [L73.2]  Diagnosis Additional Information: No value filed.    Anesthesia Type:   General     Note:    Oropharynx: oropharynx clear of all foreign objects and spontaneously breathing  Level of Consciousness: awake  Oxygen Supplementation: room air    Independent Airway: airway patency satisfactory and stable  Dentition: dentition unchanged  Vital Signs Stable: post-procedure vital signs reviewed and stable  Report to RN Given: handoff report given  Patient transferred to: Phase II  Comments: Report to Phase II RN  Handoff Report: Identifed the Patient, Identified the Reponsible Provider, Reviewed the pertinent medical history, Discussed the surgical course, Reviewed Intra-OP anesthesia mangement and issues during anesthesia, Set expectations for post-procedure period and Allowed opportunity for questions and acknowledgement of understanding      Vitals:  Vitals Value Taken Time   /68 05/21/25 13:35   Temp 36.3  C (97.34  F) 05/21/25 13:39   Pulse 71 05/21/25 13:39   Resp 12 05/21/25 13:39   SpO2 98 % 05/21/25 13:39   Vitals shown include unfiled device data.    Electronically Signed By: SERAFIN Davidson CRNA  May 21, 2025  1:39 PM

## 2025-05-21 NOTE — OP NOTE
PREOPERATIVE DIAGNOSIS: Right axillary stage I reconstruction s/p hidradenitis suppurativa excision and right sided muscle-sparing latissimus flap with resultant redundancy of axillary area interfering with day-to-day activities requiring stage II revision    POSTOPERATIVE DIAGNOSIS: As above    PROCEDURES:   1.  Right axillary flap stage II revision with excision of skin and subcutaneous tissue and layered closure total length 15 cm     SURGEON: Shaniqua Mendoza MD      RESIDENT: Alexandr Gonzales MD     ANESTHESIA: General anesthesia with endotracheal intubation.      COMPLICATIONS: Nil.      DRAINS: None     SPECIMENS: Right axillary tissue     BLOOD LOSS: 10 mL        DESCRIPTION OF PROCEDURE: After informed consent was taken from the patient, the proper site and procedure was ascertained with them and they were appropriately marked, they were taken to the operating room. They were placed in a supine position with their knees comfortably flexed with pillows underneath them, and pneumoboots placed and running prior to induction of anesthesia. Preoperative antibiotics were given in the OR and appropriately redosed during the case. General anesthesia was administered without any complications.  She was placed in a right lateral position and appropriately padded.  She was prepped and draped in standard surgical fashion.     I had preoperatively marked her for the excess tissue that needed to be removed.  I tailor tacked this marked out area and ensured that it could be closed primarily.  I then went ahead and excised the skin and subcutaneous tissue, ensured hemostasis, and closed the wound in layers using 2-0 Monocryl suture in a deep dermal layer followed by 3-0 Califig suture in the skin.  Surgical tape and glue was placed and the patient was wrapped.  The patient tolerated the procedure well. All counts were correct at the end of the case. The patient was extubated and sent to recovery room in stable condition.

## 2025-05-21 NOTE — LETTER
6/12/2024       RE: Klarissa Curtis  [address]  [facility] [place] [facility] [place] [address]       Dear Colleague,    Thank you for referring your patient, Klarissa Curtis, to the [facility] [place] [facility]. Please see a copy of my visit note below.    PRESENTING COMPLAINT:  Post-operative visit s/p right axillary hidradenitis suppurativa excision and right muscle-sparing latissimus musculocutaneous flap reconstruction done 2/29/2024.     HISTORY OF PRESENTING COMPLAINT: The patient is here for post-operative visit.  The patient is being seen in the presence of my nurse.      Doing much better.  Very happy with results. Healed.      ASSESSMENT AND PLAN:  Based upon the above findings, the patient is here for post-operative visit.      Plan is to see us back in 3-6 months to plan revision of her flap.      All questions were answered.  The patient was happy with the visit.          Again, thank you for allowing me to participate in the care of your patient.      Sincerely,    BORIS Mendoza MD  
No

## 2025-05-26 ENCOUNTER — MYC MEDICAL ADVICE (OUTPATIENT)
Dept: DERMATOLOGY | Facility: CLINIC | Age: 50
End: 2025-05-26
Payer: COMMERCIAL

## 2025-06-04 ENCOUNTER — OFFICE VISIT (OUTPATIENT)
Dept: PLASTIC SURGERY | Facility: CLINIC | Age: 50
End: 2025-06-04
Payer: COMMERCIAL

## 2025-06-04 VITALS
SYSTOLIC BLOOD PRESSURE: 109 MMHG | WEIGHT: 212 LBS | DIASTOLIC BLOOD PRESSURE: 71 MMHG | OXYGEN SATURATION: 97 % | HEIGHT: 67 IN | HEART RATE: 93 BPM | BODY MASS INDEX: 33.27 KG/M2

## 2025-06-04 DIAGNOSIS — L73.2 HIDRADENITIS SUPPURATIVA: ICD-10-CM

## 2025-06-04 DIAGNOSIS — Z98.890 S/P FLAP GRAFT: Primary | ICD-10-CM

## 2025-06-04 ASSESSMENT — PAIN SCALES - GENERAL: PAINLEVEL_OUTOF10: NO PAIN (0)

## 2025-06-04 NOTE — PROGRESS NOTES
[facility] Dermatology Note  Encounter Date: Jun 5, 2025  Office Visit     Start Time: 2:15p  End Time:  2:43p    Dermatology Problem List:  0. NUB, left shoulder; favoring inflamed cyst but rule out atypical keratinocytic neoplasm   - shave biopsy 6/5/25  1. Hidradenitis suppurativa: diagnosed age 11,   - Current: Humira 80mg weekly, oxycodone, resorcinol, dapsone 100 mg daily, clobetasol 0.05% ointment  - s/p right axillary excision and right muscle-sparing latissimus musculocutaneous flap reconstruction 2/29/2024; revision and thinning 5/21/25.   - Prior: gabapentin, nfliximab (1 dose and got charged), prednisone taper, clobetasol, topical morphine, ILK, oral antibiotics, rifampin, sirolimus 2 mg (started 4/22/21) finasteride, spironolactone (6/2019- 9/5/19, stopped due to abnormal uterine bleeding), topical morphine gel, and percocet for severe pain, Cosentyx, belbuca  2. Keratosis pilaris  3. L olecranon bursitis.   4. Dermatographism  - Tx: fexofenadine 360 mg BID  5. Eruption NOS. Both resolved.  - ears, upper chest, and back, responded to Lidex. Started ~9/28/23; resolved ~1/11/24.  - punch biopsy 8/5/21: superficial and deep perivascular and interstitial infiltrate of neutrophils, with focal leukocytoclasis and bluish, amorphous material suggestive of neutrophil degranulation, and lesser amounts of eosinophils and lymphocytes. Background dermal edema is noted. Focal neutrophilic epitheliotropism is noted.  6. Pink linear patches, ddx: follicular dermatitis vs lichen spinulosus vs KP, posterior shoulders   - amlactin, lidex   7. Seborrheic dermatitis  SHx: [place]     ____________________________________________    Assessment & Plan:  # Hidradenitis suppurativa, chronic.               - s/p Right axillary flap revision and thinning - Right 5/21/2025 with Dr. Mendoza    - continue  hydroxyzine 25 mg-take 1-2 tablets as needed for  itching   - start mupirocin 2% ointment-apply to left shoulder twice a day until resolved              - Continue Adalimumab 80 mg weekly for now  - Continue calcium 1200 mg and vitamin D 2000    - Continue Resorcinol BID. Do not apply to open wounds.               - Clobetasol 0.05% ointment BID prn flaring              - On semaglutide (down 35 lbs)              - Increase dapsone  to 200 mg daily  (CBC q2 weeks for a month then monthly)    - Bacterial culture done today on the left shoulder   - ILK injection done today    # Neoplasm of unspecified behavior of the skin (D49.2) on the left shoulder. The differential diagnosis includes favoring inflamed cyst but rule out atypical keratinocytic neoplasm. .   - Shave biopsy performed today (see procedure note(s) below).     The longitudinal plan of care for the diagnosis(es)/condition(s) as documented were addressed during this visit. Due to the added complexity in care, I will continue to support Maura in the subsequent management and with ongoing continuity of care      Procedures Performed:   - Intra-lesional triamcinolone procedure note. After verbal consent and review of risk of pain and skin thinning/atrophy, positioning and cleansing with isopropyl alcohol, 40 total mL of triamcinolone 10 mg/mL was injected into  nodule (s) on the back. The patient tolerated the procedure well and left the dermatology clinic in good condition.    - Shave biopsy procedure note, location(s): left shoulder . After discussion of benefits and risks including but not limited to bleeding, infection, scar, incomplete removal, recurrence, and non-diagnostic biopsy, verbal consent and photographs were obtained. The area was cleaned with isopropyl alcohol. 0.5mL of 1% lidocaine with epinephrine was injected to obtain adequate anesthesia of lesion(s). Shave biopsy at site(s) performed. Hemostasis was achieved with aluminium chloride. Petrolatum ointment and a sterile dressing were applied.  The patient tolerated the procedure and no complications were noted. The patient was provided with verbal and written post care instructions.     Follow-up: 4 month(s) in-person, or earlier for new or changing lesions    Staff and Resident and Scribe:     Scribe Disclosure:   By signing my name below, I, Maria Luisa Florentino, attest that this document has been prepared under the direction and in the presence of Jean Kenny MD      Electronically signed by: Maria Luisa Florentino 6/5/25    Narciso Macdonald MD  Dermatology Resident, PGY-3     Staff Physician Comments:   I saw and evaluated the patient with the resident and I agree with the assessment and plan.  I was present for the examination.    Jean Kenny MD, FAAD, FACP    Departments of Internal Medicine and Dermatology  [facility]       ____________________________________________    CC: Derm Problem (Maura is here for HS follow up. Reports cyst like areas on back that will not heal. Wondering about little white spots on fingers.)    HPI:  Ms. Klarissa Curtis is a(n) 50 year old female who presents today as a return patient for HS.  Last seen in clinic on 2/27/25 by me.     # Hidradenitis suppurativa,              - She is not sure adalimumab is helping much. Continue Adalimumab 80 mg weekly.for now   - Continue calcium 1200 mg and vitamin D 2000    - Continue Resorcinol BID. Do not apply to open wounds.               - Clobetasol ointment BID prn              - Started semaglutide 2/27/25   - started dapsone on 3/14/25    Today, she states that she has a cyst that has been present for about 3 months. The cyst has been draining and slightly painful.       Patient is otherwise feeling well, without additional skin concerns.    HS Nurse Assessment        5/30/2024     4:13 PM 2/27/2025     4:31 PM 6/5/2025     1:47 PM   Nurse Assessment Data   Over the past 30 days how many old lesions flared back up? 10 8 10   Over the past 30 days how  "many new lesions did you get? unknown \"probably a few\" 5 2   Over the past week, how many dressing changes do you do each day? 1 0 0   Over the past week, has your wound drainage been: Moderate Moderate Moderate   Rate your HS overall from 0 - 10 (0 = no disease, 10 = worst) over the past week:  4 3 2   Rate your pain score from 0 - 10 (0 = no disease, 10 = worst) for the most painful/symptomatic lesion in the past week:  2 2 3   Over the past week, how much has HS influenced your quality of life? moderately moderately moderately       Physical Exam:  Vitals: /78   Wt 96.8 kg (213 lb 6.4 oz)   BMI 33.42 kg/m    SKIN: Full skin, which includes the head/face, both arms, chest, back, abdomen,both legs, genitalia and/or groin buttocks, digits and/or nails, was examined.  - red papulonodule on the left shoulder with purulent drainage but atypical keratotic crust   - right axilla-no HS at suture site  - 2 cystic nodule on the lower abdomen  - 1 small cyst suprapubic  - 1 cyst perianal area    - No other lesions of concern on areas examined.     HS Data      1/11/2024     2:55 PM 5/30/2024     4:47 PM 6/5/2025     2:18 PM   HS Exam Data   LC Type LC1 LC1 LC1   Clinical Subtypes Regular type Regular type Regular type   Acne? No No No   Dissecting Cellulitis? No No No   Visual analogue score (0-100) 60     Total Julian Stage II II II   Total Inflammatory Nodules 5 6 5   Total Abcesses 1 1 0   Total Draining Tunnels 3 3 5   Total Abscess and Nodule Count 6 7 5   IHS4 Score  19 20 25   Total  HASI Score 12 28 55   HS-PGA  3 3       Medications:  Current Outpatient Medications   Medication Sig Dispense Refill    clobetasol propionate (TEMOVATE) 0.05 % external cream Apply twice daily to affected regions as directed 45 g 2    dapsone (ACZONE) 100 MG tablet Take 2 tablets (200 mg) by mouth daily. 30 tablet 0    hydrOXYzine HCl (ATARAX) 25 MG tablet Take 1-2 tablets (25-50 mg) by mouth 3 times daily as needed for itching. " 60 tablet 1    mupirocin (BACTROBAN) 2 % external ointment Apply twice daily for 2 weeks on the left shoulder until resolved. 15 g 2    traZODone (DESYREL) 150 MG tablet Take 1.5 tablets (225 mg) by mouth at bedtime. 135 tablet 3    Adalimumab 80 MG/0.8ML AJKT Inject 80 mg subcutaneously every 7 days. Dispense 4 pens = 3.2 mL for 28 day supply. (Patient not taking: Reported on 6/5/2025) 4 each 11    calcium carbonate (OS-MERCY) 500 MG tablet Take 1 tablet by mouth 2 times daily.      camphor-menthol (DERMASARRA) 0.5-0.5 % external lotion Apply to itchy areas on buttocks every 4-6 hrs as needed for itching 59 mL 5    celecoxib (CELEBREX) 200 MG capsule Take 1 capsule (200 mg) by mouth 2 times daily as needed for moderate pain. 180 capsule 1    clotrimazole (LOTRIMIN) 1 % external cream Apply topically 2 times daily. 30 g 1    dapsone (ACZONE) 100 MG tablet Take 1 tablet (100 mg) by mouth daily. (Patient not taking: Reported on 6/5/2025) 30 tablet 0    DULoxetine (CYMBALTA) 30 MG capsule 90mg daily (Patient taking differently: Take 90 mg by mouth every morning. 90mg daily) 270 capsule 3    fexofenadine (ALLEGRA) 180 MG tablet Take 1 tablet (180 mg) by mouth daily. (Patient taking differently: Take 180 mg by mouth as needed.) 90 tablet 4    medical cannabis (Patient's own supply) See Admin Instructions (The purpose of this order is to document that the patient reports taking medical cannabis.  This is not a prescription, and is not used to certify that the patient has a qualifying medical condition.)      naloxone (NARCAN) 4 MG/0.1ML nasal spray Spray 1 spray (4 mg) into one nostril alternating nostrils as needed for opioid reversal. every 2-3 minutes until assistance arrives 0.2 mL 0    ondansetron (ZOFRAN ODT) 4 MG ODT tab Take 1 tablet (4 mg) by mouth every 8 hours as needed for nausea. (Patient not taking: Reported on 6/4/2025) 4 tablet 0    oxyCODONE (ROXICODONE) 5 MG tablet Take 1-2 tablets (5-10 mg) by mouth every  6 hours as needed for moderate to severe pain. 8 tablet 0    oxyCODONE-acetaminophen (PERCOCET) 5-325 MG tablet Take 1-2 tablets by mouth every 6 hours as needed for severe pain. Take 1-2 percocet every 6-8 hrs as needed for pain. May fill/start now. (Patient not taking: Reported on 6/5/2025) 30 tablet 0    polyethylene glycol (MIRALAX) 17 GM/Dose powder Take 17 g by mouth 2 times daily      pregabalin (LYRICA) 150 MG capsule Take 1 capsule (150 mg) by mouth 2 times daily. 180 capsule 1    senna-docusate (SENOKOT-S/PERICOLACE) 8.6-50 MG tablet Take 1-2 tablets by mouth 2 times daily. 30 tablet 0    Vitamin D3 50 mcg (2000 units) tablet Take 1 tablet (50 mcg) by mouth daily 90 tablet 3     No current facility-administered medications for this visit.      Past Medical History:   Patient Active Problem List   Diagnosis    Hidradenitis suppurativa    Soft tissue infection    Insomnia, unspecified type    S/P flap graft    Adenomatous colon polyp     Past Medical History:   Diagnosis Date    Hidradenitis suppurativa     Insomnia     NO ACTIVE PROBLEMS         CC Jean Kenny MD  [address] [place] [address] on close of this encounter.

## 2025-06-04 NOTE — PROGRESS NOTES
"Plastic Surgery Outpatient Visit    ID: Klarissa Curtis is a 50 year old female PMH HS now s/p Right axillary flap revision and thinning - Right 5/21/2025 with Dr. Mendoza.     S: Doing well. No issues as surgical site. Wore sports bra for a week but now wearing regular bra.     O:  /71 (BP Location: Left arm, Patient Position: Chair, Cuff Size: Adult Regular)   Pulse 93   Ht 1.702 m (5' 7\")   Wt 96.2 kg (212 lb)   SpO2 97%   BMI 33.20 kg/m     General: NAD  R axilla incision C/D/I. No erythema, no significant swelling.   L axilla with wu stage II    Path:   Final Diagnosis   A. Skin and soft tissue, right axillary, excision:  - Skin and underlying soft tissue with focal erosion, dermal scar, and patchy chronic inflammation.     A/P:  -R axillary incision healing well. Start aquafor/vaseline daily for a few weeks. Ok to wear bra of choice, make sure band does not rub on incision. Continue to limit activity with R arm for a few more weeks. Follow up as needed for R axilla  -she is actively losing weight, is down 35 lbs, with a goal to lose 30lb more. She is on a GLP-1.   -interested in breast reduction and panniculectomy. She will get to goal weight and then stabilize for 6 months, then come to see us to discuss further. Will need to recalculate schnur.     Cherelle Au PA-C  Plastic and Reconstructive Surgery    20 minutes spent on the date of the encounter doing chart review, history and physical, dressing changes, documentation and further activity as noted above.    "

## 2025-06-04 NOTE — LETTER
"6/4/2025       RE: Klarissa Curtis  [address]  [facility] [address]     Dear Colleague,    Thank you for referring your patient, Klarissa Curtis, to the [facility] PLASTIC AND RECONSTRUCTIVE SURGERY CLINIC [place] at [facility]. Please see a copy of my visit note below.    Plastic Surgery Outpatient Visit    ID: Klarissa Curtis is a 50 year old female PMH HS now s/p Right axillary flap revision and thinning - Right 5/21/2025 with Dr. Mendoza.     S: Doing well. No issues as surgical site. Wore sports bra for a week but now wearing regular bra.     O:  /71 (BP Location: Left arm, Patient Position: Chair, Cuff Size: Adult Regular)   Pulse 93   Ht 1.702 m (5' 7\")   Wt 96.2 kg (212 lb)   SpO2 97%   BMI 33.20 kg/m     General: NAD  R axilla incision C/D/I. No erythema, no significant swelling.   L axilla with wu stage II    Path:   Final Diagnosis   A. Skin and soft tissue, right axillary, excision:  - Skin and underlying soft tissue with focal erosion, dermal scar, and patchy chronic inflammation.     A/P:  -R axillary incision healing well. Start aquafor/vaseline daily for a few weeks. Ok to wear bra of choice, make sure band does not rub on incision. Continue to limit activity with R arm for a few more weeks. Follow up as needed for R axilla  -she is actively losing weight, is down 35 lbs, with a goal to lose 30lb more. She is on a GLP-1.   -interested in breast reduction and panniculectomy. She will get to goal weight and then stabilize for 6 months, then come to see us to discuss further. Will need to recalculate schnur.     Cherelle Au PA-C  Plastic and Reconstructive Surgery    20 minutes spent on the date of the encounter doing chart review, history and physical, dressing changes, documentation and further activity as noted above.      Again, thank you for allowing me to participate in the care of your patient.  "     Sincerely,    Cherelle Au PA-C

## 2025-06-04 NOTE — NURSING NOTE
"Chief Complaint   Patient presents with    RECHECK     Maura, is being seen today for a 2 wk post-op DOS 5/21/25       Vitals:    06/04/25 0819   BP: 109/71   BP Location: Left arm   Patient Position: Chair   Cuff Size: Adult Regular   Pulse: 93   SpO2: 97%   Weight: 96.2 kg (212 lb)   Height: 1.702 m (5' 7\")       Body mass index is 33.2 kg/m .      Carmela Haley LPN    "

## 2025-06-05 ENCOUNTER — OFFICE VISIT (OUTPATIENT)
Dept: DERMATOLOGY | Facility: CLINIC | Age: 50
End: 2025-06-05
Attending: DERMATOLOGY
Payer: COMMERCIAL

## 2025-06-05 VITALS — BODY MASS INDEX: 33.42 KG/M2 | WEIGHT: 213.4 LBS | SYSTOLIC BLOOD PRESSURE: 116 MMHG | DIASTOLIC BLOOD PRESSURE: 78 MMHG

## 2025-06-05 DIAGNOSIS — L29.9 ITCHING: ICD-10-CM

## 2025-06-05 DIAGNOSIS — L98.9 PAINFUL SKIN LESION: ICD-10-CM

## 2025-06-05 DIAGNOSIS — Z51.81 MEDICATION MONITORING ENCOUNTER: ICD-10-CM

## 2025-06-05 DIAGNOSIS — G47.00 INSOMNIA, UNSPECIFIED TYPE: ICD-10-CM

## 2025-06-05 DIAGNOSIS — L73.2 HIDRADENITIS SUPPURATIVA: Primary | ICD-10-CM

## 2025-06-05 DIAGNOSIS — D48.9 NEOPLASM OF UNCERTAIN BEHAVIOR: ICD-10-CM

## 2025-06-05 LAB
GRAM STAIN RESULT: ABNORMAL

## 2025-06-05 PROCEDURE — 3074F SYST BP LT 130 MM HG: CPT | Performed by: DERMATOLOGY

## 2025-06-05 PROCEDURE — 87205 SMEAR GRAM STAIN: CPT | Performed by: DERMATOLOGY

## 2025-06-05 PROCEDURE — 88312 SPECIAL STAINS GROUP 1: CPT | Mod: TC | Performed by: DERMATOLOGY

## 2025-06-05 PROCEDURE — 99214 OFFICE O/P EST MOD 30 MIN: CPT | Mod: 25 | Performed by: DERMATOLOGY

## 2025-06-05 PROCEDURE — 88312 SPECIAL STAINS GROUP 1: CPT | Mod: 26 | Performed by: STUDENT IN AN ORGANIZED HEALTH CARE EDUCATION/TRAINING PROGRAM

## 2025-06-05 PROCEDURE — 11900 INJECT SKIN LESIONS </W 7: CPT | Mod: GC | Performed by: DERMATOLOGY

## 2025-06-05 PROCEDURE — 3078F DIAST BP <80 MM HG: CPT | Performed by: DERMATOLOGY

## 2025-06-05 PROCEDURE — 11102 TANGNTL BX SKIN SINGLE LES: CPT | Mod: GC | Performed by: DERMATOLOGY

## 2025-06-05 PROCEDURE — 88305 TISSUE EXAM BY PATHOLOGIST: CPT | Mod: 26 | Performed by: STUDENT IN AN ORGANIZED HEALTH CARE EDUCATION/TRAINING PROGRAM

## 2025-06-05 PROCEDURE — 99000 SPECIMEN HANDLING OFFICE-LAB: CPT | Performed by: PATHOLOGY

## 2025-06-05 RX ORDER — LIDOCAINE HYDROCHLORIDE AND EPINEPHRINE 10; 10 MG/ML; UG/ML
3 INJECTION, SOLUTION INFILTRATION; PERINEURAL ONCE
Status: DISCONTINUED | OUTPATIENT
Start: 2025-06-05 | End: 2025-06-05

## 2025-06-05 RX ORDER — MUPIROCIN 2 %
OINTMENT (GRAM) TOPICAL
Qty: 15 G | Refills: 2 | Status: SHIPPED | OUTPATIENT
Start: 2025-06-05

## 2025-06-05 RX ORDER — DAPSONE 100 MG/1
200 TABLET ORAL DAILY
Qty: 30 TABLET | Refills: 0 | Status: SHIPPED | OUTPATIENT
Start: 2025-06-05

## 2025-06-05 RX ORDER — TRAZODONE HYDROCHLORIDE 150 MG/1
225 TABLET ORAL AT BEDTIME
Qty: 135 TABLET | Refills: 3 | Status: SHIPPED | OUTPATIENT
Start: 2025-06-05

## 2025-06-05 RX ORDER — HYDROXYZINE HYDROCHLORIDE 25 MG/1
25-50 TABLET, FILM COATED ORAL 3 TIMES DAILY PRN
Qty: 60 TABLET | Refills: 1 | Status: SHIPPED | OUTPATIENT
Start: 2025-06-05

## 2025-06-05 RX ORDER — CLOBETASOL PROPIONATE 0.5 MG/G
CREAM TOPICAL
Qty: 45 G | Refills: 2 | Status: SHIPPED | OUTPATIENT
Start: 2025-06-05

## 2025-06-05 NOTE — NURSING NOTE
Drug Administration Record    Prior to injection, verified patient identity using patient's name and date of birth.  Due to injection administration, patient instructed to remain in clinic for 15 minutes  afterwards, and to report any adverse reaction to me immediately.    Drug Name: triamcinolone acetonide(kenalog)  Dose: 4mL of triamcinolone 10mg/mL, 40mg dose  Route administered: ID  NDC #: 4992-4108-66  Amount of waste(mL):1  Reason for waste: Single use vial    LOT #: [id number]  : [employer]  EXPIRATION DATE: 9/2027

## 2025-06-05 NOTE — LETTER
6/5/2025       RE: Klarissa Curtis  [address]  [facility] [address]     Dear Colleague,    Thank you for referring your patient, Klarissa Curtis, to the [facility] DERMATOLOGY CLINIC [place] at [facility]. Please see a copy of my visit note below.    [facility] Dermatology Note  Encounter Date: Jun 5, 2025  Office Visit     Start Time: 2:15p  End Time:  2:43p    Dermatology Problem List:  0. NUB, left shoulder; favoring inflamed cyst but rule out atypical keratinocytic neoplasm   - shave biopsy 6/5/25  1. Hidradenitis suppurativa: diagnosed age 11,   - Current: Humira 80mg weekly, oxycodone, resorcinol, dapsone 100 mg daily, clobetasol 0.05% ointment  - s/p right axillary excision and right muscle-sparing latissimus musculocutaneous flap reconstruction 2/29/2024; revision and thinning 5/21/25.   - Prior: gabapentin, nfliximab (1 dose and got charged), prednisone taper, clobetasol, topical morphine, ILK, oral antibiotics, rifampin, sirolimus 2 mg (started 4/22/21) finasteride, spironolactone (6/2019- 9/5/19, stopped due to abnormal uterine bleeding), topical morphine gel, and percocet for severe pain, Cosentyx, belbuca  2. Keratosis pilaris  3. L olecranon bursitis.   4. Dermatographism  - Tx: fexofenadine 360 mg BID  5. Eruption NOS. Both resolved.  - ears, upper chest, and back, responded to Lidex. Started ~9/28/23; resolved ~1/11/24.  - punch biopsy 8/5/21: superficial and deep perivascular and interstitial infiltrate of neutrophils, with focal leukocytoclasis and bluish, amorphous material suggestive of neutrophil degranulation, and lesser amounts of eosinophils and lymphocytes. Background dermal edema is noted. Focal neutrophilic epitheliotropism is noted.  6. Pink linear patches, ddx: follicular dermatitis vs lichen spinulosus vs KP, posterior shoulders   - amlactin, lidex   7. Seborrheic dermatitis  SHx: [place]     ____________________________________________    Assessment & Plan:  # Hidradenitis suppurativa, chronic.               - s/p Right axillary flap revision and thinning - Right 5/21/2025 with Dr. Mendoza    - continue  hydroxyzine 25 mg-take 1-2 tablets as needed for itching   - start mupirocin 2% ointment-apply to left shoulder twice a day until resolved              - Continue Adalimumab 80 mg weekly for now  - Continue calcium 1200 mg and vitamin D 2000    - Continue Resorcinol BID. Do not apply to open wounds.               - Clobetasol 0.05% ointment BID prn flaring              - On semaglutide (down 35 lbs)              - Increase dapsone  to 200 mg daily  (CBC q2 weeks for a month then monthly)    - Bacterial culture done today on the left shoulder   - ILK injection done today    # Neoplasm of unspecified behavior of the skin (D49.2) on the left shoulder. The differential diagnosis includes favoring inflamed cyst but rule out atypical keratinocytic neoplasm. .   - Shave biopsy performed today (see procedure note(s) below).     The longitudinal plan of care for the diagnosis(es)/condition(s) as documented were addressed during this visit. Due to the added complexity in care, I will continue to support Maura in the subsequent management and with ongoing continuity of care      Procedures Performed:   - Intra-lesional triamcinolone procedure note. After verbal consent and review of risk of pain and skin thinning/atrophy, positioning and cleansing with isopropyl alcohol, 40 total mL of triamcinolone 10 mg/mL was injected into  nodule (s) on the back. The patient tolerated the procedure well and left the dermatology clinic in good condition.    - Shave biopsy procedure note, location(s): left shoulder . After discussion of benefits and risks including but not limited to bleeding, infection, scar, incomplete removal, recurrence, and non-diagnostic biopsy, verbal  consent and photographs were obtained. The area was cleaned with isopropyl alcohol. 0.5mL of 1% lidocaine with epinephrine was injected to obtain adequate anesthesia of lesion(s). Shave biopsy at site(s) performed. Hemostasis was achieved with aluminium chloride. Petrolatum ointment and a sterile dressing were applied. The patient tolerated the procedure and no complications were noted. The patient was provided with verbal and written post care instructions.     Follow-up: 4 month(s) in-person, or earlier for new or changing lesions    Staff and Resident and Scribe:     Scribe Disclosure:   By signing my name below, I, Maria Luisa Mishel, attest that this document has been prepared under the direction and in the presence of Jean Kenny MD      Electronically signed by: Maria Luisa Florentino 6/5/25    Narciso Macdonald MD  Dermatology Resident, PGY-3     Staff Physician Comments:   I saw and evaluated the patient with the resident and I agree with the assessment and plan.  I was present for the examination.    Jean Kenny MD, FAAD, FACP    Departments of Internal Medicine and Dermatology  [facility]       ____________________________________________    CC: Derm Problem (Maura is here for HS follow up. Reports cyst like areas on back that will not heal. Wondering about little white spots on fingers.)    HPI:  Ms. Klarissa Curtis is a(n) 50 year old female who presents today as a return patient for HS.  Last seen in clinic on 2/27/25 by me.     # Hidradenitis suppurativa,              - She is not sure adalimumab is helping much. Continue Adalimumab 80 mg weekly.for now   - Continue calcium 1200 mg and vitamin D 2000    - Continue Resorcinol BID. Do not apply to open wounds.               - Clobetasol ointment BID prn              - Started semaglutide 2/27/25   - started dapsone on 3/14/25    Today, she states that she has a cyst that has been present for about 3 months. The cyst has been  "draining and slightly painful.       Patient is otherwise feeling well, without additional skin concerns.    HS Nurse Assessment        5/30/2024     4:13 PM 2/27/2025     4:31 PM 6/5/2025     1:47 PM   Nurse Assessment Data   Over the past 30 days how many old lesions flared back up? 10 8 10   Over the past 30 days how many new lesions did you get? unknown \"probably a few\" 5 2   Over the past week, how many dressing changes do you do each day? 1 0 0   Over the past week, has your wound drainage been: Moderate Moderate Moderate   Rate your HS overall from 0 - 10 (0 = no disease, 10 = worst) over the past week:  4 3 2   Rate your pain score from 0 - 10 (0 = no disease, 10 = worst) for the most painful/symptomatic lesion in the past week:  2 2 3   Over the past week, how much has HS influenced your quality of life? moderately moderately moderately       Physical Exam:  Vitals: /78   Wt 96.8 kg (213 lb 6.4 oz)   BMI 33.42 kg/m    SKIN: Full skin, which includes the head/face, both arms, chest, back, abdomen,both legs, genitalia and/or groin buttocks, digits and/or nails, was examined.  - red papulonodule on the left shoulder with purulent drainage but atypical keratotic crust   - right axilla-no HS at suture site  - 2 cystic nodule on the lower abdomen  - 1 small cyst suprapubic  - 1 cyst perianal area    - No other lesions of concern on areas examined.     HS Data      1/11/2024     2:55 PM 5/30/2024     4:47 PM 6/5/2025     2:18 PM   HS Exam Data   LC Type LC1 LC1 LC1   Clinical Subtypes Regular type Regular type Regular type   Acne? No No No   Dissecting Cellulitis? No No No   Visual analogue score (0-100) 60     Total Julian Stage II II II   Total Inflammatory Nodules 5 6 5   Total Abcesses 1 1 0   Total Draining Tunnels 3 3 5   Total Abscess and Nodule Count 6 7 5   IHS4 Score  19 20 25   Total  HASI Score 12 28 55   HS-PGA  3 3       Medications:  Current Outpatient Medications   Medication Sig Dispense " Refill     clobetasol propionate (TEMOVATE) 0.05 % external cream Apply twice daily to affected regions as directed 45 g 2     dapsone (ACZONE) 100 MG tablet Take 2 tablets (200 mg) by mouth daily. 30 tablet 0     hydrOXYzine HCl (ATARAX) 25 MG tablet Take 1-2 tablets (25-50 mg) by mouth 3 times daily as needed for itching. 60 tablet 1     mupirocin (BACTROBAN) 2 % external ointment Apply twice daily for 2 weeks on the left shoulder until resolved. 15 g 2     traZODone (DESYREL) 150 MG tablet Take 1.5 tablets (225 mg) by mouth at bedtime. 135 tablet 3     Adalimumab 80 MG/0.8ML AJKT Inject 80 mg subcutaneously every 7 days. Dispense 4 pens = 3.2 mL for 28 day supply. (Patient not taking: Reported on 6/5/2025) 4 each 11     calcium carbonate (OS-MERCY) 500 MG tablet Take 1 tablet by mouth 2 times daily.       camphor-menthol (DERMASARRA) 0.5-0.5 % external lotion Apply to itchy areas on buttocks every 4-6 hrs as needed for itching 59 mL 5     celecoxib (CELEBREX) 200 MG capsule Take 1 capsule (200 mg) by mouth 2 times daily as needed for moderate pain. 180 capsule 1     clotrimazole (LOTRIMIN) 1 % external cream Apply topically 2 times daily. 30 g 1     dapsone (ACZONE) 100 MG tablet Take 1 tablet (100 mg) by mouth daily. (Patient not taking: Reported on 6/5/2025) 30 tablet 0     DULoxetine (CYMBALTA) 30 MG capsule 90mg daily (Patient taking differently: Take 90 mg by mouth every morning. 90mg daily) 270 capsule 3     fexofenadine (ALLEGRA) 180 MG tablet Take 1 tablet (180 mg) by mouth daily. (Patient taking differently: Take 180 mg by mouth as needed.) 90 tablet 4     medical cannabis (Patient's own supply) See Admin Instructions (The purpose of this order is to document that the patient reports taking medical cannabis.  This is not a prescription, and is not used to certify that the patient has a qualifying medical condition.)       naloxone (NARCAN) 4 MG/0.1ML nasal spray Spray 1 spray (4 mg) into one nostril  alternating nostrils as needed for opioid reversal. every 2-3 minutes until assistance arrives 0.2 mL 0     ondansetron (ZOFRAN ODT) 4 MG ODT tab Take 1 tablet (4 mg) by mouth every 8 hours as needed for nausea. (Patient not taking: Reported on 6/4/2025) 4 tablet 0     oxyCODONE (ROXICODONE) 5 MG tablet Take 1-2 tablets (5-10 mg) by mouth every 6 hours as needed for moderate to severe pain. 8 tablet 0     oxyCODONE-acetaminophen (PERCOCET) 5-325 MG tablet Take 1-2 tablets by mouth every 6 hours as needed for severe pain. Take 1-2 percocet every 6-8 hrs as needed for pain. May fill/start now. (Patient not taking: Reported on 6/5/2025) 30 tablet 0     polyethylene glycol (MIRALAX) 17 GM/Dose powder Take 17 g by mouth 2 times daily       pregabalin (LYRICA) 150 MG capsule Take 1 capsule (150 mg) by mouth 2 times daily. 180 capsule 1     senna-docusate (SENOKOT-S/PERICOLACE) 8.6-50 MG tablet Take 1-2 tablets by mouth 2 times daily. 30 tablet 0     Vitamin D3 50 mcg (2000 units) tablet Take 1 tablet (50 mcg) by mouth daily 90 tablet 3     No current facility-administered medications for this visit.      Past Medical History:   Patient Active Problem List   Diagnosis     Hidradenitis suppurativa     Soft tissue infection     Insomnia, unspecified type     S/P flap graft     Adenomatous colon polyp     Past Medical History:   Diagnosis Date     Hidradenitis suppurativa      Insomnia      NO ACTIVE PROBLEMS         CC Jean Kenny MD  [address] [place] [address] on close of this encounter.     Again, thank you for allowing me to participate in the care of your patient.      Sincerely,    Jean Kenny MD

## 2025-06-09 ENCOUNTER — VIRTUAL VISIT (OUTPATIENT)
Dept: PHARMACY | Facility: CLINIC | Age: 50
End: 2025-06-09
Attending: DERMATOLOGY
Payer: COMMERCIAL

## 2025-06-09 DIAGNOSIS — L73.2 HIDRADENITIS SUPPURATIVA: Primary | ICD-10-CM

## 2025-06-09 LAB
BACTERIA SKIN AEROBE CULT: ABNORMAL
BACTERIA SKIN AEROBE CULT: ABNORMAL
GRAM STAIN RESULT: ABNORMAL

## 2025-06-09 RX ORDER — CALCIUM CARBONATE 500(1250)
1 TABLET ORAL 2 TIMES DAILY
COMMUNITY

## 2025-06-09 NOTE — PROGRESS NOTES
Medication Therapy Management (MTM) Encounter    ASSESSMENT:                            Hidradenitis suppurativa:   Flares persists. Continues Humira 80 mg weekly for prevention; modestly effective but alternative options limited at this time. Missed a couple of doses without any worsened HS flares. Will increase dapsone to 200 mg daily after next CBC lab. Discussed we could consider trial of Bimzelx but due to lack of response to Cosentyx in the past unknown if this will be more effective. Alternatively, could retry for coverage of Rinvoq. Will discuss with Dr. Kenny.     PLAN:                            Continue Humira 80 mg once weekly     Check CBC lab, then increase dapsone to 2 tablets (200 mg) daily     Will discuss the alternative treatment options with Dr. Kenny   Bimzelx 320 mg every 14 days (Cosentyx 300 mg every 4 weeks in past was not effective)   Retry for coverage of Rinvoq for comorbid dermatitis or Crohn's dosing    Follow-up: via [employer]â„¢ Audio message with updates on treatment plan moving forward and for 3 month follow-up if treatment plan is modified     SUBJECTIVE/OBJECTIVE:                          Maura Curtis is a 50 year old female called for a follow-up visit.      Reason for visit: Humira follow-up     Medication Adherence/Access:   Rinvoq coverage:   - PA & appeal previously denied. Could retry 2025 with new insurance  - PAP: annual income must be <= $87,480, does not qualify   Humira coverage:   - 80 mg weekly covered to 01/07/2026     Hidradenitis suppurativa/Seborrheic dermatitis:   - Humira (adalimumab) 80 mg injection once weekly   - Dapsone 100 mg daily (plans to increase to 200 mg daily pending updated CBC)   - Resorcinol 15% cream twice daily as needed for flares  - clobetasol 0.05% cream as needed to rash/irritation   - ILK injection intermittently   - Hydroxyzine 25 mg as needed for itch with HS flares   Side effects: None.    No major flares in HS since excisional procedure in  right axilla. Reports recurrent flare in groin & lower abdomen area but stable. Applying mupirocin ointment to area on left shoulder. Plans to increase dapsone after next CBC     Shares that she missed a couple doses of Humira and didn't have any worsened HS flares during that time - unsure if Humira is effective or not. She would be interested in consider alternative treatment if Dr. Kenny agrees with plan.     Thinks she has titrated dose of compounded semaglutide to 40 weekly - not sure of exact dose in mg but shares she is doing very well on this & has lost 35 lbs.     Specialist: Dr. Jean Kenny MD, Dermatology. Last visit 6/5/25.     Previous treatment:   - infliximab infusion every 4 weeks (2019, not effective)   - Cosentyx (secukinumab) 300 mg every 28 days (Mar 2022, not effective)   - Xeljanz (tofacitinib) 10 mg twice daily (in addition to Cosentyx, not effective)   - Rapamune (sirolimus) 2 mg daily (2021)   - Intralesional kenalog injections intermittently and oral prednisone tapers   - Hormonal agents: spironolactone (Jun 2019 to Sept 2019; stopped due to abnormal uterine bleeding), metformin, finasteride  - Oral antibiotics: doxycyline, dapsone, clindamycin plus rifampin  - Topical therapies: clindamycin 1% solution, clobetasol 0.05% cream, desonide 0.05%  - roflumilast 250 mcg daily x 4 weeks, then 500 mcg daily: caused bad migraines   - Rinvoq (upadacitinib) 15 mg tablet daily: not covered by insurance     Pre-Biologic Screening:   Hep B Surface Antigen Negative (10/30/2018)    Hep B Surface Antibody  Hep B Core Antibody  Reactive for Hep B immunity (10/30/18)    Hep C Antibody  Non-reactive (10/30/18)    HIV Antigen Antibody Negative (10/30/18)    Quantiferon TB Gold Negative (7/21/21)      Allergies/ADRs: Reviewed in chart  Past Medical History: Reviewed in chart  Tobacco: She reports that she quit smoking about 17 months ago. Her smoking use included cigarettes. She started smoking about 21  years ago. She has a 10 pack-year smoking history. She has been exposed to tobacco smoke. She has never used smokeless tobacco.  Alcohol: Reviewed in chart  ----------------    I spent 15 minutes with this patient today. All changes were made via collaborative practice agreement with Jean Kenny.     A summary of these recommendations was sent via [employer].    Loren PugaD  Medication Therapy Management Pharmacist   [facility]     Telemedicine Visit Details  The patient's medications can be safely assessed via a telemedicine encounter.  Type of service:  Telephone visit  Originating Location (pt. Location): Home    Distant Location (provider location):  Off-site  Start Time: 11:30am  End Time: 11:45am     Medication Therapy Recommendations  No medication therapy recommendations to display

## 2025-06-09 NOTE — PATIENT INSTRUCTIONS
Recommendations from today's MTM visit:                                                      Continue Humira 80 mg once weekly     Check CBC lab, then increase dapsone to 2 tablets (200 mg) daily     Will discuss the alternative treatment options with Dr. Kenny   Bimzelx 320 mg every 14 days (Cosentyx 300 mg every 4 weeks in past was not effective)   Retry for coverage of Rinvoq for comorbid dermatitis or at higher Crohn's dosing    Follow-up: via [employer] message with updates on treatment plan moving forward and for 3 month follow-up if treatment plan is modified     My Clinical Pharmacist's contact information:                                                      Please feel free to contact me with any questions or concerns you have.      Nicky Hartman, PharmD  MTM Pharmacist  [facility] Dermatology

## 2025-06-09 NOTE — Clinical Note
I'm not sure the double dose Humira is doing much for HS. She missed a couple doses of Humira & didn't have any changes in HS flares. Says things have been relatively stable since R axilla procedure but open to changing up treatment plan if you agree with any options.   Here's a couple options we could consider.  (1) Bimzelx 320 mg q 14 days (Cosentyx wasn't effective but looks like she was only on 300 mg q 4 weeks)  (2) Rinvoq - try again for Crohns dose or could try to get covered at 15 mg daily for dermatitis with option to increase to 30 mg daily   Let me know what you think or if you just want to keep Humira as is  Nicky

## 2025-06-17 ENCOUNTER — LAB (OUTPATIENT)
Dept: LAB | Facility: CLINIC | Age: 50
End: 2025-06-17
Payer: COMMERCIAL

## 2025-06-17 ENCOUNTER — RESULTS FOLLOW-UP (OUTPATIENT)
Dept: DERMATOLOGY | Facility: CLINIC | Age: 50
End: 2025-06-17

## 2025-06-17 DIAGNOSIS — Z51.81 MEDICATION MONITORING ENCOUNTER: ICD-10-CM

## 2025-06-17 LAB
BASOPHILS # BLD AUTO: 0 10E3/UL (ref 0–0.2)
BASOPHILS NFR BLD AUTO: 0 %
EOSINOPHIL # BLD AUTO: 0.7 10E3/UL (ref 0–0.7)
EOSINOPHIL NFR BLD AUTO: 8 %
ERYTHROCYTE [DISTWIDTH] IN BLOOD BY AUTOMATED COUNT: 11.9 % (ref 10–15)
HCT VFR BLD AUTO: 41.9 % (ref 35–47)
HGB BLD-MCNC: 13.6 G/DL (ref 11.7–15.7)
IMM GRANULOCYTES # BLD: 0 10E3/UL
IMM GRANULOCYTES NFR BLD: 0 %
LYMPHOCYTES # BLD AUTO: 2.4 10E3/UL (ref 0.8–5.3)
LYMPHOCYTES NFR BLD AUTO: 29 %
MCH RBC QN AUTO: 32.9 PG (ref 26.5–33)
MCHC RBC AUTO-ENTMCNC: 32.5 G/DL (ref 31.5–36.5)
MCV RBC AUTO: 102 FL (ref 78–100)
MONOCYTES # BLD AUTO: 0.5 10E3/UL (ref 0–1.3)
MONOCYTES NFR BLD AUTO: 6 %
NEUTROPHILS # BLD AUTO: 4.5 10E3/UL (ref 1.6–8.3)
NEUTROPHILS NFR BLD AUTO: 55 %
PLATELET # BLD AUTO: 394 10E3/UL (ref 150–450)
RBC # BLD AUTO: 4.13 10E6/UL (ref 3.8–5.2)
WBC # BLD AUTO: 8.1 10E3/UL (ref 4–11)

## 2025-06-17 PROCEDURE — 36415 COLL VENOUS BLD VENIPUNCTURE: CPT

## 2025-06-17 PROCEDURE — 85025 COMPLETE CBC W/AUTO DIFF WBC: CPT

## 2025-06-22 DIAGNOSIS — L73.2 HIDRADENITIS SUPPURATIVA: ICD-10-CM

## 2025-06-25 NOTE — TELEPHONE ENCOUNTER
Last Written Prescription:  dapsone (ACZONE) 100 MG tablet 30 tablet 0 6/5/2025 -- No   Sig - Route: Take 2 tablets (200 mg) by mouth daily. - Oral     ----------------------  Last Visit Date: 6-5-25  Future Visit Date: none    Refill decision: Medication unable to be refilled by RN due to: Medication not included in refill protocol policy for dermatology        Request from pharmacy:  Requested Prescriptions   Pending Prescriptions Disp Refills    dapsone (ACZONE) 100 MG tablet [Pharmacy Med Name: DAPSONE 100 MG TABLET] 30 tablet 0     Sig: TAKE 2 TABLETS BY MOUTH EVERY DAY       PJP Prophylactic Antibiotics Failed - 6/25/2025  3:03 PM        Failed - Has GFR on file in past 12 months and most recent value is normal        Failed - Medication indicated for associated diagnosis     Medication is associated with one or more of the following diagnoses:    PJP prophylaxis for HIV   Immunosuppressed patients            Failed - Most recent serum potassium is <5 mmol/L, if drug is Bactrim in past 3 months        Failed - Authorizing provider s specialty is infectious disease, Hemonc, transplant, pulmonary, rheum        Passed - Medication is active on med list and the sig matches. RN to manually verify dose and sig if red X/fail.     There is an originating order for the medication on the patient's medication list. Missing or discontinued medications are not active orders.    If the protocol passes (green check), you do not need to verify med dose and sig.    A prescription matches if they are the same clinical intention.    For Example: once daily and every morning are the same.    For all fails (red x), verify dose and sig.    If the refill does match what is on file, the RN can still proceed to approve the refill request.     If they do not match, route to the appropriate provider.          Passed - Recent (12 month) or future (90 days) visit with authorizing provider's specialty (provided they have been seen in the  past 15 months)     The patient must have completed an in-person or virtual visit within the past 12 months or has a future visit scheduled within the next 90 days with the authorizing provider s specialty.  Urgent care and e-visits do not qualify as an office visit for this protocol.          Passed - Patient is 18 years of age or older

## 2025-06-30 DIAGNOSIS — L73.2 HIDRADENITIS SUPPURATIVA: ICD-10-CM

## 2025-06-30 RX ORDER — DAPSONE 100 MG/1
200 TABLET ORAL DAILY
Qty: 60 TABLET | Refills: 0 | Status: SHIPPED | OUTPATIENT
Start: 2025-06-30

## 2025-07-08 DIAGNOSIS — L73.2 HIDRADENITIS SUPPURATIVA: ICD-10-CM

## 2025-07-08 DIAGNOSIS — F32.A DEPRESSION, UNSPECIFIED DEPRESSION TYPE: ICD-10-CM

## 2025-07-09 RX ORDER — DAPSONE 100 MG/1
200 TABLET ORAL DAILY
Qty: 60 TABLET | Refills: 0 | Status: SHIPPED | OUTPATIENT
Start: 2025-07-09

## 2025-07-11 NOTE — TELEPHONE ENCOUNTER
Last Written Prescription:   Disp Refills Start End KEITH   DULoxetine (CYMBALTA) 30 MG capsule 270 capsule 3 2024 -- No   Simg daily   Patient taking differently: Take 90 mg by mouth every morning. 90mg daily     ----------------------  Last Visit Date: 2025  [facility]      Future Visit Date: 0  ----------------------    Refill decision: Medication unable to be refilled by RN due to:     Medication not on refill protocol.          Request from pharmacy:  Requested Prescriptions   Pending Prescriptions Disp Refills    DULoxetine (CYMBALTA) 30 MG capsule [Pharmacy Med Name: DULOXETINE HCL DR 30 MG CAP] 270 capsule 3     Sig: TAKE 3 CAPSULES BY MOUTH DAILY       Serotonin-Norepinephrine Reuptake Inhibitors  Failed - 2025 10:35 AM        Failed - PHQ-9 score of less than 5 in past 6 months     Please review last PHQ-9 score.       2019     4:33 PM 2024    11:48 AM   PHQ-9 SCORE   PHQ-9 Total Score MyChart  13 (Moderate depression)   PHQ-9 Total Score 3 13             Failed - Medication is active on med list and the sig matches. RN to manually verify dose and sig if red X/fail.     If the protocol passes (green check), you do not need to verify med dose and sig.    A prescription matches if they are the same clinical intention.    For Example: once daily and every morning are the same.    The protocol can not identify upper and lower case letters as matching and will fail.     For Example: Take 1 tablet (50 mg) by mouth daily     TAKE 1 TABLET (50 MG) BY MOUTH DAILY    For all fails (red x), verify dose and sig.    If the refill does match what is on file, the RN can still proceed to approve the refill request.       If they do not match, route to the appropriate provider.             Passed - Most recent blood pressure under 140/90 in past 12 months     BP Readings from Last 3 Encounters:   25 116/78   25 109/71   25 113/68       No  data recorded            Passed - Recent (12 month) or future (90 days) visit with authorizing provider's specialty (provided they have been seen in the past 15 months)     The patient must have completed an in-person or virtual visit within the past 12 months or has a future visit scheduled within the next 90 days with the authorizing provider s specialty.  Urgent care and e-visits do not qualify as an office visit for this protocol.          Passed - Medication indicated for associated diagnosis     Medication is associated with one or more of the following diagnoses:  Anxiety  Bipolar  Chronic musculoskeletal pain  Depression  Fibromyalgia  Headache  Migraine  Neuropathy  Obsessive compulsive disorder  Panic disorder  Social phobia  Mood disorder  Menopause  Hot flashes/Menopausal flushing  Fibromyitis  Flushing          Passed - Patient is age 18 or older        Passed - No active pregnancy on record        Passed - No positive pregnancy test in past 12 months

## 2025-07-14 ENCOUNTER — LAB (OUTPATIENT)
Dept: LAB | Facility: CLINIC | Age: 50
End: 2025-07-14
Payer: COMMERCIAL

## 2025-07-14 DIAGNOSIS — Z51.81 MEDICATION MONITORING ENCOUNTER: ICD-10-CM

## 2025-07-14 LAB
BASOPHILS # BLD AUTO: 0 10E3/UL (ref 0–0.2)
BASOPHILS NFR BLD AUTO: 0 %
EOSINOPHIL # BLD AUTO: 0.3 10E3/UL (ref 0–0.7)
EOSINOPHIL NFR BLD AUTO: 4 %
ERYTHROCYTE [DISTWIDTH] IN BLOOD BY AUTOMATED COUNT: 13.5 % (ref 10–15)
HCT VFR BLD AUTO: 38.6 % (ref 35–47)
HGB BLD-MCNC: 12.5 G/DL (ref 11.7–15.7)
IMM GRANULOCYTES # BLD: 0 10E3/UL
IMM GRANULOCYTES NFR BLD: 0 %
LYMPHOCYTES # BLD AUTO: 3 10E3/UL (ref 0.8–5.3)
LYMPHOCYTES NFR BLD AUTO: 36 %
MCH RBC QN AUTO: 31.8 PG (ref 26.5–33)
MCHC RBC AUTO-ENTMCNC: 32.4 G/DL (ref 31.5–36.5)
MCV RBC AUTO: 98 FL (ref 78–100)
MONOCYTES # BLD AUTO: 0.5 10E3/UL (ref 0–1.3)
MONOCYTES NFR BLD AUTO: 6 %
NEUTROPHILS # BLD AUTO: 4.6 10E3/UL (ref 1.6–8.3)
NEUTROPHILS NFR BLD AUTO: 55 %
PLATELET # BLD AUTO: 406 10E3/UL (ref 150–450)
RBC # BLD AUTO: 3.93 10E6/UL (ref 3.8–5.2)
WBC # BLD AUTO: 8.4 10E3/UL (ref 4–11)

## 2025-07-14 PROCEDURE — 85025 COMPLETE CBC W/AUTO DIFF WBC: CPT

## 2025-07-14 PROCEDURE — 36415 COLL VENOUS BLD VENIPUNCTURE: CPT

## 2025-07-17 RX ORDER — DULOXETIN HYDROCHLORIDE 30 MG/1
90 CAPSULE, DELAYED RELEASE ORAL EVERY MORNING
Qty: 270 CAPSULE | Refills: 3 | Status: SHIPPED | OUTPATIENT
Start: 2025-07-17

## 2025-07-18 ENCOUNTER — MYC MEDICAL ADVICE (OUTPATIENT)
Dept: DERMATOLOGY | Facility: CLINIC | Age: 50
End: 2025-07-18
Payer: COMMERCIAL

## 2025-07-18 DIAGNOSIS — R82.998 DARK URINE: Primary | ICD-10-CM

## 2025-07-18 DIAGNOSIS — L73.2 HIDRADENITIS SUPPURATIVA: ICD-10-CM

## 2025-07-21 NOTE — TELEPHONE ENCOUNTER
I was passed along a message from Dr. Kenny's clinic team in regards to Maura. She has been on Dapsone for many years (as far back as 2021 per chart review) and has developed darker urine over the past few days. She was last seen on 6/5/2025 at which time her dapsone was increased to 200mg daily. She had recent labs on 7/14/2025 which were unremarkable.     Given the small risk for hemolytic anemia with Dapsone, we will complete CBC and BMP.    This patient was briefly discussed with Dr. Yusuf.     Ti Mcneal DO  Dermatology Resident  [facility]

## 2025-07-30 ENCOUNTER — LAB (OUTPATIENT)
Dept: LAB | Facility: CLINIC | Age: 50
End: 2025-07-30
Payer: COMMERCIAL

## 2025-07-30 DIAGNOSIS — R82.998 DARK URINE: ICD-10-CM

## 2025-07-30 DIAGNOSIS — L73.2 HIDRADENITIS SUPPURATIVA: ICD-10-CM

## 2025-07-30 LAB
ANION GAP SERPL CALCULATED.3IONS-SCNC: 8 MMOL/L (ref 7–15)
BASOPHILS # BLD AUTO: 0 10E3/UL (ref 0–0.2)
BASOPHILS NFR BLD AUTO: 1 %
BUN SERPL-MCNC: 13.7 MG/DL (ref 6–20)
CALCIUM SERPL-MCNC: 8.8 MG/DL (ref 8.8–10.4)
CHLORIDE SERPL-SCNC: 105 MMOL/L (ref 98–107)
CREAT SERPL-MCNC: 0.65 MG/DL (ref 0.51–0.95)
EGFRCR SERPLBLD CKD-EPI 2021: >90 ML/MIN/1.73M2
EOSINOPHIL # BLD AUTO: 0.2 10E3/UL (ref 0–0.7)
EOSINOPHIL NFR BLD AUTO: 2 %
ERYTHROCYTE [DISTWIDTH] IN BLOOD BY AUTOMATED COUNT: 16.9 % (ref 10–15)
GLUCOSE SERPL-MCNC: 95 MG/DL (ref 70–99)
HCO3 SERPL-SCNC: 27 MMOL/L (ref 22–29)
HCT VFR BLD AUTO: 37 % (ref 35–47)
HGB BLD-MCNC: 11.8 G/DL (ref 11.7–15.7)
IMM GRANULOCYTES # BLD: 0 10E3/UL
IMM GRANULOCYTES NFR BLD: 0 %
LYMPHOCYTES # BLD AUTO: 3.1 10E3/UL (ref 0.8–5.3)
LYMPHOCYTES NFR BLD AUTO: 35 %
MCH RBC QN AUTO: 32.6 PG (ref 26.5–33)
MCHC RBC AUTO-ENTMCNC: 31.9 G/DL (ref 31.5–36.5)
MCV RBC AUTO: 102 FL (ref 78–100)
MONOCYTES # BLD AUTO: 0.6 10E3/UL (ref 0–1.3)
MONOCYTES NFR BLD AUTO: 7 %
NEUTROPHILS # BLD AUTO: 4.8 10E3/UL (ref 1.6–8.3)
NEUTROPHILS NFR BLD AUTO: 54 %
PLATELET # BLD AUTO: 417 10E3/UL (ref 150–450)
POTASSIUM SERPL-SCNC: 4.2 MMOL/L (ref 3.4–5.3)
RBC # BLD AUTO: 3.62 10E6/UL (ref 3.8–5.2)
SODIUM SERPL-SCNC: 140 MMOL/L (ref 135–145)
WBC # BLD AUTO: 8.7 10E3/UL (ref 4–11)

## 2025-07-30 PROCEDURE — 85025 COMPLETE CBC W/AUTO DIFF WBC: CPT

## 2025-07-30 PROCEDURE — 36415 COLL VENOUS BLD VENIPUNCTURE: CPT

## 2025-07-30 PROCEDURE — 80048 BASIC METABOLIC PNL TOTAL CA: CPT

## 2025-07-31 ENCOUNTER — RESULTS FOLLOW-UP (OUTPATIENT)
Dept: DERMATOLOGY | Facility: CLINIC | Age: 50
End: 2025-07-31
Payer: COMMERCIAL

## 2025-07-31 DIAGNOSIS — L73.2 HIDRADENITIS SUPPURATIVA: ICD-10-CM

## 2025-07-31 RX ORDER — DAPSONE 100 MG/1
200 TABLET ORAL DAILY
Qty: 180 TABLET | Refills: 1 | Status: SHIPPED | OUTPATIENT
Start: 2025-07-31

## 2025-08-18 ENCOUNTER — ANCILLARY PROCEDURE (OUTPATIENT)
Dept: GENERAL RADIOLOGY | Facility: CLINIC | Age: 50
End: 2025-08-18
Attending: INTERNAL MEDICINE
Payer: COMMERCIAL

## 2025-08-18 ENCOUNTER — LAB (OUTPATIENT)
Dept: LAB | Facility: CLINIC | Age: 50
End: 2025-08-18
Payer: COMMERCIAL

## 2025-08-18 ENCOUNTER — OFFICE VISIT (OUTPATIENT)
Dept: INTERNAL MEDICINE | Facility: CLINIC | Age: 50
End: 2025-08-18
Payer: COMMERCIAL

## 2025-08-18 VITALS
OXYGEN SATURATION: 91 % | WEIGHT: 210.9 LBS | DIASTOLIC BLOOD PRESSURE: 69 MMHG | HEART RATE: 86 BPM | SYSTOLIC BLOOD PRESSURE: 104 MMHG | RESPIRATION RATE: 16 BRPM | TEMPERATURE: 97.6 F | HEIGHT: 67 IN | BODY MASS INDEX: 33.1 KG/M2

## 2025-08-18 DIAGNOSIS — R09.02 HYPOXIA: ICD-10-CM

## 2025-08-18 DIAGNOSIS — R53.83 FATIGUE, UNSPECIFIED TYPE: ICD-10-CM

## 2025-08-18 DIAGNOSIS — R05.2 SUBACUTE COUGH: ICD-10-CM

## 2025-08-18 DIAGNOSIS — D75.89 MACROCYTOSIS: ICD-10-CM

## 2025-08-18 DIAGNOSIS — R23.3 EASY BRUISING: ICD-10-CM

## 2025-08-18 DIAGNOSIS — N95.1 MENOPAUSAL SYNDROME (HOT FLASHES): ICD-10-CM

## 2025-08-18 DIAGNOSIS — M79.672 BILATERAL FOOT PAIN: ICD-10-CM

## 2025-08-18 DIAGNOSIS — M79.671 BILATERAL FOOT PAIN: ICD-10-CM

## 2025-08-18 DIAGNOSIS — R77.8 LOW SERUM HAPTOGLOBIN: ICD-10-CM

## 2025-08-18 DIAGNOSIS — R82.998 DARK URINE: ICD-10-CM

## 2025-08-18 DIAGNOSIS — D64.9 ANEMIA, UNSPECIFIED TYPE: ICD-10-CM

## 2025-08-18 DIAGNOSIS — R41.840 CONCENTRATION DEFICIT: ICD-10-CM

## 2025-08-18 DIAGNOSIS — Z00.00 ROUTINE GENERAL MEDICAL EXAMINATION AT A HEALTH CARE FACILITY: Primary | ICD-10-CM

## 2025-08-18 LAB
ALBUMIN SERPL BCG-MCNC: 3.8 G/DL (ref 3.5–5.2)
ALBUMIN UR-MCNC: NEGATIVE MG/DL
ALP SERPL-CCNC: 66 U/L (ref 40–150)
ALT SERPL W P-5'-P-CCNC: 9 U/L (ref 0–50)
APPEARANCE UR: CLEAR
APTT PPP: 35 SECONDS (ref 22–38)
AST SERPL W P-5'-P-CCNC: 18 U/L (ref 0–45)
BILIRUB SERPL-MCNC: 0.5 MG/DL
BILIRUB UR QL STRIP: NEGATIVE
BILIRUBIN DIRECT (ROCHE PRO & PURE): 0.24 MG/DL (ref 0–0.45)
COLOR UR AUTO: YELLOW
DAT POLY: NEGATIVE
ERYTHROCYTE [DISTWIDTH] IN BLOOD BY AUTOMATED COUNT: 13.7 % (ref 10–15)
GLUCOSE UR STRIP-MCNC: NEGATIVE MG/DL
HAPTOGLOB SERPL-MCNC: <10 MG/DL (ref 30–200)
HCT VFR BLD AUTO: 36.7 % (ref 35–47)
HGB BLD-MCNC: 11.5 G/DL (ref 11.7–15.7)
HGB UR QL STRIP: NEGATIVE
INR PPP: 1.02 (ref 0.85–1.15)
KETONES UR STRIP-MCNC: NEGATIVE MG/DL
LDH SERPL L TO P-CCNC: 222 U/L (ref 0–250)
LEUKOCYTE ESTERASE UR QL STRIP: NEGATIVE
MCH RBC QN AUTO: 33.2 PG (ref 26.5–33)
MCHC RBC AUTO-ENTMCNC: 31.3 G/DL (ref 31.5–36.5)
MCV RBC AUTO: 106.1 FL (ref 78–100)
NITRATE UR QL: NEGATIVE
PH UR STRIP: 6.5 [PH] (ref 5–7)
PLATELET # BLD AUTO: 393 10E3/UL (ref 150–450)
PROT SERPL-MCNC: 7 G/DL (ref 6.4–8.3)
PROTHROMBIN TIME: 13.6 SECONDS (ref 11.8–14.8)
RBC # BLD AUTO: 3.46 10E6/UL (ref 3.8–5.2)
RETICS # AUTO: 0.17 10E6/UL (ref 0.03–0.1)
RETICS/RBC NFR AUTO: 4.88 % (ref 0.5–2)
SP GR UR STRIP: 1.02 (ref 1–1.03)
SPECIMEN EXP DATE BLD: NORMAL
TSH SERPL DL<=0.005 MIU/L-ACNC: 1.4 UIU/ML (ref 0.3–4.2)
UROBILINOGEN UR STRIP-MCNC: NORMAL MG/DL
WBC # BLD AUTO: 7.74 10E3/UL (ref 4–11)

## 2025-08-18 PROCEDURE — 85610 PROTHROMBIN TIME: CPT | Performed by: PATHOLOGY

## 2025-08-18 PROCEDURE — 3074F SYST BP LT 130 MM HG: CPT | Performed by: INTERNAL MEDICINE

## 2025-08-18 PROCEDURE — 85027 COMPLETE CBC AUTOMATED: CPT | Performed by: PATHOLOGY

## 2025-08-18 PROCEDURE — 99000 SPECIMEN HANDLING OFFICE-LAB: CPT | Performed by: PATHOLOGY

## 2025-08-18 PROCEDURE — 86880 COOMBS TEST DIRECT: CPT

## 2025-08-18 PROCEDURE — 80076 HEPATIC FUNCTION PANEL: CPT | Performed by: PATHOLOGY

## 2025-08-18 PROCEDURE — 85045 AUTOMATED RETICULOCYTE COUNT: CPT | Performed by: PATHOLOGY

## 2025-08-18 PROCEDURE — 81003 URINALYSIS AUTO W/O SCOPE: CPT | Performed by: PATHOLOGY

## 2025-08-18 PROCEDURE — 99214 OFFICE O/P EST MOD 30 MIN: CPT | Mod: 25 | Performed by: INTERNAL MEDICINE

## 2025-08-18 PROCEDURE — 99396 PREV VISIT EST AGE 40-64: CPT | Mod: 24 | Performed by: INTERNAL MEDICINE

## 2025-08-18 PROCEDURE — 83010 ASSAY OF HAPTOGLOBIN QUANT: CPT | Performed by: INTERNAL MEDICINE

## 2025-08-18 PROCEDURE — 85730 THROMBOPLASTIN TIME PARTIAL: CPT | Performed by: PATHOLOGY

## 2025-08-18 PROCEDURE — 3078F DIAST BP <80 MM HG: CPT | Performed by: INTERNAL MEDICINE

## 2025-08-18 PROCEDURE — 71046 X-RAY EXAM CHEST 2 VIEWS: CPT | Mod: GC | Performed by: RADIOLOGY

## 2025-08-18 PROCEDURE — 83615 LACTATE (LD) (LDH) ENZYME: CPT | Performed by: PATHOLOGY

## 2025-08-18 PROCEDURE — 84443 ASSAY THYROID STIM HORMONE: CPT | Performed by: PATHOLOGY

## 2025-08-18 PROCEDURE — 36415 COLL VENOUS BLD VENIPUNCTURE: CPT | Performed by: PATHOLOGY

## 2025-08-18 SDOH — HEALTH STABILITY: PHYSICAL HEALTH: ON AVERAGE, HOW MANY MINUTES DO YOU ENGAGE IN EXERCISE AT THIS LEVEL?: 40 MIN

## 2025-08-18 SDOH — HEALTH STABILITY: PHYSICAL HEALTH: ON AVERAGE, HOW MANY DAYS PER WEEK DO YOU ENGAGE IN MODERATE TO STRENUOUS EXERCISE (LIKE A BRISK WALK)?: 3 DAYS

## 2025-08-18 ASSESSMENT — ANXIETY QUESTIONNAIRES
IF YOU CHECKED OFF ANY PROBLEMS ON THIS QUESTIONNAIRE, HOW DIFFICULT HAVE THESE PROBLEMS MADE IT FOR YOU TO DO YOUR WORK, TAKE CARE OF THINGS AT HOME, OR GET ALONG WITH OTHER PEOPLE: SOMEWHAT DIFFICULT
GAD7 TOTAL SCORE: 1
1. FEELING NERVOUS, ANXIOUS, OR ON EDGE: NOT AT ALL
2. NOT BEING ABLE TO STOP OR CONTROL WORRYING: NOT AT ALL
6. BECOMING EASILY ANNOYED OR IRRITABLE: NOT AT ALL
5. BEING SO RESTLESS THAT IT IS HARD TO SIT STILL: SEVERAL DAYS
7. FEELING AFRAID AS IF SOMETHING AWFUL MIGHT HAPPEN: NOT AT ALL
4. TROUBLE RELAXING: NOT AT ALL
3. WORRYING TOO MUCH ABOUT DIFFERENT THINGS: NOT AT ALL
GAD7 TOTAL SCORE: 1

## 2025-08-18 ASSESSMENT — PATIENT HEALTH QUESTIONNAIRE - PHQ9: SUM OF ALL RESPONSES TO PHQ QUESTIONS 1-9: 9

## 2025-08-18 ASSESSMENT — SOCIAL DETERMINANTS OF HEALTH (SDOH): HOW OFTEN DO YOU GET TOGETHER WITH FRIENDS OR RELATIVES?: ONCE A WEEK

## 2025-08-19 ENCOUNTER — PATIENT OUTREACH (OUTPATIENT)
Dept: CARE COORDINATION | Facility: CLINIC | Age: 50
End: 2025-08-19
Payer: COMMERCIAL

## 2025-08-20 ENCOUNTER — LAB (OUTPATIENT)
Dept: LAB | Facility: CLINIC | Age: 50
End: 2025-08-20
Payer: COMMERCIAL

## 2025-08-20 ENCOUNTER — MYC MEDICAL ADVICE (OUTPATIENT)
Dept: DERMATOLOGY | Facility: CLINIC | Age: 50
End: 2025-08-20
Payer: COMMERCIAL

## 2025-08-20 DIAGNOSIS — R53.83 FATIGUE, UNSPECIFIED TYPE: Primary | ICD-10-CM

## 2025-08-20 DIAGNOSIS — R77.8 LOW SERUM HAPTOGLOBIN: ICD-10-CM

## 2025-08-20 DIAGNOSIS — R53.83 FATIGUE, UNSPECIFIED TYPE: ICD-10-CM

## 2025-08-20 DIAGNOSIS — Z51.81 MEDICATION MONITORING ENCOUNTER: ICD-10-CM

## 2025-08-20 DIAGNOSIS — D64.9 ANEMIA, UNSPECIFIED TYPE: ICD-10-CM

## 2025-08-20 DIAGNOSIS — R09.02 HYPOXIA: ICD-10-CM

## 2025-08-20 DIAGNOSIS — D75.89 MACROCYTOSIS: ICD-10-CM

## 2025-08-20 LAB
BASOPHILS # BLD AUTO: 0.04 10E3/UL (ref 0–0.2)
BASOPHILS NFR BLD AUTO: 0.4 %
D DIMER PPP FEU-MCNC: <0.27 UG/ML FEU (ref 0–0.5)
EOSINOPHIL # BLD AUTO: 0.1 10E3/UL (ref 0–0.7)
EOSINOPHIL NFR BLD AUTO: 1.1 %
ERYTHROCYTE [DISTWIDTH] IN BLOOD BY AUTOMATED COUNT: 13.2 % (ref 10–15)
HCT VFR BLD AUTO: 37.2 % (ref 35–47)
HGB BLD-MCNC: 11.9 G/DL (ref 11.7–15.7)
IMM GRANULOCYTES # BLD: <0.04 10E3/UL
IMM GRANULOCYTES NFR BLD: 0.1 %
LYMPHOCYTES # BLD AUTO: 2.35 10E3/UL (ref 0.8–5.3)
LYMPHOCYTES NFR BLD AUTO: 25.4 %
MCH RBC QN AUTO: 33.6 PG (ref 26.5–33)
MCHC RBC AUTO-ENTMCNC: 32 G/DL (ref 31.5–36.5)
MCV RBC AUTO: 105.1 FL (ref 78–100)
METHGB MFR BLD: 7.7 % (ref 0–3)
MONOCYTES # BLD AUTO: 0.42 10E3/UL (ref 0–1.3)
MONOCYTES NFR BLD AUTO: 4.5 %
NEUTROPHILS # BLD AUTO: 6.33 10E3/UL (ref 1.6–8.3)
NEUTROPHILS NFR BLD AUTO: 68.5 %
PLATELET # BLD AUTO: 404 10E3/UL (ref 150–450)
RBC # BLD AUTO: 3.54 10E6/UL (ref 3.8–5.2)
RETICS # AUTO: 0.17 10E6/UL (ref 0.03–0.1)
RETICS/RBC NFR AUTO: 4.83 % (ref 0.5–2)
WBC # BLD AUTO: 9.25 10E3/UL (ref 4–11)

## 2025-08-20 PROCEDURE — 83880 ASSAY OF NATRIURETIC PEPTIDE: CPT

## 2025-08-20 PROCEDURE — 83050 HGB METHEMOGLOBIN QUAN: CPT

## 2025-08-20 PROCEDURE — 85060 BLOOD SMEAR INTERPRETATION: CPT | Performed by: PATHOLOGY

## 2025-08-20 PROCEDURE — 85379 FIBRIN DEGRADATION QUANT: CPT

## 2025-08-20 PROCEDURE — 85025 COMPLETE CBC W/AUTO DIFF WBC: CPT

## 2025-08-20 PROCEDURE — 36415 COLL VENOUS BLD VENIPUNCTURE: CPT

## 2025-08-20 PROCEDURE — 85045 AUTOMATED RETICULOCYTE COUNT: CPT

## 2025-08-21 ENCOUNTER — PATIENT OUTREACH (OUTPATIENT)
Dept: CARE COORDINATION | Facility: CLINIC | Age: 50
End: 2025-08-21
Payer: COMMERCIAL

## 2025-08-21 LAB
NT-PROBNP SERPL-MCNC: 230 PG/ML (ref 0–192)
PATH REPORT.COMMENTS IMP SPEC: NORMAL
PATH REPORT.COMMENTS IMP SPEC: NORMAL
PATH REPORT.FINAL DX SPEC: NORMAL
PATH REPORT.MICROSCOPIC SPEC OTHER STN: NORMAL
PATH REPORT.MICROSCOPIC SPEC OTHER STN: NORMAL

## (undated) DEVICE — GLOVE SURG PI ULTRA TOUCH M SZ 8-1/2 LF

## (undated) DEVICE — KIT ENDO FIRST STEP DISINFECTANT 200ML W/POUCH EP-4

## (undated) DEVICE — COLLECTION KIT E SWAB REG 220245

## (undated) DEVICE — SUCTION SLEEVE NEPTUNE 2 165MM 0703-005-165

## (undated) DEVICE — SPLINT ORTH FBGLS ORTHO-GLASS 4INX15IN OG-4PC

## (undated) DEVICE — STPL SKIN 35W ROTATING HEAD PRW35

## (undated) DEVICE — GLOVE BIOGEL PI ULTRATOUCH G SZ 8.5 42185

## (undated) DEVICE — LINEN TOWEL PACK X5 5464

## (undated) DEVICE — BNDG ELASTIC 3"X5YDS UNSTERILE 6611-30

## (undated) DEVICE — DRAPE IOBAN INCISE 23X17" 6650EZ

## (undated) DEVICE — SU STRATAFIX MONOCRYL 3-0 SPIRAL PS-2 45CM SXMP1B107

## (undated) DEVICE — DRAIN JACKSON PRATT RESERVOIR 100ML SU130-1305

## (undated) DEVICE — DRSG ADAPTIC 3X8" 6113

## (undated) DEVICE — ESU GROUND PAD ADULT W/CORD E7507

## (undated) DEVICE — SU DERMABOND PRINEO 22CM CLR222US

## (undated) DEVICE — ESU ELEC BLADE 2.75" COATED/INSULATED E1455

## (undated) DEVICE — GLOVE BIOGEL PI MICRO INDICATOR UNDERGLOVE SZ 7.5 48975

## (undated) DEVICE — TEST TUBE W/SCREW CAP 17361

## (undated) DEVICE — SU MONOCRYL 2-0 SH 27" UND Y417H

## (undated) DEVICE — BLADE KNIFE SURG 15 371115

## (undated) DEVICE — GLOVE BIOGEL PI MICRO SZ 7.0 48570

## (undated) DEVICE — SPECIMEN CONTAINER 3OZ W/FORMALIN 59901

## (undated) DEVICE — DRSG XEROFORM 5X9" CUR253590W

## (undated) DEVICE — TUBING SUCTION MEDI-VAC 1/4"X20' N620A

## (undated) DEVICE — DRSG STERI STRIP 1/2X4" R1547

## (undated) DEVICE — KIT SPY ELITE DISP LC3006

## (undated) DEVICE — SUCTION MANIFOLD NEPTUNE 2 SYS 1 PORT 702-025-000

## (undated) DEVICE — SOL WATER IRRIG 500ML BOTTLE 2F7113

## (undated) DEVICE — PITCHER STERILE 1000ML  SSK9004A

## (undated) DEVICE — GLOVE PI ULTRATCH M LF SZ 6.5 PF CUFF TEXT STRL LF 42665

## (undated) DEVICE — SUCTION MANIFOLD NEPTUNE 2 SYS 4 PORT 0702-020-000

## (undated) DEVICE — ESU PENCIL SMOKE EVAC W/ROCKER SWITCH 0703-047-000

## (undated) DEVICE — CLIP HORIZON SM RED WIDE SLOT 001201

## (undated) DEVICE — KIT ENDO TURNOVER/PROCEDURE CARRY-ON 101822

## (undated) DEVICE — Device

## (undated) DEVICE — SYR BULB IRRIG DOVER 60 ML LATEX FREE 67000

## (undated) DEVICE — SU PDS II 0 CTX 36" Z370T

## (undated) DEVICE — DRSG GAUZE 4X4" TRAY 6939

## (undated) DEVICE — DRSG KERLIX 2 1/4"X3YDS ROLL 6720

## (undated) DEVICE — SYR 10ML FINGER CONTROL W/O NDL 309695

## (undated) DEVICE — SU SILK 2-0 TIE 12X30" A305H

## (undated) DEVICE — DRAPE U SPLIT 74X120" 29440

## (undated) DEVICE — PADDING CAST 3IN WEBRIL STRL 2394

## (undated) DEVICE — CLIP HORIZON MED BLUE 002200

## (undated) DEVICE — LINEN TOWEL PACK X6 WHITE 5487

## (undated) DEVICE — PREP POVIDONE-IODINE 7.5% SCRUB 4OZ BOTTLE MDS093945

## (undated) DEVICE — SOL NACL 0.9% IRRIG 1000ML BOTTLE 2F7124

## (undated) DEVICE — ELECTRODE PATIENT RETURN ADULT L10 FT 2 PLATE CORD 0855C

## (undated) DEVICE — LINEN TOWEL PACK X30 5481

## (undated) DEVICE — SU ETHILON 3-0 PS-1 18" 1663H

## (undated) DEVICE — PREP CHLORAPREP 26ML TINTED HI-LITE ORANGE 930815

## (undated) DEVICE — SOL NACL 0.9% IRRIG 500ML BOTTLE 2F7123

## (undated) DEVICE — NDL 21GA 1.5"

## (undated) DEVICE — ESU CORD BIPOLAR 12' E0512

## (undated) DEVICE — BASIN EMESIS STERILE  SSK9005A

## (undated) DEVICE — PREP SKIN SCRUB TRAY 4461A

## (undated) DEVICE — SUCTION CANISTER MEDIVAC LINER 3000ML W/LID 65651-530

## (undated) DEVICE — ESU ELEC NDL 1" COATED/INSULATED E1465

## (undated) DEVICE — SYR 10ML LL W/O NDL 302995

## (undated) DEVICE — PAD CHUX UNDERPAD 30X36" P3036C

## (undated) DEVICE — DRAPE STERI TOWEL SM 1000

## (undated) DEVICE — SOLIDIFIER FLD 3.2OZ  CL-FREE F/ BIOHZRD WASTE 3000ML CANIST

## (undated) DEVICE — DRAIN JACKSON PRATT CHANNEL 15FR ROUND HUBLESS SIL JP-2228

## (undated) DEVICE — SOL WATER IRRIG 1000ML BOTTLE 2F7114

## (undated) DEVICE — CUFF TOURN 18IN STRL DISP

## (undated) DEVICE — DRAPE SHEET MED 44X70" 9355

## (undated) DEVICE — PACK MINOR CUSTOM ASC

## (undated) DEVICE — GOWN IMPERVIOUS 2XL BLUE

## (undated) DEVICE — SPONGE LAP 18X18" X8435

## (undated) DEVICE — PREP POVIDONE-IODINE 10% SOLUTION 4OZ BOTTLE MDS093944

## (undated) DEVICE — DRAPE STOCKINETTE 6" 8564

## (undated) DEVICE — TUBING IRRIG TUR Y TYPE 96" LF 6543-01

## (undated) DEVICE — LABEL MEDICATION SYSTEM 3303-P

## (undated) DEVICE — CUSTOM PACK UPPER EXTREMITY SOP5BUEHEC

## (undated) DEVICE — DRSG PRIMAPORE 02X3" 7133

## (undated) DEVICE — BLADE CLIPPER DISP 4406

## (undated) RX ORDER — BUPIVACAINE HYDROCHLORIDE 2.5 MG/ML
INJECTION, SOLUTION EPIDURAL; INFILTRATION; INTRACAUDAL
Status: DISPENSED
Start: 2024-02-29

## (undated) RX ORDER — LIDOCAINE HYDROCHLORIDE 10 MG/ML
INJECTION, SOLUTION EPIDURAL; INFILTRATION; INTRACAUDAL; PERINEURAL
Status: DISPENSED
Start: 2024-06-13

## (undated) RX ORDER — DEXAMETHASONE SODIUM PHOSPHATE 4 MG/ML
INJECTION, SOLUTION INTRA-ARTICULAR; INTRALESIONAL; INTRAMUSCULAR; INTRAVENOUS; SOFT TISSUE
Status: DISPENSED
Start: 2024-02-29

## (undated) RX ORDER — ACETAMINOPHEN 325 MG/1
TABLET ORAL
Status: DISPENSED
Start: 2025-05-21

## (undated) RX ORDER — SODIUM CHLORIDE, SODIUM LACTATE, POTASSIUM CHLORIDE, CALCIUM CHLORIDE 600; 310; 30; 20 MG/100ML; MG/100ML; MG/100ML; MG/100ML
INJECTION, SOLUTION INTRAVENOUS
Status: DISPENSED
Start: 2024-02-29

## (undated) RX ORDER — ONDANSETRON 2 MG/ML
INJECTION INTRAMUSCULAR; INTRAVENOUS
Status: DISPENSED
Start: 2024-02-29

## (undated) RX ORDER — FENTANYL CITRATE 50 UG/ML
INJECTION, SOLUTION INTRAMUSCULAR; INTRAVENOUS
Status: DISPENSED
Start: 2025-05-21

## (undated) RX ORDER — CLINDAMYCIN PHOSPHATE 900 MG/50ML
INJECTION, SOLUTION INTRAVENOUS
Status: DISPENSED
Start: 2024-02-29

## (undated) RX ORDER — LABETALOL HYDROCHLORIDE 5 MG/ML
INJECTION, SOLUTION INTRAVENOUS
Status: DISPENSED
Start: 2024-02-29

## (undated) RX ORDER — HYDROMORPHONE HCL IN WATER/PF 6 MG/30 ML
PATIENT CONTROLLED ANALGESIA SYRINGE INTRAVENOUS
Status: DISPENSED
Start: 2024-02-29

## (undated) RX ORDER — ONDANSETRON 2 MG/ML
INJECTION INTRAMUSCULAR; INTRAVENOUS
Status: DISPENSED
Start: 2024-06-13

## (undated) RX ORDER — OXYCODONE HYDROCHLORIDE 5 MG/1
TABLET ORAL
Status: DISPENSED
Start: 2025-05-21

## (undated) RX ORDER — PROPOFOL 10 MG/ML
INJECTION, EMULSION INTRAVENOUS
Status: DISPENSED
Start: 2024-02-29

## (undated) RX ORDER — BUPIVACAINE HYDROCHLORIDE 5 MG/ML
INJECTION, SOLUTION EPIDURAL; INTRACAUDAL; PERINEURAL
Status: DISPENSED
Start: 2025-05-21

## (undated) RX ORDER — FENTANYL CITRATE 50 UG/ML
INJECTION, SOLUTION INTRAMUSCULAR; INTRAVENOUS
Status: DISPENSED
Start: 2024-02-29

## (undated) RX ORDER — DEXAMETHASONE SODIUM PHOSPHATE 4 MG/ML
INJECTION, SOLUTION INTRA-ARTICULAR; INTRALESIONAL; INTRAMUSCULAR; INTRAVENOUS; SOFT TISSUE
Status: DISPENSED
Start: 2024-06-13

## (undated) RX ORDER — TRIAMCINOLONE ACETONIDE 40 MG/ML
INJECTION, SUSPENSION INTRA-ARTICULAR; INTRAMUSCULAR
Status: DISPENSED
Start: 2023-03-02

## (undated) RX ORDER — BUPIVACAINE HYDROCHLORIDE 2.5 MG/ML
INJECTION, SOLUTION EPIDURAL; INFILTRATION; INTRACAUDAL; PERINEURAL
Status: DISPENSED
Start: 2025-05-21

## (undated) RX ORDER — GLYCOPYRROLATE 0.2 MG/ML
INJECTION, SOLUTION INTRAMUSCULAR; INTRAVENOUS
Status: DISPENSED
Start: 2024-02-29

## (undated) RX ORDER — SCOLOPAMINE TRANSDERMAL SYSTEM 1 MG/1
PATCH, EXTENDED RELEASE TRANSDERMAL
Status: DISPENSED
Start: 2024-02-29

## (undated) RX ORDER — PROPOFOL 10 MG/ML
INJECTION, EMULSION INTRAVENOUS
Status: DISPENSED
Start: 2024-06-13

## (undated) RX ORDER — LIDOCAINE HYDROCHLORIDE AND EPINEPHRINE 10; 10 MG/ML; UG/ML
INJECTION, SOLUTION INFILTRATION; PERINEURAL
Status: DISPENSED
Start: 2025-05-21

## (undated) RX ORDER — HYDROMORPHONE HYDROCHLORIDE 1 MG/ML
INJECTION, SOLUTION INTRAMUSCULAR; INTRAVENOUS; SUBCUTANEOUS
Status: DISPENSED
Start: 2024-02-29

## (undated) RX ORDER — CEFAZOLIN SODIUM 2 G/50ML
SOLUTION INTRAVENOUS
Status: DISPENSED
Start: 2025-05-21

## (undated) RX ORDER — METHOCARBAMOL 500 MG/1
TABLET, FILM COATED ORAL
Status: DISPENSED
Start: 2024-02-29

## (undated) RX ORDER — CEFAZOLIN SODIUM 1 G/3ML
INJECTION, POWDER, FOR SOLUTION INTRAMUSCULAR; INTRAVENOUS
Status: DISPENSED
Start: 2024-02-29